# Patient Record
Sex: FEMALE | Race: WHITE | NOT HISPANIC OR LATINO | Employment: OTHER | URBAN - NONMETROPOLITAN AREA
[De-identification: names, ages, dates, MRNs, and addresses within clinical notes are randomized per-mention and may not be internally consistent; named-entity substitution may affect disease eponyms.]

---

## 2017-09-20 ENCOUNTER — ANESTHESIA (OUTPATIENT)
Dept: PERIOP | Facility: HOSPITAL | Age: 67
End: 2017-09-20

## 2017-09-20 ENCOUNTER — APPOINTMENT (OUTPATIENT)
Dept: GENERAL RADIOLOGY | Facility: HOSPITAL | Age: 67
End: 2017-09-20

## 2017-09-20 ENCOUNTER — ANESTHESIA EVENT (OUTPATIENT)
Dept: PERIOP | Facility: HOSPITAL | Age: 67
End: 2017-09-20

## 2017-09-20 ENCOUNTER — HOSPITAL ENCOUNTER (INPATIENT)
Facility: HOSPITAL | Age: 67
LOS: 13 days | Discharge: NURSING, REHAB OR LONG-TERM CARE | End: 2017-10-03
Attending: SURGERY | Admitting: SURGERY

## 2017-09-20 DIAGNOSIS — Z74.09 IMPAIRED FUNCTIONAL MOBILITY, BALANCE, GAIT, AND ENDURANCE: ICD-10-CM

## 2017-09-20 DIAGNOSIS — Z74.09 IMPAIRED MOBILITY AND ACTIVITIES OF DAILY LIVING: ICD-10-CM

## 2017-09-20 DIAGNOSIS — Z78.9 IMPAIRED MOBILITY AND ACTIVITIES OF DAILY LIVING: ICD-10-CM

## 2017-09-20 DIAGNOSIS — K65.1 INTRA-ABDOMINAL ABSCESS (HCC): Primary | ICD-10-CM

## 2017-09-20 LAB
ANION GAP SERPL CALCULATED.3IONS-SCNC: 11 MMOL/L (ref 5–15)
ARTERIAL PATENCY WRIST A: ABNORMAL
ATMOSPHERIC PRESS: ABNORMAL MMHG
BASE EXCESS BLDA CALC-SCNC: -0.8 MMOL/L (ref -2.4–2.4)
BASOPHILS # BLD AUTO: 0.01 10*3/MM3 (ref 0–0.2)
BASOPHILS NFR BLD AUTO: 0.1 % (ref 0–2)
BDY SITE: ABNORMAL
BUN BLD-MCNC: 23 MG/DL (ref 7–21)
BUN/CREAT SERPL: 18.5 (ref 7–25)
CA-I BLD-MCNC: 3.9 MG/DL (ref 4.5–4.9)
CALCIUM SPEC-SCNC: 7.2 MG/DL (ref 8.4–10.2)
CHLORIDE SERPL-SCNC: 91 MMOL/L (ref 95–110)
CO2 BLDA-SCNC: 21.1 MMOL/L (ref 23–27)
CO2 SERPL-SCNC: 21 MMOL/L (ref 22–31)
CREAT BLD-MCNC: 1.24 MG/DL (ref 0.5–1)
DEPRECATED RDW RBC AUTO: 46.5 FL (ref 36.4–46.3)
EOSINOPHIL # BLD AUTO: 0 10*3/MM3 (ref 0–0.7)
EOSINOPHIL NFR BLD AUTO: 0 % (ref 0–7)
ERYTHROCYTE [DISTWIDTH] IN BLOOD BY AUTOMATED COUNT: 14.2 % (ref 11.5–14.5)
GFR SERPL CREATININE-BSD FRML MDRD: 43 ML/MIN/1.73 (ref 60–104)
GLUCOSE BLD-MCNC: 129 MG/DL (ref 60–100)
GLUCOSE BLDA-MCNC: 127 MMOL/L
HCO3 BLDA-SCNC: 20.3 MMOL/L (ref 22–26)
HCT VFR BLD AUTO: 34.9 % (ref 35–45)
HCT VFR BLD CALC: 36 % (ref 38–47)
HGB BLD-MCNC: 11.5 G/DL (ref 12–15.5)
HGB BLDA-MCNC: 12.3 G/DL (ref 12–16)
IMM GRANULOCYTES # BLD: 0.13 10*3/MM3 (ref 0–0.02)
IMM GRANULOCYTES NFR BLD: 1 % (ref 0–0.5)
LYMPHOCYTES # BLD AUTO: 0.79 10*3/MM3 (ref 0.6–4.2)
LYMPHOCYTES NFR BLD AUTO: 6.2 % (ref 10–50)
MCH RBC QN AUTO: 29.6 PG (ref 26.5–34)
MCHC RBC AUTO-ENTMCNC: 33 G/DL (ref 31.4–36)
MCV RBC AUTO: 89.9 FL (ref 80–98)
MODALITY: ABNORMAL
MONOCYTES # BLD AUTO: 0.22 10*3/MM3 (ref 0–0.9)
MONOCYTES NFR BLD AUTO: 1.7 % (ref 0–12)
NEUTROPHILS # BLD AUTO: 11.58 10*3/MM3 (ref 2–8.6)
NEUTROPHILS NFR BLD AUTO: 91 % (ref 37–80)
PCO2 BLDA: 24.5 MM HG (ref 35–45)
PH BLDA: 7.54 PH UNITS (ref 7.35–7.45)
PLATELET # BLD AUTO: 192 10*3/MM3 (ref 150–450)
PMV BLD AUTO: 10.4 FL (ref 8–12)
PO2 BLDA: 203.7 MM HG (ref 80–105)
POTASSIUM BLD-SCNC: 3.4 MMOL/L (ref 3.5–5.1)
POTASSIUM BLDA-SCNC: 3.34 MMOL/L (ref 3.6–4.9)
RBC # BLD AUTO: 3.88 10*6/MM3 (ref 3.77–5.16)
SAO2 % BLDCOA: 99.4 %
SODIUM BLD-SCNC: 123 MMOL/L (ref 137–145)
SODIUM BLDA-SCNC: 124.7 MMOL/L (ref 138–146)
WBC NRBC COR # BLD: 12.73 10*3/MM3 (ref 3.2–9.8)

## 2017-09-20 PROCEDURE — 99283 EMERGENCY DEPT VISIT LOW MDM: CPT

## 2017-09-20 PROCEDURE — 25010000002 HYDROMORPHONE PER 4 MG: Performed by: ANESTHESIOLOGY

## 2017-09-20 PROCEDURE — 94799 UNLISTED PULMONARY SVC/PX: CPT

## 2017-09-20 PROCEDURE — 44140 PARTIAL REMOVAL OF COLON: CPT | Performed by: SURGERY

## 2017-09-20 PROCEDURE — 80048 BASIC METABOLIC PNL TOTAL CA: CPT | Performed by: SURGERY

## 2017-09-20 PROCEDURE — 82803 BLOOD GASES ANY COMBINATION: CPT | Performed by: SURGERY

## 2017-09-20 PROCEDURE — 94002 VENT MGMT INPAT INIT DAY: CPT

## 2017-09-20 PROCEDURE — 25010000002 PROPOFOL 10 MG/ML EMULSION: Performed by: ANESTHESIOLOGY

## 2017-09-20 PROCEDURE — 88307 TISSUE EXAM BY PATHOLOGIST: CPT | Performed by: PATHOLOGY

## 2017-09-20 PROCEDURE — 25010000002 PHENYLEPHRINE PER 1 ML: Performed by: ANESTHESIOLOGY

## 2017-09-20 PROCEDURE — 25010000002 FENTANYL CITRATE (PF) 100 MCG/2ML SOLUTION: Performed by: ANESTHESIOLOGY

## 2017-09-20 PROCEDURE — 71010 HC CHEST PA OR AP: CPT

## 2017-09-20 PROCEDURE — 85025 COMPLETE CBC W/AUTO DIFF WBC: CPT | Performed by: SURGERY

## 2017-09-20 PROCEDURE — 25010000002 SUCCINYLCHOLINE PER 20 MG: Performed by: ANESTHESIOLOGY

## 2017-09-20 PROCEDURE — 25010000002 HEPARIN (PORCINE) PER 1000 UNITS: Performed by: SURGERY

## 2017-09-20 PROCEDURE — 94760 N-INVAS EAR/PLS OXIMETRY 1: CPT

## 2017-09-20 PROCEDURE — 99024 POSTOP FOLLOW-UP VISIT: CPT | Performed by: SURGERY

## 2017-09-20 PROCEDURE — C1751 CATH, INF, PER/CENT/MIDLINE: HCPCS | Performed by: ANESTHESIOLOGY

## 2017-09-20 PROCEDURE — 44320 COLOSTOMY: CPT | Performed by: SURGERY

## 2017-09-20 PROCEDURE — 25010000002 MIDAZOLAM 50 MG/10ML SOLUTION 10 ML VIAL: Performed by: SURGERY

## 2017-09-20 PROCEDURE — 25010000002 LEVOFLOXACIN PER 250 MG: Performed by: SURGERY

## 2017-09-20 PROCEDURE — 25010000002 MIDAZOLAM PER 1 MG: Performed by: ANESTHESIOLOGY

## 2017-09-20 PROCEDURE — 88307 TISSUE EXAM BY PATHOLOGIST: CPT | Performed by: SURGERY

## 2017-09-20 RX ORDER — HYDROMORPHONE HCL 110MG/55ML
0.5 PATIENT CONTROLLED ANALGESIA SYRINGE INTRAVENOUS
Status: DISCONTINUED | OUTPATIENT
Start: 2017-09-20 | End: 2017-09-25

## 2017-09-20 RX ORDER — FENTANYL CITRATE 50 UG/ML
50 INJECTION, SOLUTION INTRAMUSCULAR; INTRAVENOUS
Status: DISCONTINUED | OUTPATIENT
Start: 2017-09-20 | End: 2017-09-25

## 2017-09-20 RX ORDER — PROPOFOL 10 MG/ML
VIAL (ML) INTRAVENOUS AS NEEDED
Status: DISCONTINUED | OUTPATIENT
Start: 2017-09-20 | End: 2017-09-20 | Stop reason: SURG

## 2017-09-20 RX ORDER — SUCCINYLCHOLINE CHLORIDE 20 MG/ML
INJECTION INTRAMUSCULAR; INTRAVENOUS AS NEEDED
Status: DISCONTINUED | OUTPATIENT
Start: 2017-09-20 | End: 2017-09-20 | Stop reason: SURG

## 2017-09-20 RX ORDER — PRIMIDONE 50 MG/1
TABLET ORAL 3 TIMES DAILY
COMMUNITY

## 2017-09-20 RX ORDER — FUROSEMIDE 80 MG
80 TABLET ORAL 2 TIMES DAILY
COMMUNITY
End: 2017-10-17 | Stop reason: HOSPADM

## 2017-09-20 RX ORDER — LEVOFLOXACIN 5 MG/ML
500 INJECTION, SOLUTION INTRAVENOUS EVERY 24 HOURS
Status: DISCONTINUED | OUTPATIENT
Start: 2017-09-20 | End: 2017-10-03 | Stop reason: HOSPADM

## 2017-09-20 RX ORDER — SODIUM CHLORIDE 0.9 % (FLUSH) 0.9 %
1-10 SYRINGE (ML) INJECTION AS NEEDED
Status: DISCONTINUED | OUTPATIENT
Start: 2017-09-20 | End: 2017-10-03 | Stop reason: HOSPADM

## 2017-09-20 RX ORDER — ACETAMINOPHEN 650 MG/1
650 SUPPOSITORY RECTAL ONCE AS NEEDED
Status: DISCONTINUED | OUTPATIENT
Start: 2017-09-20 | End: 2017-09-25

## 2017-09-20 RX ORDER — ALBUTEROL SULFATE 90 UG/1
2 AEROSOL, METERED RESPIRATORY (INHALATION) EVERY 4 HOURS PRN
COMMUNITY

## 2017-09-20 RX ORDER — POTASSIUM CHLORIDE 750 MG/1
10 CAPSULE, EXTENDED RELEASE ORAL 3 TIMES DAILY
COMMUNITY

## 2017-09-20 RX ORDER — SODIUM CHLORIDE 9 MG/ML
100 INJECTION, SOLUTION INTRAVENOUS CONTINUOUS
Status: DISCONTINUED | OUTPATIENT
Start: 2017-09-20 | End: 2017-09-25

## 2017-09-20 RX ORDER — ONDANSETRON 2 MG/ML
4 INJECTION INTRAMUSCULAR; INTRAVENOUS ONCE AS NEEDED
Status: COMPLETED | OUTPATIENT
Start: 2017-09-20 | End: 2017-09-22

## 2017-09-20 RX ORDER — GABAPENTIN 600 MG/1
600 TABLET ORAL 3 TIMES DAILY
COMMUNITY

## 2017-09-20 RX ORDER — ONDANSETRON 2 MG/ML
4 INJECTION INTRAMUSCULAR; INTRAVENOUS EVERY 6 HOURS PRN
Status: DISCONTINUED | OUTPATIENT
Start: 2017-09-20 | End: 2017-10-03 | Stop reason: HOSPADM

## 2017-09-20 RX ORDER — ACETAMINOPHEN 325 MG/1
650 TABLET ORAL ONCE AS NEEDED
Status: DISCONTINUED | OUTPATIENT
Start: 2017-09-20 | End: 2017-09-25

## 2017-09-20 RX ORDER — FLUMAZENIL 0.1 MG/ML
0.2 INJECTION INTRAVENOUS AS NEEDED
Status: DISCONTINUED | OUTPATIENT
Start: 2017-09-20 | End: 2017-09-25

## 2017-09-20 RX ORDER — ONDANSETRON 4 MG/1
4 TABLET, FILM COATED ORAL EVERY 6 HOURS PRN
Status: DISCONTINUED | OUTPATIENT
Start: 2017-09-20 | End: 2017-10-03 | Stop reason: HOSPADM

## 2017-09-20 RX ORDER — CHLORHEXIDINE GLUCONATE 0.12 MG/ML
15 RINSE ORAL EVERY 12 HOURS SCHEDULED
Status: DISCONTINUED | OUTPATIENT
Start: 2017-09-20 | End: 2017-10-03 | Stop reason: HOSPADM

## 2017-09-20 RX ORDER — MEMANTINE HYDROCHLORIDE 7 MG/1
28 CAPSULE, EXTENDED RELEASE ORAL DAILY
COMMUNITY

## 2017-09-20 RX ORDER — SODIUM CHLORIDE 9 MG/ML
INJECTION, SOLUTION INTRAVENOUS CONTINUOUS PRN
Status: DISCONTINUED | OUTPATIENT
Start: 2017-09-20 | End: 2017-09-20 | Stop reason: SURG

## 2017-09-20 RX ORDER — DONEPEZIL HYDROCHLORIDE 5 MG/1
5 TABLET, FILM COATED ORAL NIGHTLY
COMMUNITY

## 2017-09-20 RX ORDER — ROSUVASTATIN CALCIUM 10 MG/1
10 TABLET, COATED ORAL AS NEEDED
COMMUNITY

## 2017-09-20 RX ORDER — SODIUM CHLORIDE, SODIUM GLUCONATE, SODIUM ACETATE, POTASSIUM CHLORIDE, AND MAGNESIUM CHLORIDE 526; 502; 368; 37; 30 MG/100ML; MG/100ML; MG/100ML; MG/100ML; MG/100ML
INJECTION, SOLUTION INTRAVENOUS CONTINUOUS PRN
Status: DISCONTINUED | OUTPATIENT
Start: 2017-09-20 | End: 2017-09-20 | Stop reason: SURG

## 2017-09-20 RX ORDER — NALOXONE HCL 0.4 MG/ML
0.2 VIAL (ML) INJECTION AS NEEDED
Status: DISCONTINUED | OUTPATIENT
Start: 2017-09-20 | End: 2017-09-25

## 2017-09-20 RX ORDER — IPRATROPIUM BROMIDE AND ALBUTEROL SULFATE 2.5; .5 MG/3ML; MG/3ML
3 SOLUTION RESPIRATORY (INHALATION)
Status: DISCONTINUED | OUTPATIENT
Start: 2017-09-20 | End: 2017-10-03 | Stop reason: HOSPADM

## 2017-09-20 RX ORDER — NYSTATIN 100000 [USP'U]/G
POWDER TOPICAL EVERY 12 HOURS SCHEDULED
Status: DISCONTINUED | OUTPATIENT
Start: 2017-09-20 | End: 2017-10-03 | Stop reason: HOSPADM

## 2017-09-20 RX ORDER — LEVOTHYROXINE SODIUM 0.15 MG/1
150137 TABLET ORAL DAILY
COMMUNITY
End: 2019-05-24

## 2017-09-20 RX ORDER — DIPHENHYDRAMINE HYDROCHLORIDE 50 MG/ML
12.5 INJECTION INTRAMUSCULAR; INTRAVENOUS
Status: DISCONTINUED | OUTPATIENT
Start: 2017-09-20 | End: 2017-09-25

## 2017-09-20 RX ORDER — LIDOCAINE HYDROCHLORIDE 20 MG/ML
INJECTION, SOLUTION INFILTRATION; PERINEURAL AS NEEDED
Status: DISCONTINUED | OUTPATIENT
Start: 2017-09-20 | End: 2017-09-20 | Stop reason: SURG

## 2017-09-20 RX ORDER — FAMOTIDINE 10 MG/ML
20 INJECTION, SOLUTION INTRAVENOUS 2 TIMES DAILY
Status: DISCONTINUED | OUTPATIENT
Start: 2017-09-20 | End: 2017-10-03 | Stop reason: HOSPADM

## 2017-09-20 RX ORDER — MIRTAZAPINE 30 MG/1
30 TABLET, FILM COATED ORAL NIGHTLY
COMMUNITY
End: 2019-05-24

## 2017-09-20 RX ORDER — ONDANSETRON 4 MG/1
4 TABLET, ORALLY DISINTEGRATING ORAL EVERY 6 HOURS PRN
Status: DISCONTINUED | OUTPATIENT
Start: 2017-09-20 | End: 2017-10-03 | Stop reason: HOSPADM

## 2017-09-20 RX ORDER — ONDANSETRON 4 MG/1
4 TABLET, FILM COATED ORAL EVERY 8 HOURS PRN
COMMUNITY

## 2017-09-20 RX ORDER — VECURONIUM BROMIDE 20 MG/20ML
INJECTION, POWDER, LYOPHILIZED, FOR SOLUTION INTRAVENOUS AS NEEDED
Status: DISCONTINUED | OUTPATIENT
Start: 2017-09-20 | End: 2017-09-20 | Stop reason: SURG

## 2017-09-20 RX ORDER — EPHEDRINE SULFATE 50 MG/ML
5 INJECTION, SOLUTION INTRAVENOUS ONCE AS NEEDED
Status: DISCONTINUED | OUTPATIENT
Start: 2017-09-20 | End: 2017-09-21

## 2017-09-20 RX ORDER — DICYCLOMINE HYDROCHLORIDE 10 MG/1
10 CAPSULE ORAL 4 TIMES DAILY PRN
COMMUNITY
End: 2019-05-24

## 2017-09-20 RX ORDER — LABETALOL HYDROCHLORIDE 5 MG/ML
5 INJECTION, SOLUTION INTRAVENOUS
Status: DISCONTINUED | OUTPATIENT
Start: 2017-09-20 | End: 2017-09-25

## 2017-09-20 RX ORDER — CITALOPRAM 40 MG/1
40 TABLET ORAL DAILY
COMMUNITY
End: 2019-05-24

## 2017-09-20 RX ORDER — HYDROCODONE BITARTRATE AND ACETAMINOPHEN 7.5; 325 MG/1; MG/1
1 TABLET ORAL EVERY 4 HOURS PRN
Status: ON HOLD | COMMUNITY
End: 2017-10-03

## 2017-09-20 RX ORDER — FENTANYL CITRATE 50 UG/ML
INJECTION, SOLUTION INTRAMUSCULAR; INTRAVENOUS AS NEEDED
Status: DISCONTINUED | OUTPATIENT
Start: 2017-09-20 | End: 2017-09-20 | Stop reason: SURG

## 2017-09-20 RX ORDER — HEPARIN SODIUM 5000 [USP'U]/ML
5000 INJECTION, SOLUTION INTRAVENOUS; SUBCUTANEOUS EVERY 8 HOURS SCHEDULED
Status: DISCONTINUED | OUTPATIENT
Start: 2017-09-20 | End: 2017-10-03 | Stop reason: HOSPADM

## 2017-09-20 RX ORDER — MIDAZOLAM HYDROCHLORIDE 1 MG/ML
INJECTION INTRAMUSCULAR; INTRAVENOUS AS NEEDED
Status: DISCONTINUED | OUTPATIENT
Start: 2017-09-20 | End: 2017-09-20 | Stop reason: SURG

## 2017-09-20 RX ORDER — TIZANIDINE 4 MG/1
4 TABLET ORAL EVERY 12 HOURS PRN
COMMUNITY
End: 2019-05-08

## 2017-09-20 RX ORDER — CHOLESTYRAMINE 4 G/9G
3 POWDER, FOR SUSPENSION ORAL
COMMUNITY
End: 2017-10-17 | Stop reason: HOSPADM

## 2017-09-20 RX ADMIN — PROPOFOL 150 MG: 10 INJECTION, EMULSION INTRAVENOUS at 02:14

## 2017-09-20 RX ADMIN — SODIUM CHLORIDE 125 ML/HR: 900 INJECTION, SOLUTION INTRAVENOUS at 23:23

## 2017-09-20 RX ADMIN — MIDAZOLAM 7 MG/HR: 5 INJECTION INTRAMUSCULAR; INTRAVENOUS at 22:45

## 2017-09-20 RX ADMIN — LIDOCAINE HYDROCHLORIDE 60 MG: 20 INJECTION, SOLUTION INFILTRATION; PERINEURAL at 02:14

## 2017-09-20 RX ADMIN — METRONIDAZOLE 500 MG: 500 INJECTION, SOLUTION INTRAVENOUS at 21:12

## 2017-09-20 RX ADMIN — CHLORHEXIDINE GLUCONATE 15 ML: 1.2 RINSE ORAL at 09:00

## 2017-09-20 RX ADMIN — MIDAZOLAM 1 MG: 1 INJECTION INTRAMUSCULAR; INTRAVENOUS at 02:08

## 2017-09-20 RX ADMIN — HYDROMORPHONE HYDROCHLORIDE 0.5 MG: 2 INJECTION, SOLUTION INTRAMUSCULAR; INTRAVENOUS; SUBCUTANEOUS at 09:12

## 2017-09-20 RX ADMIN — FENTANYL CITRATE 50 MCG: 50 INJECTION, SOLUTION INTRAMUSCULAR; INTRAVENOUS at 02:10

## 2017-09-20 RX ADMIN — NYSTATIN: 100000 POWDER TOPICAL at 20:31

## 2017-09-20 RX ADMIN — NYSTATIN: 100000 POWDER TOPICAL at 12:52

## 2017-09-20 RX ADMIN — HYDROMORPHONE HYDROCHLORIDE 0.5 MG: 2 INJECTION, SOLUTION INTRAMUSCULAR; INTRAVENOUS; SUBCUTANEOUS at 04:52

## 2017-09-20 RX ADMIN — VECURONIUM BROMIDE 2 MG: 1 INJECTION, POWDER, LYOPHILIZED, FOR SOLUTION INTRAVENOUS at 03:15

## 2017-09-20 RX ADMIN — CALCIUM CHLORIDE 1 G: 100 INJECTION, SOLUTION INTRAVENOUS at 07:47

## 2017-09-20 RX ADMIN — SODIUM CHLORIDE 125 ML/HR: 900 INJECTION, SOLUTION INTRAVENOUS at 12:54

## 2017-09-20 RX ADMIN — SUCCINYLCHOLINE CHLORIDE 120 MG: 20 INJECTION, SOLUTION INTRAMUSCULAR; INTRAVENOUS at 02:14

## 2017-09-20 RX ADMIN — PHENYLEPHRINE HYDROCHLORIDE 100 MCG: 10 INJECTION INTRAVENOUS at 03:30

## 2017-09-20 RX ADMIN — SODIUM CHLORIDE, SODIUM GLUCONATE, SODIUM ACETATE, POTASSIUM CHLORIDE, AND MAGNESIUM CHLORIDE: 526; 502; 368; 37; 30 INJECTION, SOLUTION INTRAVENOUS at 02:31

## 2017-09-20 RX ADMIN — SODIUM CHLORIDE: 9 INJECTION, SOLUTION INTRAVENOUS at 02:08

## 2017-09-20 RX ADMIN — MIDAZOLAM 1 MG: 1 INJECTION INTRAMUSCULAR; INTRAVENOUS at 04:17

## 2017-09-20 RX ADMIN — PHENYLEPHRINE HYDROCHLORIDE 100 MCG: 10 INJECTION INTRAVENOUS at 03:15

## 2017-09-20 RX ADMIN — VECURONIUM BROMIDE 10 MG: 1 INJECTION, POWDER, LYOPHILIZED, FOR SOLUTION INTRAVENOUS at 02:31

## 2017-09-20 RX ADMIN — LEVOFLOXACIN 500 MG: 5 INJECTION, SOLUTION INTRAVENOUS at 13:49

## 2017-09-20 RX ADMIN — HEPARIN SODIUM 5000 UNITS: 5000 INJECTION, SOLUTION INTRAVENOUS; SUBCUTANEOUS at 13:49

## 2017-09-20 RX ADMIN — METRONIDAZOLE 500 MG: 500 INJECTION, SOLUTION INTRAVENOUS at 06:14

## 2017-09-20 RX ADMIN — METRONIDAZOLE 500 MG: 500 INJECTION, SOLUTION INTRAVENOUS at 13:49

## 2017-09-20 RX ADMIN — MIDAZOLAM 1 MG/HR: 5 INJECTION INTRAMUSCULAR; INTRAVENOUS at 15:20

## 2017-09-20 RX ADMIN — HEPARIN SODIUM 5000 UNITS: 5000 INJECTION, SOLUTION INTRAVENOUS; SUBCUTANEOUS at 21:11

## 2017-09-20 RX ADMIN — PROPOFOL 20 MCG/KG/MIN: 10 INJECTION, EMULSION INTRAVENOUS at 04:43

## 2017-09-20 RX ADMIN — CHLORHEXIDINE GLUCONATE 15 ML: 1.2 RINSE ORAL at 20:31

## 2017-09-20 RX ADMIN — FAMOTIDINE 20 MG: 10 INJECTION INTRAVENOUS at 12:51

## 2017-09-20 NOTE — ANESTHESIA PROCEDURE NOTES
Arterial Line    Patient location during procedure: OR   Line placed for hemodynamic monitoring, ABGs/Labs/ISTAT and MD/Surgeon request.  Performed By   Anesthesiologist: VANESA GARG  Preanesthetic Checklist  Completed: patient identified, site marked, surgical consent, pre-op evaluation, timeout performed, IV checked, risks and benefits discussed and monitors and equipment checked  Arterial Line Prep   Sterile Tech: mask, gloves, cap, gown and sterile barriers  Prep: ChloraPrep  Patient monitoring: blood pressure monitoring, continuous pulse oximetry and EKG  Arterial Line Procedure   Laterality:right  Location:  radial artery  Catheter size: 20 G   Guidance: landmark technique and palpation technique  Number of attempts: 1  Successful placement: yes          Post Assessment   Dressing Type: biopatch applied, occlusive dressing applied, secured with tape and wrist guard applied.   Complications no  Circ/Move/Sens Assessment: normal.   Patient Tolerance: patient tolerated the procedure well with no apparent complications

## 2017-09-20 NOTE — ANESTHESIA POSTPROCEDURE EVALUATION
Patient: Irma Pacheco    Procedure Summary     Date Anesthesia Start Anesthesia Stop Room / Location    09/20/17 0208 [id number] [facility] OR       Procedure Diagnosis Surgeon Provider    Laparotomy, possible colon resection, possible colostomy, possible gastrostomy tube placement (N/A Abdomen) Intra-abdominal abscess  (Intra-abdominal abscess [K65.1]) MD Owen Francis MD          Anesthesia Type: general  Last vitals  BP        Temp        Pulse       Resp        SpO2          Post Anesthesia Care and Evaluation    Patient location during evaluation: PACU  Patient participation: complete - patient cannot participate  Pain management: adequate  Airway patency: patent  Anesthetic complications: No anesthetic complications  PONV Status: none  Cardiovascular status: acceptable  Respiratory status: ventilator, intubated and ETT  Hydration status: acceptable    Comments: Patient was sedated and intubated

## 2017-09-20 NOTE — ANESTHESIA PREPROCEDURE EVALUATION
Anesthesia Evaluation     Patient summary reviewed and Nursing notes reviewed   NPO Solid Status: Waived due to emergency       Airway   Mallampati: III  TM distance: <3 FB  Neck ROM: full  difficult intubation highly probable  Dental    (+) poor dentation    Comment: Upper and lower remaining dentition in hopeless repair.    Pulmonary - normal exam    breath sounds clear to auscultation  (+) asthma, sleep apnea on CPAP,   Cardiovascular - normal exam    Rhythm: regular  Rate: normal    (+) CHF, hyperlipidemia      Neuro/Psych  (+) psychiatric history Depression,      ROS Comment: Parkinsons.  GI/Hepatic/Renal/Endo    (+) morbid obesity,     ROS Comment: HGB 11 [employer] witness. Does not want blood products.    Musculoskeletal (-) negative ROS    Abdominal   (+) obese,    Substance History - negative use     OB/GYN negative ob/gyn ROS         Other - negative ROS                                   Anesthesia Plan    ASA 4 - emergent     general   Rapid sequence(Discussed central line,arterial line,post op ventilation and patient,daughter understand possible complications,risks and agrees.)  intravenous induction   Anesthetic plan and risks discussed with patient and child.  Use of blood products discussed with patient and child  Did not consent to blood products. Special considerations: Restoration.   Plan discussed with CRNA.

## 2017-09-20 NOTE — ANESTHESIA PROCEDURE NOTES
Central Line    Patient location during procedure: OR  Indications: central pressure monitoring, vascular access and MD/Surgeon request  Staff  Anesthesiologist: VANESA GARG  Preanesthetic Checklist  Completed: patient identified, site marked, surgical consent, pre-op evaluation, timeout performed, IV checked, risks and benefits discussed and monitors and equipment checked  Central Line Prep  Sterile Tech:cap, gloves, gown, mask and sterile barriers  Prep: chloraprep  Patient monitoring: blood pressure monitoring, continuous pulse oximetry and EKG  Central Line Procedure  Laterality:right  Location:internal jugular  Catheter Type:double lumen  Catheter Size:7 Fr  Guidance:landmark technique and palpation technique  Assessment  Post procedure:biopatch applied, line sutured and occlusive dressing applied  Assessement:blood return through all ports and free fluid flow  Complications:no  Patient Tolerance:patient tolerated the procedure well with no apparent complications  Additional Notes  Ultrasound Interpretation:  Using ultrasound guidance the potential vascular sites for insertion of the catheter were visualized to determine the patency of the vessel to be used for vascular access.  After selecting the appropriate site for insertion, the needle was visualized under ultrasound being inserted into the vessel, followed by ultrasound confirmation of wire and catheter placement.  There were no abnormalities seen on ultrasound; an image was taken/ and the patient tolerated the procedure with no complications.

## 2017-09-20 NOTE — ANESTHESIA PROCEDURE NOTES
Airway  Urgency: emergent    Airway not difficult    General Information and Staff    Patient location during procedure: OR  Anesthesiologist: VANESA GARG    Consent for Airway (if performed for an anesthetic, see related documentation for consents)  Patient identity confirmed: arm band and verbally with patient  Consent: No emergent situation. Verbal consent obtained. Written consent obtained.  Risks and benefits: risks, benefits and alternatives were discussed  Consent given by: patient and guardian      Indications and Patient Condition  Indications for airway management: airway protection    Preoxygenated: yes  MILS maintained throughout  Mask difficulty assessment: 0 - not attempted    Final Airway Details  Final airway type: endotracheal airway      Successful airway: ETT  Cuffed: yes   Successful intubation technique: direct laryngoscopy  Facilitating devices/methods: cricoid pressure and intubating stylet  Endotracheal tube insertion site: oral  Blade: Srikanth  Blade size: #3  ETT size: 7.0 mm  Cormack-Lehane Classification: grade IIa - partial view of glottis  Placement verified by: chest auscultation and capnometry   Cuff volume (mL): 5  Measured from: lips  Number of attempts at approach: 1

## 2017-09-21 ENCOUNTER — APPOINTMENT (OUTPATIENT)
Dept: GENERAL RADIOLOGY | Facility: HOSPITAL | Age: 67
End: 2017-09-21

## 2017-09-21 LAB
ANION GAP SERPL CALCULATED.3IONS-SCNC: 10 MMOL/L (ref 5–15)
ARTERIAL PATENCY WRIST A: ABNORMAL
ARTERIAL PATENCY WRIST A: ABNORMAL
ATMOSPHERIC PRESS: ABNORMAL MMHG
ATMOSPHERIC PRESS: ABNORMAL MMHG
BASE EXCESS BLDA CALC-SCNC: 1.4 MMOL/L (ref -2.4–2.4)
BASE EXCESS BLDA CALC-SCNC: 1.6 MMOL/L (ref -2.4–2.4)
BDY SITE: ABNORMAL
BDY SITE: ABNORMAL
BUN BLD-MCNC: 24 MG/DL (ref 7–21)
BUN/CREAT SERPL: 19.7 (ref 7–25)
CA-I BLD-MCNC: 3.9 MG/DL (ref 4.5–4.9)
CA-I BLD-MCNC: 4 MG/DL (ref 4.5–4.9)
CALCIUM SPEC-SCNC: 7.4 MG/DL (ref 8.4–10.2)
CHLORIDE SERPL-SCNC: 97 MMOL/L (ref 95–110)
CO2 BLDA-SCNC: 25.5 MMOL/L (ref 23–27)
CO2 BLDA-SCNC: 25.9 MMOL/L (ref 23–27)
CO2 SERPL-SCNC: 22 MMOL/L (ref 22–31)
CREAT BLD-MCNC: 1.22 MG/DL (ref 0.5–1)
DEPRECATED RDW RBC AUTO: 46.6 FL (ref 36.4–46.3)
ERYTHROCYTE [DISTWIDTH] IN BLOOD BY AUTOMATED COUNT: 14 % (ref 11.5–14.5)
GFR SERPL CREATININE-BSD FRML MDRD: 44 ML/MIN/1.73 (ref 60–104)
GLUCOSE BLD-MCNC: 91 MG/DL (ref 60–100)
GLUCOSE BLDA-MCNC: 102 MMOL/L
GLUCOSE BLDA-MCNC: 95 MMOL/L
HCO3 BLDA-SCNC: 24.5 MMOL/L (ref 22–26)
HCO3 BLDA-SCNC: 24.8 MMOL/L (ref 22–26)
HCT VFR BLD AUTO: 28.6 % (ref 35–45)
HCT VFR BLD CALC: 28 % (ref 38–47)
HCT VFR BLD CALC: 30 % (ref 38–47)
HGB BLD-MCNC: 9.5 G/DL (ref 12–15.5)
HGB BLDA-MCNC: 10.2 G/DL (ref 12–16)
HGB BLDA-MCNC: 9.5 G/DL (ref 12–16)
LAB AP CASE REPORT: NORMAL
Lab: NORMAL
MCH RBC QN AUTO: 30 PG (ref 26.5–34)
MCHC RBC AUTO-ENTMCNC: 33.2 G/DL (ref 31.4–36)
MCV RBC AUTO: 90.2 FL (ref 80–98)
MODALITY: ABNORMAL
MODALITY: ABNORMAL
PATH REPORT.FINAL DX SPEC: NORMAL
PATH REPORT.GROSS SPEC: NORMAL
PCO2 BLDA: 31.9 MM HG (ref 35–45)
PCO2 BLDA: 34.9 MM HG (ref 35–45)
PH BLDA: 7.47 PH UNITS (ref 7.35–7.45)
PH BLDA: 7.5 PH UNITS (ref 7.35–7.45)
PLATELET # BLD AUTO: 160 10*3/MM3 (ref 150–450)
PMV BLD AUTO: 10.5 FL (ref 8–12)
PO2 BLDA: 86 MM HG (ref 80–105)
PO2 BLDA: 95.8 MM HG (ref 80–105)
POTASSIUM BLD-SCNC: 3.4 MMOL/L (ref 3.5–5.1)
POTASSIUM BLDA-SCNC: 3.17 MMOL/L (ref 3.6–4.9)
POTASSIUM BLDA-SCNC: 3.34 MMOL/L (ref 3.6–4.9)
RBC # BLD AUTO: 3.17 10*6/MM3 (ref 3.77–5.16)
SAO2 % BLDCOA: 96.9 % (ref 94–100)
SAO2 % BLDCOA: 97.4 % (ref 94–100)
SODIUM BLD-SCNC: 129 MMOL/L (ref 137–145)
SODIUM BLDA-SCNC: 127.5 MMOL/L (ref 138–146)
SODIUM BLDA-SCNC: 129.2 MMOL/L (ref 138–146)
WBC NRBC COR # BLD: 13.51 10*3/MM3 (ref 3.2–9.8)

## 2017-09-21 PROCEDURE — 80048 BASIC METABOLIC PNL TOTAL CA: CPT | Performed by: SURGERY

## 2017-09-21 PROCEDURE — 94799 UNLISTED PULMONARY SVC/PX: CPT

## 2017-09-21 PROCEDURE — 25010000002 ONDANSETRON PER 1 MG: Performed by: SURGERY

## 2017-09-21 PROCEDURE — 99024 POSTOP FOLLOW-UP VISIT: CPT | Performed by: SURGERY

## 2017-09-21 PROCEDURE — 94640 AIRWAY INHALATION TREATMENT: CPT

## 2017-09-21 PROCEDURE — 25010000002 LEVOFLOXACIN PER 250 MG: Performed by: SURGERY

## 2017-09-21 PROCEDURE — 94760 N-INVAS EAR/PLS OXIMETRY 1: CPT

## 2017-09-21 PROCEDURE — 85027 COMPLETE CBC AUTOMATED: CPT | Performed by: SURGERY

## 2017-09-21 PROCEDURE — 82803 BLOOD GASES ANY COMBINATION: CPT | Performed by: SURGERY

## 2017-09-21 PROCEDURE — 94003 VENT MGMT INPAT SUBQ DAY: CPT

## 2017-09-21 PROCEDURE — 25010000002 HEPARIN (PORCINE) PER 1000 UNITS: Performed by: SURGERY

## 2017-09-21 PROCEDURE — 36600 WITHDRAWAL OF ARTERIAL BLOOD: CPT

## 2017-09-21 PROCEDURE — 71010 HC CHEST PA OR AP: CPT

## 2017-09-21 PROCEDURE — 25010000002 DIPHENHYDRAMINE PER 50 MG: Performed by: ANESTHESIOLOGY

## 2017-09-21 PROCEDURE — 94660 CPAP INITIATION&MGMT: CPT

## 2017-09-21 PROCEDURE — 25010000002 HYDROMORPHONE PER 4 MG: Performed by: ANESTHESIOLOGY

## 2017-09-21 PROCEDURE — 25010000002 FENTANYL CITRATE (PF) 100 MCG/2ML SOLUTION: Performed by: SURGERY

## 2017-09-21 PROCEDURE — 25010000002 MIDAZOLAM 50 MG/10ML SOLUTION 10 ML VIAL: Performed by: SURGERY

## 2017-09-21 RX ADMIN — HEPARIN SODIUM 5000 UNITS: 5000 INJECTION, SOLUTION INTRAVENOUS; SUBCUTANEOUS at 14:30

## 2017-09-21 RX ADMIN — MIDAZOLAM 7 MG/HR: 5 INJECTION INTRAMUSCULAR; INTRAVENOUS at 05:10

## 2017-09-21 RX ADMIN — HYDROMORPHONE HYDROCHLORIDE 0.5 MG: 2 INJECTION, SOLUTION INTRAMUSCULAR; INTRAVENOUS; SUBCUTANEOUS at 09:53

## 2017-09-21 RX ADMIN — HYDROMORPHONE HYDROCHLORIDE 0.5 MG: 2 INJECTION, SOLUTION INTRAMUSCULAR; INTRAVENOUS; SUBCUTANEOUS at 14:38

## 2017-09-21 RX ADMIN — FENTANYL CITRATE 50 MCG: 50 INJECTION, SOLUTION INTRAMUSCULAR; INTRAVENOUS at 02:04

## 2017-09-21 RX ADMIN — METRONIDAZOLE 500 MG: 500 INJECTION, SOLUTION INTRAVENOUS at 14:30

## 2017-09-21 RX ADMIN — NYSTATIN: 100000 POWDER TOPICAL at 08:01

## 2017-09-21 RX ADMIN — HYDROMORPHONE HYDROCHLORIDE 0.5 MG: 2 INJECTION, SOLUTION INTRAMUSCULAR; INTRAVENOUS; SUBCUTANEOUS at 17:37

## 2017-09-21 RX ADMIN — FENTANYL CITRATE 50 MCG: 50 INJECTION, SOLUTION INTRAMUSCULAR; INTRAVENOUS at 03:15

## 2017-09-21 RX ADMIN — HYDROMORPHONE HYDROCHLORIDE 0.5 MG: 2 INJECTION, SOLUTION INTRAMUSCULAR; INTRAVENOUS; SUBCUTANEOUS at 19:31

## 2017-09-21 RX ADMIN — METRONIDAZOLE 500 MG: 500 INJECTION, SOLUTION INTRAVENOUS at 21:42

## 2017-09-21 RX ADMIN — METRONIDAZOLE 500 MG: 500 INJECTION, SOLUTION INTRAVENOUS at 05:34

## 2017-09-21 RX ADMIN — FAMOTIDINE 20 MG: 10 INJECTION INTRAVENOUS at 08:01

## 2017-09-21 RX ADMIN — DIPHENHYDRAMINE HYDROCHLORIDE 12.5 MG: 50 INJECTION INTRAMUSCULAR; INTRAVENOUS at 22:14

## 2017-09-21 RX ADMIN — NYSTATIN: 100000 POWDER TOPICAL at 21:34

## 2017-09-21 RX ADMIN — IPRATROPIUM BROMIDE AND ALBUTEROL SULFATE 3 ML: 2.5; .5 SOLUTION RESPIRATORY (INHALATION) at 19:33

## 2017-09-21 RX ADMIN — SODIUM CHLORIDE 125 ML/HR: 900 INJECTION, SOLUTION INTRAVENOUS at 08:26

## 2017-09-21 RX ADMIN — FAMOTIDINE 20 MG: 10 INJECTION INTRAVENOUS at 17:21

## 2017-09-21 RX ADMIN — SODIUM CHLORIDE 125 ML/HR: 900 INJECTION, SOLUTION INTRAVENOUS at 17:21

## 2017-09-21 RX ADMIN — HYDROMORPHONE HYDROCHLORIDE 0.5 MG: 2 INJECTION, SOLUTION INTRAMUSCULAR; INTRAVENOUS; SUBCUTANEOUS at 21:34

## 2017-09-21 RX ADMIN — CHLORHEXIDINE GLUCONATE 15 ML: 1.2 RINSE ORAL at 21:34

## 2017-09-21 RX ADMIN — HEPARIN SODIUM 5000 UNITS: 5000 INJECTION, SOLUTION INTRAVENOUS; SUBCUTANEOUS at 05:32

## 2017-09-21 RX ADMIN — IPRATROPIUM BROMIDE AND ALBUTEROL SULFATE 3 ML: 2.5; .5 SOLUTION RESPIRATORY (INHALATION) at 13:39

## 2017-09-21 RX ADMIN — HEPARIN SODIUM 5000 UNITS: 5000 INJECTION, SOLUTION INTRAVENOUS; SUBCUTANEOUS at 21:42

## 2017-09-21 RX ADMIN — HYDROMORPHONE HYDROCHLORIDE 0.5 MG: 2 INJECTION, SOLUTION INTRAMUSCULAR; INTRAVENOUS; SUBCUTANEOUS at 08:00

## 2017-09-21 RX ADMIN — CHLORHEXIDINE GLUCONATE 15 ML: 1.2 RINSE ORAL at 08:01

## 2017-09-21 RX ADMIN — LEVOFLOXACIN 500 MG: 5 INJECTION, SOLUTION INTRAVENOUS at 12:45

## 2017-09-21 RX ADMIN — ONDANSETRON 4 MG: 2 INJECTION INTRAMUSCULAR; INTRAVENOUS at 09:58

## 2017-09-22 ENCOUNTER — APPOINTMENT (OUTPATIENT)
Dept: GENERAL RADIOLOGY | Facility: HOSPITAL | Age: 67
End: 2017-09-22

## 2017-09-22 LAB
ANION GAP SERPL CALCULATED.3IONS-SCNC: 10 MMOL/L (ref 5–15)
BUN BLD-MCNC: 24 MG/DL (ref 7–21)
BUN/CREAT SERPL: 20.7 (ref 7–25)
CALCIUM SPEC-SCNC: 7.5 MG/DL (ref 8.4–10.2)
CHLORIDE SERPL-SCNC: 100 MMOL/L (ref 95–110)
CO2 SERPL-SCNC: 23 MMOL/L (ref 22–31)
CREAT BLD-MCNC: 1.16 MG/DL (ref 0.5–1)
DEPRECATED RDW RBC AUTO: 47.6 FL (ref 36.4–46.3)
ERYTHROCYTE [DISTWIDTH] IN BLOOD BY AUTOMATED COUNT: 14.3 % (ref 11.5–14.5)
GFR SERPL CREATININE-BSD FRML MDRD: 47 ML/MIN/1.73 (ref 45–104)
GLUCOSE BLD-MCNC: 79 MG/DL (ref 60–100)
HCT VFR BLD AUTO: 27.6 % (ref 35–45)
HGB BLD-MCNC: 9 G/DL (ref 12–15.5)
MCH RBC QN AUTO: 29.7 PG (ref 26.5–34)
MCHC RBC AUTO-ENTMCNC: 32.6 G/DL (ref 31.4–36)
MCV RBC AUTO: 91.1 FL (ref 80–98)
PLATELET # BLD AUTO: 188 10*3/MM3 (ref 150–450)
PMV BLD AUTO: 11.1 FL (ref 8–12)
POTASSIUM BLD-SCNC: 3.3 MMOL/L (ref 3.5–5.1)
RBC # BLD AUTO: 3.03 10*6/MM3 (ref 3.77–5.16)
SODIUM BLD-SCNC: 133 MMOL/L (ref 137–145)
WBC NRBC COR # BLD: 14.46 10*3/MM3 (ref 3.2–9.8)

## 2017-09-22 PROCEDURE — 25010000002 HYDROMORPHONE PER 4 MG: Performed by: ANESTHESIOLOGY

## 2017-09-22 PROCEDURE — 94799 UNLISTED PULMONARY SVC/PX: CPT

## 2017-09-22 PROCEDURE — 85027 COMPLETE CBC AUTOMATED: CPT | Performed by: SURGERY

## 2017-09-22 PROCEDURE — 25010000002 FENTANYL CITRATE (PF) 100 MCG/2ML SOLUTION: Performed by: SURGERY

## 2017-09-22 PROCEDURE — 25010000002 LEVOFLOXACIN PER 250 MG: Performed by: SURGERY

## 2017-09-22 PROCEDURE — 25010000003 POTASSIUM CHLORIDE 10 MEQ/100ML SOLUTION: Performed by: SURGERY

## 2017-09-22 PROCEDURE — 25010000002 ONDANSETRON PER 1 MG: Performed by: SURGERY

## 2017-09-22 PROCEDURE — 94760 N-INVAS EAR/PLS OXIMETRY 1: CPT

## 2017-09-22 PROCEDURE — 25010000002 HEPARIN (PORCINE) PER 1000 UNITS: Performed by: SURGERY

## 2017-09-22 PROCEDURE — 80048 BASIC METABOLIC PNL TOTAL CA: CPT | Performed by: SURGERY

## 2017-09-22 PROCEDURE — 99024 POSTOP FOLLOW-UP VISIT: CPT | Performed by: SURGERY

## 2017-09-22 PROCEDURE — 25010000002 ONDANSETRON PER 1 MG: Performed by: ANESTHESIOLOGY

## 2017-09-22 PROCEDURE — 94660 CPAP INITIATION&MGMT: CPT

## 2017-09-22 PROCEDURE — 71010 HC CHEST PA OR AP: CPT

## 2017-09-22 PROCEDURE — 25010000002 FUROSEMIDE PER 20 MG: Performed by: SURGERY

## 2017-09-22 RX ORDER — LEVOTHYROXINE SODIUM 0.15 MG/1
150 TABLET ORAL
Status: DISCONTINUED | OUTPATIENT
Start: 2017-09-22 | End: 2017-10-03 | Stop reason: HOSPADM

## 2017-09-22 RX ORDER — PRIMIDONE 50 MG/1
50 TABLET ORAL EVERY 8 HOURS SCHEDULED
Status: DISCONTINUED | OUTPATIENT
Start: 2017-09-22 | End: 2017-10-03 | Stop reason: HOSPADM

## 2017-09-22 RX ORDER — POTASSIUM CHLORIDE 7.45 MG/ML
10 INJECTION INTRAVENOUS
Status: COMPLETED | OUTPATIENT
Start: 2017-09-22 | End: 2017-09-22

## 2017-09-22 RX ORDER — FUROSEMIDE 10 MG/ML
20 INJECTION INTRAMUSCULAR; INTRAVENOUS DAILY
Status: DISCONTINUED | OUTPATIENT
Start: 2017-09-22 | End: 2017-09-25

## 2017-09-22 RX ORDER — MIRTAZAPINE 15 MG/1
30 TABLET, FILM COATED ORAL NIGHTLY
Status: DISCONTINUED | OUTPATIENT
Start: 2017-09-22 | End: 2017-10-03 | Stop reason: HOSPADM

## 2017-09-22 RX ORDER — CITALOPRAM 40 MG/1
40 TABLET ORAL DAILY
Status: DISCONTINUED | OUTPATIENT
Start: 2017-09-22 | End: 2017-10-03 | Stop reason: HOSPADM

## 2017-09-22 RX ADMIN — FAMOTIDINE 20 MG: 10 INJECTION INTRAVENOUS at 17:17

## 2017-09-22 RX ADMIN — MIRTAZAPINE 30 MG: 15 TABLET, FILM COATED ORAL at 20:37

## 2017-09-22 RX ADMIN — HYDROMORPHONE HYDROCHLORIDE 0.5 MG: 2 INJECTION, SOLUTION INTRAMUSCULAR; INTRAVENOUS; SUBCUTANEOUS at 07:25

## 2017-09-22 RX ADMIN — FENTANYL CITRATE 50 MCG: 50 INJECTION, SOLUTION INTRAMUSCULAR; INTRAVENOUS at 23:13

## 2017-09-22 RX ADMIN — HYDROMORPHONE HYDROCHLORIDE 0.5 MG: 2 INJECTION, SOLUTION INTRAMUSCULAR; INTRAVENOUS; SUBCUTANEOUS at 01:54

## 2017-09-22 RX ADMIN — ONDANSETRON 4 MG: 2 INJECTION INTRAMUSCULAR; INTRAVENOUS at 11:26

## 2017-09-22 RX ADMIN — IPRATROPIUM BROMIDE AND ALBUTEROL SULFATE 3 ML: 2.5; .5 SOLUTION RESPIRATORY (INHALATION) at 18:47

## 2017-09-22 RX ADMIN — METRONIDAZOLE 500 MG: 500 INJECTION, SOLUTION INTRAVENOUS at 20:36

## 2017-09-22 RX ADMIN — NYSTATIN: 100000 POWDER TOPICAL at 20:43

## 2017-09-22 RX ADMIN — HEPARIN SODIUM 5000 UNITS: 5000 INJECTION, SOLUTION INTRAVENOUS; SUBCUTANEOUS at 06:33

## 2017-09-22 RX ADMIN — FAMOTIDINE 20 MG: 10 INJECTION INTRAVENOUS at 08:12

## 2017-09-22 RX ADMIN — CHLORHEXIDINE GLUCONATE 15 ML: 1.2 RINSE ORAL at 20:36

## 2017-09-22 RX ADMIN — METRONIDAZOLE 500 MG: 500 INJECTION, SOLUTION INTRAVENOUS at 06:32

## 2017-09-22 RX ADMIN — LEVOFLOXACIN 500 MG: 5 INJECTION, SOLUTION INTRAVENOUS at 14:24

## 2017-09-22 RX ADMIN — METRONIDAZOLE 500 MG: 500 INJECTION, SOLUTION INTRAVENOUS at 15:28

## 2017-09-22 RX ADMIN — IPRATROPIUM BROMIDE AND ALBUTEROL SULFATE 3 ML: 2.5; .5 SOLUTION RESPIRATORY (INHALATION) at 07:44

## 2017-09-22 RX ADMIN — PRIMIDONE 50 MG: 50 TABLET ORAL at 21:36

## 2017-09-22 RX ADMIN — IPRATROPIUM BROMIDE AND ALBUTEROL SULFATE 3 ML: 2.5; .5 SOLUTION RESPIRATORY (INHALATION) at 00:07

## 2017-09-22 RX ADMIN — FENTANYL CITRATE 50 MCG: 50 INJECTION, SOLUTION INTRAMUSCULAR; INTRAVENOUS at 00:08

## 2017-09-22 RX ADMIN — HEPARIN SODIUM 5000 UNITS: 5000 INJECTION, SOLUTION INTRAVENOUS; SUBCUTANEOUS at 20:36

## 2017-09-22 RX ADMIN — POTASSIUM CHLORIDE 10 MEQ: 7.46 INJECTION, SOLUTION INTRAVENOUS at 08:59

## 2017-09-22 RX ADMIN — HYDROMORPHONE HYDROCHLORIDE 0.5 MG: 2 INJECTION, SOLUTION INTRAMUSCULAR; INTRAVENOUS; SUBCUTANEOUS at 21:46

## 2017-09-22 RX ADMIN — NYSTATIN: 100000 POWDER TOPICAL at 08:13

## 2017-09-22 RX ADMIN — ONDANSETRON 4 MG: 2 INJECTION INTRAMUSCULAR; INTRAVENOUS at 11:21

## 2017-09-22 RX ADMIN — POTASSIUM CHLORIDE 10 MEQ: 7.46 INJECTION, SOLUTION INTRAVENOUS at 13:18

## 2017-09-22 RX ADMIN — CITALOPRAM HYDROBROMIDE 40 MG: 40 TABLET ORAL at 10:19

## 2017-09-22 RX ADMIN — HYDROMORPHONE HYDROCHLORIDE 0.5 MG: 2 INJECTION, SOLUTION INTRAMUSCULAR; INTRAVENOUS; SUBCUTANEOUS at 12:15

## 2017-09-22 RX ADMIN — HYDROMORPHONE HYDROCHLORIDE 0.5 MG: 2 INJECTION, SOLUTION INTRAMUSCULAR; INTRAVENOUS; SUBCUTANEOUS at 18:17

## 2017-09-22 RX ADMIN — PRIMIDONE 50 MG: 50 TABLET ORAL at 15:31

## 2017-09-22 RX ADMIN — LEVOTHYROXINE SODIUM 150 MCG: 150 TABLET ORAL at 11:21

## 2017-09-22 RX ADMIN — CHLORHEXIDINE GLUCONATE 15 ML: 1.2 RINSE ORAL at 08:12

## 2017-09-22 RX ADMIN — HYDROMORPHONE HYDROCHLORIDE 0.5 MG: 2 INJECTION, SOLUTION INTRAMUSCULAR; INTRAVENOUS; SUBCUTANEOUS at 15:28

## 2017-09-22 RX ADMIN — FUROSEMIDE 20 MG: 10 INJECTION, SOLUTION INTRAVENOUS at 10:19

## 2017-09-22 RX ADMIN — HEPARIN SODIUM 5000 UNITS: 5000 INJECTION, SOLUTION INTRAVENOUS; SUBCUTANEOUS at 14:06

## 2017-09-22 RX ADMIN — IPRATROPIUM BROMIDE AND ALBUTEROL SULFATE 3 ML: 2.5; .5 SOLUTION RESPIRATORY (INHALATION) at 13:24

## 2017-09-22 RX ADMIN — SODIUM CHLORIDE 100 ML/HR: 900 INJECTION, SOLUTION INTRAVENOUS at 17:17

## 2017-09-22 RX ADMIN — HYDROMORPHONE HYDROCHLORIDE 0.5 MG: 2 INJECTION, SOLUTION INTRAMUSCULAR; INTRAVENOUS; SUBCUTANEOUS at 10:19

## 2017-09-22 RX ADMIN — POTASSIUM CHLORIDE 10 MEQ: 7.46 INJECTION, SOLUTION INTRAVENOUS at 10:07

## 2017-09-22 RX ADMIN — POTASSIUM CHLORIDE 10 MEQ: 7.46 INJECTION, SOLUTION INTRAVENOUS at 11:24

## 2017-09-23 ENCOUNTER — APPOINTMENT (OUTPATIENT)
Dept: GENERAL RADIOLOGY | Facility: HOSPITAL | Age: 67
End: 2017-09-23

## 2017-09-23 LAB
ANION GAP SERPL CALCULATED.3IONS-SCNC: 10 MMOL/L (ref 5–15)
BUN BLD-MCNC: 24 MG/DL (ref 7–21)
BUN/CREAT SERPL: 20.3 (ref 7–25)
CALCIUM SPEC-SCNC: 7.5 MG/DL (ref 8.4–10.2)
CHLORIDE SERPL-SCNC: 100 MMOL/L (ref 95–110)
CO2 SERPL-SCNC: 21 MMOL/L (ref 22–31)
CREAT BLD-MCNC: 1.18 MG/DL (ref 0.5–1)
DEPRECATED RDW RBC AUTO: 49.4 FL (ref 36.4–46.3)
ERYTHROCYTE [DISTWIDTH] IN BLOOD BY AUTOMATED COUNT: 14.6 % (ref 11.5–14.5)
GFR SERPL CREATININE-BSD FRML MDRD: 46 ML/MIN/1.73 (ref 45–104)
GLUCOSE BLD-MCNC: 69 MG/DL (ref 60–100)
HCT VFR BLD AUTO: 27.3 % (ref 35–45)
HGB BLD-MCNC: 8.8 G/DL (ref 12–15.5)
MCH RBC QN AUTO: 29.6 PG (ref 26.5–34)
MCHC RBC AUTO-ENTMCNC: 32.2 G/DL (ref 31.4–36)
MCV RBC AUTO: 91.9 FL (ref 80–98)
PLATELET # BLD AUTO: 171 10*3/MM3 (ref 150–450)
PMV BLD AUTO: 10.5 FL (ref 8–12)
POTASSIUM BLD-SCNC: 4 MMOL/L (ref 3.5–5.1)
RBC # BLD AUTO: 2.97 10*6/MM3 (ref 3.77–5.16)
SODIUM BLD-SCNC: 131 MMOL/L (ref 137–145)
WBC NRBC COR # BLD: 17.08 10*3/MM3 (ref 3.2–9.8)

## 2017-09-23 PROCEDURE — 71010 HC CHEST PA OR AP: CPT

## 2017-09-23 PROCEDURE — 25010000002 HEPARIN (PORCINE) PER 1000 UNITS: Performed by: SURGERY

## 2017-09-23 PROCEDURE — 94799 UNLISTED PULMONARY SVC/PX: CPT

## 2017-09-23 PROCEDURE — 99024 POSTOP FOLLOW-UP VISIT: CPT | Performed by: SURGERY

## 2017-09-23 PROCEDURE — 25010000002 HYDROMORPHONE PER 4 MG: Performed by: ANESTHESIOLOGY

## 2017-09-23 PROCEDURE — 80048 BASIC METABOLIC PNL TOTAL CA: CPT | Performed by: SURGERY

## 2017-09-23 PROCEDURE — 85027 COMPLETE CBC AUTOMATED: CPT | Performed by: SURGERY

## 2017-09-23 PROCEDURE — 25010000002 LEVOFLOXACIN PER 250 MG: Performed by: SURGERY

## 2017-09-23 PROCEDURE — 0D1N0Z4 BYPASS SIGMOID COLON TO CUTANEOUS, OPEN APPROACH: ICD-10-PCS | Performed by: SURGERY

## 2017-09-23 PROCEDURE — 25010000002 ONDANSETRON PER 1 MG: Performed by: SURGERY

## 2017-09-23 PROCEDURE — 25010000002 FENTANYL CITRATE (PF) 100 MCG/2ML SOLUTION: Performed by: SURGERY

## 2017-09-23 PROCEDURE — 94760 N-INVAS EAR/PLS OXIMETRY 1: CPT

## 2017-09-23 PROCEDURE — 25010000002 FUROSEMIDE PER 20 MG: Performed by: SURGERY

## 2017-09-23 PROCEDURE — 0DBN0ZZ EXCISION OF SIGMOID COLON, OPEN APPROACH: ICD-10-PCS | Performed by: SURGERY

## 2017-09-23 RX ADMIN — IPRATROPIUM BROMIDE AND ALBUTEROL SULFATE 3 ML: 2.5; .5 SOLUTION RESPIRATORY (INHALATION) at 13:33

## 2017-09-23 RX ADMIN — HYDROMORPHONE HYDROCHLORIDE 0.5 MG: 2 INJECTION, SOLUTION INTRAMUSCULAR; INTRAVENOUS; SUBCUTANEOUS at 23:05

## 2017-09-23 RX ADMIN — FUROSEMIDE 20 MG: 10 INJECTION, SOLUTION INTRAVENOUS at 08:25

## 2017-09-23 RX ADMIN — HYDROMORPHONE HYDROCHLORIDE 0.5 MG: 2 INJECTION, SOLUTION INTRAMUSCULAR; INTRAVENOUS; SUBCUTANEOUS at 18:50

## 2017-09-23 RX ADMIN — MIRTAZAPINE 30 MG: 15 TABLET, FILM COATED ORAL at 20:21

## 2017-09-23 RX ADMIN — CITALOPRAM HYDROBROMIDE 40 MG: 40 TABLET ORAL at 08:24

## 2017-09-23 RX ADMIN — HEPARIN SODIUM 5000 UNITS: 5000 INJECTION, SOLUTION INTRAVENOUS; SUBCUTANEOUS at 06:44

## 2017-09-23 RX ADMIN — FENTANYL CITRATE 50 MCG: 50 INJECTION, SOLUTION INTRAMUSCULAR; INTRAVENOUS at 21:18

## 2017-09-23 RX ADMIN — CHLORHEXIDINE GLUCONATE 15 ML: 1.2 RINSE ORAL at 20:27

## 2017-09-23 RX ADMIN — FENTANYL CITRATE 50 MCG: 50 INJECTION, SOLUTION INTRAMUSCULAR; INTRAVENOUS at 08:23

## 2017-09-23 RX ADMIN — HEPARIN SODIUM 5000 UNITS: 5000 INJECTION, SOLUTION INTRAVENOUS; SUBCUTANEOUS at 20:23

## 2017-09-23 RX ADMIN — PRIMIDONE 50 MG: 50 TABLET ORAL at 20:22

## 2017-09-23 RX ADMIN — HYDROMORPHONE HYDROCHLORIDE 0.5 MG: 2 INJECTION, SOLUTION INTRAMUSCULAR; INTRAVENOUS; SUBCUTANEOUS at 15:00

## 2017-09-23 RX ADMIN — FAMOTIDINE 20 MG: 10 INJECTION INTRAVENOUS at 08:25

## 2017-09-23 RX ADMIN — FAMOTIDINE 20 MG: 10 INJECTION INTRAVENOUS at 18:51

## 2017-09-23 RX ADMIN — METRONIDAZOLE 500 MG: 500 INJECTION, SOLUTION INTRAVENOUS at 15:07

## 2017-09-23 RX ADMIN — PRIMIDONE 50 MG: 50 TABLET ORAL at 15:07

## 2017-09-23 RX ADMIN — SODIUM CHLORIDE 100 ML/HR: 900 INJECTION, SOLUTION INTRAVENOUS at 16:30

## 2017-09-23 RX ADMIN — METRONIDAZOLE 500 MG: 500 INJECTION, SOLUTION INTRAVENOUS at 06:46

## 2017-09-23 RX ADMIN — HEPARIN SODIUM 5000 UNITS: 5000 INJECTION, SOLUTION INTRAVENOUS; SUBCUTANEOUS at 15:00

## 2017-09-23 RX ADMIN — FENTANYL CITRATE 50 MCG: 50 INJECTION, SOLUTION INTRAMUSCULAR; INTRAVENOUS at 03:07

## 2017-09-23 RX ADMIN — LEVOTHYROXINE SODIUM 150 MCG: 150 TABLET ORAL at 06:45

## 2017-09-23 RX ADMIN — HYDROMORPHONE HYDROCHLORIDE 0.5 MG: 2 INJECTION, SOLUTION INTRAMUSCULAR; INTRAVENOUS; SUBCUTANEOUS at 05:18

## 2017-09-23 RX ADMIN — CHLORHEXIDINE GLUCONATE 15 ML: 1.2 RINSE ORAL at 08:33

## 2017-09-23 RX ADMIN — IPRATROPIUM BROMIDE AND ALBUTEROL SULFATE 3 ML: 2.5; .5 SOLUTION RESPIRATORY (INHALATION) at 07:26

## 2017-09-23 RX ADMIN — SODIUM CHLORIDE 100 ML/HR: 900 INJECTION, SOLUTION INTRAVENOUS at 08:32

## 2017-09-23 RX ADMIN — NYSTATIN: 100000 POWDER TOPICAL at 08:33

## 2017-09-23 RX ADMIN — ONDANSETRON 4 MG: 2 INJECTION INTRAMUSCULAR; INTRAVENOUS at 08:37

## 2017-09-23 RX ADMIN — LEVOFLOXACIN 500 MG: 5 INJECTION, SOLUTION INTRAVENOUS at 12:44

## 2017-09-23 RX ADMIN — PRIMIDONE 50 MG: 50 TABLET ORAL at 06:44

## 2017-09-23 RX ADMIN — METRONIDAZOLE 500 MG: 500 INJECTION, SOLUTION INTRAVENOUS at 20:22

## 2017-09-23 RX ADMIN — IPRATROPIUM BROMIDE AND ALBUTEROL SULFATE 3 ML: 2.5; .5 SOLUTION RESPIRATORY (INHALATION) at 20:35

## 2017-09-23 RX ADMIN — NYSTATIN: 100000 POWDER TOPICAL at 20:23

## 2017-09-24 LAB
ALBUMIN SERPL-MCNC: 2.6 G/DL (ref 3.4–4.8)
ALBUMIN/GLOB SERPL: 1.1 G/DL (ref 1.1–1.8)
ALP SERPL-CCNC: 107 U/L (ref 38–126)
ALT SERPL W P-5'-P-CCNC: 35 U/L (ref 9–52)
ANION GAP SERPL CALCULATED.3IONS-SCNC: 7 MMOL/L (ref 5–15)
AST SERPL-CCNC: 43 U/L (ref 14–36)
BILIRUB SERPL-MCNC: 0.3 MG/DL (ref 0.2–1.3)
BUN BLD-MCNC: 14 MG/DL (ref 7–21)
BUN/CREAT SERPL: 15.2 (ref 7–25)
CALCIUM SPEC-SCNC: 7.4 MG/DL (ref 8.4–10.2)
CHLORIDE SERPL-SCNC: 101 MMOL/L (ref 95–110)
CO2 SERPL-SCNC: 23 MMOL/L (ref 22–31)
CREAT BLD-MCNC: 0.92 MG/DL (ref 0.5–1)
DEPRECATED RDW RBC AUTO: 48.2 FL (ref 36.4–46.3)
ERYTHROCYTE [DISTWIDTH] IN BLOOD BY AUTOMATED COUNT: 14.6 % (ref 11.5–14.5)
GFR SERPL CREATININE-BSD FRML MDRD: 61 ML/MIN/1.73 (ref 45–104)
GLOBULIN UR ELPH-MCNC: 2.3 GM/DL (ref 2.3–3.5)
GLUCOSE BLD-MCNC: 107 MG/DL (ref 60–100)
HCT VFR BLD AUTO: 26.5 % (ref 35–45)
HGB BLD-MCNC: 8.8 G/DL (ref 12–15.5)
MCH RBC QN AUTO: 29.9 PG (ref 26.5–34)
MCHC RBC AUTO-ENTMCNC: 33.2 G/DL (ref 31.4–36)
MCV RBC AUTO: 90.1 FL (ref 80–98)
PLATELET # BLD AUTO: 196 10*3/MM3 (ref 150–450)
PMV BLD AUTO: 10.2 FL (ref 8–12)
POTASSIUM BLD-SCNC: 3.4 MMOL/L (ref 3.5–5.1)
PROT SERPL-MCNC: 4.9 G/DL (ref 6.3–8.6)
RBC # BLD AUTO: 2.94 10*6/MM3 (ref 3.77–5.16)
SODIUM BLD-SCNC: 131 MMOL/L (ref 137–145)
WBC NRBC COR # BLD: 17.53 10*3/MM3 (ref 3.2–9.8)

## 2017-09-24 PROCEDURE — 25010000002 FENTANYL CITRATE (PF) 100 MCG/2ML SOLUTION: Performed by: SURGERY

## 2017-09-24 PROCEDURE — 25010000002 LEVOFLOXACIN PER 250 MG: Performed by: SURGERY

## 2017-09-24 PROCEDURE — 85027 COMPLETE CBC AUTOMATED: CPT | Performed by: SURGERY

## 2017-09-24 PROCEDURE — 25010000002 HEPARIN (PORCINE) PER 1000 UNITS: Performed by: SURGERY

## 2017-09-24 PROCEDURE — 94760 N-INVAS EAR/PLS OXIMETRY 1: CPT

## 2017-09-24 PROCEDURE — G8978 MOBILITY CURRENT STATUS: HCPCS

## 2017-09-24 PROCEDURE — 80053 COMPREHEN METABOLIC PANEL: CPT | Performed by: SURGERY

## 2017-09-24 PROCEDURE — 94799 UNLISTED PULMONARY SVC/PX: CPT

## 2017-09-24 PROCEDURE — 97530 THERAPEUTIC ACTIVITIES: CPT

## 2017-09-24 PROCEDURE — 25010000002 ONDANSETRON PER 1 MG: Performed by: SURGERY

## 2017-09-24 PROCEDURE — 25010000002 HYDROMORPHONE PER 4 MG: Performed by: ANESTHESIOLOGY

## 2017-09-24 PROCEDURE — 99024 POSTOP FOLLOW-UP VISIT: CPT | Performed by: SURGERY

## 2017-09-24 PROCEDURE — 97110 THERAPEUTIC EXERCISES: CPT

## 2017-09-24 PROCEDURE — 25010000002 FUROSEMIDE PER 20 MG: Performed by: SURGERY

## 2017-09-24 PROCEDURE — 97162 PT EVAL MOD COMPLEX 30 MIN: CPT

## 2017-09-24 PROCEDURE — G8979 MOBILITY GOAL STATUS: HCPCS

## 2017-09-24 RX ORDER — POTASSIUM CHLORIDE 1.5 G/1.77G
20 POWDER, FOR SOLUTION ORAL DAILY
Status: DISCONTINUED | OUTPATIENT
Start: 2017-09-24 | End: 2017-09-27

## 2017-09-24 RX ADMIN — CHLORHEXIDINE GLUCONATE 15 ML: 1.2 RINSE ORAL at 21:00

## 2017-09-24 RX ADMIN — HEPARIN SODIUM 5000 UNITS: 5000 INJECTION, SOLUTION INTRAVENOUS; SUBCUTANEOUS at 21:00

## 2017-09-24 RX ADMIN — IPRATROPIUM BROMIDE AND ALBUTEROL SULFATE 3 ML: 2.5; .5 SOLUTION RESPIRATORY (INHALATION) at 08:16

## 2017-09-24 RX ADMIN — METRONIDAZOLE 500 MG: 500 INJECTION, SOLUTION INTRAVENOUS at 21:01

## 2017-09-24 RX ADMIN — METRONIDAZOLE 500 MG: 500 INJECTION, SOLUTION INTRAVENOUS at 13:32

## 2017-09-24 RX ADMIN — FENTANYL CITRATE 50 MCG: 50 INJECTION, SOLUTION INTRAMUSCULAR; INTRAVENOUS at 13:31

## 2017-09-24 RX ADMIN — LEVOTHYROXINE SODIUM 150 MCG: 150 TABLET ORAL at 06:52

## 2017-09-24 RX ADMIN — METRONIDAZOLE 500 MG: 500 INJECTION, SOLUTION INTRAVENOUS at 05:09

## 2017-09-24 RX ADMIN — NYSTATIN: 100000 POWDER TOPICAL at 08:50

## 2017-09-24 RX ADMIN — FAMOTIDINE 20 MG: 10 INJECTION INTRAVENOUS at 17:36

## 2017-09-24 RX ADMIN — HYDROMORPHONE HYDROCHLORIDE 0.5 MG: 2 INJECTION, SOLUTION INTRAMUSCULAR; INTRAVENOUS; SUBCUTANEOUS at 04:56

## 2017-09-24 RX ADMIN — FUROSEMIDE 20 MG: 10 INJECTION, SOLUTION INTRAVENOUS at 08:50

## 2017-09-24 RX ADMIN — PRIMIDONE 50 MG: 50 TABLET ORAL at 13:34

## 2017-09-24 RX ADMIN — CITALOPRAM HYDROBROMIDE 40 MG: 40 TABLET ORAL at 08:50

## 2017-09-24 RX ADMIN — SODIUM CHLORIDE 100 ML/HR: 900 INJECTION, SOLUTION INTRAVENOUS at 04:52

## 2017-09-24 RX ADMIN — ONDANSETRON 4 MG: 2 INJECTION INTRAMUSCULAR; INTRAVENOUS at 08:50

## 2017-09-24 RX ADMIN — PRIMIDONE 50 MG: 50 TABLET ORAL at 21:00

## 2017-09-24 RX ADMIN — MIRTAZAPINE 30 MG: 15 TABLET, FILM COATED ORAL at 21:00

## 2017-09-24 RX ADMIN — IPRATROPIUM BROMIDE AND ALBUTEROL SULFATE 3 ML: 2.5; .5 SOLUTION RESPIRATORY (INHALATION) at 18:42

## 2017-09-24 RX ADMIN — HYDROMORPHONE HYDROCHLORIDE 0.5 MG: 2 INJECTION, SOLUTION INTRAMUSCULAR; INTRAVENOUS; SUBCUTANEOUS at 21:00

## 2017-09-24 RX ADMIN — HYDROMORPHONE HYDROCHLORIDE 0.5 MG: 2 INJECTION, SOLUTION INTRAMUSCULAR; INTRAVENOUS; SUBCUTANEOUS at 08:50

## 2017-09-24 RX ADMIN — HYDROMORPHONE HYDROCHLORIDE 0.5 MG: 2 INJECTION, SOLUTION INTRAMUSCULAR; INTRAVENOUS; SUBCUTANEOUS at 01:40

## 2017-09-24 RX ADMIN — NYSTATIN: 100000 POWDER TOPICAL at 21:06

## 2017-09-24 RX ADMIN — SODIUM CHLORIDE 100 ML/HR: 900 INJECTION, SOLUTION INTRAVENOUS at 17:37

## 2017-09-24 RX ADMIN — PRIMIDONE 50 MG: 50 TABLET ORAL at 06:52

## 2017-09-24 RX ADMIN — FAMOTIDINE 20 MG: 10 INJECTION INTRAVENOUS at 08:50

## 2017-09-24 RX ADMIN — LEVOFLOXACIN 500 MG: 5 INJECTION, SOLUTION INTRAVENOUS at 12:45

## 2017-09-24 RX ADMIN — POTASSIUM CHLORIDE 20 MEQ: 1.5 POWDER, FOR SOLUTION ORAL at 12:45

## 2017-09-24 RX ADMIN — HEPARIN SODIUM 5000 UNITS: 5000 INJECTION, SOLUTION INTRAVENOUS; SUBCUTANEOUS at 13:32

## 2017-09-24 RX ADMIN — HEPARIN SODIUM 5000 UNITS: 5000 INJECTION, SOLUTION INTRAVENOUS; SUBCUTANEOUS at 05:09

## 2017-09-25 PROCEDURE — 25010000002 HYDROMORPHONE PER 4 MG: Performed by: SURGERY

## 2017-09-25 PROCEDURE — 94799 UNLISTED PULMONARY SVC/PX: CPT

## 2017-09-25 PROCEDURE — 25010000002 LEVOFLOXACIN PER 250 MG: Performed by: SURGERY

## 2017-09-25 PROCEDURE — 94760 N-INVAS EAR/PLS OXIMETRY 1: CPT

## 2017-09-25 PROCEDURE — 97530 THERAPEUTIC ACTIVITIES: CPT

## 2017-09-25 PROCEDURE — 99024 POSTOP FOLLOW-UP VISIT: CPT | Performed by: SURGERY

## 2017-09-25 PROCEDURE — 25010000002 HEPARIN (PORCINE) PER 1000 UNITS: Performed by: SURGERY

## 2017-09-25 PROCEDURE — 25010000002 HYDROMORPHONE PER 4 MG: Performed by: ANESTHESIOLOGY

## 2017-09-25 RX ORDER — FUROSEMIDE 80 MG
80 TABLET ORAL DAILY
Status: DISCONTINUED | OUTPATIENT
Start: 2017-09-25 | End: 2017-09-27

## 2017-09-25 RX ORDER — GLATIRAMER 40 MG/ML
40 INJECTION, SOLUTION SUBCUTANEOUS 3 TIMES WEEKLY
Status: DISCONTINUED | OUTPATIENT
Start: 2017-09-25 | End: 2017-10-03 | Stop reason: HOSPADM

## 2017-09-25 RX ORDER — GABAPENTIN 300 MG/1
600 CAPSULE ORAL 3 TIMES DAILY
Status: DISCONTINUED | OUTPATIENT
Start: 2017-09-25 | End: 2017-10-03 | Stop reason: HOSPADM

## 2017-09-25 RX ORDER — HYDROMORPHONE HCL 110MG/55ML
0.5 PATIENT CONTROLLED ANALGESIA SYRINGE INTRAVENOUS
Status: DISCONTINUED | OUTPATIENT
Start: 2017-09-25 | End: 2017-10-03 | Stop reason: HOSPADM

## 2017-09-25 RX ADMIN — METRONIDAZOLE 500 MG: 500 INJECTION, SOLUTION INTRAVENOUS at 14:36

## 2017-09-25 RX ADMIN — METRONIDAZOLE 500 MG: 500 INJECTION, SOLUTION INTRAVENOUS at 22:35

## 2017-09-25 RX ADMIN — IPRATROPIUM BROMIDE AND ALBUTEROL SULFATE 3 ML: 2.5; .5 SOLUTION RESPIRATORY (INHALATION) at 07:57

## 2017-09-25 RX ADMIN — HYDROMORPHONE HYDROCHLORIDE 0.5 MG: 2 INJECTION, SOLUTION INTRAMUSCULAR; INTRAVENOUS; SUBCUTANEOUS at 16:19

## 2017-09-25 RX ADMIN — LEVOTHYROXINE SODIUM 150 MCG: 150 TABLET ORAL at 06:35

## 2017-09-25 RX ADMIN — HYDROMORPHONE HYDROCHLORIDE 0.5 MG: 1 INJECTION, SOLUTION INTRAMUSCULAR; INTRAVENOUS; SUBCUTANEOUS at 08:31

## 2017-09-25 RX ADMIN — GABAPENTIN 600 MG: 300 CAPSULE ORAL at 08:33

## 2017-09-25 RX ADMIN — IPRATROPIUM BROMIDE AND ALBUTEROL SULFATE 3 ML: 2.5; .5 SOLUTION RESPIRATORY (INHALATION) at 13:41

## 2017-09-25 RX ADMIN — GABAPENTIN 600 MG: 300 CAPSULE ORAL at 22:35

## 2017-09-25 RX ADMIN — HYDROMORPHONE HYDROCHLORIDE 0.5 MG: 2 INJECTION, SOLUTION INTRAMUSCULAR; INTRAVENOUS; SUBCUTANEOUS at 12:53

## 2017-09-25 RX ADMIN — ONDANSETRON 4 MG: 4 TABLET, FILM COATED ORAL at 12:46

## 2017-09-25 RX ADMIN — PRIMIDONE 50 MG: 50 TABLET ORAL at 13:50

## 2017-09-25 RX ADMIN — NYSTATIN: 100000 POWDER TOPICAL at 08:34

## 2017-09-25 RX ADMIN — HEPARIN SODIUM 5000 UNITS: 5000 INJECTION, SOLUTION INTRAVENOUS; SUBCUTANEOUS at 22:37

## 2017-09-25 RX ADMIN — HEPARIN SODIUM 5000 UNITS: 5000 INJECTION, SOLUTION INTRAVENOUS; SUBCUTANEOUS at 13:50

## 2017-09-25 RX ADMIN — HYDROMORPHONE HYDROCHLORIDE 0.5 MG: 2 INJECTION, SOLUTION INTRAMUSCULAR; INTRAVENOUS; SUBCUTANEOUS at 06:35

## 2017-09-25 RX ADMIN — PRIMIDONE 50 MG: 50 TABLET ORAL at 22:49

## 2017-09-25 RX ADMIN — CHLORHEXIDINE GLUCONATE 15 ML: 1.2 RINSE ORAL at 22:37

## 2017-09-25 RX ADMIN — CHLORHEXIDINE GLUCONATE 15 ML: 1.2 RINSE ORAL at 08:29

## 2017-09-25 RX ADMIN — NYSTATIN: 100000 POWDER TOPICAL at 22:38

## 2017-09-25 RX ADMIN — CITALOPRAM HYDROBROMIDE 40 MG: 40 TABLET ORAL at 08:33

## 2017-09-25 RX ADMIN — MIRTAZAPINE 30 MG: 15 TABLET, FILM COATED ORAL at 22:49

## 2017-09-25 RX ADMIN — METRONIDAZOLE 500 MG: 500 INJECTION, SOLUTION INTRAVENOUS at 08:34

## 2017-09-25 RX ADMIN — SODIUM CHLORIDE 100 ML/HR: 900 INJECTION, SOLUTION INTRAVENOUS at 06:36

## 2017-09-25 RX ADMIN — PRIMIDONE 50 MG: 50 TABLET ORAL at 06:35

## 2017-09-25 RX ADMIN — FAMOTIDINE 20 MG: 10 INJECTION INTRAVENOUS at 17:31

## 2017-09-25 RX ADMIN — IPRATROPIUM BROMIDE AND ALBUTEROL SULFATE 3 ML: 2.5; .5 SOLUTION RESPIRATORY (INHALATION) at 19:11

## 2017-09-25 RX ADMIN — GLATIRAMER 40 MG: 40 INJECTION, SOLUTION SUBCUTANEOUS at 18:25

## 2017-09-25 RX ADMIN — Medication 10 ML: at 08:34

## 2017-09-25 RX ADMIN — GABAPENTIN 600 MG: 300 CAPSULE ORAL at 17:31

## 2017-09-25 RX ADMIN — LEVOFLOXACIN 500 MG: 5 INJECTION, SOLUTION INTRAVENOUS at 13:25

## 2017-09-25 RX ADMIN — FAMOTIDINE 20 MG: 10 INJECTION INTRAVENOUS at 08:33

## 2017-09-25 RX ADMIN — HEPARIN SODIUM 5000 UNITS: 5000 INJECTION, SOLUTION INTRAVENOUS; SUBCUTANEOUS at 06:37

## 2017-09-25 RX ADMIN — POTASSIUM CHLORIDE 20 MEQ: 1.5 POWDER, FOR SOLUTION ORAL at 08:33

## 2017-09-25 RX ADMIN — FUROSEMIDE 80 MG: 40 TABLET ORAL at 08:33

## 2017-09-26 LAB
ANION GAP SERPL CALCULATED.3IONS-SCNC: 4 MMOL/L (ref 5–15)
BUN BLD-MCNC: 8 MG/DL (ref 7–21)
BUN/CREAT SERPL: 8.5 (ref 7–25)
CALCIUM SPEC-SCNC: 7.1 MG/DL (ref 8.4–10.2)
CHLORIDE SERPL-SCNC: 99 MMOL/L (ref 95–110)
CO2 SERPL-SCNC: 30 MMOL/L (ref 22–31)
CREAT BLD-MCNC: 0.94 MG/DL (ref 0.5–1)
DEPRECATED RDW RBC AUTO: 51.3 FL (ref 36.4–46.3)
ERYTHROCYTE [DISTWIDTH] IN BLOOD BY AUTOMATED COUNT: 15.3 % (ref 11.5–14.5)
GFR SERPL CREATININE-BSD FRML MDRD: 60 ML/MIN/1.73 (ref 45–104)
GLUCOSE BLD-MCNC: 83 MG/DL (ref 60–100)
HCT VFR BLD AUTO: 25.3 % (ref 35–45)
HGB BLD-MCNC: 8.3 G/DL (ref 12–15.5)
MCH RBC QN AUTO: 29.7 PG (ref 26.5–34)
MCHC RBC AUTO-ENTMCNC: 32.8 G/DL (ref 31.4–36)
MCV RBC AUTO: 90.7 FL (ref 80–98)
PLATELET # BLD AUTO: 210 10*3/MM3 (ref 150–450)
PMV BLD AUTO: 9.1 FL (ref 8–12)
POTASSIUM BLD-SCNC: 3.3 MMOL/L (ref 3.5–5.1)
RBC # BLD AUTO: 2.79 10*6/MM3 (ref 3.77–5.16)
SODIUM BLD-SCNC: 133 MMOL/L (ref 137–145)
WBC NRBC COR # BLD: 16.53 10*3/MM3 (ref 3.2–9.8)

## 2017-09-26 PROCEDURE — 97530 THERAPEUTIC ACTIVITIES: CPT

## 2017-09-26 PROCEDURE — 25010000002 LEVOFLOXACIN PER 250 MG: Performed by: SURGERY

## 2017-09-26 PROCEDURE — 85027 COMPLETE CBC AUTOMATED: CPT | Performed by: SURGERY

## 2017-09-26 PROCEDURE — 97116 GAIT TRAINING THERAPY: CPT

## 2017-09-26 PROCEDURE — 94799 UNLISTED PULMONARY SVC/PX: CPT

## 2017-09-26 PROCEDURE — 94760 N-INVAS EAR/PLS OXIMETRY 1: CPT

## 2017-09-26 PROCEDURE — 99024 POSTOP FOLLOW-UP VISIT: CPT | Performed by: SURGERY

## 2017-09-26 PROCEDURE — 25010000002 HYDROMORPHONE PER 4 MG: Performed by: SURGERY

## 2017-09-26 PROCEDURE — 80048 BASIC METABOLIC PNL TOTAL CA: CPT | Performed by: SURGERY

## 2017-09-26 PROCEDURE — 25010000002 HEPARIN (PORCINE) PER 1000 UNITS: Performed by: SURGERY

## 2017-09-26 RX ORDER — POTASSIUM CHLORIDE 750 MG/1
40 CAPSULE, EXTENDED RELEASE ORAL DAILY
Status: DISCONTINUED | OUTPATIENT
Start: 2017-09-26 | End: 2017-10-03 | Stop reason: HOSPADM

## 2017-09-26 RX ADMIN — METRONIDAZOLE 500 MG: 500 INJECTION, SOLUTION INTRAVENOUS at 06:40

## 2017-09-26 RX ADMIN — FAMOTIDINE 20 MG: 10 INJECTION INTRAVENOUS at 17:38

## 2017-09-26 RX ADMIN — CITALOPRAM HYDROBROMIDE 40 MG: 40 TABLET ORAL at 09:30

## 2017-09-26 RX ADMIN — PRIMIDONE 50 MG: 50 TABLET ORAL at 06:40

## 2017-09-26 RX ADMIN — FUROSEMIDE 80 MG: 40 TABLET ORAL at 09:30

## 2017-09-26 RX ADMIN — PRIMIDONE 50 MG: 50 TABLET ORAL at 22:13

## 2017-09-26 RX ADMIN — CHLORHEXIDINE GLUCONATE 15 ML: 1.2 RINSE ORAL at 22:16

## 2017-09-26 RX ADMIN — IPRATROPIUM BROMIDE AND ALBUTEROL SULFATE 3 ML: 2.5; .5 SOLUTION RESPIRATORY (INHALATION) at 11:29

## 2017-09-26 RX ADMIN — IPRATROPIUM BROMIDE AND ALBUTEROL SULFATE 3 ML: 2.5; .5 SOLUTION RESPIRATORY (INHALATION) at 18:27

## 2017-09-26 RX ADMIN — LEVOTHYROXINE SODIUM 150 MCG: 150 TABLET ORAL at 06:40

## 2017-09-26 RX ADMIN — IPRATROPIUM BROMIDE AND ALBUTEROL SULFATE 3 ML: 2.5; .5 SOLUTION RESPIRATORY (INHALATION) at 01:23

## 2017-09-26 RX ADMIN — MIRTAZAPINE 30 MG: 15 TABLET, FILM COATED ORAL at 22:13

## 2017-09-26 RX ADMIN — NYSTATIN: 100000 POWDER TOPICAL at 10:25

## 2017-09-26 RX ADMIN — GABAPENTIN 600 MG: 300 CAPSULE ORAL at 09:30

## 2017-09-26 RX ADMIN — METRONIDAZOLE 500 MG: 500 INJECTION, SOLUTION INTRAVENOUS at 22:08

## 2017-09-26 RX ADMIN — HEPARIN SODIUM 5000 UNITS: 5000 INJECTION, SOLUTION INTRAVENOUS; SUBCUTANEOUS at 06:40

## 2017-09-26 RX ADMIN — HYDROMORPHONE HYDROCHLORIDE 0.5 MG: 2 INJECTION, SOLUTION INTRAMUSCULAR; INTRAVENOUS; SUBCUTANEOUS at 13:11

## 2017-09-26 RX ADMIN — HEPARIN SODIUM 5000 UNITS: 5000 INJECTION, SOLUTION INTRAVENOUS; SUBCUTANEOUS at 22:17

## 2017-09-26 RX ADMIN — PRIMIDONE 50 MG: 50 TABLET ORAL at 13:11

## 2017-09-26 RX ADMIN — IPRATROPIUM BROMIDE AND ALBUTEROL SULFATE 3 ML: 2.5; .5 SOLUTION RESPIRATORY (INHALATION) at 06:52

## 2017-09-26 RX ADMIN — POTASSIUM CHLORIDE 20 MEQ: 1.5 POWDER, FOR SOLUTION ORAL at 09:30

## 2017-09-26 RX ADMIN — HYDROMORPHONE HYDROCHLORIDE 0.5 MG: 2 INJECTION, SOLUTION INTRAMUSCULAR; INTRAVENOUS; SUBCUTANEOUS at 22:07

## 2017-09-26 RX ADMIN — GABAPENTIN 600 MG: 300 CAPSULE ORAL at 22:13

## 2017-09-26 RX ADMIN — NYSTATIN: 100000 POWDER TOPICAL at 22:15

## 2017-09-26 RX ADMIN — CHLORHEXIDINE GLUCONATE 15 ML: 1.2 RINSE ORAL at 09:29

## 2017-09-26 RX ADMIN — HEPARIN SODIUM 5000 UNITS: 5000 INJECTION, SOLUTION INTRAVENOUS; SUBCUTANEOUS at 13:11

## 2017-09-26 RX ADMIN — GABAPENTIN 600 MG: 300 CAPSULE ORAL at 17:38

## 2017-09-26 RX ADMIN — POTASSIUM CHLORIDE 40 MEQ: 750 CAPSULE, EXTENDED RELEASE ORAL at 09:30

## 2017-09-26 RX ADMIN — LEVOFLOXACIN 500 MG: 5 INJECTION, SOLUTION INTRAVENOUS at 13:11

## 2017-09-26 RX ADMIN — METRONIDAZOLE 500 MG: 500 INJECTION, SOLUTION INTRAVENOUS at 13:11

## 2017-09-26 RX ADMIN — FAMOTIDINE 20 MG: 10 INJECTION INTRAVENOUS at 09:30

## 2017-09-27 LAB
ANION GAP SERPL CALCULATED.3IONS-SCNC: 8 MMOL/L (ref 5–15)
BUN BLD-MCNC: 7 MG/DL (ref 7–21)
BUN/CREAT SERPL: 7.4 (ref 7–25)
CALCIUM SPEC-SCNC: 6.9 MG/DL (ref 8.4–10.2)
CHLORIDE SERPL-SCNC: 99 MMOL/L (ref 95–110)
CO2 SERPL-SCNC: 27 MMOL/L (ref 22–31)
CREAT BLD-MCNC: 0.95 MG/DL (ref 0.5–1)
DEPRECATED RDW RBC AUTO: 50.4 FL (ref 36.4–46.3)
ERYTHROCYTE [DISTWIDTH] IN BLOOD BY AUTOMATED COUNT: 15.3 % (ref 11.5–14.5)
GFR SERPL CREATININE-BSD FRML MDRD: 59 ML/MIN/1.73 (ref 45–104)
GLUCOSE BLD-MCNC: 82 MG/DL (ref 60–100)
HCT VFR BLD AUTO: 24.6 % (ref 35–45)
HGB BLD-MCNC: 8.1 G/DL (ref 12–15.5)
MCH RBC QN AUTO: 30 PG (ref 26.5–34)
MCHC RBC AUTO-ENTMCNC: 32.9 G/DL (ref 31.4–36)
MCV RBC AUTO: 91.1 FL (ref 80–98)
PLATELET # BLD AUTO: 177 10*3/MM3 (ref 150–450)
PMV BLD AUTO: 10.1 FL (ref 8–12)
POTASSIUM BLD-SCNC: 3 MMOL/L (ref 3.5–5.1)
RBC # BLD AUTO: 2.7 10*6/MM3 (ref 3.77–5.16)
SODIUM BLD-SCNC: 134 MMOL/L (ref 137–145)
WBC NRBC COR # BLD: 18.41 10*3/MM3 (ref 3.2–9.8)

## 2017-09-27 PROCEDURE — 99024 POSTOP FOLLOW-UP VISIT: CPT | Performed by: SURGERY

## 2017-09-27 PROCEDURE — 97530 THERAPEUTIC ACTIVITIES: CPT

## 2017-09-27 PROCEDURE — 85027 COMPLETE CBC AUTOMATED: CPT | Performed by: SURGERY

## 2017-09-27 PROCEDURE — 97116 GAIT TRAINING THERAPY: CPT

## 2017-09-27 PROCEDURE — 25010000002 HEPARIN (PORCINE) PER 1000 UNITS: Performed by: SURGERY

## 2017-09-27 PROCEDURE — 80048 BASIC METABOLIC PNL TOTAL CA: CPT | Performed by: SURGERY

## 2017-09-27 PROCEDURE — 94799 UNLISTED PULMONARY SVC/PX: CPT

## 2017-09-27 PROCEDURE — 25010000002 LEVOFLOXACIN PER 250 MG: Performed by: SURGERY

## 2017-09-27 PROCEDURE — 25010000002 HYDROMORPHONE PER 4 MG: Performed by: SURGERY

## 2017-09-27 PROCEDURE — 94760 N-INVAS EAR/PLS OXIMETRY 1: CPT

## 2017-09-27 PROCEDURE — 97110 THERAPEUTIC EXERCISES: CPT

## 2017-09-27 RX ORDER — POTASSIUM CHLORIDE 750 MG/1
40 CAPSULE, EXTENDED RELEASE ORAL DAILY
Status: DISCONTINUED | OUTPATIENT
Start: 2017-09-27 | End: 2017-09-27 | Stop reason: SDUPTHER

## 2017-09-27 RX ORDER — FUROSEMIDE 80 MG
80 TABLET ORAL 2 TIMES DAILY
Status: DISCONTINUED | OUTPATIENT
Start: 2017-09-27 | End: 2017-10-03 | Stop reason: HOSPADM

## 2017-09-27 RX ADMIN — FAMOTIDINE 20 MG: 10 INJECTION INTRAVENOUS at 08:51

## 2017-09-27 RX ADMIN — GLATIRAMER 40 MG: 40 INJECTION, SOLUTION SUBCUTANEOUS at 08:52

## 2017-09-27 RX ADMIN — CHLORHEXIDINE GLUCONATE 15 ML: 1.2 RINSE ORAL at 20:11

## 2017-09-27 RX ADMIN — METRONIDAZOLE 500 MG: 500 INJECTION, SOLUTION INTRAVENOUS at 06:09

## 2017-09-27 RX ADMIN — PRIMIDONE 50 MG: 50 TABLET ORAL at 13:04

## 2017-09-27 RX ADMIN — IPRATROPIUM BROMIDE AND ALBUTEROL SULFATE 3 ML: 2.5; .5 SOLUTION RESPIRATORY (INHALATION) at 07:26

## 2017-09-27 RX ADMIN — PRIMIDONE 50 MG: 50 TABLET ORAL at 06:12

## 2017-09-27 RX ADMIN — POTASSIUM CHLORIDE 40 MEQ: 750 CAPSULE, EXTENDED RELEASE ORAL at 08:54

## 2017-09-27 RX ADMIN — LEVOFLOXACIN 500 MG: 5 INJECTION, SOLUTION INTRAVENOUS at 13:04

## 2017-09-27 RX ADMIN — CITALOPRAM HYDROBROMIDE 40 MG: 40 TABLET ORAL at 08:54

## 2017-09-27 RX ADMIN — HEPARIN SODIUM 5000 UNITS: 5000 INJECTION, SOLUTION INTRAVENOUS; SUBCUTANEOUS at 20:09

## 2017-09-27 RX ADMIN — NYSTATIN: 100000 POWDER TOPICAL at 20:11

## 2017-09-27 RX ADMIN — IPRATROPIUM BROMIDE AND ALBUTEROL SULFATE 3 ML: 2.5; .5 SOLUTION RESPIRATORY (INHALATION) at 00:16

## 2017-09-27 RX ADMIN — HYDROMORPHONE HYDROCHLORIDE 0.5 MG: 2 INJECTION, SOLUTION INTRAMUSCULAR; INTRAVENOUS; SUBCUTANEOUS at 21:59

## 2017-09-27 RX ADMIN — IPRATROPIUM BROMIDE AND ALBUTEROL SULFATE 3 ML: 2.5; .5 SOLUTION RESPIRATORY (INHALATION) at 18:36

## 2017-09-27 RX ADMIN — PRIMIDONE 50 MG: 50 TABLET ORAL at 20:08

## 2017-09-27 RX ADMIN — CHLORHEXIDINE GLUCONATE 15 ML: 1.2 RINSE ORAL at 08:53

## 2017-09-27 RX ADMIN — MIRTAZAPINE 30 MG: 15 TABLET, FILM COATED ORAL at 20:08

## 2017-09-27 RX ADMIN — POTASSIUM CHLORIDE 20 MEQ: 1.5 POWDER, FOR SOLUTION ORAL at 08:55

## 2017-09-27 RX ADMIN — NYSTATIN: 100000 POWDER TOPICAL at 08:53

## 2017-09-27 RX ADMIN — LEVOTHYROXINE SODIUM 150 MCG: 150 TABLET ORAL at 06:12

## 2017-09-27 RX ADMIN — HEPARIN SODIUM 5000 UNITS: 5000 INJECTION, SOLUTION INTRAVENOUS; SUBCUTANEOUS at 13:04

## 2017-09-27 RX ADMIN — FUROSEMIDE 80 MG: 40 TABLET ORAL at 08:54

## 2017-09-27 RX ADMIN — METRONIDAZOLE 500 MG: 500 INJECTION, SOLUTION INTRAVENOUS at 13:04

## 2017-09-27 RX ADMIN — FAMOTIDINE 20 MG: 10 INJECTION INTRAVENOUS at 17:17

## 2017-09-27 RX ADMIN — GABAPENTIN 600 MG: 300 CAPSULE ORAL at 15:19

## 2017-09-27 RX ADMIN — HEPARIN SODIUM 5000 UNITS: 5000 INJECTION, SOLUTION INTRAVENOUS; SUBCUTANEOUS at 06:12

## 2017-09-27 RX ADMIN — HYDROMORPHONE HYDROCHLORIDE 0.5 MG: 2 INJECTION, SOLUTION INTRAMUSCULAR; INTRAVENOUS; SUBCUTANEOUS at 20:10

## 2017-09-27 RX ADMIN — GABAPENTIN 600 MG: 300 CAPSULE ORAL at 08:55

## 2017-09-27 RX ADMIN — GABAPENTIN 600 MG: 300 CAPSULE ORAL at 20:09

## 2017-09-27 RX ADMIN — FUROSEMIDE 80 MG: 80 TABLET ORAL at 17:17

## 2017-09-27 RX ADMIN — IPRATROPIUM BROMIDE AND ALBUTEROL SULFATE 3 ML: 2.5; .5 SOLUTION RESPIRATORY (INHALATION) at 12:34

## 2017-09-27 RX ADMIN — METRONIDAZOLE 500 MG: 500 INJECTION, SOLUTION INTRAVENOUS at 20:09

## 2017-09-27 RX ADMIN — HYDROMORPHONE HYDROCHLORIDE 0.5 MG: 2 INJECTION, SOLUTION INTRAMUSCULAR; INTRAVENOUS; SUBCUTANEOUS at 17:18

## 2017-09-28 LAB
ANION GAP SERPL CALCULATED.3IONS-SCNC: 9 MMOL/L (ref 5–15)
BUN BLD-MCNC: 7 MG/DL (ref 7–21)
BUN/CREAT SERPL: 7.4 (ref 7–25)
CALCIUM SPEC-SCNC: 7 MG/DL (ref 8.4–10.2)
CHLORIDE SERPL-SCNC: 99 MMOL/L (ref 95–110)
CO2 SERPL-SCNC: 28 MMOL/L (ref 22–31)
CREAT BLD-MCNC: 0.95 MG/DL (ref 0.5–1)
DEPRECATED RDW RBC AUTO: 51.5 FL (ref 36.4–46.3)
ERYTHROCYTE [DISTWIDTH] IN BLOOD BY AUTOMATED COUNT: 15.3 % (ref 11.5–14.5)
GFR SERPL CREATININE-BSD FRML MDRD: 59 ML/MIN/1.73 (ref 45–104)
GLUCOSE BLD-MCNC: 95 MG/DL (ref 60–100)
HCT VFR BLD AUTO: 26.3 % (ref 35–45)
HGB BLD-MCNC: 8.5 G/DL (ref 12–15.5)
MCH RBC QN AUTO: 29.6 PG (ref 26.5–34)
MCHC RBC AUTO-ENTMCNC: 32.3 G/DL (ref 31.4–36)
MCV RBC AUTO: 91.6 FL (ref 80–98)
PLATELET # BLD AUTO: 177 10*3/MM3 (ref 150–450)
PMV BLD AUTO: 10.1 FL (ref 8–12)
POTASSIUM BLD-SCNC: 3.5 MMOL/L (ref 3.5–5.1)
RBC # BLD AUTO: 2.87 10*6/MM3 (ref 3.77–5.16)
SODIUM BLD-SCNC: 136 MMOL/L (ref 137–145)
WBC NRBC COR # BLD: 15.42 10*3/MM3 (ref 3.2–9.8)

## 2017-09-28 PROCEDURE — 97530 THERAPEUTIC ACTIVITIES: CPT

## 2017-09-28 PROCEDURE — 94799 UNLISTED PULMONARY SVC/PX: CPT

## 2017-09-28 PROCEDURE — 25010000002 LEVOFLOXACIN PER 250 MG: Performed by: SURGERY

## 2017-09-28 PROCEDURE — 94760 N-INVAS EAR/PLS OXIMETRY 1: CPT

## 2017-09-28 PROCEDURE — 25010000002 HEPARIN (PORCINE) PER 1000 UNITS: Performed by: SURGERY

## 2017-09-28 PROCEDURE — 85027 COMPLETE CBC AUTOMATED: CPT | Performed by: SURGERY

## 2017-09-28 PROCEDURE — 25010000002 HYDROMORPHONE PER 4 MG: Performed by: SURGERY

## 2017-09-28 PROCEDURE — 80048 BASIC METABOLIC PNL TOTAL CA: CPT | Performed by: SURGERY

## 2017-09-28 PROCEDURE — 97116 GAIT TRAINING THERAPY: CPT

## 2017-09-28 PROCEDURE — 99024 POSTOP FOLLOW-UP VISIT: CPT | Performed by: SURGERY

## 2017-09-28 RX ORDER — SODIUM CHLORIDE 0.9 % (FLUSH) 0.9 %
1-10 SYRINGE (ML) INJECTION AS NEEDED
Status: DISCONTINUED | OUTPATIENT
Start: 2017-09-28 | End: 2017-10-03 | Stop reason: HOSPADM

## 2017-09-28 RX ADMIN — GABAPENTIN 600 MG: 300 CAPSULE ORAL at 15:37

## 2017-09-28 RX ADMIN — PRIMIDONE 50 MG: 50 TABLET ORAL at 15:37

## 2017-09-28 RX ADMIN — HEPARIN SODIUM 5000 UNITS: 5000 INJECTION, SOLUTION INTRAVENOUS; SUBCUTANEOUS at 14:20

## 2017-09-28 RX ADMIN — HYDROMORPHONE HYDROCHLORIDE 0.5 MG: 2 INJECTION, SOLUTION INTRAMUSCULAR; INTRAVENOUS; SUBCUTANEOUS at 18:24

## 2017-09-28 RX ADMIN — FUROSEMIDE 80 MG: 80 TABLET ORAL at 08:22

## 2017-09-28 RX ADMIN — POTASSIUM CHLORIDE 40 MEQ: 750 CAPSULE, EXTENDED RELEASE ORAL at 08:22

## 2017-09-28 RX ADMIN — CHLORHEXIDINE GLUCONATE 15 ML: 1.2 RINSE ORAL at 08:22

## 2017-09-28 RX ADMIN — LEVOFLOXACIN 500 MG: 5 INJECTION, SOLUTION INTRAVENOUS at 14:19

## 2017-09-28 RX ADMIN — NYSTATIN: 100000 POWDER TOPICAL at 09:00

## 2017-09-28 RX ADMIN — IPRATROPIUM BROMIDE AND ALBUTEROL SULFATE 3 ML: 2.5; .5 SOLUTION RESPIRATORY (INHALATION) at 00:11

## 2017-09-28 RX ADMIN — METRONIDAZOLE 500 MG: 500 INJECTION, SOLUTION INTRAVENOUS at 21:04

## 2017-09-28 RX ADMIN — FUROSEMIDE 80 MG: 80 TABLET ORAL at 18:21

## 2017-09-28 RX ADMIN — HYDROMORPHONE HYDROCHLORIDE 0.5 MG: 2 INJECTION, SOLUTION INTRAMUSCULAR; INTRAVENOUS; SUBCUTANEOUS at 21:03

## 2017-09-28 RX ADMIN — METRONIDAZOLE 500 MG: 500 INJECTION, SOLUTION INTRAVENOUS at 06:10

## 2017-09-28 RX ADMIN — CITALOPRAM HYDROBROMIDE 40 MG: 40 TABLET ORAL at 08:22

## 2017-09-28 RX ADMIN — CHLORHEXIDINE GLUCONATE 15 ML: 1.2 RINSE ORAL at 20:19

## 2017-09-28 RX ADMIN — HYDROMORPHONE HYDROCHLORIDE 0.5 MG: 2 INJECTION, SOLUTION INTRAMUSCULAR; INTRAVENOUS; SUBCUTANEOUS at 04:09

## 2017-09-28 RX ADMIN — METRONIDAZOLE 500 MG: 500 INJECTION, SOLUTION INTRAVENOUS at 15:38

## 2017-09-28 RX ADMIN — IPRATROPIUM BROMIDE AND ALBUTEROL SULFATE 3 ML: 2.5; .5 SOLUTION RESPIRATORY (INHALATION) at 07:20

## 2017-09-28 RX ADMIN — HYDROMORPHONE HYDROCHLORIDE 0.5 MG: 2 INJECTION, SOLUTION INTRAMUSCULAR; INTRAVENOUS; SUBCUTANEOUS at 14:19

## 2017-09-28 RX ADMIN — LEVOTHYROXINE SODIUM 150 MCG: 150 TABLET ORAL at 06:10

## 2017-09-28 RX ADMIN — NYSTATIN: 100000 POWDER TOPICAL at 21:04

## 2017-09-28 RX ADMIN — IPRATROPIUM BROMIDE AND ALBUTEROL SULFATE 3 ML: 2.5; .5 SOLUTION RESPIRATORY (INHALATION) at 19:07

## 2017-09-28 RX ADMIN — GABAPENTIN 600 MG: 300 CAPSULE ORAL at 08:22

## 2017-09-28 RX ADMIN — PRIMIDONE 50 MG: 50 TABLET ORAL at 06:10

## 2017-09-28 RX ADMIN — HEPARIN SODIUM 5000 UNITS: 5000 INJECTION, SOLUTION INTRAVENOUS; SUBCUTANEOUS at 06:10

## 2017-09-28 RX ADMIN — FAMOTIDINE 20 MG: 10 INJECTION INTRAVENOUS at 18:21

## 2017-09-28 RX ADMIN — PRIMIDONE 50 MG: 50 TABLET ORAL at 21:03

## 2017-09-28 RX ADMIN — MIRTAZAPINE 30 MG: 15 TABLET, FILM COATED ORAL at 20:19

## 2017-09-28 RX ADMIN — FAMOTIDINE 20 MG: 10 INJECTION INTRAVENOUS at 08:26

## 2017-09-28 RX ADMIN — HEPARIN SODIUM 5000 UNITS: 5000 INJECTION, SOLUTION INTRAVENOUS; SUBCUTANEOUS at 21:03

## 2017-09-28 RX ADMIN — HYDROMORPHONE HYDROCHLORIDE 0.5 MG: 2 INJECTION, SOLUTION INTRAMUSCULAR; INTRAVENOUS; SUBCUTANEOUS at 10:42

## 2017-09-28 RX ADMIN — GABAPENTIN 600 MG: 300 CAPSULE ORAL at 20:19

## 2017-09-29 LAB
DEPRECATED RDW RBC AUTO: 52.9 FL (ref 36.4–46.3)
ERYTHROCYTE [DISTWIDTH] IN BLOOD BY AUTOMATED COUNT: 15.6 % (ref 11.5–14.5)
HCT VFR BLD AUTO: 27.6 % (ref 35–45)
HGB BLD-MCNC: 8.9 G/DL (ref 12–15.5)
HOLD SPECIMEN: NORMAL
MCH RBC QN AUTO: 29.8 PG (ref 26.5–34)
MCHC RBC AUTO-ENTMCNC: 32.2 G/DL (ref 31.4–36)
MCV RBC AUTO: 92.3 FL (ref 80–98)
PLATELET # BLD AUTO: 181 10*3/MM3 (ref 150–450)
PMV BLD AUTO: 10.3 FL (ref 8–12)
RBC # BLD AUTO: 2.99 10*6/MM3 (ref 3.77–5.16)
WBC NRBC COR # BLD: 13.81 10*3/MM3 (ref 3.2–9.8)

## 2017-09-29 PROCEDURE — 97110 THERAPEUTIC EXERCISES: CPT

## 2017-09-29 PROCEDURE — G8987 SELF CARE CURRENT STATUS: HCPCS

## 2017-09-29 PROCEDURE — 97116 GAIT TRAINING THERAPY: CPT

## 2017-09-29 PROCEDURE — 99024 POSTOP FOLLOW-UP VISIT: CPT | Performed by: SURGERY

## 2017-09-29 PROCEDURE — 85027 COMPLETE CBC AUTOMATED: CPT | Performed by: SURGERY

## 2017-09-29 PROCEDURE — G8988 SELF CARE GOAL STATUS: HCPCS

## 2017-09-29 PROCEDURE — 25010000002 HEPARIN (PORCINE) PER 1000 UNITS: Performed by: SURGERY

## 2017-09-29 PROCEDURE — 97166 OT EVAL MOD COMPLEX 45 MIN: CPT

## 2017-09-29 PROCEDURE — 97535 SELF CARE MNGMENT TRAINING: CPT

## 2017-09-29 PROCEDURE — 94760 N-INVAS EAR/PLS OXIMETRY 1: CPT

## 2017-09-29 PROCEDURE — 25010000002 HYDROMORPHONE PER 4 MG: Performed by: SURGERY

## 2017-09-29 PROCEDURE — 97140 MANUAL THERAPY 1/> REGIONS: CPT

## 2017-09-29 PROCEDURE — 94799 UNLISTED PULMONARY SVC/PX: CPT

## 2017-09-29 PROCEDURE — 25010000002 LEVOFLOXACIN PER 250 MG: Performed by: SURGERY

## 2017-09-29 RX ADMIN — LEVOTHYROXINE SODIUM 150 MCG: 150 TABLET ORAL at 06:38

## 2017-09-29 RX ADMIN — IPRATROPIUM BROMIDE AND ALBUTEROL SULFATE 3 ML: 2.5; .5 SOLUTION RESPIRATORY (INHALATION) at 13:28

## 2017-09-29 RX ADMIN — IPRATROPIUM BROMIDE AND ALBUTEROL SULFATE 3 ML: 2.5; .5 SOLUTION RESPIRATORY (INHALATION) at 20:00

## 2017-09-29 RX ADMIN — HYDROMORPHONE HYDROCHLORIDE 0.5 MG: 2 INJECTION, SOLUTION INTRAMUSCULAR; INTRAVENOUS; SUBCUTANEOUS at 14:04

## 2017-09-29 RX ADMIN — HEPARIN SODIUM 5000 UNITS: 5000 INJECTION, SOLUTION INTRAVENOUS; SUBCUTANEOUS at 21:25

## 2017-09-29 RX ADMIN — CITALOPRAM HYDROBROMIDE 40 MG: 40 TABLET ORAL at 10:12

## 2017-09-29 RX ADMIN — PRIMIDONE 50 MG: 50 TABLET ORAL at 06:38

## 2017-09-29 RX ADMIN — POTASSIUM CHLORIDE 40 MEQ: 750 CAPSULE, EXTENDED RELEASE ORAL at 10:12

## 2017-09-29 RX ADMIN — HEPARIN SODIUM 5000 UNITS: 5000 INJECTION, SOLUTION INTRAVENOUS; SUBCUTANEOUS at 06:38

## 2017-09-29 RX ADMIN — IPRATROPIUM BROMIDE AND ALBUTEROL SULFATE 3 ML: 2.5; .5 SOLUTION RESPIRATORY (INHALATION) at 08:28

## 2017-09-29 RX ADMIN — FUROSEMIDE 80 MG: 80 TABLET ORAL at 10:12

## 2017-09-29 RX ADMIN — GABAPENTIN 600 MG: 300 CAPSULE ORAL at 10:19

## 2017-09-29 RX ADMIN — NYSTATIN: 100000 POWDER TOPICAL at 09:00

## 2017-09-29 RX ADMIN — LEVOFLOXACIN 500 MG: 5 INJECTION, SOLUTION INTRAVENOUS at 14:36

## 2017-09-29 RX ADMIN — METRONIDAZOLE 500 MG: 500 INJECTION, SOLUTION INTRAVENOUS at 21:25

## 2017-09-29 RX ADMIN — GLATIRAMER 40 MG: 40 INJECTION, SOLUTION SUBCUTANEOUS at 14:30

## 2017-09-29 RX ADMIN — FAMOTIDINE 20 MG: 10 INJECTION INTRAVENOUS at 18:21

## 2017-09-29 RX ADMIN — HEPARIN SODIUM 5000 UNITS: 5000 INJECTION, SOLUTION INTRAVENOUS; SUBCUTANEOUS at 14:30

## 2017-09-29 RX ADMIN — PRIMIDONE 50 MG: 50 TABLET ORAL at 21:25

## 2017-09-29 RX ADMIN — PRIMIDONE 50 MG: 50 TABLET ORAL at 14:33

## 2017-09-29 RX ADMIN — GABAPENTIN 600 MG: 300 CAPSULE ORAL at 21:25

## 2017-09-29 RX ADMIN — MIRTAZAPINE 30 MG: 15 TABLET, FILM COATED ORAL at 21:25

## 2017-09-29 RX ADMIN — FUROSEMIDE 80 MG: 80 TABLET ORAL at 18:21

## 2017-09-29 RX ADMIN — CHLORHEXIDINE GLUCONATE 15 ML: 1.2 RINSE ORAL at 10:12

## 2017-09-29 RX ADMIN — METRONIDAZOLE 500 MG: 500 INJECTION, SOLUTION INTRAVENOUS at 06:38

## 2017-09-29 RX ADMIN — IPRATROPIUM BROMIDE AND ALBUTEROL SULFATE 3 ML: 2.5; .5 SOLUTION RESPIRATORY (INHALATION) at 00:25

## 2017-09-29 RX ADMIN — NYSTATIN: 100000 POWDER TOPICAL at 21:26

## 2017-09-29 RX ADMIN — GABAPENTIN 600 MG: 300 CAPSULE ORAL at 15:36

## 2017-09-29 RX ADMIN — METRONIDAZOLE 500 MG: 500 INJECTION, SOLUTION INTRAVENOUS at 15:32

## 2017-09-29 RX ADMIN — CHLORHEXIDINE GLUCONATE 15 ML: 1.2 RINSE ORAL at 21:25

## 2017-09-29 RX ADMIN — FAMOTIDINE 20 MG: 10 INJECTION INTRAVENOUS at 09:00

## 2017-09-30 LAB
DEPRECATED RDW RBC AUTO: 52.6 FL (ref 36.4–46.3)
ERYTHROCYTE [DISTWIDTH] IN BLOOD BY AUTOMATED COUNT: 15.7 % (ref 11.5–14.5)
HCT VFR BLD AUTO: 27 % (ref 35–45)
HGB BLD-MCNC: 8.7 G/DL (ref 12–15.5)
HOLD SPECIMEN: NORMAL
MCH RBC QN AUTO: 29.7 PG (ref 26.5–34)
MCHC RBC AUTO-ENTMCNC: 32.2 G/DL (ref 31.4–36)
MCV RBC AUTO: 92.2 FL (ref 80–98)
PLATELET # BLD AUTO: 184 10*3/MM3 (ref 150–450)
PMV BLD AUTO: 10.1 FL (ref 8–12)
RBC # BLD AUTO: 2.93 10*6/MM3 (ref 3.77–5.16)
WBC NRBC COR # BLD: 11.9 10*3/MM3 (ref 3.2–9.8)

## 2017-09-30 PROCEDURE — 94760 N-INVAS EAR/PLS OXIMETRY 1: CPT

## 2017-09-30 PROCEDURE — 97530 THERAPEUTIC ACTIVITIES: CPT

## 2017-09-30 PROCEDURE — 25010000002 HEPARIN (PORCINE) PER 1000 UNITS: Performed by: SURGERY

## 2017-09-30 PROCEDURE — 25010000002 HYDROMORPHONE PER 4 MG: Performed by: SURGERY

## 2017-09-30 PROCEDURE — 85027 COMPLETE CBC AUTOMATED: CPT | Performed by: SURGERY

## 2017-09-30 PROCEDURE — 97110 THERAPEUTIC EXERCISES: CPT

## 2017-09-30 PROCEDURE — 99024 POSTOP FOLLOW-UP VISIT: CPT | Performed by: SURGERY

## 2017-09-30 PROCEDURE — 25010000002 LEVOFLOXACIN PER 250 MG: Performed by: SURGERY

## 2017-09-30 PROCEDURE — 94799 UNLISTED PULMONARY SVC/PX: CPT

## 2017-09-30 PROCEDURE — 97116 GAIT TRAINING THERAPY: CPT

## 2017-09-30 RX ADMIN — GABAPENTIN 600 MG: 300 CAPSULE ORAL at 09:58

## 2017-09-30 RX ADMIN — NYSTATIN: 100000 POWDER TOPICAL at 10:00

## 2017-09-30 RX ADMIN — Medication 10 ML: at 14:35

## 2017-09-30 RX ADMIN — FUROSEMIDE 80 MG: 80 TABLET ORAL at 12:27

## 2017-09-30 RX ADMIN — IPRATROPIUM BROMIDE AND ALBUTEROL SULFATE 3 ML: 2.5; .5 SOLUTION RESPIRATORY (INHALATION) at 07:29

## 2017-09-30 RX ADMIN — HEPARIN SODIUM 5000 UNITS: 5000 INJECTION, SOLUTION INTRAVENOUS; SUBCUTANEOUS at 14:35

## 2017-09-30 RX ADMIN — IPRATROPIUM BROMIDE AND ALBUTEROL SULFATE 3 ML: 2.5; .5 SOLUTION RESPIRATORY (INHALATION) at 13:14

## 2017-09-30 RX ADMIN — Medication 10 ML: at 21:47

## 2017-09-30 RX ADMIN — GABAPENTIN 600 MG: 300 CAPSULE ORAL at 16:32

## 2017-09-30 RX ADMIN — PRIMIDONE 50 MG: 50 TABLET ORAL at 14:36

## 2017-09-30 RX ADMIN — METRONIDAZOLE 500 MG: 500 INJECTION, SOLUTION INTRAVENOUS at 05:56

## 2017-09-30 RX ADMIN — CHLORHEXIDINE GLUCONATE 15 ML: 1.2 RINSE ORAL at 09:58

## 2017-09-30 RX ADMIN — LEVOFLOXACIN 500 MG: 5 INJECTION, SOLUTION INTRAVENOUS at 14:35

## 2017-09-30 RX ADMIN — IPRATROPIUM BROMIDE AND ALBUTEROL SULFATE 3 ML: 2.5; .5 SOLUTION RESPIRATORY (INHALATION) at 00:57

## 2017-09-30 RX ADMIN — HYDROMORPHONE HYDROCHLORIDE 0.5 MG: 2 INJECTION, SOLUTION INTRAMUSCULAR; INTRAVENOUS; SUBCUTANEOUS at 20:21

## 2017-09-30 RX ADMIN — HEPARIN SODIUM 5000 UNITS: 5000 INJECTION, SOLUTION INTRAVENOUS; SUBCUTANEOUS at 21:45

## 2017-09-30 RX ADMIN — POTASSIUM CHLORIDE 40 MEQ: 750 CAPSULE, EXTENDED RELEASE ORAL at 09:58

## 2017-09-30 RX ADMIN — PRIMIDONE 50 MG: 50 TABLET ORAL at 05:56

## 2017-09-30 RX ADMIN — NYSTATIN: 100000 POWDER TOPICAL at 21:57

## 2017-09-30 RX ADMIN — IPRATROPIUM BROMIDE AND ALBUTEROL SULFATE 3 ML: 2.5; .5 SOLUTION RESPIRATORY (INHALATION) at 18:49

## 2017-09-30 RX ADMIN — MIRTAZAPINE 30 MG: 15 TABLET, FILM COATED ORAL at 20:21

## 2017-09-30 RX ADMIN — GABAPENTIN 600 MG: 300 CAPSULE ORAL at 20:21

## 2017-09-30 RX ADMIN — HEPARIN SODIUM 5000 UNITS: 5000 INJECTION, SOLUTION INTRAVENOUS; SUBCUTANEOUS at 05:56

## 2017-09-30 RX ADMIN — PRIMIDONE 50 MG: 50 TABLET ORAL at 21:46

## 2017-09-30 RX ADMIN — FAMOTIDINE 20 MG: 10 INJECTION INTRAVENOUS at 09:58

## 2017-09-30 RX ADMIN — METRONIDAZOLE 500 MG: 500 INJECTION, SOLUTION INTRAVENOUS at 16:32

## 2017-09-30 RX ADMIN — LEVOTHYROXINE SODIUM 150 MCG: 150 TABLET ORAL at 05:56

## 2017-09-30 RX ADMIN — METRONIDAZOLE 500 MG: 500 INJECTION, SOLUTION INTRAVENOUS at 21:47

## 2017-09-30 RX ADMIN — FAMOTIDINE 20 MG: 10 INJECTION INTRAVENOUS at 17:40

## 2017-09-30 RX ADMIN — CITALOPRAM HYDROBROMIDE 40 MG: 40 TABLET ORAL at 09:58

## 2017-10-01 LAB
ANION GAP SERPL CALCULATED.3IONS-SCNC: 8 MMOL/L (ref 5–15)
BUN BLD-MCNC: 9 MG/DL (ref 7–21)
BUN/CREAT SERPL: 8 (ref 7–25)
CALCIUM SPEC-SCNC: 7.3 MG/DL (ref 8.4–10.2)
CHLORIDE SERPL-SCNC: 100 MMOL/L (ref 95–110)
CO2 SERPL-SCNC: 31 MMOL/L (ref 22–31)
CREAT BLD-MCNC: 1.13 MG/DL (ref 0.5–1)
DEPRECATED RDW RBC AUTO: 53.5 FL (ref 36.4–46.3)
ERYTHROCYTE [DISTWIDTH] IN BLOOD BY AUTOMATED COUNT: 16 % (ref 11.5–14.5)
GFR SERPL CREATININE-BSD FRML MDRD: 48 ML/MIN/1.73 (ref 60–104)
GLUCOSE BLD-MCNC: 94 MG/DL (ref 60–100)
HCT VFR BLD AUTO: 25.5 % (ref 35–45)
HGB BLD-MCNC: 8.2 G/DL (ref 12–15.5)
MCH RBC QN AUTO: 29.7 PG (ref 26.5–34)
MCHC RBC AUTO-ENTMCNC: 32.2 G/DL (ref 31.4–36)
MCV RBC AUTO: 92.4 FL (ref 80–98)
PLATELET # BLD AUTO: 191 10*3/MM3 (ref 150–450)
PMV BLD AUTO: 10.1 FL (ref 8–12)
POTASSIUM BLD-SCNC: 3.5 MMOL/L (ref 3.5–5.1)
RBC # BLD AUTO: 2.76 10*6/MM3 (ref 3.77–5.16)
SODIUM BLD-SCNC: 139 MMOL/L (ref 137–145)
WBC NRBC COR # BLD: 10.14 10*3/MM3 (ref 3.2–9.8)

## 2017-10-01 PROCEDURE — 97116 GAIT TRAINING THERAPY: CPT

## 2017-10-01 PROCEDURE — 80048 BASIC METABOLIC PNL TOTAL CA: CPT | Performed by: SURGERY

## 2017-10-01 PROCEDURE — 99024 POSTOP FOLLOW-UP VISIT: CPT | Performed by: SURGERY

## 2017-10-01 PROCEDURE — 94799 UNLISTED PULMONARY SVC/PX: CPT

## 2017-10-01 PROCEDURE — 97110 THERAPEUTIC EXERCISES: CPT

## 2017-10-01 PROCEDURE — 25010000002 LEVOFLOXACIN PER 250 MG: Performed by: SURGERY

## 2017-10-01 PROCEDURE — 94760 N-INVAS EAR/PLS OXIMETRY 1: CPT

## 2017-10-01 PROCEDURE — 85027 COMPLETE CBC AUTOMATED: CPT | Performed by: SURGERY

## 2017-10-01 PROCEDURE — 25010000002 HEPARIN (PORCINE) PER 1000 UNITS: Performed by: SURGERY

## 2017-10-01 PROCEDURE — 25010000002 HYDROMORPHONE PER 4 MG: Performed by: SURGERY

## 2017-10-01 RX ADMIN — METRONIDAZOLE 500 MG: 500 INJECTION, SOLUTION INTRAVENOUS at 21:42

## 2017-10-01 RX ADMIN — METRONIDAZOLE 500 MG: 500 INJECTION, SOLUTION INTRAVENOUS at 14:30

## 2017-10-01 RX ADMIN — FUROSEMIDE 80 MG: 80 TABLET ORAL at 08:35

## 2017-10-01 RX ADMIN — HEPARIN SODIUM 5000 UNITS: 5000 INJECTION, SOLUTION INTRAVENOUS; SUBCUTANEOUS at 14:30

## 2017-10-01 RX ADMIN — GABAPENTIN 600 MG: 300 CAPSULE ORAL at 21:41

## 2017-10-01 RX ADMIN — NYSTATIN: 100000 POWDER TOPICAL at 21:54

## 2017-10-01 RX ADMIN — HYDROMORPHONE HYDROCHLORIDE 0.5 MG: 2 INJECTION, SOLUTION INTRAMUSCULAR; INTRAVENOUS; SUBCUTANEOUS at 21:42

## 2017-10-01 RX ADMIN — HYDROMORPHONE HYDROCHLORIDE 0.5 MG: 2 INJECTION, SOLUTION INTRAMUSCULAR; INTRAVENOUS; SUBCUTANEOUS at 16:06

## 2017-10-01 RX ADMIN — LEVOFLOXACIN 500 MG: 5 INJECTION, SOLUTION INTRAVENOUS at 13:10

## 2017-10-01 RX ADMIN — NYSTATIN: 100000 POWDER TOPICAL at 08:35

## 2017-10-01 RX ADMIN — HEPARIN SODIUM 5000 UNITS: 5000 INJECTION, SOLUTION INTRAVENOUS; SUBCUTANEOUS at 21:41

## 2017-10-01 RX ADMIN — FUROSEMIDE 80 MG: 80 TABLET ORAL at 17:25

## 2017-10-01 RX ADMIN — PRIMIDONE 50 MG: 50 TABLET ORAL at 06:35

## 2017-10-01 RX ADMIN — MIRTAZAPINE 30 MG: 15 TABLET, FILM COATED ORAL at 21:41

## 2017-10-01 RX ADMIN — METRONIDAZOLE 500 MG: 500 INJECTION, SOLUTION INTRAVENOUS at 06:36

## 2017-10-01 RX ADMIN — Medication 10 ML: at 08:42

## 2017-10-01 RX ADMIN — Medication 10 ML: at 11:37

## 2017-10-01 RX ADMIN — HEPARIN SODIUM 5000 UNITS: 5000 INJECTION, SOLUTION INTRAVENOUS; SUBCUTANEOUS at 06:35

## 2017-10-01 RX ADMIN — PRIMIDONE 50 MG: 50 TABLET ORAL at 21:41

## 2017-10-01 RX ADMIN — FAMOTIDINE 20 MG: 10 INJECTION INTRAVENOUS at 17:25

## 2017-10-01 RX ADMIN — IPRATROPIUM BROMIDE AND ALBUTEROL SULFATE 3 ML: 2.5; .5 SOLUTION RESPIRATORY (INHALATION) at 00:04

## 2017-10-01 RX ADMIN — HYDROMORPHONE HYDROCHLORIDE 0.5 MG: 2 INJECTION, SOLUTION INTRAMUSCULAR; INTRAVENOUS; SUBCUTANEOUS at 11:38

## 2017-10-01 RX ADMIN — GABAPENTIN 600 MG: 300 CAPSULE ORAL at 16:06

## 2017-10-01 RX ADMIN — LEVOTHYROXINE SODIUM 150 MCG: 150 TABLET ORAL at 06:35

## 2017-10-01 RX ADMIN — IPRATROPIUM BROMIDE AND ALBUTEROL SULFATE 3 ML: 2.5; .5 SOLUTION RESPIRATORY (INHALATION) at 18:56

## 2017-10-01 RX ADMIN — IPRATROPIUM BROMIDE AND ALBUTEROL SULFATE 3 ML: 2.5; .5 SOLUTION RESPIRATORY (INHALATION) at 07:09

## 2017-10-01 RX ADMIN — CITALOPRAM HYDROBROMIDE 40 MG: 40 TABLET ORAL at 08:35

## 2017-10-01 RX ADMIN — FAMOTIDINE 20 MG: 10 INJECTION INTRAVENOUS at 08:34

## 2017-10-01 RX ADMIN — Medication 10 ML: at 17:33

## 2017-10-01 RX ADMIN — GABAPENTIN 600 MG: 300 CAPSULE ORAL at 08:34

## 2017-10-01 RX ADMIN — POTASSIUM CHLORIDE 40 MEQ: 750 CAPSULE, EXTENDED RELEASE ORAL at 08:35

## 2017-10-01 RX ADMIN — Medication 10 ML: at 21:41

## 2017-10-01 RX ADMIN — PRIMIDONE 50 MG: 50 TABLET ORAL at 14:30

## 2017-10-01 NOTE — PLAN OF CARE
Problem: Patient Care Overview (Adult)  Goal: Plan of Care Review  Outcome: Ongoing (interventions implemented as appropriate)    10/01/17 1417   Coping/Psychosocial Response Interventions   Plan Of Care Reviewed With patient   Patient Care Overview   Progress no change       Goal: Adult Individualization and Mutuality  Outcome: Ongoing (interventions implemented as appropriate)  Goal: Discharge Needs Assessment  Outcome: Ongoing (interventions implemented as appropriate)    Problem: Pressure Ulcer Risk (Pedro Scale) (Adult,Obstetrics,Pediatric)  Goal: Skin Integrity  Outcome: Ongoing (interventions implemented as appropriate)    Problem: Fall Risk (Adult)  Goal: Absence of Falls  Outcome: Ongoing (interventions implemented as appropriate)    Problem: Bowel Resection (Adult)  Goal: Signs and Symptoms of Listed Potential Problems Will be Absent or Manageable (Bowel Resection)  Outcome: Ongoing (interventions implemented as appropriate)

## 2017-10-01 NOTE — SIGNIFICANT NOTE
10/01/17 1107   Rehab Treatment   Discipline occupational therapy assistant   Treatment Not Performed patient/family declined treatment

## 2017-10-01 NOTE — THERAPY TREATMENT NOTE
Acute Care - Physical Therapy Treatment Note  [facility]     Patient Name: Irma Pacheco  : 1950  MRN: [id number]  Today's Date: 10/1/2017  Onset of Illness/Injury or Date of Surgery Date: 17  Date of Referral to PT: 17  Referring Physician: Dr. Timothy Dixon.    Admit Date: 2017    Visit Dx:    ICD-10-CM ICD-9-CM   1. Intra-abdominal abscess K65.1 567.22   2. Impaired functional mobility, balance, gait, and endurance Z74.09 V49.89   3. Impaired mobility and activities of daily living Z74.09 799.89     Patient Active Problem List   Diagnosis   • Intra-abdominal abscess               Adult Rehabilitation Note       10/01/17 1333 17 0953 17 1400    Rehab Assessment/Intervention    Discipline physical therapy assistant  -SHRAVAN physical therapy assistant  -SHRAVAN physical therapy assistant  -AM    Document Type therapy note (daily note)  -SHRAVAN therapy note (daily note)  -SHRAVAN therapy note (daily note)  -AM    Subjective Information agree to therapy  -SHRAVAN agree to therapy;complains of;pain  -SHRAVAN agree to therapy;complains of;pain  -AM    Patient Effort, Rehab Treatment excellent  -SHRAVAN excellent  -SHRAVAN good  -AM    Symptoms Noted During/After Treatment  fatigue  -SHRAVAN fatigue  -AM    Precautions/Limitations fall precautions  -SHRAVAN fall precautions  -SHRAVAN fall precautions  -AM    Equipment Issued to Patient   gait belt  -AM    Recorded by [SHRAVAN] Thai Swanson PTA [SHRAVAN] Thai Swanson PTA [AM] Murtaza Mccarthy PTA    Vital Signs    Pre Systolic BP Rehab 140  -SHRAVAN 109  -SHRAVAN 131  -AM    Pre Treatment Diastolic BP 76  -SHRAVAN 69  -SHRAVAN 59  -AM    Post Systolic BP Rehab 120  -SHRAVAN 135  -SHRAVAN 126  -AM    Post Treatment Diastolic BP 57  -SHRAVAN 55  -SHRAVAN 65  -AM    Pretreatment Heart Rate (beats/min) 92  -SHRAVAN 83  -SHRAVAN 84  -AM    Intratreatment Heart Rate (beats/min) 101  -SHRAVAN 92   after gait trip 1. 97 after gqit trip 3.  -SHRAVAN     Posttreatment Heart Rate (beats/min) 88  -SHRAVAN 85  -SHRAVAN 90  -AM    Pre SpO2 (%) 97  -SHRAVAN 96  -SHRAVAN  93  -AM    O2 Delivery Pre Treatment room air  -SHRAVAN room air  -SHRAVAN room air  -AM    Intra SpO2 (%) 94  -SHRAVAN 95   after gait trip 1. 95% after gait trip 3  -SHRAVAN     O2 Delivery Intra Treatment room air  -SHRAVAN room air  -SHRAVAN     Post SpO2 (%) 95  -SHRAVAN 93  -SHRAVAN 94  -AM    O2 Delivery Post Treatment room air  -SHRAVAN room air  -SHRAVAN room air  -AM    Pre Patient Position Sitting  -SHRAVAN Sitting  -SHRAVAN Supine  -AM    Intra Patient Position   Standing  -AM    Post Patient Position Sitting  -SHRAVAN Sitting  -SHRAVAN Supine  -AM    Recorded by [SHRAVAN] Thai Swanson PTA [SHRAVAN] Thai Swanson PTA [AM] Murtaza Mccarthy PTA    Pain Assessment    Pain Assessment 0-10  -SHRAVAN 0-10  -SHRAVAN 0-10  -AM    Pain Score 6  -SHRAVAN 5  -SHRAVAN 7  -AM    Post Pain Score 7  -SHRAVAN 5  -SHRAVAN 7  -AM    Pain Type   Acute pain;Surgical pain  -AM    Pain Location Back  -SHRAVAN Generalized  -SHRAVAN Abdomen  -AM    Pain Orientation Lower  -SHRAVAN  Mid  -AM    Pain Descriptors   Sore  -AM    Pain Frequency   Constant/continuous  -AM    Date Pain First Started   09/20/17  -AM    Clinical Progression   Gradually improving  -AM    Patient's Stated Pain Goal   No pain  -AM    Pain Intervention(s) Ambulation/increased activity;Repositioned   pt defers pain meds.   -SHRAVAN Ambulation/increased activity;Repositioned  -SHRAVAN Medication (See MAR);Ambulation/increased activity  -AM    Result of Injury   No  -AM    Work-Related Injury   No  -AM    Multiple Pain Sites   No  -AM    Recorded by [SHRAVAN] Thai Swanson PTA [SHRAVAN] Thai Swanson PTA [AM] Murtaza Mccarthy PTA    Vision Assessment/Intervention    Visual Impairment WFL with corrective lenses  -SHRAVAN WFL with corrective lenses  -SHRAVAN     Recorded by [SHRAVAN] Thai Swanson PTA [SHRAVAN] Thai Swanson PTA     Cognitive Assessment/Intervention    Current Cognitive/Communication Assessment functional  -SHRAVAN functional  -SHRAVAN functional  -AM    Orientation Status oriented x 4  -SHRAVAN oriented x 4  -SHRAVAN oriented x 4  -AM    Follows Commands/Answers Questions 100% of the time  -SHRAVAN 100% of the  time  -SHRAVAN 100% of the time  -AM    Personal Safety WNL/WFL  -SHRAVAN WNL/WFL  -SHRAVAN     Personal Safety Interventions gait belt;muscle strengthening facilitated;nonskid shoes/slippers when out of bed;supervised activity  -SHRAVAN gait belt;muscle strengthening facilitated;nonskid shoes/slippers when out of bed;supervised activity  -SHRAVAN gait belt;nonskid shoes/slippers when out of bed;supervised activity  -AM    Recorded by [SHRAVAN] Thai Swanson PTA [SHRAVAN] Thai Swanson PTA [AM] Murtaza Mccarthy PTA    ROM (Range of Motion)    General ROM   no range of motion deficits identified  -AM    Recorded by   [AM] Murtaza Mccarthy PTA    Bed Mobility, Assessment/Treatment    Bed Mobility, Assistive Device  bed rails   HOB flat  -SHRAVAN bed rails;head of bed elevated  -AM    Bed Mobility, Roll Left, [place]uff  minimum assist (75% patient effort)  -SHRAVAN not tested  -AM    Bed Mobility, Roll Right, [place]uff  minimum assist (75% patient effort)  -SHRAVAN not tested  -AM    Bed Mobility, Scoot/Bridge, [place]uff  supervision required  -SHRAVAN not tested  -AM    Bed Mob, Supine to Sit, Duff  conditional independence  -SHRAVAN conditional independence  -AM    Bed Mob, Sit to Supine, [place]uff  minimum assist (75% patient effort)  -SHRAVAN minimum assist (75% patient effort)  -AM    Bed Mob, Sidelying to Sit, Duff   not tested  -AM    Bed Mob, Sit to Sidelying, Duff   not tested  -AM    Bed Mobility, Safety Issues   decreased use of arms for pushing/pulling;decreased use of legs for bridging/pushing  -AM    Bed Mobility, Impairments   strength decreased;pain  -AM    Bed Mobility, Comment  pt worked on bed mob this tx bur reports that she has sleeped in a recliner for years.   -SHRAVAN     Recorded by  [SHRAVAN] Thai Swanson PTA [AM] Murtaza Mccarthy PTA    Transfer Assessment/Treatment    Transfers, Bed-Chair Duff  contact guard assist  -SHRAVAN contact guard assist  -AM    Transfers, Chair-Bed Duff  contact guard assist  -SHRAVAN  contact guard assist  -AM    Transfers, Bed-Chair-Bed, Assist Device  rolling walker  -SHRAVAN rolling walker  -AM    Transfers, Sit-Stand [place] stand by assist  -SHRAVAN contact guard assist  -SHRAVAN contact guard assist  -AM    Transfers, Stand-Sit [place] stand by assist  -SHRAVAN contact guard assist  -SHRAVAN contact guard assist  -AM    Transfers, Sit-Stand-Sit, Assist Device rolling walker  -SHRAVAN rolling walker  -SHRAVAN rolling walker  -AM    Toilet Transfer, [place]   not tested  -AM    Walk-In Shower Transfer, [place]   not tested  -AM    Bathtub Transfer, [place]   not tested  -AM    Transfer, Maintain Weight Bearing Status   able to maintain weight bearing status  -AM    Transfer, Safety Issues   step length decreased  -AM    Transfer, Impairments   strength decreased;pain  -AM    Recorded by [SHRAVAN] Thai Swanson PTA [SHRAVAN] Thai Swanson PTA [AM] Murtaza Mccarthy PTA    Gait Assessment/Treatment    Gait, [place] Level contact guard assist  -SHRAVAN contact guard assist  -SHRAVAN contact guard assist  -AM    Gait, Assistive Device rolling walker  -SHRAVAN rolling walker  -SHRAVAN rolling walker  -AM    Gait, Distance (Feet) --   60,48,48,36,20  -SHRAVAN --   10,32,32,40,56  -SHRAVAN 120   40+40+40  -AM    Gait, Gait Pattern Analysis   3-point gait  -AM    Gait, Gait Deviations step length decreased  -SHRAVAN palomo decreased;step length decreased  -SHRAVAN bilateral:;palomo decreased  -AM    Gait, Maintain Weight Bearing Status   able to maintain weight bearing status  -AM    Gait, Safety Issues   step length decreased  -AM    Gait, Impairments strength decreased  -SHRAVAN  strength decreased;pain  -AM    Gait, Comment pt fatigues quickly.   -SHRAVAN fatigeus quickly, becomes SOA.   -SHRAVAN     Recorded by [SHRAVAN] Thai Swanson PTA [SHRAVAN] Thai Swanson PTA [AM] Murtaza Mccarthy PTA    Stairs Assessment/Treatment    Stairs, [place] Level  not tested  -SHRAVAN not tested  -AM    Recorded by  [SHRAVAN] Thai Swanson PTA [AM] Murtaza Mccarthy PTA    Therapy  Exercises    Bilateral Lower Extremities AROM:;20 reps;sitting;ankle pumps/circles;LAQ;hip flexion  -SHRAVAN AROM:;20 reps;ankle pumps/circles;hip abduction/adduction;SAQ   pt declined further tx due to nausea.   -SHRAVAN     Recorded by [SHRAVAN] Thai Swanson PTA [SHRAVAN] Thai Swanson PTA     Positioning and Restraints    Pre-Treatment Position sitting in chair/recliner  -SHRAVAN sitting in chair/recliner  -SHRAVAN in bed  -AM    Post Treatment Position chair  -SHRAVAN chair  -SHRAVAN bed  -AM    In Bed   supine;call light within reach;encouraged to call for assist;exit alarm on;with family/caregiver;with nsg  -AM    In Chair reclined;call light within reach;encouraged to call for assist   all needs met.   -SHRAVAN reclined;call light within reach;encouraged to call for assist;with family/caregiver   all needs met.   -SHRAVAN     Recorded by [SHRAVAN] Thai Swanson PTA [SHRAVAN] Thai Swanson PTA [AM] Murtaza Mccarthy PTA      User Key  (r) = Recorded By, (t) = Taken By, (c) = Cosigned By    Initials Name Effective Dates     Thai Swanson PTA 10/17/16 -     AM Murtaza Mccarthy PTA 10/17/16 -                 IP PT Goals       09/30/17 0953 09/29/17 1400 09/28/17 1355    Bed Mobility PT STG    Bed Mobility PT STG, Date Goal Reviewed 09/30/17  -SHRAVAN  09/28/17  -CZ    Bed Mobility PT STG, Outcome goal met  -SHRAVAN  goal ongoing  -CZ    Transfer Training PT LTG    Transfer Training PT  LTG, Date Goal Reviewed  09/29/17  -AM 09/28/17  -CZ    Transfer Training PT LTG, Outcome  goal met  -AM goal ongoing  -CZ      09/28/17 1007 09/27/17 1150 09/26/17 1314    Bed Mobility PT STG    Bed Mobility PT STG, Date Goal Reviewed 09/28/17  -CZ 09/27/17  -RW 09/26/17  -SHRAVAN    Bed Mobility PT STG, Outcome goal partially met  -CZ goal ongoing  -RW goal ongoing  -SHRAVAN    Transfer Training PT STG    Transfer Training PT STG, Date Goal Reviewed  09/27/17  -RW 09/26/17  -SHRAVAN    Transfer Training PT STG, Outcome  goal met  -RW goal ongoing  -SHRAVAN    Transfer Training PT LTG    Transfer  Training PT  LTG, Date Goal Reviewed 09/28/17  -CZ 09/27/17  -RW 09/26/17  -SHRAVAN    Transfer Training PT LTG, Outcome goal partially met  -CZ goal ongoing  -RW goal ongoing  -SHRAVAN    Gait Training PT LTG    Gait Training Goal PT LTG, Date Goal Reviewed  09/27/17  -RW 09/26/17  -SHRAVAN    Gait Training Goal PT LTG, Outcome  goal met  -RW goal ongoing  -SHRAVAN      09/25/17 1544 09/25/17 1455 09/24/17 1606    Bed Mobility PT STG    Bed Mobility PT STG, Date Established   09/24/17  -MN    Bed Mobility PT STG, Time to Achieve   4 days  -MN    Bed Mobility PT STG, Activity Type   roll left/roll right;supine to sit/sit to supine  -MN    Bed Mobility PT STG, [place] Level   minimum assist (75% patient effort)  -MN    Transfer Training Goal, Assist Device   bed rails  -MN    Bed Mobility PT STG, Date Goal Reviewed 09/25/17  -SHRAVAN      Bed Mobility PT STG, Outcome goal ongoing  -SHRAVAN      Transfer Training PT STG    Transfer Training PT STG, Date Established   09/24/17  -MN    Transfer Training PT STG, Time to Achieve   3 days  -MN    Transfer Training PT STG, Activity Type   bed to chair /chair to bed;sit to stand/stand to sit  -MN    Transfer Training PT STG, [place] Level   moderate assist (50% patient effort)  -MN    Transfer Training PT STG, Assist Device   walker, rolling  -MN    Transfer Training PT STG, Date Goal Reviewed  09/25/17  -SHRAVAN     Transfer Training PT STG, Outcome  goal ongoing  -SHRAVAN     Transfer Training PT LTG    Transfer Training PT LTG, Date Established   09/24/17  -MN    Transfer Training PT LTG, Time to Achieve   2 wks  -MN    Transfer Training PT LTG, Activity Type   sit to stand/stand to sit;bed to chair /chair to bed;toilet  -MN    Transfer Training PT LTG, [place] Level   contact guard assist  -MN    Transfer Training PT LTG, Assist Device   walker, rolling  -MN    Transfer Training PT  LTG, Date Goal Reviewed 09/25/17  -SHRAVAN      Transfer Training PT LTG, Outcome goal ongoing  -SHRAVAN      Gait Training  PT LTG    Gait Training Goal PT LTG, Date Established   09/24/17  -MN    Gait Training Goal PT LTG, Time to Achieve   2 wks  -MN    Gait Training Goal PT LTG, [place] Level   minimum assist (75% patient effort)  -MN    Gait Training Goal PT LTG, Assist Device   walker, rolling  -MN    Gait Training Goal PT LTG, Distance to Achieve   25 ft  -MN    Gait Training Goal PT LTG, Date Goal Reviewed 09/25/17  -SHRAVAN      Gait Training Goal PT LTG, Outcome goal ongoing  -SHRAVAN        User Key  (r) = Recorded By, (t) = Taken By, (c) = Cosigned By    Initials Name Provider Type    MN Jcainta Rubio, PT Physical Therapist    SHRAVAN Thai Swanson, PTA Physical Therapy Assistant    AM Murtaza Mccarthy, PTA Physical Therapy Assistant    RW Gordon Kaye, PTA Physical Therapy Assistant    CZ Devin Ennis, PT Physical Therapist          Physical Therapy Education     Title: PT OT SLP Therapies (Active)     Topic: Physical Therapy (Active)     Point: Mobility training (Active)    Learning Progress Summary    Learner Readiness Method Response Comment Documented by Status   Patient Acceptance E NR  SHRAVAN 10/01/17 1429 Active    Acceptance E NR  SHRAVAN 09/30/17 1257 Active    Acceptance E VU reviewed benifits of oob and mobiity  09/27/17 1149 Done    Acceptance E NR  SHRAVAN 09/26/17 1418 Active    Acceptance E DU,NR sequencing for sit EOB safely and role of therapy MN 09/24/17 1604 Done   Family Acceptance E DU,NR sequencing for sit EOB safely and role of therapy MN 09/24/17 1604 Done               Point: Home exercise program (Active)    Learning Progress Summary    Learner Readiness Method Response Comment Documented by Status   Patient Acceptance E NR Patient educated on importance of improving her activity tolerance; educated on proper supine ther ex technique.  09/28/17 1556 Active   Family Acceptance E NR Patient educated on importance of improving her activity tolerance; educated on proper supine ther ex technique.  09/28/17 5106  Active               Point: Body mechanics (Active)    Learning Progress Summary    Learner Readiness Method Response Comment Documented by Status   Patient Acceptance E NR Patient educated on proper technique for sit to supine; educated on proper gait mechanics and appropriate rest breaks.  09/28/17 1307 Active                      User Key     Initials Effective Dates Name Provider Type Discipline    MN 10/17/16 -  Jacinta Rubio, PT Physical Therapist PT    SHRAVAN 10/17/16 -  Thai Swanson, PTA Physical Therapy Assistant PT    RW 10/17/16 -  Gordon Kaye PTA Physical Therapy Assistant PT    CZ 02/17/17 -  Devin Ennis, PT Physical Therapist PT                    PT Recommendation and Plan  Anticipated Discharge Disposition: skilled nursing facility  Planned Therapy Interventions: balance training, bed mobility training, gait training, home exercise program, patient/family education, strengthening, stretching, transfer training  PT Frequency: other (see comments) (5-14x/week)  Plan of Care Review  Plan Of Care Reviewed With: patient  Progress: improving  Outcome Summary/Follow up Plan: pt continues to demonstrate great effort. pt w/ increased gait trips up to 60 ft CGA. pt becomes fatigues and SOA easily. vitals monitored and WFL. pt completed AROM in sitting. pt would continue to benefit from PT service. pt reports sje is expected to DC to swing bed on monday.            Outcome Measures       10/01/17 1333 09/30/17 0953 09/29/17 1400    How much help from another person do you currently need...    Turning from your back to your side while in flat bed without using bedrails? 3  -SHRAVAN 3  -SHRAVAN 3  -AM    Moving from lying on back to sitting on the side of a flat bed without bedrails? 4  -SHRAVAN 4  -SHRAVAN 3  -AM    Moving to and from a bed to a chair (including a wheelchair)? 3  -SHRAVAN 3  -SHRAVAN 3  -AM    Standing up from a chair using your arms (e.g., wheelchair, bedside chair)? 3  -SHRAVAN 3  -SHRAVAN 3  -AM    Climbing 3-5 steps  with a railing? 2  -SHRAVAN 2  -SHRAVAN 1  -AM    To walk in hospital room? 3  -SHRAVAN 3  -SHRAVAN 3  -AM    AM-PAC 6 Clicks Score 18  -SHRAVAN 18  -SHRAVAN 16  -AM    Functional Assessment    Outcome Measure Options AM-PAC 6 Clicks Basic Mobility (PT)  -SHRAVAN AM-PAC 6 Clicks Basic Mobility (PT)  -SHRAVAN AM-PAC 6 Clicks Basic Mobility (PT)  -AM      09/29/17 0933          How much help from another is currently needed...    Putting on and taking off regular lower body clothing? 2  -RB      Bathing (including washing, rinsing, and drying) 2  -RB      Toileting (which includes using toilet bed pan or urinal) 3  -RB      Putting on and taking off regular upper body clothing 3  -RB      Taking care of personal grooming (such as brushing teeth) 4  -RB      Eating meals 4  -RB      Score 18  -RB      Functional Assessment    Outcome Measure Options AM-PAC 6 Clicks Daily Activity (OT)  -RB        User Key  (r) = Recorded By, (t) = Taken By, (c) = Cosigned By    Initials Name Provider Type    JINNY Chavez, OT Occupational Therapist    SHRAVAN Swanson PTA Physical Therapy Assistant    MADISON Mccarthy PTA Physical Therapy Assistant           Time Calculation:         PT Charges       10/01/17 1433          Time Calculation    Start Time 1333  -SHRAVAN      Stop Time 1416  -      Time Calculation (min) 43 min  -      Time Calculation- PT    Total Timed Code Minutes- PT 43 minute(s)  -        User Key  (r) = Recorded By, (t) = Taken By, (c) = Cosigned By    Initials Name Provider Type     Thai Swanson PTA Physical Therapy Assistant          Therapy Charges for Today     Code Description Service Date Service Provider Modifiers Qty    08515434841 HC GAIT TRAINING EA 15 MIN 9/30/2017 Thai Swanson PTA GP 2    75899454845 HC PT THERAPEUTIC ACT EA 15 MIN 9/30/2017 Thai Swanson PTA GP 1    66504316860 HC PT THER PROC EA 15 MIN 9/30/2017 Thai Swanson PTA GP 1    68274690081 HC GAIT TRAINING EA 15 MIN 10/1/2017 Thai Swanson PTA GP 2     [id number]  PT THER PROC EA 15 MIN 10/1/2017 Thai Swanson PTA GP 1          PT G-Codes  PT Professional Judgement Used?: Yes  Outcome Measure Options: AM-PAC 6 Clicks Basic Mobility (PT)  Score: 8  Functional Limitation: Mobility: Walking and moving around  Mobility: Walking and Moving Around Current Status (): At least 80 percent but less than 100 percent impaired, limited or restricted  Mobility: Walking and Moving Around Goal Status (): At least 60 percent but less than 80 percent impaired, limited or restricted    Thai Swanson PTA  10/1/2017

## 2017-10-01 NOTE — PLAN OF CARE
Problem: Patient Care Overview (Adult)  Goal: Plan of Care Review  Outcome: Ongoing (interventions implemented as appropriate)  Goal: Adult Individualization and Mutuality  Outcome: Ongoing (interventions implemented as appropriate)  Goal: Discharge Needs Assessment  Outcome: Ongoing (interventions implemented as appropriate)    Problem: Pressure Ulcer Risk (Pedro Scale) (Adult,Obstetrics,Pediatric)  Goal: Skin Integrity  Outcome: Ongoing (interventions implemented as appropriate)    Problem: Fall Risk (Adult)  Goal: Absence of Falls  Outcome: Ongoing (interventions implemented as appropriate)    Problem: Bowel Resection (Adult)  Goal: Signs and Symptoms of Listed Potential Problems Will be Absent or Manageable (Bowel Resection)  Outcome: Ongoing (interventions implemented as appropriate)

## 2017-10-01 NOTE — PROGRESS NOTES
GENERAL SURGERY PROGRESS NOTE  Chief Complaint:  Surgery Follow up   LOS: 11 days       Subjective     Interval History:     Feels well, tolerating fulls well. Good ostomy output.    Objective     Vital Signs  Temp:  [97.5 °F (36.4 °C)-99.2 °F (37.3 °C)] 98.3 °F (36.8 °C)  Heart Rate:  [76-89] 77  Resp:  [18-20] 20  BP: (116-131)/(56-72) 131/56    Physical Exam:   Abdomen soft, incision CDI ostomy with stool out. CLARI in place with minimal brown drainage.  Labs:  Lab Results (last 24 hours)     Procedure Component Value Units Date/Time    CBC (No Diff) [[id number]]  (Abnormal) Collected:  10/01/17 0539    Specimen:  Blood Updated:  10/01/17 0702     WBC 10.14 (H) 10*3/mm3      RBC 2.76 (L) 10*6/mm3      Hemoglobin 8.2 (L) g/dL      Hematocrit 25.5 (L) %      MCV 92.4 fL      MCH 29.7 pg      MCHC 32.2 g/dL      RDW 16.0 (H) %      RDW-SD 53.5 (H) fl      MPV 10.1 fL      Platelets 191 10*3/mm3     Basic Metabolic Panel [[id number]]  (Abnormal) Collected:  10/01/17 0539    Specimen:  Blood Updated:  10/01/17 0726     Glucose 94 mg/dL      BUN 9 mg/dL      Creatinine 1.13 (H) mg/dL      Sodium 139 mmol/L      Potassium 3.5 mmol/L      Chloride 100 mmol/L      CO2 31.0 mmol/L      Calcium 7.3 (L) mg/dL      eGFR Non African Amer 48 (L) mL/min/1.73      BUN/Creatinine Ratio 8.0     Anion Gap 8.0 mmol/L            Results Review:     Labs and imaging for today were reviewed.    Assessment/Plan     Irma Pacheco is a 67 y.o. female who is s.p hartmanns for rectal perforation.      Overall doing well. Anticipate going to swing bed in [facility] tomorrow if continues to do well.  Will remove drain and switch to PO ABx prior to DC.          This document has been electronically signed by Timothy Dixon MD on October 1, 2017 11:06 AM        Timothy Dixon MD  10/01/17  11:06 AM

## 2017-10-01 NOTE — PLAN OF CARE
Problem: Patient Care Overview (Adult)  Goal: Plan of Care Review  Outcome: Ongoing (interventions implemented as appropriate)    10/01/17 1333   Coping/Psychosocial Response Interventions   Plan Of Care Reviewed With patient   Patient Care Overview   Progress improving   Outcome Evaluation   Outcome Summary/Follow up Plan pt continues to demonstrate great effort. pt w/ increased gait trips up to 60 ft CGA. pt becomes fatigued and SOA easily. vitals monitored and WFL. pt completed AROM in sitting. pt would continue to benefit from PT services. pt reports she is expected to DC to swing bed on monday.        Goal: Discharge Needs Assessment  Outcome: Ongoing (interventions implemented as appropriate)    09/20/17 1854 09/26/17 1500 09/29/17 0933   Discharge Needs Assessment   Concerns To Be Addressed --  --  --    Concerns Comments --  Pt. is concerned about ostomy supplies and if medicare will pay for them.  --    Readmission Within The Last 30 Days no previous admission in last 30 days --  --    Community Agency Name(S) --  [facility] --    Equipment Needed After Discharge --  colostomy/ostomy supplies --    Discharge Facility/Level Of Care Needs --  --  --    Current Discharge Risk --  chronically ill;physical impairment --    Discharge Planning Comments --  CM discussed dcp options including hhc and snf. Pt. has hhc currently and she and he daughter both said that she plans on returning home. She said that she much weaker than previously. she will need pt/ot. She declined snf option. Resources give for PADD and pacs. as pt. was interested in purchasing a bus with a lift for her wheel chair. Pt. said that she was turned down medicaid because she makes $90.00 too much a month. I told her that she may want to try to reapply for medicaid since she has added medical expenses. If she had medicaid, she could benefit from more in home services. I also gave her the national MS society resource #.  --    Current Health    Outpatient/Agency/Support Group Needs --  support group(s) (specify);homecare agency (specify level of care)  (Pt.current with [facility]. resources give for PADD,and [facility] resource guide..) --    Anticipated Changes Related to Illness --  inability to care for self --    Living Environment   Transportation Available --  --  family or friend will provide   Self-Care   Equipment Currently Used at Home --  --  rollator;wheelchair, motorized;grab bar     09/29/17 1331 10/01/17 1418   Discharge Needs Assessment   Concerns To Be Addressed --  no discharge needs identified   Concerns Comments --  --    Readmission Within The Last 30 Days --  --    Community Agency Name(S) --  --    Equipment Needed After Discharge --  --    Discharge Facility/Level Of Care Needs rehabilitation facility;nursing facility, skilled --    Current Discharge Risk --  --    Discharge Planning Comments --  --    Current Health   Outpatient/Agency/Support Group Needs --  --    Anticipated Changes Related to Illness --  --    Living Environment   Transportation Available --  --    Self-Care   Equipment Currently Used at Home --  --

## 2017-10-02 PROCEDURE — 94760 N-INVAS EAR/PLS OXIMETRY 1: CPT

## 2017-10-02 PROCEDURE — 97535 SELF CARE MNGMENT TRAINING: CPT

## 2017-10-02 PROCEDURE — 97110 THERAPEUTIC EXERCISES: CPT

## 2017-10-02 PROCEDURE — 94799 UNLISTED PULMONARY SVC/PX: CPT

## 2017-10-02 PROCEDURE — 97116 GAIT TRAINING THERAPY: CPT

## 2017-10-02 PROCEDURE — 25010000002 HEPARIN (PORCINE) PER 1000 UNITS: Performed by: SURGERY

## 2017-10-02 PROCEDURE — 25010000002 LEVOFLOXACIN PER 250 MG: Performed by: SURGERY

## 2017-10-02 PROCEDURE — 25010000002 HYDROMORPHONE PER 4 MG: Performed by: SURGERY

## 2017-10-02 PROCEDURE — 99024 POSTOP FOLLOW-UP VISIT: CPT | Performed by: SURGERY

## 2017-10-02 RX ORDER — OXYCODONE HYDROCHLORIDE AND ACETAMINOPHEN 5; 325 MG/1; MG/1
1 TABLET ORAL EVERY 6 HOURS PRN
Status: DISCONTINUED | OUTPATIENT
Start: 2017-10-02 | End: 2017-10-03 | Stop reason: HOSPADM

## 2017-10-02 RX ORDER — SODIUM CHLORIDE 0.9 % (FLUSH) 0.9 %
SYRINGE (ML) INJECTION
Status: DISPENSED
Start: 2017-10-02 | End: 2017-10-02

## 2017-10-02 RX ADMIN — IPRATROPIUM BROMIDE AND ALBUTEROL SULFATE 3 ML: 2.5; .5 SOLUTION RESPIRATORY (INHALATION) at 19:30

## 2017-10-02 RX ADMIN — IPRATROPIUM BROMIDE AND ALBUTEROL SULFATE 3 ML: 2.5; .5 SOLUTION RESPIRATORY (INHALATION) at 00:48

## 2017-10-02 RX ADMIN — OXYCODONE HYDROCHLORIDE AND ACETAMINOPHEN 1 TABLET: 5; 325 TABLET ORAL at 23:46

## 2017-10-02 RX ADMIN — FUROSEMIDE 80 MG: 80 TABLET ORAL at 18:03

## 2017-10-02 RX ADMIN — CITALOPRAM HYDROBROMIDE 40 MG: 40 TABLET ORAL at 09:08

## 2017-10-02 RX ADMIN — GABAPENTIN 600 MG: 300 CAPSULE ORAL at 16:38

## 2017-10-02 RX ADMIN — HYDROMORPHONE HYDROCHLORIDE 0.5 MG: 2 INJECTION, SOLUTION INTRAMUSCULAR; INTRAVENOUS; SUBCUTANEOUS at 09:21

## 2017-10-02 RX ADMIN — IPRATROPIUM BROMIDE AND ALBUTEROL SULFATE 3 ML: 2.5; .5 SOLUTION RESPIRATORY (INHALATION) at 13:35

## 2017-10-02 RX ADMIN — LEVOFLOXACIN 500 MG: 5 INJECTION, SOLUTION INTRAVENOUS at 13:55

## 2017-10-02 RX ADMIN — HEPARIN SODIUM 5000 UNITS: 5000 INJECTION, SOLUTION INTRAVENOUS; SUBCUTANEOUS at 13:55

## 2017-10-02 RX ADMIN — FUROSEMIDE 80 MG: 80 TABLET ORAL at 09:08

## 2017-10-02 RX ADMIN — Medication 10 ML: at 21:26

## 2017-10-02 RX ADMIN — HEPARIN SODIUM 5000 UNITS: 5000 INJECTION, SOLUTION INTRAVENOUS; SUBCUTANEOUS at 21:13

## 2017-10-02 RX ADMIN — LEVOTHYROXINE SODIUM 150 MCG: 150 TABLET ORAL at 06:20

## 2017-10-02 RX ADMIN — FAMOTIDINE 20 MG: 10 INJECTION INTRAVENOUS at 09:08

## 2017-10-02 RX ADMIN — MIRTAZAPINE 30 MG: 15 TABLET, FILM COATED ORAL at 21:13

## 2017-10-02 RX ADMIN — NYSTATIN: 100000 POWDER TOPICAL at 21:26

## 2017-10-02 RX ADMIN — HEPARIN SODIUM 5000 UNITS: 5000 INJECTION, SOLUTION INTRAVENOUS; SUBCUTANEOUS at 06:20

## 2017-10-02 RX ADMIN — HYDROMORPHONE HYDROCHLORIDE 0.5 MG: 2 INJECTION, SOLUTION INTRAMUSCULAR; INTRAVENOUS; SUBCUTANEOUS at 14:00

## 2017-10-02 RX ADMIN — GABAPENTIN 600 MG: 300 CAPSULE ORAL at 21:13

## 2017-10-02 RX ADMIN — METRONIDAZOLE 500 MG: 500 INJECTION, SOLUTION INTRAVENOUS at 06:20

## 2017-10-02 RX ADMIN — METRONIDAZOLE 500 MG: 500 INJECTION, SOLUTION INTRAVENOUS at 16:37

## 2017-10-02 RX ADMIN — GABAPENTIN 600 MG: 300 CAPSULE ORAL at 09:08

## 2017-10-02 RX ADMIN — FAMOTIDINE 20 MG: 10 INJECTION INTRAVENOUS at 18:03

## 2017-10-02 RX ADMIN — GLATIRAMER 40 MG: 40 INJECTION, SOLUTION SUBCUTANEOUS at 09:07

## 2017-10-02 RX ADMIN — PRIMIDONE 50 MG: 50 TABLET ORAL at 06:20

## 2017-10-02 RX ADMIN — PRIMIDONE 50 MG: 50 TABLET ORAL at 21:13

## 2017-10-02 RX ADMIN — IPRATROPIUM BROMIDE AND ALBUTEROL SULFATE 3 ML: 2.5; .5 SOLUTION RESPIRATORY (INHALATION) at 08:23

## 2017-10-02 RX ADMIN — POTASSIUM CHLORIDE 40 MEQ: 750 CAPSULE, EXTENDED RELEASE ORAL at 09:08

## 2017-10-02 RX ADMIN — NYSTATIN: 100000 POWDER TOPICAL at 09:09

## 2017-10-02 RX ADMIN — METRONIDAZOLE 500 MG: 500 INJECTION, SOLUTION INTRAVENOUS at 21:13

## 2017-10-02 RX ADMIN — HYDROMORPHONE HYDROCHLORIDE 0.5 MG: 2 INJECTION, SOLUTION INTRAMUSCULAR; INTRAVENOUS; SUBCUTANEOUS at 16:38

## 2017-10-02 RX ADMIN — PRIMIDONE 50 MG: 50 TABLET ORAL at 13:55

## 2017-10-02 NOTE — PROGRESS NOTES
GENERAL SURGERY PROGRESS NOTE  Chief Complaint:  Surgery Follow up   LOS: 12 days       Subjective     Interval History:     Improved ambulation with PT today. Overall feels well.     Objective     Vital Signs  Temp:  [98.5 °F (36.9 °C)-98.9 °F (37.2 °C)] 98.9 °F (37.2 °C)  Heart Rate:  [72-86] 73  Resp:  [18-20] 18  BP: (104-137)/(60-72) 104/60    Physical Exam:   Partial skin dehiscence of wound. No significant drainage. Ostomy with stool out.  Labs:  Lab Results (last 24 hours)     ** No results found for the last 24 hours. **           Results Review:     Labs and imaging for today were reviewed.    Assessment/Plan     Irma Pacheco is a 67 y.o. female who is s/p Karish[facility]'s      Overall appears to be doing well.  Will plan for DC tomorrow if CBC ok.          This document has been electronically signed by Timothy Dixon MD on October 2, 2017 4:37 PM        Timothy Dixon MD  10/02/17  4:37 PM

## 2017-10-02 NOTE — PLAN OF CARE
Problem: Patient Care Overview (Adult)  Goal: Plan of Care Review  Outcome: Ongoing (interventions implemented as appropriate)    10/02/17 1130   Coping/Psychosocial Response Interventions   Plan Of Care Reviewed With patient;daughter   Patient Care Overview   Progress progress toward functional goals as expected   Outcome Evaluation   Outcome Summary/Follow up Plan Pt has met all PT goals again this tx. Pt able to amb 40+40+40+40 ft w/RW CG. Pt would benefit from SNF.

## 2017-10-02 NOTE — PLAN OF CARE
Problem: Patient Care Overview (Adult)  Goal: Plan of Care Review  Outcome: Ongoing (interventions implemented as appropriate)    10/02/17 1420 10/02/17 1443   Coping/Psychosocial Response Interventions   Plan Of Care Reviewed With --  patient   Patient Care Overview   Progress --  improving   Outcome Evaluation   Outcome Summary/Follow up Plan No new goals met this date --        Goal: Discharge Needs Assessment  Outcome: Ongoing (interventions implemented as appropriate)    09/20/17 1854 09/26/17 1500 09/29/17 0933   Discharge Needs Assessment   Concerns To Be Addressed --  --  --    Concerns Comments --  Pt. is concerned about ostomy supplies and if medicare will pay for them.  --    Readmission Within The Last 30 Days no previous admission in last 30 days --  --    Community Agency Name(S) --  [facility] --    Equipment Needed After Discharge --  colostomy/ostomy supplies --    Discharge Facility/Level Of Care Needs --  --  --    Current Discharge Risk --  chronically ill;physical impairment --    Discharge Planning Comments --  CM discussed dcp options including hhc and snf. Pt. has hhc currently and she and he daughter both said that she plans on returning home. She said that she much weaker than previously. she will need pt/ot. She declined snf option. Resources give for PADD and pacs. as pt. was interested in purchasing a bus with a lift for her wheel chair. Pt. said that she was turned down medicaid because she makes $90.00 too much a month. I told her that she may want to try to reapply for medicaid since she has added medical expenses. If she had medicaid, she could benefit from more in home services. I also gave her the national MS society resource #.  --    Current Health   Outpatient/Agency/Support Group Needs --  support group(s) (specify);homecare agency (specify level of care)  (Pt.current with [employer]. resources give for PADD,and [facility] resource guide..) --    Anticipated  Changes Related to Illness --  inability to care for self --    Living Environment   Transportation Available --  --  family or friend will provide   Self-Care   Equipment Currently Used at Home --  --  rollator;wheelchair, motorized;grab bar     09/29/17 1331 10/02/17 1443   Discharge Needs Assessment   Concerns To Be Addressed --  no discharge needs identified   Concerns Comments --  --    Readmission Within The Last 30 Days --  --    Community Agency Name(S) --  --    Equipment Needed After Discharge --  --    Discharge Facility/Level Of Care Needs rehabilitation facility;nursing facility, skilled --    Current Discharge Risk --  --    Discharge Planning Comments --  --    Current Health   Outpatient/Agency/Support Group Needs --  --    Anticipated Changes Related to Illness --  --    Living Environment   Transportation Available --  --    Self-Care   Equipment Currently Used at Home --  --          Problem: Inpatient Occupational Therapy  Goal: Transfer Training Goal 1 LTG- OT  Outcome: Ongoing (interventions implemented as appropriate)    09/29/17 1331 10/02/17 1420   Transfer Training OT LTG   Transfer Training OT LTG, Date Established --  10/02/17   Transfer Training OT LTG, Time to Achieve by discharge --    Transfer Training OT LTG, Activity Type all transfers --    Transfer Training OT LTG, Assist Device walker, rolling --        Goal: Strength Goal LTG- OT  Outcome: Ongoing (interventions implemented as appropriate)    09/29/17 1331 10/02/17 1420   Strength OT LTG   Strength Goal OT LTG, Date Established 09/22/17 --    Strength Goal OT LTG, Time to Achieve by discharge --    Strength Goal OT LTG, Measure to Achieve 1-2 sets of 10 reps all planes with 1-2 lb wrist wts or dumbells for B UE strength to increase independence with bed mobility/transfers. --    Strength Goal OT LTG, Date Goal Reviewed --  10/02/17   Strength Goal OT LTG, Outcome --  goal not met       Goal: Dynamic Standing Balance Goal  LTG-OT  Outcome: Ongoing (interventions implemented as appropriate)    09/29/17 1331 10/02/17 1420   Dynamic Standing Balance OT LTG   Dynamic Standing Balance OT LTG, Date Established 09/29/17 --    Dynamic Standing Balance OT LTG, Time to Achieve by discharge --    Dynamic Standing Balance OT LTG, Melber Level supervision required  (5 minutes with functional activity.) --    Dynamic Standing Balance OT LTG, Assist Device assistive Device  (R/W.) --    Dynamic Standing Balance OT LTG, Date Goal Reviewed --  10/02/17   Dynamic Standing Balance OT LTG, Outcome --  goal not met       Goal: ADL Goal LTG- OT  Outcome: Ongoing (interventions implemented as appropriate)    09/29/17 1331 10/02/17 1420   ADL OT LTG   ADL OT LTG, Date Established 09/29/17 --    ADL OT LTG, Time to Achieve by discharge --    ADL OT LTG, Activity Type ADL skills  (Sponge bath and dress or walk-in shower.) --    ADL OT LTG, Melber Level min assist;assistive device  (R/W.) --    ADL OT LTG, Date Goal Reviewed --  10/02/17   ADL OT LTG, Outcome --  goal not met

## 2017-10-02 NOTE — NURSING NOTE
Dressing changed completed to midline abdominal incision.  Staples intact,; noted scabbing to incision.  2 small areas noted that are gaped with some serosanguineous/purulent drainage.  Cleaned incision and surrounding area with chloraprep swabs.  Cleaned also around CLARI drain site; sutures intact; scabbing noted.  Applied drain sponge to CLARI site; applied 4x4s over abdominal midline incision and covered with ABD. Pt tolerated dressing change well.  Encouraged pt to splint abdomen with position changes to decrease strain to incision site.

## 2017-10-02 NOTE — THERAPY TREATMENT NOTE
Acute Care - Physical Therapy Treatment Note  [facility]     Patient Name: Irma Pacheco  : 1950  MRN: [id number]  Today's Date: 10/2/2017  Onset of Illness/Injury or Date of Surgery Date: 17  Date of Referral to PT: 17  Referring Physician: Dr. Timothy Dixon.    Admit Date: 2017    Visit Dx:    ICD-10-CM ICD-9-CM   1. Intra-abdominal abscess K65.1 567.22   2. Impaired functional mobility, balance, gait, and endurance Z74.09 V49.89   3. Impaired mobility and activities of daily living Z74.09 799.89     Patient Active Problem List   Diagnosis   • Intra-abdominal abscess               Adult Rehabilitation Note       10/02/17 1420 10/02/17 1130 10/01/17 1333    Rehab Assessment/Intervention    Discipline occupational therapy assistant  -KD physical therapy assistant  -AM physical therapy assistant  -SHRAVAN    Document Type therapy note (daily note)  -KD therapy note (daily note)  -AM therapy note (daily note)  -SHRAVAN    Subjective Information agree to therapy  -KD agree to therapy;complains of;pain  -AM agree to therapy  -SHRAVAN    Patient Effort, Rehab Treatment  good  -AM excellent  -SHRAVAN    Symptoms Noted During/After Treatment  increased pain  -AM     Precautions/Limitations fall precautions  -KD fall precautions  -AM fall precautions  -SHRAVAN    Equipment Issued to Patient  gait belt  -AM     Recorded by [KD] GITA Fair/DERICK [AM] Murtaza Mccarthy PTA [SHRAVAN] Thai Swanson PTA    Vital Signs    Pre Systolic BP Rehab  121  -  -SHRAVAN    Pre Treatment Diastolic BP  68  -AM 76  -SHRAVAN    Post Systolic BP Rehab  121  -  -SHRAVAN    Post Treatment Diastolic BP  69  -AM 57  -SHRAVAN    Pretreatment Heart Rate (beats/min) 90  -KD 65  -AM 92  -SHRAVAN    Intratreatment Heart Rate (beats/min)   101  -SHRAVAN    Posttreatment Heart Rate (beats/min) 84  -KD 87  -AM 88  -SHRAVAN    Pre SpO2 (%) 96  -KD 98  -AM 97  -SHRAVAN    O2 Delivery Pre Treatment room air  -KD room air  -AM room air  -SHRAVAN    Intra SpO2 (%)   94  -SHRAVAN     O2 Delivery Intra Treatment   room air  -SHRAVAN    Post SpO2 (%) 94  -KD 96  -AM 95  -SHRAVAN    O2 Delivery Post Treatment room air  -KD room air  -AM room air  -SHRAVAN    Pre Patient Position Sitting  -KD Supine  -AM Sitting  -SHRAVAN    Intra Patient Position Standing  -KD Standing  -AM     Post Patient Position Sitting  -KD Sitting  -AM Sitting  -SHRAVAN    Recorded by [KD] GITA Fair/L [AM] Murtaza Mccarthy PTA [SHRAVAN] Thai Swanson PTA    Pain Assessment    Pain Assessment 0-10  -KD 0-10  -AM 0-10  -SHRAVAN    Pain Score 8  -KD 6  -AM 6  -SHRAVAN    Post Pain Score 8  -KD 7  -AM 7  -SHRAVAN    Pain Type Acute pain  -KD Acute pain;Surgical pain  -AM     Pain Location Back  -KD Abdomen  -AM Back  -SHRAVAN    Pain Orientation Lower  -KD Mid  -AM Lower  -SHRAVAN    Pain Descriptors  Sore  -AM     Pain Frequency  Constant/continuous  -AM     Date Pain First Started  09/20/17  -AM     Clinical Progression  Gradually improving  -AM     Patient's Stated Pain Goal  No pain  -AM     Pain Intervention(s)  Medication (See MAR);Ambulation/increased activity  -AM Ambulation/increased activity;Repositioned   pt defers pain meds.   -SHRAVAN    Result of Injury  No  -AM     Work-Related Injury  No  -AM     Multiple Pain Sites  No  -AM     Recorded by [KD] GITA Fair/DERICK [AM] Murtaza Mccarthy PTA [SHRAVAN] Thai Swanson PTA    Vision Assessment/Intervention    Visual Impairment WFL with corrective lenses  -KD  WFL with corrective lenses  -SHRAVAN    Recorded by [KD] GITA Fair/DERICK  [SHRAVAN] Thai Swanson PTA    Cognitive Assessment/Intervention    Current Cognitive/Communication Assessment functional  -KD functional  -AM functional  -SHRAVAN    Orientation Status oriented x 4  -KD oriented x 4  -AM oriented x 4  -SHRAVAN    Follows Commands/Answers Questions 100% of the time  -% of the time  -% of the time  -SHRAVAN    Personal Safety WNL/WFL  -KD  WNL/WFL  -SHRAVAN    Personal Safety Interventions gait belt;nonskid shoes/slippers when out of bed  -KD gait belt;nonskid  shoes/slippers when out of bed;supervised activity  -AM gait belt;muscle strengthening facilitated;nonskid shoes/slippers when out of bed;supervised activity  -SHRAVAN    Recorded by [KD] FREDDY Fair [AM] Murtaza Mccarthy PTA [SHRAVAN] Thai Swanson PTA    ROM (Range of Motion)    General ROM  no range of motion deficits identified  -AM     Recorded by  [AM] Murtaza Mccarthy PTA     Bed Mobility, Assessment/Treatment    Bed Mobility, Assistive Device  bed rails;head of bed elevated  -AM     Bed Mobility, Roll Left, [place]  not tested  -AM     Bed Mobility, Roll Right, [place]  not tested  -AM     Bed Mobility, Scoot/Bridge, [place]  not tested  -AM     Bed Mob, Supine to Sit, [place]  conditional independence  -AM     Bed Mob, Sit to Supine, [place]  not tested  -AM     Bed Mob, Sidelying to Sit, [place]  not tested  -AM     Bed Mob, Sit to Sidelying, [place]  not tested  -AM     Bed Mobility, Safety Issues  decreased use of arms for pushing/pulling;decreased use of legs for bridging/pushing  -AM     Bed Mobility, Impairments  strength decreased;pain  -AM     Recorded by  [AM] Murtaza Mccarthy PTA     Transfer Assessment/Treatment    Transfers, Bed-Chair [place]  contact guard assist  -AM     Transfers, Chair-Bed [place]  contact guard assist  -AM     Transfers, Bed-Chair-Bed, Assist Device  rolling walker  -AM     Transfers, Sit-Stand [place] supervision required  -KD contact guard assist  -AM stand by assist  -SHRAVAN    Transfers, Stand-Sit [place] supervision required  -KD contact guard assist  -AM stand by assist  -SHRAVAN    Transfers, Sit-Stand-Sit, Assist Device rolling walker  -KD rolling walker  -AM rolling walker  -SHRAVAN    Toilet Transfer, [place]  contact guard assist  -AM     Toilet Transfer, Assistive Device  rolling walker  -AM     Walk-In Shower Transfer, [place]  not tested  -AM     Bathtub Transfer, [place]  not tested  -AM      Transfer, Maintain Weight Bearing Status  able to maintain weight bearing status  -AM     Transfer, Safety Issues  step length decreased  -AM     Transfer, Impairments  strength decreased;pain  -AM     Recorded by [KD] GITA Fair/DERICK [AM] Murtaza Mccarthy PTA [SHRAVAN] Thai Swanson PTA    Gait Assessment/Treatment    Gait, [place] Level  contact guard assist  -AM contact guard assist  -SHRAVAN    Gait, Assistive Device  rolling walker  -AM rolling walker  -SHRAVAN    Gait, Distance (Feet)  160   40+40+40+40  -AM --   60,48,48,36,20  -SHRAVAN    Gait, Gait Pattern Analysis  swing-through gait  -AM     Gait, Gait Deviations  bilateral:;aplomo decreased  -AM step length decreased  -SHRAVAN    Gait, Maintain Weight Bearing Status  able to maintain weight bearing status  -AM     Gait, Safety Issues  step length decreased  -AM     Gait, Impairments  strength decreased;pain  -AM strength decreased  -SHRAVAN    Gait, Comment   pt fatigues quickly.   -SHRAVAN    Recorded by  [AM] Murtaza Mccarthy PTA [SHRAVAN] Thai Swanson PTA    Stairs Assessment/Treatment    Number of Stairs  3  -AM     Stairs, Handrail Location  both sides  -AM     Stairs, [place] Level  contact guard assist  -AM     Stairs, Technique Used  step to step (ascending);step to step (descending)  -AM     Stairs, Maintain Weight Bearing Status  able to maintain weight bearing status  -AM     Stairs, Impairments  strength decreased;pain  -AM     Recorded by  [AM] Murtaza Mccarthy PTA     Balance Skills Training    Standing-Level of Assistance Close supervision  -KD      Standing Balance # of Minutes 5  -KD      Recorded by [KD] GITA Fair/L      Therapy Exercises    Bilateral Lower Extremities   AROM:;20 reps;sitting;ankle pumps/circles;LAQ;hip flexion  -SHRAVAN    Bilateral Upper Extremity AROM:;20 reps;sitting;elbow flexion/extension;pronation/supination;shoulder abduction/adduction;shoulder extension/flexion;shoulder ER/IR;shoulder horizontal abd/add  -ROBERTO      BUE  Resistance manual resistance- minimal  -KD      Recorded by [KD] GITA Fair/DERICK  [SHRAVAN] Thai Swanson PTA    Positioning and Restraints    Pre-Treatment Position in bed  -KD in bed  -AM sitting in chair/recliner  -SHRAVAN    Post Treatment Position bed  -KD chair  -AM chair  -SHRAVAN    In Bed sitting;call light within reach;encouraged to call for assist;exit alarm on  -KD      In Chair  reclined;call light within reach;encouraged to call for assist;with family/caregiver;legs elevated  -AM reclined;call light within reach;encouraged to call for assist   all needs met.   -SHRAVAN    Recorded by [KD] GITA Fair/DERICK [AM] Murtaza Mccarthy PTA [SHRAVAN] Thai Swanson PTA      09/30/17 0953          Rehab Assessment/Intervention    Discipline physical therapy assistant  -SHRAVAN      Document Type therapy note (daily note)  -SHRAVAN      Subjective Information agree to therapy;complains of;pain  -SHRAVAN      Patient Effort, Rehab Treatment excellent  -SHRAVAN      Symptoms Noted During/After Treatment fatigue  -SHRAVAN      Precautions/Limitations fall precautions  -SHRAVAN      Recorded by [SHRAVAN] Thai Swanson PTA      Vital Signs    Pre Systolic BP Rehab 109  -SHRAVAN      Pre Treatment Diastolic BP 69  -SHRAVAN      Post Systolic BP Rehab 135  -SHRAVAN      Post Treatment Diastolic BP 55  -SHRAVAN      Pretreatment Heart Rate (beats/min) 83  -SHRAVAN      Intratreatment Heart Rate (beats/min) 92   after gait trip 1. 97 after gqit trip 3.  -SHRAVAN      Posttreatment Heart Rate (beats/min) 85  -SHRAVAN      Pre SpO2 (%) 96  -SHRAVAN      O2 Delivery Pre Treatment room air  -SHRAVAN      Intra SpO2 (%) 95   after gait trip 1. 95% after gait trip 3  -SHRAVAN      O2 Delivery Intra Treatment room air  -SHRAVAN      Post SpO2 (%) 93  -SHRVAAN      O2 Delivery Post Treatment room air  -SHRAVAN      Pre Patient Position Sitting  -SHRAVAN      Post Patient Position Sitting  -SHRAVAN      Recorded by [SHRAVAN] Thai Swanson PTA      Pain Assessment    Pain Assessment 0-10  -SHRAVAN      Pain Score 5  -SHRAVAN      Post Pain Score 5  -SHRAVAN       Pain Location Generalized  -SHRAVAN      Pain Intervention(s) Ambulation/increased activity;Repositioned  -SHRAVAN      Recorded by [SHRAVAN] Thai Swanson PTA      Vision Assessment/Intervention    Visual Impairment WFL with corrective lenses  -SHRAVAN      Recorded by [SHRAVAN] Thai Swanson PTA      Cognitive Assessment/Intervention    Current Cognitive/Communication Assessment functional  -SHRAVAN      Orientation Status oriented x 4  -SHRAVAN      Follows Commands/Answers Questions 100% of the time  -SHRAVAN      Personal Safety WNL/WFL  -SHRAVAN      Personal Safety Interventions gait belt;muscle strengthening facilitated;nonskid shoes/slippers when out of bed;supervised activity  -SHRAVAN      Recorded by [SHRAVAN] Thai Swanson PTA      Bed Mobility, Assessment/Treatment    Bed Mobility, Assistive Device bed rails   HOB flat  -SHRAVAN      Bed Mobility, Roll Left, [place] minimum assist (75% patient effort)  -SHRAVAN      Bed Mobility, Roll Right, [place] minimum assist (75% patient effort)  -SHRAVAN      Bed Mobility, Scoot/Bridge, [place] supervision required  -SHRAVAN      Bed Mob, Supine to Sit, [place] conditional independence  -SHRAVAN      Bed Mob, Sit to Supine, [place] minimum assist (75% patient effort)  -      Bed Mobility, Comment pt worked on bed mob this tx bur reports that she has sleeped in a recliner for years.   -SHRAVAN      Recorded by [SHRAVAN] Thai Swanson PTA      Transfer Assessment/Treatment    Transfers, Bed-Chair [place] contact guard assist  -SHRAVAN      Transfers, Chair-Bed [place] contact guard assist  -SHRAVAN      Transfers, Bed-Chair-Bed, Assist Device rolling walker  -SHRAVAN      Transfers, Sit-Stand [place] contact guard assist  -SHRAVAN      Transfers, Stand-Sit [place] contact guard assist  -SHRAVAN      Transfers, Sit-Stand-Sit, Assist Device rolling walker  -SHRAVAN      Recorded by [SHRAVAN] Thai Swanson PTA      Gait Assessment/Treatment    Gait, [place] Level contact guard assist  -SHRAVAN      Gait, Assistive Device  rolling walker  -SHRAVAN      Gait, Distance (Feet) --   10,32,32,40,56  -SHRAVAN      Gait, Gait Deviations palomo decreased;step length decreased  -SHRAVAN      Gait, Comment fatigeus quickly, becomes SOA.   -SHRAVAN      Recorded by [SHRAVAN] Thai Swanson PTA      Stairs Assessment/Treatment    Stairs, [place] Level not tested  -SHRAVAN      Recorded by [SHRAVAN] Thai Swanson PTA      Therapy Exercises    Bilateral Lower Extremities AROM:;20 reps;ankle pumps/circles;hip abduction/adduction;SAQ   pt declined further tx due to nausea.   -SHRAVAN      Recorded by [SHRAVAN] Thai Swanson PTA      Positioning and Restraints    Pre-Treatment Position sitting in chair/recliner  -SHRAVAN      Post Treatment Position chair  -SHRAVAN      In Chair reclined;call light within reach;encouraged to call for assist;with family/caregiver   all needs met.   -SHRAVAN      Recorded by [SHRAVAN] Thai Swanson PTA        User Key  (r) = Recorded By, (t) = Taken By, (c) = Cosigned By    Initials Name Effective Dates    SHRAVAN Thai Swanson, TERRY 10/17/16 -     AM Murtaza Mccarthy PTA 10/17/16 -     KD GITA Fair/DERICK 10/17/16 -                 IP PT Goals       09/30/17 0953 09/29/17 1400 09/28/17 1355    Bed Mobility PT STG    Bed Mobility PT STG, Date Goal Reviewed 09/30/17  -SHRAVAN  09/28/17  -CZ    Bed Mobility PT STG, Outcome goal met  -SHRAVAN  goal ongoing  -CZ    Transfer Training PT LTG    Transfer Training PT  LTG, Date Goal Reviewed  09/29/17  -AM 09/28/17  -CZ    Transfer Training PT LTG, Outcome  goal met  -AM goal ongoing  -CZ      09/28/17 1007 09/27/17 1150 09/26/17 1314    Bed Mobility PT STG    Bed Mobility PT STG, Date Goal Reviewed 09/28/17  -CZ 09/27/17  -RW 09/26/17  -SHRAVAN    Bed Mobility PT STG, Outcome goal partially met  -CZ goal ongoing  -RW goal ongoing  -SHRAVAN    Transfer Training PT STG    Transfer Training PT STG, Date Goal Reviewed  09/27/17  -RW 09/26/17  -SHRAVAN    Transfer Training PT STG, Outcome  goal met  -RW goal ongoing  -SHRAVAN    Transfer Training PT LTG     Transfer Training PT  LTG, Date Goal Reviewed 09/28/17  -CZ 09/27/17  -RW 09/26/17  -SHRAVAN    Transfer Training PT LTG, Outcome goal partially met  -CZ goal ongoing  -RW goal ongoing  -SHRAVAN    Gait Training PT LTG    Gait Training Goal PT LTG, Date Goal Reviewed  09/27/17  -RW 09/26/17  -SHRAVAN    Gait Training Goal PT LTG, Outcome  goal met  -RW goal ongoing  -SHRAVAN      09/25/17 1544 09/25/17 1455 09/24/17 1606    Bed Mobility PT STG    Bed Mobility PT STG, Date Established   09/24/17  -MN    Bed Mobility PT STG, Time to Achieve   4 days  -MN    Bed Mobility PT STG, Activity Type   roll left/roll right;supine to sit/sit to supine  -MN    Bed Mobility PT STG, [place] Level   minimum assist (75% patient effort)  -MN    Transfer Training Goal, Assist Device   bed rails  -MN    Bed Mobility PT STG, Date Goal Reviewed 09/25/17  -SHRAVAN      Bed Mobility PT STG, Outcome goal ongoing  -SHRAVAN      Transfer Training PT STG    Transfer Training PT STG, Date Established   09/24/17  -MN    Transfer Training PT STG, Time to Achieve   3 days  -MN    Transfer Training PT STG, Activity Type   bed to chair /chair to bed;sit to stand/stand to sit  -MN    Transfer Training PT STG, [place] Level   moderate assist (50% patient effort)  -MN    Transfer Training PT STG, Assist Device   walker, rolling  -MN    Transfer Training PT STG, Date Goal Reviewed  09/25/17  -SHRAVAN     Transfer Training PT STG, Outcome  goal ongoing  -SHRAVAN     Transfer Training PT LTG    Transfer Training PT LTG, Date Established   09/24/17  -MN    Transfer Training PT LTG, Time to Achieve   2 wks  -MN    Transfer Training PT LTG, Activity Type   sit to stand/stand to sit;bed to chair /chair to bed;toilet  -MN    Transfer Training PT LTG, [place] Level   contact guard assist  -MN    Transfer Training PT LTG, Assist Device   walker, rolling  -MN    Transfer Training PT  LTG, Date Goal Reviewed 09/25/17  -SHRAVAN      Transfer Training PT LTG, Outcome goal ongoing  -SHRAVAN      Gait  Training PT LTG    Gait Training Goal PT LTG, Date Established   09/24/17  -MN    Gait Training Goal PT LTG, Time to Achieve   2 wks  -MN    Gait Training Goal PT LTG, [place] Level   minimum assist (75% patient effort)  -MN    Gait Training Goal PT LTG, Assist Device   walker, rolling  -MN    Gait Training Goal PT LTG, Distance to Achieve   25 ft  -MN    Gait Training Goal PT LTG, Date Goal Reviewed 09/25/17  -SHRAVAN      Gait Training Goal PT LTG, Outcome goal ongoing  -SHRAVAN        User Key  (r) = Recorded By, (t) = Taken By, (c) = Cosigned By    Initials Name Provider Type    MN Jacinta Rubio, PT Physical Therapist    SHRAVAN Thai Swanson, PTA Physical Therapy Assistant    AM Murtaza Mccarthy, PTA Physical Therapy Assistant    RW Gordon Kaye, PTA Physical Therapy Assistant    CZ Devin Ennis, PT Physical Therapist          Physical Therapy Education     Title: PT OT SLP Therapies (Active)     Topic: Physical Therapy (Active)     Point: Mobility training (Active)    Learning Progress Summary    Learner Readiness Method Response Comment Documented by Status   Patient Acceptance E NR  SHRAVAN 10/01/17 1429 Active    Acceptance E NR  SHRAVAN 09/30/17 1257 Active    Acceptance E VU reviewed benifits of oob and mobiity  09/27/17 1149 Done    Acceptance E NR  SHRAVAN 09/26/17 1418 Active    Acceptance E DU,NR sequencing for sit EOB safely and role of therapy MN 09/24/17 1604 Done   Family Acceptance E DU,NR sequencing for sit EOB safely and role of therapy MN 09/24/17 1604 Done               Point: Home exercise program (Active)    Learning Progress Summary    Learner Readiness Method Response Comment Documented by Status   Patient Acceptance E NR Patient educated on importance of improving her activity tolerance; educated on proper supine ther ex technique.  09/28/17 1556 Active   Family Acceptance E NR Patient educated on importance of improving her activity tolerance; educated on proper supine ther ex technique. CZ  09/28/17 1556 Active               Point: Body mechanics (Active)    Learning Progress Summary    Learner Readiness Method Response Comment Documented by Status   Patient Acceptance E NR Patient educated on proper technique for sit to supine; educated on proper gait mechanics and appropriate rest breaks. CZ 09/28/17 1307 Active                      User Key     Initials Effective Dates Name Provider Type Discipline    MN 10/17/16 -  Jacinta Rubio, PT Physical Therapist PT    SHRAVAN 10/17/16 -  Thai Swanson, PTA Physical Therapy Assistant PT    RW 10/17/16 -  Gordon Kaye PTA Physical Therapy Assistant PT    CZ 02/17/17 -  Devin Ennis, PT Physical Therapist PT                    PT Recommendation and Plan  Anticipated Discharge Disposition: skilled nursing facility  Planned Therapy Interventions: balance training, bed mobility training, gait training, home exercise program, patient/family education, strengthening, stretching, transfer training  PT Frequency: other (see comments) (5-14x/week)  Plan of Care Review  Plan Of Care Reviewed With: patient  Progress: progress toward functional goals as expected  Outcome Summary/Follow up Plan: Pt met 1 PT goal this tx. Pt able to t/f out of bed Cond Ind but required min a for LE to get back in bed. Pt amb 40 + 40 +40 ft w/RW CGA. Pt would benefit from SNF for rehab to home.          Outcome Measures       10/02/17 1420 10/02/17 1130 10/01/17 1333    How much help from another person do you currently need...    Turning from your back to your side while in flat bed without using bedrails?  4  -AM 3  -SHRAVAN    Moving from lying on back to sitting on the side of a flat bed without bedrails?  4  -AM 4  -SHRAVAN    Moving to and from a bed to a chair (including a wheelchair)?  3  -AM 3  -SHRAVAN    Standing up from a chair using your arms (e.g., wheelchair, bedside chair)?  3  -AM 3  -SHRAVAN    Climbing 3-5 steps with a railing?  3  -AM 2  -SHRAVAN    To walk in hospital room?  3  -AM 3  -SHRAVAN     AM-PAC 6 Clicks Score  20  -AM 18  -SHRAVAN    How much help from another is currently needed...    Putting on and taking off regular lower body clothing? 2  -KD      Bathing (including washing, rinsing, and drying) 2  -KD      Toileting (which includes using toilet bed pan or urinal) 3  -KD      Putting on and taking off regular upper body clothing 3  -KD      Taking care of personal grooming (such as brushing teeth) 4  -KD      Eating meals 4  -KD      Score 18  -KD      Functional Assessment    Outcome Measure Options  AM-PAC 6 Clicks Basic Mobility (PT)  -AM AM-PAC 6 Clicks Basic Mobility (PT)  -SHRAVAN      09/30/17 0953          How much help from another person do you currently need...    Turning from your back to your side while in flat bed without using bedrails? 3  -SHRAVAN      Moving from lying on back to sitting on the side of a flat bed without bedrails? 4  -SHRAVAN      Moving to and from a bed to a chair (including a wheelchair)? 3  -SHRAVAN      Standing up from a chair using your arms (e.g., wheelchair, bedside chair)? 3  -SHRAVAN      Climbing 3-5 steps with a railing? 2  -SHRAVAN      To walk in hospital room? 3  -SHRAVAN      AM-PAC 6 Clicks Score 18  -SHRAVAN      Functional Assessment    Outcome Measure Options AM-PAC 6 Clicks Basic Mobility (PT)  -SHRAVAN        User Key  (r) = Recorded By, (t) = Taken By, (c) = Cosigned By    Initials Name Provider Type    SHRAVAN Swanson PTA Physical Therapy Assistant    MADISON Mccarthy PTA Physical Therapy Assistant    GITA Mendez/L Occupational Therapy Assistant           Time Calculation:         PT Charges       10/02/17 1130          Time Calculation    Start Time 1130  -AM      Stop Time 1210  -AM      Time Calculation (min) 40 min  -AM      PT Received On 10/02/17  -AM      PT - Next Appointment 10/03/17  -AM      Time Calculation- PT    Total Timed Code Minutes- PT 40 minute(s)  -AM        User Key  (r) = Recorded By, (t) = Taken By, (c) = Cosigned By    Initials Name  Provider Type    AM Murtaza Mccarthy PTA Physical Therapy Assistant          Therapy Charges for Today     Code Description Service Date Service Provider Modifiers Qty    57913058528 HC GAIT TRAINING EA 15 MIN 10/2/2017 Murtaza Mccarthy PTA GP 1    10953756324 HC PT THER PROC EA 15 MIN 10/2/2017 Murtaza Mccarthy PTA GP 1    27586633731 HC PT SELF CARE/MGMT/TRAIN EA 15 MIN 10/2/2017 Murtaza Mccarthy PTA GP 1          PT G-Codes  PT Professional Judgement Used?: Yes  Outcome Measure Options: AM-PAC 6 Clicks Basic Mobility (PT)  Score: 8  Functional Limitation: Mobility: Walking and moving around  Mobility: Walking and Moving Around Current Status (): At least 80 percent but less than 100 percent impaired, limited or restricted  Mobility: Walking and Moving Around Goal Status (): At least 60 percent but less than 80 percent impaired, limited or restricted    Murtaza Mccarthy PTA  10/2/2017

## 2017-10-02 NOTE — PLAN OF CARE
Problem: Patient Care Overview (Adult)  Goal: Plan of Care Review  Outcome: Ongoing (interventions implemented as appropriate)    10/02/17 1443   Coping/Psychosocial Response Interventions   Plan Of Care Reviewed With patient   Patient Care Overview   Progress improving   Outcome Evaluation   Outcome Summary/Follow up Plan vss. patient ready to be discharged to [facility]       Goal: Adult Individualization and Mutuality  Outcome: Ongoing (interventions implemented as appropriate)    10/02/17 1443   Individualization   Patient Specific Goals go to swing bed today       Goal: Discharge Needs Assessment  Outcome: Ongoing (interventions implemented as appropriate)    10/02/17 1443   Discharge Needs Assessment   Concerns To Be Addressed no discharge needs identified         Problem: Pressure Ulcer Risk (Pedro Scale) (Adult,Obstetrics,Pediatric)  Goal: Skin Integrity  Outcome: Ongoing (interventions implemented as appropriate)    10/02/17 1443   Pressure Ulcer Risk (Pedro Scale) (Adult,Obstetrics,Pediatric)   Skin Integrity making progress toward outcome         Problem: Fall Risk (Adult)  Goal: Absence of Falls  Outcome: Ongoing (interventions implemented as appropriate)    10/02/17 1443   Fall Risk (Adult)   Absence of Falls making progress toward outcome         Problem: Bowel Resection (Adult)  Goal: Signs and Symptoms of Listed Potential Problems Will be Absent or Manageable (Bowel Resection)  Outcome: Ongoing (interventions implemented as appropriate)    10/02/17 1443   Bowel Resection   Problems Assessed (Bowel Resection) all   Problems Present (Bowel Resection) pain;infection

## 2017-10-02 NOTE — DISCHARGE PLACEMENT REQUEST
"Irma Pacheco (67 y.o. Female)     Date of Birth Social Security Number Address Home Phone MRN    [id number]  [id number] [phone] [id number]    [employer] Status          [employer] Single       Admission Date Admission Type Admitting Provider Attending Provider Department, Room/Bed    9/20/17 Emergency Timothy Dixon MD Armstrong, James Edward, MD [address], [facility]/1    Discharge Date Discharge Disposition Discharge Destination                      Attending Provider: Timothy Dixon MD     Allergies:  Codeine, Milk-related Compounds, Motrin [Ibuprofen], Penicillins    Isolation:  None   Infection:  None   Code Status:  FULL    Ht:  60\" (152.4 cm)   Wt:  273 lb (124 kg)    Admission Cmt:  None   Principal Problem:  Intra-abdominal abscess [K65.1] More...                 Active Insurance as of 9/20/2017     Primary Coverage     Payor Plan Insurance Group Employer/Plan Group    MEDICARE MEDICARE A & B      Payor Plan Address Payor Plan Phone Number Effective From Effective To    [address] [phone] 10/1/2010     [place] [address]       Subscriber Name Subscriber Birth Date Member ID       IRMA PACHECO 1950 [id number]                 Emergency Contacts      (Rel.) Home Phone Work Phone Mobile Phone    Lalita Pacheco (Daughter) -- -- [phone]            Vital Signs (last 72 hrs)       09/29 0700  -  09/30 0659 09/30 0700  -  10/01 0659 10/01 0700  -  10/02 0659 10/02 0700  -  10/02 1029   Most Recent    Temp (°F) 97.6 -  98.7    97.5 -  99.2    98 -  98.6      98.9     98.9 (37.2)    Heart Rate 79 -  89    75 -  89    72 -  86    76 -  84     84    Resp 18 -  20    18 -  20    18 -  20      18     18    /51 -  156/63    116/72 -  131/56    125/57 -  137/61      104/60     104/60    SpO2 (%) 90 -  97    92 -  98    92 -  96    97 -  98     98          Intake & " Output (last 3 days)       09/29 0701 - 09/30 0700 09/30 0701 - 10/01 0700 10/01 0701 - 10/02 0700 10/02 0701 - 10/03 0700    P.O.    400    IV Piggyback  100 100     Total Intake(mL/kg)  100 (0.8) 100 (0.8) 400 (3.2)    Urine (mL/kg/hr) 0 (0) 0 (0) 0 (0)     Other 25 (0) 70 (0) 55 (0)     Stool 150 (0) 875 (0.3) 250 (0.1)     Total Output 175 945 305      Net -175 -845 -205 +400            Unmeasured Urine Occurrence 11 x 11 x 12 x 1 x        Lines, Drains & Airways    Active LDAs     Name:   Placement date:   Placement time:   Site:   Days:    Peripheral IV Line - Single Lumen 10/02/17 0645 median vein (underside of arm), left 22 gauge  10/02/17    0645      less than 1    Colostomy 09/20/17 0232 descending/sigmoid colostomy  09/20/17    0232      12    Drain/Device Site 09/20/17 0330 Right other (see comments) abdomen collapsible closed device  09/20/17    0330      12                Hospital Medications (active)       Dose Frequency Start End    chlorhexidine (PERIDEX) 0.12 % solution 15 mL 15 mL Every 12 Hours Scheduled 9/20/2017     Sig - Route: Apply 15 mL to the mouth or throat Every 12 (Twelve) Hours. - Mouth/Throat    citalopram (CeleXA) tablet 40 mg 40 mg Daily 9/22/2017     Sig - Route: Take 1 tablet by mouth Daily. - Oral    famotidine (PEPCID) injection 20 mg 20 mg 2 Times Daily 9/20/2017     Sig - Route: Infuse 2 mL into a venous catheter 2 (Two) Times a Day. - Intravenous    furosemide (LASIX) tablet 80 mg 80 mg 2 Times Daily 9/27/2017     Sig - Route: Take 1 tablet by mouth 2 (Two) Times a Day. - Oral    gabapentin (NEURONTIN) capsule 600 mg 600 mg 3 Times Daily 9/25/2017     Sig - Route: Take 2 capsules by mouth 3 (Three) Times a Day. - Oral    Glatiramer Acetate solution prefilled syringe 40 mg 40 mg 3 Times Weekly 9/25/2017     Sig - Route: Inject 40 mg into the shoulder, thigh, or buttocks Once per day on Mon Wed Fri. - Intramuscular    Notes to Pharmacy: Monday, Wednesday, Friday    heparin  "(porcine) 5000 UNIT/ML injection 5,000 Units 5,000 Units Every 8 Hours Scheduled 9/20/2017     Sig - Route: Inject 1 mL under the skin Every 8 (Eight) Hours. - Subcutaneous    HYDROmorphone (DILAUDID) injection 0.5 mg 0.5 mg Every 2 Hours PRN 9/25/2017 10/5/2017    Sig - Route: Infuse 0.25 mL into a venous catheter Every 2 (Two) Hours As Needed for Severe Pain . - Intravenous    ipratropium-albuterol (DUO-NEB) nebulizer solution 3 mL 3 mL Every 6 Hours - RT 9/20/2017     Sig - Route: Take 3 mL by nebulization Every 6 (Six) Hours. - Nebulization    levoFLOXacin (LEVAQUIN) 500 mg/100 mL D5W (premix) (LEVAQUIN) 500 mg 500 mg Every 24 Hours 9/20/2017     Sig - Route: Infuse 100 mL into a venous catheter Daily. - Intravenous    levothyroxine (SYNTHROID, LEVOTHROID) tablet 150 mcg 150 mcg Every Early Morning 9/22/2017     Sig - Route: Take 1 tablet by mouth Every Morning. - Oral    magic mouthwash with nystatin oral supsension 10 mL 10 mL Every 4 Hours PRN 9/30/2017     Sig - Route: Swish and spit 10 mL Every 4 (Four) Hours As Needed for Mouth Pain. - Swish & Spit    metroNIDAZOLE (FLAGYL) IVPB 500 mg 500 mg Every 8 Hours 9/20/2017     Sig - Route: Infuse 100 mL into a venous catheter Every 8 (Eight) Hours. - Intravenous    mirtazapine (REMERON) tablet 30 mg 30 mg Nightly 9/22/2017     Sig - Route: Take 2 tablets by mouth Every Night. - Oral    nystatin (MYCOSTATIN) powder  Every 12 Hours Scheduled 9/20/2017     Sig - Route: Apply  topically Every 12 (Twelve) Hours. - Topical    ondansetron (ZOFRAN) injection 4 mg 4 mg Every 6 Hours PRN 9/20/2017     Sig - Route: Infuse 2 mL into a venous catheter Every 6 (Six) Hours As Needed for Nausea or Vomiting. - Intravenous    Linked Group 1:  \"Or\" Linked Group Details        ondansetron (ZOFRAN) tablet 4 mg 4 mg Every 6 Hours PRN 9/20/2017     Sig - Route: Take 1 tablet by mouth Every 6 (Six) Hours As Needed for Nausea or Vomiting. - Oral    Linked Group 1:  \"Or\" Linked Group " "Details        ondansetron ODT (ZOFRAN-ODT) disintegrating tablet 4 mg 4 mg Every 6 Hours PRN 9/20/2017     Sig - Route: Take 1 tablet by mouth Every 6 (Six) Hours As Needed for Nausea or Vomiting. - Oral    Linked Group 1:  \"Or\" Linked Group Details        potassium chloride (MICRO-K) CR capsule 40 mEq 40 mEq Daily 9/26/2017     Sig - Route: Take 4 capsules by mouth Daily. - Oral    primidone (MYSOLINE) tablet 50 mg 50 mg Every 8 Hours Scheduled 9/22/2017     Sig - Route: Take 1 tablet by mouth Every 8 (Eight) Hours. - Oral    sodium chloride 0.9 % flush 0.9 %  - ADS Override Pull   10/2/2017 10/2/2017    Notes to Pharmacy: REGINALDO RADHA: cabinet override    sodium chloride 0.9 % flush 1-10 mL 1-10 mL As Needed 9/20/2017     Sig - Route: Infuse 1-10 mL into a venous catheter As Needed for Line Care. - Intravenous    sodium chloride 0.9 % flush 1-10 mL 1-10 mL As Needed 9/28/2017     Sig - Route: Infuse 1-10 mL into a venous catheter As Needed for Line Care. - Intravenous          Lab Results (last 72 hours)     Procedure Component Value Units Date/Time    CBC (No Diff) [[id number]]  (Abnormal) Collected:  09/30/17 0513    Specimen:  Blood Updated:  09/30/17 0539     WBC 11.90 (H) 10*3/mm3      RBC 2.93 (L) 10*6/mm3      Hemoglobin 8.7 (L) g/dL      Hematocrit 27.0 (L) %      MCV 92.2 fL      MCH 29.7 pg      MCHC 32.2 g/dL      RDW 15.7 (H) %      RDW-SD 52.6 (H) fl      MPV 10.1 fL      Platelets 184 10*3/mm3     Extra Tubes [[id number]] Collected:  09/30/17 0513    Specimen:  Blood from Blood, Venous Line Updated:  09/30/17 0701    Narrative:       The following orders were created for panel order Extra Tubes.  Procedure                               Abnormality         Status                     ---------                               -----------         ------                     Green Top (Gel)[[id number]]                                  Final result                 Please view results for these tests on the " individual orders.    Green Top (Gel) [[id number]] Collected:  09/30/17 0513    Specimen:  Blood Updated:  09/30/17 0701     Extra Tube Hold for add-ons.      Auto resulted.       CBC (No Diff) [[id number]]  (Abnormal) Collected:  10/01/17 0539    Specimen:  Blood Updated:  10/01/17 0702     WBC 10.14 (H) 10*3/mm3      RBC 2.76 (L) 10*6/mm3      Hemoglobin 8.2 (L) g/dL      Hematocrit 25.5 (L) %      MCV 92.4 fL      MCH 29.7 pg      MCHC 32.2 g/dL      RDW 16.0 (H) %      RDW-SD 53.5 (H) fl      MPV 10.1 fL      Platelets 191 10*3/mm3     Basic Metabolic Panel [[id number]]  (Abnormal) Collected:  10/01/17 0539    Specimen:  Blood Updated:  10/01/17 0726     Glucose 94 mg/dL      BUN 9 mg/dL      Creatinine 1.13 (H) mg/dL      Sodium 139 mmol/L      Potassium 3.5 mmol/L      Chloride 100 mmol/L      CO2 31.0 mmol/L      Calcium 7.3 (L) mg/dL      eGFR Non African Amer 48 (L) mL/min/1.73      BUN/Creatinine Ratio 8.0     Anion Gap 8.0 mmol/L           Imaging Results (last 72 hours)     ** No results found for the last 72 hours. **           Physician Progress Notes (last 72 hours) (Notes from 9/29/2017 10:29 AM through 10/2/2017 10:29 AM)      Timothy Dixon MD at 9/29/2017  1:22 PM  Version 1 of 1         GENERAL SURGERY PROGRESS NOTE  Chief Complaint:  Surgery Follow up   LOS: 9 days       Subjective     Interval History:     Feels a little better today. Walked again yesterday. Resting in chair currently. Tolerating diet.    Objective     Vital Signs  Temp:  [97.1 °F (36.2 °C)-98.6 °F (37 °C)] 97.6 °F (36.4 °C)  Heart Rate:  [] 79  Resp:  [18-20] 18  BP: (109-146)/(48-81) 109/51    Physical Exam:   Abdomen soft, stool out ostomy.  Labs:  Lab Results (last 24 hours)     Procedure Component Value Units Date/Time    CBC (No Diff) [[id number]]  (Abnormal) Collected:  09/29/17 0813    Specimen:  Blood Updated:  09/29/17 0855     WBC 13.81 (H) 10*3/mm3      RBC 2.99 (L) 10*6/mm3      Hemoglobin 8.9 (L) g/dL       Hematocrit 27.6 (L) %      MCV 92.3 fL      MCH 29.8 pg      MCHC 32.2 g/dL      RDW 15.6 (H) %      RDW-SD 52.9 (H) fl      MPV 10.3 fL      Platelets 181 10*3/mm3     Extra Tubes [[id number]] Collected:  09/29/17 0847    Specimen:  Blood from Blood, Venous Line Updated:  09/29/17 1001    Narrative:       The following orders were created for panel order Extra Tubes.  Procedure                               Abnormality         Status                     ---------                               -----------         ------                     Green Top (Gel)[[id number]]                                  Final result                 Please view results for these tests on the individual orders.    Green Top (Gel) [[id number]] Collected:  09/29/17 0847    Specimen:  Blood Updated:  09/29/17 1001     Extra Tube Hold for add-ons.      Auto resulted.              Results Review:     Labs and imaging for today were reviewed.    Assessment/Plan     Irma Pacheco is a 67 y.o. female who is s/p Karishma's      WBC improving, continue ABx and drain.  Anticipate going to [facility] swing bed on Monday.          This document has been electronically signed by Timothy Dixon MD on September 29, 2017 1:22 PM        Timothy Dixon MD  09/29/17  1:22 PM           Electronically signed by Timothy Dixon MD at 9/29/2017  1:24 PM      Timothy Dixon MD at 9/30/2017 10:46 AM  Version 1 of 1         GENERAL SURGERY PROGRESS NOTE  Chief Complaint:  Surgery Follow up   LOS: 10 days       Subjective     Interval History:     Continues to improve, feels well overall. Some pain and nausea with regular diet, but tolerates full liquids well.    Objective     Vital Signs  Temp:  [97.6 °F (36.4 °C)-98.7 °F (37.1 °C)] 98.7 °F (37.1 °C)  Heart Rate:  [75-89] 75  Resp:  [18-20] 20  BP: (109-156)/(51-63) 156/63    Physical Exam:   Abdomen soft, ostomy with stool out  Labs:  Lab Results (last 24 hours)      Procedure Component Value Units Date/Time    CBC (No Diff) [[id number]]  (Abnormal) Collected:  09/30/17 0513    Specimen:  Blood Updated:  09/30/17 0539     WBC 11.90 (H) 10*3/mm3      RBC 2.93 (L) 10*6/mm3      Hemoglobin 8.7 (L) g/dL      Hematocrit 27.0 (L) %      MCV 92.2 fL      MCH 29.7 pg      MCHC 32.2 g/dL      RDW 15.7 (H) %      RDW-SD 52.6 (H) fl      MPV 10.1 fL      Platelets 184 10*3/mm3     Extra Tubes [[id number]] Collected:  09/30/17 0513    Specimen:  Blood from Blood, Venous Line Updated:  09/30/17 0701    Narrative:       The following orders were created for panel order Extra Tubes.  Procedure                               Abnormality         Status                     ---------                               -----------         ------                     Green Top (Gel)[[id number]]                                  Final result                 Please view results for these tests on the individual orders.    Green Top (Gel) [[id number]] Collected:  09/30/17 0513    Specimen:  Blood Updated:  09/30/17 0701     Extra Tube Hold for add-ons.      Auto resulted.              Results Review:     Labs and imaging for today were reviewed.    Assessment/Plan     Irma Pacheco is a 67 y.o. female who is s/p Hartmanns for rectal perforation      Continue current care. WBC improving with ABx.  Anticipate transfer to [facility] on Monday.          This document has been electronically signed by Timothy Dixon MD on September 30, 2017 10:46 AM        Timothy Dixon MD  09/30/17  10:46 AM           Electronically signed by Timothy Dixon MD at 9/30/2017 10:47 AM      Timothy Dixon MD at 10/1/2017 11:06 AM  Version 1 of 1         GENERAL SURGERY PROGRESS NOTE  Chief Complaint:  Surgery Follow up   LOS: 11 days       Subjective     Interval History:     Feels well, tolerating fulls well. Good ostomy output.    Objective     Vital Signs  Temp:  [97.5 °F (36.4 °C)-99.2 °F  (37.3 °C)] 98.3 °F (36.8 °C)  Heart Rate:  [76-89] 77  Resp:  [18-20] 20  BP: (116-131)/(56-72) 131/56    Physical Exam:   Abdomen soft, incision CDI ostomy with stool out. CLARI in place with minimal brown drainage.  Labs:  Lab Results (last 24 hours)     Procedure Component Value Units Date/Time    CBC (No Diff) [[id number]]  (Abnormal) Collected:  10/01/17 0539    Specimen:  Blood Updated:  10/01/17 0702     WBC 10.14 (H) 10*3/mm3      RBC 2.76 (L) 10*6/mm3      Hemoglobin 8.2 (L) g/dL      Hematocrit 25.5 (L) %      MCV 92.4 fL      MCH 29.7 pg      MCHC 32.2 g/dL      RDW 16.0 (H) %      RDW-SD 53.5 (H) fl      MPV 10.1 fL      Platelets 191 10*3/mm3     Basic Metabolic Panel [[id number]]  (Abnormal) Collected:  10/01/17 0539    Specimen:  Blood Updated:  10/01/17 0726     Glucose 94 mg/dL      BUN 9 mg/dL      Creatinine 1.13 (H) mg/dL      Sodium 139 mmol/L      Potassium 3.5 mmol/L      Chloride 100 mmol/L      CO2 31.0 mmol/L      Calcium 7.3 (L) mg/dL      eGFR Non African Amer 48 (L) mL/min/1.73      BUN/Creatinine Ratio 8.0     Anion Gap 8.0 mmol/L            Results Review:     Labs and imaging for today were reviewed.    Assessment/Plan     Irma Pacheco is a 67 y.o. female who is s.p hartmanns for rectal perforation.      Overall doing well. Anticipate going to swing bed in [facility] tomorrow if continues to do well.  Will remove drain and switch to PO ABx prior to DC.          This document has been electronically signed by Timothy Dixon MD on October 1, 2017 11:06 AM        Timothy Dixon MD  10/01/17  11:06 AM           Electronically signed by Timothy Dixon MD at 10/1/2017 11:08 AM        Consult Notes (last 72 hours) (Notes from 9/29/2017 10:29 AM through 10/2/2017 10:29 AM)     No notes of this type exist for this encounter.

## 2017-10-02 NOTE — THERAPY TREATMENT NOTE
Acute Care - Occupational Therapy Treatment Note  [facility]     Patient Name: Irma Pacheco  : 1950  MRN: [id number]  Today's Date: 10/2/2017  Onset of Illness/Injury or Date of Surgery Date: 17  Date of Referral to OT: 17  Referring Physician: Dr. Timothy Dixon.      Admit Date: 2017    Visit Dx:     ICD-10-CM ICD-9-CM   1. Intra-abdominal abscess K65.1 567.22   2. Impaired functional mobility, balance, gait, and endurance Z74.09 V49.89   3. Impaired mobility and activities of daily living Z74.09 799.89     Patient Active Problem List   Diagnosis   • Intra-abdominal abscess             Adult Rehabilitation Note       10/02/17 1420 10/01/17 1333 17 0953    Rehab Assessment/Intervention    Discipline occupational therapy assistant  -KD physical therapy assistant  -SHRAVAN physical therapy assistant  -SHRAVAN    Document Type therapy note (daily note)  -KD therapy note (daily note)  -SHRAVAN therapy note (daily note)  -SHRAVAN    Subjective Information agree to therapy  -KD agree to therapy  -SHRAVAN agree to therapy;complains of;pain  -SHRAVAN    Patient Effort, Rehab Treatment  excellent  -SHRAVAN excellent  -SHRAVAN    Symptoms Noted During/After Treatment   fatigue  -SHRAVAN    Precautions/Limitations fall precautions  -KD fall precautions  -SHRAVAN fall precautions  -SHRAVAN    Recorded by [KD] GITA Fair/DERICK [SHRAVAN] Thai Swanson, PTA [SHRAVAN] Thai Swanson, PTA    Vital Signs    Pre Systolic BP Rehab  140  -SHRAVAN 109  -SHRAVAN    Pre Treatment Diastolic BP  76  -SHRAVAN 69  -SHRAVAN    Post Systolic BP Rehab  120  -SHRAVAN 135  -SHRAVAN    Post Treatment Diastolic BP  57  -SHRAVAN 55  -SHRAVAN    Pretreatment Heart Rate (beats/min) 90  -KD 92  -SHRAVAN 83  -SHRAVAN    Intratreatment Heart Rate (beats/min)  101  -SHRAVAN 92   after gait trip 1. 97 after gqit trip 3.  -SHRAVAN    Posttreatment Heart Rate (beats/min) 84  -KD 88  -SHRAVAN 85  -SHRAVAN    Pre SpO2 (%) 96  -KD 97  -SHRAVAN 96  -SHRAVAN    O2 Delivery Pre Treatment room air  -KD room air  -SHRAVAN room air  -SHRAVAN    Intra SpO2 (%)  94  -SHRAVAN  95   after gait trip 1. 95% after gait trip 3  -SHRAVAN    O2 Delivery Intra Treatment  room air  -SHRAVAN room air  -SHRAVAN    Post SpO2 (%) 94  -KD 95  -SHRAVAN 93  -SHRAVAN    O2 Delivery Post Treatment room air  -KD room air  -SHRAVAN room air  -SHRAVAN    Pre Patient Position Sitting  -KD Sitting  -SHRAVAN Sitting  -SHRAVAN    Intra Patient Position Standing  -KD      Post Patient Position Sitting  -KD Sitting  -SHRAVAN Sitting  -SHRAVAN    Recorded by [KD] GITA Fair/DERICK [SHRAVAN] Thai Swanson PTA [SHRAVAN] Thai Swanson PTA    Pain Assessment    Pain Assessment 0-10  -KD 0-10  -SHRAVAN 0-10  -SHRAVAN    Pain Score 8  -KD 6  -SHRAVAN 5  -SHRAVAN    Post Pain Score 8  -KD 7  -SHRAVAN 5  -SHRAVAN    Pain Type Acute pain  -KD      Pain Location Back  -KD Back  -SHRAVAN Generalized  -SHRAVAN    Pain Orientation Lower  -KD Lower  -SHRAVAN     Pain Intervention(s)  Ambulation/increased activity;Repositioned   pt defers pain meds.   -SHRAVAN Ambulation/increased activity;Repositioned  -SHRAVAN    Recorded by [KD] GITA Fair/DERICK [SHRAVAN] Thai Swanson PTA [SHRAVAN] Thai Swanson PTA    Vision Assessment/Intervention    Visual Impairment WFL with corrective lenses  -KD WFL with corrective lenses  -SHRAVAN WFL with corrective lenses  -SHRAVAN    Recorded by [KD] GITA Fair/DERICK [SHRAVAN] Thai Swanson PTA [SHRAVAN] Thai Swanson PTA    Cognitive Assessment/Intervention    Current Cognitive/Communication Assessment functional  -KD functional  -SHRAVAN functional  -SHRAVAN    Orientation Status oriented x 4  -KD oriented x 4  -SHRAVAN oriented x 4  -SHRAVAN    Follows Commands/Answers Questions 100% of the time  -% of the time  -SHRAVAN 100% of the time  -SHRAVAN    Personal Safety WNL/WFL  -KD WNL/WFL  -SHRAVAN WNL/WFL  -SHRAVAN    Personal Safety Interventions gait belt;nonskid shoes/slippers when out of bed  -KD gait belt;muscle strengthening facilitated;nonskid shoes/slippers when out of bed;supervised activity  -SHRAVAN gait belt;muscle strengthening facilitated;nonskid shoes/slippers when out of bed;supervised activity  -SHRAVAN    Recorded by [KD] Judit URBANO  GITA Guallpa/L [SHRAVAN] Thai Swanson PTA [SHRAVAN] Thai Swanson PTA    Bed Mobility, Assessment/Treatment    Bed Mobility, Assistive Device   bed rails   HOB flat  -SHRAVAN    Bed Mobility, Roll Left, [place]   minimum assist (75% patient effort)  -SHRAVAN    Bed Mobility, Roll Right, [place]   minimum assist (75% patient effort)  -SHRAVAN    Bed Mobility, Scoot/Bridge, [place]   supervision required  -SHRAVAN    Bed Mob, Supine to Sit, [place]   conditional independence  -SHRAVAN    Bed Mob, Sit to Supine, [place]   minimum assist (75% patient effort)  -SHRAVAN    Bed Mobility, Comment   pt worked on bed mob this tx bur reports that she has sleeped in a recliner for years.   -SHRAVAN    Recorded by   [SHRAVAN] Thai Swanson PTA    Transfer Assessment/Treatment    Transfers, Bed-Chair [place]   contact guard assist  -SHRAVAN    Transfers, Chair-Bed [place]   contact guard assist  -SHRAVAN    Transfers, Bed-Chair-Bed, Assist Device   rolling walker  -SHRAVAN    Transfers, Sit-Stand [place] supervision required  -KD stand by assist  -SHRAVAN contact guard assist  -SHRAVAN    Transfers, Stand-Sit [place] supervision required  -KD stand by assist  -SHRAVAN contact guard assist  -SHRAVAN    Transfers, Sit-Stand-Sit, Assist Device rolling walker  -KD rolling walker  -SHRAVAN rolling walker  -SHRAVAN    Recorded by [KD] GITA Fair/DERICK [SHRAVAN] Thai Swanson PTA [SHRAVAN] Thai Swanson PTA    Gait Assessment/Treatment    Gait, [place] Level  contact guard assist  -SHRAVAN contact guard assist  -SHRAVAN    Gait, Assistive Device  rolling walker  -SHRAVAN rolling walker  -SHRAVAN    Gait, Distance (Feet)  --   60,48,48,36,20  -SHRAVAN --   10,32,32,40,56  -SHRAVAN    Gait, Gait Deviations  step length decreased  -SHRAVAN palomo decreased;step length decreased  -SHRAVAN    Gait, Impairments  strength decreased  -SHRAVAN     Gait, Comment  pt fatigues quickly.   -SHRAVAN fatigeus quickly, becomes SOA.   -SHRAVAN    Recorded by  [SHRAVAN] Thai Swanson PTA [SHRAVAN] Thai Swanson PTA    Stairs  Assessment/Treatment    Stairs, [place] Level   not tested  -SHRAVAN    Recorded by   [SHRAVAN] Thai Swanson PTA    Therapy Exercises    Bilateral Lower Extremities  AROM:;20 reps;sitting;ankle pumps/circles;LAQ;hip flexion  -SHRAVAN AROM:;20 reps;ankle pumps/circles;hip abduction/adduction;SAQ   pt declined further tx due to nausea.   -SHRAVAN    Bilateral Upper Extremity AROM:;20 reps;sitting;elbow flexion/extension;pronation/supination;shoulder abduction/adduction;shoulder extension/flexion;shoulder ER/IR;shoulder horizontal abd/add  -KD      BUE Resistance manual resistance- minimal  -KD      Recorded by [KD] GITA Fair/DERICK [SHRAVAN] Thai Swanson PTA [SHRAVAN] Thai Swanson PTA    Positioning and Restraints    Pre-Treatment Position in bed  -KD sitting in chair/recliner  -SHRAVAN sitting in chair/recliner  -SHRAVAN    Post Treatment Position bed  -KD chair  -SHRAVAN chair  -SHRAVAN    In Bed sitting;call light within reach;encouraged to call for assist;exit alarm on  -KD      In Chair  reclined;call light within reach;encouraged to call for assist   all needs met.   -SHRAVAN reclined;call light within reach;encouraged to call for assist;with family/caregiver   all needs met.   -SHRAVAN    Recorded by [ROBERTO] GITA Fair/DERICK [SHRAVAN] Thai Swanson PTA [SHRAVAN] Thai Swanson PTA      User Key  (r) = Recorded By, (t) = Taken By, (c) = Cosigned By    Initials Name Effective Dates    SHRAVAN Swanson PTA 10/17/16 -     FREDDY Mendez 10/17/16 -                 OT Goals       10/02/17 1420 09/29/17 1331       Transfer Training OT LTG    Transfer Training OT LTG, Date Established 10/02/17  -KD 09/29/17  -RB     Transfer Training OT LTG, Time to Achieve  by discharge  -RB     Transfer Training OT LTG, Activity Type  all transfers  -RB     Transfer Training OT LTG, Assist Device  walker, rolling  -RB     Strength OT LTG    Strength Goal OT LTG, Date Established  09/22/17  -RB     Strength Goal OT LTG, Time to Achieve  by discharge  -RB      Strength Goal OT LTG, Measure to Achieve  1-2 sets of 10 reps all planes with 1-2 lb wrist wts or dumbells for B UE strength to increase independence with bed mobility/transfers.  -RB     Strength Goal OT LTG, Date Goal Reviewed 10/02/17  -      Strength Goal OT LTG, Outcome goal not met  -KD      Dynamic Standing Balance OT LTG    Dynamic Standing Balance OT LTG, Date Established  09/29/17  -     Dynamic Standing Balance OT LTG, Time to Achieve  by discharge  -RB     Dynamic Standing Balance OT LTG, [place] Level  supervision required   5 minutes with functional activity.  -RB     Dynamic Standing Balance OT LTG, Assist Device  assistive Device   R/W.  -RB     Dynamic Standing Balance OT LTG, Date Goal Reviewed 10/02/17  -KD      Dynamic Standing Balance OT LTG, Outcome goal not met  -KD      ADL OT LTG    ADL OT LTG, Date Established  09/29/17  -     ADL OT LTG, Time to Achieve  by discharge  -RB     ADL OT LTG, Activity Type  ADL skills   Sponge bath and dress or walk-in shower.  -     ADL OT LTG, [place] Level  min assist;assistive device   R/W.  -RB     ADL OT LTG, Date Goal Reviewed 10/02/17  -KD      ADL OT LTG, Outcome goal not met  -KD        User Key  (r) = Recorded By, (t) = Taken By, (c) = Cosigned By    Initials Name Provider Type    JINNY Chavez, OT Occupational Therapist    FREDDY Mendez Occupational Therapy Assistant          Occupational Therapy Education     Title: PT OT SLP Therapies (Active)     Topic: Occupational Therapy (Active)     Point: Precautions (Done)    Description: Instruct learner(s) on prescribed precautions during self-care and functional transfers.    Learning Progress Summary    Learner Readiness Method Response Comment Documented by Status   Patient Acceptance E VU Edu pt on use of gait belt and non skid socks before OOB and no OOB without assisst. RB 09/29/17 1329 Done                      User Key     Initials Effective Dates Name Provider Type  Discipline    RB 06/15/16 -  Harry Chavez OT Occupational Therapist OT                  OT Recommendation and Plan  Anticipated Discharge Disposition: home with 24/7 care, inpatient rehabilitation facility, skilled nursing facility  Planned Therapy Interventions: activity intolerance, ADL retraining, balance training, energy conservation, edema management, transfer training, strengthening  Therapy Frequency:  (3-14x/wk.)  Plan of Care Review  Outcome Summary/Follow up Plan: No new goals met this date        Outcome Measures       10/02/17 1420 10/01/17 1333 09/30/17 0953    How much help from another person do you currently need...    Turning from your back to your side while in flat bed without using bedrails?  3  -SHRAVAN 3  -SHRAVAN    Moving from lying on back to sitting on the side of a flat bed without bedrails?  4  -SHRAVAN 4  -SHRAVAN    Moving to and from a bed to a chair (including a wheelchair)?  3  -SHRAVAN 3  -SHRAVAN    Standing up from a chair using your arms (e.g., wheelchair, bedside chair)?  3  -SHRAVAN 3  -SHRAVAN    Climbing 3-5 steps with a railing?  2  -SHRAVAN 2  -SHRAVAN    To walk in hospital room?  3  -SHRAVAN 3  -SHRAVAN    AM-PAC 6 Clicks Score  18  -SHRAVAN 18  -SHRAVAN    How much help from another is currently needed...    Putting on and taking off regular lower body clothing? 2  -KD      Bathing (including washing, rinsing, and drying) 2  -KD      Toileting (which includes using toilet bed pan or urinal) 3  -KD      Putting on and taking off regular upper body clothing 3  -KD      Taking care of personal grooming (such as brushing teeth) 4  -KD      Eating meals 4  -KD      Score 18  -KD      Functional Assessment    Outcome Measure Options  AM-PAC 6 Clicks Basic Mobility (PT)  -SHRAVAN AM-PAC 6 Clicks Basic Mobility (PT)  -SHRAVAN      User Key  (r) = Recorded By, (t) = Taken By, (c) = Cosigned By    Initials Name Provider Type    SHRAVAN Swanson PTA Physical Therapy Assistant    FREDDY Mendez Occupational Therapy Assistant           Time  Calculation:         Time Calculation- OT       10/02/17 1520          Time Calculation- OT    OT Start Time 1420  -KD      OT Stop Time 1445  -KD      OT Time Calculation (min) 25 min  -KD      Total Timed Code Minutes- OT 25 minute(s)  -KD      OT Received On 10/02/17  -KD        User Key  (r) = Recorded By, (t) = Taken By, (c) = Cosigned By    Initials Name Provider Type     GITA Fair/L Occupational Therapy Assistant           Therapy Charges for Today     Code Description Service Date Service Provider Modifiers Qty    56960731468 HC OT THER PROC EA 15 MIN 10/2/2017 GITA Fair/L GO 1    60530293904 HC OT SELF CARE/MGMT/TRAIN EA 15 MIN 10/2/2017 GITA Fair/L GO 1          OT G-codes  OT Professional Judgement Used?: Yes  OT Functional Scales Options: AM-PAC 6 Clicks Daily Activity (OT)  Score: 18  Functional Limitation: Self care  Self Care Current Status (): At least 40 percent but less than 60 percent impaired, limited or restricted  Self Care Goal Status (): At least 20 percent but less than 40 percent impaired, limited or restricted    GITA Fair/DERICK  10/2/2017

## 2017-10-03 VITALS
OXYGEN SATURATION: 99 % | SYSTOLIC BLOOD PRESSURE: 131 MMHG | DIASTOLIC BLOOD PRESSURE: 61 MMHG | WEIGHT: 268.19 LBS | HEIGHT: 60 IN | HEART RATE: 84 BPM | BODY MASS INDEX: 52.65 KG/M2 | TEMPERATURE: 98.6 F | RESPIRATION RATE: 17 BRPM

## 2017-10-03 PROBLEM — K65.1 INTRA-ABDOMINAL ABSCESS (HCC): Status: ACTIVE | Noted: 2017-09-19

## 2017-10-03 LAB
DEPRECATED RDW RBC AUTO: 54.1 FL (ref 36.4–46.3)
ERYTHROCYTE [DISTWIDTH] IN BLOOD BY AUTOMATED COUNT: 16 % (ref 11.5–14.5)
HCT VFR BLD AUTO: 27.3 % (ref 35–45)
HGB BLD-MCNC: 8.7 G/DL (ref 12–15.5)
MCH RBC QN AUTO: 29.8 PG (ref 26.5–34)
MCHC RBC AUTO-ENTMCNC: 31.9 G/DL (ref 31.4–36)
MCV RBC AUTO: 93.5 FL (ref 80–98)
PLATELET # BLD AUTO: 191 10*3/MM3 (ref 150–450)
PMV BLD AUTO: 10.2 FL (ref 8–12)
RBC # BLD AUTO: 2.92 10*6/MM3 (ref 3.77–5.16)
WBC NRBC COR # BLD: 10.19 10*3/MM3 (ref 3.2–9.8)

## 2017-10-03 PROCEDURE — 25010000002 HYDROMORPHONE PER 4 MG: Performed by: SURGERY

## 2017-10-03 PROCEDURE — G8978 MOBILITY CURRENT STATUS: HCPCS

## 2017-10-03 PROCEDURE — 97116 GAIT TRAINING THERAPY: CPT

## 2017-10-03 PROCEDURE — 94760 N-INVAS EAR/PLS OXIMETRY 1: CPT

## 2017-10-03 PROCEDURE — 25010000002 HEPARIN (PORCINE) PER 1000 UNITS: Performed by: SURGERY

## 2017-10-03 PROCEDURE — 94799 UNLISTED PULMONARY SVC/PX: CPT

## 2017-10-03 PROCEDURE — 85027 COMPLETE CBC AUTOMATED: CPT | Performed by: SURGERY

## 2017-10-03 PROCEDURE — 97530 THERAPEUTIC ACTIVITIES: CPT

## 2017-10-03 PROCEDURE — G8979 MOBILITY GOAL STATUS: HCPCS

## 2017-10-03 RX ORDER — HYDROCODONE BITARTRATE AND ACETAMINOPHEN 7.5; 325 MG/1; MG/1
1 TABLET ORAL EVERY 4 HOURS PRN
Qty: 40 TABLET | Refills: 0 | Status: SHIPPED | OUTPATIENT
Start: 2017-10-03 | End: 2017-10-17 | Stop reason: HOSPADM

## 2017-10-03 RX ADMIN — HYDROMORPHONE HYDROCHLORIDE 0.5 MG: 2 INJECTION, SOLUTION INTRAMUSCULAR; INTRAVENOUS; SUBCUTANEOUS at 11:27

## 2017-10-03 RX ADMIN — Medication 10 ML: at 09:06

## 2017-10-03 RX ADMIN — IPRATROPIUM BROMIDE AND ALBUTEROL SULFATE 3 ML: 2.5; .5 SOLUTION RESPIRATORY (INHALATION) at 13:12

## 2017-10-03 RX ADMIN — ONDANSETRON 4 MG: 4 TABLET, FILM COATED ORAL at 11:26

## 2017-10-03 RX ADMIN — POTASSIUM CHLORIDE 40 MEQ: 750 CAPSULE, EXTENDED RELEASE ORAL at 09:00

## 2017-10-03 RX ADMIN — PRIMIDONE 50 MG: 50 TABLET ORAL at 05:54

## 2017-10-03 RX ADMIN — FUROSEMIDE 80 MG: 80 TABLET ORAL at 09:00

## 2017-10-03 RX ADMIN — HEPARIN SODIUM 5000 UNITS: 5000 INJECTION, SOLUTION INTRAVENOUS; SUBCUTANEOUS at 05:54

## 2017-10-03 RX ADMIN — OXYCODONE HYDROCHLORIDE AND ACETAMINOPHEN 1 TABLET: 5; 325 TABLET ORAL at 08:59

## 2017-10-03 RX ADMIN — LEVOTHYROXINE SODIUM 150 MCG: 150 TABLET ORAL at 05:54

## 2017-10-03 RX ADMIN — NYSTATIN: 100000 POWDER TOPICAL at 09:01

## 2017-10-03 RX ADMIN — CITALOPRAM HYDROBROMIDE 40 MG: 40 TABLET ORAL at 08:59

## 2017-10-03 RX ADMIN — IPRATROPIUM BROMIDE AND ALBUTEROL SULFATE 3 ML: 2.5; .5 SOLUTION RESPIRATORY (INHALATION) at 07:31

## 2017-10-03 RX ADMIN — IPRATROPIUM BROMIDE AND ALBUTEROL SULFATE 3 ML: 2.5; .5 SOLUTION RESPIRATORY (INHALATION) at 00:50

## 2017-10-03 RX ADMIN — METRONIDAZOLE 500 MG: 500 INJECTION, SOLUTION INTRAVENOUS at 05:54

## 2017-10-03 RX ADMIN — FAMOTIDINE 20 MG: 10 INJECTION INTRAVENOUS at 09:00

## 2017-10-03 RX ADMIN — GABAPENTIN 600 MG: 300 CAPSULE ORAL at 08:59

## 2017-10-03 NOTE — DISCHARGE PLACEMENT REQUEST
"Irma Pacheco (67 y.o. Female)     Date of Birth Social Security Number Address Home Phone MRN    1950  [address] [address] [phone] [id number]    Yarsani Marital Status          Nondenominational Single       Admission Date Admission Type Admitting Provider Attending Provider Department, Room/Bed    9/20/17 Emergency Timothy Dixon MD Armstrong, James Edward, MD [address], [facility]/1    Discharge Date Discharge Disposition Discharge Destination         Skilled Nursing Facility (DC - External)             Attending Provider: Timothy Dixon MD     Allergies:  Codeine, Milk-related Compounds, Motrin [Ibuprofen], Penicillins    Isolation:  None   Infection:  None   Code Status:  FULL    Ht:  60\" (152.4 cm)   Wt:  268 lb 3 oz (122 kg)    Admission Cmt:  None   Principal Problem:  Intra-abdominal abscess [K65.1] More...                 Active Insurance as of 9/20/2017     Primary Coverage     Payor Plan Insurance Group Employer/Plan Group    MEDICARE MEDICARE A & B      Payor Plan Address Payor Plan Phone Number Effective From Effective To    [address] [phone] 10/1/2010     [place] [address]       Subscriber Name Subscriber Birth Date Member ID       IRMA PACHECO 1950 [id number]                 Emergency Contacts      (Rel.) Home Phone Work Phone Mobile Phone    Lalita Pacheco (Daughter) -- -- [phone]            Discharge Order     Start     Ordered    10/03/17 1223  Discharge patient  Once     Comments:  Patient going to swing bed in [facility]   Expected Discharge Date:  10/03/17    Discharge Disposition:  Skilled Nursing Facility (DC - External)        10/03/17 1224          "

## 2017-10-03 NOTE — DISCHARGE PLACEMENT REQUEST
"Irma Pacheco (67 y.o. Female)     Date of Birth Social Security Number Address Home Phone MRN    1950  [address] [address] [phone] [id number]    Episcopalian Marital Status          Mandaeism Single       Admission Date Admission Type Admitting Provider Attending Provider Department, Room/Bed    9/20/17 Emergency Timothy Dixon MD Armstrong, James Edward, MD [address], [facility]/1    Discharge Date Discharge Disposition Discharge Destination                      Attending Provider: Timothy Dixon MD     Allergies:  Codeine, Milk-related Compounds, Motrin [Ibuprofen], Penicillins    Isolation:  None   Infection:  None   Code Status:  FULL    Ht:  60\" (152.4 cm)   Wt:  268 lb 3 oz (122 kg)    Admission Cmt:  None   Principal Problem:  Intra-abdominal abscess [K65.1] More...                 Active Insurance as of 9/20/2017     Primary Coverage     Payor Plan Insurance Group Employer/Plan Group    MEDICARE MEDICARE A & B      Payor Plan Address Payor Plan Phone Number Effective From Effective To    [address] [phone] 10/1/2010     [place] [address]       Subscriber Name Subscriber Birth Date Member ID       IRMA PACHECO 1950 [id number]                 Emergency Contacts      (Rel.) Home Phone Work Phone Mobile Phone    Lalita Pacheco (Daughter) -- -- [phone]            Vital Signs (last 24 hours)       10/02 0700  -  10/03 0659 10/03 0700  -  10/03 1051   Most Recent    Temp (°F) 96.9 -  98.9       98.3 (36.8)    Heart Rate 73 -  87    74 -  81     81    Resp   18      18     18    /60 -  138/96       119/56    SpO2 (%) 92 -  98      91     91          Intake & Output (last day)       10/02 0701 - 10/03 0700 10/03 0701 - 10/04 0700    P.O. 940 200    IV Piggyback      Total Intake(mL/kg) 940 (7.7) 200 (1.6)    Urine (mL/kg/hr) 0 (0)     Other 20 (0)     Stool 250 (0.1)     " Total Output 270      Net +670 +200          Unmeasured Urine Occurrence 23 x 1 x        Lines, Drains & Airways    Active LDAs     Name:   Placement date:   Placement time:   Site:   Days:    Peripheral IV Line - Single Lumen 10/02/17 0645 median vein (underside of arm), left 22 gauge  10/02/17    0645      1    Colostomy 09/20/17 0232 descending/sigmoid colostomy  09/20/17 0232      13                Lab Results (last 24 hours)     Procedure Component Value Units Date/Time    CBC (No Diff) [[id number]]  (Abnormal) Collected:  10/03/17 0517    Specimen:  Blood Updated:  10/03/17 0611     WBC 10.19 (H) 10*3/mm3      RBC 2.92 (L) 10*6/mm3      Hemoglobin 8.7 (L) g/dL      Hematocrit 27.3 (L) %      MCV 93.5 fL      MCH 29.8 pg      MCHC 31.9 g/dL      RDW 16.0 (H) %      RDW-SD 54.1 (H) fl      MPV 10.2 fL      Platelets 191 10*3/mm3         Imaging Results (last 24 hours)     ** No results found for the last 24 hours. **           Physician Progress Notes (last 24 hours) (Notes from 10/2/2017 10:52 AM through 10/3/2017 10:52 AM)      Timothy Dixon MD at 10/2/2017  4:37 PM  Version 1 of 1         GENERAL SURGERY PROGRESS NOTE  Chief Complaint:  Surgery Follow up   LOS: 12 days       Subjective     Interval History:     Improved ambulation with PT today. Overall feels well.     Objective     Vital Signs  Temp:  [98.5 °F (36.9 °C)-98.9 °F (37.2 °C)] 98.9 °F (37.2 °C)  Heart Rate:  [72-86] 73  Resp:  [18-20] 18  BP: (104-137)/(60-72) 104/60    Physical Exam:   Partial skin dehiscence of wound. No significant drainage. Ostomy with stool out.  Labs:  Lab Results (last 24 hours)     ** No results found for the last 24 hours. **           Results Review:     Labs and imaging for today were reviewed.    Assessment/Plan     Irma Pacheco is a 67 y.o. female who is s/p Karishma's      Overall appears to be doing well.  Will plan for DC tomorrow if CBC ok.          This document has been electronically signed  by Timothy Dixon MD on October 2, 2017 4:37 PM        Timothy Dixon MD  10/02/17  4:37 PM           Electronically signed by Timothy Dixon MD at 10/2/2017  4:38 PM        Consult Notes (last 24 hours) (Notes from 10/2/2017 10:52 AM through 10/3/2017 10:52 AM)     No notes of this type exist for this encounter.

## 2017-10-03 NOTE — PLAN OF CARE
Problem: Patient Care Overview (Adult)  Goal: Plan of Care Review  Outcome: Ongoing (interventions implemented as appropriate)    10/03/17 1110   Coping/Psychosocial Response Interventions   Plan Of Care Reviewed With patient   Outcome Evaluation   Outcome Summary/Follow up Plan Pt progress note completed today 2* all goals met. Pt has made significant improvements with functional mobility since initial eval and is ambulating 85ft with RW and CGA/SBA and able to do large step with 2HR and CGA in prep for van transfer. Pt is planning to d/c to [facility] swing bed today for continued therapy prior to d/c home with daughter. WIll continue to see patient until d/c         Problem: Inpatient Physical Therapy  Goal: Bed Mobility Goal STG- PT  Outcome: Outcome(s) achieved Date Met:  10/03/17    09/24/17 1606 10/03/17 1110   Bed Mobility PT STG   Bed Mobility PT STG, Date Established 09/24/17 --    Bed Mobility PT STG, Time to Achieve 4 days --    Bed Mobility PT STG, Activity Type roll left/roll right;supine to sit/sit to supine --    Bed Mobility PT STG, [place] Level minimum assist (75% patient effort) --    Transfer Training Goal, Assist Device bed rails --    Bed Mobility PT STG, Date Goal Reviewed --  10/03/17   Bed Mobility PT STG, Outcome --  goal met       Goal: Transfer Training Goal 1 STG- PT  Outcome: Outcome(s) achieved Date Met:  10/03/17    09/24/17 1606 10/03/17 1110   Transfer Training PT STG   Transfer Training PT STG, Date Established 09/24/17 --    Transfer Training PT STG, Time to Achieve 3 days --    Transfer Training PT STG, Activity Type bed to chair /chair to bed;sit to stand/stand to sit --    Transfer Training PT STG, [place] Level moderate assist (50% patient effort) --    Transfer Training PT STG, Assist Device walker, rolling --    Transfer Training PT STG, Date Goal Reviewed --  10/03/17   Transfer Training PT STG, Outcome --  goal met       Goal: Transfer Training Goal 1 LTG-  PT  Outcome: Outcome(s) achieved Date Met:  10/03/17    09/24/17 1606 10/03/17 1110   Transfer Training PT LTG   Transfer Training PT LTG, Date Established 09/24/17 --    Transfer Training PT LTG, Time to Achieve 2 wks --    Transfer Training PT LTG, Activity Type sit to stand/stand to sit;bed to chair /chair to bed;toilet --    Transfer Training PT LTG, [place] Level contact guard assist --    Transfer Training PT LTG, Assist Device walker, rolling --    Transfer Training PT LTG, Date Goal Reviewed --  10/03/17   Transfer Training PT LTG, Outcome --  goal met       Goal: Gait Training Goal LTG- PT  Outcome: Revised    10/03/17 1110   Gait Training PT LTG   Gait Training Goal PT LTG, [place] Level supervision required   Gait Training Goal PT LTG, Assist Device walker, rolling   Gait Training Goal PT LTG, Distance to Achieve 150 ft   Gait Training Goal PT LTG, Date Goal Reviewed 10/03/17   Gait Training Goal PT LTG, Outcome goal revised

## 2017-10-03 NOTE — THERAPY PROGRESS REPORT/RE-CERT
"Acute Care - Physical Therapy Progress Note  [facility]     Patient Name: Irma Pacheco  : 1950  MRN: [id number]  Today's Date: 10/3/2017   Onset of Illness/Injury or Date of Surgery Date: 17  Date of Referral to PT: 17  Referring Physician: Dr. Timothy Dixon.      Admit Date: 2017     Visit Dx:    ICD-10-CM ICD-9-CM   1. Intra-abdominal abscess K65.1 567.22   2. Impaired functional mobility, balance, gait, and endurance Z74.09 V49.89   3. Impaired mobility and activities of daily living Z74.09 799.89     Patient Active Problem List   Diagnosis   • Intra-abdominal abscess     History reviewed. No pertinent past medical history.  Past Surgical History:   Procedure Laterality Date   • COLON RESECTION N/A 2017    Procedure: exploratory laparotomy, sigmoid colon resection and colostomy;  Surgeon: Timothy Dixon MD;  Location: [facility] OR;  Service:           PT ASSESSMENT (last 72 hours)      PT Evaluation       10/03/17 1020 10/03/17 0845    Rehab Evaluation    Document Type progress note  -MN     Subjective Information complains of;pain;agree to therapy  -MN     Patient Effort, Rehab Treatment good  -MN     General Information    Patient Profile Review yes  -MN     General Observations Sit OOB in recliner +IV +daughter at bedside  -MN     Pertinent History Of Current Problem Pt has made sig progress with functional mobility since initial eval  -MN     Precautions/Limitations fall precautions;other (see comments)   gait belt placemnet 2* colostomy  -MN     Clinical Impression    Patient/Family Goals Statement \"Get home stronger than before\"  -MN     Criteria for Skilled Therapeutic Interventions Met yes;treatment indicated  -MN     Pathology/Pathophysiology Noted (Describe Specifically for Each System) musculoskeletal;neuromuscular  -MN     Impairments Found (describe specific impairments) aerobic capacity/endurance;anthropometric characteristics;gait, locomotion, and " balance  -MN     Rehab Potential good, to achieve stated therapy goals  -MN     Predicted Duration of Therapy Intervention (days/wks) until d/c to [facility] swing bed  -MN     Vital Signs    Pre Systolic BP Rehab 95  -MN     Pre Treatment Diastolic BP 57  -MN     Post Systolic BP Rehab 131  -MN     Post Treatment Diastolic BP 61  -MN     Pretreatment Heart Rate (beats/min) 91  -MN     Intratreatment Heart Rate (beats/min) 99  -MN     Posttreatment Heart Rate (beats/min) 89  -MN     Pre SpO2 (%) 95  -MN     O2 Delivery Pre Treatment room air  -MN     Intra SpO2 (%) 94  -MN     O2 Delivery Intra Treatment room air  -MN     Post SpO2 (%) 97  -MN     O2 Delivery Post Treatment room air  -MN     Pre Patient Position Sitting  -MN     Intra Patient Position Standing  -MN     Post Patient Position Sitting  -MN     Pain Assessment    Pain Assessment 0-10  -MN     Pain Score 8  -MN     Post Pain Score 8  -MN     Pain Type Acute pain  -MN     Pain Location Head  -MN     Vision Assessment/Intervention    Visual Impairment WFL  -MN     Cognitive Assessment/Intervention    Current Cognitive/Communication Assessment functional  -MN     Orientation Status oriented x 4  -MN     Follows Commands/Answers Questions 100% of the time  -MN     Personal Safety WNL/WFL  -MN     Personal Safety Interventions gait belt;nonskid shoes/slippers when out of bed  -MN     ROM (Range of Motion)    General ROM no range of motion deficits identified  -MN     MMT (Manual Muscle Testing)    General MMT Assessment lower extremity strength deficits identified  -MN     Lower Extremity    Lower Ext Manual Muscle Testing Detail BLEs: hip flex 2+/5, knee ext 3+/5, knee flex 3+/5, DF 3+/5, PF 3-/5  -MN     Transfer Assessment/Treatment    Transfers, Sit-Stand [place] stand by assist  -MN     Transfers, Stand-Sit [place] stand by assist   x6 reps  -MN     Transfers, Sit-Stand-Sit, Assist Device rolling walker  -MN     Transfer, Comment cues required  for hand placement prior to sitting for safety  -MN     Gait Assessment/Treatment    Gait, [place] Level supervision required;contact guard assist  -MN     Gait, Assistive Device rolling walker  -MN     Gait, Distance (Feet) 60   +50+85 ft with sit rest breaks between  -MN     Stairs Assessment/Treatment    Number of Stairs 4   up/down large steps then sit rest break and then 4 more step  -MN     Stairs, Handrail Location both sides  -MN     Stairs, [place] Level contact guard assist  -MN     Stairs, Technique Used step to step (ascending);step to step (descending)  -MN     Sensory Assessment/Intervention    Light Touch LLE;RLE  -MN LUE;RUE  -SG    LUE Light Touch  WNL  -SG    RUE Light Touch  WNL  -SG    LLE Light Touch mild impairment   chronic decreased light touch distal to mid thigh  -MN mild impairment  -SG    RLE Light Touch mild impairment   chronic decreased light touch distal to mid thigh  -MN mild impairment  -SG    Edema Management    Edema Amount none  -MN     Positioning and Restraints    Pre-Treatment Position sitting in chair/recliner  -MN     Post Treatment Position chair  -MN     In Chair reclined;encouraged to call for assist;with family/caregiver  -MN       10/02/17 2020 10/02/17 1420    Rehab Evaluation    Document Type  therapy note (daily note)  -KD    Subjective Information  agree to therapy  -KD    General Information    Precautions/Limitations  fall precautions  -KD    Vital Signs    Pretreatment Heart Rate (beats/min)  90  -KD    Posttreatment Heart Rate (beats/min)  84  -KD    Pre SpO2 (%)  96  -KD    O2 Delivery Pre Treatment  room air  -KD    Post SpO2 (%)  94  -KD    O2 Delivery Post Treatment  room air  -KD    Pre Patient Position  Sitting  -KD    Intra Patient Position  Standing  -KD    Post Patient Position  Sitting  -KD    Pain Assessment    Pain Assessment  0-10  -KD    Pain Score  8  -KD    Post Pain Score  8  -KD    Pain Type  Acute pain  -KD    Pain Location  Back   -KD    Pain Orientation  Lower  -KD    Vision Assessment/Intervention    Visual Impairment  WFL with corrective lenses  -KD    Cognitive Assessment/Intervention    Current Cognitive/Communication Assessment  functional  -KD    Orientation Status  oriented x 4  -KD    Follows Commands/Answers Questions  100% of the time  -KD    Personal Safety  WNL/WFL  -KD    Personal Safety Interventions  gait belt;nonskid shoes/slippers when out of bed  -KD    Transfer Assessment/Treatment    Transfers, Sit-Stand [place]  supervision required  -KD    Transfers, Stand-Sit [place]  supervision required  -KD    Transfers, Sit-Stand-Sit, Assist Device  rolling walker  -KD    Balance Skills Training    Standing-Level of Assistance  Close supervision  -KD    Standing Balance # of Minutes  5  -KD    Therapy Exercises    Bilateral Upper Extremity  AROM:;20 reps;sitting;elbow flexion/extension;pronation/supination;shoulder abduction/adduction;shoulder extension/flexion;shoulder ER/IR;shoulder horizontal abd/add  -KD    BUE Resistance  manual resistance- minimal  -KD    Sensory Assessment/Intervention    Light Touch LUE;RUE  -CLARI     LUE Light Touch WNL  -CLARI     RUE Light Touch WNL  -CLARI     LLE Light Touch mild impairment  -CLARI     RLE Light Touch mild impairment  -CLARI     Positioning and Restraints    Pre-Treatment Position  in bed  -KD    Post Treatment Position  bed  -KD    In Bed  sitting;call light within reach;encouraged to call for assist;exit alarm on  -KD      10/02/17 1130 10/01/17 1333    Rehab Evaluation    Document Type therapy note (daily note)  -AM therapy note (daily note)  -SHRAVAN    Subjective Information agree to therapy;complains of;pain  -AM agree to therapy  -SHRAVAN    Patient Effort, Rehab Treatment good  -AM excellent  -SHRAVAN    Symptoms Noted During/After Treatment increased pain  -AM     General Information    Precautions/Limitations fall precautions  -AM fall precautions  -SHRAVAN    Vital Signs    Pre Systolic BP Rehab  121  -  -SHRAVAN    Pre Treatment Diastolic BP 68  -AM 76  -SHRAVAN    Post Systolic BP Rehab 121  -  -SHRAVAN    Post Treatment Diastolic BP 69  -AM 57  -SHRAVAN    Pretreatment Heart Rate (beats/min) 65  -AM 92  -SHRAVAN    Intratreatment Heart Rate (beats/min)  101  -SHRAVAN    Posttreatment Heart Rate (beats/min) 87  -AM 88  -SHRAVAN    Pre SpO2 (%) 98  -AM 97  -SHRAVAN    O2 Delivery Pre Treatment room air  -AM room air  -SHRAVAN    Intra SpO2 (%)  94  -SHRAVAN    O2 Delivery Intra Treatment  room air  -SHRAVAN    Post SpO2 (%) 96  -AM 95  -SHRAVAN    O2 Delivery Post Treatment room air  -AM room air  -SHRAVAN    Pre Patient Position Supine  -AM Sitting  -SHRAVAN    Intra Patient Position Standing  -AM     Post Patient Position Sitting  -AM Sitting  -SHRAVAN    Pain Assessment    Pain Assessment 0-10  -AM 0-10  -SHRAVAN    Pain Score 6  -AM 6  -SHRAVAN    Post Pain Score 7  -AM 7  -SHRAVAN    Pain Type Acute pain;Surgical pain  -AM     Pain Location Abdomen  -AM Back  -SHRAVAN    Pain Orientation Mid  -AM Lower  -SHRAVAN    Pain Descriptors Sore  -AM     Pain Frequency Constant/continuous  -AM     Date Pain First Started 09/20/17  -AM     Clinical Progression Gradually improving  -AM     Patient's Stated Pain Goal No pain  -AM     Pain Intervention(s) Medication (See MAR);Ambulation/increased activity  -AM Ambulation/increased activity;Repositioned   pt defers pain meds.   -SHRAVAN    Result of Injury No  -AM     Work-Related Injury No  -AM     Multiple Pain Sites No  -AM     Vision Assessment/Intervention    Visual Impairment  WFL with corrective lenses  -SHRAVAN    Cognitive Assessment/Intervention    Current Cognitive/Communication Assessment functional  -AM functional  -SHRAVAN    Orientation Status oriented x 4  -AM oriented x 4  -SHRAVAN    Follows Commands/Answers Questions 100% of the time  -% of the time  -SHRAVAN    Personal Safety  WNL/WFL  -SHRAVAN    Personal Safety Interventions gait belt;nonskid shoes/slippers when out of bed;supervised activity  -AM gait belt;muscle strengthening facilitated;nonskid  shoes/slippers when out of bed;supervised activity  -SHRAVAN    ROM (Range of Motion)    General ROM no range of motion deficits identified  -AM     Bed Mobility, Assessment/Treatment    Bed Mobility, Assistive Device bed rails;head of bed elevated  -AM     Bed Mobility, Roll Left, [place] not tested  -AM     Bed Mobility, Roll Right, [place] not tested  -AM     Bed Mobility, Scoot/Bridge, [place] not tested  -AM     Bed Mob, Supine to Sit, [place] conditional independence  -AM     Bed Mob, Sit to Supine, [place] not tested  -AM     Bed Mob, Sidelying to Sit, [place] not tested  -AM     Bed Mob, Sit to Sidelying, [place] not tested  -AM     Bed Mobility, Safety Issues decreased use of arms for pushing/pulling;decreased use of legs for bridging/pushing  -AM     Bed Mobility, Impairments strength decreased;pain  -AM     Transfer Assessment/Treatment    Transfers, Bed-Chair [place] contact guard assist  -AM     Transfers, Chair-Bed [place] contact guard assist  -AM     Transfers, Bed-Chair-Bed, Assist Device rolling walker  -AM     Transfers, Sit-Stand [place] contact guard assist  -AM stand by assist  -SHRAVAN    Transfers, Stand-Sit [place] contact guard assist  -AM stand by assist  -SHRAVAN    Transfers, Sit-Stand-Sit, Assist Device rolling walker  -AM rolling walker  -SHRAVAN    Toilet Transfer, [place] contact guard assist  -AM     Toilet Transfer, Assistive Device rolling walker  -AM     Walk-In Shower Transfer, [place] not tested  -AM     Bathtub Transfer, [place] not tested  -AM     Transfer, Maintain Weight Bearing Status able to maintain weight bearing status  -AM     Transfer, Safety Issues step length decreased  -AM     Transfer, Impairments strength decreased;pain  -AM     Gait Assessment/Treatment    Gait, [place] Level contact guard assist  -AM contact guard assist  -SHRAVAN    Gait, Assistive Device rolling walker  -AM rolling walker  -SHRAVAN    Gait,  Distance (Feet) 160   40+40+40+40  -AM --   60,48,48,36,20  -SHRAVAN    Gait, Gait Pattern Analysis swing-through gait  -AM     Gait, Gait Deviations bilateral:;[place] decreased  -AM step length decreased  -SHRAVAN    Gait, Maintain Weight Bearing Status able to maintain weight bearing status  -AM     Gait, Safety Issues step length decreased  -AM     Gait, Impairments strength decreased;pain  -AM strength decreased  -SHRAVAN    Gait, Comment  pt fatigues quickly.   -SHRAVAN    Stairs Assessment/Treatment    Number of Stairs 3  -AM     Stairs, Handrail Location both sides  -AM     Stairs, [place] Level contact guard assist  -AM     Stairs, Technique Used step to step (ascending);step to step (descending)  -AM     Stairs, Maintain Weight Bearing Status able to maintain weight bearing status  -AM     Stairs, Impairments strength decreased;pain  -AM     Therapy Exercises    Bilateral Lower Extremities  AROM:;20 reps;sitting;ankle pumps/circles;LAQ;hip flexion  -SHRAVAN    Positioning and Restraints    Pre-Treatment Position in bed  -AM sitting in chair/recliner  -SHRAVAN    Post Treatment Position chair  -AM chair  -SHRAVAN    In Chair reclined;call light within reach;encouraged to call for assist;with family/caregiver;legs elevated  -AM reclined;call light within reach;encouraged to call for assist   all needs met.   -SHRAVAN      User Key  (r) = Recorded By, (t) = Taken By, (c) = Cosigned By    Initials Name Provider Type    YOVANA Rubio, PT Physical Therapist    LA Westfall, RN Registered Nurse    CLARI Nixon, RN Registered Nurse    SHRAVAN Swanson PTA Physical Therapy Assistant    MADISON Mccarthy PTA Physical Therapy Assistant    GITA Mendez/L Occupational Therapy Assistant          Physical Therapy Education     Title: PT OT SLP Therapies (Active)     Topic: Physical Therapy (Active)     Point: Mobility training (Active)    Learning Progress Summary    Learner Readiness Method Response Comment Documented by  Status   Patient Acceptance E NR  SHRAVAN 10/01/17 1429 Active    Acceptance E NR  SHRAVAN 09/30/17 1257 Active    Acceptance E VU reviewed benifits of oob and mobiity  09/27/17 1149 Done    Acceptance E NR   09/26/17 1418 Active    Acceptance E DU,NR sequencing for sit EOB safely and role of therapy MN 09/24/17 1604 Done   Family Acceptance E DU,NR sequencing for sit EOB safely and role of therapy MN 09/24/17 1604 Done               Point: Home exercise program (Active)    Learning Progress Summary    Learner Readiness Method Response Comment Documented by Status   Patient Acceptance E NR Patient educated on importance of improving her activity tolerance; educated on proper supine ther ex technique.  09/28/17 1556 Active   Family Acceptance E NR Patient educated on importance of improving her activity tolerance; educated on proper supine ther ex technique.  09/28/17 1556 Active               Point: Body mechanics (Active)    Learning Progress Summary    Learner Readiness Method Response Comment Documented by Status   Patient Acceptance E NR Patient educated on proper technique for sit to supine; educated on proper gait mechanics and appropriate rest breaks.  09/28/17 1307 Active               Point: Precautions (Done)    Learning Progress Summary    Learner Readiness Method Response Comment Documented by Status   Patient Acceptance E VU safety with stairs MN 10/03/17 1110 Done   Family Acceptance E VU safety with stairs MN 10/03/17 1110 Done                      User Key     Initials Effective Dates Name Provider Type Discipline    MN 10/17/16 -  Jacinta Rubio, PT Physical Therapist PT     10/17/16 -  Thai Swanson, PTA Physical Therapy Assistant PT     10/17/16 -  Gordon Kaye PTA Physical Therapy Assistant PT     02/17/17 -  Devin Ennis, PT Physical Therapist PT                PT Recommendation and Plan  Anticipated Discharge Disposition: transitional care  Planned Therapy Interventions: balance  training, gait training, home exercise program, stair training, strengthening, stretching, transfer training  PT Frequency: other (see comments) (5-14x/week)  Plan of Care Review  Plan Of Care Reviewed With: patient  Outcome Summary/Follow up Plan: Pt progress  note completed today 2* all goals met. Pt has made significant improvements with functional mobility since initial eval and is ambulating 85ft with RW and CGA/SBA and able to do large step with 2HR and CGA in prep for van transfer. Pt is planning to d/c to [facility] swing bed today for continued therapy prior to d/c home with daughter. WIll continue to see patient until d/c          IP PT Goals       10/03/17 1110 09/30/17 0953 09/29/17 1400    Bed Mobility PT STG    Bed Mobility PT STG, Date Goal Reviewed 10/03/17  -MN 09/30/17  -SHRAVAN     Bed Mobility PT STG, Outcome goal met  -MN goal met  -SHRAVAN     Transfer Training PT STG    Transfer Training PT STG, Date Goal Reviewed 10/03/17  -MN      Transfer Training PT STG, Outcome goal met  -MN      Transfer Training PT LTG    Transfer Training PT  LTG, Date Goal Reviewed 10/03/17  -MN  09/29/17  -AM    Transfer Training PT LTG, Outcome goal met  -MN  goal met  -AM    Gait Training PT LTG    Gait Training Goal PT LTG, [place] Level supervision required  -MN      Gait Training Goal PT LTG, Assist Device walker, rolling  -MN      Gait Training Goal PT LTG, Distance to Achieve 150 ft  -MN      Gait Training Goal PT LTG, Date Goal Reviewed 10/03/17  -MN      Gait Training Goal PT LTG, Outcome goal revised  -MN        09/28/17 1355 09/28/17 1007 09/27/17 1150    Bed Mobility PT STG    Bed Mobility PT STG, Date Goal Reviewed 09/28/17  -CZ 09/28/17  -CZ 09/27/17  -RW    Bed Mobility PT STG, Outcome goal ongoing  -CZ goal partially met  -CZ goal ongoing  -RW    Transfer Training PT STG    Transfer Training PT STG, Date Goal Reviewed   09/27/17  -RW    Transfer Training PT STG, Outcome   goal met  -RW    Transfer Training  PT LTG    Transfer Training PT  LTG, Date Goal Reviewed 09/28/17  -CZ 09/28/17  -CZ 09/27/17  -RW    Transfer Training PT LTG, Outcome goal ongoing  -CZ goal partially met  -CZ goal ongoing  -RW    Gait Training PT LTG    Gait Training Goal PT LTG, Date Goal Reviewed   09/27/17  -RW    Gait Training Goal PT LTG, Outcome   goal met  -RW      09/26/17 1314 09/25/17 1544 09/25/17 1455    Bed Mobility PT STG    Bed Mobility PT STG, Date Goal Reviewed 09/26/17  -SHRAVAN 09/25/17  -SHRAVAN     Bed Mobility PT STG, Outcome goal ongoing  -SHRAVAN goal ongoing  -SHRAVAN     Transfer Training PT STG    Transfer Training PT STG, Date Goal Reviewed 09/26/17  -SHRAVAN  09/25/17  -SHRAVAN    Transfer Training PT STG, Outcome goal ongoing  -SHRAVAN  goal ongoing  -SHRAVAN    Transfer Training PT LTG    Transfer Training PT  LTG, Date Goal Reviewed 09/26/17  -SHRAVAN 09/25/17  -SHRAVAN     Transfer Training PT LTG, Outcome goal ongoing  -SHRAVAN goal ongoing  -SHRAVAN     Gait Training PT LTG    Gait Training Goal PT LTG, Date Goal Reviewed 09/26/17  -SHRAVAN 09/25/17  -SHRAVAN     Gait Training Goal PT LTG, Outcome goal ongoing  -SHRAVAN goal ongoing  -SHRAVAN       09/24/17 1606          Bed Mobility PT STG    Bed Mobility PT STG, Date Established 09/24/17  -MN      Bed Mobility PT STG, Time to Achieve 4 days  -MN      Bed Mobility PT STG, Activity Type roll left/roll right;supine to sit/sit to supine  -MN      Bed Mobility PT STG, [place] Level minimum assist (75% patient effort)  -MN      Transfer Training Goal, Assist Device bed rails  -MN      Transfer Training PT STG    Transfer Training PT STG, Date Established 09/24/17  -MN      Transfer Training PT STG, Time to Achieve 3 days  -MN      Transfer Training PT STG, Activity Type bed to chair /chair to bed;sit to stand/stand to sit  -MN      Transfer Training PT STG, [place] Level moderate assist (50% patient effort)  -MN      Transfer Training PT STG, Assist Device walker, rolling  -MN      Transfer Training PT LTG    Transfer Training PT LTG,  Date Established 09/24/17  -MN      Transfer Training PT LTG, Time to Achieve 2 wks  -MN      Transfer Training PT LTG, Activity Type sit to stand/stand to sit;bed to chair /chair to bed;toilet  -MN      Transfer Training PT LTG, [place] Level contact guard assist  -MN      Transfer Training PT LTG, Assist Device walker, rolling  -MN      Gait Training PT LTG    Gait Training Goal PT LTG, Date Established 09/24/17  -MN      Gait Training Goal PT LTG, Time to Achieve 2 wks  -MN      Gait Training Goal PT LTG, [place] Level minimum assist (75% patient effort)  -MN      Gait Training Goal PT LTG, Assist Device walker, rolling  -MN      Gait Training Goal PT LTG, Distance to Achieve 25 ft  -MN        User Key  (r) = Recorded By, (t) = Taken By, (c) = Cosigned By    Initials Name Provider Type    YOVANA Rubio, PT Physical Therapist    SHRAVAN Swanson, PTA Physical Therapy Assistant    AM Murtaza Mccarthy, PTA Physical Therapy Assistant    RW Gordon Kaye, PTA Physical Therapy Assistant    CZ Devin Ennis, PT Physical Therapist                Outcome Measures       10/03/17 1020 10/02/17 1420 10/02/17 1130    How much help from another person do you currently need...    Turning from your back to your side while in flat bed without using bedrails? 4  -MN  4  -AM    Moving from lying on back to sitting on the side of a flat bed without bedrails? 4  -MN  4  -AM    Moving to and from a bed to a chair (including a wheelchair)? 3  -MN  3  -AM    Standing up from a chair using your arms (e.g., wheelchair, bedside chair)? 3  -MN  3  -AM    Climbing 3-5 steps with a railing? 3  -MN  3  -AM    To walk in hospital room? 3  -MN  3  -AM    AM-PAC 6 Clicks Score 20  -MN  20  -AM    How much help from another is currently needed...    Putting on and taking off regular lower body clothing?  2  -KD     Bathing (including washing, rinsing, and drying)  2  -KD     Toileting (which includes using toilet bed pan or  urinal)  3  -KD     Putting on and taking off regular upper body clothing  3  -KD     Taking care of personal grooming (such as brushing teeth)  4  -KD     Eating meals  4  -KD     Score  18  -KD     Functional Assessment    Outcome Measure Options AM-PAC 6 Clicks Basic Mobility (PT)  -MN  AM-PAC 6 Clicks Basic Mobility (PT)  -AM      10/01/17 1333          How much help from another person do you currently need...    Turning from your back to your side while in flat bed without using bedrails? 3  -SHRAVAN      Moving from lying on back to sitting on the side of a flat bed without bedrails? 4  -SHRAVAN      Moving to and from a bed to a chair (including a wheelchair)? 3  -SHRAVAN      Standing up from a chair using your arms (e.g., wheelchair, bedside chair)? 3  -SHRAVAN      Climbing 3-5 steps with a railing? 2  -SHRAVAN      To walk in hospital room? 3  -SHRAVAN      AM-PAC 6 Clicks Score 18  -SHRAVAN      Functional Assessment    Outcome Measure Options AM-PAC 6 Clicks Basic Mobility (PT)  -SHRAVAN        User Key  (r) = Recorded By, (t) = Taken By, (c) = Cosigned By    Initials Name Provider Type    YOVANA Rubio, PT Physical Therapist    SHRAVAN Swanson PTA Physical Therapy Assistant    MADISON Mccarthy PTA Physical Therapy Assistant    GITA Mendez/L Occupational Therapy Assistant           Time Calculation:         PT Charges       10/03/17 1108          Time Calculation    Start Time 1020  -MN      Stop Time 1058  -MN      Time Calculation (min) 38 min  -MN      PT Received On 10/03/17  -MN      PT Goal Re-Cert Due Date 10/16/17  -MN      Time Calculation- PT    Total Timed Code Minutes- PT 38 minute(s)  -MN        User Key  (r) = Recorded By, (t) = Taken By, (c) = Cosigned By    Initials Name Provider Type    YOVANA Rubio PT Physical Therapist          Therapy Charges for Today     Code Description Service Date Service Provider Modifiers Qty    74844121360 HC PT MOBILITY CURRENT 10/3/2017 Jacinta Rubio, LUCRECIA GP,  CJ 1    95371808731 HC PT MOBILITY PROJECTED 10/3/2017 Jacinta Rubio PT GP, CI 1    95737016809 HC GAIT TRAINING EA 15 MIN 10/3/2017 Jacinta Rubio, PT GP 2    73718052050 HC PT THERAPEUTIC ACT EA 15 MIN 10/3/2017 Jacinta Rubio, PT GP 1          PT G-Codes  PT Professional Judgement Used?: Yes  Outcome Measure Options: AM-PAC 6 Clicks Basic Mobility (PT)  Score: 20  Functional Limitation: Mobility: Walking and moving around  Mobility: Walking and Moving Around Current Status (): At least 20 percent but less than 40 percent impaired, limited or restricted  Mobility: Walking and Moving Around Goal Status (): At least 1 percent but less than 20 percent impaired, limited or restricted      Jacinta Rubio PT  10/3/2017

## 2017-10-03 NOTE — PLAN OF CARE
Problem: Patient Care Overview (Adult)  Goal: Plan of Care Review  Outcome: Ongoing (interventions implemented as appropriate)    10/02/17 1443 10/02/17 2020   Coping/Psychosocial Response Interventions   Plan Of Care Reviewed With --  patient   Patient Care Overview   Progress improving --    Outcome Evaluation   Outcome Summary/Follow up Plan vss. patient ready to be discharged to [facility] --        Goal: Adult Individualization and Mutuality  Outcome: Ongoing (interventions implemented as appropriate)  Goal: Discharge Needs Assessment  Outcome: Ongoing (interventions implemented as appropriate)    Problem: Pressure Ulcer Risk (Pedro Scale) (Adult,Obstetrics,Pediatric)  Goal: Skin Integrity  Outcome: Ongoing (interventions implemented as appropriate)    Problem: Fall Risk (Adult)  Goal: Absence of Falls  Outcome: Ongoing (interventions implemented as appropriate)

## 2017-10-03 NOTE — THERAPY DISCHARGE NOTE
Acute Care - Physical Therapy Discharge Summary  [facility]       Patient Name: Irma Pacheco  : 1950  MRN: [id number]    Today's Date: 10/3/2017  Onset of Illness/Injury or Date of Surgery Date: 17    Date of Referral to PT: 17  Referring Physician: Dr. Timothy Dixon.      Admit Date: 2017      PT Recommendation and Plan    Visit Dx:    ICD-10-CM ICD-9-CM   1. Intra-abdominal abscess K65.1 567.22   2. Impaired functional mobility, balance, gait, and endurance Z74.09 V49.89   3. Impaired mobility and activities of daily living Z74.09 799.89             Outcome Measures       10/03/17 1020 10/02/17 1420 10/02/17 1130    How much help from another person do you currently need...    Turning from your back to your side while in flat bed without using bedrails? 4  -MN  4  -AM    Moving from lying on back to sitting on the side of a flat bed without bedrails? 4  -MN  4  -AM    Moving to and from a bed to a chair (including a wheelchair)? 3  -MN  3  -AM    Standing up from a chair using your arms (e.g., wheelchair, bedside chair)? 3  -MN  3  -AM    Climbing 3-5 steps with a railing? 3  -MN  3  -AM    To walk in hospital room? 3  -MN  3  -AM    AM-PAC 6 Clicks Score 20  -MN  20  -AM    How much help from another is currently needed...    Putting on and taking off regular lower body clothing?  2  -KD     Bathing (including washing, rinsing, and drying)  2  -KD     Toileting (which includes using toilet bed pan or urinal)  3  -KD     Putting on and taking off regular upper body clothing  3  -KD     Taking care of personal grooming (such as brushing teeth)  4  -KD     Eating meals  4  -KD     Score  18  -KD     Functional Assessment    Outcome Measure Options AM-PAC 6 Clicks Basic Mobility (PT)  -MN  AM-PAC 6 Clicks Basic Mobility (PT)  -AM      10/01/17 1333          How much help from another person do you currently need...    Turning from your back to your side while in flat bed without  using bedrails? 3  -SHRAVAN      Moving from lying on back to sitting on the side of a flat bed without bedrails? 4  -SHRAVAN      Moving to and from a bed to a chair (including a wheelchair)? 3  -SHRAVAN      Standing up from a chair using your arms (e.g., wheelchair, bedside chair)? 3  -SHRAVAN      Climbing 3-5 steps with a railing? 2  -SHRAVAN      To walk in hospital room? 3  -SHRAVAN      AM-PAC 6 Clicks Score 18  -SHRAVAN      Functional Assessment    Outcome Measure Options AM-PAC 6 Clicks Basic Mobility (PT)  -SHRAVAN        User Key  (r) = Recorded By, (t) = Taken By, (c) = Cosigned By    Initials Name Provider Type    YOVANA Rubio, PT Physical Therapist    SHRAVAN Swanson, PTA Physical Therapy Assistant    MADISON Mccarthy, PTA Physical Therapy Assistant    ROBERTO Guallpa, PELAYO/L Occupational Therapy Assistant                PT Charges       10/03/17 1108          Time Calculation    Start Time 1020  -MN      Stop Time 1058  -MN      Time Calculation (min) 38 min  -MN      PT Received On 10/03/17  -MN      PT Goal Re-Cert Due Date 10/16/17  -MN      Time Calculation- PT    Total Timed Code Minutes- PT 38 minute(s)  -MN        User Key  (r) = Recorded By, (t) = Taken By, (c) = Cosigned By    Initials Name Provider Type    YOVANA Rubio, PT Physical Therapist                  IP PT Goals       10/03/17 1602 10/03/17 1110 09/30/17 0953    Bed Mobility PT STG    Bed Mobility PT STG, Date Goal Reviewed  10/03/17  -MN 09/30/17  -SHRAVAN    Bed Mobility PT STG, Outcome  goal met  -MN goal met  -SHRAVAN    Transfer Training PT STG    Transfer Training PT STG, Date Goal Reviewed  10/03/17  -MN     Transfer Training PT STG, Outcome  goal met  -MN     Transfer Training PT LTG    Transfer Training PT  LTG, Date Goal Reviewed  10/03/17  -MN     Transfer Training PT LTG, Outcome  goal met  -MN     Gait Training PT LTG    Gait Training Goal PT LTG, [place] Level  supervision required  -MN     Gait Training Goal PT LTG, Assist Device  walker,  rolling  -MN     Gait Training Goal PT LTG, Distance to Achieve  150 ft  -MN     Gait Training Goal PT LTG, Date Goal Reviewed 10/03/17  -CZ 10/03/17  -MN     Gait Training Goal PT LTG, Outcome goal not met  -CZ goal revised  -MN     Gait Training Goal PT LTG, Reason Goal Not Met discharged from facility  -CZ        09/29/17 1400 09/28/17 1355 09/28/17 1007    Bed Mobility PT STG    Bed Mobility PT STG, Date Goal Reviewed  09/28/17  -CZ 09/28/17  -CZ    Bed Mobility PT STG, Outcome  goal ongoing  -CZ goal partially met  -CZ    Transfer Training PT LTG    Transfer Training PT  LTG, Date Goal Reviewed 09/29/17  -AM 09/28/17  -CZ 09/28/17  -CZ    Transfer Training PT LTG, Outcome goal met  -AM goal ongoing  -CZ goal partially met  -CZ      09/27/17 1150 09/26/17 1314 09/25/17 1544    Bed Mobility PT STG    Bed Mobility PT STG, Date Goal Reviewed 09/27/17  -RW 09/26/17  -SHRAVAN 09/25/17  -SHRAVAN    Bed Mobility PT STG, Outcome goal ongoing  -RW goal ongoing  -SHRAVAN goal ongoing  -SHRAVAN    Transfer Training PT STG    Transfer Training PT STG, Date Goal Reviewed 09/27/17  -RW 09/26/17  -SHRAVAN     Transfer Training PT STG, Outcome goal met  -RW goal ongoing  -SHRAVAN     Transfer Training PT LTG    Transfer Training PT  LTG, Date Goal Reviewed 09/27/17  -RW 09/26/17  -SHRAVAN 09/25/17  -SHRAVAN    Transfer Training PT LTG, Outcome goal ongoing  -RW goal ongoing  -SHRAVAN goal ongoing  -SHRAVAN    Gait Training PT LTG    Gait Training Goal PT LTG, Date Goal Reviewed 09/27/17  -RW 09/26/17  -SHRAVAN 09/25/17  -SHRAVAN    Gait Training Goal PT LTG, Outcome goal met  -RW goal ongoing  -SHRAVAN goal ongoing  -SHRAVAN      09/25/17 1455 09/24/17 1606       Bed Mobility PT STG    Bed Mobility PT STG, Date Established  09/24/17  -MN     Bed Mobility PT STG, Time to Achieve  4 days  -MN     Bed Mobility PT STG, Activity Type  roll left/roll right;supine to sit/sit to supine  -MN     Bed Mobility PT STG, [place] Level  minimum assist (75% patient effort)  -MN     Transfer Training Goal,  Assist Device  bed rails  -MN     Transfer Training PT STG    Transfer Training PT STG, Date Established  09/24/17  -MN     Transfer Training PT STG, Time to Achieve  3 days  -MN     Transfer Training PT STG, Activity Type  bed to chair /chair to bed;sit to stand/stand to sit  -MN     Transfer Training PT STG, [place] Level  moderate assist (50% patient effort)  -MN     Transfer Training PT STG, Assist Device  walker, rolling  -MN     Transfer Training PT STG, Date Goal Reviewed 09/25/17  -SHRAVAN      Transfer Training PT STG, Outcome goal ongoing  -SHRAVAN      Transfer Training PT LTG    Transfer Training PT LTG, Date Established  09/24/17  -MN     Transfer Training PT LTG, Time to Achieve  2 wks  -MN     Transfer Training PT LTG, Activity Type  sit to stand/stand to sit;bed to chair /chair to bed;toilet  -MN     Transfer Training PT LTG, [place] Level  contact guard assist  -MN     Transfer Training PT LTG, Assist Device  walker, rolling  -MN     Gait Training PT LTG    Gait Training Goal PT LTG, Date Established  09/24/17  -MN     Gait Training Goal PT LTG, Time to Achieve  2 wks  -MN     Gait Training Goal PT LTG, [place] Level  minimum assist (75% patient effort)  -MN     Gait Training Goal PT LTG, Assist Device  walker, rolling  -MN     Gait Training Goal PT LTG, Distance to Achieve  25 ft  -MN       User Key  (r) = Recorded By, (t) = Taken By, (c) = Cosigned By    Initials Name Provider Type    YOVANA Rubio, PT Physical Therapist    SHRAVAN Swanson, PTA Physical Therapy Assistant    MADISON Mccarthy, PTA Physical Therapy Assistant    KELSEA Kaye, PTA Physical Therapy Assistant    YANIV Ennis, PT Physical Therapist              PT Discharge Summary  Anticipated Discharge Disposition: transitional care  Reason for Discharge: Per MD order, Discharge from facility  Outcomes Achieved: Patient able to partially acheive established goals  Discharge Destination: SNF      Devin URBANO  Raymon, PT   10/3/2017

## 2017-10-03 NOTE — PLAN OF CARE
Problem: Inpatient Physical Therapy  Goal: Gait Training Goal LTG- PT  Outcome: Unable to achieve outcome(s) by discharge Date Met:  10/03/17    09/24/17 1606 10/03/17 1110 10/03/17 1602   Gait Training PT LTG   Gait Training Goal PT LTG, Date Established 09/24/17 --  --    Gait Training Goal PT LTG, Time to Achieve 2 wks --  --    Gait Training Goal PT LTG, [place] Level --  supervision required --    Gait Training Goal PT LTG, Assist Device --  walker, rolling --    Gait Training Goal PT LTG, Distance to Achieve --  150 ft --    Gait Training Goal PT LTG, Date Goal Reviewed --  --  10/03/17   Gait Training Goal PT LTG, Outcome --  --  goal not met   Gait Training Goal PT LTG, Reason Goal Not Met --  --  discharged from facility

## 2017-10-04 NOTE — THERAPY DISCHARGE NOTE
Acute Care - Occupational Therapy Discharge Summary  [facility]     Patient Name: Irma Pacheco  : 1950  MRN: [id number]    Today's Date: 10/4/2017  Onset of Illness/Injury or Date of Surgery Date: 17    Date of Referral to OT: 17  Referring Physician: Dr. Timothy Dixon.      Admit Date: 2017        OT Recommendation and Plan    Visit Dx:    ICD-10-CM ICD-9-CM   1. Intra-abdominal abscess K65.1 567.22   2. Impaired functional mobility, balance, gait, and endurance Z74.09 V49.89   3. Impaired mobility and activities of daily living Z74.09 799.89                     OT Goals       10/04/17 0751 10/02/17 1420 17 1331    Transfer Training OT LTG    Transfer Training OT LTG, Date Established  10/02/17  -KD 17  -RB    Transfer Training OT LTG, Time to Achieve   by discharge  -RB    Transfer Training OT LTG, Activity Type   all transfers  -RB    Transfer Training OT LTG, Assist Device   walker, rolling  -RB    Transfer Training OT LTG, Date Goal Reviewed 10/04/17  -      Transfer Training OT LTG, Outcome goal not met  -      Transfer Training OT LTG, Reason Goal Not Met discharged from facility  -      Strength OT LTG    Strength Goal OT LTG, Date Established   17  -RB    Strength Goal OT LTG, Time to Achieve   by discharge  -RB    Strength Goal OT LTG, Measure to Achieve   1-2 sets of 10 reps all planes with 1-2 lb wrist wts or dumbells for B UE strength to increase independence with bed mobility/transfers.  -RB    Strength Goal OT LTG, Date Goal Reviewed  10/02/17  -KD     Strength Goal OT LTG, Outcome  goal not met  -KD     Strength Goal OT LTG, Reason Goal Not Met discharged from facility  -      Dynamic Standing Balance OT LTG    Dynamic Standing Balance OT LTG, Date Established   17  -RB    Dynamic Standing Balance OT LTG, Time to Achieve   by discharge  -RB    Dynamic Standing Balance OT LTG, [place] Level   supervision required   5 minutes  with functional activity.  -RB    Dynamic Standing Balance OT LTG, Assist Device   assistive Device   R/W.  -RB    Dynamic Standing Balance OT LTG, Date Goal Reviewed  10/02/17  -     Dynamic Standing Balance OT LTG, Outcome  goal not met  -     Dynamic Standing Balance OT LTG, Reason Goal Not Met discharged from facility  [facility]      ADL OT LTG    ADL OT LTG, Date Established   09/29/17  -    ADL OT LTG, Time to Achieve   by discharge  -    ADL OT LTG, Activity Type   ADL skills   Sponge bath and dress or walk-in shower.  -    ADL OT LTG, [place] Level   min assist;assistive device   R/W.  -RB    ADL OT LTG, Date Goal Reviewed  10/02/17  -KD     ADL OT LTG, Outcome  goal not met  -     ADL OT LTG, Reason Goal Not Met discharged from facility  -        User Key  (r) = Recorded By, (t) = Taken By, (c) = Cosigned By    Initials Name Provider Type    RB Harry Chavez, SANDRA Occupational Therapist     GALDINO Eldridge/L Occupational Therapist     GITA Fair/L Occupational Therapy Assistant                Outcome Measures       10/03/17 1020 10/02/17 1420 10/02/17 1130    How much help from another person do you currently need...    Turning from your back to your side while in flat bed without using bedrails? 4  -MN  4  -AM    Moving from lying on back to sitting on the side of a flat bed without bedrails? 4  -MN  4  -AM    Moving to and from a bed to a chair (including a wheelchair)? 3  -MN  3  -AM    Standing up from a chair using your arms (e.g., wheelchair, bedside chair)? 3  -MN  3  -AM    Climbing 3-5 steps with a railing? 3  -MN  3  -AM    To walk in hospital room? 3  -MN  3  -AM    AM-PAC 6 Clicks Score 20  -MN  20  -AM    How much help from another is currently needed...    Putting on and taking off regular lower body clothing?  2  -KD     Bathing (including washing, rinsing, and drying)  2  -KD     Toileting (which includes using toilet bed pan or urinal)  3  -KD     Putting on and  taking off regular upper body clothing  3  -KD     Taking care of personal grooming (such as brushing teeth)  4  -KD     Eating meals  4  -KD     Score  18  -KD     Functional Assessment    Outcome Measure Options AM-PAC 6 Clicks Basic Mobility (PT)  -MN  AM-PAC 6 Clicks Basic Mobility (PT)  -AM      10/01/17 1333          How much help from another person do you currently need...    Turning from your back to your side while in flat bed without using bedrails? 3  -SHRAVAN      Moving from lying on back to sitting on the side of a flat bed without bedrails? 4  -SHRAVAN      Moving to and from a bed to a chair (including a wheelchair)? 3  -SHRAVAN      Standing up from a chair using your arms (e.g., wheelchair, bedside chair)? 3  -SHRAVAN      Climbing 3-5 steps with a railing? 2  -SHRAVAN      To walk in hospital room? 3  -SHRAVAN      AM-PAC 6 Clicks Score 18  -SHRAVAN      Functional Assessment    Outcome Measure Options AM-PAC 6 Clicks Basic Mobility (PT)  -SHRAVAN        User Key  (r) = Recorded By, (t) = Taken By, (c) = Cosigned By    Initials Name Provider Type    YOVANA Rubio, PT Physical Therapist    SHRAVAN Swanson, PTA Physical Therapy Assistant    MADISNO Mccarthy PTA Physical Therapy Assistant    GITA Mendez/L Occupational Therapy Assistant              OT Discharge Summary  Anticipated Discharge Disposition: home with 24/7 care, inpatient rehabilitation facility, skilled nursing facility  Reason for Discharge: Discharge from facility, Per MD order  Outcomes Achieved: Refer to plan of care for updates on goals achieved  Discharge Destination: SNF      GALDINO Eldridge/DERICK  10/4/2017

## 2017-10-04 NOTE — PLAN OF CARE
Problem: Inpatient Occupational Therapy  Goal: Transfer Training Goal 1 LTG- OT  Outcome: Unable to achieve outcome(s) by discharge Date Met:  10/04/17    09/29/17 1331 10/02/17 1420 10/04/17 0751   Transfer Training OT LTG   Transfer Training OT LTG, Date Established --  10/02/17 --    Transfer Training OT LTG, Time to Achieve by discharge --  --    Transfer Training OT LTG, Activity Type all transfers --  --    Transfer Training OT LTG, Assist Device walker, rolling --  --    Transfer Training OT LTG, Date Goal Reviewed --  --  10/04/17   Transfer Training OT LTG, Outcome --  --  goal not met   Transfer Training OT LTG, Reason Goal Not Met --  --  discharged from facility       Goal: Strength Goal LTG- OT  Outcome: Unable to achieve outcome(s) by discharge Date Met:  10/04/17    09/29/17 1331 10/02/17 1420 10/04/17 0751   Strength OT LTG   Strength Goal OT LTG, Date Established 09/22/17 --  --    Strength Goal OT LTG, Time to Achieve by discharge --  --    Strength Goal OT LTG, Measure to Achieve 1-2 sets of 10 reps all planes with 1-2 lb wrist wts or dumbells for B UE strength to increase independence with bed mobility/transfers. --  --    Strength Goal OT LTG, Date Goal Reviewed --  10/02/17 --    Strength Goal OT LTG, Outcome --  goal not met --    Strength Goal OT LTG, Reason Goal Not Met --  --  discharged from facility       Goal: Dynamic Standing Balance Goal LTG-OT  Outcome: Unable to achieve outcome(s) by discharge Date Met:  10/04/17    09/29/17 1331 10/02/17 1420 10/04/17 0751   Dynamic Standing Balance OT LTG   Dynamic Standing Balance OT LTG, Date Established 09/29/17 --  --    Dynamic Standing Balance OT LTG, Time to Achieve by discharge --  --    Dynamic Standing Balance OT LTG, [place] Level supervision required  (5 minutes with functional activity.) --  --    Dynamic Standing Balance OT LTG, Assist Device assistive Device  (R/W.) --  --    Dynamic Standing Balance OT LTG, Date Goal Reviewed  --  10/02/17 --    Dynamic Standing Balance OT LTG, Outcome --  goal not met --    Dynamic Standing Balance OT LTG, Reason Goal Not Met --  --  discharged from facility       Goal: ADL Goal LTG- OT  Outcome: Unable to achieve outcome(s) by discharge Date Met:  10/04/17    09/29/17 1331 10/02/17 1420 10/04/17 0751   ADL OT LTG   ADL OT LTG, Date Established 09/29/17 --  --    ADL OT LTG, Time to Achieve by discharge --  --    ADL OT LTG, Activity Type ADL skills  (Sponge bath and dress or walk-in shower.) --  --    ADL OT LTG, [place] Level min assist;assistive device  (R/W.) --  --    ADL OT LTG, Date Goal Reviewed --  10/02/17 --    ADL OT LTG, Outcome --  goal not met --    ADL OT LTG, Reason Goal Not Met --  --  discharged from facility

## 2017-10-08 ENCOUNTER — HOSPITAL ENCOUNTER (INPATIENT)
Facility: HOSPITAL | Age: 67
LOS: 9 days | Discharge: NURSING, REHAB OR LONG-TERM CARE | End: 2017-10-17
Attending: SURGERY | Admitting: SURGERY

## 2017-10-08 DIAGNOSIS — Z78.9 IMPAIRED MOBILITY AND ACTIVITIES OF DAILY LIVING: ICD-10-CM

## 2017-10-08 DIAGNOSIS — N73.9 PELVIC ABSCESS IN FEMALE: Primary | ICD-10-CM

## 2017-10-08 DIAGNOSIS — Z74.09 IMPAIRED FUNCTIONAL MOBILITY, BALANCE, GAIT, AND ENDURANCE: ICD-10-CM

## 2017-10-08 DIAGNOSIS — Z74.09 IMPAIRED MOBILITY AND ACTIVITIES OF DAILY LIVING: ICD-10-CM

## 2017-10-08 LAB
ALBUMIN SERPL-MCNC: 3.2 G/DL (ref 3.4–4.8)
ALBUMIN/GLOB SERPL: 1.2 G/DL (ref 1.1–1.8)
ALP SERPL-CCNC: 130 U/L (ref 38–126)
ALT SERPL W P-5'-P-CCNC: 25 U/L (ref 9–52)
ANION GAP SERPL CALCULATED.3IONS-SCNC: 13 MMOL/L (ref 5–15)
AST SERPL-CCNC: 18 U/L (ref 14–36)
BASOPHILS # BLD AUTO: 0.02 10*3/MM3 (ref 0–0.2)
BASOPHILS NFR BLD AUTO: 0.1 % (ref 0–2)
BILIRUB SERPL-MCNC: 0.5 MG/DL (ref 0.2–1.3)
BUN BLD-MCNC: 17 MG/DL (ref 7–21)
BUN/CREAT SERPL: 13.3 (ref 7–25)
CALCIUM SPEC-SCNC: 7.9 MG/DL (ref 8.4–10.2)
CHLORIDE SERPL-SCNC: 94 MMOL/L (ref 95–110)
CO2 SERPL-SCNC: 23 MMOL/L (ref 22–31)
CREAT BLD-MCNC: 1.28 MG/DL (ref 0.5–1)
D-LACTATE SERPL-SCNC: 1 MMOL/L (ref 0.5–2)
DEPRECATED RDW RBC AUTO: 49.4 FL (ref 36.4–46.3)
EOSINOPHIL # BLD AUTO: 0.03 10*3/MM3 (ref 0–0.7)
EOSINOPHIL NFR BLD AUTO: 0.2 % (ref 0–7)
ERYTHROCYTE [DISTWIDTH] IN BLOOD BY AUTOMATED COUNT: 15.1 % (ref 11.5–14.5)
GFR SERPL CREATININE-BSD FRML MDRD: 42 ML/MIN/1.73 (ref 45–104)
GLOBULIN UR ELPH-MCNC: 2.6 GM/DL (ref 2.3–3.5)
GLUCOSE BLD-MCNC: 69 MG/DL (ref 60–100)
HCT VFR BLD AUTO: 29.2 % (ref 35–45)
HGB BLD-MCNC: 10.1 G/DL (ref 12–15.5)
IMM GRANULOCYTES # BLD: 0.2 10*3/MM3 (ref 0–0.02)
IMM GRANULOCYTES NFR BLD: 1.2 % (ref 0–0.5)
LYMPHOCYTES # BLD AUTO: 2.18 10*3/MM3 (ref 0.6–4.2)
LYMPHOCYTES NFR BLD AUTO: 13.3 % (ref 10–50)
MAGNESIUM SERPL-MCNC: 2.2 MG/DL (ref 1.6–2.3)
MCH RBC QN AUTO: 30.9 PG (ref 26.5–34)
MCHC RBC AUTO-ENTMCNC: 34.6 G/DL (ref 31.4–36)
MCV RBC AUTO: 89.3 FL (ref 80–98)
MONOCYTES # BLD AUTO: 1.35 10*3/MM3 (ref 0–0.9)
MONOCYTES NFR BLD AUTO: 8.3 % (ref 0–12)
NEUTROPHILS # BLD AUTO: 12.57 10*3/MM3 (ref 2–8.6)
NEUTROPHILS NFR BLD AUTO: 76.9 % (ref 37–80)
PHOSPHATE SERPL-MCNC: 3.6 MG/DL (ref 2.4–4.4)
PLATELET # BLD AUTO: 249 10*3/MM3 (ref 150–450)
PMV BLD AUTO: 9.6 FL (ref 8–12)
POTASSIUM BLD-SCNC: 4.6 MMOL/L (ref 3.5–5.1)
PROT SERPL-MCNC: 5.8 G/DL (ref 6.3–8.6)
RBC # BLD AUTO: 3.27 10*6/MM3 (ref 3.77–5.16)
SODIUM BLD-SCNC: 130 MMOL/L (ref 137–145)
WBC NRBC COR # BLD: 16.35 10*3/MM3 (ref 3.2–9.8)
WHOLE BLOOD HOLD SPECIMEN: NORMAL

## 2017-10-08 PROCEDURE — 94799 UNLISTED PULMONARY SVC/PX: CPT

## 2017-10-08 PROCEDURE — 84100 ASSAY OF PHOSPHORUS: CPT | Performed by: SURGERY

## 2017-10-08 PROCEDURE — 25010000002 LEVOFLOXACIN PER 250 MG: Performed by: SURGERY

## 2017-10-08 PROCEDURE — 99222 1ST HOSP IP/OBS MODERATE 55: CPT | Performed by: SURGERY

## 2017-10-08 PROCEDURE — 25010000002 ONDANSETRON PER 1 MG: Performed by: SURGERY

## 2017-10-08 PROCEDURE — 80053 COMPREHEN METABOLIC PANEL: CPT | Performed by: SURGERY

## 2017-10-08 PROCEDURE — 85025 COMPLETE CBC W/AUTO DIFF WBC: CPT | Performed by: SURGERY

## 2017-10-08 PROCEDURE — 83735 ASSAY OF MAGNESIUM: CPT | Performed by: SURGERY

## 2017-10-08 PROCEDURE — 83605 ASSAY OF LACTIC ACID: CPT | Performed by: SURGERY

## 2017-10-08 RX ORDER — DONEPEZIL HYDROCHLORIDE 5 MG/1
5 TABLET, FILM COATED ORAL NIGHTLY
Status: DISCONTINUED | OUTPATIENT
Start: 2017-10-08 | End: 2017-10-17 | Stop reason: HOSPADM

## 2017-10-08 RX ORDER — MIRTAZAPINE 15 MG/1
30 TABLET, FILM COATED ORAL NIGHTLY
Status: DISCONTINUED | OUTPATIENT
Start: 2017-10-08 | End: 2017-10-17 | Stop reason: HOSPADM

## 2017-10-08 RX ORDER — TIZANIDINE 4 MG/1
4 TABLET ORAL EVERY 12 HOURS PRN
Status: DISCONTINUED | OUTPATIENT
Start: 2017-10-08 | End: 2017-10-17 | Stop reason: HOSPADM

## 2017-10-08 RX ORDER — DIFLUPREDNATE OPHTHALMIC 0.5 MG/ML
1 EMULSION OPHTHALMIC 2 TIMES DAILY
COMMUNITY
Start: 2017-10-13 | End: 2017-10-19

## 2017-10-08 RX ORDER — ONDANSETRON 2 MG/ML
4 INJECTION INTRAMUSCULAR; INTRAVENOUS EVERY 6 HOURS PRN
Status: DISCONTINUED | OUTPATIENT
Start: 2017-10-08 | End: 2017-10-17 | Stop reason: HOSPADM

## 2017-10-08 RX ORDER — SODIUM CHLORIDE 0.9 % (FLUSH) 0.9 %
1-10 SYRINGE (ML) INJECTION AS NEEDED
Status: DISCONTINUED | OUTPATIENT
Start: 2017-10-08 | End: 2017-10-17 | Stop reason: HOSPADM

## 2017-10-08 RX ORDER — DIFLUPREDNATE OPHTHALMIC 0.5 MG/ML
1 EMULSION OPHTHALMIC 3 TIMES DAILY
COMMUNITY
Start: 2017-10-08 | End: 2017-10-17

## 2017-10-08 RX ORDER — OXYCODONE AND ACETAMINOPHEN 7.5; 325 MG/1; MG/1
1 TABLET ORAL EVERY 6 HOURS PRN
COMMUNITY
End: 2017-10-17 | Stop reason: HOSPADM

## 2017-10-08 RX ORDER — LEVOFLOXACIN 5 MG/ML
500 INJECTION, SOLUTION INTRAVENOUS EVERY 24 HOURS
Status: DISCONTINUED | OUTPATIENT
Start: 2017-10-08 | End: 2017-10-17

## 2017-10-08 RX ORDER — ROSUVASTATIN CALCIUM 10 MG/1
10 TABLET, COATED ORAL DAILY
Status: DISCONTINUED | OUTPATIENT
Start: 2017-10-08 | End: 2017-10-17 | Stop reason: HOSPADM

## 2017-10-08 RX ORDER — HYDROCODONE BITARTRATE AND ACETAMINOPHEN 7.5; 325 MG/1; MG/1
1 TABLET ORAL EVERY 4 HOURS PRN
Status: DISCONTINUED | OUTPATIENT
Start: 2017-10-08 | End: 2017-10-11

## 2017-10-08 RX ORDER — LEVOTHYROXINE SODIUM 0.15 MG/1
150 TABLET ORAL
Status: DISCONTINUED | OUTPATIENT
Start: 2017-10-09 | End: 2017-10-17 | Stop reason: HOSPADM

## 2017-10-08 RX ORDER — ONDANSETRON 4 MG/1
4 TABLET, FILM COATED ORAL EVERY 8 HOURS PRN
Status: DISCONTINUED | OUTPATIENT
Start: 2017-10-08 | End: 2017-10-17 | Stop reason: HOSPADM

## 2017-10-08 RX ORDER — POTASSIUM CHLORIDE 750 MG/1
10 CAPSULE, EXTENDED RELEASE ORAL 3 TIMES DAILY
Status: DISCONTINUED | OUTPATIENT
Start: 2017-10-08 | End: 2017-10-09 | Stop reason: CLARIF

## 2017-10-08 RX ORDER — FUROSEMIDE 80 MG
80 TABLET ORAL 2 TIMES DAILY
Status: DISCONTINUED | OUTPATIENT
Start: 2017-10-08 | End: 2017-10-16

## 2017-10-08 RX ORDER — SODIUM CHLORIDE 9 MG/ML
100 INJECTION, SOLUTION INTRAVENOUS CONTINUOUS
Status: DISCONTINUED | OUTPATIENT
Start: 2017-10-08 | End: 2017-10-13

## 2017-10-08 RX ADMIN — METRONIDAZOLE 500 MG: 500 INJECTION, SOLUTION INTRAVENOUS at 21:16

## 2017-10-08 RX ADMIN — SODIUM CHLORIDE 100 ML/HR: 900 INJECTION, SOLUTION INTRAVENOUS at 18:40

## 2017-10-08 RX ADMIN — DONEPEZIL HYDROCHLORIDE 5 MG: 5 TABLET, FILM COATED ORAL at 21:23

## 2017-10-08 RX ADMIN — ONDANSETRON 4 MG: 2 INJECTION INTRAMUSCULAR; INTRAVENOUS at 21:23

## 2017-10-08 RX ADMIN — POTASSIUM CHLORIDE 10 MEQ: 750 CAPSULE, EXTENDED RELEASE ORAL at 21:23

## 2017-10-08 RX ADMIN — LEVOFLOXACIN 500 MG: 5 INJECTION, SOLUTION INTRAVENOUS at 22:27

## 2017-10-08 RX ADMIN — MIRTAZAPINE 30 MG: 15 TABLET, FILM COATED ORAL at 21:23

## 2017-10-08 RX ADMIN — HYDROCODONE BITARTRATE AND ACETAMINOPHEN 1 TABLET: 7.5; 325 TABLET ORAL at 19:11

## 2017-10-08 NOTE — H&P
Patient Care Team:  DIANA Lindquist as PCP - General (Family Medicine)  Arnulfo Marcum MD  Chief complaint abd wound drainage    Subjective     Patient is a 67 y.o. female presents 3 weeks s/p kayla's procedure.  She got up this morning and sat in the chair and had a large volume of pus expressed from lower midline wound.  It has continued to drain pus over the course of the day.  Feels OK.  Ate this morning.  Still having ostomy output.  No fevers.    Review of Systems   Review of Systems   Constitutional: Negative.  Negative for appetite change, chills, fever and unexpected weight change.   HENT: Negative.  Negative for hearing loss, nosebleeds and trouble swallowing.    Eyes: Negative.  Negative for visual disturbance.   Respiratory: Negative.  Negative for apnea, cough, choking, chest tightness, shortness of breath, wheezing and stridor.    Cardiovascular: Negative.  Negative for chest pain, palpitations and leg swelling.   Gastrointestinal: Negative.  Negative for abdominal distention, abdominal pain, blood in stool, constipation, diarrhea, nausea and vomiting.   Endocrine: Negative.  Negative for cold intolerance, heat intolerance, polydipsia, polyphagia and polyuria.   Genitourinary: Negative.  Negative for difficulty urinating, dysuria, frequency, hematuria and urgency.   Musculoskeletal: Negative.  Negative for arthralgias, back pain, myalgias and neck pain.   Skin: Negative.  Negative for color change, pallor and rash.   Allergic/Immunologic: Negative.  Negative for immunocompromised state.   Neurological: Negative.  Negative for dizziness, seizures, syncope, light-headedness, numbness and headaches.   Hematological: Negative.  Negative for adenopathy.   Psychiatric/Behavioral: Negative.  Negative for suicidal ideas. The patient is not nervous/anxious.      History  No past medical history on file.  Past Surgical History:   Procedure Laterality Date   • COLON RESECTION N/A 9/20/2017     Procedure: exploratory laparotomy, sigmoid colon resection and colostomy;  Surgeon: Timothy Dixon MD;  Location: [facility];  Service:      No family history on file.  Social History   Substance Use Topics   • Smoking status: Former Smoker     Types: Cigarettes     Quit date: 9/20/1980   • Smokeless tobacco: Not on file   • Alcohol use 0.6 oz/week     1 Cans of beer per week     Prescriptions Prior to Admission   Medication Sig Dispense Refill Last Dose   • besifloxacin (BESIVANCE) 0.6 % suspension ophthalmic suspension Administer 1 drop into the left eye Daily.   10/8/2017 at Unknown time   • [START ON 10/13/2017] difluprednate (DUREZOL) 0.05 % ophthalmic emulsion Administer 1 drop into the left eye 2 (Two) Times a Day.      • difluprednate (DUREZOL) 0.05 % ophthalmic emulsion Administer 1 drop into the left eye 3 (Three) Times a Day.      • oxyCODONE-acetaminophen (PERCOCET) 7.5-325 MG per tablet Take 1 tablet by mouth Every 6 (Six) Hours As Needed.      • albuterol (PROVENTIL HFA;VENTOLIN HFA) 108 (90 Base) MCG/ACT inhaler Inhale 2 puffs Every 4 (Four) Hours As Needed for Wheezing.      • cholestyramine (QUESTRAN) 4 g packet Take 3 g by mouth 3 (Three) Times a Day With Meals.      • citalopram (CeleXA) 40 MG tablet Take 40 mg by mouth Daily.      • dicyclomine (BENTYL) 10 MG capsule Take 10 mg by mouth 4 (Four) Times a Day As Needed.      • donepezil (ARICEPT) 5 MG tablet Take 5 mg by mouth Every Night.      • furosemide (LASIX) 80 MG tablet Take 80 mg by mouth 2 (Two) Times a Day.      • gabapentin (NEURONTIN) 600 MG tablet Take 600 mg by mouth 3 (Three) Times a Day.      • Glatiramer Acetate (COPAXONE SC) Inject 40 mg under the skin 3 (Three) Times a Week. M, W, F      • HYDROcodone-acetaminophen (NORCO) 7.5-325 MG per tablet Take 1 tablet by mouth Every 4 (Four) Hours As Needed (pain). 40 tablet 0    • levothyroxine (SYNTHROID, LEVOTHROID) 150 MCG tablet Take 150 mcg by mouth Daily.      • memantine  (NAMENDA XR) 7 MG capsule sustained-release 24 hr extended release capsule Take 21 mg by mouth Daily.      • mirtazapine (REMERON) 30 MG tablet Take 30 mg by mouth Every Night.      • ondansetron (ZOFRAN) 4 MG tablet Take 4 mg by mouth Every 8 (Eight) Hours As Needed for Nausea or Vomiting.      • potassium chloride (MICRO-K) 10 MEQ CR capsule Take 10 mEq by mouth 3 (Three) Times a Day.      • primidone (MYSOLINE) 50 MG tablet Take 50 mg by mouth 3 (Three) Times a Day.      • rosuvastatin (CRESTOR) 10 MG tablet Take 10 mg by mouth Daily.      • tiZANidine (ZANAFLEX) 4 MG tablet Take 4 mg by mouth Every 12 (Twelve) Hours As Needed for Muscle Spasms.        Allergies:  Codeine; Milk-related compounds; Motrin [ibuprofen]; and Penicillins    Current Facility-Administered Medications:   •  pneumococcal polysaccharide 23-valent (PNEUMOVAX-23) vaccine 0.5 mL, 0.5 mL, Intramuscular, During Hospitalization, Arnulfo Marcum MD    Objective     Vital Signs       Physical Exam:    Physical Exam   Constitutional: She is oriented to person, place, and time. She appears well-developed and well-nourished.   HENT:   Head: Normocephalic and atraumatic.   Eyes: EOM are normal. Pupils are equal, round, and reactive to light.   Neck: Normal range of motion. Neck supple.   Cardiovascular: Normal rate and regular rhythm.    Pulmonary/Chest: Effort normal and breath sounds normal.   Abdominal: Soft. Bowel sounds are normal.   Musculoskeletal: Normal range of motion.   Neurological: She is alert and oriented to person, place, and time.   Skin: Skin is warm and dry.   Psychiatric: She has a normal mood and affect. Her behavior is normal.   Vitals reviewed.  incision intact, ostomy pink and viable, purulent discharge coming from lower aspect of wound    Results Review:      Lab Results (last 24 hours)     ** No results found for the last 24 hours. **      CT scan from outside hospital reviewed and shows large pelvic abscess    Assessment/Plan      Active Problems:    * No active hospital problems. *    66 yo lady transfer/direct admit from outside hospital that was recently operated on here.  Appears to have a post op pelvic abscess.  Vitals OK, labs pending.  No need for repeat CT scan.  Will admit and start fluids and antibiotics.  Due to the size and nature of her abscess will plan on taking her to the OR tomorrow morning for ex-lap and washout.  The procedure and risks, benefits, and alternatives to the plan have been discussed and she understands and agrees.  She has also stated she doesn't want blood products even if necessary for saving her life.    I discussed the patients findings and my recommendations with patient and family.     Arnulfo Marcum MD  10/08/17  5:51 PM

## 2017-10-09 ENCOUNTER — ANESTHESIA EVENT (OUTPATIENT)
Dept: PERIOP | Facility: HOSPITAL | Age: 67
End: 2017-10-09

## 2017-10-09 ENCOUNTER — ANESTHESIA (OUTPATIENT)
Dept: PERIOP | Facility: HOSPITAL | Age: 67
End: 2017-10-09

## 2017-10-09 ENCOUNTER — APPOINTMENT (OUTPATIENT)
Dept: GENERAL RADIOLOGY | Facility: HOSPITAL | Age: 67
End: 2017-10-09

## 2017-10-09 LAB
A-A DO2: 76.4 MMHG
ALBUMIN SERPL-MCNC: 2.9 G/DL (ref 3.4–4.8)
ALBUMIN/GLOB SERPL: 1 G/DL (ref 1.1–1.8)
ALP SERPL-CCNC: 114 U/L (ref 38–126)
ALT SERPL W P-5'-P-CCNC: 24 U/L (ref 9–52)
ANION GAP SERPL CALCULATED.3IONS-SCNC: 12 MMOL/L (ref 5–15)
ARTERIAL PATENCY WRIST A: ABNORMAL
AST SERPL-CCNC: 28 U/L (ref 14–36)
ATMOSPHERIC PRESS: ABNORMAL MMHG
BASE EXCESS BLDA CALC-SCNC: -8.2 MMOL/L (ref -2.4–2.4)
BDY SITE: ABNORMAL
BILIRUB SERPL-MCNC: 0.4 MG/DL (ref 0.2–1.3)
BODY TEMPERATURE: 98.6 C
BUN BLD-MCNC: 14 MG/DL (ref 7–21)
BUN/CREAT SERPL: 12.4 (ref 7–25)
CA-I BLD-MCNC: 4.4 MG/DL (ref 4.5–4.9)
CALCIUM SPEC-SCNC: 7.7 MG/DL (ref 8.4–10.2)
CHLORIDE SERPL-SCNC: 101 MMOL/L (ref 95–110)
CK MB SERPL-CCNC: 0.67 NG/ML (ref 0–5)
CK SERPL-CCNC: 36 U/L (ref 30–135)
CO2 BLDA-SCNC: 17.5 MMOL/L (ref 23–27)
CO2 SERPL-SCNC: 19 MMOL/L (ref 22–31)
CREAT BLD-MCNC: 1.13 MG/DL (ref 0.5–1)
DEPRECATED RDW RBC AUTO: 47.9 FL (ref 36.4–46.3)
ERYTHROCYTE [DISTWIDTH] IN BLOOD BY AUTOMATED COUNT: 14.9 % (ref 11.5–14.5)
GFR SERPL CREATININE-BSD FRML MDRD: 48 ML/MIN/1.73 (ref 60–104)
GLOBULIN UR ELPH-MCNC: 2.8 GM/DL (ref 2.3–3.5)
GLUCOSE BLD-MCNC: 68 MG/DL (ref 60–100)
GLUCOSE BLDA-MCNC: 66 MMOL/L
HCO3 BLDA-SCNC: 16.6 MMOL/L (ref 22–26)
HCT VFR BLD AUTO: 26.2 % (ref 35–45)
HCT VFR BLD CALC: 33 % (ref 38–47)
HGB BLD-MCNC: 9.2 G/DL (ref 12–15.5)
HGB BLDA-MCNC: 11.2 G/DL (ref 12–16)
HOROWITZ INDEX BLD+IHG-RTO: 40 %
MCH RBC QN AUTO: 30.8 PG (ref 26.5–34)
MCHC RBC AUTO-ENTMCNC: 35.1 G/DL (ref 31.4–36)
MCV RBC AUTO: 87.6 FL (ref 80–98)
MODALITY: ABNORMAL
NT-PROBNP SERPL-MCNC: 918 PG/ML (ref 0–900)
PCO2 BLDA: 31.6 MM HG (ref 35–45)
PH BLDA: 7.34 PH UNITS (ref 7.35–7.45)
PLATELET # BLD AUTO: 226 10*3/MM3 (ref 150–450)
PMV BLD AUTO: 9.1 FL (ref 8–12)
PO2 BLDA: 171.3 MM HG (ref 80–105)
POTASSIUM BLD-SCNC: 4.2 MMOL/L (ref 3.5–5.1)
POTASSIUM BLDA-SCNC: 3.81 MMOL/L (ref 3.6–4.9)
PROT SERPL-MCNC: 5.7 G/DL (ref 6.3–8.6)
RBC # BLD AUTO: 2.99 10*6/MM3 (ref 3.77–5.16)
SAO2 % BLDCOA: 99 %
SODIUM BLD-SCNC: 132 MMOL/L (ref 137–145)
SODIUM BLDA-SCNC: 133 MMOL/L (ref 138–146)
TROPONIN I SERPL-MCNC: 0.02 NG/ML
TROPONIN I SERPL-MCNC: <0.012 NG/ML
TROPONIN I SERPL-MCNC: <0.012 NG/ML
WBC NRBC COR # BLD: 10.92 10*3/MM3 (ref 3.2–9.8)
WHOLE BLOOD HOLD SPECIMEN: NORMAL

## 2017-10-09 PROCEDURE — 94799 UNLISTED PULMONARY SVC/PX: CPT

## 2017-10-09 PROCEDURE — 25010000002 LEVOFLOXACIN PER 250 MG: Performed by: SURGERY

## 2017-10-09 PROCEDURE — 87070 CULTURE OTHR SPECIMN AEROBIC: CPT | Performed by: SURGERY

## 2017-10-09 PROCEDURE — 93010 ELECTROCARDIOGRAM REPORT: CPT | Performed by: INTERNAL MEDICINE

## 2017-10-09 PROCEDURE — 99232 SBSQ HOSP IP/OBS MODERATE 35: CPT | Performed by: SURGERY

## 2017-10-09 PROCEDURE — 82550 ASSAY OF CK (CPK): CPT | Performed by: SURGERY

## 2017-10-09 PROCEDURE — 87186 SC STD MICRODIL/AGAR DIL: CPT | Performed by: SURGERY

## 2017-10-09 PROCEDURE — 87015 SPECIMEN INFECT AGNT CONCNTJ: CPT | Performed by: SURGERY

## 2017-10-09 PROCEDURE — 82803 BLOOD GASES ANY COMBINATION: CPT | Performed by: SURGERY

## 2017-10-09 PROCEDURE — 0W9G00Z DRAINAGE OF PERITONEAL CAVITY WITH DRAINAGE DEVICE, OPEN APPROACH: ICD-10-PCS | Performed by: SURGERY

## 2017-10-09 PROCEDURE — 25010000002 FENTANYL CITRATE (PF) 100 MCG/2ML SOLUTION: Performed by: NURSE ANESTHETIST, CERTIFIED REGISTERED

## 2017-10-09 PROCEDURE — 71010 HC CHEST PA OR AP: CPT

## 2017-10-09 PROCEDURE — 94002 VENT MGMT INPAT INIT DAY: CPT

## 2017-10-09 PROCEDURE — 25010000002 PROPOFOL 1000 MG/ML EMULSION: Performed by: SURGERY

## 2017-10-09 PROCEDURE — 84484 ASSAY OF TROPONIN QUANT: CPT | Performed by: SURGERY

## 2017-10-09 PROCEDURE — 85027 COMPLETE CBC AUTOMATED: CPT | Performed by: SURGERY

## 2017-10-09 PROCEDURE — 80053 COMPREHEN METABOLIC PANEL: CPT | Performed by: SURGERY

## 2017-10-09 PROCEDURE — 49002 REOPENING OF ABDOMEN: CPT | Performed by: SURGERY

## 2017-10-09 PROCEDURE — 82553 CREATINE MB FRACTION: CPT | Performed by: SURGERY

## 2017-10-09 PROCEDURE — 25010000002 HYDROMORPHONE PER 4 MG: Performed by: NURSE ANESTHETIST, CERTIFIED REGISTERED

## 2017-10-09 PROCEDURE — 25010000002 PROPOFOL 10 MG/ML EMULSION: Performed by: NURSE ANESTHETIST, CERTIFIED REGISTERED

## 2017-10-09 PROCEDURE — 93005 ELECTROCARDIOGRAM TRACING: CPT | Performed by: SURGERY

## 2017-10-09 PROCEDURE — 83880 ASSAY OF NATRIURETIC PEPTIDE: CPT | Performed by: SURGERY

## 2017-10-09 PROCEDURE — 25010000002 MORPHINE PER 10 MG: Performed by: SURGERY

## 2017-10-09 PROCEDURE — 25010000002 PROPOFOL 1000 MG/ML EMULSION

## 2017-10-09 PROCEDURE — 25010000002 MIDAZOLAM PER 1 MG: Performed by: NURSE ANESTHETIST, CERTIFIED REGISTERED

## 2017-10-09 PROCEDURE — 87205 SMEAR GRAM STAIN: CPT | Performed by: SURGERY

## 2017-10-09 RX ORDER — SODIUM CHLORIDE 9 MG/ML
INJECTION, SOLUTION INTRAVENOUS CONTINUOUS PRN
Status: DISCONTINUED | OUTPATIENT
Start: 2017-10-09 | End: 2017-10-09 | Stop reason: SURG

## 2017-10-09 RX ORDER — FENTANYL CITRATE 50 UG/ML
50 INJECTION, SOLUTION INTRAMUSCULAR; INTRAVENOUS
Status: DISCONTINUED | OUTPATIENT
Start: 2017-10-09 | End: 2017-10-10

## 2017-10-09 RX ORDER — GLATIRAMER 40 MG/ML
40 INJECTION, SOLUTION SUBCUTANEOUS 3 TIMES WEEKLY
Status: DISCONTINUED | OUTPATIENT
Start: 2017-10-09 | End: 2017-10-17 | Stop reason: HOSPADM

## 2017-10-09 RX ORDER — PROPOFOL 10 MG/ML
VIAL (ML) INTRAVENOUS AS NEEDED
Status: DISCONTINUED | OUTPATIENT
Start: 2017-10-09 | End: 2017-10-09 | Stop reason: SURG

## 2017-10-09 RX ORDER — MORPHINE SULFATE 10 MG/ML
2 INJECTION INTRAMUSCULAR; INTRAVENOUS; SUBCUTANEOUS EVERY 4 HOURS PRN
Status: DISCONTINUED | OUTPATIENT
Start: 2017-10-09 | End: 2017-10-10

## 2017-10-09 RX ORDER — MIDAZOLAM HYDROCHLORIDE 1 MG/ML
INJECTION INTRAMUSCULAR; INTRAVENOUS AS NEEDED
Status: DISCONTINUED | OUTPATIENT
Start: 2017-10-09 | End: 2017-10-09 | Stop reason: SURG

## 2017-10-09 RX ORDER — POTASSIUM CHLORIDE 750 MG/1
10 CAPSULE, EXTENDED RELEASE ORAL 3 TIMES DAILY
Status: DISCONTINUED | OUTPATIENT
Start: 2017-10-09 | End: 2017-10-17 | Stop reason: HOSPADM

## 2017-10-09 RX ORDER — LIDOCAINE HYDROCHLORIDE 10 MG/ML
INJECTION, SOLUTION INFILTRATION; PERINEURAL AS NEEDED
Status: DISCONTINUED | OUTPATIENT
Start: 2017-10-09 | End: 2017-10-09 | Stop reason: SURG

## 2017-10-09 RX ORDER — HYDROMORPHONE HCL 110MG/55ML
PATIENT CONTROLLED ANALGESIA SYRINGE INTRAVENOUS AS NEEDED
Status: DISCONTINUED | OUTPATIENT
Start: 2017-10-09 | End: 2017-10-09 | Stop reason: SURG

## 2017-10-09 RX ORDER — CHLORHEXIDINE GLUCONATE 0.12 MG/ML
15 RINSE ORAL EVERY 12 HOURS SCHEDULED
Status: DISCONTINUED | OUTPATIENT
Start: 2017-10-09 | End: 2017-10-17 | Stop reason: HOSPADM

## 2017-10-09 RX ORDER — FENTANYL CITRATE 50 UG/ML
INJECTION, SOLUTION INTRAMUSCULAR; INTRAVENOUS AS NEEDED
Status: DISCONTINUED | OUTPATIENT
Start: 2017-10-09 | End: 2017-10-09 | Stop reason: SURG

## 2017-10-09 RX ORDER — ROCURONIUM BROMIDE 10 MG/ML
INJECTION, SOLUTION INTRAVENOUS AS NEEDED
Status: DISCONTINUED | OUTPATIENT
Start: 2017-10-09 | End: 2017-10-09 | Stop reason: SURG

## 2017-10-09 RX ADMIN — ROCURONIUM BROMIDE 20 MG: 10 INJECTION INTRAVENOUS at 09:58

## 2017-10-09 RX ADMIN — PROPOFOL 50 MCG/KG/MIN: 10 INJECTION, EMULSION INTRAVENOUS at 13:13

## 2017-10-09 RX ADMIN — GLATIRAMER 40 MG: 40 INJECTION, SOLUTION SUBCUTANEOUS at 21:04

## 2017-10-09 RX ADMIN — CHLORHEXIDINE GLUCONATE 15 ML: 1.2 RINSE ORAL at 12:23

## 2017-10-09 RX ADMIN — DONEPEZIL HYDROCHLORIDE 5 MG: 5 TABLET, FILM COATED ORAL at 21:04

## 2017-10-09 RX ADMIN — MORPHINE SULFATE 2 MG: 10 INJECTION INTRAVENOUS at 18:35

## 2017-10-09 RX ADMIN — METRONIDAZOLE 500 MG: 500 INJECTION, SOLUTION INTRAVENOUS at 03:25

## 2017-10-09 RX ADMIN — ROCURONIUM BROMIDE 50 MG: 10 INJECTION INTRAVENOUS at 08:48

## 2017-10-09 RX ADMIN — PROPOFOL 140 MG: 10 INJECTION, EMULSION INTRAVENOUS at 08:48

## 2017-10-09 RX ADMIN — PROPOFOL 20 MG: 10 INJECTION, EMULSION INTRAVENOUS at 11:02

## 2017-10-09 RX ADMIN — POTASSIUM CHLORIDE 10 MEQ: 750 CAPSULE, EXTENDED RELEASE ORAL at 21:04

## 2017-10-09 RX ADMIN — MIDAZOLAM 1 MG: 1 INJECTION INTRAMUSCULAR; INTRAVENOUS at 08:45

## 2017-10-09 RX ADMIN — FUROSEMIDE 80 MG: 80 TABLET ORAL at 17:26

## 2017-10-09 RX ADMIN — PROPOFOL 50 MCG/KG/MIN: 10 INJECTION, EMULSION INTRAVENOUS at 22:39

## 2017-10-09 RX ADMIN — FENTANYL CITRATE 50 MCG: 50 INJECTION, SOLUTION INTRAMUSCULAR; INTRAVENOUS at 08:48

## 2017-10-09 RX ADMIN — METRONIDAZOLE 500 MG: 500 INJECTION, SOLUTION INTRAVENOUS at 21:13

## 2017-10-09 RX ADMIN — MORPHINE SULFATE 2 MG: 10 INJECTION INTRAVENOUS at 04:10

## 2017-10-09 RX ADMIN — FENTANYL CITRATE 50 MCG: 50 INJECTION, SOLUTION INTRAMUSCULAR; INTRAVENOUS at 09:42

## 2017-10-09 RX ADMIN — PROPOFOL 50 MCG/KG/MIN: 10 INJECTION, EMULSION INTRAVENOUS at 16:17

## 2017-10-09 RX ADMIN — SODIUM CHLORIDE: 9 INJECTION, SOLUTION INTRAVENOUS at 09:15

## 2017-10-09 RX ADMIN — SODIUM CHLORIDE 100 ML/HR: 900 INJECTION, SOLUTION INTRAVENOUS at 12:14

## 2017-10-09 RX ADMIN — MIDAZOLAM 1 MG: 1 INJECTION INTRAMUSCULAR; INTRAVENOUS at 08:40

## 2017-10-09 RX ADMIN — HYDROMORPHONE HYDROCHLORIDE 0.25 MG: 2 INJECTION INTRAMUSCULAR; INTRAVENOUS; SUBCUTANEOUS at 10:03

## 2017-10-09 RX ADMIN — LIDOCAINE HYDROCHLORIDE 50 MG: 10 INJECTION, SOLUTION INFILTRATION; PERINEURAL at 08:48

## 2017-10-09 RX ADMIN — CHLORHEXIDINE GLUCONATE 15 ML: 1.2 RINSE ORAL at 21:04

## 2017-10-09 RX ADMIN — ROCURONIUM BROMIDE 10 MG: 10 INJECTION INTRAVENOUS at 10:23

## 2017-10-09 RX ADMIN — HYDROMORPHONE HYDROCHLORIDE 0.25 MG: 2 INJECTION INTRAMUSCULAR; INTRAVENOUS; SUBCUTANEOUS at 10:26

## 2017-10-09 RX ADMIN — PROPOFOL 5 MCG/KG/MIN: 10 INJECTION, EMULSION INTRAVENOUS at 11:22

## 2017-10-09 RX ADMIN — HYDROMORPHONE HYDROCHLORIDE 0.25 MG: 2 INJECTION INTRAMUSCULAR; INTRAVENOUS; SUBCUTANEOUS at 10:21

## 2017-10-09 RX ADMIN — PROPOFOL 50 MCG/KG/MIN: 10 INJECTION, EMULSION INTRAVENOUS at 20:30

## 2017-10-09 RX ADMIN — LEVOFLOXACIN 500 MG: 5 INJECTION, SOLUTION INTRAVENOUS at 18:36

## 2017-10-09 RX ADMIN — HYDROMORPHONE HYDROCHLORIDE 0.25 MG: 2 INJECTION INTRAMUSCULAR; INTRAVENOUS; SUBCUTANEOUS at 10:11

## 2017-10-09 RX ADMIN — HYDROCODONE BITARTRATE AND ACETAMINOPHEN 1 TABLET: 7.5; 325 TABLET ORAL at 03:24

## 2017-10-09 RX ADMIN — MIRTAZAPINE 30 MG: 15 TABLET, FILM COATED ORAL at 21:04

## 2017-10-09 RX ADMIN — ROCURONIUM BROMIDE 20 MG: 10 INJECTION INTRAVENOUS at 09:32

## 2017-10-09 NOTE — PLAN OF CARE
Problem: Patient Care Overview (Adult)  Goal: Plan of Care Review  Outcome: Ongoing (interventions implemented as appropriate)    10/09/17 4527   Coping/Psychosocial Response Interventions   Plan Of Care Reviewed With caregiver   Patient Care Overview   Progress no change   Outcome Evaluation   Outcome Summary/Follow up Plan Pt admitted with post op Pelvic abscess. Pt gone to surgery. RD will monitor.

## 2017-10-09 NOTE — PLAN OF CARE
Problem: Patient Care Overview (Adult)  Goal: Plan of Care Review  Outcome: Ongoing (interventions implemented as appropriate)    10/09/17 1819   Coping/Psychosocial Response Interventions   Plan Of Care Reviewed With daughter   Patient Care Overview   Progress no change   Outcome Evaluation   Outcome Summary/Follow up Plan pt admitted to the ICU following surgery, pt remains on ventilator, plan to extubate in the am. pt tolerating well, daughter at bedside.        Goal: Adult Individualization and Mutuality  Outcome: Ongoing (interventions implemented as appropriate)  Goal: Discharge Needs Assessment  Outcome: Ongoing (interventions implemented as appropriate)    Problem: Fall Risk (Adult)  Goal: Identify Related Risk Factors and Signs and Symptoms  Outcome: Outcome(s) achieved Date Met:  10/09/17  Goal: Absence of Falls  Outcome: Ongoing (interventions implemented as appropriate)    Problem: Pain, Acute (Adult)  Goal: Identify Related Risk Factors and Signs and Symptoms  Outcome: Outcome(s) achieved Date Met:  10/09/17  Goal: Acceptable Pain Control/Comfort Level  Outcome: Ongoing (interventions implemented as appropriate)    Problem: Perioperative Period (Adult)  Goal: Signs and Symptoms of Listed Potential Problems Will be Absent or Manageable (Perioperative Period)  Outcome: Ongoing (interventions implemented as appropriate)

## 2017-10-09 NOTE — ANESTHESIA PREPROCEDURE EVALUATION
Anesthesia Evaluation     Patient summary reviewed and Nursing notes reviewed   NPO Solid Status: > 8 hours       Airway   Mallampati: III  TM distance: <3 FB  Neck ROM: full  Dental    (+) poor dentation    Comment: Upper and lower remaining dentition in hopeless repair.    Pulmonary - normal exam    breath sounds clear to auscultation  (+) a smoker Former, asthma, sleep apnea on CPAP,   Cardiovascular - normal exam    ECG reviewed  Rhythm: regular  Rate: normal    (+) CHF, hyperlipidemia    ROS comment: EKG:NSR    Neuro/Psych  (+) psychiatric history Depression,      ROS Comment: Parkinsons.  GI/Hepatic/Renal/Endo    (+) morbid obesity, renal disease (Creatinine 1.13),     ROS Comment:  Jehovah witness. Does not want blood products. HGB 9.2 HCT 26.2 10/9/17.    Musculoskeletal (-) negative ROS    Abdominal   (+) obese,    Substance History - negative use     OB/GYN negative ob/gyn ROS         Other - negative ROS                                       Anesthesia Plan    ASA 4 - emergent     general   (Discussed central line,arterial line,post op ventilation and patient,daughter understand possible complications,risk and agree.)  intravenous induction   Anesthetic plan and risks discussed with patient and child.

## 2017-10-09 NOTE — PROGRESS NOTES
LOS: 1 day   Patient Care Team:  DIANA Lindquist as PCP - General (Family Medicine)    Chief Complaint:  <principal problem not specified>    Subjective     Interval History:   Chest pain overnight, pressure, SOA, morphine improved pain, never had pain like that before    Objective     Vital Signs  Temp:  [97.5 °F (36.4 °C)-98.9 °F (37.2 °C)] 97.5 °F (36.4 °C)  Heart Rate:  [70-81] 78  Resp:  [16-20] 20  BP: ()/(42-58) 105/42    Physical Exam:  Ostomy pink and viable, pus still draining from lower midline wound     Results Review:       Lab Results (last 24 hours)     Procedure Component Value Units Date/Time    CBC Auto Differential [[id number]]  (Abnormal) Collected:  10/08/17 1830    Specimen:  Blood Updated:  10/08/17 1842     WBC 16.35 (H) 10*3/mm3      RBC 3.27 (L) 10*6/mm3      Hemoglobin 10.1 (L) g/dL      Hematocrit 29.2 (L) %      MCV 89.3 fL      MCH 30.9 pg      MCHC 34.6 g/dL      RDW 15.1 (H) %      RDW-SD 49.4 (H) fl      MPV 9.6 fL      Platelets 249 10*3/mm3      Neutrophil % 76.9 %      Lymphocyte % 13.3 %      Monocyte % 8.3 %      Eosinophil % 0.2 %      Basophil % 0.1 %      Immature Grans % 1.2 (H) %      Neutrophils, Absolute 12.57 (H) 10*3/mm3      Lymphocytes, Absolute 2.18 10*3/mm3      Monocytes, Absolute 1.35 (H) 10*3/mm3      Eosinophils, Absolute 0.03 10*3/mm3      Basophils, Absolute 0.02 10*3/mm3      Immature Grans, Absolute 0.20 (H) 10*3/mm3     Lactic Acid, Plasma [[id number]]  (Normal) Collected:  10/08/17 1830    Specimen:  Blood Updated:  10/08/17 1851     Lactate 1.0 mmol/L     Magnesium [[id number]]  (Normal) Collected:  10/08/17 1830    Specimen:  Blood Updated:  10/08/17 1856     Magnesium 2.2 mg/dL     Phosphorus [[id number]]  (Normal) Collected:  10/08/17 1830    Specimen:  Blood Updated:  10/08/17 1856     Phosphorus 3.6 mg/dL     Comprehensive Metabolic Panel [[id number]]  (Abnormal) Collected:  10/08/17 1830    Specimen:  Blood Updated:  10/08/17 1857      Glucose 69 mg/dL      BUN 17 mg/dL      Creatinine 1.28 (H) mg/dL      Sodium 130 (L) mmol/L      Potassium 4.6 mmol/L      Chloride 94 (L) mmol/L      CO2 23.0 mmol/L      Calcium 7.9 (L) mg/dL      Total Protein 5.8 (L) g/dL      Albumin 3.20 (L) g/dL      ALT (SGPT) 25 U/L      AST (SGOT) 18 U/L      Alkaline Phosphatase 130 (H) U/L      Total Bilirubin 0.5 mg/dL      eGFR Non African Amer 42 (L) mL/min/1.73      Globulin 2.6 gm/dL      A/G Ratio 1.2 g/dL      BUN/Creatinine Ratio 13.3     Anion Gap 13.0 mmol/L     Extra Tubes [[id number]] Collected:  10/08/17 1835    Specimen:  Blood from Blood, Venous Line Updated:  10/08/17 1946    Narrative:       The following orders were created for panel order Extra Tubes.  Procedure                               Abnormality         Status                     ---------                               -----------         ------                     Light Blue Top[[id number]]                                   Final result                 Please view results for these tests on the individual orders.    Light Blue Top [[id number]] Collected:  10/08/17 1835    Specimen:  Blood Updated:  10/08/17 1946     Extra Tube hold for add-on      Auto resulted       CBC (No Diff) [[id number]]  (Abnormal) Collected:  10/09/17 0357    Specimen:  Blood Updated:  10/09/17 0408     WBC 10.92 (H) 10*3/mm3      RBC 2.99 (L) 10*6/mm3      Hemoglobin 9.2 (L) g/dL      Hematocrit 26.2 (L) %      MCV 87.6 fL      MCH 30.8 pg      MCHC 35.1 g/dL      RDW 14.9 (H) %      RDW-SD 47.9 (H) fl      MPV 9.1 fL      Platelets 226 10*3/mm3     CK [[id number]]  (Normal) Collected:  10/09/17 0357    Specimen:  Blood Updated:  10/09/17 0416     Creatine Kinase 36 U/L     BNP [[id number]]  (Abnormal) Collected:  10/09/17 0357    Specimen:  Blood Updated:  10/09/17 0430     proBNP 918.0 (H) pg/mL     Troponin [[id number]]  (Normal) Collected:  10/09/17 0357    Specimen:  Blood Updated:  10/09/17 0430     Troponin I <0.012  ng/mL     CK-MB [[id number]]  (Normal) Collected:  10/09/17 0357    Specimen:  Blood Updated:  10/09/17 0430     CKMB 0.67 ng/mL     Comprehensive Metabolic Panel [[id number]] Collected:  10/09/17 0357    Specimen:  Blood Updated:  10/09/17 0630          Medication Review:   Current Facility-Administered Medications   Medication Dose Route Frequency Provider Last Rate Last Dose   • donepezil (ARICEPT) tablet 5 mg  5 mg Oral Nightly Arnulfo Marcum MD   5 mg at 10/08/17 2123   • furosemide (LASIX) tablet 80 mg  80 mg Oral BID Arnulfo Marcum MD       • HYDROcodone-acetaminophen (NORCO) 7.5-325 MG per tablet 1 tablet  1 tablet Oral Q4H PRN Arnulfo Marcum MD   1 tablet at 10/09/17 0324   • levoFLOXacin (LEVAQUIN) 500 mg/100 mL D5W (premix) (LEVAQUIN) 500 mg  500 mg Intravenous Q24H Arnulfo Marcum MD   500 mg at 10/08/17 2227   • levothyroxine (SYNTHROID, LEVOTHROID) tablet 150 mcg  150 mcg Oral Q AM Arnulfo Marcum MD   Stopped at 10/09/17 0600   • metroNIDAZOLE (FLAGYL) IVPB 500 mg  500 mg Intravenous Q8H Arnulfo Marcum MD   500 mg at 10/09/17 0325   • mirtazapine (REMERON) tablet 30 mg  30 mg Oral Nightly Arnulfo Marcum MD   30 mg at 10/08/17 2123   • Morphine injection 2 mg  2 mg Intravenous Q4H PRN Arnulfo Marcum MD   2 mg at 10/09/17 0410   • ondansetron (ZOFRAN) injection 4 mg  4 mg Intravenous Q6H PRN Arnulfo Marcum MD   4 mg at 10/08/17 2123   • ondansetron (ZOFRAN) tablet 4 mg  4 mg Oral Q8H PRN Arnulfo Marcum MD       • pneumococcal polysaccharide 23-valent (PNEUMOVAX-23) vaccine 0.5 mL  0.5 mL Intramuscular During Hospitalization Arnulfo Marcum MD       • potassium chloride (MICRO-K) CR capsule 10 mEq  10 mEq Oral TID Arnulfo Marcum MD   10 mEq at 10/08/17 2123   • rosuvastatin (CRESTOR) tablet 10 mg  10 mg Oral Daily Arnulfo Marcum MD       • sodium chloride 0.9 % flush 1-10 mL  1-10 mL Intravenous PRN Arnulfo Marcum MD       • sodium chloride 0.9 % infusion  100 mL/hr Intravenous Continuous Arnulfo Marcum  mL/hr at 10/08/17 2116 100  mL/hr at 10/08/17 2116   • tiZANidine (ZANAFLEX) tablet 4 mg  4 mg Oral Q12H PRN Arnulfo Marcum MD           Assessment/Plan     Active Problems:    Pelvic abscess in female    68 yo lady with pelvic abscess after [place]  Cardiac workup  Still plan OR today for washout  High risk patient, discussed with her and she understands, will likely be in ICU post op    Arnulfo Marcum MD  10/09/17  6:42 AM

## 2017-10-09 NOTE — CONSULTS
Adult Nutrition  Assessment    Patient Name:  Irma Pacheco  YOB: 1950  MRN: [id number]  Admit Date:  10/8/2017    Assessment Date:  10/9/2017    Comments:  Pt with a BMI of 50.2 which is compatible with morbid obesity, she is 257% of her IBW.   She is 257% of her IBW.  Pt admitted post op Pelvic Abscess.  3 weeks ago she had a Karishma's Procedure.  She is going to surgery today for an Exp lap and washout.  RD will monitor          Reason for Assessment       10/09/17 1435    Reason for Assessment    Reason For Assessment/Visit identified at risk by screening criteria    Identified At Risk By Screening Criteria BMI    Other diagnosis pelvic abscess                Nutrition/Diet History       10/09/17 1436    Nutrition/Diet History    Typical Food/Fluid Intake Pt not in room.  She has gone to surgery              Labs/Tests/Procedures/Meds       10/09/17 1436    Labs/Tests/Procedures/Meds    Labs/Tests Review Reviewed;Na+;Creat;Alb    Medication Review Reviewed, pertinent;Diuretic;Antibiotic            Physical Findings       10/09/17 1436    Physical Appearance    Overall Physical Appearance obese;overweight            Estimated/Assessed Needs       10/09/17 1437    Calculation Measurements    Weight Used For Calculations 63 kg (138 lb 14.2 oz)   adjusted body weight    Height Used for Calculations 1.524 m (5')    Estimated/Assessed Energy Needs    Energy Need Method Montgomery-St Jeor    Age 67    RMR (Montgomery-St. Jeor Equation) 1096.5    Total estimated needs (Montgomery St. Jeor) 1450    Estimated/Assessed Protein Needs    Weight Used for Protein Calculation 62.6 kg (138 lb)   adjusted body weight    Protein (gm/kg) 1.2    1.2 Gm Protein (gm) 75.12    Estimated Protein Range 75    Estimated/Assessed Fluid Needs    Fluid Need Method Other (comment)   1500cc            Nutrition Prescription Ordered       10/09/17 1442    Nutrition Prescription PO    Current PO Diet NPO              Electronically  signed by:  Nancy Gilmore, GIANNA  10/09/17 2:48 PM

## 2017-10-09 NOTE — PLAN OF CARE
Problem: Patient Care Overview (Adult)  Goal: Plan of Care Review  Outcome: Ongoing (interventions implemented as appropriate)    10/09/17 0430   Coping/Psychosocial Response Interventions   Plan Of Care Reviewed With patient;daughter   Patient Care Overview   Progress no change   Outcome Evaluation   Outcome Summary/Follow up Plan c/o heartburn then chest pain later in shift, EKg, awaiting blood work results. Abd continuing to drain purulent brown/green with foul odor. drsg changed x2.       Goal: Adult Individualization and Mutuality  Outcome: Ongoing (interventions implemented as appropriate)  Goal: Discharge Needs Assessment  Outcome: Ongoing (interventions implemented as appropriate)    Problem: Fall Risk (Adult)  Goal: Identify Related Risk Factors and Signs and Symptoms  Outcome: Ongoing (interventions implemented as appropriate)  Goal: Absence of Falls  Outcome: Ongoing (interventions implemented as appropriate)    Problem: Pain, Acute (Adult)  Goal: Identify Related Risk Factors and Signs and Symptoms  Outcome: Ongoing (interventions implemented as appropriate)  Goal: Acceptable Pain Control/Comfort Level  Outcome: Ongoing (interventions implemented as appropriate)

## 2017-10-09 NOTE — ANESTHESIA POSTPROCEDURE EVALUATION
Patient: Irma Pacheco    Procedure Summary     Date Anesthesia Start Anesthesia Stop Room / Location    10/09/17 0836 1106  [facility] OR 09 / [facility]       Procedure Diagnosis Surgeon Provider    LAPAROTOMY EXPLORATORY, drainage intra-abdominal abscess, washout (N/A Abdomen) Pelvic abscess in female  (Pelvic abscess in female [N73.9]) MD Owen Roberson MD          Anesthesia Type: general  Last vitals  BP        Temp        Pulse   70 (10/09/17 1103)   Resp        SpO2   100 % (10/09/17 1103)      Post Anesthesia Care and Evaluation    Patient location during evaluation: ICU  Patient participation: complete - patient cannot participate  Level of consciousness: responsive to painful stimuli  Pain scale: Unable to assess.  Anesthetic complications: No anesthetic complications  PONV Status: none  Cardiovascular status: hemodynamically stable  Respiratory status: oral airway, ETT and ventilator    Comments: Transported to ICU per bed with monitors.  Bagged with 100% oxygen per ETT.  Placed on vent.  Report to staff.

## 2017-10-09 NOTE — OP NOTE
LAPAROTOMY EXPLORATORY  Procedure Note    Irma Pacheco  10/8/2017 - 10/9/2017    Pre-op Diagnosis:   Pelvic abscess in female [N73.9]    Post-op Diagnosis:     Post-Op Diagnosis Codes:     * Pelvic abscess in female [N73.9]    Procedure/CPT® Codes:  GA REOPEN RECENT ABD EXPLORATORY [10435]    Procedure(s):  LAPAROTOMY EXPLORATORY, drainage intra-abdominal abscess, washout    Surgeon(s):  Arnulfo Marcum MD    Anesthesia: General    Staff:   Circulator: Rebekah Galvin RN  Scrub Person: Cami Vega  Assistant: Teresa Clement CSA    Estimated Blood Loss: 100 mL    Specimens:                  ID Type Source Tests Collected by Time Destination   1 : peritoneal fluid Body Fluid Abdominal Wall GRAM STAIN, BODY FLUID CULTURE Arnulfo Marcum MD 10/9/2017 0934          Drains:   Colostomy 09/20/17 0232 descending/sigmoid colostomy (Active)   Stoma Appearance rosebud appearance 10/3/2017  8:45 AM   Peristomal Skin unable to assess, covered by appliance 10/3/2017  8:45 AM   Appliance 2-piece 10/3/2017  8:45 AM   Accessories/Skin Care barrier substance over peristomal skin 10/2/2017  8:20 PM   Stoma Function stool 10/8/2017  7:45 PM   Stool Color brown 10/8/2017  7:45 PM   Tolerance no signs/symptoms of discomfort 10/3/2017  8:45 AM   Intake (mL) 350 10/3/2017 11:22 AM   Stool output (mL) 400 10/9/2017  3:13 AM       Drain/Device Site 10/09/17 0941 collapsible closed device (Active)       Naso/Oral/Gastric Tube 10/09/17 0936 [place] sump 18 right nostril (Active)       Urethral Catheter 10/08/17 (Active)   Daily Indications Other (comment) 10/8/2017  7:45 PM   Urine Output (mL) 375 10/9/2017  3:13 AM           Findings: deep pelvic abscess with large amount of pus, 2 small bowel enterotomies made on entrance and both noticed and repaired    Complications: none    Op note: After consent was obtained patient was taken the operating room.  Gen. anesthesia was induced.  The right internal jugular central line was placed per  anesthesia.  Bilateral radial arterial lines were attempted to be placed but could not be placed due to the patient's anatomy.  The abdomen was prepped and draped in normal sterile fashion.  Timeout was performed.  We opened her previous midline laparotomy incision with scalpel followed by electrocautery.  Fascia was found and entered and then incision was opened totally.  We took the incision down deep into the pelvis.  Kocker's placed on bilateral fascia and sharp dissection was used to remove the loops of bowel from the abdominal wall and each other.  Attention was turned to the pelvis and into the abscess cavity there is a large volume of pus.  This was also all suctioned and washed out and cleaned out.  This time we surveilled rest abdomen.  2 small enterotomies were noted with one in the midabdomen and one in a loop of small bowel and down near the pelvis.  These were both repaired primarily with 3-0 Vicryl sutures.  The pelvic abscess was adequately drained and was clean.  After copious irrigation 19 [place] CLARI drain was placed with this tip down the pelvis after adequate washout.  Hemostasis was good. The fascia was closed with a running looped PDS with 1-0 Vicryl internal retention sutures every fourth stitch.  Once fascia was closed.  The wound is closed and skin was closed partially on the superior aspect of the wound with georgina every few centimeters and the wound was left open at the base where the majority of the pus and contamination was found.  Patient tolerated procedure well.  Taken intubated to the ICU.    Arnulfo Marcum MD     Date: 10/9/2017  Time: 10:46 AM

## 2017-10-09 NOTE — NURSING NOTE
Mirna MARIANO (House Supervisor) notified @ 0735 that this pt will be coming to surgery this AM and will need an ICU bed/vent post-op per Dr. Lincoln.  Calvin Mera RN (CCU Clinical Leader) notified @ 0736 that this pt will be coming to surgery this AM and will need an ICU bed/vent post-op per Dr. Lincoln.  Calvin MARIANO

## 2017-10-09 NOTE — ANESTHESIA PROCEDURE NOTES
Airway  Urgency: elective    Airway not difficult    General Information and Staff    Patient location during procedure: OR  CRNA: BUDDY MCKEON    Indications and Patient Condition  Indications for airway management: airway protection    Preoxygenated: yes  Mask difficulty assessment: 1 - vent by mask    Final Airway Details  Final airway type: endotracheal airway      Successful airway: ETT  Cuffed: yes   Successful intubation technique: direct laryngoscopy  Endotracheal tube insertion site: oral  Blade: Srikanth  Blade size: #3  ETT size: 7.0 mm  Cormack-Lehane Classification: grade I - full view of glottis  Placement verified by: chest auscultation and capnometry   Measured from: lips  ETT to lips (cm): 21  Number of attempts at approach: 1

## 2017-10-10 LAB
ANION GAP SERPL CALCULATED.3IONS-SCNC: 15 MMOL/L (ref 5–15)
ANISOCYTOSIS BLD QL: NORMAL
ARTERIAL PATENCY WRIST A: ABNORMAL
ATMOSPHERIC PRESS: ABNORMAL MMHG
BASE EXCESS BLDA CALC-SCNC: -8.1 MMOL/L (ref -2.4–2.4)
BASOPHILS # BLD AUTO: 0.13 10*3/MM3 (ref 0–0.2)
BASOPHILS NFR BLD AUTO: 0.6 % (ref 0–2)
BDY SITE: ABNORMAL
BUN BLD-MCNC: 10 MG/DL (ref 7–21)
BUN/CREAT SERPL: 10.3 (ref 7–25)
CA-I BLD-MCNC: 4.5 MG/DL (ref 4.5–4.9)
CALCIUM SPEC-SCNC: 8.1 MG/DL (ref 8.4–10.2)
CHLORIDE SERPL-SCNC: 106 MMOL/L (ref 95–110)
CO2 BLDA-SCNC: 15.6 MMOL/L (ref 23–27)
CO2 SERPL-SCNC: 15 MMOL/L (ref 22–31)
CREAT BLD-MCNC: 0.97 MG/DL (ref 0.5–1)
DEPRECATED RDW RBC AUTO: 48.9 FL (ref 36.4–46.3)
EOSINOPHIL # BLD AUTO: 0 10*3/MM3 (ref 0–0.7)
EOSINOPHIL NFR BLD AUTO: 0 % (ref 0–7)
ERYTHROCYTE [DISTWIDTH] IN BLOOD BY AUTOMATED COUNT: 14.8 % (ref 11.5–14.5)
GFR SERPL CREATININE-BSD FRML MDRD: 58 ML/MIN/1.73 (ref 45–104)
GLUCOSE BLD-MCNC: 94 MG/DL (ref 60–100)
GLUCOSE BLDA-MCNC: 83 MMOL/L
HCO3 BLDA-SCNC: 14.8 MMOL/L (ref 22–26)
HCT VFR BLD AUTO: 29.5 % (ref 35–45)
HCT VFR BLD CALC: 41 % (ref 38–47)
HGB BLD-MCNC: 9.6 G/DL (ref 12–15.5)
HGB BLDA-MCNC: 14.1 G/DL (ref 12–16)
IMM GRANULOCYTES # BLD: 1.51 10*3/MM3 (ref 0–0.02)
IMM GRANULOCYTES NFR BLD: 7.4 % (ref 0–0.5)
LYMPHOCYTES # BLD AUTO: 1.77 10*3/MM3 (ref 0.6–4.2)
LYMPHOCYTES NFR BLD AUTO: 8.7 % (ref 10–50)
MCH RBC QN AUTO: 29.5 PG (ref 26.5–34)
MCHC RBC AUTO-ENTMCNC: 32.5 G/DL (ref 31.4–36)
MCV RBC AUTO: 90.8 FL (ref 80–98)
MODALITY: ABNORMAL
MONOCYTES # BLD AUTO: 1.07 10*3/MM3 (ref 0–0.9)
MONOCYTES NFR BLD AUTO: 5.2 % (ref 0–12)
NEUTROPHILS # BLD AUTO: 15.93 10*3/MM3 (ref 2–8.6)
NEUTROPHILS NFR BLD AUTO: 78.1 % (ref 37–80)
PCO2 BLDA: 24.8 MM HG (ref 35–45)
PH BLDA: 7.39 PH UNITS (ref 7.35–7.45)
PLATELET # BLD AUTO: 279 10*3/MM3 (ref 150–450)
PMV BLD AUTO: 9.9 FL (ref 8–12)
PO2 BLDA: 131 MM HG (ref 80–105)
POTASSIUM BLD-SCNC: 4.4 MMOL/L (ref 3.5–5.1)
POTASSIUM BLDA-SCNC: 4.37 MMOL/L (ref 3.6–4.9)
RBC # BLD AUTO: 3.25 10*6/MM3 (ref 3.77–5.16)
SAO2 % BLDCOA: 98.7 % (ref 94–100)
SMALL PLATELETS BLD QL SMEAR: ADEQUATE
SODIUM BLD-SCNC: 136 MMOL/L (ref 137–145)
SODIUM BLDA-SCNC: 133.9 MMOL/L (ref 138–146)
TOXIC GRANULATION: NORMAL
WBC NRBC COR # BLD: 20.41 10*3/MM3 (ref 3.2–9.8)

## 2017-10-10 PROCEDURE — 80048 BASIC METABOLIC PNL TOTAL CA: CPT | Performed by: SURGERY

## 2017-10-10 PROCEDURE — 94003 VENT MGMT INPAT SUBQ DAY: CPT

## 2017-10-10 PROCEDURE — 94799 UNLISTED PULMONARY SVC/PX: CPT

## 2017-10-10 PROCEDURE — 36600 WITHDRAWAL OF ARTERIAL BLOOD: CPT

## 2017-10-10 PROCEDURE — 25010000002 FENTANYL CITRATE (PF) 100 MCG/2ML SOLUTION: Performed by: SURGERY

## 2017-10-10 PROCEDURE — 94760 N-INVAS EAR/PLS OXIMETRY 1: CPT

## 2017-10-10 PROCEDURE — 25010000002 LEVOFLOXACIN PER 250 MG: Performed by: SURGERY

## 2017-10-10 PROCEDURE — 82803 BLOOD GASES ANY COMBINATION: CPT | Performed by: SURGERY

## 2017-10-10 PROCEDURE — 25010000002 MORPHINE PER 10 MG: Performed by: SURGERY

## 2017-10-10 PROCEDURE — 99024 POSTOP FOLLOW-UP VISIT: CPT | Performed by: SURGERY

## 2017-10-10 PROCEDURE — 85025 COMPLETE CBC W/AUTO DIFF WBC: CPT | Performed by: SURGERY

## 2017-10-10 PROCEDURE — 85007 BL SMEAR W/DIFF WBC COUNT: CPT | Performed by: SURGERY

## 2017-10-10 PROCEDURE — 25010000002 MORPHINE (PF) 10 MG/ML SOLUTION: Performed by: SURGERY

## 2017-10-10 PROCEDURE — 25010000002 PROPOFOL 1000 MG/ML EMULSION: Performed by: SURGERY

## 2017-10-10 RX ORDER — MORPHINE SULFATE 10 MG/ML
4 INJECTION INTRAMUSCULAR; INTRAVENOUS; SUBCUTANEOUS EVERY 4 HOURS PRN
Status: DISCONTINUED | OUTPATIENT
Start: 2017-10-10 | End: 2017-10-11

## 2017-10-10 RX ADMIN — MIRTAZAPINE 30 MG: 15 TABLET, FILM COATED ORAL at 20:59

## 2017-10-10 RX ADMIN — FENTANYL CITRATE 50 MCG: 50 INJECTION, SOLUTION INTRAMUSCULAR; INTRAVENOUS at 14:52

## 2017-10-10 RX ADMIN — FUROSEMIDE 80 MG: 80 TABLET ORAL at 08:21

## 2017-10-10 RX ADMIN — MORPHINE SULFATE 2 MG: 10 INJECTION INTRAVENOUS at 07:47

## 2017-10-10 RX ADMIN — METRONIDAZOLE 500 MG: 500 INJECTION, SOLUTION INTRAVENOUS at 12:38

## 2017-10-10 RX ADMIN — METRONIDAZOLE 500 MG: 500 INJECTION, SOLUTION INTRAVENOUS at 20:59

## 2017-10-10 RX ADMIN — METRONIDAZOLE 500 MG: 500 INJECTION, SOLUTION INTRAVENOUS at 06:15

## 2017-10-10 RX ADMIN — CHLORHEXIDINE GLUCONATE 15 ML: 1.2 RINSE ORAL at 08:21

## 2017-10-10 RX ADMIN — HYDROCODONE BITARTRATE AND ACETAMINOPHEN 1 TABLET: 7.5; 325 TABLET ORAL at 08:52

## 2017-10-10 RX ADMIN — FENTANYL CITRATE 50 MCG: 50 INJECTION, SOLUTION INTRAMUSCULAR; INTRAVENOUS at 11:02

## 2017-10-10 RX ADMIN — FUROSEMIDE 80 MG: 80 TABLET ORAL at 17:47

## 2017-10-10 RX ADMIN — POTASSIUM CHLORIDE 10 MEQ: 750 CAPSULE, EXTENDED RELEASE ORAL at 20:59

## 2017-10-10 RX ADMIN — SODIUM CHLORIDE 100 ML/HR: 900 INJECTION, SOLUTION INTRAVENOUS at 08:52

## 2017-10-10 RX ADMIN — LEVOFLOXACIN 500 MG: 5 INJECTION, SOLUTION INTRAVENOUS at 17:47

## 2017-10-10 RX ADMIN — LEVOTHYROXINE SODIUM 150 MCG: 150 TABLET ORAL at 06:21

## 2017-10-10 RX ADMIN — DONEPEZIL HYDROCHLORIDE 5 MG: 5 TABLET, FILM COATED ORAL at 20:59

## 2017-10-10 RX ADMIN — ROSUVASTATIN CALCIUM 10 MG: 10 TABLET, FILM COATED ORAL at 08:21

## 2017-10-10 RX ADMIN — MORPHINE SULFATE 2 MG: 10 INJECTION INTRAVENOUS at 12:38

## 2017-10-10 RX ADMIN — SODIUM CHLORIDE 100 ML/HR: 900 INJECTION, SOLUTION INTRAVENOUS at 00:13

## 2017-10-10 RX ADMIN — PROPOFOL 45 MCG/KG/MIN: 10 INJECTION, EMULSION INTRAVENOUS at 01:35

## 2017-10-10 RX ADMIN — PROPOFOL 40 MCG/KG/MIN: 10 INJECTION, EMULSION INTRAVENOUS at 05:42

## 2017-10-10 RX ADMIN — MORPHINE SULFATE 4 MG: 10 INJECTION INTRAVENOUS at 21:11

## 2017-10-10 RX ADMIN — MORPHINE SULFATE 4 MG: 10 INJECTION INTRAVENOUS at 17:46

## 2017-10-10 RX ADMIN — SODIUM CHLORIDE 500 ML: 9 INJECTION, SOLUTION INTRAVENOUS at 07:51

## 2017-10-10 RX ADMIN — SODIUM CHLORIDE 100 ML/HR: 900 INJECTION, SOLUTION INTRAVENOUS at 16:58

## 2017-10-10 RX ADMIN — CHLORHEXIDINE GLUCONATE 15 ML: 1.2 RINSE ORAL at 20:59

## 2017-10-10 RX ADMIN — POTASSIUM CHLORIDE 10 MEQ: 750 CAPSULE, EXTENDED RELEASE ORAL at 08:21

## 2017-10-10 RX ADMIN — HYDROCODONE BITARTRATE AND ACETAMINOPHEN 1 TABLET: 7.5; 325 TABLET ORAL at 23:30

## 2017-10-10 NOTE — PLAN OF CARE
Problem: Patient Care Overview (Adult)  Goal: Plan of Care Review  Outcome: Ongoing (interventions implemented as appropriate)    10/10/17 0545   Coping/Psychosocial Response Interventions   Plan Of Care Reviewed With patient;daughter   Patient Care Overview   Progress improving   Outcome Evaluation   Outcome Summary/Follow up Plan VSS tonight. Pt tolerating weaning from ventilator well. Sedation held and readiness to extubate examined by RT. Pt had insufficient UOP and small out put from CLARI drain. Pt to be extubated this AM.        Goal: Adult Individualization and Mutuality  Outcome: Ongoing (interventions implemented as appropriate)  Goal: Discharge Needs Assessment  Outcome: Ongoing (interventions implemented as appropriate)    Problem: Fall Risk (Adult)  Goal: Absence of Falls  Outcome: Ongoing (interventions implemented as appropriate)    Problem: Pain, Acute (Adult)  Goal: Acceptable Pain Control/Comfort Level  Outcome: Ongoing (interventions implemented as appropriate)    Problem: Perioperative Period (Adult)  Goal: Signs and Symptoms of Listed Potential Problems Will be Absent or Manageable (Perioperative Period)  Outcome: Ongoing (interventions implemented as appropriate)

## 2017-10-10 NOTE — CONSULTS
Adult Nutrition  Assessment    Patient Name:  Irma Pacheco  YOB: 1950  MRN: [id number]  Admit Date:  10/8/2017    Assessment Date:  10/10/2017    Comments:  Pt  Currently NPO post op Exp lap with drainage and washout of pelvic abscess.  She was extubated this am and is currently NPO with NGT.  Pt and her daughter report poor po pta with pt unable to tolerate a soft diet.  WT loss of ? Amt.  Rd will monitor diet advancement and tolerance.            Reason for Assessment       10/10/17 1218    Reason for Assessment    Reason For Assessment/Visit follow up protocol                Nutrition/Diet History       10/10/17 1218    Nutrition/Diet History    Typical Food/Fluid Intake Pt laying in the bed.  She claims that she was not eating well at the nursing home.  Her daughter claims that she was only taking liquids at the nursing home.  She could not tolerate a soft diet.                Labs/Tests/Procedures/Meds       10/10/17 1221    Labs/Tests/Procedures/Meds    Labs/Tests Review Reviewed    Medication Review Diuretic;Antibiotic                Nutrition Prescription Ordered       10/10/17 1222    Nutrition Prescription PO    Current PO Diet NPO              Electronically signed by:  Nancy Gilmore RD  10/10/17 12:29 PM

## 2017-10-10 NOTE — PLAN OF CARE
Problem: Patient Care Overview (Adult)  Goal: Plan of Care Review  Outcome: Ongoing (interventions implemented as appropriate)    10/10/17 8327   Coping/Psychosocial Response Interventions   Plan Of Care Reviewed With patient;caregiver;daughter   Patient Care Overview   Progress no change   Outcome Evaluation   Outcome Summary/Follow up Plan Pt post op surgery. Extubated this am and currently NPO with NGT. Poor intake following her discharge from the hospital to the nursing home with poor po tolerance. Rd will monitor diet initiation and tolerance

## 2017-10-11 LAB
ANION GAP SERPL CALCULATED.3IONS-SCNC: 16 MMOL/L (ref 5–15)
BASOPHILS # BLD AUTO: 0.11 10*3/MM3 (ref 0–0.2)
BASOPHILS NFR BLD AUTO: 0.6 % (ref 0–2)
BUN BLD-MCNC: 9 MG/DL (ref 7–21)
BUN/CREAT SERPL: 9.6 (ref 7–25)
CALCIUM SPEC-SCNC: 7.9 MG/DL (ref 8.4–10.2)
CHLORIDE SERPL-SCNC: 103 MMOL/L (ref 95–110)
CO2 SERPL-SCNC: 19 MMOL/L (ref 22–31)
CREAT BLD-MCNC: 0.94 MG/DL (ref 0.5–1)
DEPRECATED RDW RBC AUTO: 48.4 FL (ref 36.4–46.3)
EOSINOPHIL # BLD AUTO: 0.03 10*3/MM3 (ref 0–0.7)
EOSINOPHIL NFR BLD AUTO: 0.2 % (ref 0–7)
ERYTHROCYTE [DISTWIDTH] IN BLOOD BY AUTOMATED COUNT: 14.7 % (ref 11.5–14.5)
GFR SERPL CREATININE-BSD FRML MDRD: 60 ML/MIN/1.73 (ref 60–104)
GLUCOSE BLD-MCNC: 83 MG/DL (ref 60–100)
HCT VFR BLD AUTO: 27.9 % (ref 35–45)
HGB BLD-MCNC: 9.1 G/DL (ref 12–15.5)
IMM GRANULOCYTES # BLD: 1.87 10*3/MM3 (ref 0–0.02)
IMM GRANULOCYTES NFR BLD: 10.6 % (ref 0–0.5)
LYMPHOCYTES # BLD AUTO: 1.83 10*3/MM3 (ref 0.6–4.2)
LYMPHOCYTES NFR BLD AUTO: 10.3 % (ref 10–50)
MAGNESIUM SERPL-MCNC: 1.9 MG/DL (ref 1.6–2.3)
MCH RBC QN AUTO: 29.4 PG (ref 26.5–34)
MCHC RBC AUTO-ENTMCNC: 32.6 G/DL (ref 31.4–36)
MCV RBC AUTO: 90 FL (ref 80–98)
MONOCYTES # BLD AUTO: 1.06 10*3/MM3 (ref 0–0.9)
MONOCYTES NFR BLD AUTO: 6 % (ref 0–12)
NEUTROPHILS # BLD AUTO: 12.81 10*3/MM3 (ref 2–8.6)
NEUTROPHILS NFR BLD AUTO: 72.3 % (ref 37–80)
NRBC BLD MANUAL-RTO: 0 /100 WBC (ref 0–0)
PHOSPHATE SERPL-MCNC: 3.1 MG/DL (ref 2.4–4.4)
PLATELET # BLD AUTO: 279 10*3/MM3 (ref 150–450)
PMV BLD AUTO: 9.6 FL (ref 8–12)
POTASSIUM BLD-SCNC: 3.5 MMOL/L (ref 3.5–5.1)
RBC # BLD AUTO: 3.1 10*6/MM3 (ref 3.77–5.16)
SODIUM BLD-SCNC: 138 MMOL/L (ref 137–145)
WBC NRBC COR # BLD: 17.71 10*3/MM3 (ref 3.2–9.8)

## 2017-10-11 PROCEDURE — 80048 BASIC METABOLIC PNL TOTAL CA: CPT | Performed by: SURGERY

## 2017-10-11 PROCEDURE — G8978 MOBILITY CURRENT STATUS: HCPCS

## 2017-10-11 PROCEDURE — 83735 ASSAY OF MAGNESIUM: CPT | Performed by: SURGERY

## 2017-10-11 PROCEDURE — G8988 SELF CARE GOAL STATUS: HCPCS

## 2017-10-11 PROCEDURE — 25010000002 ENOXAPARIN PER 10 MG: Performed by: SURGERY

## 2017-10-11 PROCEDURE — 97116 GAIT TRAINING THERAPY: CPT

## 2017-10-11 PROCEDURE — 25010000002 LEVOFLOXACIN PER 250 MG: Performed by: SURGERY

## 2017-10-11 PROCEDURE — G8979 MOBILITY GOAL STATUS: HCPCS

## 2017-10-11 PROCEDURE — 84100 ASSAY OF PHOSPHORUS: CPT | Performed by: SURGERY

## 2017-10-11 PROCEDURE — 94799 UNLISTED PULMONARY SVC/PX: CPT

## 2017-10-11 PROCEDURE — 97166 OT EVAL MOD COMPLEX 45 MIN: CPT

## 2017-10-11 PROCEDURE — 94760 N-INVAS EAR/PLS OXIMETRY 1: CPT

## 2017-10-11 PROCEDURE — G8987 SELF CARE CURRENT STATUS: HCPCS

## 2017-10-11 PROCEDURE — 97162 PT EVAL MOD COMPLEX 30 MIN: CPT

## 2017-10-11 PROCEDURE — 85025 COMPLETE CBC W/AUTO DIFF WBC: CPT | Performed by: SURGERY

## 2017-10-11 PROCEDURE — 99024 POSTOP FOLLOW-UP VISIT: CPT | Performed by: SURGERY

## 2017-10-11 RX ORDER — ACETAMINOPHEN 500 MG
500 TABLET ORAL EVERY 6 HOURS PRN
Status: DISCONTINUED | OUTPATIENT
Start: 2017-10-11 | End: 2017-10-17 | Stop reason: HOSPADM

## 2017-10-11 RX ORDER — MORPHINE SULFATE 10 MG/ML
2 INJECTION INTRAMUSCULAR; INTRAVENOUS; SUBCUTANEOUS EVERY 4 HOURS PRN
Status: DISCONTINUED | OUTPATIENT
Start: 2017-10-11 | End: 2017-10-11

## 2017-10-11 RX ORDER — MIRTAZAPINE 15 MG/1
30 TABLET, FILM COATED ORAL NIGHTLY
Status: DISCONTINUED | OUTPATIENT
Start: 2017-10-11 | End: 2017-10-11 | Stop reason: SDUPTHER

## 2017-10-11 RX ADMIN — POTASSIUM CHLORIDE 10 MEQ: 750 CAPSULE, EXTENDED RELEASE ORAL at 17:33

## 2017-10-11 RX ADMIN — LEVOFLOXACIN 500 MG: 5 INJECTION, SOLUTION INTRAVENOUS at 18:03

## 2017-10-11 RX ADMIN — MIRTAZAPINE 30 MG: 15 TABLET, FILM COATED ORAL at 20:35

## 2017-10-11 RX ADMIN — SODIUM CHLORIDE 100 ML/HR: 900 INJECTION, SOLUTION INTRAVENOUS at 17:34

## 2017-10-11 RX ADMIN — ACETAMINOPHEN 500 MG: 500 TABLET ORAL at 12:18

## 2017-10-11 RX ADMIN — POTASSIUM CHLORIDE 10 MEQ: 750 CAPSULE, EXTENDED RELEASE ORAL at 08:29

## 2017-10-11 RX ADMIN — HYDROCODONE BITARTRATE AND ACETAMINOPHEN 1 TABLET: 7.5; 325 TABLET ORAL at 08:30

## 2017-10-11 RX ADMIN — ACETAMINOPHEN 500 MG: 500 TABLET ORAL at 20:34

## 2017-10-11 RX ADMIN — CHLORHEXIDINE GLUCONATE 15 ML: 1.2 RINSE ORAL at 08:29

## 2017-10-11 RX ADMIN — CHLORHEXIDINE GLUCONATE 15 ML: 1.2 RINSE ORAL at 20:34

## 2017-10-11 RX ADMIN — METRONIDAZOLE 500 MG: 500 INJECTION, SOLUTION INTRAVENOUS at 05:16

## 2017-10-11 RX ADMIN — FUROSEMIDE 80 MG: 80 TABLET ORAL at 17:33

## 2017-10-11 RX ADMIN — ENOXAPARIN SODIUM 40 MG: 40 INJECTION SUBCUTANEOUS at 08:34

## 2017-10-11 RX ADMIN — METRONIDAZOLE 500 MG: 500 INJECTION, SOLUTION INTRAVENOUS at 20:36

## 2017-10-11 RX ADMIN — LEVOTHYROXINE SODIUM 150 MCG: 150 TABLET ORAL at 06:10

## 2017-10-11 RX ADMIN — DONEPEZIL HYDROCHLORIDE 5 MG: 5 TABLET, FILM COATED ORAL at 20:39

## 2017-10-11 RX ADMIN — SODIUM CHLORIDE 100 ML/HR: 900 INJECTION, SOLUTION INTRAVENOUS at 06:27

## 2017-10-11 RX ADMIN — POTASSIUM CHLORIDE 10 MEQ: 750 CAPSULE, EXTENDED RELEASE ORAL at 20:37

## 2017-10-11 RX ADMIN — ROSUVASTATIN CALCIUM 10 MG: 10 TABLET, FILM COATED ORAL at 08:30

## 2017-10-11 RX ADMIN — METRONIDAZOLE 500 MG: 500 INJECTION, SOLUTION INTRAVENOUS at 12:18

## 2017-10-11 RX ADMIN — GLATIRAMER 40 MG: 40 INJECTION, SOLUTION SUBCUTANEOUS at 08:34

## 2017-10-11 RX ADMIN — FUROSEMIDE 80 MG: 80 TABLET ORAL at 08:30

## 2017-10-11 NOTE — PROGRESS NOTES
LOS: 3 days   Patient Care Team:  DIANA Lindquist as PCP - General (Family Medicine)    Chief Complaint:  <principal problem not specified>    Subjective     Interval History:   Didn't sleep last night, neurologically changed, depressed this AM    Objective     Vital Signs  Temp:  [97.9 °F (36.6 °C)-99.1 °F (37.3 °C)] 99.1 °F (37.3 °C)  Heart Rate:  [70-97] 97  Resp:  [12-20] 16  BP: (129-161)/(60-70) 132/60  Arterial Line BP: ()/() 127/66    Physical Exam:  Ostomy pink and viable, wound dressings OK     Results Review:       Lab Results (last 24 hours)     Procedure Component Value Units Date/Time    CBC Auto Differential [[id number]]  (Abnormal) Collected:  10/10/17 0757    Specimen:  Blood Updated:  10/10/17 0810     WBC 20.41 (H) 10*3/mm3      RBC 3.25 (L) 10*6/mm3      Hemoglobin 9.6 (L) g/dL      Hematocrit 29.5 (L) %      MCV 90.8 fL      MCH 29.5 pg      MCHC 32.5 g/dL      RDW 14.8 (H) %      RDW-SD 48.9 (H) fl      MPV 9.9 fL      Platelets 279 10*3/mm3      Neutrophil % 78.1 %      Lymphocyte % 8.7 (L) %      Monocyte % 5.2 %      Eosinophil % 0.0 %      Basophil % 0.6 %      Immature Grans % 7.4 (H) %      Neutrophils, Absolute 15.93 (H) 10*3/mm3      Lymphocytes, Absolute 1.77 10*3/mm3      Monocytes, Absolute 1.07 (H) 10*3/mm3      Eosinophils, Absolute 0.00 10*3/mm3      Basophils, Absolute 0.13 10*3/mm3      Immature Grans, Absolute 1.51 (H) 10*3/mm3     Basic Metabolic Panel [[id number]]  (Abnormal) Collected:  10/10/17 0757    Specimen:  Blood Updated:  10/10/17 0817     Glucose 94 mg/dL      BUN 10 mg/dL      Creatinine 0.97 mg/dL      Sodium 136 (L) mmol/L      Potassium 4.4 mmol/L      Chloride 106 mmol/L      CO2 15.0 (L) mmol/L      Calcium 8.1 (L) mg/dL      eGFR Non African Amer 58 mL/min/1.73      BUN/Creatinine Ratio 10.3     Anion Gap 15.0 mmol/L     CBC & Differential [[id number]] Collected:  10/10/17 0757    Specimen:  Blood Updated:  10/10/17 0922    Narrative:        The following orders were created for panel order CBC & Differential.  Procedure                               Abnormality         Status                     ---------                               -----------         ------                     Scan Slide[[id number]]                                       Final result               CBC Auto Differential[[id number]]        Abnormal            Final result                 Please view results for these tests on the individual orders.    Scan Slide [[id number]] Collected:  10/10/17 0757    Specimen:  Blood Updated:  10/10/17 0922     Anisocytosis Slight/1+     Toxic Granulation Slight/1+     Platelet Estimate Adequate    CBC & Differential [[id number]] Collected:  10/11/17 0317    Specimen:  Blood Updated:  10/11/17 0336    Narrative:       The following orders were created for panel order CBC & Differential.  Procedure                               Abnormality         Status                     ---------                               -----------         ------                     Scan Slide[[id number]]                                                                  CBC Auto Differential[[id number]]        Abnormal            Final result                 Please view results for these tests on the individual orders.    CBC Auto Differential [[id number]]  (Abnormal) Collected:  10/11/17 0317    Specimen:  Blood Updated:  10/11/17 0336     WBC 17.71 (H) 10*3/mm3      RBC 3.10 (L) 10*6/mm3      Hemoglobin 9.1 (L) g/dL      Hematocrit 27.9 (L) %      MCV 90.0 fL      MCH 29.4 pg      MCHC 32.6 g/dL      RDW 14.7 (H) %      RDW-SD 48.4 (H) fl      MPV 9.6 fL      Platelets 279 10*3/mm3      Neutrophil % 72.3 %      Lymphocyte % 10.3 %      Monocyte % 6.0 %      Eosinophil % 0.2 %      Basophil % 0.6 %      Immature Grans % 10.6 (H) %      Neutrophils, Absolute 12.81 (H) 10*3/mm3      Lymphocytes, Absolute 1.83 10*3/mm3      Monocytes, Absolute 1.06 (H) 10*3/mm3      Eosinophils,  Absolute 0.03 10*3/mm3      Basophils, Absolute 0.11 10*3/mm3      Immature Grans, Absolute 1.87 (H) 10*3/mm3      nRBC 0.0 /100 WBC     Magnesium [[id number]]  (Normal) Collected:  10/11/17 0317    Specimen:  Blood Updated:  10/11/17 0347     Magnesium 1.9 mg/dL     Phosphorus [[id number]]  (Normal) Collected:  10/11/17 0317    Specimen:  Blood Updated:  10/11/17 0347     Phosphorus 3.1 mg/dL     Basic Metabolic Panel [[id number]]  (Abnormal) Collected:  10/11/17 0317    Specimen:  Blood Updated:  10/11/17 0348     Glucose 83 mg/dL      BUN 9 mg/dL      Creatinine 0.94 mg/dL      Sodium 138 mmol/L      Potassium 3.5 mmol/L      Chloride 103 mmol/L      CO2 19.0 (L) mmol/L      Calcium 7.9 (L) mg/dL      eGFR Non African Amer 60 (L) mL/min/1.73      BUN/Creatinine Ratio 9.6     Anion Gap 16.0 (H) mmol/L           Medication Review:   Current Facility-Administered Medications   Medication Dose Route Frequency Provider Last Rate Last Dose   • chlorhexidine (PERIDEX) 0.12 % solution 15 mL  15 mL Mouth/Throat Q12H Arnulfo Marcum MD   15 mL at 10/10/17 2059   • donepezil (ARICEPT) tablet 5 mg  5 mg Oral Nightly Arnulfo Marcum MD   5 mg at 10/10/17 2059   • enoxaparin (LOVENOX) syringe 40 mg  40 mg Subcutaneous Q24H Arnulfo Marcum MD       • furosemide (LASIX) tablet 80 mg  80 mg Oral BID Arnulfo Marcum MD   80 mg at 10/10/17 1747   • Glatiramer Acetate solution prefilled syringe 40 mg  40 mg Subcutaneous Once per day on Mon Wed Fri Arnulfo Marcum MD   40 mg at 10/09/17 2104   • HYDROcodone-acetaminophen (NORCO) 7.5-325 MG per tablet 1 tablet  1 tablet Oral Q4H PRN Arnulfo Marcum MD   1 tablet at 10/10/17 2330   • levoFLOXacin (LEVAQUIN) 500 mg/100 mL D5W (premix) (LEVAQUIN) 500 mg  500 mg Intravenous Q24H Arnulfo Marcum MD   500 mg at 10/10/17 1747   • levothyroxine (SYNTHROID, LEVOTHROID) tablet 150 mcg  150 mcg Oral Q AM Arnulfo Marcum MD   150 mcg at 10/11/17 0610   • metroNIDAZOLE (FLAGYL) IVPB 500 mg  500 mg Intravenous Q8H Arnulfo BISHOP  MD Trixie   500 mg at 10/11/17 0516   • mirtazapine (REMERON) tablet 30 mg  30 mg Oral Nightly Arnulfo Marcum MD   30 mg at 10/10/17 2059   • mirtazapine (REMERON) tablet 30 mg  30 mg Oral Nightly Arnulfo Marcum MD       • Morphine injection 2 mg  2 mg Intravenous Q4H PRN Arnulfo Marcum MD       • ondansetron (ZOFRAN) injection 4 mg  4 mg Intravenous Q6H PRN Arnulfo Marcum MD   4 mg at 10/08/17 2123   • ondansetron (ZOFRAN) tablet 4 mg  4 mg Oral Q8H PRN Arnulfo Marcum MD       • pneumococcal polysaccharide 23-valent (PNEUMOVAX-23) vaccine 0.5 mL  0.5 mL Intramuscular During Hospitalization Arnulfo Mracum MD       • potassium chloride (MICRO-K) CR capsule 10 mEq  10 mEq Oral TID Arnulfo Marcum MD   10 mEq at 10/10/17 2059   • rosuvastatin (CRESTOR) tablet 10 mg  10 mg Oral Daily Arnulfo Marcum MD   10 mg at 10/10/17 0821   • sodium chloride 0.9 % flush 1-10 mL  1-10 mL Intravenous PRN Arnulfo Marcum MD       • sodium chloride 0.9 % infusion  100 mL/hr Intravenous Continuous Arnulfo Marcum  mL/hr at 10/11/17 0627 100 mL/hr at 10/11/17 0627   • tiZANidine (ZANAFLEX) tablet 4 mg  4 mg Oral Q12H PRN Arnulfo Marcum MD           Assessment/Plan     Active Problems:    Pelvic abscess in female    66 yo lady 3 weeks s/p Karishma's and POD#2 s/p ex-lap and washout of pelvic abscess  Neuro- poor sleep, depressed mood concerning for ICU delirium, will hold narcotics as neccesary and restart remeron tonight for sleep  Cards- stable, no hypotension or tachy  Lungs- breathing well on nasal canula, IS for support  GI- NPO for now, wait for ostomy output, d/c NGT  Renal- UOP much improved  Heme/ID- jehovahs witness, monitor labs, f/u cultures, continue antibiotics, start lovenox  dispo- PT to get into chair, will try to transfer to floor soon    Arnulfo Marcum MD  10/11/17  7:12 AM

## 2017-10-11 NOTE — PLAN OF CARE
Problem: Patient Care Overview (Adult)  Goal: Plan of Care Review  Outcome: Ongoing (interventions implemented as appropriate)    10/10/17 2000 10/11/17 0615   Coping/Psychosocial Response Interventions   Plan Of Care Reviewed With patient;daughter --    Patient Care Overview   Progress --  improving   Outcome Evaluation   Outcome Summary/Follow up Plan --  VSS for this shift. pt extubated this Am with no complications. pt complains of back pain relieved by medication. Pt became confused at one point during the night, was briefly apprehensive, this was short lived. Aguilera output adequate. CLARI and dressing remain intact.        Goal: Adult Individualization and Mutuality  Outcome: Ongoing (interventions implemented as appropriate)  Goal: Discharge Needs Assessment  Outcome: Ongoing (interventions implemented as appropriate)    Problem: Fall Risk (Adult)  Goal: Absence of Falls  Outcome: Ongoing (interventions implemented as appropriate)    Problem: Pain, Acute (Adult)  Goal: Acceptable Pain Control/Comfort Level  Outcome: Ongoing (interventions implemented as appropriate)    Problem: Perioperative Period (Adult)  Goal: Signs and Symptoms of Listed Potential Problems Will be Absent or Manageable (Perioperative Period)  Outcome: Ongoing (interventions implemented as appropriate)    Problem: Pressure Ulcer Risk (Pedro Scale) (Adult,Obstetrics,Pediatric)  Goal: Identify Related Risk Factors and Signs and Symptoms  Outcome: Outcome(s) achieved Date Met:  10/11/17  Goal: Skin Integrity  Outcome: Ongoing (interventions implemented as appropriate)    Problem: Pain, Chronic (Adult)  Goal: Identify Related Risk Factors and Signs and Symptoms  Outcome: Outcome(s) achieved Date Met:  10/11/17  Goal: Acceptable Pain Control/Comfort Level  Outcome: Ongoing (interventions implemented as appropriate)

## 2017-10-11 NOTE — THERAPY EVALUATION
Acute Care - Physical Therapy Initial Evaluation  [facility]     Patient Name: Irma Pacheco  : 1950  MRN: [id number]  Today's Date: 10/11/2017   Onset of Illness/Injury or Date of Surgery Date: 10/09/17  Date of Referral to PT: 10/10/17  Referring Physician: Dr. Arnulfo Marcum      Admit Date: 10/8/2017     Visit Dx:    ICD-10-CM ICD-9-CM   1. Pelvic abscess in female N73.9 614.4   2. Impaired functional mobility, balance, gait, and endurance Z74.09 V49.89     Patient Active Problem List   Diagnosis   • Intra-abdominal abscess   • Pelvic abscess in female     History reviewed. No pertinent past medical history.  Past Surgical History:   Procedure Laterality Date   • COLON RESECTION N/A 2017    Procedure: exploratory laparotomy, sigmoid colon resection and colostomy;  Surgeon: Timothy Dixon MD;  Location: [facility];  Service:    • EXPLORATORY LAPAROTOMY N/A 10/9/2017    Procedure: LAPAROTOMY EXPLORATORY, drainage intra-abdominal abscess, washout;  Surgeon: Arnulfo Marcum MD;  Location: [facility];  Service:           PT ASSESSMENT (last 72 hours)      PT Evaluation       10/11/17 1012 10/11/17 0800    Rehab Evaluation    Document Type evaluation  -SHRAVAN     Subjective Information agree to therapy;complains of   dtr/RN report some possible delirium  -SHRAVAN     Patient Effort, Rehab Treatment good  -SHRAVAN     Symptoms Noted During/After Treatment increased pain  -SHRAVAN     General Information    Patient Profile Review yes  -SHRAVAN     Onset of Illness/Injury or Date of Surgery Date 10/09/17  -SHRAVAN     Referring Physician Dr. Arnulfo Marcum  -SHRAVAN     General Observations Pt supine;noted telemetry, IV, O2 on 2L/min per nasal cannula, CLARI drain, miranda  -SHRAVAN     Pertinent History Of Current Problem Pt admitted to [facility] 10/8/2017 with pelvic abscess s/p Karishma's 3 3 weeks prior. Pt underwent exploratory lap wit drainage of abscess 10/9/2017.  -SHRAVAN     Precautions/Limitations fall precautions;oxygen therapy device and  L/min;other (see comments)   watch gait belt placement s/p abdominal surgery  -SHRAVAN     Prior Level of Function min assist:;gait;transfer;ADL's  -SHRAVAN     Equipment Currently Used at Home bipap/ cpap;grab bar;rollator;wheelchair, motorized  -SHRAVAN     Plans/Goals Discussed With patient and family   dtr present  -SHRAVAN     Risks Reviewed patient and family:  -SHRAVAN     Benefits Reviewed patient and family:  -SHRAVAN     Barriers to Rehab cognitive status  -SHRAVAN     Living Environment    Lives With child(laine), adult   dtr  -SHRAVAN     Living Arrangements apartment   pt recently at NH in [facility] s/p abd surgery  -SHRAVAN     Home Accessibility no concerns  -SHRAVAN     Transportation Available family or friend will provide  -SHRAVAN     Living Environment Comment Pt transferred to NH after 1st abdominal surgery where she was walking with assistance with a walker. At home pt used rollator for inside apartment and power chair for community distances. Dtr assisted pt with lower body bathing and dressing pt was able to bathe and dress upper body.  -SHRAVAN     Clinical Impression    Date of Referral to PT 10/10/17  -SHRAVAN     PT Diagnosis impaired physical mobility s/p abdominal surgery for pelvic abscess  -SHRAVAN     Prognosis per MD  -SHRAVAN     Patient/Family Goals Statement Be stronger;able to assist with care  -SHRAVAN     Criteria for Skilled Therapeutic Interventions Met yes  -SHRAVAN     Rehab Potential good, to achieve stated therapy goals  -SHRAVAN     Predicted Duration of Therapy Intervention (days/wks) until discharge or goals met  -SHRAVAN     Vital Signs    Pre Systolic BP Rehab 141  -SHRAVAN     Pre Treatment Diastolic BP 62  -SHRAVAN     Intra Systolic BP Rehab 139  -SHRAVAN     Intra Treatment Diastolic BP 60  -SHRAVAN     Post Systolic BP Rehab 150  -SHRAVAN     Post Treatment Diastolic BP 73  -SHRAVAN     Pretreatment Heart Rate (beats/min) 91  -SHRAVAN     Posttreatment Heart Rate (beats/min) 96  -SHRAVAN     Pre SpO2 (%) 100  -SHRAVAN     O2 Delivery Pre Treatment supplemental O2   2L/min  -SHRAVAN     Intra SpO2 (%) 98   -     O2 Delivery Intra Treatment supplemental O2  -     Post SpO2 (%) 95  -     O2 Delivery Post Treatment supplemental O2  -SHRAVAN     Pre Patient Position Supine  -     Intra Patient Position Sitting  -     Post Patient Position Sitting  -     Pain Assessment    Pain Assessment 0-10  -     Pain Score 2  -     Post Pain Score 7  -     Pain Type Chronic pain  -     Pain Location Back  -     Pain Orientation Lower  -     Pain Intervention(s) Repositioned  -     Vision Assessment/Intervention    Visual Impairment WFL with corrective lenses  -     Cognitive Assessment/Intervention    Current Cognitive/Communication Assessment impaired  -     Orientation Status oriented to;person;place   knew  and current year not current month  -     Follows Commands/Answers Questions 75% of the time;needs cueing;needs increased time;needs repetition  -     Personal Safety mild impairment  -     Personal Safety Interventions gait belt;nonskid shoes/slippers when out of bed;supervised activity  -     ROM (Range of Motion)    General ROM upper extremity range of motion deficits identified  -     General ROM Detail AAROM WFL BLE's  -     General UE Assessment    ROM Detail Please see OT evalution for BUE range detail  -     MMT (Manual Muscle Testing)    General MMT Assessment upper extremity strength deficits identified;lower extremity strength deficits identified  -     Upper Extremity    Upper Ext Manual Muscle Testing Detail Please refer to OT evaluation for BUE strength detail  -     Lower Extremity    Lower Ext Manual Muscle Testing Detail BLE: 3+/5 ankles/feet, 3/5 knees, 3-/5 hips  -     Bed Mobility, Assessment/Treatment    Bed Mob, Supine to Sit, [place] moderate assist (50% patient effort);2 person assist required  -     Bed Mob, Sit to Supine, [place] not tested  -     Bed Mobility, Comment Pt sat EOB 10 minutes prior to transfer to chair. Pt complained of mild  lightheadedness that improved with time  -SHRAVAN     Transfer Assessment/Treatment    Transfers, Bed-Chair [place] minimum assist (75% patient effort);2 person assist required  -SHRAVAN     Transfers, Chair-Bed [place] not tested  -SHRAVAN     Transfers, Bed-Chair-Bed, Assist Device rolling walker  -SHRAVAN     Transfers, Sit-Stand [place] minimum assist (75% patient effort);2 person assist required  -SHRAVAN     Transfers, Stand-Sit [place] minimum assist (75% patient effort);2 person assist required  -SHRAVAN     Transfers, Sit-Stand-Sit, Assist Device rolling walker  -SHRAVAN     Transfer, Comment Pt required verbal and tactile cues to guide walker to recliner  -SHRAVAN     Gait Assessment/Treatment    Gait, [place] Level minimum assist (75% patient effort);2 person assist required  -SHRAVAN     Gait, Assistive Device rolling walker  -SHRAVAN     Gait, Distance (Feet) 3   to recliner  -SHRAVAN     Sensory Assessment/Intervention    LUE Light Touch  WNL  -SV    RUE Light Touch  WNL  -SV    LLE Light Touch WNL  -SHRAVAN WNL  -SV    RLE Light Touch WNL  -SHRAVAN WNL  -SV    Edema Management    Edema Amount left:;right:;minimal  -SHRAVAN     Positioning and Restraints    Pre-Treatment Position in bed  -SHRAVAN     Post Treatment Position chair  -SHRAVAN     In Chair call light within reach;encouraged to call for assist;with family/caregiver;legs elevated;reclined  -SHRAVAN       10/10/17 1600 10/10/17 1200    Sensory Assessment/Intervention    LUE Light Touch WNL  -SV WNL  -SV    RUE Light Touch WNL  -SV WNL  -SV    LLE Light Touch WNL  -SV WNL  -SV    RLE Light Touch WNL  -SV WNL  -SV      10/10/17 0800 10/08/17 1720    General Information    Equipment Currently Used at Home  bipap/ cpap;grab bar;rollator;wheelchair, motorized  -VK    Living Environment    Lives With  child(laine), adult  -VK    Living Arrangements  apartment  -VK    Home Accessibility  no concerns  -VK    Stair Railings at Home  none  -VK    Type of Financial/Environmental Concern  none  -VK     Transportation Available  van, wheelchair accessible  -VK    Sensory Assessment/Intervention    LUE Light Touch WNL  -SV     RUE Light Touch WNL  -SV     LLE Light Touch WNL  -SV     RLE Light Touch WNL  -SV       User Key  (r) = Recorded By, (t) = Taken By, (c) = Cosigned By    Initials Name Provider Type    SHRAVAN Cain, PT Physical Therapist    DARÍO Friend, RN Registered Nurse    CLARE Lackey RN Registered Nurse          Physical Therapy Education     Title: PT OT SLP Therapies (Active)     Topic: Physical Therapy (Active)     Point: Mobility training (Active)    Learning Progress Summary    Learner Readiness Method Response Comment Documented by Status   Patient Acceptance E NR   10/11/17 1308 Active   Family Acceptance E NR   10/11/17 1308 Active               Point: Precautions (Active)    Learning Progress Summary    Learner Readiness Method Response Comment Documented by Status   Patient Acceptance E NR   10/11/17 1308 Active   Family Acceptance E NR   10/11/17 1308 Active                      User Key     Initials Effective Dates Name Provider Type Discipline     10/17/16 -  Carla Cain PT Physical Therapist PT                PT Recommendation and Plan  Anticipated Discharge Disposition: skilled nursing facility  Planned Therapy Interventions: balance training, bed mobility training, gait training, patient/family education, strengthening, transfer training  PT Frequency: other (see comments) (5-14 times per week)  Plan of Care Review  Plan Of Care Reviewed With: patient, daughter  Outcome Summary/Follow up Plan: PT evaluation completed.Pt transferred supine to sit with mod assistance of 2 and sit to stand with RW with min assistance of 2. Pt transferred to recliner with RW and min assistance of 2. Pt slow processing today. Function limited primariliy be decreased strength, balance, and tolerance for functional mobility and activities. Pt will benefit from skilled PT to regain  lost function.If pt discharged prior to goals being met, anticipate pt may require SNF again with continued therapy services prior to return home.          IP PT Goals       10/11/17 1309          Transfer Training PT LTG    Transfer Training PT LTG, Date Established 10/11/17  -      Transfer Training PT LTG, Time to Achieve by discharge  -      Transfer Training PT LTG, Activity Type bed to chair /chair to bed;sit to stand/stand to sit  -      Transfer Training PT LTG, [place] Level supervision required  -      Transfer Training PT LTG, Outcome goal ongoing  -      Gait Training PT LTG    Gait Training Goal PT LTG, Date Established 10/11/17  -      Gait Training Goal PT LTG, Time to Achieve by discharge  -      Gait Training Goal PT LTG, [place] Level supervision required  -      Gait Training Goal PT LTG, Distance to Achieve 150ft  -      Gait Training Goal PT LTG, Additional Goal 300ft  -      Gait Training Goal PT LTG, Outcome goal ongoing  -      Strength Goal PT LTG    Strength Goal PT LTG, Date Established 10/11/17  -      Strength Goal PT LTG, Time to Achieve by discharge  -      Strength Goal PT LTG, Measure to Achieve Pt will perform 15 reps of AROM exercises   -      Strength Goal PT LTG, Outcome goal ongoing  -      Patient Education PT LTG    Patient Education PT LTG, Date Established 10/11/17  -      Patient Education PT LTG, Time to Achieve by discharge  -      Patient Education PT LTG, Education Type gait;transfers;home safety  -      Patient Education PT LTG Outcome goal ongoing  [facility]        User Key  (r) = Recorded By, (t) = Taken By, (c) = Cosigned By    Initials Name Provider Type    SHRAVAN Cain, PT Physical Therapist                Outcome Measures       10/11/17 1012          How much help from another person do you currently need...    Turning from your back to your side while in flat bed without using bedrails? 2  -SHRAVAN      Moving from lying on  back to sitting on the side of a flat bed without bedrails? 2  -SHRAVAN      Moving to and from a bed to a chair (including a wheelchair)? 3  -SHRAVAN      Standing up from a chair using your arms (e.g., wheelchair, bedside chair)? 3  -SHRAVAN      Climbing 3-5 steps with a railing? 2  -SHRAVAN      To walk in hospital room? 3  -SHRAVAN      AM-PAC 6 Clicks Score 15  -SHRAVAN      Functional Assessment    Outcome Measure Options AM-PAC 6 Clicks Basic Mobility (PT)  -        User Key  (r) = Recorded By, (t) = Taken By, (c) = Cosigned By    Initials Name Provider Type    SHRAVAN Cain PT Physical Therapist           Time Calculation:         PT Charges       10/11/17 1318          Time Calculation    Start Time 1012  -      Stop Time 1100  -      Time Calculation (min) 48 min  -SHRAVAN      PT Received On 10/11/17  -      PT Goal Re-Cert Due Date 10/24/17  -      Time Calculation- PT    Total Timed Code Minutes- PT 15 minute(s)  -        User Key  (r) = Recorded By, (t) = Taken By, (c) = Cosigned By    Initials Name Provider Type    SHRAVAN Cain PT Physical Therapist          Therapy Charges for Today     Code Description Service Date Service Provider Modifiers Qty    82328011321 HC PT MOBILITY CURRENT 10/11/2017 Carla Cain PT GP, CK 1    53769169910 HC PT MOBILITY PROJECTED 10/11/2017 Carla Cain PT GP, CJ 1    92335251891 HC PT EVAL MOD COMPLEXITY 1 10/11/2017 Carla Cain PT GP 1    06409577528 HC GAIT TRAINING EA 15 MIN 10/11/2017 Carla Cain PT GP 1          PT G-Codes  PT Professional Judgement Used?: Yes  Outcome Measure Options: AM-PAC 6 Clicks Basic Mobility (PT)  Score: 15  Functional Limitation: Mobility: Walking and moving around  Mobility: Walking and Moving Around Current Status (): At least 40 percent but less than 60 percent impaired, limited or restricted  Mobility: Walking and Moving Around Goal Status (): At least 20 percent but less than 40 percent impaired, limited or restricted      Carla MANUEL  Ant, PT  10/11/2017

## 2017-10-11 NOTE — THERAPY EVALUATION
Acute Care - Occupational Therapy Initial Evaluation  [facility]     Patient Name: Irma Pacheco  : 1950  MRN: [id number]  Today's Date: 10/11/2017  Onset of Illness/Injury or Date of Surgery Date: 10/09/17  Date of Referral to OT: 10/10/17  Referring Physician: Dr. Arnulfo Marcum    Admit Date: 10/8/2017       ICD-10-CM ICD-9-CM   1. Pelvic abscess in female N73.9 614.4   2. Impaired functional mobility, balance, gait, and endurance Z74.09 V49.89   3. Impaired mobility and activities of daily living Z74.09 799.89     Patient Active Problem List   Diagnosis   • Intra-abdominal abscess   • Pelvic abscess in female     History reviewed. No pertinent past medical history.  Past Surgical History:   Procedure Laterality Date   • COLON RESECTION N/A 2017    Procedure: exploratory laparotomy, sigmoid colon resection and colostomy;  Surgeon: Timothy Dixon MD;  Location: [facility];  Service:    • EXPLORATORY LAPAROTOMY N/A 10/9/2017    Procedure: LAPAROTOMY EXPLORATORY, drainage intra-abdominal abscess, washout;  Surgeon: Arnulfo Marcum MD;  Location: [facility];  Service:           OT ASSESSMENT FLOWSHEET (last 72 hours)      OT Evaluation       10/11/17 1200 10/11/17 1018 10/11/17 1012 10/11/17 0800 10/10/17 1600    Rehab Evaluation    Document Type  evaluation  -RB evaluation  -SHRAVAN      Subjective Information  agree to therapy;complains of;pain  -RB agree to therapy;complains of   dtr/RN report some possible delirium  -SHRAVAN      Patient Effort, Rehab Treatment  good  -RB good  -SHRAVAN      Symptoms Noted During/After Treatment  increased pain  -RB increased pain  -SHRAVAN      General Information    Patient Profile Review  yes  -RB yes  -SHRAVAN      Onset of Illness/Injury or Date of Surgery Date  10/09/17  -RB 10/09/17  -SHRAVAN      Referring Physician  Dr. Arnulfo Marcum  -RB Dr. Arnulfo Marcum  -SHRAVAN      General Observations  Supine with telemetry, IV, miranda and CLARI drain.  -RB Pt supine;noted telemetry, IV, O2 on 2L/min per  nasal cannula, CLARI drain, miranda  -SHRAVAN      Pertinent History Of Current Problem  Exp lap 2/20/17 with sigmoid colon res and colostomy.  10/9/17 - exp lap with intra-abdominal abscess and washout.  -RB Pt admitted to [facility] 10/8/2017 with pelvic abscess s/p Karishma's 3 3 weeks prior. Pt underwent exploratory lap wit drainage of abscess 10/9/2017.  -SHRAVAN      Precautions/Limitations  fall precautions;oxygen therapy device and L/min   Careful with belt placement due to recent sx..  -RB fall precautions;oxygen therapy device and L/min;other (see comments)   watch gait belt placement s/p abdominal surgery  -SHRAVAN      Prior Level of Function  min assist:;gait;transfer;ADL's  -RB min assist:;gait;transfer;ADL's  -SHRAVAN      Equipment Currently Used at Home  grab bar;wheelchair, motorized;rollator;bipap/ cpap  -RB bipap/ cpap;grab bar;rollator;wheelchair, motorized  -SHRAVAN      Plans/Goals Discussed With  patient and family  -RB patient and family   dtr present  -SHRAVAN      Risks Reviewed  patient and family:  -RB patient and family:  -SHRAVAN      Benefits Reviewed   patient and family:  -SHRAVAN      Barriers to Rehab  medically complex  -RB cognitive status  -SHRAVAN      Living Environment    Lives With  child(laine), adult   dtr  -RB child(laine), adult   dtr  -SHRAVAN      Living Arrangements  apartment   Pt came from swing bed at [facility] but prev living with dtr.  -RB apartment   pt recently at NH in [facility] s/p abd surgery  -SHRAVAN      Home Accessibility  no concerns  -RB no concerns  -SHRAVAN      Transportation Available  family or friend will provide  -RB family or friend will provide  -SHRAVAN      Living Environment Comment  At home pt used rollator for inside apt and W/C for outside..  -RB Pt transferred to NH after 1st abdominal surgery where she was walking with assistance with a walker. At home pt used rollator for inside apartment and power chair for community distances. Dtr assisted pt with lower body bathing and dressing pt was able to bathe and dress  upper body.  -SHRAVAN      Clinical Impression    Date of Referral to OT  10/10/17  -RB       OT Diagnosis  Impaired mob and ADLs.  -RB       Functional Level At Time Of Evaluation  Impaired mobility and ADLs.  -RB       Impairments Found (describe specific impairments)  gait, locomotion, and balance  -RB       Criteria for Skilled Therapeutic Interventions Met  yes;treatment indicated  -RB       Rehab Potential  good, to achieve stated therapy goals  -RB       Therapy Frequency  --   3-14x/wk  -RB       Predicted Duration of Therapy Intervention (days/wks)  Until D/C.  -RB       Anticipated Discharge Disposition  home with 24/7 care;skilled nursing facility;long term acute care facility  -RB       Functional Level Prior    Ambulation  3-->assistive equipment and person  -RB       Transferring  3-->assistive equipment and person  -RB       Toileting  3-->assistive equipment and person  -RB       Bathing  3-->assistive equipment and person  -RB       Dressing  2-->assistive person  -RB       Eating  0-->independent  -RB       Communication  0-->understands/communicates without difficulty  -RB       Vital Signs    Pre Systolic BP Rehab  141  -  -SHRAVAN      Pre Treatment Diastolic BP  62  -RB 62  -SHRAVAN      Intra Systolic BP Rehab   139  -SHRAVAN      Intra Treatment Diastolic BP   60  -SHRAVAN      Post Systolic BP Rehab  150  -  -SHRAVAN      Post Treatment Diastolic BP  73  -RB 73  -SHRAVAN      Pretreatment Heart Rate (beats/min)  91  -RB 91  -SHRAVAN      Posttreatment Heart Rate (beats/min)  96  -RB 96  -SHRAVAN      Pre SpO2 (%)  99  -  -SHRAVAN      O2 Delivery Pre Treatment  supplemental O2   2 L  -RB supplemental O2   2L/min  -SHRAVAN      Intra SpO2 (%)   98  -SHRAVAN      O2 Delivery Intra Treatment   supplemental O2  -SHRAVAN      Post SpO2 (%)  96  -RB 95  -SHRAVAN      O2 Delivery Post Treatment   supplemental O2  -SHRAVAN      Pre Patient Position  Supine  -RB Supine  -SHRAVAN      Intra Patient Position  Standing  -RB Sitting  -SHRAVAN      Post Patient Position   Sitting  -RB Sitting  -SHRAVAN      Pain Assessment    Pain Assessment  0-10  -RB 0-10  -SHRAVAN      Pain Score  2  -RB 2  -SHRAVAN      Post Pain Score  7  -RB 7  -SHRAVAN      Pain Type  Chronic pain  -RB Chronic pain  -SHRAVAN      Pain Location  Back  -RB Back  -SHRAVAN      Pain Orientation  Lower  -RB Lower  -SHRAVAN      Pain Intervention(s)  Repositioned  -RB Repositioned  -SHRAVAN      Vision Assessment/Intervention    Visual Impairment  WFL with corrective lenses  -RB WFL with corrective lenses  -SHRAVAN      Cognitive Assessment/Intervention    Current Cognitive/Communication Assessment  impaired  -RB impaired  -SHRAVAN      Orientation Status  oriented to;person;place;other (see comments)   Present year.  -RB oriented to;person;place   knew  and current year not current month  -SHRAVAN      Follows Commands/Answers Questions  75% of the time  -RB 75% of the time;needs cueing;needs increased time;needs repetition  -SHRAVAN      Personal Safety  mild impairment  -RB mild impairment  -SHRAVAN      Personal Safety Interventions  gait belt;nonskid shoes/slippers when out of bed;supervised activity  -RB gait belt;nonskid shoes/slippers when out of bed;supervised activity  -SHRAVAN      ROM (Range of Motion)    General ROM  upper extremity range of motion deficits identified  -RB upper extremity range of motion deficits identified  -      General ROM Detail  L shld ~ 45* flex and rest is WNL.  -RB AAROM WFL BLE's  -SHRAVAN      General UE Assessment    ROM Detail   Please see OT evalution for BUE range detail  -SHRAVAN      MMT (Manual Muscle Testing)    General MMT Assessment  upper extremity strength deficits identified  -RB upper extremity strength deficits identified;lower extremity strength deficits identified  -      General MMT Assessment Detail  2-/5 for L shld flex, 4-/5 for B  and 4/5 for rest of B UE strength.  -RB       Upper Extremity    Upper Ext Manual Muscle Testing Detail   Please refer to OT evaluation for BUE strength detail  -SHRAVAN      Bed Mobility,  Assessment/Treatment    Bed Mob, Supine to Sit, [place]  moderate assist (50% patient effort);2 person assist required  -RB moderate assist (50% patient effort);2 person assist required  -SHRAVAN      Bed Mob, Sit to Supine, [place]  not tested  -RB not tested  -SHRAVAN      Bed Mobility, Comment  EOB sit ~ 10 minutes.  -RB Pt sat EOB 10 minutes prior to transfer to chair. Pt complained of mild lightheadedness that improved with time  -SHRAVAN      Transfer Assessment/Treatment    Transfers, Bed-Chair [place]  minimum assist (75% patient effort);2 person assist required  -RB minimum assist (75% patient effort);2 person assist required  -SHRAVAN      Transfers, Chair-Bed [place]   not tested  -SHRAVAN      Transfers, Bed-Chair-Bed, Assist Device  rolling walker  -RB rolling walker  -SHRAVAN      Transfers, Sit-Stand [place]  minimum assist (75% patient effort);2 person assist required  -RB minimum assist (75% patient effort);2 person assist required  -SHRAVAN      Transfers, Stand-Sit [place]  minimum assist (75% patient effort);2 person assist required  -RB minimum assist (75% patient effort);2 person assist required  -SHRAVAN      Transfers, Sit-Stand-Sit, Assist Device  rolling walker  -RB rolling walker  -SHRAVAN      Transfer, Comment   Pt required verbal and tactile cues to guide walker to recliner  -SHRAVAN      Functional Mobility    Functional Mobility- Ind. Level  minimum assist (75% patient effort);2 person assist required  -RB       Functional Mobility- Device  rolling walker  -RB       Functional Mobility-Distance (Feet)  3  -RB       Functional Mobility- Safety Issues  step length decreased;sequencing ability decreased;supplemental O2  -RB       Sensory Assessment/Intervention    Light Touch  LUE;RUE  -RB       LUE Light Touch WNL  -SV WNL  -RB  WNL  -SV WNL  -SV    RUE Light Touch WNL  -SV WNL  -RB  WNL  -SV WNL  -SV    LLE Light Touch WNL  -SV  WNL  -SHRAVAN WNL  -SV WNL  -SV    RLE Light Touch WNL  -SV  WNL  -SHRAVAN WNL  -SV WNL   -SV    Edema Management    Edema Amount   left:;right:;minimal  -SHRAVAN      General Therapy Interventions    Planned Therapy Interventions  activity intolerance;ADL retraining;strengthening;transfer training;balance training;bed mobility training;energy conservation  -RB       Activity Intolerance  fair  -RB       Positioning and Restraints    Pre-Treatment Position  in bed  -RB in bed  -SHRAVAN      Post Treatment Position  chair  -RB chair  -SHRAVAN      In Chair  call light within reach;with family/caregiver  -RB call light within reach;encouraged to call for assist;with family/caregiver;legs elevated;reclined  -SHRAVAN      Lower Extremity    Lower Ext Manual Muscle Testing Detail   BLE: 3+/5 ankles/feet, 3/5 knees, 3-/5 hips  -SHRAVAN        10/10/17 1200 10/10/17 0800 10/08/17 1720          General Information    Equipment Currently Used at Home   bipap/ cpap;grab bar;rollator;wheelchair, motorized  -VK      Living Environment    Lives With   child(laine), adult  -VK      Living Arrangements   apartment  -VK      Home Accessibility   no concerns  -VK      Stair Railings at Home   none  -VK      Type of Financial/Environmental Concern   none  -VK      Transportation Available   van, wheelchair accessible  -VK      Functional Level Prior    Ambulation   3-->assistive equipment and person  -VK      Transferring   4-->completely dependent  -VK      Toileting   3-->assistive equipment and person  -VK      Bathing   3-->assistive equipment and person  -VK      Dressing   2-->assistive person  -VK      Eating   0-->independent  -VK      Communication   0-->understands/communicates without difficulty  -VK      Swallowing   2-->difficulty swallowing foods  -VK      Sensory Assessment/Intervention    LUE Light Touch WNL  -SV WNL  -SV       RUE Light Touch WNL  -SV WNL  -SV       LLE Light Touch WNL  -SV WNL  -SV       RLE Light Touch WNL  -SV WNL  -SV         User Key  (r) = Recorded By, (t) = Taken By, (c) = Cosigned By    Initials Name  Effective Dates    RB Harry Chavez, OT 06/15/16 -     SHRAVAN Cain, PT 10/17/16 -     VK Latha Friend, RN 10/17/16 -     SV Nneka Lackey, RN 01/09/17 -            Occupational Therapy Education     Title: PT OT SLP Therapies (Active)     Topic: Occupational Therapy (Active)     Point: Precautions (Done)    Description: Instruct learner(s) on prescribed precautions during self-care and functional transfers.    Learning Progress Summary    Learner Readiness Method Response Comment Documented by Status   Patient Acceptance E VU,NR Edu pt on use of gait belt and non skid socks when OOB and no OOB without assist. RB 10/11/17 1415 Done                      User Key     Initials Effective Dates Name Provider Type Discipline     06/15/16 -  Harry Chavez OT Occupational Therapist OT                  OT Recommendation and Plan  Anticipated Discharge Disposition: home with 24/7 care, skilled nursing facility, long term acute care facility  Planned Therapy Interventions: activity intolerance, ADL retraining, strengthening, transfer training, balance training, bed mobility training, energy conservation  Therapy Frequency:  (3-14x/wk)  Plan of Care Review  Plan Of Care Reviewed With: patient, daughter  Outcome Summary/Follow up Plan: OT eval complete on this date.  Pt required mod A x 2 for sup to sit and min A x 2 for sit to stand.  Min A x 2 for ambulation bed to chair .  Pt may benefit from OT services to increase independence with ADLs and functional mobility/transfers.           OT Goals       10/11/17 1417          Transfer Training OT LTG    Transfer Training OT LTG, Date Established 10/11/17  -RB      Transfer Training OT LTG, Time to Achieve by discharge  -RB      Transfer Training OT LTG, Activity Type all transfers  -RB      Transfer Training OT LTG, [place] Level supervision required  -RB      Transfer Training OT LTG, Assist Device walker, platform  -RB      Strength OT LTG    Strength Goal OT LTG,  Date Established 10/11/17  -RB      Strength Goal OT LTG, Time to Achieve by discharge  -RB      Strength Goal OT LTG, Measure to Achieve 1-2 sets of 10 reps all planes with 1-2 lb wrist wts or dumbells for B UE strengthening to increase independence with functional mobility.  -RB      Dynamic Sitting Balance OT LTG    Dynamic Sitting Balance OT LTG, Date Established 10/04/17  -RB      Dynamic Sitting Balance OT LTG, Time to Achieve by discharge  -RB      Dynamic Sitting Balance OT LTG, [place] Level supervision required   10-15 minutes with functional activity  -RB      Dynamic Sitting Balance OT LTG, Assist Device bed rails  -RB      ADL OT LTG    ADL OT LTG, Date Established 10/11/17  -RB      ADL OT LTG, Time to Achieve by discharge  -RB      ADL OT LTG, Activity Type ADL skills   Sponge bath and dress upper body.  -RB      ADL OT LTG, [place] Level standby assist;contact guard;assistive device   R/W.  -RB        User Key  (r) = Recorded By, (t) = Taken By, (c) = Cosigned By    Initials Name Provider Type    RB Harry Chavez, OT Occupational Therapist                Outcome Measures       10/11/17 1018 10/11/17 1012       How much help from another person do you currently need...    Turning from your back to your side while in flat bed without using bedrails?  2  -SHRAVAN     Moving from lying on back to sitting on the side of a flat bed without bedrails?  2  -SHRAVNA     Moving to and from a bed to a chair (including a wheelchair)?  3  -SHRAVAN     Standing up from a chair using your arms (e.g., wheelchair, bedside chair)?  3  -SHRAVAN     Climbing 3-5 steps with a railing?  2  -SHRAVAN     To walk in hospital room?  3  -SHRAVAN     AM-PAC 6 Clicks Score  15  -SHRAVAN     How much help from another is currently needed...    Putting on and taking off regular lower body clothing? 2  -RB      Bathing (including washing, rinsing, and drying) 2  -RB      Toileting (which includes using toilet bed pan or urinal) 2  -RB      Putting on and  taking off regular upper body clothing 3  -RB      Taking care of personal grooming (such as brushing teeth) 3  -RB      Eating meals 4  -RB      Score 16  -RB      Functional Assessment    Outcome Measure Options AM-PAC 6 Clicks Daily Activity (OT)  -RB AM-PAC 6 Clicks Basic Mobility (PT)  -SHRAVAN       User Key  (r) = Recorded By, (t) = Taken By, (c) = Cosigned By    Initials Name Provider Type    RB Harry Chavez OT Occupational Therapist    SHRAVAN Cain, PT Physical Therapist          Time Calculation:   OT Start Time: 1018 (Co- eval with PT.)  OT Stop Time: 1100  OT Time Calculation (min): 42 min    Therapy Charges for Today     Code Description Service Date Service Provider Modifiers Qty    [id number]  OT SELFCARE CURRENT 10/11/2017 Harry Chavez OT GO, CK 1    [id number]  OT SELFCARE PROJECTED 10/11/2017 SANDRA Stanton, CJ 1    [id number]  OT EVAL MOD COMPLEXITY 2 10/11/2017 Harry Chavez OT GO 1          OT G-codes  OT Professional Judgement Used?: Yes  OT Functional Scales Options: AM-PAC 6 Clicks Daily Activity (OT)  Score: 16  Functional Limitation: Self care  Self Care Current Status (): At least 40 percent but less than 60 percent impaired, limited or restricted  Self Care Goal Status (): At least 20 percent but less than 40 percent impaired, limited or restricted    Harry Chavez OT  10/11/2017

## 2017-10-11 NOTE — PLAN OF CARE
Problem: Patient Care Overview (Adult)  Goal: Plan of Care Review  Outcome: Ongoing (interventions implemented as appropriate)    10/11/17 1417   Coping/Psychosocial Response Interventions   Plan Of Care Reviewed With patient;daughter   Outcome Evaluation   Outcome Summary/Follow up Plan OT eval complete on this date. Pt required mod A x 2 for sup to sit and min A x 2 for sit to stand. Min A x 2 for ambulation bed to chair . Pt may benefit from OT services to increase independence with ADLs and functional mobility/transfers.        Goal: Discharge Needs Assessment  Outcome: Ongoing (interventions implemented as appropriate)    Problem: Inpatient Occupational Therapy  Goal: Transfer Training Goal 1 LTG- OT  Outcome: Ongoing (interventions implemented as appropriate)    10/11/17 1417   Transfer Training OT LTG   Transfer Training OT LTG, Date Established 10/11/17   Transfer Training OT LTG, Time to Achieve by discharge   Transfer Training OT LTG, Activity Type all transfers   Transfer Training OT LTG, [place] Level supervision required   Transfer Training OT LTG, Assist Device walker, platform       Goal: Strength Goal LTG- OT  Outcome: Ongoing (interventions implemented as appropriate)    10/11/17 1417   Strength OT LTG   Strength Goal OT LTG, Date Established 10/11/17   Strength Goal OT LTG, Time to Achieve by discharge   Strength Goal OT LTG, Measure to Achieve 1-2 sets of 10 reps all planes with 1-2 lb wrist wts or dumbells for B UE strengthening to increase independence with functional mobility.       Goal: Dynamic Sitting Balance Goal LTG- OT  Outcome: Ongoing (interventions implemented as appropriate)    10/11/17 1417   Dynamic Sitting Balance OT LTG   Dynamic Sitting Balance OT LTG, Date Established 10/04/17   Dynamic Sitting Balance OT LTG, Time to Achieve by discharge   Dynamic Sitting Balance OT LTG, [place] Level supervision required  (10-15 minutes with functional activity)   Dynamic Sitting  Balance OT LTG, Assist Device bed rails       Goal: ADL Goal LTG- OT  Outcome: Ongoing (interventions implemented as appropriate)    10/11/17 1417   ADL OT LTG   ADL OT LTG, Date Established 10/11/17   ADL OT LTG, Time to Achieve by discharge   ADL OT LTG, Activity Type ADL skills  (Sponge bath and dress upper body.)   ADL OT LTG, [place] Level standby assist;       10/11/17 1417   ADL OT LTG   ADL OT LTG, Date Established 10/11/17   ADL OT LTG, Time to Achieve by discharge   ADL OT LTG, Activity Type ADL skills  (Sponge bath and dress upper body.)   ADL OT LTG, [place] Level standby assist;contact guard;assistive device  (R/W.)   contact guard

## 2017-10-11 NOTE — PLAN OF CARE
Problem: Patient Care Overview (Adult)  Goal: Plan of Care Review  Outcome: Ongoing (interventions implemented as appropriate)    10/11/17 1309   Coping/Psychosocial Response Interventions   Plan Of Care Reviewed With patient;daughter   Outcome Evaluation   Outcome Summary/Follow up Plan PT evaluation completed.Pt transferred supine to sit with mod assistance of 2 and sit to stand with RW with min assistance of 2. Pt transferred to recliner with RW and min assistance of 2. Pt slow processing today. Function limited primariliy be decreased strength, balance, and tolerance for functional mobility and activities. Pt will benefit from skilled PT to regain lost function.If pt discharged prior to goals being met, anticipate pt may require SNF again with continued therapy services prior to return home.         Problem: Inpatient Physical Therapy  Goal: Transfer Training Goal 1 LTG- PT  Outcome: Ongoing (interventions implemented as appropriate)    10/11/17 1309   Transfer Training PT LTG   Transfer Training PT LTG, Date Established 10/11/17   Transfer Training PT LTG, Time to Achieve by discharge   Transfer Training PT LTG, Activity Type bed to chair /chair to bed;sit to stand/stand to sit   Transfer Training PT LTG, [place] Level supervision required   Transfer Training PT LTG, Outcome goal ongoing       Goal: Gait Training Goal LTG- PT  Outcome: Ongoing (interventions implemented as appropriate)    10/11/17 1309   Gait Training PT LTG   Gait Training Goal PT LTG, Date Established 10/11/17   Gait Training Goal PT LTG, Time to Achieve by discharge   Gait Training Goal PT LTG, [place] Level supervision required   Gait Training Goal PT LTG, Distance to Achieve 150ft   Gait Training Goal PT LTG, Additional Goal 300ft   Gait Training Goal PT LTG, Outcome goal ongoing       Goal: Strength Goal LTG- PT  Outcome: Ongoing (interventions implemented as appropriate)    10/11/17 1309   Strength Goal PT LTG   Strength Goal  PT LTG, Date Established 10/11/17   Strength Goal PT LTG, Time to Achieve by discharge   Strength Goal PT LTG, Measure to Achieve Pt will perform 15 reps of AROM exercises    Strength Goal PT LTG, Outcome goal ongoing       Goal: Patient Education Goal LTG- PT  Outcome: Ongoing (interventions implemented as appropriate)    10/11/17 1309   Patient Education PT LTG   Patient Education PT LTG, Date Established 10/11/17   Patient Education PT LTG, Time to Achieve by discharge   Patient Education PT LTG, Education Type gait;transfers;home safety   Patient Education PT LTG Outcome goal ongoing

## 2017-10-12 LAB
ANION GAP SERPL CALCULATED.3IONS-SCNC: 19 MMOL/L (ref 5–15)
BASOPHILS # BLD AUTO: 0.12 10*3/MM3 (ref 0–0.2)
BASOPHILS NFR BLD AUTO: 0.8 % (ref 0–2)
BUN BLD-MCNC: 11 MG/DL (ref 7–21)
BUN/CREAT SERPL: 11.6 (ref 7–25)
CALCIUM SPEC-SCNC: 7.9 MG/DL (ref 8.4–10.2)
CHLORIDE SERPL-SCNC: 101 MMOL/L (ref 95–110)
CO2 SERPL-SCNC: 20 MMOL/L (ref 22–31)
CREAT BLD-MCNC: 0.95 MG/DL (ref 0.5–1)
DEPRECATED RDW RBC AUTO: 48.6 FL (ref 36.4–46.3)
EOSINOPHIL # BLD AUTO: 0 10*3/MM3 (ref 0–0.7)
EOSINOPHIL NFR BLD AUTO: 0 % (ref 0–7)
ERYTHROCYTE [DISTWIDTH] IN BLOOD BY AUTOMATED COUNT: 14.8 % (ref 11.5–14.5)
GFR SERPL CREATININE-BSD FRML MDRD: 59 ML/MIN/1.73 (ref 60–104)
GLUCOSE BLD-MCNC: 95 MG/DL (ref 60–100)
HCT VFR BLD AUTO: 27 % (ref 35–45)
HGB BLD-MCNC: 8.8 G/DL (ref 12–15.5)
IMM GRANULOCYTES # BLD: 2.32 10*3/MM3 (ref 0–0.02)
IMM GRANULOCYTES NFR BLD: 14.5 % (ref 0–0.5)
LYMPHOCYTES # BLD AUTO: 1.68 10*3/MM3 (ref 0.6–4.2)
LYMPHOCYTES NFR BLD AUTO: 10.5 % (ref 10–50)
MCH RBC QN AUTO: 29.2 PG (ref 26.5–34)
MCHC RBC AUTO-ENTMCNC: 32.6 G/DL (ref 31.4–36)
MCV RBC AUTO: 89.7 FL (ref 80–98)
MONOCYTES # BLD AUTO: 0.71 10*3/MM3 (ref 0–0.9)
MONOCYTES NFR BLD AUTO: 4.4 % (ref 0–12)
NEUTROPHILS # BLD AUTO: 11.15 10*3/MM3 (ref 2–8.6)
NEUTROPHILS NFR BLD AUTO: 69.8 % (ref 37–80)
PLATELET # BLD AUTO: 290 10*3/MM3 (ref 150–450)
PMV BLD AUTO: 9.8 FL (ref 8–12)
POTASSIUM BLD-SCNC: 3.6 MMOL/L (ref 3.5–5.1)
RBC # BLD AUTO: 3.01 10*6/MM3 (ref 3.77–5.16)
SODIUM BLD-SCNC: 140 MMOL/L (ref 137–145)
WBC NRBC COR # BLD: 15.98 10*3/MM3 (ref 3.2–9.8)

## 2017-10-12 PROCEDURE — 94799 UNLISTED PULMONARY SVC/PX: CPT

## 2017-10-12 PROCEDURE — 85025 COMPLETE CBC W/AUTO DIFF WBC: CPT | Performed by: SURGERY

## 2017-10-12 PROCEDURE — 25010000002 LEVOFLOXACIN PER 250 MG: Performed by: SURGERY

## 2017-10-12 PROCEDURE — 25010000002 ENOXAPARIN PER 10 MG: Performed by: SURGERY

## 2017-10-12 PROCEDURE — 25010000002 ONDANSETRON PER 1 MG: Performed by: SURGERY

## 2017-10-12 PROCEDURE — 94760 N-INVAS EAR/PLS OXIMETRY 1: CPT

## 2017-10-12 PROCEDURE — 25010000002 KETOROLAC TROMETHAMINE PER 15 MG: Performed by: SURGERY

## 2017-10-12 PROCEDURE — 99024 POSTOP FOLLOW-UP VISIT: CPT | Performed by: SURGERY

## 2017-10-12 PROCEDURE — 80048 BASIC METABOLIC PNL TOTAL CA: CPT | Performed by: SURGERY

## 2017-10-12 RX ORDER — PRIMIDONE 50 MG/1
50 TABLET ORAL 3 TIMES DAILY
Status: DISCONTINUED | OUTPATIENT
Start: 2017-10-12 | End: 2017-10-17 | Stop reason: HOSPADM

## 2017-10-12 RX ORDER — KETOROLAC TROMETHAMINE 30 MG/ML
30 INJECTION, SOLUTION INTRAMUSCULAR; INTRAVENOUS EVERY 6 HOURS PRN
Status: DISCONTINUED | OUTPATIENT
Start: 2017-10-12 | End: 2017-10-17 | Stop reason: HOSPADM

## 2017-10-12 RX ORDER — LABETALOL HYDROCHLORIDE 5 MG/ML
5 INJECTION, SOLUTION INTRAVENOUS ONCE
Status: COMPLETED | OUTPATIENT
Start: 2017-10-12 | End: 2017-10-12

## 2017-10-12 RX ORDER — MEMANTINE HYDROCHLORIDE 5 MG/1
7 TABLET ORAL NIGHTLY
Status: DISCONTINUED | OUTPATIENT
Start: 2017-10-12 | End: 2017-10-17 | Stop reason: HOSPADM

## 2017-10-12 RX ORDER — GABAPENTIN 300 MG/1
600 CAPSULE ORAL 3 TIMES DAILY
Status: DISCONTINUED | OUTPATIENT
Start: 2017-10-12 | End: 2017-10-17 | Stop reason: HOSPADM

## 2017-10-12 RX ORDER — CITALOPRAM 40 MG/1
40 TABLET ORAL DAILY
Status: DISCONTINUED | OUTPATIENT
Start: 2017-10-12 | End: 2017-10-17 | Stop reason: HOSPADM

## 2017-10-12 RX ADMIN — POTASSIUM CHLORIDE 10 MEQ: 750 CAPSULE, EXTENDED RELEASE ORAL at 20:20

## 2017-10-12 RX ADMIN — ONDANSETRON 4 MG: 2 INJECTION INTRAMUSCULAR; INTRAVENOUS at 20:31

## 2017-10-12 RX ADMIN — PRIMIDONE 50 MG: 50 TABLET ORAL at 16:30

## 2017-10-12 RX ADMIN — Medication 10 ML: at 09:31

## 2017-10-12 RX ADMIN — POTASSIUM CHLORIDE 10 MEQ: 750 CAPSULE, EXTENDED RELEASE ORAL at 12:37

## 2017-10-12 RX ADMIN — PRIMIDONE 50 MG: 50 TABLET ORAL at 20:22

## 2017-10-12 RX ADMIN — KETOROLAC TROMETHAMINE 30 MG: 30 INJECTION, SOLUTION INTRAMUSCULAR; INTRAVENOUS at 17:26

## 2017-10-12 RX ADMIN — TIZANIDINE 4 MG: 4 TABLET ORAL at 20:23

## 2017-10-12 RX ADMIN — MEMANTINE 7.5 MG: 5 TABLET ORAL at 20:22

## 2017-10-12 RX ADMIN — LABETALOL HYDROCHLORIDE 5 MG: 5 INJECTION, SOLUTION INTRAVENOUS at 16:34

## 2017-10-12 RX ADMIN — METRONIDAZOLE 500 MG: 500 INJECTION, SOLUTION INTRAVENOUS at 04:25

## 2017-10-12 RX ADMIN — CHLORHEXIDINE GLUCONATE 15 ML: 1.2 RINSE ORAL at 20:20

## 2017-10-12 RX ADMIN — METRONIDAZOLE 500 MG: 500 INJECTION, SOLUTION INTRAVENOUS at 14:03

## 2017-10-12 RX ADMIN — GABAPENTIN 600 MG: 300 CAPSULE ORAL at 16:33

## 2017-10-12 RX ADMIN — FUROSEMIDE 80 MG: 80 TABLET ORAL at 20:20

## 2017-10-12 RX ADMIN — TIZANIDINE 4 MG: 4 TABLET ORAL at 03:47

## 2017-10-12 RX ADMIN — SODIUM CHLORIDE 100 ML/HR: 900 INJECTION, SOLUTION INTRAVENOUS at 16:38

## 2017-10-12 RX ADMIN — SODIUM CHLORIDE 100 ML/HR: 900 INJECTION, SOLUTION INTRAVENOUS at 04:21

## 2017-10-12 RX ADMIN — LEVOTHYROXINE SODIUM 150 MCG: 150 TABLET ORAL at 05:51

## 2017-10-12 RX ADMIN — ACETAMINOPHEN 500 MG: 500 TABLET ORAL at 03:47

## 2017-10-12 RX ADMIN — ENOXAPARIN SODIUM 40 MG: 40 INJECTION SUBCUTANEOUS at 09:30

## 2017-10-12 RX ADMIN — ACETAMINOPHEN 500 MG: 500 TABLET ORAL at 13:56

## 2017-10-12 RX ADMIN — POTASSIUM CHLORIDE 10 MEQ: 750 CAPSULE, EXTENDED RELEASE ORAL at 16:31

## 2017-10-12 RX ADMIN — MIRTAZAPINE 30 MG: 15 TABLET, FILM COATED ORAL at 20:23

## 2017-10-12 RX ADMIN — LEVOFLOXACIN 500 MG: 5 INJECTION, SOLUTION INTRAVENOUS at 18:13

## 2017-10-12 RX ADMIN — ROSUVASTATIN CALCIUM 10 MG: 10 TABLET, FILM COATED ORAL at 12:37

## 2017-10-12 RX ADMIN — DONEPEZIL HYDROCHLORIDE 5 MG: 5 TABLET, FILM COATED ORAL at 20:20

## 2017-10-12 RX ADMIN — METRONIDAZOLE 500 MG: 500 INJECTION, SOLUTION INTRAVENOUS at 20:31

## 2017-10-12 RX ADMIN — FUROSEMIDE 80 MG: 80 TABLET ORAL at 12:37

## 2017-10-12 RX ADMIN — CITALOPRAM HYDROBROMIDE 40 MG: 40 TABLET ORAL at 13:56

## 2017-10-12 RX ADMIN — GABAPENTIN 600 MG: 300 CAPSULE ORAL at 20:20

## 2017-10-12 NOTE — PLAN OF CARE
Problem: Patient Care Overview (Adult)  Goal: Plan of Care Review  Outcome: Ongoing (interventions implemented as appropriate)    10/12/17 1426   Coping/Psychosocial Response Interventions   Plan Of Care Reviewed With caregiver   Patient Care Overview   Progress no change   Outcome Evaluation   Outcome Summary/Follow up Plan Pt remains NPO but NGT has been removed. Pt very confused and unable to take po at this time. RD will monitor         Problem: Bowel Resection (Adult)  Goal: Signs and Symptoms of Listed Potential Problems Will be Absent or Manageable (Bowel Resection)  Outcome: Ongoing (interventions implemented as appropriate)

## 2017-10-12 NOTE — DISCHARGE SUMMARY
Discharge Summary  Date: 10/3/2017  Service: General Surgery  Attending: Timothy [place]    Procedures: Laparotomy with evacuation of abscess and sigmoid colon resection with end colostomy  Consults: None  Discharge Diagnoses: Upper rectal or sigmoid colon perforation with intra-abdominal abscess  Hospital Course: Patient was transferred from [facility] with apparent intra-abdominal sepsis.  She was taking the operating room and underwent a Castelan's type procedure for apparent rectal perforation with a large intra-abdominal abscess.  The patient was then kept in the hospital until she was medically stable and appeared to have recovered from her acute illness.  At the time of her transfer to [facility] for further rehabilitation the patient was tolerating a diet, was afebrile, had a normal white count.    Discharge Medications:     Your medication list       As of 10/3/2017  2:23 PM      CHANGE how you take these medications       Instructions Last Dose Given Next Dose Due    HYDROcodone-acetaminophen 7.5-325 MG per tablet   Commonly known as:  NORCO   What changed:  reasons to take this        Take 1 tablet by mouth Every 4 (Four) Hours As Needed (pain).           CONTINUE taking these medications       Instructions Last Dose Given Next Dose Due    albuterol 108 (90 Base) MCG/ACT inhaler   Commonly known as:  PROVENTIL HFA;VENTOLIN HFA              cholestyramine 4 g packet   Commonly known as:  QUESTRAN              citalopram 40 MG tablet   Commonly known as:  CeleXA              COPAXONE SC              dicyclomine 10 MG capsule   Commonly known as:  BENTYL              donepezil 5 MG tablet   Commonly known as:  ARICEPT              furosemide 80 MG tablet   Commonly known as:  LASIX              gabapentin 600 MG tablet   Commonly known as:  NEURONTIN              levothyroxine 150 MCG tablet   Commonly known as:  SYNTHROID, LEVOTHROID              memantine 7 MG capsule sustained-release 24 hr  extended release capsule   Commonly known as:  NAMENDA XR              mirtazapine 30 MG tablet   Commonly known as:  REMERON              ondansetron 4 MG tablet   Commonly known as:  ZOFRAN              potassium chloride 10 MEQ CR capsule   Commonly known as:  MICRO-K              primidone 50 MG tablet   Commonly known as:  MYSOLINE              rosuvastatin 10 MG tablet   Commonly known as:  CRESTOR              tiZANidine 4 MG tablet   Commonly known as:  ZANAFLEX                   Where to Get Your Medications      You can get these medications from any pharmacy     Bring a paper prescription for each of these medications    • HYDROcodone-acetaminophen 7.5-325 MG per tablet             Activity Instructions     Discharge Activity       1) No driving while taking narcotics.   2) May shower, no tub bath or submerging incisions  3) Do not lift / push / pull more than 15 lbs.  4) Patient will need physical and occupational therapy and has no activity restrictions other than lifting.  5) Will need ostomy teaching while in [place]                   Your Scheduled Appointments     Oct 12, 2017  1:30 PM CDT   Post-Op with Timothy [place] MD   [facility] GENERAL SURGERY (--)    [address] [place] [address] Dr  [facility] [address] [place] [id number] [phone]                          This document has been electronically signed by Timothy [place] MD on October 12, 2017 7:45 AM

## 2017-10-12 NOTE — SIGNIFICANT NOTE
10/12/17 1333   Rehab Treatment   Discipline occupational therapy assistant   Treatment Not Performed patient/family decline treatment, pt not feeling well  (Pt with confusion this date and only worsening throughout the day, pt now in restraints, will recheck tomorrow  )

## 2017-10-12 NOTE — PLAN OF CARE
Problem: Fall Risk (Adult)  Goal: Absence of Falls  Outcome: Ongoing (interventions implemented as appropriate)  No falls this shift    Problem: Pressure Ulcer Risk (Pedro Scale) (Adult,Obstetrics,Pediatric)  Goal: Skin Integrity  Outcome: Ongoing (interventions implemented as appropriate)    Problem: Pain, Chronic (Adult)  Goal: Acceptable Pain Control/Comfort Level  Outcome: Ongoing (interventions implemented as appropriate)    Problem: Bowel Resection (Adult)  Goal: Signs and Symptoms of Listed Potential Problems Will be Absent or Manageable (Bowel Resection)  Outcome: Ongoing (interventions implemented as appropriate)

## 2017-10-12 NOTE — PROGRESS NOTES
LOS: 4 days   Patient Care Team:  DIANA Lindquist as PCP - General (Family Medicine)    Chief Complaint:  <principal problem not specified>    Subjective     Interval History:   Slept 2 hours last night, agitated, still in restraints, non-verbal this AM, making eye contact     Objective     Vital Signs  Temp:  [98.2 °F (36.8 °C)-99.6 °F (37.6 °C)] 99.6 °F (37.6 °C)  Heart Rate:  [] 83  Resp:  [12-28] 18  BP: (119-182)/(60-97) 167/72    Physical Exam:  Dressings ok, ostomy pink and viable with output this AM, CLARI with bloody/purulent output     Results Review:       Lab Results (last 24 hours)     Procedure Component Value Units Date/Time    CBC & Differential [[id number]] Collected:  10/12/17 0317    Specimen:  Blood Updated:  10/12/17 0336    Narrative:       The following orders were created for panel order CBC & Differential.  Procedure                               Abnormality         Status                     ---------                               -----------         ------                     Scan Slide[[id number]]                                                                  CBC Auto Differential[[id number]]        Abnormal            Final result                 Please view results for these tests on the individual orders.    CBC Auto Differential [[id number]]  (Abnormal) Collected:  10/12/17 0317    Specimen:  Blood Updated:  10/12/17 0336     WBC 15.98 (H) 10*3/mm3      RBC 3.01 (L) 10*6/mm3      Hemoglobin 8.8 (L) g/dL      Hematocrit 27.0 (L) %      MCV 89.7 fL      MCH 29.2 pg      MCHC 32.6 g/dL      RDW 14.8 (H) %      RDW-SD 48.6 (H) fl      MPV 9.8 fL      Platelets 290 10*3/mm3      Neutrophil % 69.8 %      Lymphocyte % 10.5 %      Monocyte % 4.4 %      Eosinophil % 0.0 %      Basophil % 0.8 %      Immature Grans % 14.5 (H) %      Neutrophils, Absolute 11.15 (H) 10*3/mm3      Lymphocytes, Absolute 1.68 10*3/mm3      Monocytes, Absolute 0.71 10*3/mm3      Eosinophils, Absolute  0.00 10*3/mm3      Basophils, Absolute 0.12 10*3/mm3      Immature Grans, Absolute 2.32 (H) 10*3/mm3     Basic Metabolic Panel [[id number]]  (Abnormal) Collected:  10/12/17 0317    Specimen:  Blood Updated:  10/12/17 0343     Glucose 95 mg/dL      BUN 11 mg/dL      Creatinine 0.95 mg/dL      Sodium 140 mmol/L      Potassium 3.6 mmol/L      Chloride 101 mmol/L      CO2 20.0 (L) mmol/L      Calcium 7.9 (L) mg/dL      eGFR Non African Amer 59 (L) mL/min/1.73      BUN/Creatinine Ratio 11.6     Anion Gap 19.0 (H) mmol/L     Body Fluid Culture - Body Fluid, Abdominal Wall [[id number]]  (Abnormal) Collected:  10/09/17 0934    Specimen:  Body Fluid from Abdominal Wall Updated:  10/12/17 0606     BF Culture --      Light growth (2+) Streptococcus anginosus (A)     Gram Stain Result Many (4+) WBCs seen      Many (4+) Gram positive cocci in chains and clusters      Many (4+) Gram negative bacilli          Medication Review:   Current Facility-Administered Medications   Medication Dose Route Frequency Provider Last Rate Last Dose   • acetaminophen (TYLENOL) tablet 500 mg  500 mg Oral Q6H PRN Arnulfo Marcum MD   500 mg at 10/12/17 0347   • chlorhexidine (PERIDEX) 0.12 % solution 15 mL  15 mL Mouth/Throat Q12H Arnulfo Marcum MD   15 mL at 10/11/17 2034   • donepezil (ARICEPT) tablet 5 mg  5 mg Oral Nightly Arnulof Marcum MD   5 mg at 10/11/17 2039   • enoxaparin (LOVENOX) syringe 40 mg  40 mg Subcutaneous Q24H Arnulfo Marcum MD   40 mg at 10/11/17 0834   • furosemide (LASIX) tablet 80 mg  80 mg Oral BID Arnulfo Marcum MD   80 mg at 10/11/17 1733   • Glatiramer Acetate solution prefilled syringe 40 mg  40 mg Subcutaneous Once per day on Mon Wed Fri Arnulfo Marcum MD   40 mg at 10/11/17 0834   • levoFLOXacin (LEVAQUIN) 500 mg/100 mL D5W (premix) (LEVAQUIN) 500 mg  500 mg Intravenous Q24H Arnulfo Marcum MD   500 mg at 10/11/17 1801   • levothyroxine (SYNTHROID, LEVOTHROID) tablet 150 mcg  150 mcg Oral Q AM Arnulfo Marcum MD   150 mcg at 10/12/17 7746    • metroNIDAZOLE (FLAGYL) IVPB 500 mg  500 mg Intravenous Q8H Arnulfo Marcum MD   500 mg at 10/12/17 0425   • mirtazapine (REMERON) tablet 30 mg  30 mg Oral Nightly Arnulfo Marcum MD   30 mg at 10/11/17 2035   • ondansetron (ZOFRAN) injection 4 mg  4 mg Intravenous Q6H PRN Arnulfo Marcum MD   4 mg at 10/08/17 2123   • ondansetron (ZOFRAN) tablet 4 mg  4 mg Oral Q8H PRN Arnulfo Marcum MD       • pneumococcal polysaccharide 23-valent (PNEUMOVAX-23) vaccine 0.5 mL  0.5 mL Intramuscular During Hospitalization Arnulfo Marcum MD       • potassium chloride (MICRO-K) CR capsule 10 mEq  10 mEq Oral TID Arnulfo Marcum MD   10 mEq at 10/11/17 2037   • rosuvastatin (CRESTOR) tablet 10 mg  10 mg Oral Daily Arnulfo Marcum MD   10 mg at 10/11/17 0830   • sodium chloride 0.9 % flush 1-10 mL  1-10 mL Intravenous PRN Arnulfo Marcum MD       • sodium chloride 0.9 % infusion  100 mL/hr Intravenous Continuous Arnulfo Marcum  mL/hr at 10/12/17 0425 100 mL/hr at 10/12/17 0425   • tiZANidine (ZANAFLEX) tablet 4 mg  4 mg Oral Q12H PRN Arnulfo Marcum MD   4 mg at 10/12/17 0347       Assessment/Plan     Active Problems:    Pelvic abscess in female    68 yo lady 3 weeks s/p Karishma's and POD#3 s/p ex-lap and washout of pelvic abscess  Neuro- worsening ICU psychosis, poor sleep but improved on remeron, hold narcotics, in restaints  Cards- stable, no hypotension or tachy  Lungs- breathing well on nasal canula, IS for support  GI- having ostomy output, will start feeds once neuro status improves  Renal- UOP much improved, continue to monitor, continue miranda  Heme/ID- jehovahs witness, monitor labs, f/u cultures, continue antibiotics and lovenox  dispo- PT to get into chair    Arnulfo Marcum MD  10/12/17  8:55 AM

## 2017-10-12 NOTE — CONSULTS
Adult Nutrition  Assessment    Patient Name:  Irma Pacheco  YOB: 1950  MRN: [id number]  Admit Date:  10/8/2017    Assessment Date:  10/12/2017    Comments:  Pt remains NPO but NGT has been removed.  Pt having Ostomy output.  She is now POD#3.  MD notes that pt to have po started as soon as neuro improved.  Pt currently confused and agitated with soft restraints.  MD notes ICU Psychosis.  Pt is on LAsix and Flagyl.  According to nsg they are restarting some meds that may help with current mental status.  Will monitor.           Reason for Assessment       10/12/17 1420    Reason for Assessment    Reason For Assessment/Visit follow up protocol                Nutrition/Diet History       10/12/17 1421    Nutrition/Diet History    Typical Food/Fluid Intake Pt in the bed. No family present.  Pt would just look at me and would not answer any of my questions.  Per staff she has been agitated and required soft restraints.  Refusing to take meds.  MD is aware              Labs/Tests/Procedures/Meds       10/12/17 1422    Labs/Tests/Procedures/Meds    Labs/Tests Review Reviewed    Medication Review Reviewed, pertinent            Physical Findings       10/12/17 1422    Physical Appearance    Overall Physical Appearance obese;overweight              Nutrition Prescription Ordered       10/12/17 1422    Nutrition Prescription PO    Current PO Diet NPO              Electronically signed by:  Nancy Gilmore RD  10/12/17 2:27 PM

## 2017-10-12 NOTE — PLAN OF CARE
Problem: Patient Care Overview (Adult)  Goal: Plan of Care Review  Outcome: Ongoing (interventions implemented as appropriate)    10/12/17 1704   Coping/Psychosocial Response Interventions   Plan Of Care Reviewed With patient;daughter   Patient Care Overview   Progress improving   Outcome Evaluation   Outcome Summary/Follow up Plan throughout the afternoon, pts. delirium symptoms have improved; pt. now able to hold a limited conversation and oriented to person; also now able to follow some commands and distrustfulness is improved; pt. taking PO meds without difficulty; good UOP and good stool output from colostomy; incision without drainage and CLARI drainage moderate; SBP elevated but improved after Labetalol       Goal: Adult Individualization and Mutuality  Outcome: Ongoing (interventions implemented as appropriate)  Goal: Discharge Needs Assessment  Outcome: Ongoing (interventions implemented as appropriate)    Problem: Fall Risk (Adult)  Goal: Absence of Falls  Outcome: Ongoing (interventions implemented as appropriate)    Problem: Pain, Acute (Adult)  Goal: Identify Related Risk Factors and Signs and Symptoms  Outcome: Ongoing (interventions implemented as appropriate)  Goal: Acceptable Pain Control/Comfort Level  Outcome: Ongoing (interventions implemented as appropriate)    Problem: Pressure Ulcer Risk (Pedro Scale) (Adult,Obstetrics,Pediatric)  Goal: Skin Integrity  Outcome: Ongoing (interventions implemented as appropriate)    Problem: Bowel Resection (Adult)  Goal: Signs and Symptoms of Listed Potential Problems Will be Absent or Manageable (Bowel Resection)  Outcome: Ongoing (interventions implemented as appropriate)    Problem: Anxiety (Adult)  Goal: Identify Related Risk Factors and Signs and Symptoms  Outcome: Ongoing (interventions implemented as appropriate)  Goal: Reduction/Resolution  Outcome: Ongoing (interventions implemented as appropriate)

## 2017-10-13 ENCOUNTER — APPOINTMENT (OUTPATIENT)
Dept: ULTRASOUND IMAGING | Facility: HOSPITAL | Age: 67
End: 2017-10-13

## 2017-10-13 ENCOUNTER — APPOINTMENT (OUTPATIENT)
Dept: INTERVENTIONAL RADIOLOGY/VASCULAR | Facility: HOSPITAL | Age: 67
End: 2017-10-13

## 2017-10-13 ENCOUNTER — APPOINTMENT (OUTPATIENT)
Dept: GENERAL RADIOLOGY | Facility: HOSPITAL | Age: 67
End: 2017-10-13

## 2017-10-13 LAB
ALBUMIN SERPL-MCNC: 2.8 G/DL (ref 3.4–4.8)
ALBUMIN/GLOB SERPL: 1.1 G/DL (ref 1.1–1.8)
ALP SERPL-CCNC: 91 U/L (ref 38–126)
ALT SERPL W P-5'-P-CCNC: 30 U/L (ref 9–52)
ANION GAP SERPL CALCULATED.3IONS-SCNC: 12 MMOL/L (ref 5–15)
ANION GAP SERPL CALCULATED.3IONS-SCNC: 13 MMOL/L (ref 5–15)
ANISOCYTOSIS BLD QL: ABNORMAL
AST SERPL-CCNC: 26 U/L (ref 14–36)
BACTERIA FLD CULT: ABNORMAL
BACTERIA FLD CULT: ABNORMAL
BASOPHILS # BLD AUTO: 0.11 10*3/MM3 (ref 0–0.2)
BASOPHILS NFR BLD AUTO: 1 % (ref 0–2)
BILIRUB SERPL-MCNC: 0.3 MG/DL (ref 0.2–1.3)
BUN BLD-MCNC: 11 MG/DL (ref 7–21)
BUN BLD-MCNC: 11 MG/DL (ref 7–21)
BUN/CREAT SERPL: 11.5 (ref 7–25)
BUN/CREAT SERPL: 11.6 (ref 7–25)
CA-I BLD-MCNC: 3.6 MG/DL (ref 4.5–4.9)
CALCIUM SPEC-SCNC: 7.3 MG/DL (ref 8.4–10.2)
CALCIUM SPEC-SCNC: 7.6 MG/DL (ref 8.4–10.2)
CHLORIDE SERPL-SCNC: 100 MMOL/L (ref 95–110)
CHLORIDE SERPL-SCNC: 100 MMOL/L (ref 95–110)
CHOLEST SERPL-MCNC: 85 MG/DL (ref 0–199)
CO2 SERPL-SCNC: 25 MMOL/L (ref 22–31)
CO2 SERPL-SCNC: 27 MMOL/L (ref 22–31)
CREAT BLD-MCNC: 0.95 MG/DL (ref 0.5–1)
CREAT BLD-MCNC: 0.96 MG/DL (ref 0.5–1)
CRP SERPL-MCNC: 4.4 MG/DL (ref 0–1)
DEPRECATED RDW RBC AUTO: 48.6 FL (ref 36.4–46.3)
EOSINOPHIL # BLD AUTO: 0.06 10*3/MM3 (ref 0–0.7)
EOSINOPHIL # BLD MANUAL: 0.11 10*3/MM3 (ref 0–0.7)
EOSINOPHIL NFR BLD AUTO: 0.5 % (ref 0–7)
EOSINOPHIL NFR BLD MANUAL: 1 % (ref 0–7)
ERYTHROCYTE [DISTWIDTH] IN BLOOD BY AUTOMATED COUNT: 14.9 % (ref 11.5–14.5)
GFR SERPL CREATININE-BSD FRML MDRD: 58 ML/MIN/1.73 (ref 45–104)
GFR SERPL CREATININE-BSD FRML MDRD: 59 ML/MIN/1.73 (ref 45–104)
GLOBULIN UR ELPH-MCNC: 2.5 GM/DL (ref 2.3–3.5)
GLUCOSE BLD-MCNC: 80 MG/DL (ref 60–100)
GLUCOSE BLD-MCNC: 80 MG/DL (ref 60–100)
GLUCOSE BLDC GLUCOMTR-MCNC: 208 MG/DL (ref 70–130)
GRAM STN SPEC: ABNORMAL
HCT VFR BLD AUTO: 27 % (ref 35–45)
HGB BLD-MCNC: 8.8 G/DL (ref 12–15.5)
HYPOCHROMIA BLD QL: ABNORMAL
IMM GRANULOCYTES # BLD: 1.37 10*3/MM3 (ref 0–0.02)
IMM GRANULOCYTES NFR BLD: 12.4 % (ref 0–0.5)
LYMPHOCYTES # BLD AUTO: 1.84 10*3/MM3 (ref 0.6–4.2)
LYMPHOCYTES # BLD MANUAL: 1.66 10*3/MM3 (ref 0.6–4.2)
LYMPHOCYTES NFR BLD AUTO: 16.6 % (ref 10–50)
LYMPHOCYTES NFR BLD MANUAL: 15 % (ref 10–50)
LYMPHOCYTES NFR BLD MANUAL: 9 % (ref 0–12)
MAGNESIUM SERPL-MCNC: 1.6 MG/DL (ref 1.6–2.3)
MCH RBC QN AUTO: 29.3 PG (ref 26.5–34)
MCHC RBC AUTO-ENTMCNC: 32.6 G/DL (ref 31.4–36)
MCV RBC AUTO: 90 FL (ref 80–98)
METAMYELOCYTES NFR BLD MANUAL: 6 % (ref 0–0)
MONOCYTES # BLD AUTO: 0.77 10*3/MM3 (ref 0–0.9)
MONOCYTES # BLD AUTO: 1 10*3/MM3 (ref 0–0.9)
MONOCYTES NFR BLD AUTO: 7 % (ref 0–12)
MYELOCYTES NFR BLD MANUAL: 3 % (ref 0–0)
NEUTROPHILS # BLD AUTO: 6.92 10*3/MM3 (ref 2–8.6)
NEUTROPHILS # BLD AUTO: 7.31 10*3/MM3 (ref 2–8.6)
NEUTROPHILS NFR BLD AUTO: 62.5 % (ref 37–80)
NEUTROPHILS NFR BLD MANUAL: 61 % (ref 37–80)
NEUTS BAND NFR BLD MANUAL: 5 % (ref 0–5)
NRBC SPEC MANUAL: 1 /100 WBC (ref 0–0)
PHOSPHATE SERPL-MCNC: 3.5 MG/DL (ref 2.4–4.4)
PLATELET # BLD AUTO: 249 10*3/MM3 (ref 150–450)
PMV BLD AUTO: 9.5 FL (ref 8–12)
POLYCHROMASIA BLD QL SMEAR: ABNORMAL
POTASSIUM BLD-SCNC: 3 MMOL/L (ref 3.5–5.1)
POTASSIUM BLD-SCNC: 3 MMOL/L (ref 3.5–5.1)
PREALB SERPL-MCNC: 15.5 MG/DL (ref 17.6–36)
PROT SERPL-MCNC: 5.3 G/DL (ref 6.3–8.6)
RBC # BLD AUTO: 3 10*6/MM3 (ref 3.77–5.16)
SMALL PLATELETS BLD QL SMEAR: ADEQUATE
SODIUM BLD-SCNC: 138 MMOL/L (ref 137–145)
SODIUM BLD-SCNC: 139 MMOL/L (ref 137–145)
TRIGL SERPL-MCNC: 180 MG/DL (ref 20–199)
WBC MORPH BLD: NORMAL
WBC NRBC COR # BLD: 11.07 10*3/MM3 (ref 3.2–9.8)

## 2017-10-13 PROCEDURE — 25010000003 POTASSIUM CHLORIDE 10 MEQ/100ML SOLUTION: Performed by: SURGERY

## 2017-10-13 PROCEDURE — 25010000002 ONDANSETRON PER 1 MG: Performed by: SURGERY

## 2017-10-13 PROCEDURE — 63710000001 INSULIN ASPART PER 5 UNITS: Performed by: SURGERY

## 2017-10-13 PROCEDURE — 25010000002 KETOROLAC TROMETHAMINE PER 15 MG: Performed by: SURGERY

## 2017-10-13 PROCEDURE — 83735 ASSAY OF MAGNESIUM: CPT | Performed by: SURGERY

## 2017-10-13 PROCEDURE — 94799 UNLISTED PULMONARY SVC/PX: CPT

## 2017-10-13 PROCEDURE — 82962 GLUCOSE BLOOD TEST: CPT

## 2017-10-13 PROCEDURE — 85025 COMPLETE CBC W/AUTO DIFF WBC: CPT | Performed by: SURGERY

## 2017-10-13 PROCEDURE — 84478 ASSAY OF TRIGLYCERIDES: CPT | Performed by: SURGERY

## 2017-10-13 PROCEDURE — B548ZZA ULTRASONOGRAPHY OF SUPERIOR VENA CAVA, GUIDANCE: ICD-10-PCS | Performed by: RADIOLOGY

## 2017-10-13 PROCEDURE — C1751 CATH, INF, PER/CENT/MIDLINE: HCPCS

## 2017-10-13 PROCEDURE — 86140 C-REACTIVE PROTEIN: CPT | Performed by: SURGERY

## 2017-10-13 PROCEDURE — 25010000002 MAGNESIUM SULFATE PER 500 MG OF MAGNESIUM: Performed by: SURGERY

## 2017-10-13 PROCEDURE — 82330 ASSAY OF CALCIUM: CPT | Performed by: SURGERY

## 2017-10-13 PROCEDURE — 02HV33Z INSERTION OF INFUSION DEVICE INTO SUPERIOR VENA CAVA, PERCUTANEOUS APPROACH: ICD-10-PCS | Performed by: RADIOLOGY

## 2017-10-13 PROCEDURE — 25010000002 CALCIUM GLUCONATE PER 10 ML: Performed by: SURGERY

## 2017-10-13 PROCEDURE — 97530 THERAPEUTIC ACTIVITIES: CPT

## 2017-10-13 PROCEDURE — 82465 ASSAY BLD/SERUM CHOLESTEROL: CPT | Performed by: SURGERY

## 2017-10-13 PROCEDURE — 80053 COMPREHEN METABOLIC PANEL: CPT | Performed by: SURGERY

## 2017-10-13 PROCEDURE — 84134 ASSAY OF PREALBUMIN: CPT | Performed by: SURGERY

## 2017-10-13 PROCEDURE — 25010000002 LEVOFLOXACIN PER 250 MG: Performed by: SURGERY

## 2017-10-13 PROCEDURE — 25010000002 ENOXAPARIN PER 10 MG: Performed by: SURGERY

## 2017-10-13 PROCEDURE — 85007 BL SMEAR W/DIFF WBC COUNT: CPT | Performed by: SURGERY

## 2017-10-13 PROCEDURE — 99024 POSTOP FOLLOW-UP VISIT: CPT | Performed by: SURGERY

## 2017-10-13 PROCEDURE — 76937 US GUIDE VASCULAR ACCESS: CPT

## 2017-10-13 PROCEDURE — 97110 THERAPEUTIC EXERCISES: CPT

## 2017-10-13 PROCEDURE — 25010000002 POTASSIUM CHLORIDE PER 2 MEQ OF POTASSIUM: Performed by: SURGERY

## 2017-10-13 PROCEDURE — 84100 ASSAY OF PHOSPHORUS: CPT | Performed by: SURGERY

## 2017-10-13 RX ORDER — POTASSIUM CHLORIDE 7.45 MG/ML
10 INJECTION INTRAVENOUS
Status: COMPLETED | OUTPATIENT
Start: 2017-10-13 | End: 2017-10-13

## 2017-10-13 RX ORDER — ALUMINA, MAGNESIA, AND SIMETHICONE 2400; 2400; 240 MG/30ML; MG/30ML; MG/30ML
30 SUSPENSION ORAL EVERY 6 HOURS PRN
Status: DISCONTINUED | OUTPATIENT
Start: 2017-10-13 | End: 2017-10-17 | Stop reason: HOSPADM

## 2017-10-13 RX ADMIN — KETOROLAC TROMETHAMINE 30 MG: 30 INJECTION, SOLUTION INTRAMUSCULAR; INTRAVENOUS at 06:03

## 2017-10-13 RX ADMIN — ONDANSETRON 4 MG: 2 INJECTION INTRAMUSCULAR; INTRAVENOUS at 15:34

## 2017-10-13 RX ADMIN — ALUMINUM HYDROXIDE, MAGNESIUM HYDROXIDE, AND DIMETHICONE 30 ML: 400; 400; 40 SUSPENSION ORAL at 12:13

## 2017-10-13 RX ADMIN — POTASSIUM CHLORIDE 10 MEQ: 7.46 INJECTION, SOLUTION INTRAVENOUS at 07:40

## 2017-10-13 RX ADMIN — MEMANTINE 7.5 MG: 5 TABLET ORAL at 20:26

## 2017-10-13 RX ADMIN — INSULIN ASPART 4 UNITS: 100 INJECTION, SOLUTION INTRAVENOUS; SUBCUTANEOUS at 20:40

## 2017-10-13 RX ADMIN — CITALOPRAM HYDROBROMIDE 40 MG: 40 TABLET ORAL at 08:26

## 2017-10-13 RX ADMIN — GABAPENTIN 600 MG: 300 CAPSULE ORAL at 08:26

## 2017-10-13 RX ADMIN — LEVOTHYROXINE SODIUM 150 MCG: 150 TABLET ORAL at 06:42

## 2017-10-13 RX ADMIN — DONEPEZIL HYDROCHLORIDE 5 MG: 5 TABLET, FILM COATED ORAL at 20:27

## 2017-10-13 RX ADMIN — POTASSIUM CHLORIDE 10 MEQ: 750 CAPSULE, EXTENDED RELEASE ORAL at 20:27

## 2017-10-13 RX ADMIN — PRIMIDONE 50 MG: 50 TABLET ORAL at 08:27

## 2017-10-13 RX ADMIN — LEVOFLOXACIN 500 MG: 5 INJECTION, SOLUTION INTRAVENOUS at 18:26

## 2017-10-13 RX ADMIN — FUROSEMIDE 80 MG: 80 TABLET ORAL at 08:26

## 2017-10-13 RX ADMIN — POTASSIUM CHLORIDE 10 MEQ: 7.46 INJECTION, SOLUTION INTRAVENOUS at 08:37

## 2017-10-13 RX ADMIN — ONDANSETRON 4 MG: 2 INJECTION INTRAMUSCULAR; INTRAVENOUS at 08:36

## 2017-10-13 RX ADMIN — GABAPENTIN 600 MG: 300 CAPSULE ORAL at 20:27

## 2017-10-13 RX ADMIN — KETOROLAC TROMETHAMINE 30 MG: 30 INJECTION, SOLUTION INTRAMUSCULAR; INTRAVENOUS at 19:08

## 2017-10-13 RX ADMIN — ENOXAPARIN SODIUM 40 MG: 40 INJECTION SUBCUTANEOUS at 06:42

## 2017-10-13 RX ADMIN — PRIMIDONE 50 MG: 50 TABLET ORAL at 17:15

## 2017-10-13 RX ADMIN — PRIMIDONE 50 MG: 50 TABLET ORAL at 20:27

## 2017-10-13 RX ADMIN — MIRTAZAPINE 30 MG: 15 TABLET, FILM COATED ORAL at 20:27

## 2017-10-13 RX ADMIN — POTASSIUM CHLORIDE 10 MEQ: 750 CAPSULE, EXTENDED RELEASE ORAL at 08:26

## 2017-10-13 RX ADMIN — FUROSEMIDE 80 MG: 80 TABLET ORAL at 17:14

## 2017-10-13 RX ADMIN — METRONIDAZOLE 500 MG: 500 INJECTION, SOLUTION INTRAVENOUS at 06:03

## 2017-10-13 RX ADMIN — GLATIRAMER 40 MG: 40 INJECTION, SOLUTION SUBCUTANEOUS at 08:27

## 2017-10-13 RX ADMIN — ROSUVASTATIN CALCIUM 10 MG: 10 TABLET, FILM COATED ORAL at 08:27

## 2017-10-13 RX ADMIN — POTASSIUM CHLORIDE 10 MEQ: 7.46 INJECTION, SOLUTION INTRAVENOUS at 09:40

## 2017-10-13 RX ADMIN — POTASSIUM CHLORIDE 10 MEQ: 750 CAPSULE, EXTENDED RELEASE ORAL at 17:14

## 2017-10-13 RX ADMIN — POTASSIUM CHLORIDE: 2 INJECTION, SOLUTION, CONCENTRATE INTRAVENOUS at 17:14

## 2017-10-13 RX ADMIN — POTASSIUM CHLORIDE 10 MEQ: 7.46 INJECTION, SOLUTION INTRAVENOUS at 11:20

## 2017-10-13 RX ADMIN — KETOROLAC TROMETHAMINE 30 MG: 30 INJECTION, SOLUTION INTRAMUSCULAR; INTRAVENOUS at 11:24

## 2017-10-13 RX ADMIN — GABAPENTIN 600 MG: 300 CAPSULE ORAL at 17:14

## 2017-10-13 RX ADMIN — I.V. FAT EMULSION 38.6 G: 20 EMULSION INTRAVENOUS at 21:11

## 2017-10-13 RX ADMIN — POTASSIUM CHLORIDE 10 MEQ: 7.46 INJECTION, SOLUTION INTRAVENOUS at 12:30

## 2017-10-13 NOTE — PLAN OF CARE
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 Will monitor .          Problem: Bowel Resection (Adult)  Goal: Signs and Symptoms of Listed Potential Problems Will be Absent or Manageable (Bowel Resection)  Outcome: Ongoing (interventions implemented as appropriate)

## 2017-10-13 NOTE — PLAN OF CARE
Problem: Patient Care Overview (Adult)  Goal: Plan of Care Review  Outcome: Ongoing (interventions implemented as appropriate)    10/13/17 0350   Coping/Psychosocial Response Interventions   Plan Of Care Reviewed With patient;daughter   Patient Care Overview   Progress improving   Outcome Evaluation   Outcome Summary/Follow up Plan Pt was able to sleep throughout the night, she is oriented X4, her vital signs have remained stable throughout the night.        Goal: Adult Individualization and Mutuality  Outcome: Ongoing (interventions implemented as appropriate)  Goal: Discharge Needs Assessment  Outcome: Ongoing (interventions implemented as appropriate)    Problem: Fall Risk (Adult)  Goal: Absence of Falls  Outcome: Ongoing (interventions implemented as appropriate)    Problem: Pain, Acute (Adult)  Goal: Identify Related Risk Factors and Signs and Symptoms  Outcome: Ongoing (interventions implemented as appropriate)  Goal: Acceptable Pain Control/Comfort Level  Outcome: Ongoing (interventions implemented as appropriate)    Problem: Pressure Ulcer Risk (Pedro Scale) (Adult,Obstetrics,Pediatric)  Goal: Skin Integrity  Outcome: Ongoing (interventions implemented as appropriate)    Problem: Bowel Resection (Adult)  Goal: Signs and Symptoms of Listed Potential Problems Will be Absent or Manageable (Bowel Resection)  Outcome: Ongoing (interventions implemented as appropriate)    Problem: Anxiety (Adult)  Goal: Identify Related Risk Factors and Signs and Symptoms  Outcome: Ongoing (interventions implemented as appropriate)  Goal: Reduction/Resolution  Outcome: Ongoing (interventions implemented as appropriate)

## 2017-10-13 NOTE — PROGRESS NOTES
TWO PATIENT IDENTIFIERS WERE USED. CONSENT WAS SIGNED PER PATIENT EDUCATION MATERIAL WAS GIVEN TO PATIENT AND / OR FAMILY. THE PATIENT WAS DRAPED WITH FULL BODY DRAPE AND PATIENT'SRIGHT   ARM WAS PREPPED WITH CHLORAPREP.  ULTRASOUND WAS USED TO LOCALIZE THERIGHT BASILIC VEIN. SUBCUTANEOUS TISSUE AT THE CATHETER SITE WAS INFILTRATED WITH 2% LIDOCAINE. UNDER ULTRASOUND GUIDANCE, THE VEIN WAS ACCESSED WITH A 21GAUGE  NEEDLE. AN 0.018 WIRE WAS THEN THREADED THROUGH THE NEEDLE INTO THE CENTRAL VENOUS SYSTEM. THE 21GAUGE  NEEDLE WAS REMOVED AND A 6 FRENCHPEEL AWAY SHEATH WAS PLACED OVER THE WIRE. THE PICC LINE CATHETER WAS CUT AT 41 CM. THE PICC LINE CATHETER WAS THEN PLACED OVER THE WIRE INTO THE VEIN, THE SHEATH WAS PEELED AWAY,WIRE WAS REMOVED. CATHETER WAS FLUSHED WITH NORMAL SALINE AND TIPS APPLIED. BIOPATCH PLACED. CATHETER SECURED WITHSTATLOCK  AND TEGADERM. PATIENT TOLERATED PROCEDURE WELL. THIS WAS DONE IN    ICU      IMPRESSION: SUCCESSFUL PLACEMENT OF TRIPLE LUMEN SOLO PICC        Aliza Galo  10/13/2017  2:10 PM

## 2017-10-13 NOTE — PLAN OF CARE
Problem: Patient Care Overview (Adult)  Goal: Plan of Care Review  Outcome: Ongoing (interventions implemented as appropriate)    10/13/17 1898   Coping/Psychosocial Response Interventions   Plan Of Care Reviewed With patient;daughter   Patient Care Overview   Progress improving   Outcome Evaluation   Outcome Summary/Follow up Plan Pt has been much more clear minded this shift. PICC line placed and TPN started per MD order. CLARI drain needs to be emptied several times each shift. Dressing changed no S/S of infection noted to site. Pt very positive about her outcome.        Goal: Adult Individualization and Mutuality  Outcome: Ongoing (interventions implemented as appropriate)  Goal: Discharge Needs Assessment  Outcome: Ongoing (interventions implemented as appropriate)    Problem: Fall Risk (Adult)  Goal: Absence of Falls  Outcome: Ongoing (interventions implemented as appropriate)    Problem: Pain, Acute (Adult)  Goal: Identify Related Risk Factors and Signs and Symptoms  Outcome: Ongoing (interventions implemented as appropriate)  Goal: Acceptable Pain Control/Comfort Level  Outcome: Ongoing (interventions implemented as appropriate)    Problem: Pressure Ulcer Risk (Pedro Scale) (Adult,Obstetrics,Pediatric)  Goal: Skin Integrity  Outcome: Ongoing (interventions implemented as appropriate)    Problem: Bowel Resection (Adult)  Goal: Signs and Symptoms of Listed Potential Problems Will be Absent or Manageable (Bowel Resection)  Outcome: Ongoing (interventions implemented as appropriate)    Problem: Anxiety (Adult)  Goal: Identify Related Risk Factors and Signs and Symptoms  Outcome: Ongoing (interventions implemented as appropriate)  Goal: Reduction/Resolution  Outcome: Ongoing (interventions implemented as appropriate)

## 2017-10-13 NOTE — THERAPY TREATMENT NOTE
Acute Care - Physical Therapy Treatment Note  [facility]     Patient Name: Irma Pacheco  : 1950  MRN: [id number]  Today's Date: 10/13/2017  Onset of Illness/Injury or Date of Surgery Date: 10/09/17  Date of Referral to PT: 10/10/17  Referring Physician: Dr. Arnulfo Marcum    Admit Date: 10/8/2017    Visit Dx:    ICD-10-CM ICD-9-CM   1. Pelvic abscess in female N73.9 614.4   2. Impaired functional mobility, balance, gait, and endurance Z74.09 V49.89   3. Impaired mobility and activities of daily living Z74.09 799.89     Patient Active Problem List   Diagnosis   • Intra-abdominal abscess   • Pelvic abscess in female               Adult Rehabilitation Note       10/13/17 1000          Rehab Assessment/Intervention    Discipline physical therapy assistant  -LN      Document Type therapy note (daily note)  -LN      Subjective Information agree to therapy;complains of;pain;weakness  -LN      Precautions/Limitations fall precautions;oxygen therapy device and L/min   watch gt belt placement due to recent abd sx  -LN      Recorded by [LN] Jannet Garcia PTA      Vital Signs    Pre Systolic BP Rehab 129  -LN      Pre Treatment Diastolic BP 60  -LN      Intra Systolic BP Rehab 146  -LN      Intra Treatment Diastolic BP 63  -LN      Post Systolic BP Rehab 135  -LN      Post Treatment Diastolic BP 63  -LN      Pretreatment Heart Rate (beats/min) 70  -LN      Intratreatment Heart Rate (beats/min) 82  -LN      Posttreatment Heart Rate (beats/min) 79  -LN      Pre SpO2 (%) 100  -LN      O2 Delivery Pre Treatment supplemental O2  -LN      Post SpO2 (%) 96  -LN      Pre Patient Position Side Lying  -LN      Intra Patient Position Standing  -LN      Post Patient Position Sitting  -LN      Recorded by [LN] Jannet Garcia PTA      Pain Assessment    Pain Assessment 0-10  -LN      Pain Score 7  -LN      Post Pain Score 8  -LN      Pain Type Acute pain  -LN      Pain Location Abdomen  -LN      Pain Intervention(s) --   nsg  informed  -LN      Recorded by [LN] Jannet Garcia PTA      Cognitive Assessment/Intervention    Orientation Status oriented to;person;place     -LN      Recorded by [LN] Jannet Garcia PTA      Bed Mobility, Assessment/Treatment    Bed Mobility, Assistive Device bed rails;head of bed elevated  -LN      Bed Mobility, Roll Left, [place] not tested  -LN      Bed Mobility, Roll Right, [place] minimum assist (75% patient effort)  -LN      Bed Mob, Supine to Sit, [place] minimum assist (75% patient effort);moderate assist (50% patient effort)  -LN      Bed Mob, Sit to Supine, [place] not tested  -LN      Recorded by [LN] Jannet Garcia PTA      Transfer Assessment/Treatment    Transfers, Bed-Chair [place] minimum assist (75% patient effort)  -LN      Transfers, Chair-Bed [place] not tested  -LN      Transfers, Bed-Chair-Bed, Assist Device rolling walker  -LN      Transfers, Sit-Stand [place] minimum assist (75% patient effort)  -LN      Transfers, Stand-Sit [place] minimum assist (75% patient effort)  -LN      Transfers, Sit-Stand-Sit, Assist Device rolling walker  -LN      Toilet Transfer, [place] not tested  -LN      Recorded by [LN] Jannet Garcia PTA      Gait Assessment/Treatment    Gait, [place] Level minimum assist (75% patient effort)  -LN      Gait, Assistive Device rolling walker  -LN      Gait, Distance (Feet) 3  -LN      Gait, Gait Deviations palomo decreased;step length decreased  -LN      Recorded by [LN] Jannet Garcia PTA      Therapy Exercises    Bilateral Lower Extremities AROM:;20 reps;sitting;ankle pumps/circles;hip abduction/adduction;LAQ  -LN      Recorded by [LN] Jannet Garcia PTA      Positioning and Restraints    Post Treatment Position chair  -LN      In Chair notified nsg;sitting;call light within reach;encouraged to call for assist;with family/caregiver;legs elevated  -LN      Recorded by [LN] Jannet Garcia PTA        User Key  (r) = Recorded  By, (t) = Taken By, (c) = Cosigned By    Initials Name Effective Dates    LN Jannet S Jose, PTA 10/17/16 -                 IP PT Goals       10/13/17 1000 10/11/17 1309       Transfer Training PT LTG    Transfer Training PT LTG, Date Established  10/11/17  -     Transfer Training PT LTG, Time to Achieve  by discharge  -     Transfer Training PT LTG, Activity Type  bed to chair /chair to bed;sit to stand/stand to sit  -     Transfer Training PT LTG, [place] Level  supervision required  -     Transfer Training PT  LTG, Date Goal Reviewed 10/13/17  -      Transfer Training PT LTG, Outcome goal not met  -LN goal ongoing  -     Gait Training PT LTG    Gait Training Goal PT LTG, Date Established  10/11/17  -     Gait Training Goal PT LTG, Time to Achieve  by discharge  -     Gait Training Goal PT LTG, [place] Level  supervision required  -     Gait Training Goal PT LTG, Distance to Achieve  150ft  -     Gait Training Goal PT LTG, Additional Goal  300ft  -     Gait Training Goal PT LTG, Date Goal Reviewed 10/13/17  -      Gait Training Goal PT LTG, Outcome goal not met  -LN goal ongoing  -     Strength Goal PT LTG    Strength Goal PT LTG, Date Established  10/11/17  -     Strength Goal PT LTG, Time to Achieve  by discharge  -     Strength Goal PT LTG, Measure to Achieve  Pt will perform 15 reps of AROM exercises   -     Strength Goal PT LTG, Date Goal Reviewed 10/13/17  -      Strength Goal PT LTG, Outcome goal met  -LN goal ongoing  -     Patient Education PT LTG    Patient Education PT LTG, Date Established  10/11/17  -     Patient Education PT LTG, Time to Achieve  by discharge  -     Patient Education PT LTG, Education Type  gait;transfers;home safety  -     Patient Education PT LTG, Date Goal Reviewed 10/13/17  -      Patient Education PT LTG Outcome goal not met  - goal ongoing  [facility]       User Key  (r) = Recorded By, (t) = Taken By, (c) = Cosigned By    Initials  Name Provider Type    SHRAVAN Carla Cain, PT Physical Therapist    LN Jannet Garcia PTA Physical Therapy Assistant          Physical Therapy Education     Title: PT OT SLP Therapies (Active)     Topic: Physical Therapy (Active)     Point: Mobility training (Active)    Learning Progress Summary    Learner Readiness Method Response Comment Documented by Status   Patient Acceptance E NR  LN 10/13/17 1259 Active    Acceptance E NR  SHRAVAN 10/11/17 1308 Active   Family Acceptance E NR  SHRAVAN 10/11/17 1308 Active               Point: Home exercise program (Active)    Learning Progress Summary    Learner Readiness Method Response Comment Documented by Status   Patient Acceptance E NR  LN 10/13/17 1259 Active               Point: Body mechanics (Active)    Learning Progress Summary    Learner Readiness Method Response Comment Documented by Status   Patient Acceptance E NR  LN 10/13/17 1259 Active               Point: Precautions (Active)    Learning Progress Summary    Learner Readiness Method Response Comment Documented by Status   Patient Acceptance E NR  LN 10/13/17 1259 Active    Acceptance E NR  SHRAVAN 10/11/17 1308 Active   Family Acceptance E NR   10/11/17 1308 Active                      User Key     Initials Effective Dates Name Provider Type Discipline     10/17/16 -  Carla Cain, PT Physical Therapist PT     10/17/16 -  Jannet Garcia PTA Physical Therapy Assistant PT                    PT Recommendation and Plan  Anticipated Discharge Disposition: skilled nursing facility  Planned Therapy Interventions: balance training, bed mobility training, gait training, patient/family education, strengthening, transfer training  PT Frequency: other (see comments) (5-14 times per week)  Plan of Care Review  Plan Of Care Reviewed With: patient  Progress: progress towards functional goals is fair  Outcome Summary/Follow up Plan: sup-sit-stand-sit min of 1,amb 3' with rw and min of 1,vss with t/f's-1 new goal met-if d/c today would  benefit from aru          Outcome Measures       10/13/17 1000 10/11/17 1018 10/11/17 1012    How much help from another person do you currently need...    Turning from your back to your side while in flat bed without using bedrails? 3  -LN  2  -SHRAVAN    Moving from lying on back to sitting on the side of a flat bed without bedrails? 3  -LN  2  -SHRAVAN    Moving to and from a bed to a chair (including a wheelchair)? 3  -LN  3  -SHRAVAN    Standing up from a chair using your arms (e.g., wheelchair, bedside chair)? 3  -LN  3  -SHRAVAN    Climbing 3-5 steps with a railing? 2  -LN  2  -SHRAVAN    To walk in hospital room? 3  -LN  3  -SHRAVAN    AM-PAC 6 Clicks Score 17  -LN  15  -SHRAVAN    How much help from another is currently needed...    Putting on and taking off regular lower body clothing?  2  -RB     Bathing (including washing, rinsing, and drying)  2  -RB     Toileting (which includes using toilet bed pan or urinal)  2  -RB     Putting on and taking off regular upper body clothing  3  -RB     Taking care of personal grooming (such as brushing teeth)  3  -RB     Eating meals  4  -RB     Score  16  -RB     Functional Assessment    Outcome Measure Options AM-PAC 6 Clicks Basic Mobility (PT)  -LN AM-PAC 6 Clicks Daily Activity (OT)  -RB AM-PAC 6 Clicks Basic Mobility (PT)  -SHRAVAN      User Key  (r) = Recorded By, (t) = Taken By, (c) = Cosigned By    Initials Name Provider Type    RB Harry Chavez, OT Occupational Therapist    SHRAVAN Carla Cain, PT Physical Therapist    KATH Garcia PTA Physical Therapy Assistant           Time Calculation:         PT Charges       10/13/17 1000          Time Calculation    Start Time 1000  -LN      Stop Time 1040  -LN      Time Calculation (min) 40 min  -LN      PT Received On 10/13/17  -LN      Time Calculation- PT    Total Timed Code Minutes- PT 40 minute(s)  -LN        User Key  (r) = Recorded By, (t) = Taken By, (c) = Cosigned By    Initials Name Provider Type    KATH Garcia PTA Physical Therapy Assistant           Therapy Charges for Today     Code Description Service Date Service Provider Modifiers Qty    28991184964 HC PT THERAPEUTIC ACT EA 15 MIN 10/13/2017 Jannet Garcia, TERRY GP 2    89805276917 HC PT THER PROC EA 15 MIN 10/13/2017 Jannet Garcia PTA GP 1          PT G-Codes  PT Professional Judgement Used?: Yes  Outcome Measure Options: AM-PAC 6 Clicks Basic Mobility (PT)  Score: 15  Functional Limitation: Mobility: Walking and moving around  Mobility: Walking and Moving Around Current Status (): At least 40 percent but less than 60 percent impaired, limited or restricted  Mobility: Walking and Moving Around Goal Status (): At least 20 percent but less than 40 percent impaired, limited or restricted    Jannet Garcia PTA  10/13/2017

## 2017-10-13 NOTE — PROGRESS NOTES
Parenteral Nutrition by Pharmacy    Patient: Irma Pacheco  MRN#: [id number]  Attending: No name on file.  Admission Date: 100817    Subjective/Objective       Assessment      Lab Results   Component Value Date    GLUCOSE 80 10/13/2017    BUN 11 10/13/2017    CREATININE 0.95 10/13/2017    EGFRIFNONA 59 10/13/2017    BCR 11.6 10/13/2017    K 3.0 (L) 10/13/2017    CO2 25.0 10/13/2017    CALCIUM 7.3 (L) 10/13/2017    ALBUMIN 2.80 (L) 10/13/2017    LABIL2 1.1 10/13/2017    AST 26 10/13/2017    ALT 30 10/13/2017     Lab Results   Component Value Date    GLUCOSE 80 10/13/2017    CALCIUM 7.3 (L) 10/13/2017     10/13/2017    K 3.0 (L) 10/13/2017    CO2 25.0 10/13/2017     10/13/2017    BUN 11 10/13/2017    CREATININE 0.95 10/13/2017    EGFRIFNONA 59 10/13/2017    BCR 11.6 10/13/2017    ANIONGAP 13.0 10/13/2017     Magnesium   Date Value Ref Range Status   10/13/2017 1.6 1.6 - 2.3 mg/dL Final     Phosphorus   Date Value Ref Range Status   10/13/2017 3.5 2.4 - 4.4 mg/dL Final     Calcium   Date Value Ref Range Status   10/13/2017 7.3 (L) 8.4 - 10.2 mg/dL Final     Triglycerides   Date Value Ref Range Status   10/13/2017 180 20 - 199 mg/dL Final     Above labs reviewed.  Corrected Ca 8.26- still low  Plan       Reviewed patient chart and above labs. Calculated REE at 1606 calories x 1.2 stress factor resulting in 1927 calories needed. 20% of these calories will be met with Lipids 20%. Calculated Protein at 2.5 gm/kg of IBW = 113.8 gm.   TPN formula will provide 96 grams of protein and 1690 calories + 385 calories from lipids= 2075 calories will be provided. Pharmacy will continue to monitor and adjust formula as needed.     Current TPN formula will be as follows:  Clinimix 5/20%  K Phos 30 mMol  KCl 56 mEq  NaCl 130 mEq  Na Acetate 70 mEq  Calcium Gluconate 10 mEq  Magnesium Sulfate 16 mEq  MVI 10 ml      Becka M Shaw[place], RP  10/13/17  10:21 AM

## 2017-10-13 NOTE — SIGNIFICANT NOTE
10/13/17 1339   Rehab Treatment   Discipline occupational therapy assistant   Treatment Not Performed patient/family declined treatment  (Pt awaiting xray and RN to change dressings and gown, also pt states she may be going upstairs to another floor , requests OT check back sometimne this weekend )

## 2017-10-13 NOTE — PLAN OF CARE
Problem: Patient Care Overview (Adult)  Goal: Plan of Care Review  Outcome: Ongoing (interventions implemented as appropriate)    10/13/17 1000   Coping/Psychosocial Response Interventions   Plan Of Care Reviewed With patient   Patient Care Overview   Progress progress towards functional goals is fair   Outcome Evaluation   Outcome Summary/Follow up Plan sup-sit-stand-sit min of 1,amb 3' with rw and min of 1,vss with t/f's-1 new goal met-if d/c today would benefit from aru       Goal: Discharge Needs Assessment  Outcome: Ongoing (interventions implemented as appropriate)    10/11/17 1417 10/11/17 1446 10/13/17 0350   Current Health   Anticipated Changes Related to Illness --  inability to care for self --    Discharge Needs Assessment   Concerns To Be Addressed --  --  no discharge needs identified   Readmission Within The Last 30 Days --  other (see comments)  (Pt had surgery within the last 30 days and is here for an abscess in the wound.. Pt was transferred to [facility] bed upon d/c from [facility].) --    Equipment Needed After Discharge --  none --    Discharge Facility/Level Of Care Needs acute rehab --  --    Discharge Disposition --  --  still a patient   Living Environment   Transportation Available --  family or friend will provide --    Self-Care   Equipment Currently Used at Home --  grab bar;wheelchair, motorized;rollator;bipap/ cpap --          Problem: Inpatient Physical Therapy  Goal: Transfer Training Goal 1 LTG- PT  Outcome: Ongoing (interventions implemented as appropriate)    10/11/17 1309 10/13/17 1000   Transfer Training PT LTG   Transfer Training PT LTG, Date Established 10/11/17 --    Transfer Training PT LTG, Time to Achieve by discharge --    Transfer Training PT LTG, Activity Type bed to chair /chair to bed;sit to stand/stand to sit --    Transfer Training PT LTG, [place] Level supervision required --    Transfer Training PT LTG, Date Goal Reviewed --  10/13/17   Transfer Training PT  LTG, Outcome --  goal not met       Goal: Gait Training Goal LTG- PT  Outcome: Ongoing (interventions implemented as appropriate)    10/11/17 1309 10/13/17 1000   Gait Training PT LTG   Gait Training Goal PT LTG, Date Established 10/11/17 --    Gait Training Goal PT LTG, Time to Achieve by discharge --    Gait Training Goal PT LTG, [place] Level supervision required --    Gait Training Goal PT LTG, Distance to Achieve 150ft --    Gait Training Goal PT LTG, Additional Goal 300ft --    Gait Training Goal PT LTG, Date Goal Reviewed --  10/13/17   Gait Training Goal PT LTG, Outcome --  goal not met       Goal: Strength Goal LTG- PT  Outcome: Outcome(s) achieved Date Met:  10/13/17    10/11/17 1309 10/13/17 1000   Strength Goal PT LTG   Strength Goal PT LTG, Date Established 10/11/17 --    Strength Goal PT LTG, Time to Achieve by discharge --    Strength Goal PT LTG, Measure to Achieve Pt will perform 15 reps of AROM exercises  --    Strength Goal PT LTG, Date Goal Reviewed --  10/13/17   Strength Goal PT LTG, Outcome --  goal met       Goal: Patient Education Goal LTG- PT  Outcome: Ongoing (interventions implemented as appropriate)    10/11/17 1309 10/13/17 1000   Patient Education PT LTG   Patient Education PT LTG, Date Established 10/11/17 --    Patient Education PT LTG, Time to Achieve by discharge --    Patient Education PT LTG, Education Type gait;transfers;home safety --    Patient Education PT LTG, Date Goal Reviewed --  10/13/17   Patient Education PT LTG Outcome --  goal not met

## 2017-10-13 NOTE — PROGRESS NOTES
LOS: 5 days   Patient Care Team:  DIANA Lindquist as PCP - General (Family Medicine)    Chief Complaint:  <principal problem not specified>    Subjective     Interval History:   Slept much better last night, overnight had greatly increased green drainage from wound and CLARI    Objective     Vital Signs  Temp:  [97.7 °F (36.5 °C)-99.6 °F (37.6 °C)] 97.7 °F (36.5 °C)  Heart Rate:  [] 57  Resp:  [12-24] 12  BP: (107-210)/() 154/60    Physical Exam:  Succus in CLARI, wound clean and OK currently     Results Review:       Lab Results (last 24 hours)     Procedure Component Value Units Date/Time    CBC Auto Differential [[id number]]  (Abnormal) Collected:  10/13/17 0550    Specimen:  Blood Updated:  10/13/17 0558     WBC 11.07 (H) 10*3/mm3      RBC 3.00 (L) 10*6/mm3      Hemoglobin 8.8 (L) g/dL      Hematocrit 27.0 (L) %      MCV 90.0 fL      MCH 29.3 pg      MCHC 32.6 g/dL      RDW 14.9 (H) %      RDW-SD 48.6 (H) fl      MPV 9.5 fL      Platelets 249 10*3/mm3      Neutrophil % 62.5 %      Lymphocyte % 16.6 %      Monocyte % 7.0 %      Eosinophil % 0.5 %      Basophil % 1.0 %      Immature Grans % 12.4 (H) %      Neutrophils, Absolute 6.92 10*3/mm3      Lymphocytes, Absolute 1.84 10*3/mm3      Monocytes, Absolute 0.77 10*3/mm3      Eosinophils, Absolute 0.06 10*3/mm3      Basophils, Absolute 0.11 10*3/mm3      Immature Grans, Absolute 1.37 (H) 10*3/mm3     Basic Metabolic Panel [[id number]]  (Abnormal) Collected:  10/13/17 0550    Specimen:  Blood Updated:  10/13/17 0628     Glucose 80 mg/dL      BUN 11 mg/dL      Creatinine 0.96 mg/dL      Sodium 139 mmol/L      Potassium 3.0 (L) mmol/L      Chloride 100 mmol/L      CO2 27.0 mmol/L      Calcium 7.6 (L) mg/dL      eGFR Non African Amer 58 mL/min/1.73      BUN/Creatinine Ratio 11.5     Anion Gap 12.0 mmol/L     Body Fluid Culture - Body Fluid, Abdominal Wall [[id number]]  (Abnormal)  (Susceptibility) Collected:  10/09/17 0980    Specimen:  Body Fluid from  Abdominal Wall Updated:  10/13/17 0713     BF Culture --      Light growth (2+) Streptococcus anginosus (A)     Gram Stain Result Many (4+) WBCs seen      Many (4+) Gram positive cocci in chains and clusters      Many (4+) Gram negative bacilli    Susceptibility      Streptococcus anginosus     PASTORA     Amoxicillin + Clavulanate <=0.5/.25 ug/ml     Ampicillin <=0.06 ug/ml Susceptible     Cefotaxime <=0.25 ug/ml Susceptible     Ceftriaxone <=0.25 ug/ml Susceptible     Cefuroxime sodium <=0.25 ug/ml     Clindamycin >0.5 ug/ml Resistant     Erythromycin >0.5 ug/ml Resistant     Levofloxacin 1 ug/ml Susceptible     Meropenem <=0.06 ug/ml Susceptible     Penicillin G <=0.03 ug/ml Susceptible     Tetracycline <=0.5 ug/ml Susceptible     Trimethoprim + Sulfamethoxazole <=.25/4.7 ug/ml     Vancomycin 1 ug/ml Susceptible                    Cholesterol, Total [[id number]] Collected:  10/13/17 0726    Specimen:  Blood Updated:  10/13/17 0730    Calcium, Ionized [[id number]]  (Abnormal) Collected:  10/13/17 0726    Specimen:  Blood Updated:  10/13/17 0738     Ionized Calcium 3.60 (L) mg/dL     Magnesium [[id number]]  (Normal) Collected:  10/13/17 0726    Specimen:  Blood Updated:  10/13/17 0747     Magnesium 1.6 mg/dL     Phosphorus [[id number]]  (Normal) Collected:  10/13/17 0726    Specimen:  Blood Updated:  10/13/17 0747     Phosphorus 3.5 mg/dL     Triglycerides [[id number]]  (Normal) Collected:  10/13/17 0726    Specimen:  Blood Updated:  10/13/17 0747     Triglycerides 180 mg/dL     C-reactive Protein [[id number]]  (Abnormal) Collected:  10/13/17 0726    Specimen:  Blood Updated:  10/13/17 0747     C-Reactive Protein 4.40 (H) mg/dL     Comprehensive Metabolic Panel [[id number]]  (Abnormal) Collected:  10/13/17 0726    Specimen:  Blood Updated:  10/13/17 0747     Glucose 80 mg/dL      BUN 11 mg/dL      Creatinine 0.95 mg/dL      Sodium 138 mmol/L      Potassium 3.0 (L) mmol/L      Chloride 100 mmol/L      CO2 25.0 mmol/L       Calcium 7.3 (L) mg/dL      Total Protein 5.3 (L) g/dL      Albumin 2.80 (L) g/dL      ALT (SGPT) 30 U/L      AST (SGOT) 26 U/L      Alkaline Phosphatase 91 U/L      Total Bilirubin 0.3 mg/dL      eGFR Non African Amer 59 mL/min/1.73      Globulin 2.5 gm/dL      A/G Ratio 1.1 g/dL      BUN/Creatinine Ratio 11.6     Anion Gap 13.0 mmol/L     CBC & Differential [[id number]] Collected:  10/13/17 0550    Specimen:  Blood Updated:  10/13/17 0751    Narrative:       The following orders were created for panel order CBC & Differential.  Procedure                               Abnormality         Status                     ---------                               -----------         ------                     Manual Differential[[id number]]          Abnormal            Final result               Scan Slide[[id number]]                                                                  CBC Auto Differential[[id number]]        Abnormal            Final result                 Please view results for these tests on the individual orders.    Manual Differential [[id number]]  (Abnormal) Collected:  10/13/17 0550    Specimen:  Blood Updated:  10/13/17 0751     Neutrophil % 61.0 %      Lymphocyte % 15.0 %      Monocyte % 9.0 %      Eosinophil % 1.0 %      Bands %  5.0 %      Metamyelocyte % 6.0 (H) %      Myelocyte % 3.0 (H) %      Neutrophils Absolute 7.31 10*3/mm3      Lymphocytes Absolute 1.66 10*3/mm3      Monocytes Absolute 1.00 (H) 10*3/mm3      Eosinophils Absolute 0.11 10*3/mm3      nRBC 1.0 (H) /100 WBC      Anisocytosis Slight/1+     Hypochromia --      FEW CELLS        Polychromasia --      FEW CELLS        WBC Morphology Normal     Platelet Estimate Adequate    Prealbumin [[id number]]  (Abnormal) Collected:  10/13/17 0726    Specimen:  Blood Updated:  10/13/17 0752     Prealbumin 15.5 (L) mg/dL           Medication Review:   Current Facility-Administered Medications   Medication Dose Route Frequency Provider Last Rate Last Dose    • acetaminophen (TYLENOL) tablet 500 mg  500 mg Oral Q6H PRN Arnulfo Marcum MD   500 mg at 10/12/17 1356   • chlorhexidine (PERIDEX) 0.12 % solution 15 mL  15 mL Mouth/Throat Q12H Arnulfo Marcum MD   15 mL at 10/12/17 2020   • citalopram (CeleXA) tablet 40 mg  40 mg Oral Daily Arnulfo Marcum MD   40 mg at 10/12/17 1356   • donepezil (ARICEPT) tablet 5 mg  5 mg Oral Nightly Arnulfo Marcum MD   5 mg at 10/12/17 2020   • enoxaparin (LOVENOX) syringe 40 mg  40 mg Subcutaneous Q24H Arnulfo Marcum MD   40 mg at 10/13/17 0642   • furosemide (LASIX) tablet 80 mg  80 mg Oral BID Arnulfo Marcum MD   80 mg at 10/12/17 2020   • gabapentin (NEURONTIN) capsule 600 mg  600 mg Oral TID Arnulfo Marcum MD   600 mg at 10/12/17 2020   • Glatiramer Acetate solution prefilled syringe 40 mg  40 mg Subcutaneous Once per day on Mon Wed Fri Arnulfo Marcum MD   40 mg at 10/11/17 0834   • ketorolac (TORADOL) injection 30 mg  30 mg Intravenous Q6H PRN Arnulfo Marcum MD   30 mg at 10/13/17 0603   • levoFLOXacin (LEVAQUIN) 500 mg/100 mL D5W (premix) (LEVAQUIN) 500 mg  500 mg Intravenous Q24H Arnulfo Marcum MD   500 mg at 10/12/17 1813   • levothyroxine (SYNTHROID, LEVOTHROID) tablet 150 mcg  150 mcg Oral Q AM Arnulfo Marcum MD   150 mcg at 10/13/17 0642   • memantine (NAMENDA) tablet 7.5 mg  7.5 mg Oral Nightly Arnulfo Marcum MD   7.5 mg at 10/12/17 2022   • metroNIDAZOLE (FLAGYL) IVPB 500 mg  500 mg Intravenous Q8H Arnulfo Marcum MD   500 mg at 10/13/17 0603   • mirtazapine (REMERON) tablet 30 mg  30 mg Oral Nightly Arnulfo Marcum MD   30 mg at 10/12/17 2023   • ondansetron (ZOFRAN) injection 4 mg  4 mg Intravenous Q6H PRN Arnulfo Marcum MD   4 mg at 10/12/17 2031   • ondansetron (ZOFRAN) tablet 4 mg  4 mg Oral Q8H PRN Arnulfo Marcum MD       • Pharmacy to Dose TPN   Does not apply Continuous PRN Arnulfo Marcum MD       • pneumococcal polysaccharide 23-valent (PNEUMOVAX-23) vaccine 0.5 mL  0.5 mL Intramuscular During Hospitalization Arnulfo Marcum MD       • potassium chloride  (MICRO-K) CR capsule 10 mEq  10 mEq Oral TID Arnulfo Marcum MD   10 mEq at 10/12/17 2020   • potassium chloride 10 mEq in 100 mL IVPB  10 mEq Intravenous Q1H Arnulfo Marcum  mL/hr at 10/13/17 0740 10 mEq at 10/13/17 0740   • primidone (MYSOLINE) tablet 50 mg  50 mg Oral TID Arnulfo Marcum MD   50 mg at 10/12/17 2022   • rosuvastatin (CRESTOR) tablet 10 mg  10 mg Oral Daily Arnulfo Marcum MD   10 mg at 10/12/17 1237   • sodium chloride 0.9 % flush 1-10 mL  1-10 mL Intravenous PRN Arnulfo Marcum MD   10 mL at 10/12/17 0931   • sodium chloride 0.9 % infusion  100 mL/hr Intravenous Continuous Arnulfo Marcum  mL/hr at 10/12/17 1638 100 mL/hr at 10/12/17 1638   • tiZANidine (ZANAFLEX) tablet 4 mg  4 mg Oral Q12H PRN Arnulfo Marcum MD   4 mg at 10/12/17 2023       Assessment/Plan     Active Problems:    Pelvic abscess in female    66 yo lady 3 weeks s/p Karishma's and POD#4 s/p ex-lap and washout of pelvic abscess  Neuro- much improved ICU psychosis, holding narcotics, most home meds restarted, continue to monitor   Cards- stable, no hypotension or tachy  Lungs- breathing well on nasal canula, IS for support  GI- having ostomy output, 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

## 2017-10-13 NOTE — CONSULTS
Adult Nutrition  Assessment    Patient Name:  Irma Pacheco  YOB: 1950  MRN: [id number]  Admit Date:  10/8/2017    Assessment Date:  10/13/2017    Comments:  Pt alert and oriented  Confusion seems to have cleared up.  PICC line placed and PN to be started due to succus coming from wound and drain signifing a bowel fistula.  PN to be started by Pharm D.  Labs reviewed and noted.  PreAlb mildly depressed at 15.5/  Rd will monitor            Reason for Assessment       10/13/17 1211    Reason for Assessment    Reason For Assessment/Visit follow up protocol                Nutrition/Diet History       10/13/17 1211    Nutrition/Diet History    Typical Food/Fluid Intake Pt alert and talking.  Said that she was feeling better.                Labs/Tests/Procedures/Meds       10/13/17 1211    Labs/Tests/Procedures/Meds    Labs/Tests Review Reviewed    Medication Review Reviewed, pertinent                Nutrition Prescription Ordered       10/13/17 1212    Nutrition Prescription PO    Current PO Diet NPO    Nutrition Prescription PN    PN Route PICC              Electronically signed by:  Nancy Gilmore RD  10/13/17 12:27 PM

## 2017-10-14 LAB
ALBUMIN SERPL-MCNC: 3 G/DL (ref 3.4–4.8)
ALBUMIN/GLOB SERPL: 1.2 G/DL (ref 1.1–1.8)
ALP SERPL-CCNC: 87 U/L (ref 38–126)
ALT SERPL W P-5'-P-CCNC: 15 U/L (ref 9–52)
ANION GAP SERPL CALCULATED.3IONS-SCNC: 10 MMOL/L (ref 5–15)
AST SERPL-CCNC: 20 U/L (ref 14–36)
BASOPHILS # BLD AUTO: 0.05 10*3/MM3 (ref 0–0.2)
BASOPHILS NFR BLD AUTO: 0.5 % (ref 0–2)
BILIRUB SERPL-MCNC: 0.4 MG/DL (ref 0.2–1.3)
BUN BLD-MCNC: 17 MG/DL (ref 7–21)
BUN/CREAT SERPL: 17.7 (ref 7–25)
CA-I BLD-MCNC: 4 MG/DL (ref 4.5–4.9)
CALCIUM SPEC-SCNC: 7.5 MG/DL (ref 8.4–10.2)
CHLORIDE SERPL-SCNC: 101 MMOL/L (ref 95–110)
CO2 SERPL-SCNC: 26 MMOL/L (ref 22–31)
CREAT BLD-MCNC: 0.96 MG/DL (ref 0.5–1)
DEPRECATED RDW RBC AUTO: 50.7 FL (ref 36.4–46.3)
EOSINOPHIL # BLD AUTO: 0.15 10*3/MM3 (ref 0–0.7)
EOSINOPHIL NFR BLD AUTO: 1.4 % (ref 0–7)
ERYTHROCYTE [DISTWIDTH] IN BLOOD BY AUTOMATED COUNT: 15 % (ref 11.5–14.5)
GFR SERPL CREATININE-BSD FRML MDRD: 58 ML/MIN/1.73 (ref 45–104)
GLOBULIN UR ELPH-MCNC: 2.5 GM/DL (ref 2.3–3.5)
GLUCOSE BLD-MCNC: 181 MG/DL (ref 60–100)
GLUCOSE BLDC GLUCOMTR-MCNC: 149 MG/DL (ref 70–130)
GLUCOSE BLDC GLUCOMTR-MCNC: 159 MG/DL (ref 70–130)
GLUCOSE BLDC GLUCOMTR-MCNC: 202 MG/DL (ref 70–130)
HCT VFR BLD AUTO: 30.5 % (ref 35–45)
HGB BLD-MCNC: 9.6 G/DL (ref 12–15.5)
IMM GRANULOCYTES # BLD: 1.25 10*3/MM3 (ref 0–0.02)
IMM GRANULOCYTES NFR BLD: 12 % (ref 0–0.5)
LYMPHOCYTES # BLD AUTO: 1.71 10*3/MM3 (ref 0.6–4.2)
LYMPHOCYTES NFR BLD AUTO: 16.3 % (ref 10–50)
MAGNESIUM SERPL-MCNC: 1.9 MG/DL (ref 1.6–2.3)
MCH RBC QN AUTO: 28.9 PG (ref 26.5–34)
MCHC RBC AUTO-ENTMCNC: 31.5 G/DL (ref 31.4–36)
MCV RBC AUTO: 91.9 FL (ref 80–98)
MONOCYTES # BLD AUTO: 0.68 10*3/MM3 (ref 0–0.9)
MONOCYTES NFR BLD AUTO: 6.5 % (ref 0–12)
NEUTROPHILS # BLD AUTO: 6.62 10*3/MM3 (ref 2–8.6)
NEUTROPHILS NFR BLD AUTO: 63.3 % (ref 37–80)
NRBC BLD MANUAL-RTO: 0 /100 WBC (ref 0–0)
PHOSPHATE SERPL-MCNC: 3 MG/DL (ref 2.4–4.4)
PLATELET # BLD AUTO: 245 10*3/MM3 (ref 150–450)
PMV BLD AUTO: 10.3 FL (ref 8–12)
POTASSIUM BLD-SCNC: 3.8 MMOL/L (ref 3.5–5.1)
PROT SERPL-MCNC: 5.5 G/DL (ref 6.3–8.6)
RBC # BLD AUTO: 3.32 10*6/MM3 (ref 3.77–5.16)
SODIUM BLD-SCNC: 137 MMOL/L (ref 137–145)
WBC NRBC COR # BLD: 10.46 10*3/MM3 (ref 3.2–9.8)

## 2017-10-14 PROCEDURE — 25010000002 KETOROLAC TROMETHAMINE PER 15 MG: Performed by: SURGERY

## 2017-10-14 PROCEDURE — 82330 ASSAY OF CALCIUM: CPT | Performed by: SURGERY

## 2017-10-14 PROCEDURE — 25010000002 ONDANSETRON PER 1 MG: Performed by: SURGERY

## 2017-10-14 PROCEDURE — 25010000002 MAGNESIUM SULFATE PER 500 MG OF MAGNESIUM: Performed by: SURGERY

## 2017-10-14 PROCEDURE — 25010000002 ENOXAPARIN PER 10 MG: Performed by: SURGERY

## 2017-10-14 PROCEDURE — 94760 N-INVAS EAR/PLS OXIMETRY 1: CPT

## 2017-10-14 PROCEDURE — 84100 ASSAY OF PHOSPHORUS: CPT | Performed by: SURGERY

## 2017-10-14 PROCEDURE — 82962 GLUCOSE BLOOD TEST: CPT

## 2017-10-14 PROCEDURE — 25010000002 LEVOFLOXACIN PER 250 MG: Performed by: SURGERY

## 2017-10-14 PROCEDURE — 80053 COMPREHEN METABOLIC PANEL: CPT | Performed by: SURGERY

## 2017-10-14 PROCEDURE — 25010000002 POTASSIUM CHLORIDE PER 2 MEQ OF POTASSIUM: Performed by: SURGERY

## 2017-10-14 PROCEDURE — 83735 ASSAY OF MAGNESIUM: CPT | Performed by: SURGERY

## 2017-10-14 PROCEDURE — 63710000001 INSULIN ASPART PER 5 UNITS: Performed by: SURGERY

## 2017-10-14 PROCEDURE — 85025 COMPLETE CBC W/AUTO DIFF WBC: CPT | Performed by: SURGERY

## 2017-10-14 PROCEDURE — 25010000002 CALCIUM GLUCONATE PER 10 ML: Performed by: SURGERY

## 2017-10-14 PROCEDURE — 94799 UNLISTED PULMONARY SVC/PX: CPT

## 2017-10-14 RX ADMIN — POTASSIUM CHLORIDE: 2 INJECTION, SOLUTION, CONCENTRATE INTRAVENOUS at 17:16

## 2017-10-14 RX ADMIN — ONDANSETRON 4 MG: 2 INJECTION INTRAMUSCULAR; INTRAVENOUS at 20:50

## 2017-10-14 RX ADMIN — KETOROLAC TROMETHAMINE 30 MG: 30 INJECTION, SOLUTION INTRAMUSCULAR; INTRAVENOUS at 05:29

## 2017-10-14 RX ADMIN — ONDANSETRON 4 MG: 2 INJECTION INTRAMUSCULAR; INTRAVENOUS at 10:58

## 2017-10-14 RX ADMIN — ONDANSETRON 4 MG: 2 INJECTION INTRAMUSCULAR; INTRAVENOUS at 17:16

## 2017-10-14 RX ADMIN — DONEPEZIL HYDROCHLORIDE 5 MG: 5 TABLET, FILM COATED ORAL at 20:30

## 2017-10-14 RX ADMIN — ALUMINUM HYDROXIDE, MAGNESIUM HYDROXIDE, AND DIMETHICONE 30 ML: 400; 400; 40 SUSPENSION ORAL at 13:40

## 2017-10-14 RX ADMIN — CITALOPRAM HYDROBROMIDE 40 MG: 40 TABLET ORAL at 09:38

## 2017-10-14 RX ADMIN — KETOROLAC TROMETHAMINE 30 MG: 30 INJECTION, SOLUTION INTRAMUSCULAR; INTRAVENOUS at 20:50

## 2017-10-14 RX ADMIN — INSULIN ASPART 4 UNITS: 100 INJECTION, SOLUTION INTRAVENOUS; SUBCUTANEOUS at 05:55

## 2017-10-14 RX ADMIN — ONDANSETRON 4 MG: 2 INJECTION INTRAMUSCULAR; INTRAVENOUS at 05:29

## 2017-10-14 RX ADMIN — I.V. FAT EMULSION 38.6 G: 20 EMULSION INTRAVENOUS at 21:01

## 2017-10-14 RX ADMIN — PRIMIDONE 50 MG: 50 TABLET ORAL at 09:38

## 2017-10-14 RX ADMIN — INSULIN ASPART 2 UNITS: 100 INJECTION, SOLUTION INTRAVENOUS; SUBCUTANEOUS at 17:37

## 2017-10-14 RX ADMIN — ENOXAPARIN SODIUM 40 MG: 40 INJECTION SUBCUTANEOUS at 08:15

## 2017-10-14 RX ADMIN — ALUMINUM HYDROXIDE, MAGNESIUM HYDROXIDE, AND DIMETHICONE 30 ML: 400; 400; 40 SUSPENSION ORAL at 05:51

## 2017-10-14 RX ADMIN — POTASSIUM CHLORIDE 10 MEQ: 750 CAPSULE, EXTENDED RELEASE ORAL at 16:55

## 2017-10-14 RX ADMIN — LEVOFLOXACIN 500 MG: 5 INJECTION, SOLUTION INTRAVENOUS at 18:53

## 2017-10-14 RX ADMIN — ROSUVASTATIN CALCIUM 10 MG: 10 TABLET, FILM COATED ORAL at 09:38

## 2017-10-14 RX ADMIN — PRIMIDONE 50 MG: 50 TABLET ORAL at 20:32

## 2017-10-14 RX ADMIN — GABAPENTIN 600 MG: 300 CAPSULE ORAL at 16:55

## 2017-10-14 RX ADMIN — MIRTAZAPINE 30 MG: 15 TABLET, FILM COATED ORAL at 20:31

## 2017-10-14 RX ADMIN — POTASSIUM CHLORIDE 10 MEQ: 750 CAPSULE, EXTENDED RELEASE ORAL at 20:30

## 2017-10-14 RX ADMIN — FUROSEMIDE 80 MG: 80 TABLET ORAL at 17:16

## 2017-10-14 RX ADMIN — FUROSEMIDE 80 MG: 80 TABLET ORAL at 09:38

## 2017-10-14 RX ADMIN — KETOROLAC TROMETHAMINE 30 MG: 30 INJECTION, SOLUTION INTRAMUSCULAR; INTRAVENOUS at 10:58

## 2017-10-14 RX ADMIN — PRIMIDONE 50 MG: 50 TABLET ORAL at 16:56

## 2017-10-14 RX ADMIN — GABAPENTIN 600 MG: 300 CAPSULE ORAL at 09:38

## 2017-10-14 RX ADMIN — LEVOTHYROXINE SODIUM 150 MCG: 150 TABLET ORAL at 06:04

## 2017-10-14 RX ADMIN — ALUMINUM HYDROXIDE, MAGNESIUM HYDROXIDE, AND DIMETHICONE 30 ML: 400; 400; 40 SUSPENSION ORAL at 20:50

## 2017-10-14 RX ADMIN — GABAPENTIN 600 MG: 300 CAPSULE ORAL at 20:29

## 2017-10-14 RX ADMIN — MEMANTINE 7.5 MG: 5 TABLET ORAL at 20:31

## 2017-10-14 RX ADMIN — POTASSIUM CHLORIDE 10 MEQ: 750 CAPSULE, EXTENDED RELEASE ORAL at 09:38

## 2017-10-14 RX ADMIN — KETOROLAC TROMETHAMINE 30 MG: 30 INJECTION, SOLUTION INTRAMUSCULAR; INTRAVENOUS at 17:16

## 2017-10-14 NOTE — PROGRESS NOTES
"  Subjective:   Stable overnight.     /51  Pulse 67  Temp 98.3 °F (36.8 °C) (Oral)   Resp 14  Ht 60\" (152.4 cm)  Wt 239 lb 6.7 oz (109 kg)  SpO2 100%  BMI 46.76 kg/m2    Lab Results (last 24 hours)     Procedure Component Value Units Date/Time    POC Glucose Fingerstick [[id number]]  (Abnormal) Collected:  10/13/17 2032    Specimen:  Blood Updated:  10/13/17 2054     Glucose 208 (H) mg/dL       Meter: [id number]: [id number] LIZA IBARRA       Magnesium [[id number]]  (Normal) Collected:  10/14/17 0452    Specimen:  Blood Updated:  10/14/17 0529     Magnesium 1.9 mg/dL     Phosphorus [[id number]]  (Normal) Collected:  10/14/17 0452    Specimen:  Blood Updated:  10/14/17 0529     Phosphorus 3.0 mg/dL     Comprehensive Metabolic Panel [[id number]]  (Abnormal) Collected:  10/14/17 0452    Specimen:  Blood Updated:  10/14/17 0532     Glucose 181 (H) mg/dL      BUN 17 mg/dL      Creatinine 0.96 mg/dL      Sodium 137 mmol/L      Potassium 3.8 mmol/L      Chloride 101 mmol/L      CO2 26.0 mmol/L      Calcium 7.5 (L) mg/dL      Total Protein 5.5 (L) g/dL      Albumin 3.00 (L) g/dL      ALT (SGPT) 15 U/L      AST (SGOT) 20 U/L      Alkaline Phosphatase 87 U/L      Total Bilirubin 0.4 mg/dL      eGFR Non African Amer 58 mL/min/1.73      Globulin 2.5 gm/dL      A/G Ratio 1.2 g/dL      BUN/Creatinine Ratio 17.7     Anion Gap 10.0 mmol/L     CBC & Differential [[id number]] Collected:  10/14/17 0452    Specimen:  Blood Updated:  10/14/17 0551    Narrative:       The following orders were created for panel order CBC & Differential.  Procedure                               Abnormality         Status                     ---------                               -----------         ------                     Scan Slide[[id number]]                                                                  CBC Auto Differential[[id number]]        Abnormal            Final result                 Please view results for these tests on " the individual orders.    CBC Auto Differential [[id number]]  (Abnormal) Collected:  10/14/17 0452    Specimen:  Blood Updated:  10/14/17 0551     WBC 10.46 (H) 10*3/mm3      RBC 3.32 (L) 10*6/mm3      Hemoglobin 9.6 (L) g/dL      Hematocrit 30.5 (L) %      MCV 91.9 fL      MCH 28.9 pg      MCHC 31.5 g/dL      RDW 15.0 (H) %      RDW-SD 50.7 (H) fl      MPV 10.3 fL      Platelets 245 10*3/mm3      Neutrophil % 63.3 %      Lymphocyte % 16.3 %      Monocyte % 6.5 %      Eosinophil % 1.4 %      Basophil % 0.5 %      Immature Grans % 12.0 (H) %      Neutrophils, Absolute 6.62 10*3/mm3      Lymphocytes, Absolute 1.71 10*3/mm3      Monocytes, Absolute 0.68 10*3/mm3      Eosinophils, Absolute 0.15 10*3/mm3      Basophils, Absolute 0.05 10*3/mm3      Immature Grans, Absolute 1.25 (H) 10*3/mm3      nRBC 0.0 /100 WBC     POC Glucose Fingerstick [[id number]]  (Abnormal) Collected:  10/14/17 0550    Specimen:  Blood Updated:  10/14/17 0609     Glucose 202 (H) mg/dL       Sliding Scale AdminMeter: [id number]: [id number] NAVEED AGUILAR       Calcium, Ionized [[id number]] Updated:  10/14/17 1020    Specimen:  Blood           Current Medications:  Current Facility-Administered Medications   Medication Dose Route Frequency Provider Last Rate Last Dose   • acetaminophen (TYLENOL) tablet 500 mg  500 mg Oral Q6H PRN Arnulfo Marcum MD   500 mg at 10/12/17 1356   • Adult [facility] TPN   Intravenous Q24H (TPN) Arnulfo Marcum MD 80 mL/hr at 10/13/17 1714      And   • fat emulsion (INTRALIPID,LIPOSYN) 20 % infusion 38.6 g  193 mL Intravenous Q24H Arnulfo Marcum MD 16.08 mL/hr at 10/13/17 2111 38.6 g at 10/13/17 2111   • aluminum-magnesium hydroxide-simethicone (MAALOX MAX) 400-400-40 MG/5ML suspension 30 mL  30 mL Oral Q6H PRN Arnulfo Marcum MD   30 mL at 10/14/17 0551   • chlorhexidine (PERIDEX) 0.12 % solution 15 mL  15 mL Mouth/Throat Q12H Arnulfo Marcum MD   15 mL at 10/12/17 2020   • citalopram (CeleXA) tablet 40 mg  40 mg Oral Daily  Arnulfo Marcum MD   40 mg at 10/14/17 0938   • donepezil (ARICEPT) tablet 5 mg  5 mg Oral Nightly Arnulfo Marcum MD   5 mg at 10/13/17 2027   • enoxaparin (LOVENOX) syringe 40 mg  40 mg Subcutaneous Q24H Arnulfo Marcum MD   40 mg at 10/14/17 0815   • furosemide (LASIX) tablet 80 mg  80 mg Oral BID Arnulfo Marcum MD   80 mg at 10/14/17 0938   • gabapentin (NEURONTIN) capsule 600 mg  600 mg Oral TID Arnulfo Marcum MD   600 mg at 10/14/17 0938   • Glatiramer Acetate solution prefilled syringe 40 mg  40 mg Subcutaneous Once per day on Mon Wed Fri Arnulfo Marcum MD   40 mg at 10/13/17 0827   • insulin aspart (novoLOG) injection 0-9 Units  0-9 Units Subcutaneous 4x Daily AC & at Bedtime Arnulfo Marcum MD   4 Units at 10/14/17 0555   • ketorolac (TORADOL) injection 30 mg  30 mg Intravenous Q6H PRN Arnulfo Marcum MD   30 mg at 10/14/17 0529   • levoFLOXacin (LEVAQUIN) 500 mg/100 mL D5W (premix) (LEVAQUIN) 500 mg  500 mg Intravenous Q24H Arnulfo Marcum MD   500 mg at 10/13/17 1826   • levothyroxine (SYNTHROID, LEVOTHROID) tablet 150 mcg  150 mcg Oral Q AM Arnulfo Marcum MD   150 mcg at 10/14/17 0604   • memantine (NAMENDA) tablet 7.5 mg  7.5 mg Oral Nightly Arnulfo Marcum MD   7.5 mg at 10/13/17 2026   • mirtazapine (REMERON) tablet 30 mg  30 mg Oral Nightly Arnulfo Marcum MD   30 mg at 10/13/17 2027   • ondansetron (ZOFRAN) injection 4 mg  4 mg Intravenous Q6H PRN Arnulfo Marcum MD   4 mg at 10/14/17 0529   • ondansetron (ZOFRAN) tablet 4 mg  4 mg Oral Q8H PRN Arnulfo Marcum MD       • Pharmacy to Dose TPN   Does not apply Continuous PRN Arnulfo Marcum MD       • pneumococcal polysaccharide 23-valent (PNEUMOVAX-23) vaccine 0.5 mL  0.5 mL Intramuscular During Hospitalization Arnulfo Marcum MD       • potassium chloride (MICRO-K) CR capsule 10 mEq  10 mEq Oral TID Arnulfo Marcum MD   10 mEq at 10/14/17 0938   • primidone (MYSOLINE) tablet 50 mg  50 mg Oral TID Arnulfo Marcum MD   50 mg at 10/14/17 0938   • rosuvastatin (CRESTOR) tablet 10 mg  10 mg Oral Daily  Arnulfo Marcum MD   10 mg at 10/14/17 0938   • sodium chloride 0.9 % flush 1-10 mL  1-10 mL Intravenous PRN Arnulfo Marcum MD   10 mL at 10/12/17 0931   • tiZANidine (ZANAFLEX) tablet 4 mg  4 mg Oral Q12H PRN Arnulfo Marcum MD   4 mg at 10/12/17 2023       Prior to admission medications:  Prescriptions Prior to Admission   Medication Sig Dispense Refill Last Dose   • [] besifloxacin (BESIVANCE) 0.6 % suspension ophthalmic suspension Administer 1 drop into the left eye Daily.   10/8/2017 at Unknown time   • difluprednate (DUREZOL) 0.05 % ophthalmic emulsion Administer 1 drop into the left eye 2 (Two) Times a Day.      • [] difluprednate (DUREZOL) 0.05 % ophthalmic emulsion Administer 1 drop into the left eye 3 (Three) Times a Day.      • oxyCODONE-acetaminophen (PERCOCET) 7.5-325 MG per tablet Take 1 tablet by mouth Every 6 (Six) Hours As Needed.      • albuterol (PROVENTIL HFA;VENTOLIN HFA) 108 (90 Base) MCG/ACT inhaler Inhale 2 puffs Every 4 (Four) Hours As Needed for Wheezing.      • cholestyramine (QUESTRAN) 4 g packet Take 3 g by mouth 3 (Three) Times a Day With Meals.      • citalopram (CeleXA) 40 MG tablet Take 40 mg by mouth Daily.      • dicyclomine (BENTYL) 10 MG capsule Take 10 mg by mouth 4 (Four) Times a Day As Needed.      • donepezil (ARICEPT) 5 MG tablet Take 5 mg by mouth Every Night.      • furosemide (LASIX) 80 MG tablet Take 80 mg by mouth 2 (Two) Times a Day.      • gabapentin (NEURONTIN) 600 MG tablet Take 600 mg by mouth 3 (Three) Times a Day.      • Glatiramer Acetate (COPAXONE SC) Inject 40 mg under the skin 3 (Three) Times a Week. M, W, F      • HYDROcodone-acetaminophen (NORCO) 7.5-325 MG per tablet Take 1 tablet by mouth Every 4 (Four) Hours As Needed (pain). 40 tablet 0    • levothyroxine (SYNTHROID, LEVOTHROID) 150 MCG tablet Take 150 mcg by mouth Daily.      • memantine (NAMENDA XR) 7 MG capsule sustained-release 24 hr extended release capsule Take 21 mg by mouth Daily.      •  mirtazapine (REMERON) 30 MG tablet Take 30 mg by mouth Every Night.      • ondansetron (ZOFRAN) 4 MG tablet Take 4 mg by mouth Every 8 (Eight) Hours As Needed for Nausea or Vomiting.      • potassium chloride (MICRO-K) 10 MEQ CR capsule Take 10 mEq by mouth 3 (Three) Times a Day.      • primidone (MYSOLINE) 50 MG tablet Take 50 mg by mouth 3 (Three) Times a Day.      • rosuvastatin (CRESTOR) 10 MG tablet Take 10 mg by mouth Daily.      • tiZANidine (ZANAFLEX) 4 MG tablet Take 4 mg by mouth Every 12 (Twelve) Hours As Needed for Muscle Spasms.          Physical exam: alert , not toxic, abd soft, dressings intact.  Idris with bile like drainage    Assessment :  POD 6 pelvic abscess,colon perforation, post op fistula    Plan:  Continue tpn and present care  Pt involved.

## 2017-10-14 NOTE — PROGRESS NOTES
Parenteral Nutrition by Pharmacy    Patient: Irma Pacheco  MRN#: [id number]  Attending: No name on file.  Admission Date: 100817    Subjective/Objective  Progress notes reviewed.     Daily Assessment  Lab Results   Component Value Date    GLUCOSE 181 (H) 10/14/2017    BUN 17 10/14/2017    CREATININE 0.96 10/14/2017    EGFRIFNONA 58 10/14/2017    BCR 17.7 10/14/2017    K 3.8 10/14/2017    CO2 26.0 10/14/2017    CALCIUM 7.5 (L) 10/14/2017    ALBUMIN 3.00 (L) 10/14/2017    LABIL2 1.2 10/14/2017    AST 20 10/14/2017    ALT 15 10/14/2017     Lab Results   Component Value Date    GLUCOSE 181 (H) 10/14/2017    CALCIUM 7.5 (L) 10/14/2017     10/14/2017    K 3.8 10/14/2017    CO2 26.0 10/14/2017     10/14/2017    BUN 17 10/14/2017    CREATININE 0.96 10/14/2017    EGFRIFNONA 58 10/14/2017    BCR 17.7 10/14/2017    ANIONGAP 10.0 10/14/2017     Magnesium   Date Value Ref Range Status   10/14/2017 1.9 1.6 - 2.3 mg/dL Final     Phosphorus   Date Value Ref Range Status   10/14/2017 3.0 2.4 - 4.4 mg/dL Final     Calcium   Date Value Ref Range Status   10/14/2017 7.5 (L) 8.4 - 10.2 mg/dL Final     Triglycerides   Date Value Ref Range Status   10/13/2017 180 20 - 199 mg/dL Final     Above labs reviewed.    Plan   Labs and chart reviewed.  No change to current TPN formulation.  Pharmacy will continue to monitor and adjust formula as needed.    Current TPN formula will be as follows:  Clinimix 5/20%  K Phos 30 mMol  KCl 56 mEq  NaCl 130 mEq  Na Acetate 70 mEq  Calcium Gluconate 10 mEq  Magnesium Sulfate 16 mEq  MVI 10 ml    Yessenia Ordoñez RPH  10/14/17  8:43 AM

## 2017-10-14 NOTE — PLAN OF CARE
Problem: Patient Care Overview (Adult)  Goal: Plan of Care Review  Outcome: Ongoing (interventions implemented as appropriate)  Goal: Adult Individualization and Mutuality  Outcome: Ongoing (interventions implemented as appropriate)  Goal: Discharge Needs Assessment  Outcome: Ongoing (interventions implemented as appropriate)    Problem: Fall Risk (Adult)  Goal: Absence of Falls  Outcome: Ongoing (interventions implemented as appropriate)    Problem: Pain, Acute (Adult)  Goal: Identify Related Risk Factors and Signs and Symptoms  Outcome: Ongoing (interventions implemented as appropriate)  Goal: Acceptable Pain Control/Comfort Level  Outcome: Ongoing (interventions implemented as appropriate)    Problem: Pressure Ulcer Risk (Pedro Scale) (Adult,Obstetrics,Pediatric)  Goal: Skin Integrity  Outcome: Ongoing (interventions implemented as appropriate)    Problem: Bowel Resection (Adult)  Goal: Signs and Symptoms of Listed Potential Problems Will be Absent or Manageable (Bowel Resection)  Outcome: Ongoing (interventions implemented as appropriate)    Problem: Anxiety (Adult)  Goal: Identify Related Risk Factors and Signs and Symptoms  Outcome: Ongoing (interventions implemented as appropriate)  Goal: Reduction/Resolution  Outcome: Ongoing (interventions implemented as appropriate)

## 2017-10-15 LAB
ALBUMIN SERPL-MCNC: 2.9 G/DL (ref 3.4–4.8)
ALBUMIN/GLOB SERPL: 1 G/DL (ref 1.1–1.8)
ALP SERPL-CCNC: 88 U/L (ref 38–126)
ALT SERPL W P-5'-P-CCNC: 19 U/L (ref 9–52)
ANION GAP SERPL CALCULATED.3IONS-SCNC: 8 MMOL/L (ref 5–15)
AST SERPL-CCNC: 17 U/L (ref 14–36)
BASOPHILS # BLD AUTO: 0.05 10*3/MM3 (ref 0–0.2)
BASOPHILS NFR BLD AUTO: 0.5 % (ref 0–2)
BILIRUB SERPL-MCNC: 0.3 MG/DL (ref 0.2–1.3)
BUN BLD-MCNC: 28 MG/DL (ref 7–21)
BUN/CREAT SERPL: 24.8 (ref 7–25)
CA-I BLD-MCNC: 4.4 MG/DL (ref 4.5–4.9)
CALCIUM SPEC-SCNC: 7.8 MG/DL (ref 8.4–10.2)
CHLORIDE SERPL-SCNC: 103 MMOL/L (ref 95–110)
CO2 SERPL-SCNC: 32 MMOL/L (ref 22–31)
CREAT BLD-MCNC: 1.13 MG/DL (ref 0.5–1)
DEPRECATED RDW RBC AUTO: 50.2 FL (ref 36.4–46.3)
EOSINOPHIL # BLD AUTO: 0.16 10*3/MM3 (ref 0–0.7)
EOSINOPHIL NFR BLD AUTO: 1.5 % (ref 0–7)
ERYTHROCYTE [DISTWIDTH] IN BLOOD BY AUTOMATED COUNT: 14.9 % (ref 11.5–14.5)
GFR SERPL CREATININE-BSD FRML MDRD: 48 ML/MIN/1.73 (ref 60–104)
GLOBULIN UR ELPH-MCNC: 2.8 GM/DL (ref 2.3–3.5)
GLUCOSE BLD-MCNC: 123 MG/DL (ref 60–100)
GLUCOSE BLDC GLUCOMTR-MCNC: 103 MG/DL (ref 70–130)
GLUCOSE BLDC GLUCOMTR-MCNC: 114 MG/DL (ref 70–130)
GLUCOSE BLDC GLUCOMTR-MCNC: 128 MG/DL (ref 70–130)
GLUCOSE BLDC GLUCOMTR-MCNC: 135 MG/DL (ref 70–130)
HCT VFR BLD AUTO: 30.2 % (ref 35–45)
HGB BLD-MCNC: 9.5 G/DL (ref 12–15.5)
IMM GRANULOCYTES # BLD: 1 10*3/MM3 (ref 0–0.02)
IMM GRANULOCYTES NFR BLD: 9.3 % (ref 0–0.5)
LYMPHOCYTES # BLD AUTO: 2.01 10*3/MM3 (ref 0.6–4.2)
LYMPHOCYTES NFR BLD AUTO: 18.7 % (ref 10–50)
MAGNESIUM SERPL-MCNC: 2.3 MG/DL (ref 1.6–2.3)
MCH RBC QN AUTO: 29.1 PG (ref 26.5–34)
MCHC RBC AUTO-ENTMCNC: 31.5 G/DL (ref 31.4–36)
MCV RBC AUTO: 92.6 FL (ref 80–98)
MONOCYTES # BLD AUTO: 0.76 10*3/MM3 (ref 0–0.9)
MONOCYTES NFR BLD AUTO: 7.1 % (ref 0–12)
NEUTROPHILS # BLD AUTO: 6.79 10*3/MM3 (ref 2–8.6)
NEUTROPHILS NFR BLD AUTO: 62.9 % (ref 37–80)
PHOSPHATE SERPL-MCNC: 3.9 MG/DL (ref 2.4–4.4)
PLATELET # BLD AUTO: 253 10*3/MM3 (ref 150–450)
PMV BLD AUTO: 10 FL (ref 8–12)
POTASSIUM BLD-SCNC: 5.5 MMOL/L (ref 3.5–5.1)
PROT SERPL-MCNC: 5.7 G/DL (ref 6.3–8.6)
RBC # BLD AUTO: 3.26 10*6/MM3 (ref 3.77–5.16)
SODIUM BLD-SCNC: 143 MMOL/L (ref 137–145)
WBC NRBC COR # BLD: 10.77 10*3/MM3 (ref 3.2–9.8)

## 2017-10-15 PROCEDURE — 25010000002 ONDANSETRON PER 1 MG: Performed by: SURGERY

## 2017-10-15 PROCEDURE — 25010000002 ENOXAPARIN PER 10 MG: Performed by: SURGERY

## 2017-10-15 PROCEDURE — 25010000002 MAGNESIUM SULFATE PER 500 MG OF MAGNESIUM: Performed by: SURGERY

## 2017-10-15 PROCEDURE — 25010000002 LEVOFLOXACIN PER 250 MG: Performed by: SURGERY

## 2017-10-15 PROCEDURE — 80053 COMPREHEN METABOLIC PANEL: CPT | Performed by: SURGERY

## 2017-10-15 PROCEDURE — 25010000002 KETOROLAC TROMETHAMINE PER 15 MG: Performed by: SURGERY

## 2017-10-15 PROCEDURE — 25010000002 CALCIUM GLUCONATE PER 10 ML: Performed by: SURGERY

## 2017-10-15 PROCEDURE — 93010 ELECTROCARDIOGRAM REPORT: CPT | Performed by: INTERNAL MEDICINE

## 2017-10-15 PROCEDURE — 97110 THERAPEUTIC EXERCISES: CPT

## 2017-10-15 PROCEDURE — 84100 ASSAY OF PHOSPHORUS: CPT | Performed by: SURGERY

## 2017-10-15 PROCEDURE — 83735 ASSAY OF MAGNESIUM: CPT | Performed by: SURGERY

## 2017-10-15 PROCEDURE — 85025 COMPLETE CBC W/AUTO DIFF WBC: CPT | Performed by: SURGERY

## 2017-10-15 PROCEDURE — 25010000002 POTASSIUM CHLORIDE PER 2 MEQ OF POTASSIUM: Performed by: SURGERY

## 2017-10-15 PROCEDURE — 82330 ASSAY OF CALCIUM: CPT | Performed by: SURGERY

## 2017-10-15 PROCEDURE — 82962 GLUCOSE BLOOD TEST: CPT

## 2017-10-15 RX ADMIN — POTASSIUM CHLORIDE 10 MEQ: 750 CAPSULE, EXTENDED RELEASE ORAL at 20:02

## 2017-10-15 RX ADMIN — DONEPEZIL HYDROCHLORIDE 5 MG: 5 TABLET, FILM COATED ORAL at 20:01

## 2017-10-15 RX ADMIN — PRIMIDONE 50 MG: 50 TABLET ORAL at 09:02

## 2017-10-15 RX ADMIN — CHLORHEXIDINE GLUCONATE 15 ML: 1.2 RINSE ORAL at 20:03

## 2017-10-15 RX ADMIN — GABAPENTIN 600 MG: 300 CAPSULE ORAL at 09:01

## 2017-10-15 RX ADMIN — FUROSEMIDE 80 MG: 80 TABLET ORAL at 17:59

## 2017-10-15 RX ADMIN — FUROSEMIDE 80 MG: 80 TABLET ORAL at 09:02

## 2017-10-15 RX ADMIN — KETOROLAC TROMETHAMINE 30 MG: 30 INJECTION, SOLUTION INTRAMUSCULAR; INTRAVENOUS at 04:06

## 2017-10-15 RX ADMIN — POTASSIUM CHLORIDE 10 MEQ: 750 CAPSULE, EXTENDED RELEASE ORAL at 09:01

## 2017-10-15 RX ADMIN — LEVOFLOXACIN 500 MG: 5 INJECTION, SOLUTION INTRAVENOUS at 17:56

## 2017-10-15 RX ADMIN — MEMANTINE 7.5 MG: 5 TABLET ORAL at 20:02

## 2017-10-15 RX ADMIN — CITALOPRAM HYDROBROMIDE 40 MG: 40 TABLET ORAL at 09:02

## 2017-10-15 RX ADMIN — TIZANIDINE 4 MG: 4 TABLET ORAL at 09:01

## 2017-10-15 RX ADMIN — ONDANSETRON 4 MG: 2 INJECTION INTRAMUSCULAR; INTRAVENOUS at 04:06

## 2017-10-15 RX ADMIN — KETOROLAC TROMETHAMINE 30 MG: 30 INJECTION, SOLUTION INTRAMUSCULAR; INTRAVENOUS at 15:27

## 2017-10-15 RX ADMIN — MIRTAZAPINE 30 MG: 15 TABLET, FILM COATED ORAL at 20:02

## 2017-10-15 RX ADMIN — POTASSIUM CHLORIDE 10 MEQ: 750 CAPSULE, EXTENDED RELEASE ORAL at 17:56

## 2017-10-15 RX ADMIN — GABAPENTIN 600 MG: 300 CAPSULE ORAL at 20:01

## 2017-10-15 RX ADMIN — LEVOTHYROXINE SODIUM 150 MCG: 150 TABLET ORAL at 06:23

## 2017-10-15 RX ADMIN — I.V. FAT EMULSION 38.6 G: 20 EMULSION INTRAVENOUS at 21:44

## 2017-10-15 RX ADMIN — ENOXAPARIN SODIUM 40 MG: 40 INJECTION SUBCUTANEOUS at 09:01

## 2017-10-15 RX ADMIN — ROSUVASTATIN CALCIUM 10 MG: 10 TABLET, FILM COATED ORAL at 09:02

## 2017-10-15 RX ADMIN — PRIMIDONE 50 MG: 50 TABLET ORAL at 20:02

## 2017-10-15 RX ADMIN — ONDANSETRON 4 MG: 4 TABLET, FILM COATED ORAL at 09:02

## 2017-10-15 RX ADMIN — GABAPENTIN 600 MG: 300 CAPSULE ORAL at 17:56

## 2017-10-15 RX ADMIN — PRIMIDONE 50 MG: 50 TABLET ORAL at 17:57

## 2017-10-15 RX ADMIN — POTASSIUM CHLORIDE: 2 INJECTION, SOLUTION, CONCENTRATE INTRAVENOUS at 17:57

## 2017-10-15 RX ADMIN — ONDANSETRON 4 MG: 2 INJECTION INTRAMUSCULAR; INTRAVENOUS at 20:21

## 2017-10-15 NOTE — PLAN OF CARE
Problem: Patient Care Overview (Adult)  Goal: Plan of Care Review  Outcome: Ongoing (interventions implemented as appropriate)    10/15/17 3963   Coping/Psychosocial Response Interventions   Plan Of Care Reviewed With patient;daughter   Patient Care Overview   Progress progress towards functional goals is fair   Patient has copious drainage from CLARI drain  Goal: Adult Individualization and Mutuality  Outcome: Ongoing (interventions implemented as appropriate)  Goal: Discharge Needs Assessment  Outcome: Ongoing (interventions implemented as appropriate)    Problem: Fall Risk (Adult)  Goal: Absence of Falls  Outcome: Ongoing (interventions implemented as appropriate)    Problem: Pain, Acute (Adult)  Goal: Identify Related Risk Factors and Signs and Symptoms  Outcome: Ongoing (interventions implemented as appropriate)  Goal: Acceptable Pain Control/Comfort Level  Outcome: Ongoing (interventions implemented as appropriate)    Problem: Pressure Ulcer Risk (Pedro Scale) (Adult,Obstetrics,Pediatric)  Goal: Skin Integrity  Outcome: Ongoing (interventions implemented as appropriate)    Problem: Bowel Resection (Adult)  Goal: Signs and Symptoms of Listed Potential Problems Will be Absent or Manageable (Bowel Resection)  Outcome: Ongoing (interventions implemented as appropriate)    Problem: Anxiety (Adult)  Goal: Identify Related Risk Factors and Signs and Symptoms  Outcome: Ongoing (interventions implemented as appropriate)  Goal: Reduction/Resolution  Outcome: Ongoing (interventions implemented as appropriate)

## 2017-10-15 NOTE — PLAN OF CARE
Problem: Patient Care Overview (Adult)  Goal: Plan of Care Review  Outcome: Ongoing (interventions implemented as appropriate)  Goal: Adult Individualization and Mutuality  Outcome: Ongoing (interventions implemented as appropriate)    Problem: Fall Risk (Adult)  Goal: Absence of Falls  Outcome: Ongoing (interventions implemented as appropriate)    Problem: Pain, Acute (Adult)  Goal: Identify Related Risk Factors and Signs and Symptoms  Outcome: Ongoing (interventions implemented as appropriate)  Goal: Acceptable Pain Control/Comfort Level  Outcome: Ongoing (interventions implemented as appropriate)    Problem: Pressure Ulcer Risk (Pedro Scale) (Adult,Obstetrics,Pediatric)  Goal: Skin Integrity  Outcome: Ongoing (interventions implemented as appropriate)    Problem: Bowel Resection (Adult)  Goal: Signs and Symptoms of Listed Potential Problems Will be Absent or Manageable (Bowel Resection)  Outcome: Ongoing (interventions implemented as appropriate)    Problem: Anxiety (Adult)  Goal: Identify Related Risk Factors and Signs and Symptoms  Outcome: Ongoing (interventions implemented as appropriate)  Goal: Reduction/Resolution  Outcome: Ongoing (interventions implemented as appropriate)

## 2017-10-15 NOTE — SIGNIFICANT NOTE
Pt with visitor in room and declines OT at this time.     10/15/17 1350   Rehab Treatment   Discipline occupational therapy assistant   Treatment Not Performed patient/family declined treatment

## 2017-10-15 NOTE — THERAPY TREATMENT NOTE
Acute Care - Physical Therapy Treatment Note  [facility]     Patient Name: Irma Pacheco  : 1950  MRN: [id number]  Today's Date: 10/15/2017  Onset of Illness/Injury or Date of Surgery Date: 10/09/17  Date of Referral to PT: 10/10/17  Referring Physician: Dr. Arnulfo Marcum    Admit Date: 10/8/2017    Visit Dx:    ICD-10-CM ICD-9-CM   1. Pelvic abscess in female N73.9 614.4   2. Impaired functional mobility, balance, gait, and endurance Z74.09 V49.89   3. Impaired mobility and activities of daily living Z74.09 799.89     Patient Active Problem List   Diagnosis   • Intra-abdominal abscess   • Pelvic abscess in female               Adult Rehabilitation Note       10/15/17 1319 10/13/17 1000       Rehab Assessment/Intervention    Discipline physical therapy assistant  -POLA physical therapy assistant  -LN     Document Type therapy note (daily note)  -POLA therapy note (daily note)  -LN     Subjective Information agree to therapy  -POLA agree to therapy;complains of;pain;weakness  -LN     Precautions/Limitations  fall precautions;oxygen therapy device and L/min   watch gt belt placement due to recent abd sx  -LN     Recorded by [POLA] Rosibel Ram PTA [LN] Jannet Garcia PTA     Vital Signs    Pre Systolic BP Rehab 83  -POLA 129  -LN     Pre Treatment Diastolic BP 57  -POLA 60  -LN     Intra Systolic BP Rehab  146  -LN     Intra Treatment Diastolic   -POLA 63  -LN     Post Systolic BP Rehab 63  -POLA 135  -LN     Post Treatment Diastolic BP  63  -LN     Pretreatment Heart Rate (beats/min) 74  -POLA 70  -LN     Intratreatment Heart Rate (beats/min)  82  -LN     Posttreatment Heart Rate (beats/min)  79  -LN     Pre SpO2 (%) 100  -POLA 100  -LN     O2 Delivery Pre Treatment supplemental O2  -POLA supplemental O2  -LN     Intra SpO2 (%) 100  -POLA      Post SpO2 (%)  96  -LN     Pre Patient Position  Side Lying  -LN     Intra Patient Position  Standing  -LN     Post Patient Position  Sitting  -LN     Recorded by [POLA] Rosibel CARRILLO  TERRY Ram [LN] Jannet Garcia, TERRY     Pain Assessment    Pain Assessment 0-10  -POLA 0-10  -LN     Pain Score 8  -POLA 7  -LN     Post Pain Score 8  -POLA 8  -LN     Pain Type Chronic pain  -POLA Acute pain  -LN     Pain Location Back  -POLA Abdomen  -LN     Pain Intervention(s)  --   nsg informed  -LN     Recorded by [POLA] Rosbiel Ram PTA [LN] Jannet Garcia PTA     Vision Assessment/Intervention    Visual Impairment WFL;WNL  -POLA      Recorded by [POLA] Rosibel Ram PTA      Cognitive Assessment/Intervention    Current Cognitive/Communication Assessment functional  -POLA      Orientation Status oriented x 4  -POLA oriented to;person;place     -LN     Follows Commands/Answers Questions 100% of the time  -POLA      Personal Safety WNL/WFL  -POLA      Personal Safety Interventions supervised activity  -POLA      Recorded by [POLA] Rosibel Ram PTA [LN] Jannet Garcia PTA     Bed Mobility, Assessment/Treatment    Bed Mobility, Assistive Device  bed rails;head of bed elevated  -LN     Bed Mobility, Roll Left, [place]  not tested  -LN     Bed Mobility, Roll Right, [place]  minimum assist (75% patient effort)  -LN     Bed Mob, Supine to Sit, [place]  minimum assist (75% patient effort);moderate assist (50% patient effort)  -LN     Bed Mob, Sit to Supine, [place]  not tested  -LN     Bed Mobility, Comment unable due to low BP  -POLA      Recorded by [POLA] Rosibel Ram PTA [LN] Jannet Garcia, TERRY     Transfer Assessment/Treatment    Transfers, Bed-Chair [place]  minimum assist (75% patient effort)  -LN     Transfers, Chair-Bed [place]  not tested  -LN     Transfers, Bed-Chair-Bed, Assist Device  rolling walker  -LN     Transfers, Sit-Stand [place]  minimum assist (75% patient effort)  -LN     Transfers, Stand-Sit [place]  minimum assist (75% patient effort)  -LN     Transfers, Sit-Stand-Sit, Assist Device  rolling walker  -LN     Toilet Transfer, [place]  not tested  -LN     Recorded by  [LN]  Jannet Garcia, PTA     Gait Assessment/Treatment    Gait, [place] Level  minimum assist (75% patient effort)  -LN     Gait, Assistive Device  rolling walker  -LN     Gait, Distance (Feet)  3  -LN     Gait, Gait Deviations  palomo decreased;step length decreased  -LN     Recorded by  [LN] Jannet Garcia, TERRY     Therapy Exercises    Bilateral Lower Extremities AROM:;20 reps;supine;ankle pumps/circles;glut sets;heel slides;hip ER;hip IR;SAQ;quad sets;hip abduction/adduction  -POLA AROM:;20 reps;sitting;ankle pumps/circles;hip abduction/adduction;LAQ  -LN     Recorded by [POLA] Rosibel Ram PTA [LN] Jannet Garcia, TERRY     Positioning and Restraints    Post Treatment Position  chair  -LN     In Chair  notified nsg;sitting;call light within reach;encouraged to call for assist;with family/caregiver;legs elevated  -LN     Recorded by  [LN] Jannet Garcia PTA       User Key  (r) = Recorded By, (t) = Taken By, (c) = Cosigned By    Initials Name Effective Dates    POLA Rosibel Ram, TERRY 10/17/16 -     LN Jannet Garcia PTA 10/17/16 -                 IP PT Goals       10/15/17 1319 10/13/17 1000 10/11/17 1309    Transfer Training PT LTG    Transfer Training PT LTG, Date Established   10/11/17  -    Transfer Training PT LTG, Time to Achieve   by discharge  -    Transfer Training PT LTG, Activity Type   bed to chair /chair to bed;sit to stand/stand to sit  -SHRAVAN    Transfer Training PT LTG, [place] Level   supervision required  -SHRAVAN    Transfer Training PT  LTG, Date Goal Reviewed 10/15/17  -POLA 10/13/17  -LN     Transfer Training PT LTG, Outcome goal ongoing  - goal not met  -LN goal ongoing  -    Gait Training PT LTG    Gait Training Goal PT LTG, Date Established   10/11/17  -SHRAVAN    Gait Training Goal PT LTG, Time to Achieve   by discharge  -SHRAVAN    Gait Training Goal PT LTG, [place] Level   supervision required  -SHRAVAN    Gait Training Goal PT LTG, Distance to Achieve   150ft  -SHRAVAN    Gait Training Goal PT LTG,  Additional Goal   300ft  -    Gait Training Goal PT LTG, Date Goal Reviewed 10/15/17  -POLA 10/13/17  -LN     Gait Training Goal PT LTG, Outcome goal ongoing  - goal not met  -LN goal ongoing  -    Strength Goal PT LTG    Strength Goal PT LTG, Date Established   10/11/17  -    Strength Goal PT LTG, Time to Achieve   by discharge  -    Strength Goal PT LTG, Measure to Achieve   Pt will perform 15 reps of AROM exercises   -    Strength Goal PT LTG, Date Goal Reviewed  10/13/17  -LN     Strength Goal PT LTG, Outcome  goal met  -LN goal ongoing  -    Patient Education PT LTG    Patient Education PT LTG, Date Established   10/11/17  -    Patient Education PT LTG, Time to Achieve   by discharge  -    Patient Education PT LTG, Education Type   gait;transfers;home safety  -    Patient Education PT LTG, Date Goal Reviewed 10/15/17  -POLA 10/13/17  -LN     Patient Education PT LTG Outcome goal ongoing  - goal not met  -LN goal ongoing  -      User Key  (r) = Recorded By, (t) = Taken By, (c) = Cosigned By    Initials Name Provider Type    SHRAVAN Cain, PT Physical Therapist    POLA Rosibel Ram, PTA Physical Therapy Assistant    LN Jannet Garcia, PTA Physical Therapy Assistant          Physical Therapy Education     Title: PT OT SLP Therapies (Active)     Topic: Physical Therapy (Active)     Point: Mobility training (Active)    Learning Progress Summary    Learner Readiness Method Response Comment Documented by Status   Patient Acceptance E NR  LN 10/13/17 1259 Active    Acceptance E NR  SHRAVAN 10/11/17 1308 Active   Family Acceptance E NR  SHRAVAN 10/11/17 1308 Active               Point: Home exercise program (Active)    Learning Progress Summary    Learner Readiness Method Response Comment Documented by Status   Patient Acceptance E NR  LN 10/13/17 1259 Active               Point: Body mechanics (Active)    Learning Progress Summary    Learner Readiness Method Response Comment Documented by Status   Patient  Acceptance E NR  LN 10/13/17 1259 Active               Point: Precautions (Active)    Learning Progress Summary    Learner Readiness Method Response Comment Documented by Status   Patient Acceptance E NR  LN 10/13/17 1259 Active    Acceptance E NR  SHRAVAN 10/11/17 1308 Active   Family Acceptance E NR  SHRAVAN 10/11/17 1308 Active                      User Key     Initials Effective Dates Name Provider Type Discipline     10/17/16 -  Carla Cain, PT Physical Therapist PT    LN 10/17/16 -  Jannet Garcia, PTA Physical Therapy Assistant PT                    PT Recommendation and Plan  Anticipated Discharge Disposition: skilled nursing facility  Planned Therapy Interventions: balance training, bed mobility training, gait training, patient/family education, strengthening, transfer training  PT Frequency: other (see comments) (5-14 times per week)  Plan of Care Review  Plan Of Care Reviewed With: patient  Progress: progress toward functional goals as expected  Outcome Summary/Follow up Plan: pt unable to get OOB at this time due to Low BP, pt did tolerated exercises well with no increased pain.           Outcome Measures       10/15/17 1319 10/13/17 1000       How much help from another person do you currently need...    Turning from your back to your side while in flat bed without using bedrails? 3  -POLA 3  -LN     Moving from lying on back to sitting on the side of a flat bed without bedrails? 3  -POLA 3  -LN     Moving to and from a bed to a chair (including a wheelchair)? 3  -POLA 3  -LN     Standing up from a chair using your arms (e.g., wheelchair, bedside chair)? 3  -POLA 3  -LN     Climbing 3-5 steps with a railing? 2  -POLA 2  -LN     To walk in hospital room? 3  -POLA 3  -LN     AM-PAC 6 Clicks Score 17  -POLA 17  -LN     Functional Assessment    Outcome Measure Options  AM-PAC 6 Clicks Basic Mobility (PT)  -LN       User Key  (r) = Recorded By, (t) = Taken By, (c) = Cosigned By    Initials Name Provider Type    POLA Ram,  TERRY Physical Therapy Assistant    KATH Garcia PTA Physical Therapy Assistant           Time Calculation:         PT Charges       10/15/17 1414          Time Calculation    Start Time 1319  -POLA      Stop Time 1405  -POLA      Time Calculation (min) 46 min  -POLA      Time Calculation- PT    Total Timed Code Minutes- PT 46 minute(s)  -POLA        User Key  (r) = Recorded By, (t) = Taken By, (c) = Cosigned By    Initials Name Provider Type    POLA Ram PTA Physical Therapy Assistant          Therapy Charges for Today     Code Description Service Date Service Provider Modifiers Qty    68578475610 HC PT THER PROC EA 15 MIN 10/15/2017 Rosibel Ram PTA GP 3     Duration:  46m ( 1:19 PM -  2:05 PM)            PT G-Codes  PT Professional Judgement Used?: Yes  Outcome Measure Options: AM-PAC 6 Clicks Basic Mobility (PT)  Score: 15  Functional Limitation: Mobility: Walking and moving around  Mobility: Walking and Moving Around Current Status (): At least 40 percent but less than 60 percent impaired, limited or restricted  Mobility: Walking and Moving Around Goal Status (): At least 20 percent but less than 40 percent impaired, limited or restricted    Rosibel Ram PTA  10/15/2017

## 2017-10-15 NOTE — PROGRESS NOTES
Parenteral Nutrition by Pharmacy    Patient: Irma Pacheco  MRN#: [id number]  Attending: No name on file.  Admission Date: 100817    Subjective/Objective  Progress notes reviewed.     Daily Assessment  Lab Results   Component Value Date    GLUCOSE 123 (H) 10/15/2017    BUN 28 (H) 10/15/2017    CREATININE 1.13 (H) 10/15/2017    EGFRIFNONA 48 (L) 10/15/2017    BCR 24.8 10/15/2017    K 5.5 (H) 10/15/2017    CO2 32.0 (H) 10/15/2017    CALCIUM 7.8 (L) 10/15/2017    ALBUMIN 2.90 (L) 10/15/2017    LABIL2 1.0 (L) 10/15/2017    AST 17 10/15/2017    ALT 19 10/15/2017     Lab Results   Component Value Date    GLUCOSE 123 (H) 10/15/2017    CALCIUM 7.8 (L) 10/15/2017     10/15/2017    K 5.5 (H) 10/15/2017    CO2 32.0 (H) 10/15/2017     10/15/2017    BUN 28 (H) 10/15/2017    CREATININE 1.13 (H) 10/15/2017    EGFRIFNONA 48 (L) 10/15/2017    BCR 24.8 10/15/2017    ANIONGAP 8.0 10/15/2017     Magnesium   Date Value Ref Range Status   10/15/2017 2.3 1.6 - 2.3 mg/dL Final     Phosphorus   Date Value Ref Range Status   10/15/2017 3.9 2.4 - 4.4 mg/dL Final     Calcium   Date Value Ref Range Status   10/15/2017 7.8 (L) 8.4 - 10.2 mg/dL Final     Triglycerides   Date Value Ref Range Status   10/13/2017 180 20 - 199 mg/dL Final     Above labs reviewed.    Plan   Potassium is 5.5.  Patient receiving potassium chloride tablets 10 meq tid and potassium in TPN.  Potassium in TPN reduced by 40 meq daily, magnesium adjusted to 12 meq, acetate decreased to 30 meq.  Pharmacy will continue to monitor and adjust formula as needed.  New TPN formulation:    Clinimix 5/20%  K Phos 30 mMol  KCl 10 mEq  NaCl 160 mEq  Na Acetate 30 mEq  Calcium Gluconate 10 mEq  Magnesium Sulfate 12 mEq  MVI 10 ml  Infusing at 80 ml/hr.  Yessenia Ordoñez RPH  10/15/17  10:42 AM

## 2017-10-15 NOTE — PROGRESS NOTES
"  Subjective:   Stable overnight. Idris, urine draining bile.      BP 93/63  Pulse 76  Temp 98.6 °F (37 °C) (Axillary)   Resp 14  Ht 60\" (152.4 cm)  Wt 246 lb 4.1 oz (112 kg)  SpO2 100%  BMI 48.09 kg/m2    Lab Results (last 24 hours)     Procedure Component Value Units Date/Time    Calcium, Ionized [[id number]]  (Abnormal) Collected:  10/14/17 1207    Specimen:  Blood Updated:  10/14/17 1218     Ionized Calcium 4.00 (L) mg/dL     POC Glucose Fingerstick [[id number]]  (Abnormal) Collected:  10/14/17 1724    Specimen:  Blood Updated:  10/14/17 1740     Glucose 159 (H) mg/dL       Sliding Scale AdminMeter: [id number]: [id number] BLAKE ALICEA       POC Glucose Fingerstick [[id number]]  (Abnormal) Collected:  10/14/17 2048    Specimen:  Blood Updated:  10/14/17 2356     Glucose 149 (H) mg/dL       Sliding Scale AdminMeter: [id number]: [id number] NAVEED AGUILAR       Calcium, Ionized [[id number]]  (Abnormal) Collected:  10/15/17 0432    Specimen:  Blood Updated:  10/15/17 0506     Ionized Calcium 4.40 (L) mg/dL     Magnesium [[id number]]  (Normal) Collected:  10/15/17 0432    Specimen:  Blood Updated:  10/15/17 0533     Magnesium 2.3 mg/dL     Comprehensive Metabolic Panel [[id number]]  (Abnormal) Collected:  10/15/17 0432    Specimen:  Blood Updated:  10/15/17 0536     Glucose 123 (H) mg/dL      BUN 28 (H) mg/dL      Creatinine 1.13 (H) mg/dL      Sodium 143 mmol/L      Potassium 5.5 (H) mmol/L      Chloride 103 mmol/L      CO2 32.0 (H) mmol/L      Calcium 7.8 (L) mg/dL      Total Protein 5.7 (L) g/dL      Albumin 2.90 (L) g/dL      ALT (SGPT) 19 U/L      AST (SGOT) 17 U/L      Alkaline Phosphatase 88 U/L      Total Bilirubin 0.3 mg/dL      eGFR Non African Amer 48 (L) mL/min/1.73      Globulin 2.8 gm/dL      A/G Ratio 1.0 (L) g/dL      BUN/Creatinine Ratio 24.8     Anion Gap 8.0 mmol/L     Phosphorus [[id number]]  (Normal) Collected:  10/15/17 0432    Specimen:  Blood Updated:  10/15/17 0536     " Phosphorus 3.9 mg/dL     CBC & Differential [[id number]] Collected:  10/15/17 0432    Specimen:  Blood Updated:  10/15/17 0605    Narrative:       The following orders were created for panel order CBC & Differential.  Procedure                               Abnormality         Status                     ---------                               -----------         ------                     Scan Slide[[id number]]                                                                  CBC Auto Differential[[id number]]        Abnormal            Final result                 Please view results for these tests on the individual orders.    CBC Auto Differential [[id number]]  (Abnormal) Collected:  10/15/17 0432    Specimen:  Blood Updated:  10/15/17 0605     WBC 10.77 (H) 10*3/mm3      RBC 3.26 (L) 10*6/mm3      Hemoglobin 9.5 (L) g/dL      Hematocrit 30.2 (L) %      MCV 92.6 fL      MCH 29.1 pg      MCHC 31.5 g/dL      RDW 14.9 (H) %      RDW-SD 50.2 (H) fl      MPV 10.0 fL      Platelets 253 10*3/mm3      Neutrophil % 62.9 %      Lymphocyte % 18.7 %      Monocyte % 7.1 %      Eosinophil % 1.5 %      Basophil % 0.5 %      Immature Grans % 9.3 (H) %      Neutrophils, Absolute 6.79 10*3/mm3      Lymphocytes, Absolute 2.01 10*3/mm3      Monocytes, Absolute 0.76 10*3/mm3      Eosinophils, Absolute 0.16 10*3/mm3      Basophils, Absolute 0.05 10*3/mm3      Immature Grans, Absolute 1.00 (H) 10*3/mm3     POC Glucose Fingerstick [[id number]]  (Normal) Collected:  10/15/17 0559    Specimen:  Blood Updated:  10/15/17 0618     Glucose 128 mg/dL       Sliding Scale AdminMeter: [id number]: [id number] NAVEED AGUILAR             Current Medications:  Current Facility-Administered Medications   Medication Dose Route Frequency Provider Last Rate Last Dose   • acetaminophen (TYLENOL) tablet 500 mg  500 mg Oral Q6H PRN Arnulfo Marcum MD   500 mg at 10/12/17 1356   • Adult [facility] TPN   Intravenous Q24H (TPN) Fran Betts MD        And    • fat emulsion (INTRALIPID,LIPOSYN) 20 % infusion 38.6 g  193 mL Intravenous Q24H Fran Betts MD       • Adult [facility] [employer] TPN   Intravenous Q24H (TPN) Fran Betts MD       • Adult [facility] [employer] TPN   Intravenous Q24H (TPN) Fran Betts MD       • aluminum-magnesium hydroxide-simethicone (MAALOX MAX) 400-400-40 MG/5ML suspension 30 mL  30 mL Oral Q6H PRN Arnulfo Marcum MD   30 mL at 10/14/17 2050   • chlorhexidine (PERIDEX) 0.12 % solution 15 mL  15 mL Mouth/Throat Q12H Arnulfo Marcum MD   Stopped at 10/14/17 2100   • citalopram (CeleXA) tablet 40 mg  40 mg Oral Daily Arnulfo Marcum MD   40 mg at 10/15/17 0902   • donepezil (ARICEPT) tablet 5 mg  5 mg Oral Nightly Arnulfo Marcum MD   5 mg at 10/14/17 2030   • enoxaparin (LOVENOX) syringe 40 mg  40 mg Subcutaneous Q24H Arnulfo Marcum MD   40 mg at 10/15/17 0901   • furosemide (LASIX) tablet 80 mg  80 mg Oral BID Arnulfo Marcum MD   80 mg at 10/15/17 0902   • gabapentin (NEURONTIN) capsule 600 mg  600 mg Oral TID Arnulfo Marcum MD   600 mg at 10/15/17 0901   • Glatiramer Acetate solution prefilled syringe 40 mg  40 mg Subcutaneous Once per day on Mon Wed Fri Arnulfo Marcum MD   40 mg at 10/13/17 0827   • insulin aspart (novoLOG) injection 0-9 Units  0-9 Units Subcutaneous 4x Daily AC & at Bedtime Arnulfo Marcum MD   Stopped at 10/14/17 2048   • ketorolac (TORADOL) injection 30 mg  30 mg Intravenous Q6H PRN Arnulfo Marcum MD   30 mg at 10/15/17 0406   • levoFLOXacin (LEVAQUIN) 500 mg/100 mL D5W (premix) (LEVAQUIN) 500 mg  500 mg Intravenous Q24H Arnulfo Marcum MD   500 mg at 10/14/17 1853   • levothyroxine (SYNTHROID, LEVOTHROID) tablet 150 mcg  150 mcg Oral Q AM Arnulfo Marcum MD   150 mcg at 10/15/17 0623   • memantine (NAMENDA) tablet 7.5 mg  7.5 mg Oral Nightly Arnulfo Marcum MD   7.5 mg at 10/14/17 2031   • mirtazapine (REMERON) tablet 30 mg  30 mg Oral Nightly Arnulfo Marcum MD   30 mg at 10/14/17 2031   • ondansetron (ZOFRAN) injection 4 mg  4 mg Intravenous Q6H  PRN Arnulfo Marcum MD   4 mg at 10/15/17 0406   • ondansetron (ZOFRAN) tablet 4 mg  4 mg Oral Q8H PRN Arnulfo Marcum MD   4 mg at 10/15/17 0902   • Pharmacy to Dose TPN   Does not apply Continuous PRN Arnulfo Marcum MD       • pneumococcal polysaccharide 23-valent (PNEUMOVAX-23) vaccine 0.5 mL  0.5 mL Intramuscular During Hospitalization Arnulfo Marcum MD       • potassium chloride (MICRO-K) CR capsule 10 mEq  10 mEq Oral TID Arnulfo Marcum MD   10 mEq at 10/15/17 09   • primidone (MYSOLINE) tablet 50 mg  50 mg Oral TID Arnulfo Marcum MD   50 mg at 10/15/17 09   • rosuvastatin (CRESTOR) tablet 10 mg  10 mg Oral Daily Arnulfo Marcum MD   10 mg at 10/15/17 09   • sodium chloride 0.9 % flush 1-10 mL  1-10 mL Intravenous PRN Arnulfo Marcum MD   10 mL at 10/12/17 0931   • tiZANidine (ZANAFLEX) tablet 4 mg  4 mg Oral Q12H PRN Arnulfo Marcum MD   4 mg at 10/15/17 09       Prior to admission medications:  Prescriptions Prior to Admission   Medication Sig Dispense Refill Last Dose   • [] besifloxacin (BESIVANCE) 0.6 % suspension ophthalmic suspension Administer 1 drop into the left eye Daily.   10/8/2017 at Unknown time   • difluprednate (DUREZOL) 0.05 % ophthalmic emulsion Administer 1 drop into the left eye 2 (Two) Times a Day.      • [] difluprednate (DUREZOL) 0.05 % ophthalmic emulsion Administer 1 drop into the left eye 3 (Three) Times a Day.      • oxyCODONE-acetaminophen (PERCOCET) 7.5-325 MG per tablet Take 1 tablet by mouth Every 6 (Six) Hours As Needed.      • albuterol (PROVENTIL HFA;VENTOLIN HFA) 108 (90 Base) MCG/ACT inhaler Inhale 2 puffs Every 4 (Four) Hours As Needed for Wheezing.      • cholestyramine (QUESTRAN) 4 g packet Take 3 g by mouth 3 (Three) Times a Day With Meals.      • citalopram (CeleXA) 40 MG tablet Take 40 mg by mouth Daily.      • dicyclomine (BENTYL) 10 MG capsule Take 10 mg by mouth 4 (Four) Times a Day As Needed.      • donepezil (ARICEPT) 5 MG tablet Take 5 mg by mouth Every Night.       • furosemide (LASIX) 80 MG tablet Take 80 mg by mouth 2 (Two) Times a Day.      • gabapentin (NEURONTIN) 600 MG tablet Take 600 mg by mouth 3 (Three) Times a Day.      • Glatiramer Acetate (COPAXONE SC) Inject 40 mg under the skin 3 (Three) Times a Week. M, W, F      • HYDROcodone-acetaminophen (NORCO) 7.5-325 MG per tablet Take 1 tablet by mouth Every 4 (Four) Hours As Needed (pain). 40 tablet 0    • levothyroxine (SYNTHROID, LEVOTHROID) 150 MCG tablet Take 150 mcg by mouth Daily.      • memantine (NAMENDA XR) 7 MG capsule sustained-release 24 hr extended release capsule Take 21 mg by mouth Daily.      • mirtazapine (REMERON) 30 MG tablet Take 30 mg by mouth Every Night.      • ondansetron (ZOFRAN) 4 MG tablet Take 4 mg by mouth Every 8 (Eight) Hours As Needed for Nausea or Vomiting.      • potassium chloride (MICRO-K) 10 MEQ CR capsule Take 10 mEq by mouth 3 (Three) Times a Day.      • primidone (MYSOLINE) 50 MG tablet Take 50 mg by mouth 3 (Three) Times a Day.      • rosuvastatin (CRESTOR) 10 MG tablet Take 10 mg by mouth Daily.      • tiZANidine (ZANAFLEX) 4 MG tablet Take 4 mg by mouth Every 12 (Twelve) Hours As Needed for Muscle Spasms.          Physical exam: alert , not toxic appearance, abdomen soft, dressings intact.  Idris, urine  draining bile.     Assessment :  POD #7 for pelvic abscess,colon perforation, post op fistula    Plan: Potassium was 5.5 will adjust TPN, have called pharmacy. Continue to assess fistulas, monitor output, fistulas will need to be studied at some time.   Pt involved.            Pt seen and evaluated with resident.  Agree with above note and plan.  Fully discussed with nursing staff and patient and family member.

## 2017-10-15 NOTE — PLAN OF CARE
Problem: Patient Care Overview (Adult)  Goal: Plan of Care Review  Outcome: Ongoing (interventions implemented as appropriate)    10/15/17 1319   Coping/Psychosocial Response Interventions   Plan Of Care Reviewed With patient   Patient Care Overview   Progress progress toward functional goals as expected   Outcome Evaluation   Outcome Summary/Follow up Plan pt unable to get OOB at this time due to Low BP, pt tolerated exercises well with no increased pain.          Problem: Inpatient Physical Therapy  Goal: Transfer Training Goal 1 LTG- PT  Outcome: Ongoing (interventions implemented as appropriate)    10/11/17 1309 10/15/17 1319   Transfer Training PT LTG   Transfer Training PT LTG, Date Established 10/11/17 --    Transfer Training PT LTG, Time to Achieve by discharge --    Transfer Training PT LTG, Activity Type bed to chair /chair to bed;sit to stand/stand to sit --    Transfer Training PT LTG, [place] Level supervision required --    Transfer Training PT LTG, Date Goal Reviewed --  10/15/17   Transfer Training PT LTG, Outcome --  goal ongoing       Goal: Gait Training Goal LTG- PT  Outcome: Ongoing (interventions implemented as appropriate)    10/11/17 1309 10/15/17 1319   Gait Training PT LTG   Gait Training Goal PT LTG, Date Established 10/11/17 --    Gait Training Goal PT LTG, Time to Achieve by discharge --    Gait Training Goal PT LTG, [place] Level supervision required --    Gait Training Goal PT LTG, Distance to Achieve 150ft --    Gait Training Goal PT LTG, Additional Goal 300ft --    Gait Training Goal PT LTG, Date Goal Reviewed --  10/15/17   Gait Training Goal PT LTG, Outcome --  goal ongoing       Goal: Patient Education Goal LTG- PT  Outcome: Ongoing (interventions implemented as appropriate)    10/11/17 1309 10/15/17 1319   Patient Education PT LTG   Patient Education PT LTG, Date Established 10/11/17 --    Patient Education PT LTG, Time to Achieve by discharge --    Patient Education PT  LTG, Education Type gait;transfers;home safety --    Patient Education PT LTG, Date Goal Reviewed --  10/15/17   Patient Education PT LTG Outcome --  goal ongoing

## 2017-10-16 LAB
ALBUMIN SERPL-MCNC: 3 G/DL (ref 3.4–4.8)
ALBUMIN/GLOB SERPL: 1.1 G/DL (ref 1.1–1.8)
ALP SERPL-CCNC: 96 U/L (ref 38–126)
ALT SERPL W P-5'-P-CCNC: 24 U/L (ref 9–52)
ANION GAP SERPL CALCULATED.3IONS-SCNC: 9 MMOL/L (ref 5–15)
AST SERPL-CCNC: 26 U/L (ref 14–36)
BASOPHILS # BLD AUTO: 0.07 10*3/MM3 (ref 0–0.2)
BASOPHILS NFR BLD AUTO: 0.7 % (ref 0–2)
BILIRUB SERPL-MCNC: 0.4 MG/DL (ref 0.2–1.3)
BUN BLD-MCNC: 31 MG/DL (ref 7–21)
BUN/CREAT SERPL: 28.7 (ref 7–25)
CA-I BLD-MCNC: 3.7 MG/DL (ref 4.5–4.9)
CALCIUM SPEC-SCNC: 7.5 MG/DL (ref 8.4–10.2)
CHLORIDE SERPL-SCNC: 100 MMOL/L (ref 95–110)
CO2 SERPL-SCNC: 28 MMOL/L (ref 22–31)
CREAT BLD-MCNC: 1.08 MG/DL (ref 0.5–1)
CREAT UR-MCNC: 2 MG/DL
CRP SERPL-MCNC: 1.9 MG/DL (ref 0–1)
DEPRECATED RDW RBC AUTO: 49.8 FL (ref 36.4–46.3)
EOSINOPHIL # BLD AUTO: 0.15 10*3/MM3 (ref 0–0.7)
EOSINOPHIL NFR BLD AUTO: 1.4 % (ref 0–7)
ERYTHROCYTE [DISTWIDTH] IN BLOOD BY AUTOMATED COUNT: 14.9 % (ref 11.5–14.5)
GFR SERPL CREATININE-BSD FRML MDRD: 51 ML/MIN/1.73 (ref 45–104)
GLOBULIN UR ELPH-MCNC: 2.8 GM/DL (ref 2.3–3.5)
GLUCOSE BLD-MCNC: 109 MG/DL (ref 60–100)
GLUCOSE BLDC GLUCOMTR-MCNC: 101 MG/DL (ref 70–130)
GLUCOSE BLDC GLUCOMTR-MCNC: 124 MG/DL (ref 70–130)
GLUCOSE BLDC GLUCOMTR-MCNC: 126 MG/DL (ref 70–130)
GLUCOSE BLDC GLUCOMTR-MCNC: 138 MG/DL (ref 70–130)
HCT VFR BLD AUTO: 28.9 % (ref 35–45)
HGB BLD-MCNC: 9.3 G/DL (ref 12–15.5)
IMM GRANULOCYTES # BLD: 0.73 10*3/MM3 (ref 0–0.02)
IMM GRANULOCYTES NFR BLD: 7.1 % (ref 0–0.5)
LYMPHOCYTES # BLD AUTO: 1.79 10*3/MM3 (ref 0.6–4.2)
LYMPHOCYTES NFR BLD AUTO: 17.3 % (ref 10–50)
MAGNESIUM SERPL-MCNC: 2.3 MG/DL (ref 1.6–2.3)
MCH RBC QN AUTO: 29.5 PG (ref 26.5–34)
MCHC RBC AUTO-ENTMCNC: 32.2 G/DL (ref 31.4–36)
MCV RBC AUTO: 91.7 FL (ref 80–98)
MONOCYTES # BLD AUTO: 0.61 10*3/MM3 (ref 0–0.9)
MONOCYTES NFR BLD AUTO: 5.9 % (ref 0–12)
NEUTROPHILS # BLD AUTO: 7 10*3/MM3 (ref 2–8.6)
NEUTROPHILS NFR BLD AUTO: 67.6 % (ref 37–80)
PHOSPHATE SERPL-MCNC: 4.6 MG/DL (ref 2.4–4.4)
PLATELET # BLD AUTO: 242 10*3/MM3 (ref 150–450)
PMV BLD AUTO: 10.2 FL (ref 8–12)
POTASSIUM BLD-SCNC: 4.3 MMOL/L (ref 3.5–5.1)
PREALB SERPL-MCNC: 23.9 MG/DL (ref 17.6–36)
PROT SERPL-MCNC: 5.8 G/DL (ref 6.3–8.6)
RBC # BLD AUTO: 3.15 10*6/MM3 (ref 3.77–5.16)
SODIUM BLD-SCNC: 137 MMOL/L (ref 137–145)
WBC NRBC COR # BLD: 10.35 10*3/MM3 (ref 3.2–9.8)

## 2017-10-16 PROCEDURE — 85025 COMPLETE CBC W/AUTO DIFF WBC: CPT | Performed by: SURGERY

## 2017-10-16 PROCEDURE — 80053 COMPREHEN METABOLIC PANEL: CPT | Performed by: SURGERY

## 2017-10-16 PROCEDURE — 84134 ASSAY OF PREALBUMIN: CPT | Performed by: SURGERY

## 2017-10-16 PROCEDURE — 25010000002 LEVOFLOXACIN PER 250 MG: Performed by: SURGERY

## 2017-10-16 PROCEDURE — 86140 C-REACTIVE PROTEIN: CPT | Performed by: SURGERY

## 2017-10-16 PROCEDURE — 25010000002 CALCIUM GLUCONATE PER 10 ML: Performed by: SURGERY

## 2017-10-16 PROCEDURE — 97110 THERAPEUTIC EXERCISES: CPT

## 2017-10-16 PROCEDURE — 82962 GLUCOSE BLOOD TEST: CPT

## 2017-10-16 PROCEDURE — 83735 ASSAY OF MAGNESIUM: CPT | Performed by: SURGERY

## 2017-10-16 PROCEDURE — 97530 THERAPEUTIC ACTIVITIES: CPT

## 2017-10-16 PROCEDURE — 82570 ASSAY OF URINE CREATININE: CPT | Performed by: SURGERY

## 2017-10-16 PROCEDURE — 25010000002 MAGNESIUM SULFATE PER 500 MG OF MAGNESIUM: Performed by: SURGERY

## 2017-10-16 PROCEDURE — 25010000002 ENOXAPARIN PER 10 MG: Performed by: SURGERY

## 2017-10-16 PROCEDURE — 25010000002 POTASSIUM CHLORIDE PER 2 MEQ OF POTASSIUM: Performed by: SURGERY

## 2017-10-16 PROCEDURE — 99024 POSTOP FOLLOW-UP VISIT: CPT | Performed by: SURGERY

## 2017-10-16 PROCEDURE — 25010000002 FUROSEMIDE PER 20 MG: Performed by: SURGERY

## 2017-10-16 PROCEDURE — 25010000002 KETOROLAC TROMETHAMINE PER 15 MG: Performed by: SURGERY

## 2017-10-16 PROCEDURE — 82330 ASSAY OF CALCIUM: CPT | Performed by: SURGERY

## 2017-10-16 PROCEDURE — 93005 ELECTROCARDIOGRAM TRACING: CPT | Performed by: SURGERY

## 2017-10-16 PROCEDURE — 84100 ASSAY OF PHOSPHORUS: CPT | Performed by: SURGERY

## 2017-10-16 RX ORDER — SODIUM CHLORIDE 9 MG/ML
INJECTION, SOLUTION INTRAVENOUS
Status: DISPENSED
Start: 2017-10-16 | End: 2017-10-16

## 2017-10-16 RX ORDER — FUROSEMIDE 10 MG/ML
20 INJECTION INTRAMUSCULAR; INTRAVENOUS 2 TIMES DAILY
Status: DISCONTINUED | OUTPATIENT
Start: 2017-10-16 | End: 2017-10-17 | Stop reason: HOSPADM

## 2017-10-16 RX ADMIN — LEVOFLOXACIN 500 MG: 5 INJECTION, SOLUTION INTRAVENOUS at 18:18

## 2017-10-16 RX ADMIN — GABAPENTIN 600 MG: 300 CAPSULE ORAL at 18:18

## 2017-10-16 RX ADMIN — POTASSIUM CHLORIDE 10 MEQ: 750 CAPSULE, EXTENDED RELEASE ORAL at 18:17

## 2017-10-16 RX ADMIN — ENOXAPARIN SODIUM 40 MG: 40 INJECTION SUBCUTANEOUS at 08:14

## 2017-10-16 RX ADMIN — CITALOPRAM HYDROBROMIDE 40 MG: 40 TABLET ORAL at 08:16

## 2017-10-16 RX ADMIN — GABAPENTIN 600 MG: 300 CAPSULE ORAL at 22:33

## 2017-10-16 RX ADMIN — FUROSEMIDE 20 MG: 10 INJECTION, SOLUTION INTRAVENOUS at 18:20

## 2017-10-16 RX ADMIN — TIZANIDINE 4 MG: 4 TABLET ORAL at 20:50

## 2017-10-16 RX ADMIN — PRIMIDONE 50 MG: 50 TABLET ORAL at 18:20

## 2017-10-16 RX ADMIN — PRIMIDONE 50 MG: 50 TABLET ORAL at 09:18

## 2017-10-16 RX ADMIN — CHLORHEXIDINE GLUCONATE 15 ML: 1.2 RINSE ORAL at 20:57

## 2017-10-16 RX ADMIN — GABAPENTIN 600 MG: 300 CAPSULE ORAL at 08:16

## 2017-10-16 RX ADMIN — KETOROLAC TROMETHAMINE 30 MG: 30 INJECTION, SOLUTION INTRAMUSCULAR; INTRAVENOUS at 10:15

## 2017-10-16 RX ADMIN — MEMANTINE 7.5 MG: 5 TABLET ORAL at 20:51

## 2017-10-16 RX ADMIN — POTASSIUM CHLORIDE: 2 INJECTION, SOLUTION, CONCENTRATE INTRAVENOUS at 18:20

## 2017-10-16 RX ADMIN — ALUMINUM HYDROXIDE, MAGNESIUM HYDROXIDE, AND DIMETHICONE 30 ML: 400; 400; 40 SUSPENSION ORAL at 21:22

## 2017-10-16 RX ADMIN — ALUMINUM HYDROXIDE, MAGNESIUM HYDROXIDE, AND DIMETHICONE 30 ML: 400; 400; 40 SUSPENSION ORAL at 04:25

## 2017-10-16 RX ADMIN — FUROSEMIDE 80 MG: 80 TABLET ORAL at 08:16

## 2017-10-16 RX ADMIN — GLATIRAMER 40 MG: 40 INJECTION, SOLUTION SUBCUTANEOUS at 09:16

## 2017-10-16 RX ADMIN — MIRTAZAPINE 30 MG: 15 TABLET, FILM COATED ORAL at 20:50

## 2017-10-16 RX ADMIN — POTASSIUM CHLORIDE 10 MEQ: 750 CAPSULE, EXTENDED RELEASE ORAL at 08:15

## 2017-10-16 RX ADMIN — I.V. FAT EMULSION 38.6 G: 20 EMULSION INTRAVENOUS at 21:08

## 2017-10-16 RX ADMIN — ROSUVASTATIN CALCIUM 10 MG: 10 TABLET, FILM COATED ORAL at 08:18

## 2017-10-16 RX ADMIN — LEVOTHYROXINE SODIUM 150 MCG: 150 TABLET ORAL at 08:15

## 2017-10-16 RX ADMIN — KETOROLAC TROMETHAMINE 30 MG: 30 INJECTION, SOLUTION INTRAMUSCULAR; INTRAVENOUS at 02:56

## 2017-10-16 RX ADMIN — CHLORHEXIDINE GLUCONATE 15 ML: 1.2 RINSE ORAL at 08:18

## 2017-10-16 RX ADMIN — PRIMIDONE 50 MG: 50 TABLET ORAL at 20:54

## 2017-10-16 RX ADMIN — DONEPEZIL HYDROCHLORIDE 5 MG: 5 TABLET, FILM COATED ORAL at 20:57

## 2017-10-16 RX ADMIN — POTASSIUM CHLORIDE 10 MEQ: 750 CAPSULE, EXTENDED RELEASE ORAL at 21:22

## 2017-10-16 NOTE — CONSULTS
Adult Nutrition  Assessment    Patient Name:  Irma Pacheco  YOB: 1950  MRN: [id number]  Admit Date:  10/8/2017    Assessment Date:  10/16/2017    Comments:  Pt remains NPO with PN for total nutrition.  PN meeting >100% of estimated kcalorie and protein needs.  Still with open wound and high JT drainage (2680cc/24 hours).  Wound care continues.  LTACH consult.  LAbs reviewed and noted.  She remains on Abx and Lasix.  RD will continue to monitor tx plans.            Reason for Assessment       10/16/17 1248    Reason for Assessment    Reason For Assessment/Visit follow up protocol    Identified At Risk By Screening Criteria tube feeding or parenteral nutrition                Nutrition/Diet History       10/16/17 1248    Nutrition/Diet History    Typical Food/Fluid Intake Pt sitting up in chair.  SHe claims that her nausea is better than it was.  She is able to drink water.  TEarful over having to stay in the hospital for so long               Labs/Tests/Procedures/Meds       10/16/17 1249    Labs/Tests/Procedures/Meds    Labs/Tests Review Reviewed;Glucose;BUN;Creat;Phos;Alb    Medication Review Reviewed, pertinent;Diuretic;Antibiotic;Insulin            Physical Findings       10/16/17 1249    Physical Appearance    Overall Physical Appearance overweight;obese;on oxygen therapy    Skin surgical wound;non-healing wound(s)              Nutrition Prescription Ordered       10/16/17 1250    Nutrition Prescription PO    Current PO Diet NPO    Nutrition Prescription PN    PN Route PICC    PN Goal Rate (mL/hr) 80 mL/hr    PN Current Rate (mL/hr) 80 mL/hr    PN Goal Volume (mL) 1920 mL    Dextrose Concentration (%) 20 %    Dextrose (Kcal) 1306    Amino Acid Concentration (%) 5 %    Amino Acid (gm) 96    Lipid Concentration (%) 20%    Lipid Volume (mL) Other (comment)   193cc    Lipid Frequency Daily            Evaluation of Received Nutrient/Fluid Intake       10/16/17 1251    Evaluation of Received  Nutrient/Fluid Intake    Nutrition Delivered Calorie Evaluation;Protein Evaluation    Calorie Intake Evaluation    Parenteral Calories (kcal) 2076    Total Calories (kcal) 2076    % Kcal Needs >100% of estimated needs --1500--1600  (1690 NPO)     Protein Intake Evaluation    Enteral Protein (gm) 96    Total Protein (gm) 96    % Protein Needs >100% of estimated needs 75 gms            Electronically signed by:  Nancy Gilmore RD  10/16/17 12:56 PM

## 2017-10-16 NOTE — THERAPY TREATMENT NOTE
"Acute Care - Physical Therapy Treatment Note  [facility]     Patient Name: Irma Pacheco  : 1950  MRN: [id number]  Today's Date: 10/16/2017  Onset of Illness/Injury or Date of Surgery Date: 10/09/17  Date of Referral to PT: 10/10/17  Referring Physician: Dr. Arnulfo Marcum    Admit Date: 10/8/2017    Visit Dx:    ICD-10-CM ICD-9-CM   1. Pelvic abscess in female N73.9 614.4   2. Impaired functional mobility, balance, gait, and endurance Z74.09 V49.89   3. Impaired mobility and activities of daily living Z74.09 799.89     Patient Active Problem List   Diagnosis   • Intra-abdominal abscess   • Pelvic abscess in female               Adult Rehabilitation Note       10/16/17 0800 10/15/17 1319 10/13/17 1000    Rehab Assessment/Intervention    Discipline physical therapy assistant  -TW physical therapy assistant  -POLA physical therapy assistant  -LN    Document Type therapy note (daily note)  -TW therapy note (daily note)  -POLA therapy note (daily note)  -LN    Subjective Information agree to therapy   Pt reports \"Im ready.\" States hasnt had therapy all weekend.  -TW agree to therapy  -POLA agree to therapy;complains of;pain;weakness  -LN    Patient Effort, Rehab Treatment good  -TW      Precautions/Limitations fall precautions;other (see comments)   Pt has lots of lines that need monitoring during transfer.  -TW  fall precautions;oxygen therapy device and L/min   watch gt belt placement due to recent abd sx  -LN    Recorded by [TW] Karl Zheng, PTA [POLA] Rosibel Ram PTA [LN] Jannet Garcia PTA    Vital Signs    Pre Systolic BP Rehab 105  -TW 83  -POLA 129  -LN    Pre Treatment Diastolic BP 66  -TW 57  -POLA 60  -LN    Intra Systolic BP Rehab   146  -LN    Intra Treatment Diastolic BP  133  -POLA 63  -LN    Post Systolic BP Rehab 112  -TW 63  -POLA 135  -LN    Post Treatment Diastolic BP 67  -TW  63  -LN    Pretreatment Heart Rate (beats/min) 70  -TW 74  -POLA 70  -LN    Intratreatment Heart Rate (beats/min)   82  " -LN    Posttreatment Heart Rate (beats/min) 78  -TW  79  -LN    Pre SpO2 (%) 100  -  -POLA 100  -LN    O2 Delivery Pre Treatment supplemental O2  -TW supplemental O2  -POLA supplemental O2  -LN    Intra SpO2 (%)  100  -POLA     Post SpO2 (%) 100  -TW  96  -LN    Pre Patient Position Supine  -TW  Side Lying  -LN    Intra Patient Position Standing  -TW  Standing  -LN    Post Patient Position Sitting  -TW  Sitting  -LN    Recorded by [TW] Karl Zheng PTA [POLA] Rosibel Ram PTA [LN] Jannet Garcia PTA    Pain Assessment    Pain Assessment No/denies pain  -TW 0-10  -POLA 0-10  -LN    Pain Score  8  -POLA 7  -LN    Post Pain Score  8  -POLA 8  -LN    Pain Type  Chronic pain  -POLA Acute pain  -LN    Pain Location  Back  -POLA Abdomen  -LN    Pain Intervention(s)   --   nsg informed  -LN    Recorded by [TW] Karl Zheng PTA [POLA] Rosibel Ram PTA [LN] Jannet Garcia PTA    Vision Assessment/Intervention    Visual Impairment  WFL;WNL  -POLA     Recorded by  [POLA] Rosibel Ram PTA     Cognitive Assessment/Intervention    Current Cognitive/Communication Assessment functional  -TW functional  -POLA     Orientation Status oriented x 4  -TW oriented x 4  -POLA oriented to;person;place     -LN    Follows Commands/Answers Questions 100% of the time  -% of the time  -POLA     Personal Safety  WNL/WFL  -POLA     Personal Safety Interventions fall prevention program maintained;muscle strengthening facilitated;nonskid shoes/slippers when out of bed  -TW supervised activity  -POLA     Recorded by [TW] Karl Zheng PTA [POLA] Rosibel Ram PTA [LN] Jannet Garcia PTA    Bed Mobility, Assessment/Treatment    Bed Mobility, Assistive Device bed rails;head of bed elevated  -TW  bed rails;head of bed elevated  -LN    Bed Mobility, Roll Left, [place]   not tested  -LN    Bed Mobility, Roll Right, [place] supervision required  -TW  minimum assist (75% patient effort)  -LN    Bed Mob, Supine to Sit, [place] contact  guard assist  -TW  minimum assist (75% patient effort);moderate assist (50% patient effort)  -LN    Bed Mob, Sit to Supine, [place] not tested  -TW  not tested  -LN    Bed Mobility, Comment  unable due to low BP  -POLA     Recorded by [TW] Karl Zheng PTA [POLA] Rosibel Ram PTA [LN] Jannet Garcia PTA    Transfer Assessment/Treatment    Transfers, Bed-Chair [place] contact guard assist   with assistance from nsg to manage lines.  -TW  minimum assist (75% patient effort)  -LN    Transfers, Chair-Bed [place]   not tested  -LN    Transfers, Bed-Chair-Bed, Assist Device   rolling walker  -LN    Transfers, Sit-Stand [place] contact guard assist  -TW  minimum assist (75% patient effort)  -LN    Transfers, Stand-Sit [place] contact guard assist  -TW  minimum assist (75% patient effort)  -LN    Transfers, Sit-Stand-Sit, Assist Device rolling walker  -TW  rolling walker  -LN    Toilet Transfer, [place]   not tested  -LN    Recorded by [TW] Karl Zheng PTA  [LN] Jannet Garcia PTA    Gait Assessment/Treatment    Gait, [place] Level contact guard assist;minimum assist (75% patient effort)  -TW  minimum assist (75% patient effort)  -LN    Gait, Assistive Device rolling walker  -TW  rolling walker  -LN    Gait, Distance (Feet) 3   to bedside chair.  -TW  3  -LN    Gait, Gait Deviations   palomo decreased;step length decreased  -LN    Recorded by [TW] Karl Zheng PTA  [LN] Jannet Garcia PTA    Therapy Exercises    Bilateral Lower Extremities AROM:;20 reps;supine;ankle pumps/circles;glut sets;quad sets;hip abduction/adduction;hip ER;hip IR;heel slides  -TW AROM:;20 reps;supine;ankle pumps/circles;glut sets;heel slides;hip ER;hip IR;SAQ;quad sets;hip abduction/adduction  -POLA AROM:;20 reps;sitting;ankle pumps/circles;hip abduction/adduction;LAQ  -LN    Recorded by [TW] Karl Zheng PTA [POLA] Rosibel Ram PTA [LN] Jannet Garcia PTA    Positioning and Restraints     Pre-Treatment Position in bed  -TW      Post Treatment Position chair  -TW  chair  -LN    In Chair reclined;call light within reach;encouraged to call for assist  -TW  notified nsg;sitting;call light within reach;encouraged to call for assist;with family/caregiver;legs elevated  -LN    Recorded by [TW] Karl Zheng, PTA  [LN] Jannet Garcia, PTA      User Key  (r) = Recorded By, (t) = Taken By, (c) = Cosigned By    Initials Name Effective Dates    POLA Rosibel Ram, PTA 10/17/16 -     LN Jannet Garcia, PTA 10/17/16 -     TW Karl Zheng, PTA 10/17/16 -                 IP PT Goals       10/16/17 0800 10/15/17 1319 10/13/17 1000    Transfer Training PT LTG    Transfer Training PT  LTG, Date Goal Reviewed 10/16/17  -TW 10/15/17  -POLA 10/13/17  -LN    Transfer Training PT LTG, Outcome goal ongoing  -TW goal ongoing  -POLA goal not met  -LN    Gait Training PT LTG    Gait Training Goal PT LTG, Date Goal Reviewed 10/16/17  -TW 10/15/17  -POLA 10/13/17  -LN    Gait Training Goal PT LTG, Outcome goal ongoing  -TW goal ongoing  -POLA goal not met  -LN    Strength Goal PT LTG    Strength Goal PT LTG, Date Goal Reviewed   10/13/17  -LN    Strength Goal PT LTG, Outcome   goal met  -LN    Patient Education PT LTG    Patient Education PT LTG, Date Goal Reviewed 10/16/17  -TW 10/15/17  -POLA 10/13/17  -LN    Patient Education PT LTG Outcome goal ongoing  -TW goal ongoing  -POLA goal not met  -LN      10/11/17 1309          Transfer Training PT LTG    Transfer Training PT LTG, Date Established 10/11/17  -SHRAVAN      Transfer Training PT LTG, Time to Achieve by discharge  -SHRAVAN      Transfer Training PT LTG, Activity Type bed to chair /chair to bed;sit to stand/stand to sit  -SHRAVAN      Transfer Training PT LTG, [place] Level supervision required  -SHRAVAN      Transfer Training PT LTG, Outcome goal ongoing  -SHRAVAN      Gait Training PT LTG    Gait Training Goal PT LTG, Date Established 10/11/17  -SHRAVAN      Gait Training Goal PT LTG, Time to  Achieve by discharge  -      Gait Training Goal PT LTG, [place] Level supervision required  -      Gait Training Goal PT LTG, Distance to Achieve 150ft  -      Gait Training Goal PT LTG, Additional Goal 300ft  -      Gait Training Goal PT LTG, Outcome goal ongoing  -      Strength Goal PT LTG    Strength Goal PT LTG, Date Established 10/11/17  -      Strength Goal PT LTG, Time to Achieve by discharge  -      Strength Goal PT LTG, Measure to Achieve Pt will perform 15 reps of AROM exercises   -      Strength Goal PT LTG, Outcome goal ongoing  [facility]      Patient Education PT LTG    Patient Education PT LTG, Date Established 10/11/17  -      Patient Education PT LTG, Time to Achieve by discharge  -      Patient Education PT LTG, Education Type gait;transfers;home safety  -      Patient Education PT LTG Outcome goal ongoing  [facility]        User Key  (r) = Recorded By, (t) = Taken By, (c) = Cosigned By    Initials Name Provider Type    SHRAVAN Carla Cain, PT Physical Therapist    POLA Rosibel Ram, PTA Physical Therapy Assistant    LN Jannet Garcia, PTA Physical Therapy Assistant    TW Karl Zheng, [facility] Physical Therapy Assistant          Physical Therapy Education     Title: PT OT SLP Therapies (Active)     Topic: Physical Therapy (Active)     Point: Mobility training (Active)    Learning Progress Summary    Learner Readiness Method Response Comment Documented by Status   Patient Acceptance E NR  LN 10/13/17 1259 Active    Acceptance E NR  SHRAVAN 10/11/17 1308 Active   Family Acceptance E NR  SHRAVAN 10/11/17 1308 Active               Point: Home exercise program (Active)    Learning Progress Summary    Learner Readiness Method Response Comment Documented by Status   Patient Acceptance E NR  LN 10/13/17 1259 Active               Point: Body mechanics (Active)    Learning Progress Summary    Learner Readiness Method Response Comment Documented by Status   Patient Acceptance E NR  LN 10/13/17 1259 Active                Point: Precautions (Active)    Learning Progress Summary    Learner Readiness Method Response Comment Documented by Status   Patient Acceptance E NR  LN 10/13/17 1259 Active    Acceptance E NR  SHRAVAN 10/11/17 1308 Active   Family Acceptance E NR  SHRAVAN 10/11/17 1308 Active                      User Key     Initials Effective Dates Name Provider Type Discipline     10/17/16 -  Carla Cain, PT Physical Therapist PT    LN 10/17/16 -  Jannet Garcia PTA Physical Therapy Assistant PT                    PT Recommendation and Plan  Anticipated Discharge Disposition: skilled nursing facility  Planned Therapy Interventions: balance training, bed mobility training, gait training, patient/family education, strengthening, transfer training  PT Frequency: other (see comments) (5-14 times per week)  Plan of Care Review  Plan Of Care Reviewed With: patient  Progress: improving  Outcome Summary/Follow up Plan: Pt tolerated up to chair after completion of supine ther ex. Pt eager to improve and needs VC's to take her time for safety on occassions.          Outcome Measures       10/16/17 0800 10/15/17 1319 10/13/17 1000    How much help from another person do you currently need...    Turning from your back to your side while in flat bed without using bedrails? 3  -TW 3  -POLA 3  -LN    Moving from lying on back to sitting on the side of a flat bed without bedrails? 3  -TW 3  -POLA 3  -LN    Moving to and from a bed to a chair (including a wheelchair)? 3  -TW 3  -POLA 3  -LN    Standing up from a chair using your arms (e.g., wheelchair, bedside chair)? 3  -TW 3  -POLA 3  -LN    Climbing 3-5 steps with a railing? 3  -TW 2  -POLA 2  -LN    To walk in hospital room? 3  -TW 3  -POLA 3  -LN    AM-PAC 6 Clicks Score 18  -TW 17  -POLA 17  -LN    Functional Assessment    Outcome Measure Options AM-PAC 6 Clicks Basic Mobility (PT)  -TW  AM-PAC 6 Clicks Basic Mobility (PT)  -LN      User Key  (r) = Recorded By, (t) = Taken By, (c) = Cosigned By     Initials Name Provider Type    POLA Gleason GERARDO Ram, TERRY Physical Therapy Assistant    LN Jannet Garcia, TERRY Physical Therapy Assistant    TW Karl Zheng PTA Physical Therapy Assistant           Time Calculation:         PT Charges       10/16/17 0907          Time Calculation    Start Time 0820  -TW      Stop Time 0900  -TW      Time Calculation (min) 40 min  -TW      PT Received On 10/16/17  -TW      PT Goal Re-Cert Due Date 10/24/17  -TW      Time Calculation- PT    Total Timed Code Minutes- PT 40 minute(s)  -TW        User Key  (r) = Recorded By, (t) = Taken By, (c) = Cosigned By    Initials Name Provider Type     Karl Zheng PTA Physical Therapy Assistant          Therapy Charges for Today     Code Description Service Date Service Provider Modifiers Qty    13695435831 HC PT THER SUPP EA 15 MIN 10/16/2017 Karl Zheng PTA GP 1    33487574314 HC PT THER PROC EA 15 MIN 10/16/2017 Karl Zheng PTA GP 1    99997096394 HC PT THERAPEUTIC ACT EA 15 MIN 10/16/2017 Karl Zheng PTA GP 2          PT G-Codes  PT Professional Judgement Used?: Yes  Outcome Measure Options: AM-PAC 6 Clicks Basic Mobility (PT)  Score: 15  Functional Limitation: Mobility: Walking and moving around  Mobility: Walking and Moving Around Current Status (): At least 40 percent but less than 60 percent impaired, limited or restricted  Mobility: Walking and Moving Around Goal Status (): At least 20 percent but less than 40 percent impaired, limited or restricted    Karl Zheng PTA  10/16/2017

## 2017-10-16 NOTE — PLAN OF CARE
Problem: Inpatient Occupational Therapy  Goal: Transfer Training Goal 1 LTG- OT  Outcome: Ongoing (interventions implemented as appropriate)    10/11/17 1417 10/16/17 1100   Transfer Training OT LTG   Transfer Training OT LTG, Date Established 10/11/17 --    Transfer Training OT LTG, Time to Achieve by discharge --    Transfer Training OT LTG, Activity Type all transfers --    Transfer Training OT LTG, [place] Level supervision required --    Transfer Training OT LTG, Assist Device walker, platform --    Transfer Training OT LTG, Date Goal Reviewed --  10/16/17   Transfer Training OT LTG, Outcome --  goal ongoing       Goal: Strength Goal LTG- OT  Outcome: Ongoing (interventions implemented as appropriate)    10/11/17 1417 10/16/17 1100   Strength OT LTG   Strength Goal OT LTG, Date Established 10/11/17 --    Strength Goal OT LTG, Time to Achieve by discharge --    Strength Goal OT LTG, Measure to Achieve 1-2 sets of 10 reps all planes with 1-2 lb wrist wts or dumbells for B UE strengthening to increase independence with functional mobility. --    Strength Goal OT LTG, Date Goal Reviewed --  10/16/17   Strength Goal OT LTG, Outcome --  goal ongoing       Goal: Dynamic Sitting Balance Goal LTG- OT  Outcome: Ongoing (interventions implemented as appropriate)    10/11/17 1417 10/16/17 1100   Dynamic Sitting Balance OT LTG   Dynamic Sitting Balance OT LTG, Date Established 10/04/17 --    Dynamic Sitting Balance OT LTG, Time to Achieve by discharge --    Dynamic Sitting Balance OT LTG, [place] Level supervision required  (10-15 minutes with functional activity) --    Dynamic Sitting Balance OT LTG, Assist Device bed rails --    Dynamic Sitting Balance OT LTG, Date Goal Reviewed --  10/16/17   Dynamic Sitting Balance OT LTG, Outcome --  goal ongoing       Goal: ADL Goal LTG- OT  Outcome: Ongoing (interventions implemented as appropriate)    10/11/17 1417 10/16/17 1100   ADL OT LTG   ADL OT LTG, Date Established  10/11/17 --    ADL OT LTG, Time to Achieve by discharge --    ADL OT LTG, Activity Type ADL skills  (Sponge bath and dress upper body.) --    ADL OT LTG, [place] Level standby assist;contact guard;assistive device  (R/W.) --    ADL OT LTG, Date Goal Reviewed --  10/16/17   ADL OT LTG, Outcome --  goal ongoing

## 2017-10-16 NOTE — PLAN OF CARE
Problem: Patient Care Overview (Adult)  Goal: Plan of Care Review  Outcome: Ongoing (interventions implemented as appropriate)    10/16/17 8595   Coping/Psychosocial Response Interventions   Plan Of Care Reviewed With patient;daughter   Patient Care Overview   Progress improving   Outcome Evaluation   Outcome Summary/Follow up Plan Patient continuing to improve, VSS, pain management through toradol, pt up to chair today with 2 assist.       Goal: Adult Individualization and Mutuality  Outcome: Ongoing (interventions implemented as appropriate)  Goal: Discharge Needs Assessment  Outcome: Ongoing (interventions implemented as appropriate)    Problem: Fall Risk (Adult)  Goal: Absence of Falls  Outcome: Ongoing (interventions implemented as appropriate)    Problem: Pain, Acute (Adult)  Goal: Identify Related Risk Factors and Signs and Symptoms  Outcome: Ongoing (interventions implemented as appropriate)  Goal: Acceptable Pain Control/Comfort Level  Outcome: Ongoing (interventions implemented as appropriate)    Problem: Pressure Ulcer Risk (Pedro Scale) (Adult,Obstetrics,Pediatric)  Goal: Skin Integrity  Outcome: Ongoing (interventions implemented as appropriate)    Problem: Bowel Resection (Adult)  Goal: Signs and Symptoms of Listed Potential Problems Will be Absent or Manageable (Bowel Resection)  Outcome: Ongoing (interventions implemented as appropriate)    Problem: Anxiety (Adult)  Goal: Identify Related Risk Factors and Signs and Symptoms  Outcome: Ongoing (interventions implemented as appropriate)  Goal: Reduction/Resolution  Outcome: Ongoing (interventions implemented as appropriate)

## 2017-10-16 NOTE — PROGRESS NOTES
LOS: 8 days   Patient Care Team:  DIANA Lindquist as PCP - General (Family Medicine)    Chief Complaint:  <principal problem not specified>    Subjective     Interval History:   Feels stronger over weekend, lots more CLARI output    Objective     Vital Signs  Temp:  [97.4 °F (36.3 °C)-98.7 °F (37.1 °C)] 98.5 °F (36.9 °C)  Heart Rate:  [] 84  BP: ()/(42-84) 118/57    Physical Exam:  CLARI lighter bilious over 2 liters, ostomy working     Results Review:       Lab Results (last 24 hours)     Procedure Component Value Units Date/Time    POC Glucose Fingerstick [[id number]]  (Abnormal) Collected:  10/15/17 1136    Specimen:  Blood Updated:  10/15/17 1151     Glucose 135 (H) mg/dL       Meter: [id number]: [id number] RILEY MORRIS       POC Glucose Fingerstick [[id number]]  (Normal) Collected:  10/15/17 1754    Specimen:  Blood Updated:  10/15/17 1811     Glucose 114 mg/dL       Meter: [id number]: [id number] ED SHAIKH       POC Glucose Fingerstick [[id number]]  (Normal) Collected:  10/15/17 2028    Specimen:  Blood Updated:  10/15/17 2041     Glucose 103 mg/dL       Meter: [id number]: [id number] ANGIE TONO       Calcium, Ionized [[id number]]  (Abnormal) Collected:  10/16/17 0419    Specimen:  Blood Updated:  10/16/17 0429     Ionized Calcium 3.70 (L) mg/dL     Magnesium [[id number]]  (Normal) Collected:  10/16/17 0419    Specimen:  Blood Updated:  10/16/17 0440     Magnesium 2.3 mg/dL     Phosphorus [[id number]]  (Abnormal) Collected:  10/16/17 0419    Specimen:  Blood Updated:  10/16/17 0440     Phosphorus 4.6 (H) mg/dL     C-reactive Protein [[id number]]  (Abnormal) Collected:  10/16/17 0419    Specimen:  Blood Updated:  10/16/17 0440     C-Reactive Protein 1.90 (H) mg/dL     CBC & Differential [[id number]] Collected:  10/16/17 0419    Specimen:  Blood Updated:  10/16/17 0452    Narrative:       The following orders were created for panel order CBC &  Differential.  Procedure                               Abnormality         Status                     ---------                               -----------         ------                     Scan Slide[[id number]]                                                                  CBC Auto Differential[[id number]]        Abnormal            Final result                 Please view results for these tests on the individual orders.    CBC Auto Differential [[id number]]  (Abnormal) Collected:  10/16/17 0419    Specimen:  Blood Updated:  10/16/17 0452     WBC 10.35 (H) 10*3/mm3      RBC 3.15 (L) 10*6/mm3      Hemoglobin 9.3 (L) g/dL      Hematocrit 28.9 (L) %      MCV 91.7 fL      MCH 29.5 pg      MCHC 32.2 g/dL      RDW 14.9 (H) %      RDW-SD 49.8 (H) fl      MPV 10.2 fL      Platelets 242 10*3/mm3      Neutrophil % 67.6 %      Lymphocyte % 17.3 %      Monocyte % 5.9 %      Eosinophil % 1.4 %      Basophil % 0.7 %      Immature Grans % 7.1 (H) %      Neutrophils, Absolute 7.00 10*3/mm3      Lymphocytes, Absolute 1.79 10*3/mm3      Monocytes, Absolute 0.61 10*3/mm3      Eosinophils, Absolute 0.15 10*3/mm3      Basophils, Absolute 0.07 10*3/mm3      Immature Grans, Absolute 0.73 (H) 10*3/mm3     Prealbumin [[id number]]  (Normal) Collected:  10/16/17 0419    Specimen:  Blood Updated:  10/16/17 0459     Prealbumin 23.9 mg/dL     Comprehensive Metabolic Panel [[id number]]  (Abnormal) Collected:  10/16/17 0419    Specimen:  Blood Updated:  10/16/17 0459     Glucose 109 (H) mg/dL      BUN 31 (H) mg/dL      Creatinine 1.08 (H) mg/dL      Sodium 137 mmol/L      Potassium 4.3 mmol/L      Chloride 100 mmol/L      CO2 28.0 mmol/L      Calcium 7.5 (L) mg/dL      Total Protein 5.8 (L) g/dL      Albumin 3.00 (L) g/dL      ALT (SGPT) 24 U/L      AST (SGOT) 26 U/L      Alkaline Phosphatase 96 U/L      Total Bilirubin 0.4 mg/dL      eGFR Non African Amer 51 mL/min/1.73      Globulin 2.8 gm/dL      A/G Ratio 1.1 g/dL      BUN/Creatinine Ratio 28.7  (H)     Anion Gap 9.0 mmol/L           Medication Review:   Current Facility-Administered Medications   Medication Dose Route Frequency Provider Last Rate Last Dose   • acetaminophen (TYLENOL) tablet 500 mg  500 mg Oral Q6H PRN Arnulfo Marcum MD   500 mg at 10/12/17 1356   • Adult Central Clinimix TPN   Intravenous Q24H (TPN) Fran Betts MD 80 mL/hr at 10/15/17 1757     • aluminum-magnesium hydroxide-simethicone (MAALOX MAX) 400-400-40 MG/5ML suspension 30 mL  30 mL Oral Q6H PRN Arnulfo Marcum MD   30 mL at 10/16/17 0425   • chlorhexidine (PERIDEX) 0.12 % solution 15 mL  15 mL Mouth/Throat Q12H Arnulfo Marcum MD   15 mL at 10/15/17 2003   • citalopram (CeleXA) tablet 40 mg  40 mg Oral Daily Arnulfo Marcum MD   40 mg at 10/15/17 0902   • donepezil (ARICEPT) tablet 5 mg  5 mg Oral Nightly Arnulfo Marcum MD   5 mg at 10/15/17 2001   • enoxaparin (LOVENOX) syringe 40 mg  40 mg Subcutaneous Q24H Arnulfo Marcum MD   40 mg at 10/15/17 0901   • fat emulsion (INTRALIPID,LIPOSYN) 20 % infusion 38.6 g  193 mL Intravenous Q24H Fran Betts MD 16.08 mL/hr at 10/15/17 2144 38.6 g at 10/15/17 2144   • furosemide (LASIX) tablet 80 mg  80 mg Oral BID Arnulfo Marcum MD   80 mg at 10/15/17 1759   • gabapentin (NEURONTIN) capsule 600 mg  600 mg Oral TID Arnulfo Marcum MD   600 mg at 10/15/17 2001   • Glatiramer Acetate solution prefilled syringe 40 mg  40 mg Subcutaneous Once per day on Mon Wed Fri Arnulfo Marcum MD   40 mg at 10/13/17 0827   • insulin aspart (novoLOG) injection 0-9 Units  0-9 Units Subcutaneous 4x Daily AC & at Bedtime Arnulfo Marcum MD   Stopped at 10/14/17 2048   • ketorolac (TORADOL) injection 30 mg  30 mg Intravenous Q6H PRN Arnulfo Marcum MD   30 mg at 10/16/17 0256   • levoFLOXacin (LEVAQUIN) 500 mg/100 mL D5W (premix) (LEVAQUIN) 500 mg  500 mg Intravenous Q24H Arnulfo Marcum MD   500 mg at 10/15/17 1756   • levothyroxine (SYNTHROID, LEVOTHROID) tablet 150 mcg  150 mcg Oral Q AM Arnulfo Marcum MD   150 mcg at 10/15/17 0675   •  memantine (NAMENDA) tablet 7.5 mg  7.5 mg Oral Nightly Arnulfo Marcum MD   7.5 mg at 10/15/17 2002   • mirtazapine (REMERON) tablet 30 mg  30 mg Oral Nightly Arnulfo Marcum MD   30 mg at 10/15/17 2002   • ondansetron (ZOFRAN) injection 4 mg  4 mg Intravenous Q6H PRN Arnulfo Marcum MD   4 mg at 10/15/17 2021   • ondansetron (ZOFRAN) tablet 4 mg  4 mg Oral Q8H PRN Arnulfo Marcum MD   4 mg at 10/15/17 0902   • Pharmacy to Dose TPN   Does not apply Continuous PRN Arnulfo Marcum MD       • pneumococcal polysaccharide 23-valent (PNEUMOVAX-23) vaccine 0.5 mL  0.5 mL Intramuscular During Hospitalization Arnulfo Marcum MD       • potassium chloride (MICRO-K) CR capsule 10 mEq  10 mEq Oral TID Arnulfo Marcum MD   10 mEq at 10/15/17 2002   • primidone (MYSOLINE) tablet 50 mg  50 mg Oral TID Arnulfo Marcum MD   50 mg at 10/15/17 2002   • rosuvastatin (CRESTOR) tablet 10 mg  10 mg Oral Daily Arnulfo Marcum MD   10 mg at 10/15/17 0902   • sodium chloride 0.9 % flush 1-10 mL  1-10 mL Intravenous PRN Arnulfo Marcum MD   10 mL at 10/12/17 0931   • sodium chloride 0.9 % infusion  - ADS Override Pull            • tiZANidine (ZANAFLEX) tablet 4 mg  4 mg Oral Q12H PRN Arnulfo Marcum MD   4 mg at 10/15/17 0901       Assessment/Plan     Active Problems:    Pelvic abscess in female    66 yo lady 4 weeks s/p Karishma's and POD#7 s/p ex-lap and washout of pelvic abscess  Neuro- much improved ICU psychosis, holding narcotics, most home meds restarted, continue to monitor   Cards- stable, no hypotension or tachy  Lungs- breathing well on nasal canula, IS for support  GI- having ostomy output, succus coming from wound and drain signifies small bowel fistula, continue TPN, switch CLARI to gravity, check drain for creatinine  Renal- UOP much improved, continue to monitor, continue miranda  Heme/ID- jehovahs witness, monitor labs, f/u cultures, continue antibiotics and lovenox  dispo- PT to get into chair    Arnulfo Marcum MD  10/16/17  8:01 AM

## 2017-10-16 NOTE — PROGRESS NOTES
Parenteral Nutrition by Pharmacy    Patient: Irma Pacheco  MRN#: [id number]  Attending: No name on file.  Admission Date: 100817    Subjective/Objective  Progress notes reviewed.     Daily Assessment  Lab Results   Component Value Date    GLUCOSE 109 (H) 10/16/2017    BUN 31 (H) 10/16/2017    CREATININE 1.08 (H) 10/16/2017    EGFRIFNONA 51 10/16/2017    BCR 28.7 (H) 10/16/2017    K 4.3 10/16/2017    CO2 28.0 10/16/2017    CALCIUM 7.5 (L) 10/16/2017    ALBUMIN 3.00 (L) 10/16/2017    LABIL2 1.1 10/16/2017    AST 26 10/16/2017    ALT 24 10/16/2017     Lab Results   Component Value Date    GLUCOSE 109 (H) 10/16/2017    CALCIUM 7.5 (L) 10/16/2017     10/16/2017    K 4.3 10/16/2017    CO2 28.0 10/16/2017     10/16/2017    BUN 31 (H) 10/16/2017    CREATININE 1.08 (H) 10/16/2017    EGFRIFNONA 51 10/16/2017    BCR 28.7 (H) 10/16/2017    ANIONGAP 9.0 10/16/2017     Magnesium   Date Value Ref Range Status   10/16/2017 2.3 1.6 - 2.3 mg/dL Final     Phosphorus   Date Value Ref Range Status   10/16/2017 4.6 (H) 2.4 - 4.4 mg/dL Final     Calcium   Date Value Ref Range Status   10/16/2017 7.5 (L) 8.4 - 10.2 mg/dL Final     Triglycerides   Date Value Ref Range Status   10/13/2017 180 20 - 199 mg/dL Final     Above labs reviewed.  Corrected Ca + 8.3-WNL  Plan   Reviewed all labs and notes. Phosphate level slightly elevated. Reduced K Phos in TPN to 15 mMol and increased K Cl to 20 mEq. Left all other levels the same.  Pharmacy will continue to monitor and adjust formula as needed.    Current TPN formula is:   Clinimix 5/20%  K Phos 15 mMol  KCl 20 mEq  NaCl 160 mEq  Na Acetate 30 mEq  Calcium Gluconate 10 mEq  Magnesium Sulfate 12 mEq  MVI 10 ml  Infusing at 80 ml/hr.    Becka Moseley [facility]  10/16/17  9:12 AM

## 2017-10-16 NOTE — THERAPY TREATMENT NOTE
"Acute Care - Occupational Therapy Treatment Note  [facility]     Patient Name: Irma Pacheco  : 1950  MRN: [id number]  Today's Date: 10/16/2017  Onset of Illness/Injury or Date of Surgery Date: 10/09/17  Date of Referral to OT: 10/10/17  Referring Physician: Dr. Arnulfo Marcum      Admit Date: 10/8/2017    Visit Dx:     ICD-10-CM ICD-9-CM   1. Pelvic abscess in female N73.9 614.4   2. Impaired functional mobility, balance, gait, and endurance Z74.09 V49.89   3. Impaired mobility and activities of daily living Z74.09 799.89     Patient Active Problem List   Diagnosis   • Intra-abdominal abscess   • Pelvic abscess in female             Adult Rehabilitation Note       10/16/17 1100 10/16/17 0800 10/15/17 1319    Rehab Assessment/Intervention    Discipline occupational therapy assistant  - physical therapy assistant  -TW physical therapy assistant  -POLA    Document Type therapy note (daily note)  - therapy note (daily note)  - therapy note (daily note)  -    Subjective Information agree to therapy;complains of;fatigue  - agree to therapy   Pt reports \"Im ready.\" States hasnt had therapy all weekend.  -TW agree to therapy  -POLA    Patient Effort, Rehab Treatment fair  - good  -TW     Precautions/Limitations  fall precautions;other (see comments)   Pt has lots of lines that need monitoring during transfer.  -TW     Recorded by [] GITA Magallon/DERICK [TW] Karl Zheng PTA [POLA] Rosibel Ram PTA    Vital Signs    Pre Systolic BP Rehab 115  -  -TW 83  -POLA    Pre Treatment Diastolic BP 55  -JH 66  -TW 57  -POLA    Intra Treatment Diastolic BP   133  -POLA    Post Systolic BP Rehab  112  -TW 63  -POLA    Post Treatment Diastolic BP  67  -TW     Pretreatment Heart Rate (beats/min) 72  -JH 70  -TW 74  -POLA    Posttreatment Heart Rate (beats/min)  78  -TW     Pre SpO2 (%) 98  -  -  -POLA    O2 Delivery Pre Treatment supplemental O2  - supplemental O2  -TW supplemental O2  -POLA    " Intra SpO2 (%)   100  -POLA    Post SpO2 (%)  100  -TW     Pre Patient Position Sitting  -JH Supine  -TW     Intra Patient Position  Standing  -TW     Post Patient Position  Sitting  -TW     Recorded by [] GITA Magallon/DERICK [TW] Karl Zheng PTA [POLA] Rosibel Ram PTA    Pain Assessment    Pain Assessment No/denies pain  - No/denies pain  -TW 0-10  -POLA    Pain Score   8  -POLA    Post Pain Score   8  -POLA    Pain Type   Chronic pain  -POLA    Pain Location   Back  -POLA    Recorded by [] GITA Magallon/DERICK [TW] Karl Zheng PTA [POLA] Rosibel Ram PTA    Vision Assessment/Intervention    Visual Impairment   WFL;WNL  -POLA    Recorded by   [POLA] Rosibel Ram PTA    Cognitive Assessment/Intervention    Current Cognitive/Communication Assessment functional  -JH functional  -TW functional  -POLA    Orientation Status oriented x 4  -JH oriented x 4  -TW oriented x 4  -POLA    Follows Commands/Answers Questions 100% of the time  -% of the time  -% of the time  -POLA    Personal Safety   WNL/WFL  -POLA    Personal Safety Interventions  fall prevention program maintained;muscle strengthening facilitated;nonskid shoes/slippers when out of bed  -TW supervised activity  -POLA    Recorded by [] GITA Magallon/DERICK [TW] Karl Zheng PTA [POLA] Rosibel Ram PTA    Bed Mobility, Assessment/Treatment    Bed Mobility, Assistive Device  bed rails;head of bed elevated  -TW     Bed Mobility, Roll Right, [place]  supervision required  -TW     Bed Mob, Supine to Sit, [place]  contact guard assist  -TW     Bed Mob, Sit to Supine, [place]  not tested  -TW     Bed Mobility, Comment   unable due to low BP  -POLA    Recorded by  [TW] Karl Zheng PTA [POLA] Rosibel Ram PTA    Transfer Assessment/Treatment    Transfers, Bed-Chair [place]  contact guard assist   with assistance from nsg to manage lines.  -TW     Transfers, Sit-Stand [place]  contact guard assist  -TW      Transfers, Stand-Sit [place]  contact guard assist  -TW     Transfers, Sit-Stand-Sit, Assist Device  rolling walker  -TW     Recorded by  [TW] Karl Zheng PTA     Gait Assessment/Treatment    Gait, [place] Level  contact guard assist;minimum assist (75% patient effort)  -TW     Gait, Assistive Device  rolling walker  -TW     Gait, Distance (Feet)  3   to bedside chair.  -TW     Recorded by  [TW] Karl Zheng PTA     Grooming Assessment/Training    Grooming Assess/Train, Indepen Level supervision required  -      Recorded by [] FREDDY Magallon      Therapy Exercises    Bilateral Lower Extremities  AROM:;20 reps;supine;ankle pumps/circles;glut sets;quad sets;hip abduction/adduction;hip ER;hip IR;heel slides  -TW AROM:;20 reps;supine;ankle pumps/circles;glut sets;heel slides;hip ER;hip IR;SAQ;quad sets;hip abduction/adduction  -POLA    Bilateral Upper Extremity AROM:;15 reps;standing;sitting;elbow flexion/extension;hand pumps;pronation/supination;shoulder extension/flexion  -      BUE Resistance manual resistance- minimal  -      Recorded by [] FREDDY Magallon [TW] Karl Zheng PTA [POLA] Rosibel Ram PTA    Positioning and Restraints    Pre-Treatment Position sitting in chair/recliner  - in bed  -     Post Treatment Position chair  - chair  -     In Chair reclined;call light within reach;encouraged to call for assist;with family/caregiver  - reclined;call light within reach;encouraged to call for assist  -TW     Recorded by [] FREDDY Magallon [TW] Karl Zheng PTA       User Key  (r) = Recorded By, (t) = Taken By, (c) = Cosigned By    Initials Name Effective Dates    POLA Rosibel Ram PTA 10/17/16 -     ANGELINA Zheng PTA 10/17/16 -      FREDDY Magallon 10/17/16 -                 OT Goals       10/16/17 1100 10/11/17 1417       Transfer Training OT LTG    Transfer Training OT LTG, Date Established  10/11/17  -RB      Transfer Training OT LTG, Time to Achieve  by discharge  -RB     Transfer Training OT LTG, Activity Type  all transfers  -RB     Transfer Training OT LTG, [place] Level  supervision required  -RB     Transfer Training OT LTG, Assist Device  walker, platform  -RB     Transfer Training OT LTG, Date Goal Reviewed 10/16/17  -      Transfer Training OT LTG, Outcome goal ongoing  -      Strength OT LTG    Strength Goal OT LTG, Date Established  10/11/17  -     Strength Goal OT LTG, Time to Achieve  by discharge  -RB     Strength Goal OT LTG, Measure to Achieve  1-2 sets of 10 reps all planes with 1-2 lb wrist wts or dumbells for B UE strengthening to increase independence with functional mobility.  -     Strength Goal OT LTG, Date Goal Reviewed 10/16/17  -      Strength Goal OT LTG, Outcome goal ongoing  -      Dynamic Sitting Balance OT LTG    Dynamic Sitting Balance OT LTG, Date Established  10/04/17  -     Dynamic Sitting Balance OT LTG, Time to Achieve  by discharge  -     Dynamic Sitting Balance OT LTG, [place] Level  supervision required   10-15 minutes with functional activity  -RB     Dynamic Sitting Balance OT LTG, Assist Device  bed rails  -RB     Dynamic Sitting Balance OT LTG, Date Goal Reviewed 10/16/17  -      Dynamic Sitting Balance OT LTG, Outcome goal ongoing  -      ADL OT LTG    ADL OT LTG, Date Established  10/11/17  -     ADL OT LTG, Time to Achieve  by discharge  -     ADL OT LTG, Activity Type  ADL skills   Sponge bath and dress upper body.  -     ADL OT LTG, [place] Level  standby assist;contact guard;assistive device   R/W.  -     ADL OT LTG, Date Goal Reviewed 10/16/17  -      ADL OT LTG, Outcome goal ongoing  -        User Key  (r) = Recorded By, (t) = Taken By, (c) = Cosigned By    Initials Name Provider Type    RB Harry Chavez, OT Occupational Therapist    GITA Tam/L Occupational Therapy Assistant          Occupational Therapy  Education     Title: PT OT SLP Therapies (Active)     Topic: Occupational Therapy (Active)     Point: Precautions (Done)    Description: Instruct learner(s) on prescribed precautions during self-care and functional transfers.    Learning Progress Summary    Learner Readiness Method Response Comment Documented by Status   Patient Acceptance E VU,NR Edu pt on use of gait belt and non skid socks when OOB and no OOB without assist.  10/11/17 1415 Done                      User Key     Initials Effective Dates Name Provider Type Discipline     06/15/16 -  Harry Chavez OT Occupational Therapist OT                  OT Recommendation and Plan  Anticipated Discharge Disposition: home with 24/7 care, skilled nursing facility, long term acute care facility  Planned Therapy Interventions: activity intolerance, ADL retraining, strengthening, transfer training, balance training, bed mobility training, energy conservation  Therapy Frequency:  (3-14x/wk)           Outcome Measures       10/16/17 0800 10/15/17 1319       How much help from another person do you currently need...    Turning from your back to your side while in flat bed without using bedrails? 3  -TW 3  -POLA     Moving from lying on back to sitting on the side of a flat bed without bedrails? 3  -TW 3  -POLA     Moving to and from a bed to a chair (including a wheelchair)? 3  -TW 3  -POLA     Standing up from a chair using your arms (e.g., wheelchair, bedside chair)? 3  -TW 3  -POLA     Climbing 3-5 steps with a railing? 3  -TW 2  -POLA     To walk in hospital room? 3  -TW 3  -POLA     AM-PAC 6 Clicks Score 18  -TW 17  -POLA     Functional Assessment    Outcome Measure Options AM-PAC 6 Clicks Basic Mobility (PT)  -TW        User Key  (r) = Recorded By, (t) = Taken By, (c) = Cosigned By    Initials Name Provider Type    POLA Rosibel Ram, TERRY Physical Therapy Assistant    TW Karl Zheng PTA Physical Therapy Assistant           Time Calculation:         Time Calculation-  OT       10/16/17 1535          Time Calculation- OT    OT Start Time 1100  -      OT Stop Time 1123  -      OT Time Calculation (min) 23 min  -      OT Received On 10/16/17  -        User Key  (r) = Recorded By, (t) = Taken By, (c) = Cosigned By    Initials Name Provider Type     GITA Magallon/L Occupational Therapy Assistant           Therapy Charges for Today     Code Description Service Date Service Provider Modifiers Qty    12931062177 HC OT THER PROC EA 15 MIN 10/16/2017 GITA Magallon/L GO 1    34118825770 HC OT THERAPEUTIC ACT EA 15 MIN 10/16/2017 GITA Magallon/L GO 1    51172714799 HC OT THER SUPP EA 15 MIN 10/16/2017 GITA Magallon/L GO 1          OT G-codes  OT Professional Judgement Used?: Yes  OT Functional Scales Options: AM-PAC 6 Clicks Daily Activity (OT)  Score: 16  Functional Limitation: Self care  Self Care Current Status (): At least 40 percent but less than 60 percent impaired, limited or restricted  Self Care Goal Status (): At least 20 percent but less than 40 percent impaired, limited or restricted    GITA Magallon/DERICK  10/16/2017

## 2017-10-16 NOTE — NURSING NOTE
"Was notified that by Physicians office that patients daughter \"couldn't afford the meals\". Spoke to the patient about wanting to talk to her daughter, patient stated that daughter is out paying  Bills and getting her hair done. Talked to daughter when she came back, denied any needs, except ice for her mountain dew. No mention of concern with meal expenses.   "

## 2017-10-16 NOTE — PLAN OF CARE
Problem: Patient Care Overview (Adult)  Goal: Plan of Care Review  Outcome: Ongoing (interventions implemented as appropriate)    10/16/17 1254   Coping/Psychosocial Response Interventions   Plan Of Care Reviewed With patient;caregiver   Patient Care Overview   Progress improving   Outcome Evaluation   Outcome Summary/Follow up Plan Current PN providing >100% of estmiated needs. LTACH consult for wound therapy. Ryan iwll monitor         Problem: Bowel Resection (Adult)  Goal: Signs and Symptoms of Listed Potential Problems Will be Absent or Manageable (Bowel Resection)  Outcome: Ongoing (interventions implemented as appropriate)

## 2017-10-16 NOTE — PLAN OF CARE
Problem: Patient Care Overview (Adult)  Goal: Plan of Care Review  Outcome: Ongoing (interventions implemented as appropriate)    10/16/17 0618   Coping/Psychosocial Response Interventions   Plan Of Care Reviewed With patient;daughter   Outcome Evaluation   Outcome Summary/Follow up Plan pt currently on tpn & lipids; CLARI drain with excessive amount of greenish-yellow drainage; urine ouptpu inadequate for this shift; vss; will continue to monitor.        Goal: Adult Individualization and Mutuality  Outcome: Ongoing (interventions implemented as appropriate)  Goal: Discharge Needs Assessment  Outcome: Ongoing (interventions implemented as appropriate)    Problem: Fall Risk (Adult)  Goal: Absence of Falls  Outcome: Ongoing (interventions implemented as appropriate)    Problem: Pain, Acute (Adult)  Goal: Identify Related Risk Factors and Signs and Symptoms  Outcome: Ongoing (interventions implemented as appropriate)  Goal: Acceptable Pain Control/Comfort Level  Outcome: Ongoing (interventions implemented as appropriate)    Problem: Pressure Ulcer Risk (Pedro Scale) (Adult,Obstetrics,Pediatric)  Goal: Skin Integrity  Outcome: Ongoing (interventions implemented as appropriate)    Problem: Bowel Resection (Adult)  Goal: Signs and Symptoms of Listed Potential Problems Will be Absent or Manageable (Bowel Resection)  Outcome: Ongoing (interventions implemented as appropriate)    Problem: Anxiety (Adult)  Goal: Identify Related Risk Factors and Signs and Symptoms  Outcome: Ongoing (interventions implemented as appropriate)  Goal: Reduction/Resolution  Outcome: Ongoing (interventions implemented as appropriate)

## 2017-10-16 NOTE — PLAN OF CARE
Problem: Patient Care Overview (Adult)  Goal: Plan of Care Review  Outcome: Ongoing (interventions implemented as appropriate)    10/16/17 0800   Coping/Psychosocial Response Interventions   Plan Of Care Reviewed With patient   Patient Care Overview   Progress improving   Outcome Evaluation   Outcome Summary/Follow up Plan Pt tolerated up to chair after completion of supine ther ex. Pt eager to improve and needs VC's to take her time for safety on occassions.       Goal: Discharge Needs Assessment  Outcome: Ongoing (interventions implemented as appropriate)    10/11/17 1417 10/11/17 1446 10/13/17 0350   Current Health   Anticipated Changes Related to Illness --  --  --    Discharge Needs Assessment   Concerns To Be Addressed --  --  --    Readmission Within The Last 30 Days --  --  --    Equipment Needed After Discharge --  none --    Discharge Facility/Level Of Care Needs acute rehab --  --    Discharge Disposition --  --  still a patient   Living Environment   Transportation Available --  family or friend will provide --    Self-Care   Equipment Currently Used at Home --  grab bar;wheelchair, motorized;rollator;bipap/ cpap --      10/13/17 1757 10/15/17 1839   Current Health   Anticipated Changes Related to Illness --  inability to care for self   Discharge Needs Assessment   Concerns To Be Addressed adjustment to diagnosis/illness concerns --    Readmission Within The Last 30 Days --  other (see comments)   Equipment Needed After Discharge --  --    Discharge Facility/Level Of Care Needs --  --    Discharge Disposition --  --    Living Environment   Transportation Available --  --    Self-Care   Equipment Currently Used at Home --  --          Problem: Inpatient Physical Therapy  Goal: Transfer Training Goal 1 LTG- PT  Outcome: Ongoing (interventions implemented as appropriate)    10/11/17 1309 10/16/17 0800   Transfer Training PT LTG   Transfer Training PT LTG, Date Established 10/11/17 --    Transfer Training  PT LTG, Time to Achieve by discharge --    Transfer Training PT LTG, Activity Type bed to chair /chair to bed;sit to stand/stand to sit --    Transfer Training PT LTG, [place] Level supervision required --    Transfer Training PT LTG, Date Goal Reviewed --  10/16/17   Transfer Training PT LTG, Outcome --  goal ongoing       Goal: Gait Training Goal LTG- PT  Outcome: Ongoing (interventions implemented as appropriate)    10/11/17 1309 10/16/17 0800   Gait Training PT LTG   Gait Training Goal PT LTG, Date Established 10/11/17 --    Gait Training Goal PT LTG, Time to Achieve by discharge --    Gait Training Goal PT LTG, [place] Level supervision required --    Gait Training Goal PT LTG, Distance to Achieve 150ft --    Gait Training Goal PT LTG, Additional Goal 300ft --    Gait Training Goal PT LTG, Date Goal Reviewed --  10/16/17   Gait Training Goal PT LTG, Outcome --  goal ongoing       Goal: Patient Education Goal LTG- PT  Outcome: Ongoing (interventions implemented as appropriate)    10/11/17 1309 10/16/17 0800   Patient Education PT LTG   Patient Education PT LTG, Date Established 10/11/17 --    Patient Education PT LTG, Time to Achieve by discharge --    Patient Education PT LTG, Education Type gait;transfers;home safety --    Patient Education PT LTG, Date Goal Reviewed --  10/16/17   Patient Education PT LTG Outcome --  goal ongoing

## 2017-10-17 ENCOUNTER — APPOINTMENT (OUTPATIENT)
Dept: GENERAL RADIOLOGY | Facility: HOSPITAL | Age: 67
End: 2017-10-17

## 2017-10-17 ENCOUNTER — HOSPITAL ENCOUNTER (OUTPATIENT)
Facility: HOSPITAL | Age: 67
Discharge: HOME HEALTH | End: 2017-12-14
Attending: INTERNAL MEDICINE | Admitting: INTERNAL MEDICINE

## 2017-10-17 VITALS
OXYGEN SATURATION: 100 % | DIASTOLIC BLOOD PRESSURE: 60 MMHG | HEIGHT: 60 IN | SYSTOLIC BLOOD PRESSURE: 125 MMHG | RESPIRATION RATE: 23 BRPM | TEMPERATURE: 97.7 F | BODY MASS INDEX: 47.78 KG/M2 | HEART RATE: 75 BPM | WEIGHT: 243.39 LBS

## 2017-10-17 DIAGNOSIS — Z74.09 IMPAIRED MOBILITY AND ADLS: ICD-10-CM

## 2017-10-17 DIAGNOSIS — Z74.09 IMPAIRED FUNCTIONAL MOBILITY, BALANCE, GAIT, AND ENDURANCE: ICD-10-CM

## 2017-10-17 DIAGNOSIS — Z78.9 DECREASED ACTIVITIES OF DAILY LIVING (ADL): ICD-10-CM

## 2017-10-17 DIAGNOSIS — Z78.9 IMPAIRED MOBILITY AND ADLS: ICD-10-CM

## 2017-10-17 PROBLEM — N73.9 PELVIC ABSCESS IN FEMALE: Status: RESOLVED | Noted: 2017-10-08 | Resolved: 2017-10-17

## 2017-10-17 PROBLEM — K63.2 ENTEROCUTANEOUS FISTULA: Status: ACTIVE | Noted: 2017-10-17

## 2017-10-17 LAB
ANION GAP SERPL CALCULATED.3IONS-SCNC: 8 MMOL/L (ref 5–15)
ANISOCYTOSIS BLD QL: ABNORMAL
BACTERIA UR QL AUTO: ABNORMAL /HPF
BASOPHILS # BLD AUTO: 0.02 10*3/MM3 (ref 0–0.2)
BASOPHILS NFR BLD AUTO: 0.2 % (ref 0–2)
BILIRUB UR QL STRIP: ABNORMAL
BUN BLD-MCNC: 33 MG/DL (ref 7–21)
BUN/CREAT SERPL: 28.9 (ref 7–25)
CALCIUM SPEC-SCNC: 7.7 MG/DL (ref 8.4–10.2)
CHLORIDE SERPL-SCNC: 99 MMOL/L (ref 95–110)
CLARITY UR: ABNORMAL
CO2 SERPL-SCNC: 30 MMOL/L (ref 22–31)
COLOR UR: YELLOW
CREAT BLD-MCNC: 1.14 MG/DL (ref 0.5–1)
DEPRECATED RDW RBC AUTO: 49.7 FL (ref 36.4–46.3)
EOSINOPHIL # BLD AUTO: 0.11 10*3/MM3 (ref 0–0.7)
EOSINOPHIL # BLD MANUAL: 0.2 10*3/MM3 (ref 0–0.7)
EOSINOPHIL NFR BLD AUTO: 1.1 % (ref 0–7)
EOSINOPHIL NFR BLD MANUAL: 2 % (ref 0–7)
ERYTHROCYTE [DISTWIDTH] IN BLOOD BY AUTOMATED COUNT: 14.8 % (ref 11.5–14.5)
GFR SERPL CREATININE-BSD FRML MDRD: 48 ML/MIN/1.73 (ref 45–104)
GLUCOSE BLD-MCNC: 124 MG/DL (ref 60–100)
GLUCOSE BLDC GLUCOMTR-MCNC: 141 MG/DL (ref 70–130)
GLUCOSE UR STRIP-MCNC: NEGATIVE MG/DL
HCT VFR BLD AUTO: 27.8 % (ref 35–45)
HGB BLD-MCNC: 9 G/DL (ref 12–15.5)
HGB UR QL STRIP.AUTO: ABNORMAL
HYALINE CASTS UR QL AUTO: ABNORMAL /LPF
IMM GRANULOCYTES # BLD: 0.5 10*3/MM3 (ref 0–0.02)
IMM GRANULOCYTES NFR BLD: 5.1 % (ref 0–0.5)
KETONES UR QL STRIP: NEGATIVE
LEUKOCYTE ESTERASE UR QL STRIP.AUTO: ABNORMAL
LYMPHOCYTES # BLD AUTO: 1.89 10*3/MM3 (ref 0.6–4.2)
LYMPHOCYTES # BLD MANUAL: 2.07 10*3/MM3 (ref 0.6–4.2)
LYMPHOCYTES NFR BLD AUTO: 19.2 % (ref 10–50)
LYMPHOCYTES NFR BLD MANUAL: 1 % (ref 0–12)
LYMPHOCYTES NFR BLD MANUAL: 21 % (ref 10–50)
MAGNESIUM SERPL-MCNC: 2.5 MG/DL (ref 1.6–2.3)
MCH RBC QN AUTO: 29.7 PG (ref 26.5–34)
MCHC RBC AUTO-ENTMCNC: 32.4 G/DL (ref 31.4–36)
MCV RBC AUTO: 91.7 FL (ref 80–98)
METAMYELOCYTES NFR BLD MANUAL: 1 % (ref 0–0)
MONOCYTES # BLD AUTO: 0.1 10*3/MM3 (ref 0–0.9)
MONOCYTES # BLD AUTO: 0.5 10*3/MM3 (ref 0–0.9)
MONOCYTES NFR BLD AUTO: 5.1 % (ref 0–12)
NEUTROPHILS # BLD AUTO: 6.83 10*3/MM3 (ref 2–8.6)
NEUTROPHILS # BLD AUTO: 7.19 10*3/MM3 (ref 2–8.6)
NEUTROPHILS NFR BLD AUTO: 69.3 % (ref 37–80)
NEUTROPHILS NFR BLD MANUAL: 73 % (ref 37–80)
NITRITE UR QL STRIP: POSITIVE
PH UR STRIP.AUTO: 6.5 [PH] (ref 5–9)
PHOSPHATE SERPL-MCNC: 4.1 MG/DL (ref 2.4–4.4)
PLATELET # BLD AUTO: 217 10*3/MM3 (ref 150–450)
PMV BLD AUTO: 9.8 FL (ref 8–12)
POLYCHROMASIA BLD QL SMEAR: ABNORMAL
POTASSIUM BLD-SCNC: 4.1 MMOL/L (ref 3.5–5.1)
PROT UR QL STRIP: NEGATIVE
RBC # BLD AUTO: 3.03 10*6/MM3 (ref 3.77–5.16)
RBC # UR: ABNORMAL /HPF
REF LAB TEST METHOD: ABNORMAL
SMALL PLATELETS BLD QL SMEAR: ADEQUATE
SODIUM BLD-SCNC: 137 MMOL/L (ref 137–145)
SP GR UR STRIP: 1.01 (ref 1–1.03)
SQUAMOUS #/AREA URNS HPF: ABNORMAL /HPF
UROBILINOGEN UR QL STRIP: ABNORMAL
VARIANT LYMPHS NFR BLD MANUAL: 2 % (ref 0–5)
WBC MORPH BLD: NORMAL
WBC NRBC COR # BLD: 9.85 10*3/MM3 (ref 3.2–9.8)
WBC UR QL AUTO: ABNORMAL /HPF

## 2017-10-17 PROCEDURE — 25010000002 FUROSEMIDE PER 20 MG: Performed by: SURGERY

## 2017-10-17 PROCEDURE — 82962 GLUCOSE BLOOD TEST: CPT

## 2017-10-17 PROCEDURE — 25010000002 KETOROLAC TROMETHAMINE PER 15 MG: Performed by: SURGERY

## 2017-10-17 PROCEDURE — 25010000002 KETOROLAC TROMETHAMINE PER 15 MG: Performed by: INTERNAL MEDICINE

## 2017-10-17 PROCEDURE — 25010000002 ONDANSETRON PER 1 MG: Performed by: SURGERY

## 2017-10-17 PROCEDURE — 83735 ASSAY OF MAGNESIUM: CPT | Performed by: SURGERY

## 2017-10-17 PROCEDURE — 87086 URINE CULTURE/COLONY COUNT: CPT | Performed by: INTERNAL MEDICINE

## 2017-10-17 PROCEDURE — 87040 BLOOD CULTURE FOR BACTERIA: CPT | Performed by: INTERNAL MEDICINE

## 2017-10-17 PROCEDURE — 25010000002 FUROSEMIDE PER 20 MG: Performed by: INTERNAL MEDICINE

## 2017-10-17 PROCEDURE — 81001 URINALYSIS AUTO W/SCOPE: CPT | Performed by: INTERNAL MEDICINE

## 2017-10-17 PROCEDURE — 93010 ELECTROCARDIOGRAM REPORT: CPT | Performed by: INTERNAL MEDICINE

## 2017-10-17 PROCEDURE — 85025 COMPLETE CBC W/AUTO DIFF WBC: CPT | Performed by: SURGERY

## 2017-10-17 PROCEDURE — 25010000002 ENOXAPARIN PER 10 MG: Performed by: SURGERY

## 2017-10-17 PROCEDURE — 25010000002 HEPARIN LOCK FLUSH 10 UNIT/ML SOLUTION: Performed by: INTERNAL MEDICINE

## 2017-10-17 PROCEDURE — 99024 POSTOP FOLLOW-UP VISIT: CPT | Performed by: SURGERY

## 2017-10-17 PROCEDURE — 87186 SC STD MICRODIL/AGAR DIL: CPT | Performed by: INTERNAL MEDICINE

## 2017-10-17 PROCEDURE — 87070 CULTURE OTHR SPECIMN AEROBIC: CPT | Performed by: INTERNAL MEDICINE

## 2017-10-17 PROCEDURE — 85007 BL SMEAR W/DIFF WBC COUNT: CPT | Performed by: SURGERY

## 2017-10-17 PROCEDURE — 71010 HC CHEST PA OR AP: CPT

## 2017-10-17 PROCEDURE — 87205 SMEAR GRAM STAIN: CPT | Performed by: INTERNAL MEDICINE

## 2017-10-17 PROCEDURE — 84100 ASSAY OF PHOSPHORUS: CPT | Performed by: SURGERY

## 2017-10-17 PROCEDURE — 87077 CULTURE AEROBIC IDENTIFY: CPT | Performed by: INTERNAL MEDICINE

## 2017-10-17 PROCEDURE — 80048 BASIC METABOLIC PNL TOTAL CA: CPT | Performed by: SURGERY

## 2017-10-17 PROCEDURE — 93005 ELECTROCARDIOGRAM TRACING: CPT | Performed by: INTERNAL MEDICINE

## 2017-10-17 RX ORDER — ROSUVASTATIN CALCIUM 10 MG/1
10 TABLET, COATED ORAL DAILY
Status: DISCONTINUED | OUTPATIENT
Start: 2017-10-18 | End: 2017-12-14 | Stop reason: HOSPADM

## 2017-10-17 RX ORDER — GABAPENTIN 300 MG/1
600 CAPSULE ORAL 3 TIMES DAILY
Status: DISCONTINUED | OUTPATIENT
Start: 2017-10-17 | End: 2017-12-14 | Stop reason: HOSPADM

## 2017-10-17 RX ORDER — CITALOPRAM 40 MG/1
40 TABLET ORAL DAILY
Status: DISCONTINUED | OUTPATIENT
Start: 2017-10-18 | End: 2017-12-14 | Stop reason: HOSPADM

## 2017-10-17 RX ORDER — LEVOTHYROXINE SODIUM 0.15 MG/1
150 TABLET ORAL
Status: DISCONTINUED | OUTPATIENT
Start: 2017-10-18 | End: 2017-12-14 | Stop reason: HOSPADM

## 2017-10-17 RX ORDER — SODIUM CHLORIDE 0.9 % (FLUSH) 0.9 %
10 SYRINGE (ML) INJECTION AS NEEDED
Status: DISCONTINUED | OUTPATIENT
Start: 2017-10-17 | End: 2017-12-14 | Stop reason: HOSPADM

## 2017-10-17 RX ORDER — ACETAMINOPHEN 500 MG
500 TABLET ORAL EVERY 6 HOURS PRN
Status: DISCONTINUED | OUTPATIENT
Start: 2017-10-17 | End: 2017-12-14 | Stop reason: HOSPADM

## 2017-10-17 RX ORDER — FUROSEMIDE 10 MG/ML
20 INJECTION INTRAMUSCULAR; INTRAVENOUS 2 TIMES DAILY
Status: DISCONTINUED | OUTPATIENT
Start: 2017-10-17 | End: 2017-11-14

## 2017-10-17 RX ORDER — ONDANSETRON 2 MG/ML
4 INJECTION INTRAMUSCULAR; INTRAVENOUS EVERY 6 HOURS PRN
Status: DISCONTINUED | OUTPATIENT
Start: 2017-10-17 | End: 2017-12-14 | Stop reason: HOSPADM

## 2017-10-17 RX ORDER — KETOROLAC TROMETHAMINE 30 MG/ML
30 INJECTION, SOLUTION INTRAMUSCULAR; INTRAVENOUS EVERY 6 HOURS PRN
Status: DISCONTINUED | OUTPATIENT
Start: 2017-10-17 | End: 2017-10-22 | Stop reason: SDUPTHER

## 2017-10-17 RX ORDER — SODIUM CHLORIDE 0.9 % (FLUSH) 0.9 %
10 SYRINGE (ML) INJECTION EVERY 8 HOURS SCHEDULED
Status: DISCONTINUED | OUTPATIENT
Start: 2017-10-17 | End: 2017-12-14 | Stop reason: HOSPADM

## 2017-10-17 RX ORDER — TIZANIDINE 4 MG/1
4 TABLET ORAL EVERY 12 HOURS PRN
Status: DISCONTINUED | OUTPATIENT
Start: 2017-10-17 | End: 2017-11-02 | Stop reason: SDUPTHER

## 2017-10-17 RX ORDER — HEPARIN SODIUM,PORCINE 10 UNIT/ML
30 VIAL (ML) INTRAVENOUS EVERY 8 HOURS SCHEDULED
Status: DISCONTINUED | OUTPATIENT
Start: 2017-10-17 | End: 2017-12-14 | Stop reason: HOSPADM

## 2017-10-17 RX ORDER — MIRTAZAPINE 15 MG/1
30 TABLET, FILM COATED ORAL NIGHTLY
Status: DISCONTINUED | OUTPATIENT
Start: 2017-10-17 | End: 2017-12-14 | Stop reason: HOSPADM

## 2017-10-17 RX ORDER — PRIMIDONE 50 MG/1
50 TABLET ORAL 3 TIMES DAILY
Status: DISCONTINUED | OUTPATIENT
Start: 2017-10-17 | End: 2017-12-03

## 2017-10-17 RX ORDER — DONEPEZIL HYDROCHLORIDE 5 MG/1
5 TABLET, FILM COATED ORAL NIGHTLY
Status: DISCONTINUED | OUTPATIENT
Start: 2017-10-17 | End: 2017-12-14 | Stop reason: HOSPADM

## 2017-10-17 RX ORDER — GLATIRAMER 40 MG/ML
40 INJECTION, SOLUTION SUBCUTANEOUS 3 TIMES WEEKLY
Status: DISCONTINUED | OUTPATIENT
Start: 2017-10-18 | End: 2017-10-18

## 2017-10-17 RX ORDER — MEMANTINE HYDROCHLORIDE 5 MG/1
7 TABLET ORAL NIGHTLY
Status: DISCONTINUED | OUTPATIENT
Start: 2017-10-17 | End: 2017-12-14 | Stop reason: HOSPADM

## 2017-10-17 RX ORDER — ALUMINA, MAGNESIA, AND SIMETHICONE 2400; 2400; 240 MG/30ML; MG/30ML; MG/30ML
30 SUSPENSION ORAL EVERY 6 HOURS PRN
Status: DISCONTINUED | OUTPATIENT
Start: 2017-10-17 | End: 2017-12-14 | Stop reason: HOSPADM

## 2017-10-17 RX ORDER — CHLORHEXIDINE GLUCONATE 0.12 MG/ML
15 RINSE ORAL EVERY 12 HOURS SCHEDULED
Status: DISCONTINUED | OUTPATIENT
Start: 2017-10-17 | End: 2017-10-18 | Stop reason: ALTCHOICE

## 2017-10-17 RX ORDER — ONDANSETRON 4 MG/1
4 TABLET, FILM COATED ORAL EVERY 8 HOURS PRN
Status: DISCONTINUED | OUTPATIENT
Start: 2017-10-17 | End: 2017-12-14 | Stop reason: HOSPADM

## 2017-10-17 RX ORDER — POTASSIUM CHLORIDE 750 MG/1
10 CAPSULE, EXTENDED RELEASE ORAL 3 TIMES DAILY
Status: DISCONTINUED | OUTPATIENT
Start: 2017-10-17 | End: 2017-12-14 | Stop reason: HOSPADM

## 2017-10-17 RX ADMIN — KETOROLAC TROMETHAMINE 30 MG: 30 INJECTION, SOLUTION INTRAMUSCULAR; INTRAVENOUS at 08:55

## 2017-10-17 RX ADMIN — Medication 10 ML: at 08:56

## 2017-10-17 RX ADMIN — GABAPENTIN 600 MG: 300 CAPSULE ORAL at 08:55

## 2017-10-17 RX ADMIN — KETOROLAC TROMETHAMINE 30 MG: 30 INJECTION, SOLUTION INTRAMUSCULAR; INTRAVENOUS at 02:08

## 2017-10-17 RX ADMIN — ROSUVASTATIN CALCIUM 10 MG: 10 TABLET, FILM COATED ORAL at 08:56

## 2017-10-17 RX ADMIN — PRIMIDONE 50 MG: 50 TABLET ORAL at 08:55

## 2017-10-17 RX ADMIN — FUROSEMIDE 20 MG: 10 INJECTION, SOLUTION INTRAVENOUS at 08:55

## 2017-10-17 RX ADMIN — POTASSIUM CHLORIDE 10 MEQ: 750 CAPSULE, EXTENDED RELEASE ORAL at 08:55

## 2017-10-17 RX ADMIN — ENOXAPARIN SODIUM 40 MG: 40 INJECTION SUBCUTANEOUS at 08:54

## 2017-10-17 RX ADMIN — ONDANSETRON 4 MG: 2 INJECTION INTRAMUSCULAR; INTRAVENOUS at 02:07

## 2017-10-17 RX ADMIN — ALUMINUM HYDROXIDE, MAGNESIUM HYDROXIDE, AND DIMETHICONE 30 ML: 400; 400; 40 SUSPENSION ORAL at 08:52

## 2017-10-17 RX ADMIN — CITALOPRAM HYDROBROMIDE 40 MG: 40 TABLET ORAL at 08:55

## 2017-10-17 RX ADMIN — LEVOTHYROXINE SODIUM 150 MCG: 150 TABLET ORAL at 08:55

## 2017-10-17 NOTE — DISCHARGE SUMMARY
Discharge Summary  Date of Admission: 10/8/17  Date of Discharge: 10/17/17  Service: General Surgery  Attending: Arnulfo Marcum  Procedures: ex-lap, washout  Discharge Diagnoses: enterocutaneous fistula  Hospital Course: Came in post op after Karishma's procedure with large draining pelvic abscess.  Went to OR for washout.  Post op developed small bowel fistula.  Has succus draining out drain and wound.  Got better and started on TPN and kept NPO.  Discharged to LTACH here.  Discharge Medications:     Your medication list      CONTINUE taking these medications       Instructions Last Dose Given Next Dose Due    albuterol 108 (90 Base) MCG/ACT inhaler   Commonly known as:  PROVENTIL HFA;VENTOLIN HFA              citalopram 40 MG tablet   Commonly known as:  CeleXA              COPAXONE SC              dicyclomine 10 MG capsule   Commonly known as:  BENTYL              difluprednate 0.05 % ophthalmic emulsion   Commonly known as:  DUREZOL              donepezil 5 MG tablet   Commonly known as:  ARICEPT              gabapentin 600 MG tablet   Commonly known as:  NEURONTIN              levothyroxine 150 MCG tablet   Commonly known as:  SYNTHROID, LEVOTHROID              memantine 7 MG capsule sustained-release 24 hr extended release capsule   Commonly known as:  NAMENDA XR              mirtazapine 30 MG tablet   Commonly known as:  REMERON              ondansetron 4 MG tablet   Commonly known as:  ZOFRAN              potassium chloride 10 MEQ CR capsule   Commonly known as:  MICRO-K              primidone 50 MG tablet   Commonly known as:  MYSOLINE              rosuvastatin 10 MG tablet   Commonly known as:  CRESTOR              tiZANidine 4 MG tablet   Commonly known as:  ZANAFLEX                STOP taking these medications          besifloxacin 0.6 % suspension ophthalmic suspension   Commonly known as:  BESIVANCE           cholestyramine 4 g packet   Commonly known as:  QUESTRAN           furosemide 80 MG tablet    Commonly known as:  LASIX           HYDROcodone-acetaminophen 7.5-325 MG per tablet   Commonly known as:  NORCO           oxyCODONE-acetaminophen 7.5-325 MG per tablet   Commonly known as:  PERCOCET             ASK your doctor about these medications       Instructions Last Dose Given Next Dose Due    difluprednate 0.05 % ophthalmic emulsion   Commonly known as:  DUREZOL   Ask about: Should I take this medication?                  Disposition: Jaziel PENA    Will follow as long as she is in the hospital          This document has been electronically signed by Arnulfo Marcum MD on October 17, 2017 8:10 AM

## 2017-10-17 NOTE — PROGRESS NOTES
LOS: 9 days   Patient Care Team:  DIANA Lindquist as PCP - General (Family Medicine)    Chief Complaint:  <principal problem not specified>    Subjective     Interval History:   Feels better, still having succus output    Objective     Vital Signs  Temp:  [98 °F (36.7 °C)-98.9 °F (37.2 °C)] 98 °F (36.7 °C)  Heart Rate:  [62-84] 70  Resp:  [16-20] 16  BP: ()/(49-76) 106/60    Physical Exam:  Urine clear, CLARI to gravtiy succus, midline wound draining     Results Review:       Lab Results (last 24 hours)     Procedure Component Value Units Date/Time    Creatinine, Urine, Random - Urine, Catheter [[id number]] Collected:  10/16/17 0814    Specimen:  Urine from Urine, Catheter Updated:  10/16/17 0844     Creatinine, Urine 2.0 mg/dL     POC Glucose Fingerstick [[id number]]  (Normal) Collected:  10/16/17 0914    Specimen:  Blood Updated:  10/16/17 0932     Glucose 126 mg/dL       Meter: [id number]: [id number]       POC Glucose Fingerstick [[id number]]  (Abnormal) Collected:  10/16/17 1354    Specimen:  Blood Updated:  10/16/17 1440     Glucose 138 (H) mg/dL       Meter: [id number]: [id number]       POC Glucose Fingerstick [[id number]]  (Normal) Collected:  10/16/17 1817    Specimen:  Blood Updated:  10/16/17 1846     Glucose 101 mg/dL       Meter: [id number]: [id number]       POC Glucose Fingerstick [[id number]]  (Normal) Collected:  10/16/17 2059    Specimen:  Blood Updated:  10/16/17 2110     Glucose 124 mg/dL       Meter: [id number]: [id number]       POC Glucose Fingerstick [[id number]]  (Abnormal) Collected:  10/17/17 0548    Specimen:  Blood Updated:  10/17/17 0600     Glucose 141 (H) mg/dL       RN NotifiedMeter: [id number] Notified       Basic Metabolic Panel [[id number]]  (Abnormal) Collected:  10/17/17 0715    Specimen:  Blood Updated:  10/17/17 0742     Glucose 124 (H) mg/dL      BUN 33 (H) mg/dL      Creatinine 1.14 (H) mg/dL      Sodium 137 mmol/L      Potassium 4.1 mmol/L       Chloride 99 mmol/L      CO2 30.0 mmol/L      Calcium 7.7 (L) mg/dL      eGFR Non African Amer 48 mL/min/1.73      BUN/Creatinine Ratio 28.9 (H)     Anion Gap 8.0 mmol/L     Magnesium [[id number]]  (Abnormal) Collected:  10/17/17 0715    Specimen:  Blood Updated:  10/17/17 0742     Magnesium 2.5 (H) mg/dL     Phosphorus [[id number]]  (Normal) Collected:  10/17/17 0715    Specimen:  Blood Updated:  10/17/17 0742     Phosphorus 4.1 mg/dL     CBC Auto Differential [[id number]]  (Abnormal) Collected:  10/17/17 0715    Specimen:  Blood Updated:  10/17/17 0753     WBC 9.85 (H) 10*3/mm3      RBC 3.03 (L) 10*6/mm3      Hemoglobin 9.0 (L) g/dL      Hematocrit 27.8 (L) %      MCV 91.7 fL      MCH 29.7 pg      MCHC 32.4 g/dL      RDW 14.8 (H) %      RDW-SD 49.7 (H) fl      MPV 9.8 fL      Platelets 217 10*3/mm3      Neutrophil % 69.3 %      Lymphocyte % 19.2 %      Monocyte % 5.1 %      Eosinophil % 1.1 %      Basophil % 0.2 %      Immature Grans % 5.1 (H) %      Neutrophils, Absolute 6.83 10*3/mm3      Lymphocytes, Absolute 1.89 10*3/mm3      Monocytes, Absolute 0.50 10*3/mm3      Eosinophils, Absolute 0.11 10*3/mm3      Basophils, Absolute 0.02 10*3/mm3      Immature Grans, Absolute 0.50 (H) 10*3/mm3     Manual Differential [[id number]] Collected:  10/17/17 0715    Specimen:  Blood Updated:  10/17/17 0754    CBC & Differential [[id number]] Collected:  10/17/17 0715    Specimen:  Blood Updated:  10/17/17 0755    Narrative:       The following orders were created for panel order CBC & Differential.  Procedure                               Abnormality         Status                     ---------                               -----------         ------                     Manual Differential[[id number]]                              In process                 Scan Slide[[id number]]                                                                  CBC Auto Differential[[id number]]        Abnormal            Final result                  Please view results for these tests on the individual orders.          Medication Review:   Current Facility-Administered Medications   Medication Dose Route Frequency Provider Last Rate Last Dose   • acetaminophen (TYLENOL) tablet 500 mg  500 mg Oral Q6H PRN Arnulfo Marcum MD   500 mg at 10/12/17 1356   • Adult Central Clinimix TPN   Intravenous Q24H (TPN) Arnulfo Marcum MD 80 mL/hr at 10/16/17 1820     • aluminum-magnesium hydroxide-simethicone (MAALOX MAX) 400-400-40 MG/5ML suspension 30 mL  30 mL Oral Q6H PRN Arnulfo Marcum MD   30 mL at 10/16/17 2122   • chlorhexidine (PERIDEX) 0.12 % solution 15 mL  15 mL Mouth/Throat Q12H Arnulfo Marcum MD   15 mL at 10/16/17 2057   • citalopram (CeleXA) tablet 40 mg  40 mg Oral Daily Arnulfo Marcum MD   40 mg at 10/16/17 0816   • donepezil (ARICEPT) tablet 5 mg  5 mg Oral Nightly Arnulfo Marcum MD   5 mg at 10/16/17 2057   • enoxaparin (LOVENOX) syringe 40 mg  40 mg Subcutaneous Q24H Arnulfo Marcum MD   40 mg at 10/16/17 0814   • fat emulsion (INTRALIPID,LIPOSYN) 20 % infusion 38.6 g  193 mL Intravenous Q24H Fran Betts MD 16.08 mL/hr at 10/16/17 2108 38.6 g at 10/16/17 2108   • furosemide (LASIX) injection 20 mg  20 mg Intravenous BID Arnulfo Marcum MD   20 mg at 10/16/17 1820   • gabapentin (NEURONTIN) capsule 600 mg  600 mg Oral TID Arnulfo Marcum MD   600 mg at 10/16/17 2233   • Glatiramer Acetate solution prefilled syringe 40 mg  40 mg Subcutaneous Once per day on Mon Wed Fri Arnulfo Marcum MD   40 mg at 10/16/17 0916   • insulin aspart (novoLOG) injection 0-9 Units  0-9 Units Subcutaneous 4x Daily AC & at Bedtime Arnulfo Marcum MD   Stopped at 10/14/17 2048   • ketorolac (TORADOL) injection 30 mg  30 mg Intravenous Q6H PRN Arnulfo Marcum MD   30 mg at 10/17/17 0208   • levothyroxine (SYNTHROID, LEVOTHROID) tablet 150 mcg  150 mcg Oral Q AM Arnulfo Marcum MD   150 mcg at 10/16/17 0815   • memantine (NAMENDA) tablet 7.5 mg  7.5 mg Oral Nightly Arnulfo Marcum MD   7.5 mg at 10/16/17 2051    • mirtazapine (REMERON) tablet 30 mg  30 mg Oral Nightly Arnulfo Marcum MD   30 mg at 10/16/17 2050   • ondansetron (ZOFRAN) injection 4 mg  4 mg Intravenous Q6H PRN Arnulfo Marcum MD   4 mg at 10/17/17 0207   • ondansetron (ZOFRAN) tablet 4 mg  4 mg Oral Q8H PRN Arnulfo Marcum MD   4 mg at 10/15/17 0902   • Pharmacy to Dose TPN   Does not apply Continuous PRN Arnulfo Marcum MD       • pneumococcal polysaccharide 23-valent (PNEUMOVAX-23) vaccine 0.5 mL  0.5 mL Intramuscular During Hospitalization Arnulfo Marcum MD       • potassium chloride (MICRO-K) CR capsule 10 mEq  10 mEq Oral TID Arnulfo Marcum MD   10 mEq at 10/16/17 2122   • primidone (MYSOLINE) tablet 50 mg  50 mg Oral TID Arnulfo Marcum MD   50 mg at 10/16/17 2054   • rosuvastatin (CRESTOR) tablet 10 mg  10 mg Oral Daily Arnulfo Marcum MD   10 mg at 10/16/17 0818   • sodium chloride 0.9 % flush 1-10 mL  1-10 mL Intravenous PRN Arnulfo Marcum MD   10 mL at 10/12/17 0931   • tiZANidine (ZANAFLEX) tablet 4 mg  4 mg Oral Q12H PRN Arnulfo Marcum MD   4 mg at 10/16/17 2050       Assessment/Plan     Active Problems:    Enterocutaneous fistula    66 yo lady 4 weeks s/p Karishma's and POD#8 s/p ex-lap and washout of pelvic abscess  Neuro- much improved ICU psychosis, holding narcotics, most home meds restarted, continue to monitor   Cards- stable, no hypotension or tachy  Lungs- breathing well on nasal canula, IS for support  GI- having ostomy output, succus coming from wound and drain signifies small bowel fistula, continue TPN, sJP drain to gravity  Renal- UOP much improved, continue to monitor, continue miranda  Heme/ID- jehovahs witness, monitor labs, f/u cultures, continue antibiotics and lovenox  dispo- PT to get into chair       Arnulfo Marcum MD  10/17/17  8:13 AM

## 2017-10-17 NOTE — PROGRESS NOTES
Parenteral Nutrition by Pharmacy    Patient: Irma Pacheco  MRN#: [id number]  Attending: No name on file.  Admission Date: 100817    Subjective/Objective  Progress notes reviewed.     Daily Assessment  Lab Results   Component Value Date    GLUCOSE 124 (H) 10/17/2017    BUN 33 (H) 10/17/2017    CREATININE 1.14 (H) 10/17/2017    EGFRIFNONA 48 10/17/2017    BCR 28.9 (H) 10/17/2017    K 4.1 10/17/2017    CO2 30.0 10/17/2017    CALCIUM 7.7 (L) 10/17/2017    ALBUMIN 3.00 (L) 10/16/2017    LABIL2 1.1 10/16/2017    AST 26 10/16/2017    ALT 24 10/16/2017     Lab Results   Component Value Date    GLUCOSE 124 (H) 10/17/2017    CALCIUM 7.7 (L) 10/17/2017     10/17/2017    K 4.1 10/17/2017    CO2 30.0 10/17/2017    CL 99 10/17/2017    BUN 33 (H) 10/17/2017    CREATININE 1.14 (H) 10/17/2017    EGFRIFNONA 48 10/17/2017    BCR 28.9 (H) 10/17/2017    ANIONGAP 8.0 10/17/2017     Magnesium   Date Value Ref Range Status   10/17/2017 2.5 (H) 1.6 - 2.3 mg/dL Final     Phosphorus   Date Value Ref Range Status   10/17/2017 4.1 2.4 - 4.4 mg/dL Final     Calcium   Date Value Ref Range Status   10/17/2017 7.7 (L) 8.4 - 10.2 mg/dL Final     Triglycerides   Date Value Ref Range Status   10/13/2017 180 20 - 199 mg/dL Final     Above labs reviewed.  Corrected Ca= 8.5-WNL  Plan   Magnesium level is slightly elevated. Will decrease Magnesium in TPN to 8 mEq/bag. All other levels are within goal range. Pharmacy will continue to monitor and adjust formula as needed.    Current TPN formula is as follows:    Clinimix 5/20%  K Phos 15 mMol  KCl 20 mEq  NaCl 160 mEq  Na Acetate 30 mEq  Calcium Gluconate 10 mEq  Magnesium Sulfate 8 mEq  MVI 10 ml  Infusing at 80 ml/hr    Becka Moseley [facility]  10/17/17  8:51 AM

## 2017-10-17 NOTE — PLAN OF CARE
Problem: Inpatient Occupational Therapy  Goal: Transfer Training Goal 1 LTG- OT  Outcome: Unable to achieve outcome(s) by discharge Date Met:  10/17/17    10/11/17 1417 10/17/17 1459   Transfer Training OT LTG   Transfer Training OT LTG, Date Established 10/11/17 --    Transfer Training OT LTG, Time to Achieve by discharge --    Transfer Training OT LTG, Activity Type all transfers --    Transfer Training OT LTG, [place] Level supervision required --    Transfer Training OT LTG, Assist Device walker, platform --    Transfer Training OT LTG, Date Goal Reviewed --  10/17/17   Transfer Training OT LTG, Outcome --  goal not met   Transfer Training OT LTG, Reason Goal Not Met --  discharged from facility       Goal: Strength Goal LTG- OT  Outcome: Unable to achieve outcome(s) by discharge Date Met:  10/17/17    10/11/17 1417 10/17/17 1459   Strength OT LTG   Strength Goal OT LTG, Date Established 10/11/17 --    Strength Goal OT LTG, Time to Achieve by discharge --    Strength Goal OT LTG, Measure to Achieve 1-2 sets of 10 reps all planes with 1-2 lb wrist wts or dumbells for B UE strengthening to increase independence with functional mobility. --    Strength Goal OT LTG, Date Goal Reviewed --  10/10/17   Strength Goal OT LTG, Outcome --  goal not met   Strength Goal OT LTG, Reason Goal Not Met --  discharged from facility       Goal: Dynamic Sitting Balance Goal LTG- OT  Outcome: Unable to achieve outcome(s) by discharge Date Met:  10/17/17    10/11/17 1417 10/17/17 1459   Dynamic Sitting Balance OT LTG   Dynamic Sitting Balance OT LTG, Date Established 10/04/17 --    Dynamic Sitting Balance OT LTG, Time to Achieve by discharge --    Dynamic Sitting Balance OT LTG, [place] Level supervision required  (10-15 minutes with functional activity) --    Dynamic Sitting Balance OT LTG, Assist Device bed rails --    Dynamic Sitting Balance OT LTG, Date Goal Reviewed --  10/17/17   Dynamic Sitting Balance OT LTG, Outcome  --  goal not met   Dynamic Sitting Balance OT LTG, Reason Goal Not Met --  discharged from facility       Goal: ADL Goal LTG- OT  Outcome: Unable to achieve outcome(s) by discharge Date Met:  10/17/17    10/11/17 1417 10/17/17 1459   ADL OT LTG   ADL OT LTG, Date Established 10/11/17 --    ADL OT LTG, Time to Achieve by discharge --    ADL OT LTG, Activity Type ADL skills  (Sponge bath and dress upper body.) --    ADL OT LTG, [place] Level standby assist;contact guard;assistive device  (R/W.) --    ADL OT LTG, Date Goal Reviewed --  10/17/17   ADL OT LTG, Outcome --  goal not met   ADL OT LTG, Reason Goal Not Met --  discharged from facility

## 2017-10-17 NOTE — THERAPY DISCHARGE NOTE
Acute Care - Occupational Therapy Discharge Summary  [facility]     Patient Name: Irma Pacheco  : 1950  MRN: [id number]    Today's Date: 10/17/2017  Onset of Illness/Injury or Date of Surgery Date: 10/09/17    Date of Referral to OT: 10/10/17  Referring Physician: Dr. Arnulfo Marcum      Admit Date: 10/8/2017        OT Recommendation and Plan    Visit Dx:    ICD-10-CM ICD-9-CM   1. Pelvic abscess in female N73.9 614.4   2. Impaired functional mobility, balance, gait, and endurance Z74.09 V49.89   3. Impaired mobility and activities of daily living Z74.09 799.89                     OT Goals       10/17/17 1459 10/16/17 1100 10/11/17 1417    Transfer Training OT LTG    Transfer Training OT LTG, Date Established   10/11/17  -RB    Transfer Training OT LTG, Time to Achieve   by discharge  -RB    Transfer Training OT LTG, Activity Type   all transfers  -RB    Transfer Training OT LTG, [place] Level   supervision required  -RB    Transfer Training OT LTG, Assist Device   walker, platform  -RB    Transfer Training OT LTG, Date Goal Reviewed 10/17/17  -SG 10/16/17  -JH     Transfer Training OT LTG, Outcome goal not met  -SG goal ongoing  -     Transfer Training OT LTG, Reason Goal Not Met discharged from facility  -SG      Strength OT LTG    Strength Goal OT LTG, Date Established   10/11/17  -RB    Strength Goal OT LTG, Time to Achieve   by discharge  -RB    Strength Goal OT LTG, Measure to Achieve   1-2 sets of 10 reps all planes with 1-2 lb wrist wts or dumbells for B UE strengthening to increase independence with functional mobility.  -RB    Strength Goal OT LTG, Date Goal Reviewed 10/10/17  -SG 10/16/17  -JH     Strength Goal OT LTG, Outcome goal not met  -SG goal ongoing  -     Strength Goal OT LTG, Reason Goal Not Met discharged from facility  -SG      Dynamic Sitting Balance OT LTG    Dynamic Sitting Balance OT LTG, Date Established   10/04/17  -RB    Dynamic Sitting Balance OT LTG, Time to  Achieve   by discharge  -    Dynamic Sitting Balance OT LTG, [place] Level   supervision required   10-15 minutes with functional activity  -RB    Dynamic Sitting Balance OT LTG, Assist Device   bed rails  -RB    Dynamic Sitting Balance OT LTG, Date Goal Reviewed 10/17/17  -SG 10/16/17  -     Dynamic Sitting Balance OT LTG, Outcome goal not met  - goal ongoing  -     Dynamic Sitting Balance OT LTG, Reason Goal Not Met discharged from facility  -SG      ADL OT LTG    ADL OT LTG, Date Established   10/11/17  -    ADL OT LTG, Time to Achieve   by discharge  -RB    ADL OT LTG, Activity Type   ADL skills   Sponge bath and dress upper body.  -    ADL OT LTG, [place] Level   standby assist;contact guard;assistive device   R/W.  -RB    ADL OT LTG, Date Goal Reviewed 10/17/17  - 10/16/17  -     ADL OT LTG, Outcome goal not met  - goal ongoing  -     ADL OT LTG, Reason Goal Not Met discharged from facility  -        User Key  (r) = Recorded By, (t) = Taken By, (c) = Cosigned By    Initials Name Provider Type    RB Harry Chavez, OT Occupational Therapist     GITA Magallon/L Occupational Therapy Assistant     Keila Choi OT Occupational Therapist                Outcome Measures       10/16/17 0800 10/15/17 1319       How much help from another person do you currently need...    Turning from your back to your side while in flat bed without using bedrails? 3  -TW 3  -POLA     Moving from lying on back to sitting on the side of a flat bed without bedrails? 3  -TW 3  -POLA     Moving to and from a bed to a chair (including a wheelchair)? 3  -TW 3  -POLA     Standing up from a chair using your arms (e.g., wheelchair, bedside chair)? 3  -TW 3  -POLA     Climbing 3-5 steps with a railing? 3  -TW 2  -POLA     To walk in hospital room? 3  -TW 3  -POLA     AM-PAC 6 Clicks Score 18  -TW 17  -POLA     Functional Assessment    Outcome Measure Options AM-PAC 6 Clicks Basic Mobility (PT)  -TW        User Key  (r)  = Recorded By, (t) = Taken By, (c) = Cosigned By    Initials Name Provider Type    POLA Ram, TERRY Physical Therapy Assistant    ANGELINA Zheng PTA Physical Therapy Assistant              OT Discharge Summary  Anticipated Discharge Disposition: skilled nursing facility  Reason for Discharge: Discharge from facility, Per MD order  Outcomes Achieved: Refer to plan of care for updates on goals achieved  Discharge Destination: SNF      Keila Choi OT  10/17/2017

## 2017-10-17 NOTE — PLAN OF CARE
Problem: Bowel Resection (Adult)  Goal: Signs and Symptoms of Listed Potential Problems Will be Absent or Manageable (Bowel Resection)  Outcome: Ongoing (interventions implemented as appropriate)    Problem: Anxiety (Adult)  Goal: Identify Related Risk Factors and Signs and Symptoms  Outcome: Outcome(s) achieved Date Met:  10/17/17  Goal: Reduction/Resolution  Outcome: Ongoing (interventions implemented as appropriate)

## 2017-10-17 NOTE — THERAPY DISCHARGE NOTE
Acute Care - Physical Therapy Discharge Summary  [facility]       Patient Name: Irma Pacheco  : 1950  MRN: [id number]    Today's Date: 10/17/2017  Onset of Illness/Injury or Date of Surgery Date: 10/09/17    Date of Referral to PT: 10/10/17  Referring Physician: Dr. Arnulfo Marcum      Admit Date: 10/8/2017      PT Recommendation and Plan    Visit Dx:    ICD-10-CM ICD-9-CM   1. Pelvic abscess in female N73.9 614.4   2. Impaired functional mobility, balance, gait, and endurance Z74.09 V49.89   3. Impaired mobility and activities of daily living Z74.09 799.89             Outcome Measures       10/16/17 0800 10/15/17 1319       How much help from another person do you currently need...    Turning from your back to your side while in flat bed without using bedrails? 3  -TW 3  -POLA     Moving from lying on back to sitting on the side of a flat bed without bedrails? 3  -TW 3  -POLA     Moving to and from a bed to a chair (including a wheelchair)? 3  -TW 3  -POLA     Standing up from a chair using your arms (e.g., wheelchair, bedside chair)? 3  -TW 3  -POLA     Climbing 3-5 steps with a railing? 3  -TW 2  -POLA     To walk in hospital room? 3  -TW 3  -POLA     AM-PAC 6 Clicks Score 18  -TW 17  -POLA     Functional Assessment    Outcome Measure Options AM-PAC 6 Clicks Basic Mobility (PT)  -TW        User Key  (r) = Recorded By, (t) = Taken By, (c) = Cosigned By    Initials Name Provider Type    POLA Ram, PTA Physical Therapy Assistant    TW Karl Zheng PTA Physical Therapy Assistant                      IP PT Goals       10/17/17 1617 10/16/17 0800 10/15/17 1319    Transfer Training PT LTG    Transfer Training PT  LTG, Date Goal Reviewed 10/17/17  -MN 10/16/17  -TW 10/15/17  -POLA    Transfer Training PT LTG, Outcome goal not met  -MN goal ongoing  -TW goal ongoing  -POLA    Transfer Training PT LTG, Reason Goal Not Met discharged from facility  -MN      Gait Training PT LTG    Gait Training Goal PT LTG, Date  Goal Reviewed 10/17/17  -MN 10/16/17  -TW 10/15/17  -POLA    Gait Training Goal PT LTG, Outcome goal not met  -MN goal ongoing  -TW goal ongoing  -POLA    Gait Training Goal PT LTG, Reason Goal Not Met discharged from facility  -MN      Strength Goal PT LTG    Strength Goal PT LTG, Date Goal Reviewed 10/17/17  -MN      Strength Goal PT LTG, Outcome goal met  -MN      Strength Goal PT LTG, Reason Goal Not Met discharged from facility  -MN      Patient Education PT LTG    Patient Education PT LTG, Date Goal Reviewed 10/17/17  -MN 10/16/17  -TW 10/15/17  -POLA    Patient Education PT LTG Outcome goal not met  -MN goal ongoing  -TW goal ongoing  -POLA    Patient Education PT LTG, Reason Goal Not Met discharged from facility  -MN        10/13/17 1000 10/11/17 1309       Transfer Training PT LTG    Transfer Training PT LTG, Date Established  10/11/17  -SHRAVAN     Transfer Training PT LTG, Time to Achieve  by discharge  -SHRAVAN     Transfer Training PT LTG, Activity Type  bed to chair /chair to bed;sit to stand/stand to sit  -SHRAVAN     Transfer Training PT LTG, [place] Level  supervision required  -SHRAVAN     Transfer Training PT  LTG, Date Goal Reviewed 10/13/17  -LN      Transfer Training PT LTG, Outcome goal not met  -LN goal ongoing  -SHRAVAN     Gait Training PT LTG    Gait Training Goal PT LTG, Date Established  10/11/17  -SHRAVAN     Gait Training Goal PT LTG, Time to Achieve  by discharge  -SHRAVAN     Gait Training Goal PT LTG, [place] Level  supervision required  -SHRAVAN     Gait Training Goal PT LTG, Distance to Achieve  150ft  -SHRAVAN     Gait Training Goal PT LTG, Additional Goal  300ft  -SHRAVAN     Gait Training Goal PT LTG, Date Goal Reviewed 10/13/17  -LN      Gait Training Goal PT LTG, Outcome goal not met  -LN goal ongoing  -SHRAVAN     Strength Goal PT LTG    Strength Goal PT LTG, Date Established  10/11/17  -SHRAVAN     Strength Goal PT LTG, Time to Achieve  by discharge  -SHRAVAN     Strength Goal PT LTG, Measure to Achieve  Pt will perform 15 reps of AROM  exercises   -     Strength Goal PT LTG, Date Goal Reviewed 10/13/17  -LN      Strength Goal PT LTG, Outcome goal met  -LN goal ongoing  -     Patient Education PT LTG    Patient Education PT LTG, Date Established  10/11/17  -     Patient Education PT LTG, Time to Achieve  by discharge  -     Patient Education PT LTG, Education Type  gait;transfers;home safety  -     Patient Education PT LTG, Date Goal Reviewed 10/13/17  -LN      Patient Education PT LTG Outcome goal not met  -LN goal ongoing  -       User Key  (r) = Recorded By, (t) = Taken By, (c) = Cosigned By    Initials Name Provider Type    YOVANA Rubio, PT Physical Therapist    SHRAVAN Carla Cain, PT Physical Therapist    POLA Rosibel Ram, PTA Physical Therapy Assistant    LN Jannet Garcia, PTA Physical Therapy Assistant    TW Karl Zheng, PTA Physical Therapy Assistant              PT Discharge Summary  Anticipated Discharge Disposition: skilled nursing facility  Reason for Discharge: Discharge from facility, Per MD order  Outcomes Achieved: Patient able to partially acheive established goals  Discharge Destination: [facility]ACH      Jacinta Rubio, PT   10/17/2017

## 2017-10-17 NOTE — PLAN OF CARE
Problem: Inpatient Physical Therapy  Goal: Transfer Training Goal 1 LTG- PT  Outcome: Unable to achieve outcome(s) by discharge Date Met:  10/17/17    10/11/17 1309 10/17/17 1617   Transfer Training PT LTG   Transfer Training PT LTG, Date Established 10/11/17 --    Transfer Training PT LTG, Time to Achieve by discharge --    Transfer Training PT LTG, Activity Type bed to chair /chair to bed;sit to stand/stand to sit --    Transfer Training PT LTG, [place] Level supervision required --    Transfer Training PT LTG, Date Goal Reviewed --  10/17/17   Transfer Training PT LTG, Outcome --  goal not met   Transfer Training PT LTG, Reason Goal Not Met --  discharged from facility       Goal: Gait Training Goal LTG- PT  Outcome: Unable to achieve outcome(s) by discharge Date Met:  10/17/17    10/11/17 1309 10/17/17 1617   Gait Training PT LTG   Gait Training Goal PT LTG, Date Established 10/11/17 --    Gait Training Goal PT LTG, Time to Achieve by discharge --    Gait Training Goal PT LTG, [place] Level supervision required --    Gait Training Goal PT LTG, Distance to Achieve 150ft --    Gait Training Goal PT LTG, Additional Goal 300ft --    Gait Training Goal PT LTG, Date Goal Reviewed --  10/17/17   Gait Training Goal PT LTG, Outcome --  goal not met   Gait Training Goal PT LTG, Reason Goal Not Met --  discharged from facility       Goal: Strength Goal LTG- PT  Outcome: Outcome(s) achieved Date Met:  10/17/17    10/11/17 1309 10/17/17 1617   Strength Goal PT LTG   Strength Goal PT LTG, Date Established 10/11/17 --    Strength Goal PT LTG, Time to Achieve by discharge --    Strength Goal PT LTG, Measure to Achieve Pt will perform 15 reps of AROM exercises  --    Strength Goal PT LTG, Date Goal Reviewed --  10/17/17   Strength Goal PT LTG, Outcome --  goal met   Strength Goal PT LTG, Reason Goal Not Met --  discharged from facility       Goal: Patient Education Goal LTG- PT  Outcome: Unable to achieve outcome(s) by  discharge Date Met:  10/17/17    10/11/17 1309 10/17/17 1617   Patient Education PT LTG   Patient Education PT LTG, Date Established 10/11/17 --    Patient Education PT LTG, Time to Achieve by discharge --    Patient Education PT LTG, Education Type gait;transfers;home safety --    Patient Education PT LTG, Date Goal Reviewed --  10/17/17   Patient Education PT LTG Outcome --  goal not met   Patient Education PT LTG, Reason Goal Not Met --  discharged from facility

## 2017-10-18 PROCEDURE — 97162 PT EVAL MOD COMPLEX 30 MIN: CPT

## 2017-10-18 PROCEDURE — 25010000002 GLATIRAMER ACETATE 40 MG/ML SOLUTION PREFILLED SYRINGE

## 2017-10-18 PROCEDURE — 97530 THERAPEUTIC ACTIVITIES: CPT

## 2017-10-18 PROCEDURE — 25010000002 HEPARIN LOCK FLUSH 10 UNIT/ML SOLUTION: Performed by: INTERNAL MEDICINE

## 2017-10-18 PROCEDURE — 97110 THERAPEUTIC EXERCISES: CPT

## 2017-10-18 PROCEDURE — 82962 GLUCOSE BLOOD TEST: CPT

## 2017-10-18 PROCEDURE — 25010000002 ENOXAPARIN PER 10 MG: Performed by: INTERNAL MEDICINE

## 2017-10-18 PROCEDURE — 25010000002 KETOROLAC TROMETHAMINE PER 15 MG: Performed by: INTERNAL MEDICINE

## 2017-10-18 PROCEDURE — 25010000002 FUROSEMIDE PER 20 MG: Performed by: INTERNAL MEDICINE

## 2017-10-18 PROCEDURE — 25010000002 LEVOFLOXACIN PER 250 MG: Performed by: NURSE PRACTITIONER

## 2017-10-18 RX ORDER — PANTOPRAZOLE SODIUM 40 MG/1
40 TABLET, DELAYED RELEASE ORAL
Status: DISCONTINUED | OUTPATIENT
Start: 2017-10-19 | End: 2017-12-14 | Stop reason: HOSPADM

## 2017-10-18 RX ORDER — GLATIRAMER 40 MG/ML
40 INJECTION, SOLUTION SUBCUTANEOUS 3 TIMES WEEKLY
Status: DISCONTINUED | OUTPATIENT
Start: 2017-10-18 | End: 2017-12-14 | Stop reason: HOSPADM

## 2017-10-18 RX ORDER — LEVOFLOXACIN 5 MG/ML
500 INJECTION, SOLUTION INTRAVENOUS EVERY 24 HOURS
Status: DISPENSED | OUTPATIENT
Start: 2017-10-18 | End: 2017-10-24

## 2017-10-19 LAB
ALBUMIN SERPL-MCNC: 3 G/DL (ref 3.4–4.8)
ALBUMIN/GLOB SERPL: 1 G/DL (ref 1.1–1.8)
ALP SERPL-CCNC: 136 U/L (ref 38–126)
ALT SERPL W P-5'-P-CCNC: 49 U/L (ref 9–52)
ANION GAP SERPL CALCULATED.3IONS-SCNC: 7 MMOL/L (ref 5–15)
AST SERPL-CCNC: 73 U/L (ref 14–36)
BACTERIA SPEC AEROBE CULT: ABNORMAL
BACTERIA SPEC AEROBE CULT: NORMAL
BACTERIA SPEC AEROBE CULT: NORMAL
BILIRUB SERPL-MCNC: 0.3 MG/DL (ref 0.2–1.3)
BUN BLD-MCNC: 37 MG/DL (ref 7–21)
BUN/CREAT SERPL: 32.5 (ref 7–25)
CALCIUM SPEC-SCNC: 7.9 MG/DL (ref 8.4–10.2)
CHLORIDE SERPL-SCNC: 100 MMOL/L (ref 95–110)
CO2 SERPL-SCNC: 28 MMOL/L (ref 22–31)
CREAT BLD-MCNC: 1.14 MG/DL (ref 0.5–1)
DEPRECATED RDW RBC AUTO: 58.1 FL (ref 36.4–46.3)
ERYTHROCYTE [DISTWIDTH] IN BLOOD BY AUTOMATED COUNT: 15.1 % (ref 11.5–14.5)
GFR SERPL CREATININE-BSD FRML MDRD: 48 ML/MIN/1.73 (ref 45–104)
GLOBULIN UR ELPH-MCNC: 2.9 GM/DL (ref 2.3–3.5)
GLUCOSE BLD-MCNC: 161 MG/DL (ref 60–100)
GRAM STN SPEC: ABNORMAL
HCT VFR BLD AUTO: 25.8 % (ref 35–45)
HGB BLD-MCNC: 7.7 G/DL (ref 12–15.5)
MAGNESIUM SERPL-MCNC: 2.4 MG/DL (ref 1.6–2.3)
MCH RBC QN AUTO: 31.4 PG (ref 26.5–34)
MCHC RBC AUTO-ENTMCNC: 29.8 G/DL (ref 31.4–36)
MCV RBC AUTO: 105.3 FL (ref 80–98)
PLATELET # BLD AUTO: 178 10*3/MM3 (ref 150–450)
PMV BLD AUTO: 10.5 FL (ref 8–12)
POTASSIUM BLD-SCNC: 4.4 MMOL/L (ref 3.5–5.1)
PROT SERPL-MCNC: 5.9 G/DL (ref 6.3–8.6)
RBC # BLD AUTO: 2.45 10*6/MM3 (ref 3.77–5.16)
SODIUM BLD-SCNC: 135 MMOL/L (ref 137–145)
WBC NRBC COR # BLD: 8.44 10*3/MM3 (ref 3.2–9.8)

## 2017-10-19 PROCEDURE — 97110 THERAPEUTIC EXERCISES: CPT

## 2017-10-19 PROCEDURE — 97167 OT EVAL HIGH COMPLEX 60 MIN: CPT

## 2017-10-19 PROCEDURE — 25010000002 ENOXAPARIN PER 10 MG: Performed by: INTERNAL MEDICINE

## 2017-10-19 PROCEDURE — 25010000002 FUROSEMIDE PER 20 MG: Performed by: INTERNAL MEDICINE

## 2017-10-19 PROCEDURE — 85027 COMPLETE CBC AUTOMATED: CPT | Performed by: INTERNAL MEDICINE

## 2017-10-19 PROCEDURE — 25010000002 HEPARIN LOCK FLUSH 10 UNIT/ML SOLUTION: Performed by: INTERNAL MEDICINE

## 2017-10-19 PROCEDURE — 25010000002 LEVOFLOXACIN PER 250 MG: Performed by: NURSE PRACTITIONER

## 2017-10-19 PROCEDURE — 80053 COMPREHEN METABOLIC PANEL: CPT | Performed by: INTERNAL MEDICINE

## 2017-10-19 PROCEDURE — 82962 GLUCOSE BLOOD TEST: CPT

## 2017-10-19 PROCEDURE — 25010000002 KETOROLAC TROMETHAMINE PER 15 MG: Performed by: INTERNAL MEDICINE

## 2017-10-19 PROCEDURE — 83735 ASSAY OF MAGNESIUM: CPT | Performed by: INTERNAL MEDICINE

## 2017-10-19 RX ORDER — SODIUM CHLORIDE 9 MG/ML
INJECTION, SOLUTION INTRAVENOUS
Status: DISPENSED
Start: 2017-10-19 | End: 2017-10-19

## 2017-10-19 RX ORDER — SODIUM CHLORIDE 9 MG/ML
INJECTION, SOLUTION INTRAVENOUS
Status: DISPENSED
Start: 2017-10-19 | End: 2017-10-20

## 2017-10-20 LAB
DEPRECATED RDW RBC AUTO: 48.3 FL (ref 36.4–46.3)
ERYTHROCYTE [DISTWIDTH] IN BLOOD BY AUTOMATED COUNT: 14.7 % (ref 11.5–14.5)
GLUCOSE BLDC GLUCOMTR-MCNC: 105 MG/DL (ref 70–130)
GLUCOSE BLDC GLUCOMTR-MCNC: 108 MG/DL (ref 70–130)
GLUCOSE BLDC GLUCOMTR-MCNC: 119 MG/DL (ref 70–130)
GLUCOSE BLDC GLUCOMTR-MCNC: 122 MG/DL (ref 70–130)
GLUCOSE BLDC GLUCOMTR-MCNC: 130 MG/DL (ref 70–130)
GLUCOSE BLDC GLUCOMTR-MCNC: 132 MG/DL (ref 70–130)
GLUCOSE BLDC GLUCOMTR-MCNC: 133 MG/DL (ref 70–130)
GLUCOSE BLDC GLUCOMTR-MCNC: 135 MG/DL (ref 70–130)
GLUCOSE BLDC GLUCOMTR-MCNC: 139 MG/DL (ref 70–130)
GLUCOSE BLDC GLUCOMTR-MCNC: 141 MG/DL (ref 70–130)
GLUCOSE BLDC GLUCOMTR-MCNC: 143 MG/DL (ref 70–130)
GLUCOSE BLDC GLUCOMTR-MCNC: 149 MG/DL (ref 70–130)
GLUCOSE BLDC GLUCOMTR-MCNC: 154 MG/DL (ref 70–130)
GLUCOSE BLDC GLUCOMTR-MCNC: 165 MG/DL (ref 70–130)
HCT VFR BLD AUTO: 24.9 % (ref 35–45)
HGB BLD-MCNC: 8.2 G/DL (ref 12–15.5)
MCH RBC QN AUTO: 29.8 PG (ref 26.5–34)
MCHC RBC AUTO-ENTMCNC: 32.9 G/DL (ref 31.4–36)
MCV RBC AUTO: 90.5 FL (ref 80–98)
PLATELET # BLD AUTO: 229 10*3/MM3 (ref 150–450)
PMV BLD AUTO: 10.3 FL (ref 8–12)
RBC # BLD AUTO: 2.75 10*6/MM3 (ref 3.77–5.16)
WBC NRBC COR # BLD: 8.58 10*3/MM3 (ref 3.2–9.8)

## 2017-10-20 PROCEDURE — 25010000002 KETOROLAC TROMETHAMINE PER 15 MG: Performed by: INTERNAL MEDICINE

## 2017-10-20 PROCEDURE — 85027 COMPLETE CBC AUTOMATED: CPT | Performed by: NURSE PRACTITIONER

## 2017-10-20 PROCEDURE — 82962 GLUCOSE BLOOD TEST: CPT

## 2017-10-20 PROCEDURE — 97110 THERAPEUTIC EXERCISES: CPT

## 2017-10-20 PROCEDURE — 97530 THERAPEUTIC ACTIVITIES: CPT

## 2017-10-20 PROCEDURE — 25010000002 HEPARIN LOCK FLUSH 10 UNIT/ML SOLUTION: Performed by: INTERNAL MEDICINE

## 2017-10-20 PROCEDURE — 25010000002 FUROSEMIDE PER 20 MG: Performed by: INTERNAL MEDICINE

## 2017-10-20 PROCEDURE — 97535 SELF CARE MNGMENT TRAINING: CPT

## 2017-10-20 PROCEDURE — 25010000002 MEROPENEM: Performed by: NURSE PRACTITIONER

## 2017-10-20 PROCEDURE — 25010000002 LEVOFLOXACIN PER 250 MG: Performed by: NURSE PRACTITIONER

## 2017-10-20 PROCEDURE — 97116 GAIT TRAINING THERAPY: CPT

## 2017-10-21 LAB
ALBUMIN SERPL-MCNC: 2.9 G/DL (ref 3.4–4.8)
ALBUMIN/GLOB SERPL: 1 G/DL (ref 1.1–1.8)
ALP SERPL-CCNC: 169 U/L (ref 38–126)
ALT SERPL W P-5'-P-CCNC: 62 U/L (ref 9–52)
ANION GAP SERPL CALCULATED.3IONS-SCNC: 11 MMOL/L (ref 5–15)
AST SERPL-CCNC: 73 U/L (ref 14–36)
BILIRUB SERPL-MCNC: 0.3 MG/DL (ref 0.2–1.3)
BUN BLD-MCNC: 36 MG/DL (ref 7–21)
BUN/CREAT SERPL: 30 (ref 7–25)
CALCIUM SPEC-SCNC: 7.8 MG/DL (ref 8.4–10.2)
CHLORIDE SERPL-SCNC: 101 MMOL/L (ref 95–110)
CO2 SERPL-SCNC: 25 MMOL/L (ref 22–31)
CREAT BLD-MCNC: 1.2 MG/DL (ref 0.5–1)
DEPRECATED RDW RBC AUTO: 49.4 FL (ref 36.4–46.3)
ERYTHROCYTE [DISTWIDTH] IN BLOOD BY AUTOMATED COUNT: 14.8 % (ref 11.5–14.5)
GFR SERPL CREATININE-BSD FRML MDRD: 45 ML/MIN/1.73 (ref 60–104)
GLOBULIN UR ELPH-MCNC: 3 GM/DL (ref 2.3–3.5)
GLUCOSE BLD-MCNC: 124 MG/DL (ref 60–100)
GLUCOSE BLDC GLUCOMTR-MCNC: 132 MG/DL (ref 70–130)
GLUCOSE BLDC GLUCOMTR-MCNC: 137 MG/DL (ref 70–130)
GLUCOSE BLDC GLUCOMTR-MCNC: 139 MG/DL (ref 70–130)
HCT VFR BLD AUTO: 26.4 % (ref 35–45)
HGB BLD-MCNC: 8.3 G/DL (ref 12–15.5)
MAGNESIUM SERPL-MCNC: 2.2 MG/DL (ref 1.6–2.3)
MCH RBC QN AUTO: 28.8 PG (ref 26.5–34)
MCHC RBC AUTO-ENTMCNC: 31.4 G/DL (ref 31.4–36)
MCV RBC AUTO: 91.7 FL (ref 80–98)
PLATELET # BLD AUTO: 171 10*3/MM3 (ref 150–450)
PMV BLD AUTO: 11.3 FL (ref 8–12)
POTASSIUM BLD-SCNC: 4.5 MMOL/L (ref 3.5–5.1)
PROT SERPL-MCNC: 5.9 G/DL (ref 6.3–8.6)
RBC # BLD AUTO: 2.88 10*6/MM3 (ref 3.77–5.16)
SODIUM BLD-SCNC: 137 MMOL/L (ref 137–145)
WBC NRBC COR # BLD: 9.08 10*3/MM3 (ref 3.2–9.8)

## 2017-10-21 PROCEDURE — 25010000002 KETOROLAC TROMETHAMINE PER 15 MG: Performed by: INTERNAL MEDICINE

## 2017-10-21 PROCEDURE — 25010000002 LEVOFLOXACIN PER 250 MG: Performed by: NURSE PRACTITIONER

## 2017-10-21 PROCEDURE — 82962 GLUCOSE BLOOD TEST: CPT

## 2017-10-21 PROCEDURE — 25010000002 FUROSEMIDE PER 20 MG: Performed by: INTERNAL MEDICINE

## 2017-10-21 PROCEDURE — 83735 ASSAY OF MAGNESIUM: CPT | Performed by: INTERNAL MEDICINE

## 2017-10-21 PROCEDURE — 97530 THERAPEUTIC ACTIVITIES: CPT

## 2017-10-21 PROCEDURE — 25010000002 HEPARIN LOCK FLUSH 10 UNIT/ML SOLUTION: Performed by: INTERNAL MEDICINE

## 2017-10-21 PROCEDURE — 80053 COMPREHEN METABOLIC PANEL: CPT | Performed by: INTERNAL MEDICINE

## 2017-10-21 PROCEDURE — 97110 THERAPEUTIC EXERCISES: CPT

## 2017-10-21 PROCEDURE — 25010000002 ONDANSETRON PER 1 MG: Performed by: INTERNAL MEDICINE

## 2017-10-21 PROCEDURE — 85027 COMPLETE CBC AUTOMATED: CPT | Performed by: INTERNAL MEDICINE

## 2017-10-21 PROCEDURE — 25010000002 MEROPENEM: Performed by: NURSE PRACTITIONER

## 2017-10-21 RX ORDER — NYSTATIN 100000 [USP'U]/G
POWDER TOPICAL AS NEEDED
Status: DISCONTINUED | OUTPATIENT
Start: 2017-10-21 | End: 2017-12-14 | Stop reason: HOSPADM

## 2017-10-21 RX ORDER — NYSTATIN 100000 [USP'U]/G
POWDER TOPICAL EVERY 12 HOURS SCHEDULED
Status: DISCONTINUED | OUTPATIENT
Start: 2017-10-21 | End: 2017-12-14 | Stop reason: HOSPADM

## 2017-10-21 RX ORDER — SODIUM CHLORIDE 9 MG/ML
INJECTION, SOLUTION INTRAVENOUS
Status: DISPENSED
Start: 2017-10-21 | End: 2017-10-21

## 2017-10-22 LAB
BACTERIA SPEC AEROBE CULT: NORMAL
GLUCOSE BLDC GLUCOMTR-MCNC: 124 MG/DL (ref 70–130)
GLUCOSE BLDC GLUCOMTR-MCNC: 130 MG/DL (ref 70–130)
GLUCOSE BLDC GLUCOMTR-MCNC: 135 MG/DL (ref 70–130)

## 2017-10-22 PROCEDURE — 82962 GLUCOSE BLOOD TEST: CPT

## 2017-10-22 PROCEDURE — 25010000002 KETOROLAC TROMETHAMINE PER 15 MG: Performed by: INTERNAL MEDICINE

## 2017-10-22 PROCEDURE — 25010000002 HEPARIN LOCK FLUSH 10 UNIT/ML SOLUTION: Performed by: INTERNAL MEDICINE

## 2017-10-22 PROCEDURE — 25010000002 MEROPENEM: Performed by: NURSE PRACTITIONER

## 2017-10-22 PROCEDURE — 25010000002 FUROSEMIDE PER 20 MG: Performed by: INTERNAL MEDICINE

## 2017-10-22 PROCEDURE — 25010000002 LEVOFLOXACIN PER 250 MG: Performed by: NURSE PRACTITIONER

## 2017-10-22 RX ORDER — KETOROLAC TROMETHAMINE 30 MG/ML
30 INJECTION, SOLUTION INTRAMUSCULAR; INTRAVENOUS EVERY 6 HOURS PRN
Status: DISPENSED | OUTPATIENT
Start: 2017-10-22 | End: 2017-10-27

## 2017-10-23 LAB
ALBUMIN SERPL-MCNC: 3.2 G/DL (ref 3.4–4.8)
ALBUMIN/GLOB SERPL: 1.1 G/DL (ref 1.1–1.8)
ALP SERPL-CCNC: 174 U/L (ref 38–126)
ALT SERPL W P-5'-P-CCNC: 57 U/L (ref 9–52)
ANION GAP SERPL CALCULATED.3IONS-SCNC: 9 MMOL/L (ref 5–15)
AST SERPL-CCNC: 62 U/L (ref 14–36)
BILIRUB SERPL-MCNC: 0.2 MG/DL (ref 0.2–1.3)
BUN BLD-MCNC: 34 MG/DL (ref 7–21)
BUN/CREAT SERPL: 30.4 (ref 7–25)
CALCIUM SPEC-SCNC: 7.8 MG/DL (ref 8.4–10.2)
CHLORIDE SERPL-SCNC: 103 MMOL/L (ref 95–110)
CO2 SERPL-SCNC: 21 MMOL/L (ref 22–31)
CREAT BLD-MCNC: 1.12 MG/DL (ref 0.5–1)
DEPRECATED RDW RBC AUTO: 49.2 FL (ref 36.4–46.3)
ERYTHROCYTE [DISTWIDTH] IN BLOOD BY AUTOMATED COUNT: 15 % (ref 11.5–14.5)
GFR SERPL CREATININE-BSD FRML MDRD: 49 ML/MIN/1.73 (ref 45–104)
GLOBULIN UR ELPH-MCNC: 3 GM/DL (ref 2.3–3.5)
GLUCOSE BLD-MCNC: 126 MG/DL (ref 60–100)
GLUCOSE BLDC GLUCOMTR-MCNC: 111 MG/DL (ref 70–130)
GLUCOSE BLDC GLUCOMTR-MCNC: 123 MG/DL (ref 70–130)
GLUCOSE BLDC GLUCOMTR-MCNC: 124 MG/DL (ref 70–130)
GLUCOSE BLDC GLUCOMTR-MCNC: 129 MG/DL (ref 70–130)
GLUCOSE BLDC GLUCOMTR-MCNC: 133 MG/DL (ref 70–130)
HCT VFR BLD AUTO: 25.6 % (ref 35–45)
HGB BLD-MCNC: 8.4 G/DL (ref 12–15.5)
MAGNESIUM SERPL-MCNC: 2.2 MG/DL (ref 1.6–2.3)
MCH RBC QN AUTO: 29.5 PG (ref 26.5–34)
MCHC RBC AUTO-ENTMCNC: 32.8 G/DL (ref 31.4–36)
MCV RBC AUTO: 89.8 FL (ref 80–98)
PLATELET # BLD AUTO: 242 10*3/MM3 (ref 150–450)
PMV BLD AUTO: 10.2 FL (ref 8–12)
POTASSIUM BLD-SCNC: 3.8 MMOL/L (ref 3.5–5.1)
PROT SERPL-MCNC: 6.2 G/DL (ref 6.3–8.6)
RBC # BLD AUTO: 2.85 10*6/MM3 (ref 3.77–5.16)
SODIUM BLD-SCNC: 133 MMOL/L (ref 137–145)
WBC NRBC COR # BLD: 7.22 10*3/MM3 (ref 3.2–9.8)

## 2017-10-23 PROCEDURE — 82962 GLUCOSE BLOOD TEST: CPT

## 2017-10-23 PROCEDURE — 80053 COMPREHEN METABOLIC PANEL: CPT | Performed by: INTERNAL MEDICINE

## 2017-10-23 PROCEDURE — 25010000002 LEVOFLOXACIN PER 250 MG: Performed by: INTERNAL MEDICINE

## 2017-10-23 PROCEDURE — 25010000002 KETOROLAC TROMETHAMINE PER 15 MG: Performed by: INTERNAL MEDICINE

## 2017-10-23 PROCEDURE — 97530 THERAPEUTIC ACTIVITIES: CPT

## 2017-10-23 PROCEDURE — 25010000002 HEPARIN LOCK FLUSH 10 UNIT/ML SOLUTION: Performed by: INTERNAL MEDICINE

## 2017-10-23 PROCEDURE — 25010000002 FUROSEMIDE PER 20 MG: Performed by: INTERNAL MEDICINE

## 2017-10-23 PROCEDURE — 25010000002 MEROPENEM: Performed by: NURSE PRACTITIONER

## 2017-10-23 PROCEDURE — 83735 ASSAY OF MAGNESIUM: CPT | Performed by: INTERNAL MEDICINE

## 2017-10-23 PROCEDURE — 85027 COMPLETE CBC AUTOMATED: CPT | Performed by: INTERNAL MEDICINE

## 2017-10-23 RX ORDER — SODIUM CHLORIDE 9 MG/ML
INJECTION, SOLUTION INTRAVENOUS
Status: DISPENSED
Start: 2017-10-23 | End: 2017-10-24

## 2017-10-24 LAB
GLUCOSE BLDC GLUCOMTR-MCNC: 109 MG/DL (ref 70–130)
GLUCOSE BLDC GLUCOMTR-MCNC: 124 MG/DL (ref 70–130)
GLUCOSE BLDC GLUCOMTR-MCNC: 133 MG/DL (ref 70–130)

## 2017-10-24 PROCEDURE — 82962 GLUCOSE BLOOD TEST: CPT

## 2017-10-24 PROCEDURE — 97110 THERAPEUTIC EXERCISES: CPT

## 2017-10-24 PROCEDURE — 25010000002 FUROSEMIDE PER 20 MG: Performed by: INTERNAL MEDICINE

## 2017-10-24 PROCEDURE — 25010000002 HEPARIN LOCK FLUSH 10 UNIT/ML SOLUTION: Performed by: INTERNAL MEDICINE

## 2017-10-24 PROCEDURE — 25010000002 MEROPENEM: Performed by: NURSE PRACTITIONER

## 2017-10-24 PROCEDURE — 97535 SELF CARE MNGMENT TRAINING: CPT

## 2017-10-24 PROCEDURE — 25010000002 KETOROLAC TROMETHAMINE PER 15 MG: Performed by: INTERNAL MEDICINE

## 2017-10-24 PROCEDURE — 97530 THERAPEUTIC ACTIVITIES: CPT

## 2017-10-24 PROCEDURE — 25010000002 LEVOFLOXACIN PER 250 MG: Performed by: INTERNAL MEDICINE

## 2017-10-25 LAB
ALBUMIN SERPL-MCNC: 2.9 G/DL (ref 3.4–4.8)
ALBUMIN/GLOB SERPL: 1 G/DL (ref 1.1–1.8)
ALP SERPL-CCNC: 152 U/L (ref 38–126)
ALT SERPL W P-5'-P-CCNC: 52 U/L (ref 9–52)
ANION GAP SERPL CALCULATED.3IONS-SCNC: 11 MMOL/L (ref 5–15)
AST SERPL-CCNC: 49 U/L (ref 14–36)
BILIRUB SERPL-MCNC: 0.2 MG/DL (ref 0.2–1.3)
BUN BLD-MCNC: 38 MG/DL (ref 7–21)
BUN/CREAT SERPL: 34.2 (ref 7–25)
CALCIUM SPEC-SCNC: 8 MG/DL (ref 8.4–10.2)
CHLORIDE SERPL-SCNC: 106 MMOL/L (ref 95–110)
CO2 SERPL-SCNC: 23 MMOL/L (ref 22–31)
CREAT BLD-MCNC: 1.11 MG/DL (ref 0.5–1)
DEPRECATED RDW RBC AUTO: 49.8 FL (ref 36.4–46.3)
ERYTHROCYTE [DISTWIDTH] IN BLOOD BY AUTOMATED COUNT: 15.2 % (ref 11.5–14.5)
GFR SERPL CREATININE-BSD FRML MDRD: 49 ML/MIN/1.73 (ref 45–104)
GLOBULIN UR ELPH-MCNC: 3 GM/DL (ref 2.3–3.5)
GLUCOSE BLD-MCNC: 118 MG/DL (ref 60–100)
GLUCOSE BLDC GLUCOMTR-MCNC: 120 MG/DL (ref 70–130)
GLUCOSE BLDC GLUCOMTR-MCNC: 124 MG/DL (ref 70–130)
GLUCOSE BLDC GLUCOMTR-MCNC: 136 MG/DL (ref 70–130)
HCT VFR BLD AUTO: 25 % (ref 35–45)
HGB BLD-MCNC: 8.4 G/DL (ref 12–15.5)
MAGNESIUM SERPL-MCNC: 2.3 MG/DL (ref 1.6–2.3)
MCH RBC QN AUTO: 29.9 PG (ref 26.5–34)
MCHC RBC AUTO-ENTMCNC: 33.6 G/DL (ref 31.4–36)
MCV RBC AUTO: 89 FL (ref 80–98)
PLATELET # BLD AUTO: 240 10*3/MM3 (ref 150–450)
PMV BLD AUTO: 9.5 FL (ref 8–12)
POTASSIUM BLD-SCNC: 3.9 MMOL/L (ref 3.5–5.1)
PROT SERPL-MCNC: 5.9 G/DL (ref 6.3–8.6)
RBC # BLD AUTO: 2.81 10*6/MM3 (ref 3.77–5.16)
SODIUM BLD-SCNC: 140 MMOL/L (ref 137–145)
WBC NRBC COR # BLD: 6.58 10*3/MM3 (ref 3.2–9.8)

## 2017-10-25 PROCEDURE — 97530 THERAPEUTIC ACTIVITIES: CPT

## 2017-10-25 PROCEDURE — 83735 ASSAY OF MAGNESIUM: CPT | Performed by: INTERNAL MEDICINE

## 2017-10-25 PROCEDURE — 97535 SELF CARE MNGMENT TRAINING: CPT

## 2017-10-25 PROCEDURE — 25010000002 MEROPENEM: Performed by: NURSE PRACTITIONER

## 2017-10-25 PROCEDURE — 82962 GLUCOSE BLOOD TEST: CPT

## 2017-10-25 PROCEDURE — 25010000002 FUROSEMIDE PER 20 MG: Performed by: INTERNAL MEDICINE

## 2017-10-25 PROCEDURE — 25010000002 HEPARIN LOCK FLUSH 10 UNIT/ML SOLUTION: Performed by: INTERNAL MEDICINE

## 2017-10-25 PROCEDURE — 80053 COMPREHEN METABOLIC PANEL: CPT | Performed by: INTERNAL MEDICINE

## 2017-10-25 PROCEDURE — 25010000002 MEROPENEM: Performed by: INTERNAL MEDICINE

## 2017-10-25 PROCEDURE — 85027 COMPLETE CBC AUTOMATED: CPT | Performed by: INTERNAL MEDICINE

## 2017-10-25 PROCEDURE — 97110 THERAPEUTIC EXERCISES: CPT

## 2017-10-25 RX ORDER — SODIUM CHLORIDE 9 MG/ML
75 INJECTION, SOLUTION INTRAVENOUS ONCE
Status: DISCONTINUED | OUTPATIENT
Start: 2017-10-25 | End: 2017-10-26

## 2017-10-26 LAB
GLUCOSE BLDC GLUCOMTR-MCNC: 105 MG/DL (ref 70–130)
GLUCOSE BLDC GLUCOMTR-MCNC: 120 MG/DL (ref 70–130)
GLUCOSE BLDC GLUCOMTR-MCNC: 129 MG/DL (ref 70–130)
GLUCOSE BLDC GLUCOMTR-MCNC: 132 MG/DL (ref 70–130)
GLUCOSE BLDC GLUCOMTR-MCNC: 136 MG/DL (ref 70–130)

## 2017-10-26 PROCEDURE — 82962 GLUCOSE BLOOD TEST: CPT

## 2017-10-26 PROCEDURE — 25010000002 HEPARIN LOCK FLUSH 10 UNIT/ML SOLUTION: Performed by: INTERNAL MEDICINE

## 2017-10-26 PROCEDURE — 97110 THERAPEUTIC EXERCISES: CPT

## 2017-10-26 PROCEDURE — 97530 THERAPEUTIC ACTIVITIES: CPT

## 2017-10-26 PROCEDURE — 25010000002 FUROSEMIDE PER 20 MG: Performed by: INTERNAL MEDICINE

## 2017-10-26 PROCEDURE — 97535 SELF CARE MNGMENT TRAINING: CPT

## 2017-10-26 PROCEDURE — 25010000002 MEROPENEM: Performed by: INTERNAL MEDICINE

## 2017-10-26 PROCEDURE — 25010000002 KETOROLAC TROMETHAMINE PER 15 MG: Performed by: INTERNAL MEDICINE

## 2017-10-26 RX ORDER — SODIUM CHLORIDE 9 MG/ML
INJECTION, SOLUTION INTRAVENOUS
Status: DISPENSED
Start: 2017-10-26 | End: 2017-10-26

## 2017-10-26 RX ORDER — SODIUM CHLORIDE 9 MG/ML
30 INJECTION, SOLUTION INTRAVENOUS CONTINUOUS
Status: DISCONTINUED | OUTPATIENT
Start: 2017-10-26 | End: 2017-11-06

## 2017-10-26 RX ORDER — SODIUM CHLORIDE 9 MG/ML
75 INJECTION, SOLUTION INTRAVENOUS CONTINUOUS
Status: DISCONTINUED | OUTPATIENT
Start: 2017-10-26 | End: 2017-11-02 | Stop reason: SDUPTHER

## 2017-10-27 LAB
ALBUMIN SERPL-MCNC: 2.8 G/DL (ref 3.4–4.8)
ALBUMIN/GLOB SERPL: 0.9 G/DL (ref 1.1–1.8)
ALP SERPL-CCNC: 147 U/L (ref 38–126)
ALT SERPL W P-5'-P-CCNC: 51 U/L (ref 9–52)
ANION GAP SERPL CALCULATED.3IONS-SCNC: 9 MMOL/L (ref 5–15)
AST SERPL-CCNC: 55 U/L (ref 14–36)
BILIRUB SERPL-MCNC: 0.2 MG/DL (ref 0.2–1.3)
BUN BLD-MCNC: 32 MG/DL (ref 7–21)
BUN/CREAT SERPL: 30.5 (ref 7–25)
CALCIUM SPEC-SCNC: 8 MG/DL (ref 8.4–10.2)
CHLORIDE SERPL-SCNC: 107 MMOL/L (ref 95–110)
CO2 SERPL-SCNC: 24 MMOL/L (ref 22–31)
CREAT BLD-MCNC: 1.05 MG/DL (ref 0.5–1)
DEPRECATED RDW RBC AUTO: 50 FL (ref 36.4–46.3)
ERYTHROCYTE [DISTWIDTH] IN BLOOD BY AUTOMATED COUNT: 15.2 % (ref 11.5–14.5)
GFR SERPL CREATININE-BSD FRML MDRD: 53 ML/MIN/1.73 (ref 45–104)
GLOBULIN UR ELPH-MCNC: 3 GM/DL (ref 2.3–3.5)
GLUCOSE BLD-MCNC: 106 MG/DL (ref 60–100)
GLUCOSE BLDC GLUCOMTR-MCNC: 111 MG/DL (ref 70–130)
GLUCOSE BLDC GLUCOMTR-MCNC: 132 MG/DL (ref 70–130)
GLUCOSE BLDC GLUCOMTR-MCNC: 139 MG/DL (ref 70–130)
GLUCOSE BLDC GLUCOMTR-MCNC: 63 MG/DL (ref 70–130)
GLUCOSE BLDC GLUCOMTR-MCNC: 78 MG/DL (ref 70–130)
HCT VFR BLD AUTO: 24.5 % (ref 35–45)
HGB BLD-MCNC: 8 G/DL (ref 12–15.5)
MCH RBC QN AUTO: 29.3 PG (ref 26.5–34)
MCHC RBC AUTO-ENTMCNC: 32.7 G/DL (ref 31.4–36)
MCV RBC AUTO: 89.7 FL (ref 80–98)
PHOSPHATE SERPL-MCNC: 4.2 MG/DL (ref 2.4–4.4)
PLATELET # BLD AUTO: 225 10*3/MM3 (ref 150–450)
PMV BLD AUTO: 9.2 FL (ref 8–12)
POTASSIUM BLD-SCNC: 4.3 MMOL/L (ref 3.5–5.1)
PROT SERPL-MCNC: 5.8 G/DL (ref 6.3–8.6)
RBC # BLD AUTO: 2.73 10*6/MM3 (ref 3.77–5.16)
SODIUM BLD-SCNC: 140 MMOL/L (ref 137–145)
TRIGL SERPL-MCNC: 198 MG/DL (ref 20–199)
WBC NRBC COR # BLD: 6.9 10*3/MM3 (ref 3.2–9.8)

## 2017-10-27 PROCEDURE — 25010000002 KETOROLAC TROMETHAMINE PER 15 MG: Performed by: INTERNAL MEDICINE

## 2017-10-27 PROCEDURE — 25010000002 POTASSIUM CHLORIDE PER 2 MEQ OF POTASSIUM: Performed by: INTERNAL MEDICINE

## 2017-10-27 PROCEDURE — 97116 GAIT TRAINING THERAPY: CPT

## 2017-10-27 PROCEDURE — 82962 GLUCOSE BLOOD TEST: CPT

## 2017-10-27 PROCEDURE — 25010000002 HEPARIN LOCK FLUSH 10 UNIT/ML SOLUTION: Performed by: INTERNAL MEDICINE

## 2017-10-27 PROCEDURE — 85027 COMPLETE CBC AUTOMATED: CPT | Performed by: INTERNAL MEDICINE

## 2017-10-27 PROCEDURE — 97110 THERAPEUTIC EXERCISES: CPT

## 2017-10-27 PROCEDURE — 25010000002 CALCIUM GLUCONATE PER 10 ML: Performed by: INTERNAL MEDICINE

## 2017-10-27 PROCEDURE — 97530 THERAPEUTIC ACTIVITIES: CPT

## 2017-10-27 PROCEDURE — 25010000002 MEROPENEM: Performed by: INTERNAL MEDICINE

## 2017-10-27 PROCEDURE — 80053 COMPREHEN METABOLIC PANEL: CPT | Performed by: INTERNAL MEDICINE

## 2017-10-27 PROCEDURE — 25010000002 FUROSEMIDE PER 20 MG: Performed by: INTERNAL MEDICINE

## 2017-10-27 PROCEDURE — 84478 ASSAY OF TRIGLYCERIDES: CPT | Performed by: INTERNAL MEDICINE

## 2017-10-27 PROCEDURE — 97535 SELF CARE MNGMENT TRAINING: CPT

## 2017-10-27 PROCEDURE — 84100 ASSAY OF PHOSPHORUS: CPT | Performed by: INTERNAL MEDICINE

## 2017-10-27 PROCEDURE — 25010000002 MAGNESIUM SULFATE PER 500 MG OF MAGNESIUM: Performed by: INTERNAL MEDICINE

## 2017-10-27 RX ORDER — KETOROLAC TROMETHAMINE 30 MG/ML
30 INJECTION, SOLUTION INTRAMUSCULAR; INTRAVENOUS EVERY 6 HOURS PRN
Status: DISPENSED | OUTPATIENT
Start: 2017-10-27 | End: 2017-11-01

## 2017-10-28 LAB
GLUCOSE BLDC GLUCOMTR-MCNC: 69 MG/DL (ref 70–130)
GLUCOSE BLDC GLUCOMTR-MCNC: 88 MG/DL (ref 70–130)
GLUCOSE BLDC GLUCOMTR-MCNC: 89 MG/DL (ref 70–130)

## 2017-10-28 PROCEDURE — 97110 THERAPEUTIC EXERCISES: CPT

## 2017-10-28 PROCEDURE — 82962 GLUCOSE BLOOD TEST: CPT

## 2017-10-28 PROCEDURE — 25010000002 CALCIUM GLUCONATE PER 10 ML: Performed by: INTERNAL MEDICINE

## 2017-10-28 PROCEDURE — 97116 GAIT TRAINING THERAPY: CPT

## 2017-10-28 PROCEDURE — 25010000002 FUROSEMIDE PER 20 MG: Performed by: INTERNAL MEDICINE

## 2017-10-28 PROCEDURE — 25010000002 MEROPENEM: Performed by: INTERNAL MEDICINE

## 2017-10-28 PROCEDURE — 25010000002 KETOROLAC TROMETHAMINE PER 15 MG: Performed by: INTERNAL MEDICINE

## 2017-10-28 PROCEDURE — 97530 THERAPEUTIC ACTIVITIES: CPT

## 2017-10-28 PROCEDURE — 25010000002 MAGNESIUM SULFATE PER 500 MG OF MAGNESIUM: Performed by: INTERNAL MEDICINE

## 2017-10-28 PROCEDURE — 25010000002 HEPARIN LOCK FLUSH 10 UNIT/ML SOLUTION: Performed by: INTERNAL MEDICINE

## 2017-10-28 PROCEDURE — 25010000002 POTASSIUM CHLORIDE PER 2 MEQ OF POTASSIUM: Performed by: INTERNAL MEDICINE

## 2017-10-29 LAB
ALBUMIN SERPL-MCNC: 2.8 G/DL (ref 3.4–4.8)
ALBUMIN/GLOB SERPL: 1 G/DL (ref 1.1–1.8)
ALP SERPL-CCNC: 148 U/L (ref 38–126)
ALT SERPL W P-5'-P-CCNC: 45 U/L (ref 9–52)
ANION GAP SERPL CALCULATED.3IONS-SCNC: 10 MMOL/L (ref 5–15)
AST SERPL-CCNC: 40 U/L (ref 14–36)
BILIRUB SERPL-MCNC: 0.2 MG/DL (ref 0.2–1.3)
BUN BLD-MCNC: 26 MG/DL (ref 7–21)
BUN/CREAT SERPL: 25 (ref 7–25)
CALCIUM SPEC-SCNC: 7.9 MG/DL (ref 8.4–10.2)
CHLORIDE SERPL-SCNC: 108 MMOL/L (ref 95–110)
CO2 SERPL-SCNC: 24 MMOL/L (ref 22–31)
CREAT BLD-MCNC: 1.04 MG/DL (ref 0.5–1)
DEPRECATED RDW RBC AUTO: 47.9 FL (ref 36.4–46.3)
ERYTHROCYTE [DISTWIDTH] IN BLOOD BY AUTOMATED COUNT: 14.5 % (ref 11.5–14.5)
GFR SERPL CREATININE-BSD FRML MDRD: 53 ML/MIN/1.73 (ref 45–104)
GLOBULIN UR ELPH-MCNC: 2.9 GM/DL (ref 2.3–3.5)
GLUCOSE BLD-MCNC: 122 MG/DL (ref 60–100)
GLUCOSE BLDC GLUCOMTR-MCNC: 108 MG/DL (ref 70–130)
GLUCOSE BLDC GLUCOMTR-MCNC: 108 MG/DL (ref 70–130)
GLUCOSE BLDC GLUCOMTR-MCNC: 132 MG/DL (ref 70–130)
GLUCOSE BLDC GLUCOMTR-MCNC: 139 MG/DL (ref 70–130)
GLUCOSE BLDC GLUCOMTR-MCNC: 139 MG/DL (ref 70–130)
HCT VFR BLD AUTO: 26.2 % (ref 35–45)
HGB BLD-MCNC: 8.3 G/DL (ref 12–15.5)
MCH RBC QN AUTO: 28.5 PG (ref 26.5–34)
MCHC RBC AUTO-ENTMCNC: 31.7 G/DL (ref 31.4–36)
MCV RBC AUTO: 90 FL (ref 80–98)
PLATELET # BLD AUTO: 200 10*3/MM3 (ref 150–450)
PMV BLD AUTO: 10.4 FL (ref 8–12)
POTASSIUM BLD-SCNC: 3.9 MMOL/L (ref 3.5–5.1)
PROT SERPL-MCNC: 5.7 G/DL (ref 6.3–8.6)
RBC # BLD AUTO: 2.91 10*6/MM3 (ref 3.77–5.16)
SODIUM BLD-SCNC: 142 MMOL/L (ref 137–145)
WBC NRBC COR # BLD: 4.63 10*3/MM3 (ref 3.2–9.8)

## 2017-10-29 PROCEDURE — 25010000002 MEROPENEM: Performed by: INTERNAL MEDICINE

## 2017-10-29 PROCEDURE — 25010000002 CALCIUM GLUCONATE PER 10 ML: Performed by: INTERNAL MEDICINE

## 2017-10-29 PROCEDURE — 80053 COMPREHEN METABOLIC PANEL: CPT | Performed by: INTERNAL MEDICINE

## 2017-10-29 PROCEDURE — 82962 GLUCOSE BLOOD TEST: CPT

## 2017-10-29 PROCEDURE — 25010000002 HEPARIN LOCK FLUSH 10 UNIT/ML SOLUTION: Performed by: INTERNAL MEDICINE

## 2017-10-29 PROCEDURE — 25010000002 POTASSIUM CHLORIDE PER 2 MEQ OF POTASSIUM: Performed by: INTERNAL MEDICINE

## 2017-10-29 PROCEDURE — 85027 COMPLETE CBC AUTOMATED: CPT | Performed by: INTERNAL MEDICINE

## 2017-10-29 PROCEDURE — 25010000002 MAGNESIUM SULFATE PER 500 MG OF MAGNESIUM: Performed by: INTERNAL MEDICINE

## 2017-10-29 PROCEDURE — 25010000002 KETOROLAC TROMETHAMINE PER 15 MG: Performed by: INTERNAL MEDICINE

## 2017-10-29 PROCEDURE — 25010000002 FUROSEMIDE PER 20 MG: Performed by: INTERNAL MEDICINE

## 2017-10-30 ENCOUNTER — APPOINTMENT (OUTPATIENT)
Dept: CT IMAGING | Facility: HOSPITAL | Age: 67
End: 2017-10-30

## 2017-10-30 LAB
GLUCOSE BLDC GLUCOMTR-MCNC: 114 MG/DL (ref 70–130)
GLUCOSE BLDC GLUCOMTR-MCNC: 119 MG/DL (ref 70–130)
MAGNESIUM SERPL-MCNC: 2.1 MG/DL (ref 1.6–2.3)
PHOSPHATE SERPL-MCNC: 4.4 MG/DL (ref 2.4–4.4)
TRIGL SERPL-MCNC: 199 MG/DL (ref 20–199)

## 2017-10-30 PROCEDURE — 25010000002 MEROPENEM: Performed by: INTERNAL MEDICINE

## 2017-10-30 PROCEDURE — 84100 ASSAY OF PHOSPHORUS: CPT | Performed by: INTERNAL MEDICINE

## 2017-10-30 PROCEDURE — 83735 ASSAY OF MAGNESIUM: CPT | Performed by: INTERNAL MEDICINE

## 2017-10-30 PROCEDURE — 25010000002 POTASSIUM CHLORIDE PER 2 MEQ OF POTASSIUM: Performed by: INTERNAL MEDICINE

## 2017-10-30 PROCEDURE — 25010000002 FUROSEMIDE PER 20 MG: Performed by: INTERNAL MEDICINE

## 2017-10-30 PROCEDURE — 72194 CT PELVIS W/O & W/DYE: CPT

## 2017-10-30 PROCEDURE — 82962 GLUCOSE BLOOD TEST: CPT

## 2017-10-30 PROCEDURE — 97110 THERAPEUTIC EXERCISES: CPT

## 2017-10-30 PROCEDURE — 25010000002 HEPARIN LOCK FLUSH 10 UNIT/ML SOLUTION: Performed by: INTERNAL MEDICINE

## 2017-10-30 PROCEDURE — 25010000002 MAGNESIUM SULFATE PER 500 MG OF MAGNESIUM: Performed by: INTERNAL MEDICINE

## 2017-10-30 PROCEDURE — 25010000002 KETOROLAC TROMETHAMINE PER 15 MG: Performed by: INTERNAL MEDICINE

## 2017-10-30 PROCEDURE — 25010000002 ONDANSETRON PER 1 MG: Performed by: INTERNAL MEDICINE

## 2017-10-30 PROCEDURE — 84478 ASSAY OF TRIGLYCERIDES: CPT | Performed by: INTERNAL MEDICINE

## 2017-10-30 PROCEDURE — 97530 THERAPEUTIC ACTIVITIES: CPT

## 2017-10-30 PROCEDURE — 25010000002 CALCIUM GLUCONATE PER 10 ML: Performed by: INTERNAL MEDICINE

## 2017-10-31 LAB
ALBUMIN SERPL-MCNC: 2.5 G/DL (ref 3.4–4.8)
ALBUMIN/GLOB SERPL: 0.9 G/DL (ref 1.1–1.8)
ALP SERPL-CCNC: 133 U/L (ref 38–126)
ALT SERPL W P-5'-P-CCNC: 46 U/L (ref 9–52)
ANION GAP SERPL CALCULATED.3IONS-SCNC: 10 MMOL/L (ref 5–15)
AST SERPL-CCNC: 47 U/L (ref 14–36)
BILIRUB SERPL-MCNC: 0.2 MG/DL (ref 0.2–1.3)
BUN BLD-MCNC: 25 MG/DL (ref 7–21)
BUN/CREAT SERPL: 25.5 (ref 7–25)
CALCIUM SPEC-SCNC: 7.7 MG/DL (ref 8.4–10.2)
CHLORIDE SERPL-SCNC: 107 MMOL/L (ref 95–110)
CO2 SERPL-SCNC: 25 MMOL/L (ref 22–31)
CREAT BLD-MCNC: 0.98 MG/DL (ref 0.5–1)
DEPRECATED RDW RBC AUTO: 46.9 FL (ref 36.4–46.3)
ERYTHROCYTE [DISTWIDTH] IN BLOOD BY AUTOMATED COUNT: 14.3 % (ref 11.5–14.5)
GFR SERPL CREATININE-BSD FRML MDRD: 57 ML/MIN/1.73 (ref 45–104)
GLOBULIN UR ELPH-MCNC: 2.8 GM/DL (ref 2.3–3.5)
GLUCOSE BLD-MCNC: 112 MG/DL (ref 60–100)
GLUCOSE BLDC GLUCOMTR-MCNC: 103 MG/DL (ref 70–130)
GLUCOSE BLDC GLUCOMTR-MCNC: 107 MG/DL (ref 70–130)
GLUCOSE BLDC GLUCOMTR-MCNC: 109 MG/DL (ref 70–130)
GLUCOSE BLDC GLUCOMTR-MCNC: 119 MG/DL (ref 70–130)
GLUCOSE BLDC GLUCOMTR-MCNC: 120 MG/DL (ref 70–130)
HCT VFR BLD AUTO: 23.8 % (ref 35–45)
HGB BLD-MCNC: 7.6 G/DL (ref 12–15.5)
MCH RBC QN AUTO: 28.6 PG (ref 26.5–34)
MCHC RBC AUTO-ENTMCNC: 31.9 G/DL (ref 31.4–36)
MCV RBC AUTO: 89.5 FL (ref 80–98)
PLATELET # BLD AUTO: 160 10*3/MM3 (ref 150–450)
PMV BLD AUTO: 9.8 FL (ref 8–12)
POTASSIUM BLD-SCNC: 3.9 MMOL/L (ref 3.5–5.1)
PROT SERPL-MCNC: 5.3 G/DL (ref 6.3–8.6)
RBC # BLD AUTO: 2.66 10*6/MM3 (ref 3.77–5.16)
SODIUM BLD-SCNC: 142 MMOL/L (ref 137–145)
WBC NRBC COR # BLD: 5.85 10*3/MM3 (ref 3.2–9.8)

## 2017-10-31 PROCEDURE — 80053 COMPREHEN METABOLIC PANEL: CPT | Performed by: INTERNAL MEDICINE

## 2017-10-31 PROCEDURE — 25010000002 MEROPENEM: Performed by: INTERNAL MEDICINE

## 2017-10-31 PROCEDURE — 97168 OT RE-EVAL EST PLAN CARE: CPT

## 2017-10-31 PROCEDURE — 82962 GLUCOSE BLOOD TEST: CPT

## 2017-10-31 PROCEDURE — 97530 THERAPEUTIC ACTIVITIES: CPT

## 2017-10-31 PROCEDURE — 25010000002 CALCIUM GLUCONATE PER 10 ML: Performed by: INTERNAL MEDICINE

## 2017-10-31 PROCEDURE — 25010000002 KETOROLAC TROMETHAMINE PER 15 MG: Performed by: INTERNAL MEDICINE

## 2017-10-31 PROCEDURE — 97116 GAIT TRAINING THERAPY: CPT

## 2017-10-31 PROCEDURE — 25010000002 FUROSEMIDE PER 20 MG: Performed by: INTERNAL MEDICINE

## 2017-10-31 PROCEDURE — 25010000002 POTASSIUM CHLORIDE PER 2 MEQ OF POTASSIUM: Performed by: INTERNAL MEDICINE

## 2017-10-31 PROCEDURE — 97542 WHEELCHAIR MNGMENT TRAINING: CPT

## 2017-10-31 PROCEDURE — 25010000002 MAGNESIUM SULFATE PER 500 MG OF MAGNESIUM: Performed by: INTERNAL MEDICINE

## 2017-10-31 PROCEDURE — 25010000002 HEPARIN LOCK FLUSH 10 UNIT/ML SOLUTION: Performed by: INTERNAL MEDICINE

## 2017-10-31 PROCEDURE — 85027 COMPLETE CBC AUTOMATED: CPT | Performed by: INTERNAL MEDICINE

## 2017-10-31 PROCEDURE — 97535 SELF CARE MNGMENT TRAINING: CPT

## 2017-10-31 RX ORDER — SODIUM CHLORIDE 9 MG/ML
INJECTION, SOLUTION INTRAVENOUS
Status: DISPENSED
Start: 2017-10-31 | End: 2017-11-01

## 2017-11-01 LAB
GLUCOSE BLDC GLUCOMTR-MCNC: 119 MG/DL (ref 70–130)
GLUCOSE BLDC GLUCOMTR-MCNC: 128 MG/DL (ref 70–130)
GLUCOSE BLDC GLUCOMTR-MCNC: 87 MG/DL (ref 70–130)
GLUCOSE BLDC GLUCOMTR-MCNC: 95 MG/DL (ref 70–130)
GLUCOSE BLDC GLUCOMTR-MCNC: 98 MG/DL (ref 70–130)

## 2017-11-01 PROCEDURE — 25010000002 POTASSIUM CHLORIDE PER 2 MEQ OF POTASSIUM: Performed by: INTERNAL MEDICINE

## 2017-11-01 PROCEDURE — 25010000002 HEPARIN LOCK FLUSH 10 UNIT/ML SOLUTION: Performed by: INTERNAL MEDICINE

## 2017-11-01 PROCEDURE — 25010000002 KETOROLAC TROMETHAMINE PER 15 MG: Performed by: INTERNAL MEDICINE

## 2017-11-01 PROCEDURE — 25010000002 MAGNESIUM SULFATE PER 500 MG OF MAGNESIUM: Performed by: INTERNAL MEDICINE

## 2017-11-01 PROCEDURE — 97530 THERAPEUTIC ACTIVITIES: CPT

## 2017-11-01 PROCEDURE — 25010000002 MEROPENEM: Performed by: INTERNAL MEDICINE

## 2017-11-01 PROCEDURE — 82962 GLUCOSE BLOOD TEST: CPT

## 2017-11-01 PROCEDURE — 25010000002 FUROSEMIDE PER 20 MG: Performed by: INTERNAL MEDICINE

## 2017-11-01 PROCEDURE — 25010000002 CALCIUM GLUCONATE PER 10 ML: Performed by: INTERNAL MEDICINE

## 2017-11-01 PROCEDURE — 25010000002 MAGNESIUM SULFATE 2 GM/50ML SOLUTION: Performed by: INTERNAL MEDICINE

## 2017-11-01 PROCEDURE — 97116 GAIT TRAINING THERAPY: CPT

## 2017-11-01 RX ORDER — MAGNESIUM SULFATE HEPTAHYDRATE 40 MG/ML
2 INJECTION, SOLUTION INTRAVENOUS ONCE
Status: DISCONTINUED | OUTPATIENT
Start: 2017-11-01 | End: 2017-11-01

## 2017-11-02 ENCOUNTER — APPOINTMENT (OUTPATIENT)
Dept: GENERAL RADIOLOGY | Facility: HOSPITAL | Age: 67
End: 2017-11-02

## 2017-11-02 LAB
ALBUMIN SERPL-MCNC: 2.8 G/DL (ref 3.4–4.8)
ALBUMIN/GLOB SERPL: 1 G/DL (ref 1.1–1.8)
ALP SERPL-CCNC: 138 U/L (ref 38–126)
ALT SERPL W P-5'-P-CCNC: 38 U/L (ref 9–52)
ANION GAP SERPL CALCULATED.3IONS-SCNC: 9 MMOL/L (ref 5–15)
AST SERPL-CCNC: 27 U/L (ref 14–36)
BILIRUB SERPL-MCNC: 0.3 MG/DL (ref 0.2–1.3)
BUN BLD-MCNC: 26 MG/DL (ref 7–21)
BUN/CREAT SERPL: 25.7 (ref 7–25)
CALCIUM SPEC-SCNC: 7.7 MG/DL (ref 8.4–10.2)
CHLORIDE SERPL-SCNC: 109 MMOL/L (ref 95–110)
CO2 SERPL-SCNC: 25 MMOL/L (ref 22–31)
CREAT BLD-MCNC: 1.01 MG/DL (ref 0.5–1)
DEPRECATED RDW RBC AUTO: 47.8 FL (ref 36.4–46.3)
ERYTHROCYTE [DISTWIDTH] IN BLOOD BY AUTOMATED COUNT: 14.5 % (ref 11.5–14.5)
GFR SERPL CREATININE-BSD FRML MDRD: 55 ML/MIN/1.73 (ref 60–104)
GLOBULIN UR ELPH-MCNC: 2.9 GM/DL (ref 2.3–3.5)
GLUCOSE BLD-MCNC: 107 MG/DL (ref 60–100)
GLUCOSE BLDC GLUCOMTR-MCNC: 111 MG/DL (ref 70–130)
GLUCOSE BLDC GLUCOMTR-MCNC: 117 MG/DL (ref 70–130)
GLUCOSE BLDC GLUCOMTR-MCNC: 123 MG/DL (ref 70–130)
GLUCOSE BLDC GLUCOMTR-MCNC: 96 MG/DL (ref 70–130)
HCT VFR BLD AUTO: 24.5 % (ref 35–45)
HGB BLD-MCNC: 8 G/DL (ref 12–15.5)
MAGNESIUM SERPL-MCNC: 2.4 MG/DL (ref 1.6–2.3)
MCH RBC QN AUTO: 29.7 PG (ref 26.5–34)
MCHC RBC AUTO-ENTMCNC: 32.7 G/DL (ref 31.4–36)
MCV RBC AUTO: 91.1 FL (ref 80–98)
PHOSPHATE SERPL-MCNC: 4.2 MG/DL (ref 2.4–4.4)
PLATELET # BLD AUTO: 160 10*3/MM3 (ref 150–450)
PMV BLD AUTO: 10 FL (ref 8–12)
POTASSIUM BLD-SCNC: 4.3 MMOL/L (ref 3.5–5.1)
PROT SERPL-MCNC: 5.7 G/DL (ref 6.3–8.6)
RBC # BLD AUTO: 2.69 10*6/MM3 (ref 3.77–5.16)
SODIUM BLD-SCNC: 143 MMOL/L (ref 137–145)
WBC NRBC COR # BLD: 4.87 10*3/MM3 (ref 3.2–9.8)

## 2017-11-02 PROCEDURE — 85027 COMPLETE CBC AUTOMATED: CPT | Performed by: INTERNAL MEDICINE

## 2017-11-02 PROCEDURE — 83735 ASSAY OF MAGNESIUM: CPT | Performed by: INTERNAL MEDICINE

## 2017-11-02 PROCEDURE — 73020 X-RAY EXAM OF SHOULDER: CPT

## 2017-11-02 PROCEDURE — 25010000002 FUROSEMIDE PER 20 MG: Performed by: INTERNAL MEDICINE

## 2017-11-02 PROCEDURE — 25010000002 KETOROLAC TROMETHAMINE PER 15 MG: Performed by: INTERNAL MEDICINE

## 2017-11-02 PROCEDURE — 25010000002 POTASSIUM CHLORIDE PER 2 MEQ OF POTASSIUM: Performed by: SURGERY

## 2017-11-02 PROCEDURE — 97110 THERAPEUTIC EXERCISES: CPT

## 2017-11-02 PROCEDURE — 82962 GLUCOSE BLOOD TEST: CPT

## 2017-11-02 PROCEDURE — 84100 ASSAY OF PHOSPHORUS: CPT | Performed by: INTERNAL MEDICINE

## 2017-11-02 PROCEDURE — 25010000002 MAGNESIUM SULFATE PER 500 MG OF MAGNESIUM: Performed by: INTERNAL MEDICINE

## 2017-11-02 PROCEDURE — 25010000002 MAGNESIUM SULFATE PER 500 MG OF MAGNESIUM: Performed by: SURGERY

## 2017-11-02 PROCEDURE — 25010000002 CALCIUM GLUCONATE PER 10 ML: Performed by: INTERNAL MEDICINE

## 2017-11-02 PROCEDURE — 80053 COMPREHEN METABOLIC PANEL: CPT | Performed by: INTERNAL MEDICINE

## 2017-11-02 PROCEDURE — 25010000002 HEPARIN LOCK FLUSH 10 UNIT/ML SOLUTION: Performed by: INTERNAL MEDICINE

## 2017-11-02 PROCEDURE — 25010000002 CALCIUM GLUCONATE PER 10 ML: Performed by: SURGERY

## 2017-11-02 PROCEDURE — 25010000002 POTASSIUM CHLORIDE PER 2 MEQ OF POTASSIUM: Performed by: INTERNAL MEDICINE

## 2017-11-02 PROCEDURE — 25010000002 MEROPENEM: Performed by: INTERNAL MEDICINE

## 2017-11-02 PROCEDURE — 97530 THERAPEUTIC ACTIVITIES: CPT

## 2017-11-02 RX ORDER — KETOROLAC TROMETHAMINE 30 MG/ML
30 INJECTION, SOLUTION INTRAMUSCULAR; INTRAVENOUS EVERY 6 HOURS PRN
Status: DISPENSED | OUTPATIENT
Start: 2017-11-02 | End: 2017-11-07

## 2017-11-02 RX ORDER — CYCLOBENZAPRINE HCL 5 MG
5 TABLET ORAL 3 TIMES DAILY PRN
Status: DISCONTINUED | OUTPATIENT
Start: 2017-11-02 | End: 2017-11-02

## 2017-11-03 LAB
GLUCOSE BLDC GLUCOMTR-MCNC: 102 MG/DL (ref 70–130)
GLUCOSE BLDC GLUCOMTR-MCNC: 111 MG/DL (ref 70–130)
GLUCOSE BLDC GLUCOMTR-MCNC: 114 MG/DL (ref 70–130)
GLUCOSE BLDC GLUCOMTR-MCNC: 128 MG/DL (ref 70–130)

## 2017-11-03 PROCEDURE — 97530 THERAPEUTIC ACTIVITIES: CPT

## 2017-11-03 PROCEDURE — 82962 GLUCOSE BLOOD TEST: CPT

## 2017-11-03 PROCEDURE — 97535 SELF CARE MNGMENT TRAINING: CPT

## 2017-11-03 PROCEDURE — 25010000002 MEROPENEM: Performed by: INTERNAL MEDICINE

## 2017-11-03 PROCEDURE — 25010000002 FUROSEMIDE PER 20 MG: Performed by: INTERNAL MEDICINE

## 2017-11-03 PROCEDURE — 25010000002 MAGNESIUM SULFATE PER 500 MG OF MAGNESIUM: Performed by: SURGERY

## 2017-11-03 PROCEDURE — 97110 THERAPEUTIC EXERCISES: CPT

## 2017-11-03 PROCEDURE — 25010000002 HEPARIN LOCK FLUSH 10 UNIT/ML SOLUTION: Performed by: INTERNAL MEDICINE

## 2017-11-03 PROCEDURE — 97116 GAIT TRAINING THERAPY: CPT

## 2017-11-03 PROCEDURE — 25010000002 POTASSIUM CHLORIDE PER 2 MEQ OF POTASSIUM: Performed by: SURGERY

## 2017-11-03 PROCEDURE — 25010000002 KETOROLAC TROMETHAMINE PER 15 MG: Performed by: INTERNAL MEDICINE

## 2017-11-03 PROCEDURE — 25010000002 CALCIUM GLUCONATE PER 10 ML: Performed by: SURGERY

## 2017-11-03 RX ORDER — TIZANIDINE 4 MG/1
4 TABLET ORAL EVERY 12 HOURS PRN
Status: DISCONTINUED | OUTPATIENT
Start: 2017-11-03 | End: 2017-12-03

## 2017-11-04 LAB
ALBUMIN SERPL-MCNC: 2.7 G/DL (ref 3.4–4.8)
ALBUMIN/GLOB SERPL: 1 G/DL (ref 1.1–1.8)
ALP SERPL-CCNC: 128 U/L (ref 38–126)
ALT SERPL W P-5'-P-CCNC: 38 U/L (ref 9–52)
ANION GAP SERPL CALCULATED.3IONS-SCNC: 8 MMOL/L (ref 5–15)
AST SERPL-CCNC: 32 U/L (ref 14–36)
BILIRUB SERPL-MCNC: 0.2 MG/DL (ref 0.2–1.3)
BUN BLD-MCNC: 28 MG/DL (ref 7–21)
BUN/CREAT SERPL: 27.2 (ref 7–25)
CALCIUM SPEC-SCNC: 7.7 MG/DL (ref 8.4–10.2)
CHLORIDE SERPL-SCNC: 106 MMOL/L (ref 95–110)
CO2 SERPL-SCNC: 26 MMOL/L (ref 22–31)
CREAT BLD-MCNC: 1.03 MG/DL (ref 0.5–1)
DEPRECATED RDW RBC AUTO: 53.6 FL (ref 36.4–46.3)
ERYTHROCYTE [DISTWIDTH] IN BLOOD BY AUTOMATED COUNT: 14.3 % (ref 11.5–14.5)
GFR SERPL CREATININE-BSD FRML MDRD: 54 ML/MIN/1.73 (ref 45–104)
GLOBULIN UR ELPH-MCNC: 2.7 GM/DL (ref 2.3–3.5)
GLUCOSE BLD-MCNC: 107 MG/DL (ref 60–100)
GLUCOSE BLDC GLUCOMTR-MCNC: 107 MG/DL (ref 70–130)
GLUCOSE BLDC GLUCOMTR-MCNC: 125 MG/DL (ref 70–130)
HCT VFR BLD AUTO: 23.9 % (ref 35–45)
HGB BLD-MCNC: 7.4 G/DL (ref 12–15.5)
MAGNESIUM SERPL-MCNC: 1.8 MG/DL (ref 1.6–2.3)
MCH RBC QN AUTO: 32 PG (ref 26.5–34)
MCHC RBC AUTO-ENTMCNC: 31 G/DL (ref 31.4–36)
MCV RBC AUTO: 103.5 FL (ref 80–98)
PHOSPHATE SERPL-MCNC: 6.5 MG/DL (ref 2.4–4.4)
PLATELET # BLD AUTO: 123 10*3/MM3 (ref 150–450)
PMV BLD AUTO: 10.8 FL (ref 8–12)
POTASSIUM BLD-SCNC: 4 MMOL/L (ref 3.5–5.1)
PROT SERPL-MCNC: 5.4 G/DL (ref 6.3–8.6)
RBC # BLD AUTO: 2.31 10*6/MM3 (ref 3.77–5.16)
SODIUM BLD-SCNC: 140 MMOL/L (ref 137–145)
WBC NRBC COR # BLD: 4.23 10*3/MM3 (ref 3.2–9.8)

## 2017-11-04 PROCEDURE — 25010000002 CALCIUM GLUCONATE PER 10 ML: Performed by: INTERNAL MEDICINE

## 2017-11-04 PROCEDURE — 25010000002 FUROSEMIDE PER 20 MG: Performed by: INTERNAL MEDICINE

## 2017-11-04 PROCEDURE — 83735 ASSAY OF MAGNESIUM: CPT | Performed by: INTERNAL MEDICINE

## 2017-11-04 PROCEDURE — 80053 COMPREHEN METABOLIC PANEL: CPT | Performed by: INTERNAL MEDICINE

## 2017-11-04 PROCEDURE — 25010000002 MAGNESIUM SULFATE PER 500 MG OF MAGNESIUM: Performed by: INTERNAL MEDICINE

## 2017-11-04 PROCEDURE — 25010000002 KETOROLAC TROMETHAMINE PER 15 MG: Performed by: INTERNAL MEDICINE

## 2017-11-04 PROCEDURE — 82962 GLUCOSE BLOOD TEST: CPT

## 2017-11-04 PROCEDURE — 85027 COMPLETE CBC AUTOMATED: CPT | Performed by: INTERNAL MEDICINE

## 2017-11-04 PROCEDURE — 84100 ASSAY OF PHOSPHORUS: CPT | Performed by: INTERNAL MEDICINE

## 2017-11-04 PROCEDURE — 97530 THERAPEUTIC ACTIVITIES: CPT

## 2017-11-04 PROCEDURE — 25010000002 HEPARIN LOCK FLUSH 10 UNIT/ML SOLUTION: Performed by: INTERNAL MEDICINE

## 2017-11-04 PROCEDURE — 25010000002 POTASSIUM CHLORIDE PER 2 MEQ OF POTASSIUM: Performed by: INTERNAL MEDICINE

## 2017-11-04 RX ORDER — SODIUM CHLORIDE 9 MG/ML
INJECTION, SOLUTION INTRAVENOUS
Status: DISPENSED
Start: 2017-11-04 | End: 2017-11-05

## 2017-11-05 LAB
GLUCOSE BLDC GLUCOMTR-MCNC: 105 MG/DL (ref 70–130)
GLUCOSE BLDC GLUCOMTR-MCNC: 115 MG/DL (ref 70–130)
GLUCOSE BLDC GLUCOMTR-MCNC: 121 MG/DL (ref 70–130)
GLUCOSE BLDC GLUCOMTR-MCNC: 90 MG/DL (ref 70–130)

## 2017-11-05 PROCEDURE — 25010000002 KETOROLAC TROMETHAMINE PER 15 MG: Performed by: INTERNAL MEDICINE

## 2017-11-05 PROCEDURE — 25010000002 MAGNESIUM SULFATE PER 500 MG OF MAGNESIUM: Performed by: INTERNAL MEDICINE

## 2017-11-05 PROCEDURE — 25010000002 HEPARIN LOCK FLUSH 10 UNIT/ML SOLUTION: Performed by: INTERNAL MEDICINE

## 2017-11-05 PROCEDURE — 25010000002 FUROSEMIDE PER 20 MG: Performed by: INTERNAL MEDICINE

## 2017-11-05 PROCEDURE — 82962 GLUCOSE BLOOD TEST: CPT

## 2017-11-05 PROCEDURE — 25010000002 CALCIUM GLUCONATE PER 10 ML: Performed by: INTERNAL MEDICINE

## 2017-11-05 PROCEDURE — 25010000002 POTASSIUM CHLORIDE PER 2 MEQ OF POTASSIUM: Performed by: INTERNAL MEDICINE

## 2017-11-06 LAB
ALBUMIN SERPL-MCNC: 2.7 G/DL (ref 3.4–4.8)
ALBUMIN/GLOB SERPL: 1 G/DL (ref 1.1–1.8)
ALP SERPL-CCNC: 136 U/L (ref 38–126)
ALT SERPL W P-5'-P-CCNC: 42 U/L (ref 9–52)
ANION GAP SERPL CALCULATED.3IONS-SCNC: 9 MMOL/L (ref 5–15)
AST SERPL-CCNC: 42 U/L (ref 14–36)
BASOPHILS # BLD AUTO: 0.01 10*3/MM3 (ref 0–0.2)
BASOPHILS NFR BLD AUTO: 0.2 % (ref 0–2)
BILIRUB SERPL-MCNC: 0.2 MG/DL (ref 0.2–1.3)
BUN BLD-MCNC: 30 MG/DL (ref 7–21)
BUN/CREAT SERPL: 28.6 (ref 7–25)
CALCIUM SPEC-SCNC: 7.9 MG/DL (ref 8.4–10.2)
CHLORIDE SERPL-SCNC: 108 MMOL/L (ref 95–110)
CO2 SERPL-SCNC: 25 MMOL/L (ref 22–31)
CREAT BLD-MCNC: 1.05 MG/DL (ref 0.5–1)
DEPRECATED RDW RBC AUTO: 45 FL (ref 36.4–46.3)
EOSINOPHIL # BLD AUTO: 0.22 10*3/MM3 (ref 0–0.7)
EOSINOPHIL NFR BLD AUTO: 4.6 % (ref 0–7)
ERYTHROCYTE [DISTWIDTH] IN BLOOD BY AUTOMATED COUNT: 13.7 % (ref 11.5–14.5)
GFR SERPL CREATININE-BSD FRML MDRD: 53 ML/MIN/1.73 (ref 45–104)
GLOBULIN UR ELPH-MCNC: 2.8 GM/DL (ref 2.3–3.5)
GLUCOSE BLD-MCNC: 122 MG/DL (ref 60–100)
GLUCOSE BLDC GLUCOMTR-MCNC: 104 MG/DL (ref 70–130)
GLUCOSE BLDC GLUCOMTR-MCNC: 106 MG/DL (ref 70–130)
GLUCOSE BLDC GLUCOMTR-MCNC: 108 MG/DL (ref 70–130)
GLUCOSE BLDC GLUCOMTR-MCNC: 114 MG/DL (ref 70–130)
GLUCOSE BLDC GLUCOMTR-MCNC: 120 MG/DL (ref 70–130)
GLUCOSE BLDC GLUCOMTR-MCNC: 123 MG/DL (ref 70–130)
HCT VFR BLD AUTO: 24.7 % (ref 35–45)
HGB BLD-MCNC: 7.9 G/DL (ref 12–15.5)
IMM GRANULOCYTES # BLD: 0.01 10*3/MM3 (ref 0–0.02)
IMM GRANULOCYTES NFR BLD: 0.2 % (ref 0–0.5)
LYMPHOCYTES # BLD AUTO: 1.66 10*3/MM3 (ref 0.6–4.2)
LYMPHOCYTES NFR BLD AUTO: 34.7 % (ref 10–50)
MAGNESIUM SERPL-MCNC: 1.9 MG/DL (ref 1.6–2.3)
MCH RBC QN AUTO: 28.5 PG (ref 26.5–34)
MCHC RBC AUTO-ENTMCNC: 32 G/DL (ref 31.4–36)
MCV RBC AUTO: 89.2 FL (ref 80–98)
MONOCYTES # BLD AUTO: 0.28 10*3/MM3 (ref 0–0.9)
MONOCYTES NFR BLD AUTO: 5.9 % (ref 0–12)
NEUTROPHILS # BLD AUTO: 2.6 10*3/MM3 (ref 2–8.6)
NEUTROPHILS NFR BLD AUTO: 54.4 % (ref 37–80)
PHOSPHATE SERPL-MCNC: 4.3 MG/DL (ref 2.4–4.4)
PLATELET # BLD AUTO: 143 10*3/MM3 (ref 150–450)
PMV BLD AUTO: 10.4 FL (ref 8–12)
POTASSIUM BLD-SCNC: 3.9 MMOL/L (ref 3.5–5.1)
PROT SERPL-MCNC: 5.5 G/DL (ref 6.3–8.6)
RBC # BLD AUTO: 2.77 10*6/MM3 (ref 3.77–5.16)
SODIUM BLD-SCNC: 142 MMOL/L (ref 137–145)
TRIGL SERPL-MCNC: 152 MG/DL (ref 20–199)
WBC NRBC COR # BLD: 4.78 10*3/MM3 (ref 3.2–9.8)

## 2017-11-06 PROCEDURE — 82962 GLUCOSE BLOOD TEST: CPT

## 2017-11-06 PROCEDURE — 97110 THERAPEUTIC EXERCISES: CPT

## 2017-11-06 PROCEDURE — 25010000002 FUROSEMIDE PER 20 MG: Performed by: INTERNAL MEDICINE

## 2017-11-06 PROCEDURE — 97530 THERAPEUTIC ACTIVITIES: CPT

## 2017-11-06 PROCEDURE — 25010000002 KETOROLAC TROMETHAMINE PER 15 MG: Performed by: INTERNAL MEDICINE

## 2017-11-06 PROCEDURE — 80053 COMPREHEN METABOLIC PANEL: CPT | Performed by: INTERNAL MEDICINE

## 2017-11-06 PROCEDURE — 85025 COMPLETE CBC W/AUTO DIFF WBC: CPT | Performed by: INTERNAL MEDICINE

## 2017-11-06 PROCEDURE — 25010000002 HEPARIN LOCK FLUSH 10 UNIT/ML SOLUTION: Performed by: INTERNAL MEDICINE

## 2017-11-06 PROCEDURE — 25010000002 POTASSIUM CHLORIDE PER 2 MEQ OF POTASSIUM: Performed by: INTERNAL MEDICINE

## 2017-11-06 PROCEDURE — 25010000002 MAGNESIUM SULFATE PER 500 MG OF MAGNESIUM: Performed by: INTERNAL MEDICINE

## 2017-11-06 PROCEDURE — 83735 ASSAY OF MAGNESIUM: CPT | Performed by: INTERNAL MEDICINE

## 2017-11-06 PROCEDURE — 84478 ASSAY OF TRIGLYCERIDES: CPT | Performed by: INTERNAL MEDICINE

## 2017-11-06 PROCEDURE — 25010000002 CALCIUM GLUCONATE PER 10 ML: Performed by: INTERNAL MEDICINE

## 2017-11-06 PROCEDURE — 97116 GAIT TRAINING THERAPY: CPT

## 2017-11-06 PROCEDURE — 84100 ASSAY OF PHOSPHORUS: CPT | Performed by: INTERNAL MEDICINE

## 2017-11-06 RX ORDER — NYSTATIN 100000 U/G
CREAM TOPICAL EVERY 12 HOURS SCHEDULED
Status: DISCONTINUED | OUTPATIENT
Start: 2017-11-06 | End: 2017-12-14 | Stop reason: HOSPADM

## 2017-11-07 LAB — GLUCOSE BLDC GLUCOMTR-MCNC: 121 MG/DL (ref 70–130)

## 2017-11-07 PROCEDURE — 25010000002 KETOROLAC TROMETHAMINE PER 15 MG: Performed by: NURSE PRACTITIONER

## 2017-11-07 PROCEDURE — 25010000002 CALCIUM GLUCONATE PER 10 ML: Performed by: INTERNAL MEDICINE

## 2017-11-07 PROCEDURE — 25010000002 FUROSEMIDE PER 20 MG: Performed by: INTERNAL MEDICINE

## 2017-11-07 PROCEDURE — 97110 THERAPEUTIC EXERCISES: CPT

## 2017-11-07 PROCEDURE — 97535 SELF CARE MNGMENT TRAINING: CPT

## 2017-11-07 PROCEDURE — 97530 THERAPEUTIC ACTIVITIES: CPT

## 2017-11-07 PROCEDURE — 82962 GLUCOSE BLOOD TEST: CPT

## 2017-11-07 PROCEDURE — 25010000002 MAGNESIUM SULFATE PER 500 MG OF MAGNESIUM: Performed by: INTERNAL MEDICINE

## 2017-11-07 PROCEDURE — 97116 GAIT TRAINING THERAPY: CPT

## 2017-11-07 PROCEDURE — 25010000002 POTASSIUM CHLORIDE PER 2 MEQ OF POTASSIUM: Performed by: INTERNAL MEDICINE

## 2017-11-07 RX ORDER — KETOROLAC TROMETHAMINE 30 MG/ML
30 INJECTION, SOLUTION INTRAMUSCULAR; INTRAVENOUS EVERY 6 HOURS PRN
Status: DISPENSED | OUTPATIENT
Start: 2017-11-07 | End: 2017-11-12

## 2017-11-08 LAB
ALBUMIN SERPL-MCNC: 2.8 G/DL (ref 3.4–4.8)
ALBUMIN/GLOB SERPL: 1 G/DL (ref 1.1–1.8)
ALP SERPL-CCNC: 131 U/L (ref 38–126)
ALT SERPL W P-5'-P-CCNC: 35 U/L (ref 9–52)
ANION GAP SERPL CALCULATED.3IONS-SCNC: 7 MMOL/L (ref 5–15)
AST SERPL-CCNC: 37 U/L (ref 14–36)
BILIRUB SERPL-MCNC: 0.3 MG/DL (ref 0.2–1.3)
BUN BLD-MCNC: 33 MG/DL (ref 7–21)
BUN/CREAT SERPL: 28.4 (ref 7–25)
CALCIUM SPEC-SCNC: 8.2 MG/DL (ref 8.4–10.2)
CHLORIDE SERPL-SCNC: 109 MMOL/L (ref 95–110)
CO2 SERPL-SCNC: 26 MMOL/L (ref 22–31)
CREAT BLD-MCNC: 1.16 MG/DL (ref 0.5–1)
DEPRECATED RDW RBC AUTO: 43.8 FL (ref 36.4–46.3)
ERYTHROCYTE [DISTWIDTH] IN BLOOD BY AUTOMATED COUNT: 13.4 % (ref 11.5–14.5)
GFR SERPL CREATININE-BSD FRML MDRD: 47 ML/MIN/1.73 (ref 60–104)
GLOBULIN UR ELPH-MCNC: 2.9 GM/DL (ref 2.3–3.5)
GLUCOSE BLD-MCNC: 101 MG/DL (ref 60–100)
GLUCOSE BLDC GLUCOMTR-MCNC: 107 MG/DL (ref 70–130)
GLUCOSE BLDC GLUCOMTR-MCNC: 113 MG/DL (ref 70–130)
GLUCOSE BLDC GLUCOMTR-MCNC: 114 MG/DL (ref 70–130)
GLUCOSE BLDC GLUCOMTR-MCNC: 98 MG/DL (ref 70–130)
GLUCOSE BLDC GLUCOMTR-MCNC: 99 MG/DL (ref 70–130)
HCT VFR BLD AUTO: 23.9 % (ref 35–45)
HGB BLD-MCNC: 7.7 G/DL (ref 12–15.5)
MCH RBC QN AUTO: 28.4 PG (ref 26.5–34)
MCHC RBC AUTO-ENTMCNC: 32.2 G/DL (ref 31.4–36)
MCV RBC AUTO: 88.2 FL (ref 80–98)
PLATELET # BLD AUTO: 131 10*3/MM3 (ref 150–450)
PMV BLD AUTO: 10.4 FL (ref 8–12)
POTASSIUM BLD-SCNC: 3.9 MMOL/L (ref 3.5–5.1)
PROT SERPL-MCNC: 5.7 G/DL (ref 6.3–8.6)
RBC # BLD AUTO: 2.71 10*6/MM3 (ref 3.77–5.16)
SODIUM BLD-SCNC: 142 MMOL/L (ref 137–145)
WBC NRBC COR # BLD: 5.16 10*3/MM3 (ref 3.2–9.8)

## 2017-11-08 PROCEDURE — 82962 GLUCOSE BLOOD TEST: CPT

## 2017-11-08 PROCEDURE — 25010000002 POTASSIUM CHLORIDE PER 2 MEQ OF POTASSIUM: Performed by: INTERNAL MEDICINE

## 2017-11-08 PROCEDURE — 25010000002 MAGNESIUM SULFATE PER 500 MG OF MAGNESIUM: Performed by: INTERNAL MEDICINE

## 2017-11-08 PROCEDURE — 25010000002 KETOROLAC TROMETHAMINE PER 15 MG: Performed by: NURSE PRACTITIONER

## 2017-11-08 PROCEDURE — 25010000002 CALCIUM GLUCONATE PER 10 ML: Performed by: INTERNAL MEDICINE

## 2017-11-08 PROCEDURE — 25010000002 HEPARIN LOCK FLUSH 10 UNIT/ML SOLUTION: Performed by: INTERNAL MEDICINE

## 2017-11-08 PROCEDURE — 85027 COMPLETE CBC AUTOMATED: CPT | Performed by: INTERNAL MEDICINE

## 2017-11-08 PROCEDURE — 97530 THERAPEUTIC ACTIVITIES: CPT

## 2017-11-08 PROCEDURE — 25010000002 FUROSEMIDE PER 20 MG: Performed by: INTERNAL MEDICINE

## 2017-11-08 PROCEDURE — 97535 SELF CARE MNGMENT TRAINING: CPT

## 2017-11-08 PROCEDURE — 97116 GAIT TRAINING THERAPY: CPT

## 2017-11-08 PROCEDURE — 80053 COMPREHEN METABOLIC PANEL: CPT | Performed by: INTERNAL MEDICINE

## 2017-11-09 LAB
ALBUMIN SERPL-MCNC: 2.8 G/DL (ref 3.4–4.8)
ALBUMIN/GLOB SERPL: 1 G/DL (ref 1.1–1.8)
ALP SERPL-CCNC: 129 U/L (ref 38–126)
ALT SERPL W P-5'-P-CCNC: 34 U/L (ref 9–52)
ANION GAP SERPL CALCULATED.3IONS-SCNC: 9 MMOL/L (ref 5–15)
AST SERPL-CCNC: 25 U/L (ref 14–36)
BILIRUB SERPL-MCNC: 0.2 MG/DL (ref 0.2–1.3)
BUN BLD-MCNC: 34 MG/DL (ref 7–21)
BUN/CREAT SERPL: 27.9 (ref 7–25)
CALCIUM SPEC-SCNC: 8.3 MG/DL (ref 8.4–10.2)
CHLORIDE SERPL-SCNC: 106 MMOL/L (ref 95–110)
CO2 SERPL-SCNC: 27 MMOL/L (ref 22–31)
CREAT BLD-MCNC: 1.22 MG/DL (ref 0.5–1)
DEPRECATED RDW RBC AUTO: 46.1 FL (ref 36.4–46.3)
ERYTHROCYTE [DISTWIDTH] IN BLOOD BY AUTOMATED COUNT: 13.9 % (ref 11.5–14.5)
GFR SERPL CREATININE-BSD FRML MDRD: 44 ML/MIN/1.73 (ref 45–104)
GLOBULIN UR ELPH-MCNC: 2.9 GM/DL (ref 2.3–3.5)
GLUCOSE BLD-MCNC: 103 MG/DL (ref 60–100)
GLUCOSE BLDC GLUCOMTR-MCNC: 101 MG/DL (ref 70–130)
GLUCOSE BLDC GLUCOMTR-MCNC: 112 MG/DL (ref 70–130)
GLUCOSE BLDC GLUCOMTR-MCNC: 113 MG/DL (ref 70–130)
GLUCOSE BLDC GLUCOMTR-MCNC: 115 MG/DL (ref 70–130)
GLUCOSE BLDC GLUCOMTR-MCNC: 127 MG/DL (ref 70–130)
GLUCOSE BLDC GLUCOMTR-MCNC: 98 MG/DL (ref 70–130)
HCT VFR BLD AUTO: 25.2 % (ref 35–45)
HGB BLD-MCNC: 8 G/DL (ref 12–15.5)
MAGNESIUM SERPL-MCNC: 1.9 MG/DL (ref 1.6–2.3)
MCH RBC QN AUTO: 29 PG (ref 26.5–34)
MCHC RBC AUTO-ENTMCNC: 31.7 G/DL (ref 31.4–36)
MCV RBC AUTO: 91.3 FL (ref 80–98)
PHOSPHATE SERPL-MCNC: 3.6 MG/DL (ref 2.4–4.4)
PLATELET # BLD AUTO: 141 10*3/MM3 (ref 150–450)
PMV BLD AUTO: 10.3 FL (ref 8–12)
POTASSIUM BLD-SCNC: 4 MMOL/L (ref 3.5–5.1)
PROT SERPL-MCNC: 5.7 G/DL (ref 6.3–8.6)
RBC # BLD AUTO: 2.76 10*6/MM3 (ref 3.77–5.16)
SODIUM BLD-SCNC: 142 MMOL/L (ref 137–145)
WBC NRBC COR # BLD: 5.39 10*3/MM3 (ref 3.2–9.8)

## 2017-11-09 PROCEDURE — 97110 THERAPEUTIC EXERCISES: CPT

## 2017-11-09 PROCEDURE — 85027 COMPLETE CBC AUTOMATED: CPT | Performed by: INTERNAL MEDICINE

## 2017-11-09 PROCEDURE — 82962 GLUCOSE BLOOD TEST: CPT

## 2017-11-09 PROCEDURE — 25010000002 POTASSIUM CHLORIDE PER 2 MEQ OF POTASSIUM: Performed by: INTERNAL MEDICINE

## 2017-11-09 PROCEDURE — 25010000002 HEPARIN LOCK FLUSH 10 UNIT/ML SOLUTION: Performed by: INTERNAL MEDICINE

## 2017-11-09 PROCEDURE — 97116 GAIT TRAINING THERAPY: CPT

## 2017-11-09 PROCEDURE — 80053 COMPREHEN METABOLIC PANEL: CPT | Performed by: INTERNAL MEDICINE

## 2017-11-09 PROCEDURE — 97530 THERAPEUTIC ACTIVITIES: CPT

## 2017-11-09 PROCEDURE — 25010000002 MAGNESIUM SULFATE PER 500 MG OF MAGNESIUM: Performed by: INTERNAL MEDICINE

## 2017-11-09 PROCEDURE — 84100 ASSAY OF PHOSPHORUS: CPT | Performed by: INTERNAL MEDICINE

## 2017-11-09 PROCEDURE — 25010000002 CALCIUM GLUCONATE PER 10 ML: Performed by: INTERNAL MEDICINE

## 2017-11-09 PROCEDURE — 83735 ASSAY OF MAGNESIUM: CPT | Performed by: INTERNAL MEDICINE

## 2017-11-09 PROCEDURE — 25010000002 FUROSEMIDE PER 20 MG: Performed by: INTERNAL MEDICINE

## 2017-11-09 PROCEDURE — 25010000002 KETOROLAC TROMETHAMINE PER 15 MG: Performed by: NURSE PRACTITIONER

## 2017-11-10 LAB — GLUCOSE BLDC GLUCOMTR-MCNC: 125 MG/DL (ref 70–130)

## 2017-11-10 PROCEDURE — 25010000002 FUROSEMIDE PER 20 MG: Performed by: INTERNAL MEDICINE

## 2017-11-10 PROCEDURE — 25010000002 KETOROLAC TROMETHAMINE PER 15 MG: Performed by: NURSE PRACTITIONER

## 2017-11-10 PROCEDURE — 97530 THERAPEUTIC ACTIVITIES: CPT

## 2017-11-10 PROCEDURE — 25010000002 POTASSIUM CHLORIDE PER 2 MEQ OF POTASSIUM: Performed by: INTERNAL MEDICINE

## 2017-11-10 PROCEDURE — 82962 GLUCOSE BLOOD TEST: CPT

## 2017-11-10 PROCEDURE — 25010000002 HEPARIN LOCK FLUSH 10 UNIT/ML SOLUTION: Performed by: INTERNAL MEDICINE

## 2017-11-10 PROCEDURE — 25010000002 MAGNESIUM SULFATE PER 500 MG OF MAGNESIUM: Performed by: INTERNAL MEDICINE

## 2017-11-10 PROCEDURE — 25010000002 CALCIUM GLUCONATE PER 10 ML: Performed by: INTERNAL MEDICINE

## 2017-11-10 PROCEDURE — 97535 SELF CARE MNGMENT TRAINING: CPT

## 2017-11-10 PROCEDURE — 97116 GAIT TRAINING THERAPY: CPT

## 2017-11-11 LAB
ALBUMIN SERPL-MCNC: 2.9 G/DL (ref 3.4–4.8)
ALBUMIN/GLOB SERPL: 1 G/DL (ref 1.1–1.8)
ALP SERPL-CCNC: 138 U/L (ref 38–126)
ALT SERPL W P-5'-P-CCNC: 38 U/L (ref 9–52)
ANION GAP SERPL CALCULATED.3IONS-SCNC: 7 MMOL/L (ref 5–15)
AST SERPL-CCNC: 27 U/L (ref 14–36)
BILIRUB SERPL-MCNC: 0.3 MG/DL (ref 0.2–1.3)
BUN BLD-MCNC: 36 MG/DL (ref 7–21)
BUN/CREAT SERPL: 26.9 (ref 7–25)
CALCIUM SPEC-SCNC: 8.4 MG/DL (ref 8.4–10.2)
CHLORIDE SERPL-SCNC: 107 MMOL/L (ref 95–110)
CO2 SERPL-SCNC: 26 MMOL/L (ref 22–31)
CREAT BLD-MCNC: 1.34 MG/DL (ref 0.5–1)
DEPRECATED RDW RBC AUTO: 42.9 FL (ref 36.4–46.3)
ERYTHROCYTE [DISTWIDTH] IN BLOOD BY AUTOMATED COUNT: 13.3 % (ref 11.5–14.5)
GFR SERPL CREATININE-BSD FRML MDRD: 40 ML/MIN/1.73 (ref 60–104)
GLOBULIN UR ELPH-MCNC: 2.8 GM/DL (ref 2.3–3.5)
GLUCOSE BLD-MCNC: 123 MG/DL (ref 60–100)
GLUCOSE BLDC GLUCOMTR-MCNC: 118 MG/DL (ref 70–130)
GLUCOSE BLDC GLUCOMTR-MCNC: 121 MG/DL (ref 70–130)
GLUCOSE BLDC GLUCOMTR-MCNC: 133 MG/DL (ref 70–130)
HCT VFR BLD AUTO: 23.7 % (ref 35–45)
HGB BLD-MCNC: 7.6 G/DL (ref 12–15.5)
MCH RBC QN AUTO: 28.3 PG (ref 26.5–34)
MCHC RBC AUTO-ENTMCNC: 32.1 G/DL (ref 31.4–36)
MCV RBC AUTO: 88.1 FL (ref 80–98)
PLATELET # BLD AUTO: 142 10*3/MM3 (ref 150–450)
PMV BLD AUTO: 11 FL (ref 8–12)
POTASSIUM BLD-SCNC: 4.1 MMOL/L (ref 3.5–5.1)
PROT SERPL-MCNC: 5.7 G/DL (ref 6.3–8.6)
RBC # BLD AUTO: 2.69 10*6/MM3 (ref 3.77–5.16)
SODIUM BLD-SCNC: 140 MMOL/L (ref 137–145)
WBC NRBC COR # BLD: 6.19 10*3/MM3 (ref 3.2–9.8)

## 2017-11-11 PROCEDURE — 25010000002 HEPARIN LOCK FLUSH 10 UNIT/ML SOLUTION: Performed by: INTERNAL MEDICINE

## 2017-11-11 PROCEDURE — 80053 COMPREHEN METABOLIC PANEL: CPT | Performed by: INTERNAL MEDICINE

## 2017-11-11 PROCEDURE — 82962 GLUCOSE BLOOD TEST: CPT

## 2017-11-11 PROCEDURE — 25010000002 FUROSEMIDE PER 20 MG: Performed by: INTERNAL MEDICINE

## 2017-11-11 PROCEDURE — 97530 THERAPEUTIC ACTIVITIES: CPT

## 2017-11-11 PROCEDURE — 25010000002 MAGNESIUM SULFATE PER 500 MG OF MAGNESIUM: Performed by: INTERNAL MEDICINE

## 2017-11-11 PROCEDURE — 25010000002 POTASSIUM CHLORIDE PER 2 MEQ OF POTASSIUM: Performed by: INTERNAL MEDICINE

## 2017-11-11 PROCEDURE — 85027 COMPLETE CBC AUTOMATED: CPT | Performed by: INTERNAL MEDICINE

## 2017-11-11 PROCEDURE — 25010000002 KETOROLAC TROMETHAMINE PER 15 MG: Performed by: NURSE PRACTITIONER

## 2017-11-11 PROCEDURE — 25010000002 CALCIUM GLUCONATE PER 10 ML: Performed by: INTERNAL MEDICINE

## 2017-11-11 RX ORDER — DIPHENHYDRAMINE HCL 12.5MG/5ML
12.5 LIQUID (ML) ORAL EVERY 6 HOURS PRN
Status: DISCONTINUED | OUTPATIENT
Start: 2017-11-11 | End: 2017-12-14 | Stop reason: HOSPADM

## 2017-11-11 RX ORDER — POLYVINYL ALCOHOL 14 MG/ML
2 SOLUTION/ DROPS OPHTHALMIC AS NEEDED
Status: DISCONTINUED | OUTPATIENT
Start: 2017-11-11 | End: 2017-12-14 | Stop reason: HOSPADM

## 2017-11-11 RX ORDER — POLYVINYL ALCOHOL 14 MG/ML
2 SOLUTION/ DROPS OPHTHALMIC 2 TIMES DAILY
Status: DISCONTINUED | OUTPATIENT
Start: 2017-11-11 | End: 2017-12-14 | Stop reason: HOSPADM

## 2017-11-11 RX ORDER — ALBUTEROL SULFATE 2.5 MG/3ML
2.5 SOLUTION RESPIRATORY (INHALATION) EVERY 4 HOURS PRN
Status: DISCONTINUED | OUTPATIENT
Start: 2017-11-11 | End: 2017-12-14 | Stop reason: HOSPADM

## 2017-11-12 LAB
GLUCOSE BLDC GLUCOMTR-MCNC: 124 MG/DL (ref 70–130)
GLUCOSE BLDC GLUCOMTR-MCNC: 127 MG/DL (ref 70–130)
GLUCOSE BLDC GLUCOMTR-MCNC: 130 MG/DL (ref 70–130)
GLUCOSE BLDC GLUCOMTR-MCNC: 159 MG/DL (ref 70–130)
GLUCOSE BLDC GLUCOMTR-MCNC: 71 MG/DL (ref 70–130)

## 2017-11-12 PROCEDURE — 82962 GLUCOSE BLOOD TEST: CPT

## 2017-11-12 PROCEDURE — 25010000002 CALCIUM GLUCONATE PER 10 ML: Performed by: INTERNAL MEDICINE

## 2017-11-12 PROCEDURE — 25010000002 MAGNESIUM SULFATE PER 500 MG OF MAGNESIUM: Performed by: INTERNAL MEDICINE

## 2017-11-12 PROCEDURE — 25010000002 POTASSIUM CHLORIDE PER 2 MEQ OF POTASSIUM: Performed by: INTERNAL MEDICINE

## 2017-11-12 PROCEDURE — 25010000002 ONDANSETRON PER 1 MG: Performed by: INTERNAL MEDICINE

## 2017-11-12 PROCEDURE — 25010000002 KETOROLAC TROMETHAMINE PER 15 MG: Performed by: INTERNAL MEDICINE

## 2017-11-12 PROCEDURE — 25010000002 FUROSEMIDE PER 20 MG: Performed by: INTERNAL MEDICINE

## 2017-11-12 PROCEDURE — 25010000002 HEPARIN LOCK FLUSH 10 UNIT/ML SOLUTION: Performed by: INTERNAL MEDICINE

## 2017-11-12 PROCEDURE — 25010000002 KETOROLAC TROMETHAMINE PER 15 MG: Performed by: NURSE PRACTITIONER

## 2017-11-12 RX ORDER — KETOROLAC TROMETHAMINE 30 MG/ML
30 INJECTION, SOLUTION INTRAMUSCULAR; INTRAVENOUS EVERY 6 HOURS PRN
Status: DISCONTINUED | OUTPATIENT
Start: 2017-11-12 | End: 2017-11-14 | Stop reason: CLARIF

## 2017-11-12 RX ORDER — FLUCONAZOLE 150 MG/1
150 TABLET ORAL ONCE
Status: DISCONTINUED | OUTPATIENT
Start: 2017-11-12 | End: 2017-11-14

## 2017-11-12 RX ORDER — CIPROFLOXACIN HYDROCHLORIDE 3.5 MG/ML
2 SOLUTION/ DROPS TOPICAL
Status: DISCONTINUED | OUTPATIENT
Start: 2017-11-12 | End: 2017-11-26

## 2017-11-13 LAB
ALBUMIN SERPL-MCNC: 2.9 G/DL (ref 3.4–4.8)
ALBUMIN/GLOB SERPL: 1 G/DL (ref 1.1–1.8)
ALP SERPL-CCNC: 141 U/L (ref 38–126)
ALT SERPL W P-5'-P-CCNC: 29 U/L (ref 9–52)
ANION GAP SERPL CALCULATED.3IONS-SCNC: 10 MMOL/L (ref 5–15)
AST SERPL-CCNC: 31 U/L (ref 14–36)
BASOPHILS # BLD AUTO: 0.01 10*3/MM3 (ref 0–0.2)
BASOPHILS NFR BLD AUTO: 0.1 % (ref 0–2)
BILIRUB SERPL-MCNC: 0.3 MG/DL (ref 0.2–1.3)
BUN BLD-MCNC: 40 MG/DL (ref 7–21)
BUN/CREAT SERPL: 27.6 (ref 7–25)
CALCIUM SPEC-SCNC: 8.7 MG/DL (ref 8.4–10.2)
CHLORIDE SERPL-SCNC: 106 MMOL/L (ref 95–110)
CO2 SERPL-SCNC: 25 MMOL/L (ref 22–31)
CREAT BLD-MCNC: 1.45 MG/DL (ref 0.5–1)
DEPRECATED RDW RBC AUTO: 44 FL (ref 36.4–46.3)
EOSINOPHIL # BLD AUTO: 0.31 10*3/MM3 (ref 0–0.7)
EOSINOPHIL NFR BLD AUTO: 4.6 % (ref 0–7)
ERYTHROCYTE [DISTWIDTH] IN BLOOD BY AUTOMATED COUNT: 13.4 % (ref 11.5–14.5)
GFR SERPL CREATININE-BSD FRML MDRD: 36 ML/MIN/1.73 (ref 60–104)
GLOBULIN UR ELPH-MCNC: 3 GM/DL (ref 2.3–3.5)
GLUCOSE BLD-MCNC: 105 MG/DL (ref 60–100)
GLUCOSE BLDC GLUCOMTR-MCNC: 100 MG/DL (ref 70–130)
GLUCOSE BLDC GLUCOMTR-MCNC: 104 MG/DL (ref 70–130)
GLUCOSE BLDC GLUCOMTR-MCNC: 112 MG/DL (ref 70–130)
GLUCOSE BLDC GLUCOMTR-MCNC: 114 MG/DL (ref 70–130)
GLUCOSE BLDC GLUCOMTR-MCNC: 128 MG/DL (ref 70–130)
GLUCOSE BLDC GLUCOMTR-MCNC: 131 MG/DL (ref 70–130)
GLUCOSE BLDC GLUCOMTR-MCNC: 75 MG/DL (ref 70–130)
HCT VFR BLD AUTO: 24 % (ref 35–45)
HGB BLD-MCNC: 7.4 G/DL (ref 12–15.5)
IMM GRANULOCYTES # BLD: 0.02 10*3/MM3 (ref 0–0.02)
IMM GRANULOCYTES NFR BLD: 0.3 % (ref 0–0.5)
LYMPHOCYTES # BLD AUTO: 1.95 10*3/MM3 (ref 0.6–4.2)
LYMPHOCYTES NFR BLD AUTO: 28.9 % (ref 10–50)
MAGNESIUM SERPL-MCNC: 2 MG/DL (ref 1.6–2.3)
MCH RBC QN AUTO: 27.7 PG (ref 26.5–34)
MCHC RBC AUTO-ENTMCNC: 30.8 G/DL (ref 31.4–36)
MCV RBC AUTO: 89.9 FL (ref 80–98)
MONOCYTES # BLD AUTO: 0.47 10*3/MM3 (ref 0–0.9)
MONOCYTES NFR BLD AUTO: 7 % (ref 0–12)
NEUTROPHILS # BLD AUTO: 3.98 10*3/MM3 (ref 2–8.6)
NEUTROPHILS NFR BLD AUTO: 59.1 % (ref 37–80)
PHOSPHATE SERPL-MCNC: 3.3 MG/DL (ref 2.4–4.4)
PLATELET # BLD AUTO: 143 10*3/MM3 (ref 150–450)
PMV BLD AUTO: 10.9 FL (ref 8–12)
POTASSIUM BLD-SCNC: 4.2 MMOL/L (ref 3.5–5.1)
PROT SERPL-MCNC: 5.9 G/DL (ref 6.3–8.6)
RBC # BLD AUTO: 2.67 10*6/MM3 (ref 3.77–5.16)
SODIUM BLD-SCNC: 141 MMOL/L (ref 137–145)
TRIGL SERPL-MCNC: 136 MG/DL (ref 20–199)
WBC NRBC COR # BLD: 6.74 10*3/MM3 (ref 3.2–9.8)

## 2017-11-13 PROCEDURE — 80053 COMPREHEN METABOLIC PANEL: CPT | Performed by: INTERNAL MEDICINE

## 2017-11-13 PROCEDURE — 84478 ASSAY OF TRIGLYCERIDES: CPT | Performed by: INTERNAL MEDICINE

## 2017-11-13 PROCEDURE — 97530 THERAPEUTIC ACTIVITIES: CPT

## 2017-11-13 PROCEDURE — 82962 GLUCOSE BLOOD TEST: CPT

## 2017-11-13 PROCEDURE — 25010000002 CALCIUM GLUCONATE PER 10 ML: Performed by: INTERNAL MEDICINE

## 2017-11-13 PROCEDURE — 25010000002 MAGNESIUM SULFATE PER 500 MG OF MAGNESIUM: Performed by: INTERNAL MEDICINE

## 2017-11-13 PROCEDURE — 25010000002 POTASSIUM CHLORIDE PER 2 MEQ OF POTASSIUM: Performed by: INTERNAL MEDICINE

## 2017-11-13 PROCEDURE — 25010000002 FUROSEMIDE PER 20 MG: Performed by: INTERNAL MEDICINE

## 2017-11-13 PROCEDURE — 84100 ASSAY OF PHOSPHORUS: CPT | Performed by: INTERNAL MEDICINE

## 2017-11-13 PROCEDURE — 25010000002 KETOROLAC TROMETHAMINE PER 15 MG: Performed by: INTERNAL MEDICINE

## 2017-11-13 PROCEDURE — 85025 COMPLETE CBC W/AUTO DIFF WBC: CPT | Performed by: INTERNAL MEDICINE

## 2017-11-13 PROCEDURE — 25010000002 GLATIRAMER ACETATE 40 MG/ML SOLUTION PREFILLED SYRINGE: Performed by: INTERNAL MEDICINE

## 2017-11-13 PROCEDURE — 83735 ASSAY OF MAGNESIUM: CPT | Performed by: INTERNAL MEDICINE

## 2017-11-13 PROCEDURE — 97542 WHEELCHAIR MNGMENT TRAINING: CPT

## 2017-11-13 PROCEDURE — 97116 GAIT TRAINING THERAPY: CPT

## 2017-11-13 PROCEDURE — 97110 THERAPEUTIC EXERCISES: CPT

## 2017-11-13 RX ORDER — SODIUM CHLORIDE 9 MG/ML
INJECTION, SOLUTION INTRAVENOUS
Status: DISPENSED
Start: 2017-11-13 | End: 2017-11-14

## 2017-11-13 RX ORDER — SODIUM CHLORIDE 9 MG/ML
75 INJECTION, SOLUTION INTRAVENOUS CONTINUOUS
Status: DISCONTINUED | OUTPATIENT
Start: 2017-11-13 | End: 2017-11-16

## 2017-11-14 LAB
DEPRECATED RDW RBC AUTO: 42.9 FL (ref 36.4–46.3)
ERYTHROCYTE [DISTWIDTH] IN BLOOD BY AUTOMATED COUNT: 13.3 % (ref 11.5–14.5)
GLUCOSE BLDC GLUCOMTR-MCNC: 111 MG/DL (ref 70–130)
GLUCOSE BLDC GLUCOMTR-MCNC: 112 MG/DL (ref 70–130)
GLUCOSE BLDC GLUCOMTR-MCNC: 118 MG/DL (ref 70–130)
GLUCOSE BLDC GLUCOMTR-MCNC: 148 MG/DL (ref 70–130)
HCT VFR BLD AUTO: 23.3 % (ref 35–45)
HGB BLD-MCNC: 7.2 G/DL (ref 12–15.5)
MCH RBC QN AUTO: 27.5 PG (ref 26.5–34)
MCHC RBC AUTO-ENTMCNC: 30.9 G/DL (ref 31.4–36)
MCV RBC AUTO: 88.9 FL (ref 80–98)
PLATELET # BLD AUTO: 141 10*3/MM3 (ref 150–450)
PMV BLD AUTO: 11.2 FL (ref 8–12)
RBC # BLD AUTO: 2.62 10*6/MM3 (ref 3.77–5.16)
WBC NRBC COR # BLD: 6.68 10*3/MM3 (ref 3.2–9.8)

## 2017-11-14 PROCEDURE — 97535 SELF CARE MNGMENT TRAINING: CPT

## 2017-11-14 PROCEDURE — 25010000002 CALCIUM GLUCONATE PER 10 ML: Performed by: INTERNAL MEDICINE

## 2017-11-14 PROCEDURE — 97530 THERAPEUTIC ACTIVITIES: CPT

## 2017-11-14 PROCEDURE — 85027 COMPLETE CBC AUTOMATED: CPT | Performed by: NURSE PRACTITIONER

## 2017-11-14 PROCEDURE — 82962 GLUCOSE BLOOD TEST: CPT

## 2017-11-14 PROCEDURE — 25010000002 FUROSEMIDE PER 20 MG: Performed by: INTERNAL MEDICINE

## 2017-11-14 PROCEDURE — 25010000002 NA FERRIC GLUC CPLX PER 12.5 MG: Performed by: INTERNAL MEDICINE

## 2017-11-14 PROCEDURE — 25010000002 POTASSIUM CHLORIDE PER 2 MEQ OF POTASSIUM: Performed by: INTERNAL MEDICINE

## 2017-11-14 PROCEDURE — 25010000002 MAGNESIUM SULFATE PER 500 MG OF MAGNESIUM: Performed by: INTERNAL MEDICINE

## 2017-11-14 PROCEDURE — 25010000002 KETOROLAC TROMETHAMINE PER 15 MG: Performed by: INTERNAL MEDICINE

## 2017-11-14 PROCEDURE — 25010000002 ONDANSETRON PER 1 MG: Performed by: INTERNAL MEDICINE

## 2017-11-14 PROCEDURE — 97110 THERAPEUTIC EXERCISES: CPT

## 2017-11-15 ENCOUNTER — APPOINTMENT (OUTPATIENT)
Dept: GENERAL RADIOLOGY | Facility: HOSPITAL | Age: 67
End: 2017-11-15

## 2017-11-15 LAB
ALBUMIN SERPL-MCNC: 2.7 G/DL (ref 3.4–4.8)
ALBUMIN/GLOB SERPL: 1 G/DL (ref 1.1–1.8)
ALP SERPL-CCNC: 132 U/L (ref 38–126)
ALT SERPL W P-5'-P-CCNC: 31 U/L (ref 9–52)
ANION GAP SERPL CALCULATED.3IONS-SCNC: 9 MMOL/L (ref 5–15)
AST SERPL-CCNC: 26 U/L (ref 14–36)
BACTERIA UR QL AUTO: ABNORMAL /HPF
BILIRUB SERPL-MCNC: 0.1 MG/DL (ref 0.2–1.3)
BILIRUB UR QL STRIP: NEGATIVE
BUN BLD-MCNC: 30 MG/DL (ref 7–21)
BUN/CREAT SERPL: 24.2 (ref 7–25)
CALCIUM SPEC-SCNC: 8.3 MG/DL (ref 8.4–10.2)
CHLORIDE SERPL-SCNC: 111 MMOL/L (ref 95–110)
CLARITY UR: ABNORMAL
CO2 SERPL-SCNC: 22 MMOL/L (ref 22–31)
COLOR UR: YELLOW
CREAT BLD-MCNC: 1.24 MG/DL (ref 0.5–1)
DEPRECATED RDW RBC AUTO: 43.2 FL (ref 36.4–46.3)
ERYTHROCYTE [DISTWIDTH] IN BLOOD BY AUTOMATED COUNT: 13.3 % (ref 11.5–14.5)
GFR SERPL CREATININE-BSD FRML MDRD: 43 ML/MIN/1.73 (ref 60–104)
GLOBULIN UR ELPH-MCNC: 2.8 GM/DL (ref 2.3–3.5)
GLUCOSE BLD-MCNC: 132 MG/DL (ref 60–100)
GLUCOSE BLDC GLUCOMTR-MCNC: 102 MG/DL (ref 70–130)
GLUCOSE BLDC GLUCOMTR-MCNC: 114 MG/DL (ref 70–130)
GLUCOSE BLDC GLUCOMTR-MCNC: 145 MG/DL (ref 70–130)
GLUCOSE UR STRIP-MCNC: NEGATIVE MG/DL
HCT VFR BLD AUTO: 21.9 % (ref 35–45)
HGB BLD-MCNC: 6.9 G/DL (ref 12–15.5)
HGB UR QL STRIP.AUTO: ABNORMAL
HYALINE CASTS UR QL AUTO: ABNORMAL /LPF
IRON 24H UR-MRATE: 326 MCG/DL (ref 37–170)
IRON SATN MFR SERPL: >100 % (ref 15–50)
KETONES UR QL STRIP: NEGATIVE
LEUKOCYTE ESTERASE UR QL STRIP.AUTO: ABNORMAL
MCH RBC QN AUTO: 27.9 PG (ref 26.5–34)
MCHC RBC AUTO-ENTMCNC: 31.5 G/DL (ref 31.4–36)
MCV RBC AUTO: 88.7 FL (ref 80–98)
NITRITE UR QL STRIP: POSITIVE
PH UR STRIP.AUTO: 5.5 [PH] (ref 5–9)
PLATELET # BLD AUTO: 147 10*3/MM3 (ref 150–450)
PMV BLD AUTO: 11.1 FL (ref 8–12)
POTASSIUM BLD-SCNC: 3.9 MMOL/L (ref 3.5–5.1)
PROT SERPL-MCNC: 5.5 G/DL (ref 6.3–8.6)
PROT UR QL STRIP: NEGATIVE
RBC # BLD AUTO: 2.47 10*6/MM3 (ref 3.77–5.16)
RBC # UR: ABNORMAL /HPF
REF LAB TEST METHOD: ABNORMAL
SODIUM BLD-SCNC: 142 MMOL/L (ref 137–145)
SP GR UR STRIP: 1.01 (ref 1–1.03)
SQUAMOUS #/AREA URNS HPF: ABNORMAL /HPF
TIBC SERPL-MCNC: 296 MCG/DL (ref 265–497)
UROBILINOGEN UR QL STRIP: ABNORMAL
WBC NRBC COR # BLD: 6.04 10*3/MM3 (ref 3.2–9.8)
WBC UR QL AUTO: ABNORMAL /HPF

## 2017-11-15 PROCEDURE — 82962 GLUCOSE BLOOD TEST: CPT

## 2017-11-15 PROCEDURE — 25010000002 MAGNESIUM SULFATE PER 500 MG OF MAGNESIUM: Performed by: INTERNAL MEDICINE

## 2017-11-15 PROCEDURE — 97116 GAIT TRAINING THERAPY: CPT

## 2017-11-15 PROCEDURE — 25010000002 NA FERRIC GLUC CPLX PER 12.5 MG: Performed by: INTERNAL MEDICINE

## 2017-11-15 PROCEDURE — 87186 SC STD MICRODIL/AGAR DIL: CPT | Performed by: NURSE PRACTITIONER

## 2017-11-15 PROCEDURE — 83540 ASSAY OF IRON: CPT | Performed by: NURSE PRACTITIONER

## 2017-11-15 PROCEDURE — 97110 THERAPEUTIC EXERCISES: CPT

## 2017-11-15 PROCEDURE — 83550 IRON BINDING TEST: CPT | Performed by: NURSE PRACTITIONER

## 2017-11-15 PROCEDURE — 71010 HC CHEST PA OR AP: CPT

## 2017-11-15 PROCEDURE — 25010000002 CALCIUM GLUCONATE PER 10 ML: Performed by: INTERNAL MEDICINE

## 2017-11-15 PROCEDURE — 81001 URINALYSIS AUTO W/SCOPE: CPT | Performed by: NURSE PRACTITIONER

## 2017-11-15 PROCEDURE — 87077 CULTURE AEROBIC IDENTIFY: CPT | Performed by: NURSE PRACTITIONER

## 2017-11-15 PROCEDURE — 85027 COMPLETE CBC AUTOMATED: CPT | Performed by: INTERNAL MEDICINE

## 2017-11-15 PROCEDURE — 25010000002 POTASSIUM CHLORIDE PER 2 MEQ OF POTASSIUM: Performed by: INTERNAL MEDICINE

## 2017-11-15 PROCEDURE — 87086 URINE CULTURE/COLONY COUNT: CPT | Performed by: NURSE PRACTITIONER

## 2017-11-15 PROCEDURE — 25010000002 HEPARIN LOCK FLUSH 10 UNIT/ML SOLUTION: Performed by: INTERNAL MEDICINE

## 2017-11-15 PROCEDURE — 80053 COMPREHEN METABOLIC PANEL: CPT | Performed by: INTERNAL MEDICINE

## 2017-11-16 LAB
DEPRECATED RDW RBC AUTO: 43 FL (ref 36.4–46.3)
ERYTHROCYTE [DISTWIDTH] IN BLOOD BY AUTOMATED COUNT: 13.2 % (ref 11.5–14.5)
GLUCOSE BLDC GLUCOMTR-MCNC: 108 MG/DL (ref 70–130)
GLUCOSE BLDC GLUCOMTR-MCNC: 110 MG/DL (ref 70–130)
GLUCOSE BLDC GLUCOMTR-MCNC: 112 MG/DL (ref 70–130)
GLUCOSE BLDC GLUCOMTR-MCNC: 121 MG/DL (ref 70–130)
GLUCOSE BLDC GLUCOMTR-MCNC: 78 MG/DL (ref 70–130)
HCT VFR BLD AUTO: 24.4 % (ref 35–45)
HGB BLD-MCNC: 7.7 G/DL (ref 12–15.5)
MAGNESIUM SERPL-MCNC: 1.8 MG/DL (ref 1.6–2.3)
MCH RBC QN AUTO: 27.8 PG (ref 26.5–34)
MCHC RBC AUTO-ENTMCNC: 31.6 G/DL (ref 31.4–36)
MCV RBC AUTO: 88.1 FL (ref 80–98)
PHOSPHATE SERPL-MCNC: 3.2 MG/DL (ref 2.4–4.4)
PLATELET # BLD AUTO: 171 10*3/MM3 (ref 150–450)
PMV BLD AUTO: 11.2 FL (ref 8–12)
RBC # BLD AUTO: 2.77 10*6/MM3 (ref 3.77–5.16)
WBC NRBC COR # BLD: 7.61 10*3/MM3 (ref 3.2–9.8)

## 2017-11-16 PROCEDURE — 85027 COMPLETE CBC AUTOMATED: CPT | Performed by: NURSE PRACTITIONER

## 2017-11-16 PROCEDURE — 97542 WHEELCHAIR MNGMENT TRAINING: CPT

## 2017-11-16 PROCEDURE — 25010000002 NA FERRIC GLUC CPLX PER 12.5 MG: Performed by: INTERNAL MEDICINE

## 2017-11-16 PROCEDURE — 84100 ASSAY OF PHOSPHORUS: CPT | Performed by: INTERNAL MEDICINE

## 2017-11-16 PROCEDURE — 25010000002 CALCIUM GLUCONATE PER 10 ML: Performed by: INTERNAL MEDICINE

## 2017-11-16 PROCEDURE — 97530 THERAPEUTIC ACTIVITIES: CPT

## 2017-11-16 PROCEDURE — 82962 GLUCOSE BLOOD TEST: CPT

## 2017-11-16 PROCEDURE — 25010000002 HEPARIN LOCK FLUSH 10 UNIT/ML SOLUTION: Performed by: INTERNAL MEDICINE

## 2017-11-16 PROCEDURE — 25010000002 POTASSIUM CHLORIDE PER 2 MEQ OF POTASSIUM: Performed by: INTERNAL MEDICINE

## 2017-11-16 PROCEDURE — 97116 GAIT TRAINING THERAPY: CPT

## 2017-11-16 PROCEDURE — 83735 ASSAY OF MAGNESIUM: CPT | Performed by: INTERNAL MEDICINE

## 2017-11-16 PROCEDURE — 97110 THERAPEUTIC EXERCISES: CPT

## 2017-11-16 PROCEDURE — 97535 SELF CARE MNGMENT TRAINING: CPT

## 2017-11-16 PROCEDURE — 25010000002 MAGNESIUM SULFATE PER 500 MG OF MAGNESIUM: Performed by: INTERNAL MEDICINE

## 2017-11-16 RX ORDER — BENZONATATE 100 MG/1
100 CAPSULE ORAL 3 TIMES DAILY
Status: DISCONTINUED | OUTPATIENT
Start: 2017-11-16 | End: 2017-12-14 | Stop reason: HOSPADM

## 2017-11-16 RX ORDER — LANOLIN ALCOHOL/MO/W.PET/CERES
1000 CREAM (GRAM) TOPICAL DAILY
Status: DISCONTINUED | OUTPATIENT
Start: 2017-11-16 | End: 2017-11-16

## 2017-11-16 RX ORDER — LANOLIN ALCOHOL/MO/W.PET/CERES
1000 CREAM (GRAM) TOPICAL DAILY
Status: DISCONTINUED | OUTPATIENT
Start: 2017-11-16 | End: 2017-12-14 | Stop reason: HOSPADM

## 2017-11-16 RX ORDER — SODIUM CHLORIDE 9 MG/ML
30 INJECTION, SOLUTION INTRAVENOUS CONTINUOUS
Status: DISCONTINUED | OUTPATIENT
Start: 2017-11-16 | End: 2017-12-14 | Stop reason: HOSPADM

## 2017-11-16 RX ORDER — LEVOFLOXACIN 500 MG/1
500 TABLET, FILM COATED ORAL EVERY 24 HOURS
Status: DISCONTINUED | OUTPATIENT
Start: 2017-11-16 | End: 2017-11-17 | Stop reason: ALTCHOICE

## 2017-11-17 LAB
ALBUMIN SERPL-MCNC: 2.8 G/DL (ref 3.4–4.8)
ALBUMIN/GLOB SERPL: 1 G/DL (ref 1.1–1.8)
ALP SERPL-CCNC: 134 U/L (ref 38–126)
ALT SERPL W P-5'-P-CCNC: 29 U/L (ref 9–52)
ANION GAP SERPL CALCULATED.3IONS-SCNC: 6 MMOL/L (ref 5–15)
AST SERPL-CCNC: 24 U/L (ref 14–36)
BACTERIA SPEC AEROBE CULT: ABNORMAL
BACTERIA SPEC AEROBE CULT: ABNORMAL
BILIRUB SERPL-MCNC: 0.1 MG/DL (ref 0.2–1.3)
BUN BLD-MCNC: 19 MG/DL (ref 7–21)
BUN/CREAT SERPL: 18.3 (ref 7–25)
CALCIUM SPEC-SCNC: 8.5 MG/DL (ref 8.4–10.2)
CHLORIDE SERPL-SCNC: 112 MMOL/L (ref 95–110)
CO2 SERPL-SCNC: 22 MMOL/L (ref 22–31)
CREAT BLD-MCNC: 1.04 MG/DL (ref 0.5–1)
DEPRECATED RDW RBC AUTO: 43.1 FL (ref 36.4–46.3)
ERYTHROCYTE [DISTWIDTH] IN BLOOD BY AUTOMATED COUNT: 13.5 % (ref 11.5–14.5)
GFR SERPL CREATININE-BSD FRML MDRD: 53 ML/MIN/1.73 (ref 45–104)
GLOBULIN UR ELPH-MCNC: 2.7 GM/DL (ref 2.3–3.5)
GLUCOSE BLD-MCNC: 102 MG/DL (ref 60–100)
GLUCOSE BLDC GLUCOMTR-MCNC: 113 MG/DL (ref 70–130)
GLUCOSE BLDC GLUCOMTR-MCNC: 123 MG/DL (ref 70–130)
GLUCOSE BLDC GLUCOMTR-MCNC: 126 MG/DL (ref 70–130)
GLUCOSE BLDC GLUCOMTR-MCNC: 126 MG/DL (ref 70–130)
HCT VFR BLD AUTO: 23.6 % (ref 35–45)
HGB BLD-MCNC: 7.5 G/DL (ref 12–15.5)
MCH RBC QN AUTO: 28 PG (ref 26.5–34)
MCHC RBC AUTO-ENTMCNC: 31.8 G/DL (ref 31.4–36)
MCV RBC AUTO: 88.1 FL (ref 80–98)
PLATELET # BLD AUTO: 148 10*3/MM3 (ref 150–450)
PMV BLD AUTO: 10.9 FL (ref 8–12)
POTASSIUM BLD-SCNC: 3.9 MMOL/L (ref 3.5–5.1)
PROT SERPL-MCNC: 5.5 G/DL (ref 6.3–8.6)
RBC # BLD AUTO: 2.68 10*6/MM3 (ref 3.77–5.16)
SODIUM BLD-SCNC: 140 MMOL/L (ref 137–145)
WBC NRBC COR # BLD: 5.66 10*3/MM3 (ref 3.2–9.8)

## 2017-11-17 PROCEDURE — 25010000002 HEPARIN LOCK FLUSH 10 UNIT/ML SOLUTION: Performed by: INTERNAL MEDICINE

## 2017-11-17 PROCEDURE — 97110 THERAPEUTIC EXERCISES: CPT

## 2017-11-17 PROCEDURE — 82962 GLUCOSE BLOOD TEST: CPT

## 2017-11-17 PROCEDURE — 25010000002 NA FERRIC GLUC CPLX PER 12.5 MG: Performed by: INTERNAL MEDICINE

## 2017-11-17 PROCEDURE — 97530 THERAPEUTIC ACTIVITIES: CPT

## 2017-11-17 PROCEDURE — 85027 COMPLETE CBC AUTOMATED: CPT | Performed by: INTERNAL MEDICINE

## 2017-11-17 PROCEDURE — 97542 WHEELCHAIR MNGMENT TRAINING: CPT

## 2017-11-17 PROCEDURE — 25010000002 CALCIUM GLUCONATE PER 10 ML: Performed by: INTERNAL MEDICINE

## 2017-11-17 PROCEDURE — 25010000002 MAGNESIUM SULFATE PER 500 MG OF MAGNESIUM: Performed by: INTERNAL MEDICINE

## 2017-11-17 PROCEDURE — 25010000002 POTASSIUM CHLORIDE PER 2 MEQ OF POTASSIUM: Performed by: INTERNAL MEDICINE

## 2017-11-17 PROCEDURE — 25010000002 CEFOXITIN PER 1 G: Performed by: NURSE PRACTITIONER

## 2017-11-17 PROCEDURE — 97116 GAIT TRAINING THERAPY: CPT

## 2017-11-17 PROCEDURE — 80053 COMPREHEN METABOLIC PANEL: CPT | Performed by: INTERNAL MEDICINE

## 2017-11-17 RX ORDER — SODIUM CHLORIDE 9 MG/ML
INJECTION, SOLUTION INTRAVENOUS
Status: DISPENSED
Start: 2017-11-17 | End: 2017-11-17

## 2017-11-17 RX ORDER — OXYCODONE AND ACETAMINOPHEN 7.5; 325 MG/1; MG/1
1 TABLET ORAL EVERY 6 HOURS PRN
Status: DISPENSED | OUTPATIENT
Start: 2017-11-17 | End: 2017-11-27

## 2017-11-18 LAB
GLUCOSE BLDC GLUCOMTR-MCNC: 106 MG/DL (ref 70–130)
GLUCOSE BLDC GLUCOMTR-MCNC: 116 MG/DL (ref 70–130)
GLUCOSE BLDC GLUCOMTR-MCNC: 122 MG/DL (ref 70–130)

## 2017-11-18 PROCEDURE — 82962 GLUCOSE BLOOD TEST: CPT

## 2017-11-18 PROCEDURE — 97535 SELF CARE MNGMENT TRAINING: CPT

## 2017-11-18 PROCEDURE — 25010000002 CEFOXITIN PER 1 G: Performed by: NURSE PRACTITIONER

## 2017-11-18 PROCEDURE — 25010000002 HEPARIN LOCK FLUSH 10 UNIT/ML SOLUTION: Performed by: INTERNAL MEDICINE

## 2017-11-18 PROCEDURE — 25010000002 POTASSIUM CHLORIDE PER 2 MEQ OF POTASSIUM: Performed by: INTERNAL MEDICINE

## 2017-11-18 PROCEDURE — 25010000002 CALCIUM GLUCONATE PER 10 ML: Performed by: INTERNAL MEDICINE

## 2017-11-18 PROCEDURE — 25010000002 NA FERRIC GLUC CPLX PER 12.5 MG: Performed by: INTERNAL MEDICINE

## 2017-11-18 PROCEDURE — 97530 THERAPEUTIC ACTIVITIES: CPT

## 2017-11-18 PROCEDURE — 25010000002 MAGNESIUM SULFATE PER 500 MG OF MAGNESIUM: Performed by: INTERNAL MEDICINE

## 2017-11-18 PROCEDURE — 97110 THERAPEUTIC EXERCISES: CPT

## 2017-11-19 LAB
ALBUMIN SERPL-MCNC: 2.5 G/DL (ref 3.4–4.8)
ALBUMIN/GLOB SERPL: 0.9 G/DL (ref 1.1–1.8)
ALP SERPL-CCNC: 118 U/L (ref 38–126)
ALT SERPL W P-5'-P-CCNC: 23 U/L (ref 9–52)
ANION GAP SERPL CALCULATED.3IONS-SCNC: 8 MMOL/L (ref 5–15)
AST SERPL-CCNC: 28 U/L (ref 14–36)
BILIRUB SERPL-MCNC: <0.1 MG/DL (ref 0.2–1.3)
BUN BLD-MCNC: 20 MG/DL (ref 7–21)
BUN/CREAT SERPL: 18.5 (ref 7–25)
CALCIUM SPEC-SCNC: 8.5 MG/DL (ref 8.4–10.2)
CHLORIDE SERPL-SCNC: 111 MMOL/L (ref 95–110)
CO2 SERPL-SCNC: 21 MMOL/L (ref 22–31)
CREAT BLD-MCNC: 1.08 MG/DL (ref 0.5–1)
DEPRECATED RDW RBC AUTO: 44.2 FL (ref 36.4–46.3)
ERYTHROCYTE [DISTWIDTH] IN BLOOD BY AUTOMATED COUNT: 14.1 % (ref 11.5–14.5)
GFR SERPL CREATININE-BSD FRML MDRD: 51 ML/MIN/1.73 (ref 60–104)
GLOBULIN UR ELPH-MCNC: 2.7 GM/DL (ref 2.3–3.5)
GLUCOSE BLD-MCNC: 117 MG/DL (ref 60–100)
GLUCOSE BLDC GLUCOMTR-MCNC: 104 MG/DL (ref 70–130)
GLUCOSE BLDC GLUCOMTR-MCNC: 107 MG/DL (ref 70–130)
GLUCOSE BLDC GLUCOMTR-MCNC: 115 MG/DL (ref 70–130)
HCT VFR BLD AUTO: 22.8 % (ref 35–45)
HGB BLD-MCNC: 7.2 G/DL (ref 12–15.5)
MAGNESIUM SERPL-MCNC: 1.8 MG/DL (ref 1.6–2.3)
MCH RBC QN AUTO: 28.1 PG (ref 26.5–34)
MCHC RBC AUTO-ENTMCNC: 31.6 G/DL (ref 31.4–36)
MCV RBC AUTO: 89.1 FL (ref 80–98)
PHOSPHATE SERPL-MCNC: 3.4 MG/DL (ref 2.4–4.4)
PLATELET # BLD AUTO: 151 10*3/MM3 (ref 150–450)
PMV BLD AUTO: 11.1 FL (ref 8–12)
POTASSIUM BLD-SCNC: 4 MMOL/L (ref 3.5–5.1)
PROT SERPL-MCNC: 5.2 G/DL (ref 6.3–8.6)
RBC # BLD AUTO: 2.56 10*6/MM3 (ref 3.77–5.16)
SODIUM BLD-SCNC: 140 MMOL/L (ref 137–145)
WBC NRBC COR # BLD: 5.83 10*3/MM3 (ref 3.2–9.8)

## 2017-11-19 PROCEDURE — 25010000002 POTASSIUM CHLORIDE PER 2 MEQ OF POTASSIUM: Performed by: INTERNAL MEDICINE

## 2017-11-19 PROCEDURE — 85027 COMPLETE CBC AUTOMATED: CPT | Performed by: INTERNAL MEDICINE

## 2017-11-19 PROCEDURE — 25010000002 CALCIUM GLUCONATE PER 10 ML: Performed by: INTERNAL MEDICINE

## 2017-11-19 PROCEDURE — 84100 ASSAY OF PHOSPHORUS: CPT | Performed by: INTERNAL MEDICINE

## 2017-11-19 PROCEDURE — 25010000002 HEPARIN LOCK FLUSH 10 UNIT/ML SOLUTION: Performed by: INTERNAL MEDICINE

## 2017-11-19 PROCEDURE — 80053 COMPREHEN METABOLIC PANEL: CPT | Performed by: INTERNAL MEDICINE

## 2017-11-19 PROCEDURE — 82962 GLUCOSE BLOOD TEST: CPT

## 2017-11-19 PROCEDURE — 83735 ASSAY OF MAGNESIUM: CPT | Performed by: INTERNAL MEDICINE

## 2017-11-19 PROCEDURE — 25010000002 CEFOXITIN PER 1 G: Performed by: NURSE PRACTITIONER

## 2017-11-19 PROCEDURE — 25010000002 MAGNESIUM SULFATE PER 500 MG OF MAGNESIUM: Performed by: INTERNAL MEDICINE

## 2017-11-19 RX ORDER — GABAPENTIN 100 MG/1
100 CAPSULE ORAL ONCE
Status: DISCONTINUED | OUTPATIENT
Start: 2017-11-19 | End: 2017-11-20

## 2017-11-20 LAB
GLUCOSE BLDC GLUCOMTR-MCNC: 101 MG/DL (ref 70–130)
GLUCOSE BLDC GLUCOMTR-MCNC: 106 MG/DL (ref 70–130)
GLUCOSE BLDC GLUCOMTR-MCNC: 114 MG/DL (ref 70–130)
GLUCOSE BLDC GLUCOMTR-MCNC: 125 MG/DL (ref 70–130)
MAGNESIUM SERPL-MCNC: 2 MG/DL (ref 1.6–2.3)
PHOSPHATE SERPL-MCNC: 3.8 MG/DL (ref 2.4–4.4)
TRIGL SERPL-MCNC: 112 MG/DL (ref 20–199)

## 2017-11-20 PROCEDURE — 97530 THERAPEUTIC ACTIVITIES: CPT

## 2017-11-20 PROCEDURE — 97110 THERAPEUTIC EXERCISES: CPT

## 2017-11-20 PROCEDURE — 97535 SELF CARE MNGMENT TRAINING: CPT

## 2017-11-20 PROCEDURE — 84100 ASSAY OF PHOSPHORUS: CPT | Performed by: INTERNAL MEDICINE

## 2017-11-20 PROCEDURE — 82962 GLUCOSE BLOOD TEST: CPT

## 2017-11-20 PROCEDURE — 25010000002 POTASSIUM CHLORIDE PER 2 MEQ OF POTASSIUM: Performed by: INTERNAL MEDICINE

## 2017-11-20 PROCEDURE — 84478 ASSAY OF TRIGLYCERIDES: CPT | Performed by: INTERNAL MEDICINE

## 2017-11-20 PROCEDURE — 83735 ASSAY OF MAGNESIUM: CPT | Performed by: INTERNAL MEDICINE

## 2017-11-20 PROCEDURE — 25010000002 MAGNESIUM SULFATE PER 500 MG OF MAGNESIUM: Performed by: INTERNAL MEDICINE

## 2017-11-20 PROCEDURE — 97116 GAIT TRAINING THERAPY: CPT

## 2017-11-20 PROCEDURE — 25010000002 HEPARIN LOCK FLUSH 10 UNIT/ML SOLUTION: Performed by: INTERNAL MEDICINE

## 2017-11-20 PROCEDURE — 25010000002 CEFOXITIN PER 1 G: Performed by: NURSE PRACTITIONER

## 2017-11-20 PROCEDURE — 25010000002 CALCIUM GLUCONATE PER 10 ML: Performed by: INTERNAL MEDICINE

## 2017-11-20 RX ORDER — FERROUS SULFATE TAB EC 324 MG (65 MG FE EQUIVALENT) 324 (65 FE) MG
324 TABLET DELAYED RESPONSE ORAL
Status: DISCONTINUED | OUTPATIENT
Start: 2017-11-21 | End: 2017-12-14 | Stop reason: HOSPADM

## 2017-11-21 LAB
ALBUMIN SERPL-MCNC: 2.7 G/DL (ref 3.4–4.8)
ALBUMIN/GLOB SERPL: 0.9 G/DL (ref 1.1–1.8)
ALP SERPL-CCNC: 121 U/L (ref 38–126)
ALT SERPL W P-5'-P-CCNC: 24 U/L (ref 9–52)
ANION GAP SERPL CALCULATED.3IONS-SCNC: 9 MMOL/L (ref 5–15)
AST SERPL-CCNC: 19 U/L (ref 14–36)
BILIRUB SERPL-MCNC: 0.1 MG/DL (ref 0.2–1.3)
BUN BLD-MCNC: 20 MG/DL (ref 7–21)
BUN/CREAT SERPL: 21.1 (ref 7–25)
CALCIUM SPEC-SCNC: 8.3 MG/DL (ref 8.4–10.2)
CHLORIDE SERPL-SCNC: 110 MMOL/L (ref 95–110)
CO2 SERPL-SCNC: 20 MMOL/L (ref 22–31)
CREAT BLD-MCNC: 0.95 MG/DL (ref 0.5–1)
DEPRECATED RDW RBC AUTO: 46 FL (ref 36.4–46.3)
ERYTHROCYTE [DISTWIDTH] IN BLOOD BY AUTOMATED COUNT: 14.8 % (ref 11.5–14.5)
GFR SERPL CREATININE-BSD FRML MDRD: 59 ML/MIN/1.73 (ref 45–104)
GLOBULIN UR ELPH-MCNC: 2.9 GM/DL (ref 2.3–3.5)
GLUCOSE BLD-MCNC: 109 MG/DL (ref 60–100)
GLUCOSE BLDC GLUCOMTR-MCNC: 107 MG/DL (ref 70–130)
GLUCOSE BLDC GLUCOMTR-MCNC: 113 MG/DL (ref 70–130)
GLUCOSE BLDC GLUCOMTR-MCNC: 126 MG/DL (ref 70–130)
HCT VFR BLD AUTO: 25.5 % (ref 35–45)
HGB BLD-MCNC: 8.3 G/DL (ref 12–15.5)
HOLD SPECIMEN: NORMAL
HOLD SPECIMEN: NORMAL
MCH RBC QN AUTO: 28.9 PG (ref 26.5–34)
MCHC RBC AUTO-ENTMCNC: 32.5 G/DL (ref 31.4–36)
MCV RBC AUTO: 88.9 FL (ref 80–98)
PLATELET # BLD AUTO: 148 10*3/MM3 (ref 150–450)
PMV BLD AUTO: 11.1 FL (ref 8–12)
POTASSIUM BLD-SCNC: 4.6 MMOL/L (ref 3.5–5.1)
PROT SERPL-MCNC: 5.6 G/DL (ref 6.3–8.6)
RBC # BLD AUTO: 2.87 10*6/MM3 (ref 3.77–5.16)
SODIUM BLD-SCNC: 139 MMOL/L (ref 137–145)
WBC NRBC COR # BLD: 6.38 10*3/MM3 (ref 3.2–9.8)
WHOLE BLOOD HOLD SPECIMEN: NORMAL

## 2017-11-21 PROCEDURE — 25010000002 CEFOXITIN PER 1 G: Performed by: NURSE PRACTITIONER

## 2017-11-21 PROCEDURE — 82962 GLUCOSE BLOOD TEST: CPT

## 2017-11-21 PROCEDURE — 80053 COMPREHEN METABOLIC PANEL: CPT | Performed by: INTERNAL MEDICINE

## 2017-11-21 PROCEDURE — 25010000002 CEFOXITIN PER 1 G: Performed by: INTERNAL MEDICINE

## 2017-11-21 PROCEDURE — 25010000002 POTASSIUM CHLORIDE PER 2 MEQ OF POTASSIUM: Performed by: INTERNAL MEDICINE

## 2017-11-21 PROCEDURE — 97530 THERAPEUTIC ACTIVITIES: CPT

## 2017-11-21 PROCEDURE — 97535 SELF CARE MNGMENT TRAINING: CPT

## 2017-11-21 PROCEDURE — 25010000002 MAGNESIUM SULFATE PER 500 MG OF MAGNESIUM: Performed by: INTERNAL MEDICINE

## 2017-11-21 PROCEDURE — 97110 THERAPEUTIC EXERCISES: CPT

## 2017-11-21 PROCEDURE — 25010000002 CALCIUM GLUCONATE PER 10 ML: Performed by: INTERNAL MEDICINE

## 2017-11-21 PROCEDURE — 97116 GAIT TRAINING THERAPY: CPT

## 2017-11-21 PROCEDURE — 85027 COMPLETE CBC AUTOMATED: CPT | Performed by: INTERNAL MEDICINE

## 2017-11-21 PROCEDURE — 25010000002 HEPARIN LOCK FLUSH 10 UNIT/ML SOLUTION: Performed by: INTERNAL MEDICINE

## 2017-11-22 LAB
GLUCOSE BLDC GLUCOMTR-MCNC: 107 MG/DL (ref 70–130)
GLUCOSE BLDC GLUCOMTR-MCNC: 112 MG/DL (ref 70–130)
GLUCOSE BLDC GLUCOMTR-MCNC: 99 MG/DL (ref 70–130)

## 2017-11-22 PROCEDURE — 82962 GLUCOSE BLOOD TEST: CPT

## 2017-11-22 PROCEDURE — 97110 THERAPEUTIC EXERCISES: CPT

## 2017-11-22 PROCEDURE — 25010000002 CEFOXITIN PER 1 G: Performed by: INTERNAL MEDICINE

## 2017-11-22 PROCEDURE — 97530 THERAPEUTIC ACTIVITIES: CPT

## 2017-11-22 PROCEDURE — 25010000002 HEPARIN LOCK FLUSH 10 UNIT/ML SOLUTION: Performed by: INTERNAL MEDICINE

## 2017-11-22 PROCEDURE — 25010000002 MAGNESIUM SULFATE PER 500 MG OF MAGNESIUM: Performed by: INTERNAL MEDICINE

## 2017-11-22 PROCEDURE — 25010000002 CALCIUM GLUCONATE PER 10 ML: Performed by: INTERNAL MEDICINE

## 2017-11-22 PROCEDURE — 97116 GAIT TRAINING THERAPY: CPT

## 2017-11-23 LAB
ALBUMIN SERPL-MCNC: 2.5 G/DL (ref 3.4–4.8)
ALBUMIN/GLOB SERPL: 0.9 G/DL (ref 1.1–1.8)
ALP SERPL-CCNC: 115 U/L (ref 38–126)
ALT SERPL W P-5'-P-CCNC: 26 U/L (ref 9–52)
ANION GAP SERPL CALCULATED.3IONS-SCNC: 6 MMOL/L (ref 5–15)
AST SERPL-CCNC: 15 U/L (ref 14–36)
BILIRUB SERPL-MCNC: <0.1 MG/DL (ref 0.2–1.3)
BUN BLD-MCNC: 21 MG/DL (ref 7–21)
BUN/CREAT SERPL: 21.4 (ref 7–25)
CALCIUM SPEC-SCNC: 8.1 MG/DL (ref 8.4–10.2)
CHLORIDE SERPL-SCNC: 111 MMOL/L (ref 95–110)
CO2 SERPL-SCNC: 23 MMOL/L (ref 22–31)
CREAT BLD-MCNC: 0.98 MG/DL (ref 0.5–1)
DEPRECATED RDW RBC AUTO: 48.5 FL (ref 36.4–46.3)
ERYTHROCYTE [DISTWIDTH] IN BLOOD BY AUTOMATED COUNT: 15 % (ref 11.5–14.5)
GFR SERPL CREATININE-BSD FRML MDRD: 57 ML/MIN/1.73 (ref 45–104)
GLOBULIN UR ELPH-MCNC: 2.7 GM/DL (ref 2.3–3.5)
GLUCOSE BLD-MCNC: 95 MG/DL (ref 60–100)
GLUCOSE BLDC GLUCOMTR-MCNC: 100 MG/DL (ref 70–130)
GLUCOSE BLDC GLUCOMTR-MCNC: 112 MG/DL (ref 70–130)
GLUCOSE BLDC GLUCOMTR-MCNC: 116 MG/DL (ref 70–130)
GLUCOSE BLDC GLUCOMTR-MCNC: 117 MG/DL (ref 70–130)
GLUCOSE BLDC GLUCOMTR-MCNC: 117 MG/DL (ref 70–130)
GLUCOSE BLDC GLUCOMTR-MCNC: 97 MG/DL (ref 70–130)
HCT VFR BLD AUTO: 24.7 % (ref 35–45)
HGB BLD-MCNC: 7.6 G/DL (ref 12–15.5)
MAGNESIUM SERPL-MCNC: 1.9 MG/DL (ref 1.6–2.3)
MCH RBC QN AUTO: 27.5 PG (ref 26.5–34)
MCHC RBC AUTO-ENTMCNC: 30.8 G/DL (ref 31.4–36)
MCV RBC AUTO: 89.5 FL (ref 80–98)
PHOSPHATE SERPL-MCNC: 3.9 MG/DL (ref 2.4–4.4)
PLATELET # BLD AUTO: 120 10*3/MM3 (ref 150–450)
PMV BLD AUTO: 11.5 FL (ref 8–12)
POTASSIUM BLD-SCNC: 3.7 MMOL/L (ref 3.5–5.1)
PROT SERPL-MCNC: 5.2 G/DL (ref 6.3–8.6)
RBC # BLD AUTO: 2.76 10*6/MM3 (ref 3.77–5.16)
SODIUM BLD-SCNC: 140 MMOL/L (ref 137–145)
WBC NRBC COR # BLD: 5.92 10*3/MM3 (ref 3.2–9.8)

## 2017-11-23 PROCEDURE — 97110 THERAPEUTIC EXERCISES: CPT

## 2017-11-23 PROCEDURE — 82962 GLUCOSE BLOOD TEST: CPT

## 2017-11-23 PROCEDURE — 83735 ASSAY OF MAGNESIUM: CPT | Performed by: INTERNAL MEDICINE

## 2017-11-23 PROCEDURE — 97535 SELF CARE MNGMENT TRAINING: CPT

## 2017-11-23 PROCEDURE — 84100 ASSAY OF PHOSPHORUS: CPT | Performed by: INTERNAL MEDICINE

## 2017-11-23 PROCEDURE — 25010000002 CEFOXITIN PER 1 G: Performed by: INTERNAL MEDICINE

## 2017-11-23 PROCEDURE — 80053 COMPREHEN METABOLIC PANEL: CPT | Performed by: INTERNAL MEDICINE

## 2017-11-23 PROCEDURE — 25010000002 CALCIUM GLUCONATE PER 10 ML: Performed by: INTERNAL MEDICINE

## 2017-11-23 PROCEDURE — 25010000002 MAGNESIUM SULFATE PER 500 MG OF MAGNESIUM: Performed by: INTERNAL MEDICINE

## 2017-11-23 PROCEDURE — 85027 COMPLETE CBC AUTOMATED: CPT | Performed by: INTERNAL MEDICINE

## 2017-11-23 PROCEDURE — 25010000002 HEPARIN LOCK FLUSH 10 UNIT/ML SOLUTION: Performed by: INTERNAL MEDICINE

## 2017-11-24 LAB
GLUCOSE BLDC GLUCOMTR-MCNC: 104 MG/DL (ref 70–130)
GLUCOSE BLDC GLUCOMTR-MCNC: 108 MG/DL (ref 70–130)

## 2017-11-24 PROCEDURE — 82962 GLUCOSE BLOOD TEST: CPT

## 2017-11-24 PROCEDURE — 25010000002 HEPARIN LOCK FLUSH 10 UNIT/ML SOLUTION: Performed by: INTERNAL MEDICINE

## 2017-11-24 PROCEDURE — 97530 THERAPEUTIC ACTIVITIES: CPT

## 2017-11-24 PROCEDURE — 25010000002 MAGNESIUM SULFATE PER 500 MG OF MAGNESIUM: Performed by: INTERNAL MEDICINE

## 2017-11-24 PROCEDURE — 97116 GAIT TRAINING THERAPY: CPT

## 2017-11-24 PROCEDURE — 25010000002 CALCIUM GLUCONATE PER 10 ML: Performed by: INTERNAL MEDICINE

## 2017-11-24 PROCEDURE — 97110 THERAPEUTIC EXERCISES: CPT

## 2017-11-24 RX ORDER — FUROSEMIDE 10 MG/ML
20 INJECTION INTRAMUSCULAR; INTRAVENOUS ONCE
Status: DISCONTINUED | OUTPATIENT
Start: 2017-11-25 | End: 2017-11-26

## 2017-11-25 LAB
ALBUMIN SERPL-MCNC: 2.8 G/DL (ref 3.4–4.8)
ALBUMIN/GLOB SERPL: 1 G/DL (ref 1.1–1.8)
ALP SERPL-CCNC: 120 U/L (ref 38–126)
ALT SERPL W P-5'-P-CCNC: 26 U/L (ref 9–52)
ANION GAP SERPL CALCULATED.3IONS-SCNC: 8 MMOL/L (ref 5–15)
AST SERPL-CCNC: 25 U/L (ref 14–36)
BILIRUB SERPL-MCNC: 0.2 MG/DL (ref 0.2–1.3)
BUN BLD-MCNC: 22 MG/DL (ref 7–21)
BUN/CREAT SERPL: 21.6 (ref 7–25)
CALCIUM SPEC-SCNC: 8.1 MG/DL (ref 8.4–10.2)
CHLORIDE SERPL-SCNC: 109 MMOL/L (ref 95–110)
CO2 SERPL-SCNC: 24 MMOL/L (ref 22–31)
CREAT BLD-MCNC: 1.02 MG/DL (ref 0.5–1)
DEPRECATED RDW RBC AUTO: 48.6 FL (ref 36.4–46.3)
ERYTHROCYTE [DISTWIDTH] IN BLOOD BY AUTOMATED COUNT: 15.1 % (ref 11.5–14.5)
GFR SERPL CREATININE-BSD FRML MDRD: 54 ML/MIN/1.73 (ref 60–104)
GLOBULIN UR ELPH-MCNC: 2.9 GM/DL (ref 2.3–3.5)
GLUCOSE BLD-MCNC: 97 MG/DL (ref 60–100)
GLUCOSE BLDC GLUCOMTR-MCNC: 103 MG/DL (ref 70–130)
GLUCOSE BLDC GLUCOMTR-MCNC: 106 MG/DL (ref 70–130)
GLUCOSE BLDC GLUCOMTR-MCNC: 107 MG/DL (ref 70–130)
GLUCOSE BLDC GLUCOMTR-MCNC: 108 MG/DL (ref 70–130)
GLUCOSE BLDC GLUCOMTR-MCNC: 110 MG/DL (ref 70–130)
GLUCOSE BLDC GLUCOMTR-MCNC: 91 MG/DL (ref 70–130)
HCT VFR BLD AUTO: 25.9 % (ref 35–45)
HGB BLD-MCNC: 8.3 G/DL (ref 12–15.5)
MCH RBC QN AUTO: 28.4 PG (ref 26.5–34)
MCHC RBC AUTO-ENTMCNC: 32 G/DL (ref 31.4–36)
MCV RBC AUTO: 88.7 FL (ref 80–98)
PLATELET # BLD AUTO: 147 10*3/MM3 (ref 150–450)
PMV BLD AUTO: 11.3 FL (ref 8–12)
POTASSIUM BLD-SCNC: 3.9 MMOL/L (ref 3.5–5.1)
PROT SERPL-MCNC: 5.7 G/DL (ref 6.3–8.6)
RBC # BLD AUTO: 2.92 10*6/MM3 (ref 3.77–5.16)
SODIUM BLD-SCNC: 141 MMOL/L (ref 137–145)
WBC NRBC COR # BLD: 4.77 10*3/MM3 (ref 3.2–9.8)

## 2017-11-25 PROCEDURE — 97530 THERAPEUTIC ACTIVITIES: CPT

## 2017-11-25 PROCEDURE — 82962 GLUCOSE BLOOD TEST: CPT

## 2017-11-25 PROCEDURE — 25010000002 MAGNESIUM SULFATE PER 500 MG OF MAGNESIUM: Performed by: INTERNAL MEDICINE

## 2017-11-25 PROCEDURE — 97116 GAIT TRAINING THERAPY: CPT

## 2017-11-25 PROCEDURE — 97535 SELF CARE MNGMENT TRAINING: CPT

## 2017-11-25 PROCEDURE — 25010000002 FUROSEMIDE PER 20 MG: Performed by: INTERNAL MEDICINE

## 2017-11-25 PROCEDURE — 80053 COMPREHEN METABOLIC PANEL: CPT | Performed by: INTERNAL MEDICINE

## 2017-11-25 PROCEDURE — 25010000002 CALCIUM GLUCONATE PER 10 ML: Performed by: INTERNAL MEDICINE

## 2017-11-25 PROCEDURE — 25010000002 HEPARIN LOCK FLUSH 10 UNIT/ML SOLUTION: Performed by: INTERNAL MEDICINE

## 2017-11-25 PROCEDURE — 85027 COMPLETE CBC AUTOMATED: CPT | Performed by: INTERNAL MEDICINE

## 2017-11-26 LAB — GLUCOSE BLDC GLUCOMTR-MCNC: 119 MG/DL (ref 70–130)

## 2017-11-26 PROCEDURE — 82962 GLUCOSE BLOOD TEST: CPT

## 2017-11-26 PROCEDURE — 25010000002 CALCIUM GLUCONATE PER 10 ML: Performed by: INTERNAL MEDICINE

## 2017-11-26 PROCEDURE — 25010000002 HEPARIN LOCK FLUSH 10 UNIT/ML SOLUTION: Performed by: INTERNAL MEDICINE

## 2017-11-26 PROCEDURE — 25010000002 MAGNESIUM SULFATE PER 500 MG OF MAGNESIUM: Performed by: INTERNAL MEDICINE

## 2017-11-27 ENCOUNTER — APPOINTMENT (OUTPATIENT)
Dept: GENERAL RADIOLOGY | Facility: HOSPITAL | Age: 67
End: 2017-11-27

## 2017-11-27 LAB
ALBUMIN SERPL-MCNC: 2.7 G/DL (ref 3.4–4.8)
ALBUMIN/GLOB SERPL: 1 G/DL (ref 1.1–1.8)
ALP SERPL-CCNC: 116 U/L (ref 38–126)
ALT SERPL W P-5'-P-CCNC: 24 U/L (ref 9–52)
ANION GAP SERPL CALCULATED.3IONS-SCNC: 8 MMOL/L (ref 5–15)
AST SERPL-CCNC: 20 U/L (ref 14–36)
BILIRUB SERPL-MCNC: 0.1 MG/DL (ref 0.2–1.3)
BUN BLD-MCNC: 20 MG/DL (ref 7–21)
BUN/CREAT SERPL: 21.5 (ref 7–25)
CALCIUM SPEC-SCNC: 7.8 MG/DL (ref 8.4–10.2)
CHLORIDE SERPL-SCNC: 108 MMOL/L (ref 95–110)
CO2 SERPL-SCNC: 24 MMOL/L (ref 22–31)
CREAT BLD-MCNC: 0.93 MG/DL (ref 0.5–1)
DEPRECATED RDW RBC AUTO: 51.8 FL (ref 36.4–46.3)
ERYTHROCYTE [DISTWIDTH] IN BLOOD BY AUTOMATED COUNT: 15.5 % (ref 11.5–14.5)
GFR SERPL CREATININE-BSD FRML MDRD: 61 ML/MIN/1.73 (ref 45–104)
GLOBULIN UR ELPH-MCNC: 2.8 GM/DL (ref 2.3–3.5)
GLUCOSE BLD-MCNC: 94 MG/DL (ref 60–100)
GLUCOSE BLDC GLUCOMTR-MCNC: 101 MG/DL (ref 70–130)
GLUCOSE BLDC GLUCOMTR-MCNC: 103 MG/DL (ref 70–130)
GLUCOSE BLDC GLUCOMTR-MCNC: 111 MG/DL (ref 70–130)
GLUCOSE BLDC GLUCOMTR-MCNC: 112 MG/DL (ref 70–130)
GLUCOSE BLDC GLUCOMTR-MCNC: 114 MG/DL (ref 70–130)
GLUCOSE BLDC GLUCOMTR-MCNC: 122 MG/DL (ref 70–130)
GLUCOSE BLDC GLUCOMTR-MCNC: 94 MG/DL (ref 70–130)
HCT VFR BLD AUTO: 27.6 % (ref 35–45)
HGB BLD-MCNC: 8.4 G/DL (ref 12–15.5)
MAGNESIUM SERPL-MCNC: 2.2 MG/DL (ref 1.6–2.3)
MCH RBC QN AUTO: 28.3 PG (ref 26.5–34)
MCHC RBC AUTO-ENTMCNC: 30.4 G/DL (ref 31.4–36)
MCV RBC AUTO: 92.9 FL (ref 80–98)
PHOSPHATE SERPL-MCNC: 4 MG/DL (ref 2.4–4.4)
PLATELET # BLD AUTO: 133 10*3/MM3 (ref 150–450)
PMV BLD AUTO: 11 FL (ref 8–12)
POTASSIUM BLD-SCNC: 3.9 MMOL/L (ref 3.5–5.1)
PROT SERPL-MCNC: 5.5 G/DL (ref 6.3–8.6)
RBC # BLD AUTO: 2.97 10*6/MM3 (ref 3.77–5.16)
SODIUM BLD-SCNC: 140 MMOL/L (ref 137–145)
WBC NRBC COR # BLD: 3.89 10*3/MM3 (ref 3.2–9.8)

## 2017-11-27 PROCEDURE — 84100 ASSAY OF PHOSPHORUS: CPT | Performed by: INTERNAL MEDICINE

## 2017-11-27 PROCEDURE — 97110 THERAPEUTIC EXERCISES: CPT

## 2017-11-27 PROCEDURE — 97116 GAIT TRAINING THERAPY: CPT

## 2017-11-27 PROCEDURE — 80053 COMPREHEN METABOLIC PANEL: CPT | Performed by: INTERNAL MEDICINE

## 2017-11-27 PROCEDURE — 25010000002 CALCIUM GLUCONATE PER 10 ML: Performed by: INTERNAL MEDICINE

## 2017-11-27 PROCEDURE — 73110 X-RAY EXAM OF WRIST: CPT

## 2017-11-27 PROCEDURE — 82962 GLUCOSE BLOOD TEST: CPT

## 2017-11-27 PROCEDURE — 85027 COMPLETE CBC AUTOMATED: CPT | Performed by: INTERNAL MEDICINE

## 2017-11-27 PROCEDURE — 25010000002 HEPARIN LOCK FLUSH 10 UNIT/ML SOLUTION: Performed by: INTERNAL MEDICINE

## 2017-11-27 PROCEDURE — 25010000002 MAGNESIUM SULFATE PER 500 MG OF MAGNESIUM: Performed by: INTERNAL MEDICINE

## 2017-11-27 PROCEDURE — 83735 ASSAY OF MAGNESIUM: CPT | Performed by: INTERNAL MEDICINE

## 2017-11-27 RX ORDER — OXYCODONE AND ACETAMINOPHEN 7.5; 325 MG/1; MG/1
1 TABLET ORAL EVERY 6 HOURS PRN
Status: DISPENSED | OUTPATIENT
Start: 2017-11-27 | End: 2017-12-07

## 2017-11-28 LAB
GLUCOSE BLDC GLUCOMTR-MCNC: 101 MG/DL (ref 70–130)
GLUCOSE BLDC GLUCOMTR-MCNC: 105 MG/DL (ref 70–130)
GLUCOSE BLDC GLUCOMTR-MCNC: 99 MG/DL (ref 70–130)

## 2017-11-28 PROCEDURE — 82962 GLUCOSE BLOOD TEST: CPT

## 2017-11-28 PROCEDURE — 97110 THERAPEUTIC EXERCISES: CPT

## 2017-11-28 PROCEDURE — 25010000002 CALCIUM GLUCONATE PER 10 ML: Performed by: INTERNAL MEDICINE

## 2017-11-28 PROCEDURE — 97535 SELF CARE MNGMENT TRAINING: CPT

## 2017-11-28 PROCEDURE — 97530 THERAPEUTIC ACTIVITIES: CPT

## 2017-11-28 PROCEDURE — 25010000002 MAGNESIUM SULFATE PER 500 MG OF MAGNESIUM: Performed by: INTERNAL MEDICINE

## 2017-11-28 PROCEDURE — 97116 GAIT TRAINING THERAPY: CPT

## 2017-11-28 PROCEDURE — 25010000002 HEPARIN LOCK FLUSH 10 UNIT/ML SOLUTION: Performed by: INTERNAL MEDICINE

## 2017-11-28 RX ORDER — FUROSEMIDE 20 MG/1
20 TABLET ORAL DAILY
Status: DISCONTINUED | OUTPATIENT
Start: 2017-11-28 | End: 2017-12-14 | Stop reason: HOSPADM

## 2017-11-29 LAB
ALBUMIN SERPL-MCNC: 2.6 G/DL (ref 3.4–4.8)
ALBUMIN/GLOB SERPL: 1 G/DL (ref 1.1–1.8)
ALP SERPL-CCNC: 104 U/L (ref 38–126)
ALT SERPL W P-5'-P-CCNC: 21 U/L (ref 9–52)
ANION GAP SERPL CALCULATED.3IONS-SCNC: 7 MMOL/L (ref 5–15)
AST SERPL-CCNC: 22 U/L (ref 14–36)
BILIRUB SERPL-MCNC: 0.2 MG/DL (ref 0.2–1.3)
BUN BLD-MCNC: 19 MG/DL (ref 7–21)
BUN/CREAT SERPL: 19.8 (ref 7–25)
CALCIUM SPEC-SCNC: 8 MG/DL (ref 8.4–10.2)
CHLORIDE SERPL-SCNC: 106 MMOL/L (ref 95–110)
CO2 SERPL-SCNC: 26 MMOL/L (ref 22–31)
CREAT BLD-MCNC: 0.96 MG/DL (ref 0.5–1)
DEPRECATED RDW RBC AUTO: 51.6 FL (ref 36.4–46.3)
ERYTHROCYTE [DISTWIDTH] IN BLOOD BY AUTOMATED COUNT: 15.7 % (ref 11.5–14.5)
GFR SERPL CREATININE-BSD FRML MDRD: 58 ML/MIN/1.73 (ref 60–104)
GLOBULIN UR ELPH-MCNC: 2.7 GM/DL (ref 2.3–3.5)
GLUCOSE BLD-MCNC: 110 MG/DL (ref 60–100)
GLUCOSE BLDC GLUCOMTR-MCNC: 104 MG/DL (ref 70–130)
GLUCOSE BLDC GLUCOMTR-MCNC: 111 MG/DL (ref 70–130)
GLUCOSE BLDC GLUCOMTR-MCNC: 128 MG/DL (ref 70–130)
GLUCOSE BLDC GLUCOMTR-MCNC: 92 MG/DL (ref 70–130)
HCT VFR BLD AUTO: 25.3 % (ref 35–45)
HGB BLD-MCNC: 8 G/DL (ref 12–15.5)
MCH RBC QN AUTO: 28.6 PG (ref 26.5–34)
MCHC RBC AUTO-ENTMCNC: 31.6 G/DL (ref 31.4–36)
MCV RBC AUTO: 90.4 FL (ref 80–98)
PLATELET # BLD AUTO: 133 10*3/MM3 (ref 150–450)
PMV BLD AUTO: 11 FL (ref 8–12)
POTASSIUM BLD-SCNC: 3.6 MMOL/L (ref 3.5–5.1)
PROT SERPL-MCNC: 5.3 G/DL (ref 6.3–8.6)
RBC # BLD AUTO: 2.8 10*6/MM3 (ref 3.77–5.16)
SODIUM BLD-SCNC: 139 MMOL/L (ref 137–145)
WBC NRBC COR # BLD: 3.73 10*3/MM3 (ref 3.2–9.8)

## 2017-11-29 PROCEDURE — 85027 COMPLETE CBC AUTOMATED: CPT | Performed by: INTERNAL MEDICINE

## 2017-11-29 PROCEDURE — 25010000002 MAGNESIUM SULFATE PER 500 MG OF MAGNESIUM: Performed by: INTERNAL MEDICINE

## 2017-11-29 PROCEDURE — 82962 GLUCOSE BLOOD TEST: CPT

## 2017-11-29 PROCEDURE — 97116 GAIT TRAINING THERAPY: CPT

## 2017-11-29 PROCEDURE — 97530 THERAPEUTIC ACTIVITIES: CPT

## 2017-11-29 PROCEDURE — 80053 COMPREHEN METABOLIC PANEL: CPT | Performed by: INTERNAL MEDICINE

## 2017-11-29 PROCEDURE — 97110 THERAPEUTIC EXERCISES: CPT

## 2017-11-29 PROCEDURE — 97535 SELF CARE MNGMENT TRAINING: CPT

## 2017-11-29 PROCEDURE — 25010000002 HEPARIN LOCK FLUSH 10 UNIT/ML SOLUTION: Performed by: INTERNAL MEDICINE

## 2017-11-29 PROCEDURE — 25010000002 CALCIUM GLUCONATE PER 10 ML: Performed by: INTERNAL MEDICINE

## 2017-11-30 LAB
GLUCOSE BLDC GLUCOMTR-MCNC: 100 MG/DL (ref 70–130)
GLUCOSE BLDC GLUCOMTR-MCNC: 115 MG/DL (ref 70–130)
GLUCOSE BLDC GLUCOMTR-MCNC: 121 MG/DL (ref 70–130)
GLUCOSE BLDC GLUCOMTR-MCNC: 81 MG/DL (ref 70–130)
MAGNESIUM SERPL-MCNC: 1.9 MG/DL (ref 1.6–2.3)
PHOSPHATE SERPL-MCNC: 3.4 MG/DL (ref 2.4–4.4)
WHOLE BLOOD HOLD SPECIMEN: NORMAL

## 2017-11-30 PROCEDURE — 82962 GLUCOSE BLOOD TEST: CPT

## 2017-11-30 PROCEDURE — 25010000002 CALCIUM GLUCONATE PER 10 ML: Performed by: INTERNAL MEDICINE

## 2017-11-30 PROCEDURE — 83735 ASSAY OF MAGNESIUM: CPT | Performed by: INTERNAL MEDICINE

## 2017-11-30 PROCEDURE — 97530 THERAPEUTIC ACTIVITIES: CPT

## 2017-11-30 PROCEDURE — 84100 ASSAY OF PHOSPHORUS: CPT | Performed by: INTERNAL MEDICINE

## 2017-11-30 PROCEDURE — 97116 GAIT TRAINING THERAPY: CPT

## 2017-11-30 PROCEDURE — 97110 THERAPEUTIC EXERCISES: CPT

## 2017-11-30 PROCEDURE — 25010000002 MAGNESIUM SULFATE PER 500 MG OF MAGNESIUM: Performed by: INTERNAL MEDICINE

## 2017-11-30 PROCEDURE — 25010000002 ONDANSETRON PER 1 MG: Performed by: INTERNAL MEDICINE

## 2017-12-01 LAB
ALBUMIN SERPL-MCNC: 2.7 G/DL (ref 3.4–4.8)
ALBUMIN/GLOB SERPL: 1 G/DL (ref 1.1–1.8)
ALP SERPL-CCNC: 120 U/L (ref 38–126)
ALT SERPL W P-5'-P-CCNC: 27 U/L (ref 9–52)
ANION GAP SERPL CALCULATED.3IONS-SCNC: 8 MMOL/L (ref 5–15)
ANION GAP SERPL CALCULATED.3IONS-SCNC: 8 MMOL/L (ref 5–15)
AST SERPL-CCNC: 22 U/L (ref 14–36)
BILIRUB SERPL-MCNC: 0.1 MG/DL (ref 0.2–1.3)
BUN BLD-MCNC: 20 MG/DL (ref 7–21)
BUN BLD-MCNC: 20 MG/DL (ref 7–21)
BUN/CREAT SERPL: 19.8 (ref 7–25)
BUN/CREAT SERPL: 19.8 (ref 7–25)
CALCIUM SPEC-SCNC: 7.9 MG/DL (ref 8.4–10.2)
CALCIUM SPEC-SCNC: 7.9 MG/DL (ref 8.4–10.2)
CHLORIDE SERPL-SCNC: 105 MMOL/L (ref 95–110)
CHLORIDE SERPL-SCNC: 105 MMOL/L (ref 95–110)
CO2 SERPL-SCNC: 28 MMOL/L (ref 22–31)
CO2 SERPL-SCNC: 28 MMOL/L (ref 22–31)
CREAT BLD-MCNC: 1.01 MG/DL (ref 0.5–1)
CREAT BLD-MCNC: 1.01 MG/DL (ref 0.5–1)
DEPRECATED RDW RBC AUTO: 55.1 FL (ref 36.4–46.3)
ERYTHROCYTE [DISTWIDTH] IN BLOOD BY AUTOMATED COUNT: 16.2 % (ref 11.5–14.5)
GFR SERPL CREATININE-BSD FRML MDRD: 55 ML/MIN/1.73 (ref 45–104)
GFR SERPL CREATININE-BSD FRML MDRD: 55 ML/MIN/1.73 (ref 45–104)
GLOBULIN UR ELPH-MCNC: 2.7 GM/DL (ref 2.3–3.5)
GLUCOSE BLD-MCNC: 101 MG/DL (ref 60–100)
GLUCOSE BLD-MCNC: 101 MG/DL (ref 60–100)
GLUCOSE BLDC GLUCOMTR-MCNC: 110 MG/DL (ref 70–130)
GLUCOSE BLDC GLUCOMTR-MCNC: 112 MG/DL (ref 70–130)
GLUCOSE BLDC GLUCOMTR-MCNC: 112 MG/DL (ref 70–130)
GLUCOSE BLDC GLUCOMTR-MCNC: 122 MG/DL (ref 70–130)
GLUCOSE BLDC GLUCOMTR-MCNC: 81 MG/DL (ref 70–130)
HCT VFR BLD AUTO: 26.5 % (ref 35–45)
HGB BLD-MCNC: 8.1 G/DL (ref 12–15.5)
MCH RBC QN AUTO: 28.6 PG (ref 26.5–34)
MCHC RBC AUTO-ENTMCNC: 30.6 G/DL (ref 31.4–36)
MCV RBC AUTO: 93.6 FL (ref 80–98)
PLATELET # BLD AUTO: 128 10*3/MM3 (ref 150–450)
PMV BLD AUTO: 12.1 FL (ref 8–12)
POTASSIUM BLD-SCNC: 4.1 MMOL/L (ref 3.5–5.1)
POTASSIUM BLD-SCNC: 4.1 MMOL/L (ref 3.5–5.1)
PROT SERPL-MCNC: 5.4 G/DL (ref 6.3–8.6)
RBC # BLD AUTO: 2.83 10*6/MM3 (ref 3.77–5.16)
SODIUM BLD-SCNC: 141 MMOL/L (ref 137–145)
SODIUM BLD-SCNC: 141 MMOL/L (ref 137–145)
WBC NRBC COR # BLD: 5.09 10*3/MM3 (ref 3.2–9.8)

## 2017-12-01 PROCEDURE — 85027 COMPLETE CBC AUTOMATED: CPT | Performed by: INTERNAL MEDICINE

## 2017-12-01 PROCEDURE — 80053 COMPREHEN METABOLIC PANEL: CPT | Performed by: INTERNAL MEDICINE

## 2017-12-01 PROCEDURE — 25010000002 MAGNESIUM SULFATE PER 500 MG OF MAGNESIUM: Performed by: INTERNAL MEDICINE

## 2017-12-01 PROCEDURE — 97535 SELF CARE MNGMENT TRAINING: CPT

## 2017-12-01 PROCEDURE — 97530 THERAPEUTIC ACTIVITIES: CPT

## 2017-12-01 PROCEDURE — 25010000002 CALCIUM GLUCONATE PER 10 ML: Performed by: INTERNAL MEDICINE

## 2017-12-01 PROCEDURE — 82962 GLUCOSE BLOOD TEST: CPT

## 2017-12-01 PROCEDURE — 97116 GAIT TRAINING THERAPY: CPT

## 2017-12-01 PROCEDURE — 80048 BASIC METABOLIC PNL TOTAL CA: CPT

## 2017-12-01 PROCEDURE — 25010000002 HEPARIN LOCK FLUSH 10 UNIT/ML SOLUTION: Performed by: INTERNAL MEDICINE

## 2017-12-01 NOTE — PROGRESS NOTES
Parenteral Nutrition by Pharmacy    Patient: Irma Pacheco  MRN#: [id number]  Attending: No name on file.  Admission Date: 101717    Subjective/Objective  Progress notes reviewed.     Daily Assessment  Lab Results   Component Value Date    GLUCOSE 101 (H) 12/01/2017    GLUCOSE 101 (H) 12/01/2017    BUN 20 12/01/2017    BUN 20 12/01/2017    CREATININE 1.01 (H) 12/01/2017    CREATININE 1.01 (H) 12/01/2017    EGFRIFNONA 55 12/01/2017    EGFRIFNONA 55 12/01/2017    BCR 19.8 12/01/2017    BCR 19.8 12/01/2017    K 4.1 12/01/2017    K 4.1 12/01/2017    CO2 28.0 12/01/2017    CO2 28.0 12/01/2017    CALCIUM 7.9 (L) 12/01/2017    CALCIUM 7.9 (L) 12/01/2017    ALBUMIN 2.70 (L) 12/01/2017    LABIL2 1.0 (L) 12/01/2017    AST 22 12/01/2017    ALT 27 12/01/2017     Lab Results   Component Value Date    GLUCOSE 101 (H) 12/01/2017    GLUCOSE 101 (H) 12/01/2017    CALCIUM 7.9 (L) 12/01/2017    CALCIUM 7.9 (L) 12/01/2017     12/01/2017     12/01/2017    K 4.1 12/01/2017    K 4.1 12/01/2017    CO2 28.0 12/01/2017    CO2 28.0 12/01/2017     12/01/2017     12/01/2017    BUN 20 12/01/2017    BUN 20 12/01/2017    CREATININE 1.01 (H) 12/01/2017    CREATININE 1.01 (H) 12/01/2017    EGFRIFNONA 55 12/01/2017    EGFRIFNONA 55 12/01/2017    BCR 19.8 12/01/2017    BCR 19.8 12/01/2017    ANIONGAP 8.0 12/01/2017    ANIONGAP 8.0 12/01/2017     Magnesium   Date Value Ref Range Status   11/30/2017 1.9 1.6 - 2.3 mg/dL Final     Phosphorus   Date Value Ref Range Status   11/30/2017 3.4 2.4 - 4.4 mg/dL Final     Calcium   Date Value Ref Range Status   12/01/2017 7.9 (L) 8.4 - 10.2 mg/dL Final   12/01/2017 7.9 (L) 8.4 - 10.2 mg/dL Final     Triglycerides   Date Value Ref Range Status   11/20/2017 112 20 - 199 mg/dL Final     Above labs reviewed.    Plan   Above labs reviewed.  Mg 11/30 is 1.9.  Ph 11/30 is 3.4.  Albumin is 2.7, corrected calcium is 8.94.  Chloride has continued to trend down into range after adjustments to  formula.  Rate 83.3 ml/hr    Current TPN formulation:  Clinimix 4.25/20%  K Phos 11 meq /7.5 mMol  NaCl 110 mEq  Na Acetate 74 mEq  Calcium Gluconate 12.5 mEq  Magnesium Sulfate 5 mEq  MVI 10 ml  Infusing at 83.3 ml/hr    No changes to formula today.   Pharmacy will continue to monitor and adjust formula as needed.    Eugenio Tijerina [facility]  12/01/17  10:25 AM

## 2017-12-02 LAB — GLUCOSE BLDC GLUCOMTR-MCNC: 129 MG/DL (ref 70–130)

## 2017-12-02 PROCEDURE — 82962 GLUCOSE BLOOD TEST: CPT

## 2017-12-02 PROCEDURE — 25010000002 MAGNESIUM SULFATE PER 500 MG OF MAGNESIUM: Performed by: INTERNAL MEDICINE

## 2017-12-02 PROCEDURE — 25010000002 CALCIUM GLUCONATE PER 10 ML: Performed by: INTERNAL MEDICINE

## 2017-12-02 PROCEDURE — 97110 THERAPEUTIC EXERCISES: CPT

## 2017-12-02 RX ORDER — FUROSEMIDE 10 MG/ML
20 INJECTION INTRAMUSCULAR; INTRAVENOUS ONCE
Status: DISCONTINUED | OUTPATIENT
Start: 2017-12-02 | End: 2017-12-04

## 2017-12-03 LAB
ALBUMIN SERPL-MCNC: 2.7 G/DL (ref 3.4–4.8)
ALBUMIN/GLOB SERPL: 1 G/DL (ref 1.1–1.8)
ALP SERPL-CCNC: 113 U/L (ref 38–126)
ALT SERPL W P-5'-P-CCNC: 27 U/L (ref 9–52)
ANION GAP SERPL CALCULATED.3IONS-SCNC: 9 MMOL/L (ref 5–15)
AST SERPL-CCNC: 23 U/L (ref 14–36)
BILIRUB SERPL-MCNC: 0.2 MG/DL (ref 0.2–1.3)
BUN BLD-MCNC: 20 MG/DL (ref 7–21)
BUN/CREAT SERPL: 18.7 (ref 7–25)
CALCIUM SPEC-SCNC: 7.9 MG/DL (ref 8.4–10.2)
CHLORIDE SERPL-SCNC: 102 MMOL/L (ref 95–110)
CO2 SERPL-SCNC: 29 MMOL/L (ref 22–31)
CREAT BLD-MCNC: 1.07 MG/DL (ref 0.5–1)
DEPRECATED RDW RBC AUTO: 52.8 FL (ref 36.4–46.3)
ERYTHROCYTE [DISTWIDTH] IN BLOOD BY AUTOMATED COUNT: 16.1 % (ref 11.5–14.5)
GFR SERPL CREATININE-BSD FRML MDRD: 51 ML/MIN/1.73
GLOBULIN UR ELPH-MCNC: 2.7 GM/DL (ref 2.3–3.5)
GLUCOSE BLD-MCNC: 104 MG/DL (ref 60–100)
GLUCOSE BLDC GLUCOMTR-MCNC: 115 MG/DL (ref 70–130)
HCT VFR BLD AUTO: 27.3 % (ref 35–45)
HGB BLD-MCNC: 8.5 G/DL (ref 12–15.5)
MCH RBC QN AUTO: 28.3 PG (ref 26.5–34)
MCHC RBC AUTO-ENTMCNC: 31.1 G/DL (ref 31.4–36)
MCV RBC AUTO: 91 FL (ref 80–98)
PLATELET # BLD AUTO: 139 10*3/MM3 (ref 150–450)
PMV BLD AUTO: 11.2 FL (ref 8–12)
POTASSIUM BLD-SCNC: 3.5 MMOL/L (ref 3.5–5.1)
PROT SERPL-MCNC: 5.4 G/DL (ref 6.3–8.6)
RBC # BLD AUTO: 3 10*6/MM3 (ref 3.77–5.16)
SODIUM BLD-SCNC: 140 MMOL/L (ref 137–145)
WBC NRBC COR # BLD: 4.67 10*3/MM3 (ref 3.2–9.8)

## 2017-12-03 PROCEDURE — 82962 GLUCOSE BLOOD TEST: CPT

## 2017-12-03 PROCEDURE — 25010000002 MAGNESIUM SULFATE PER 500 MG OF MAGNESIUM: Performed by: INTERNAL MEDICINE

## 2017-12-03 PROCEDURE — 85027 COMPLETE CBC AUTOMATED: CPT | Performed by: INTERNAL MEDICINE

## 2017-12-03 PROCEDURE — 25010000002 POTASSIUM CHLORIDE PER 2 MEQ OF POTASSIUM: Performed by: INTERNAL MEDICINE

## 2017-12-03 PROCEDURE — 25010000002 CALCIUM GLUCONATE PER 10 ML: Performed by: INTERNAL MEDICINE

## 2017-12-03 PROCEDURE — 80053 COMPREHEN METABOLIC PANEL: CPT | Performed by: INTERNAL MEDICINE

## 2017-12-03 PROCEDURE — 25010000002 FUROSEMIDE PER 20 MG: Performed by: NURSE PRACTITIONER

## 2017-12-03 RX ORDER — PRIMIDONE 50 MG/1
100 TABLET ORAL 3 TIMES DAILY
Status: DISCONTINUED | OUTPATIENT
Start: 2017-12-03 | End: 2017-12-14 | Stop reason: HOSPADM

## 2017-12-03 RX ORDER — TIZANIDINE 4 MG/1
4 TABLET ORAL 2 TIMES DAILY
Status: DISCONTINUED | OUTPATIENT
Start: 2017-12-03 | End: 2017-12-14 | Stop reason: HOSPADM

## 2017-12-03 NOTE — PROGRESS NOTES
Parenteral Nutrition by Pharmacy    Patient: Irma Pacheco  MRN#: [id number]  Attending: No name on file.  Admission Date: 101717    Subjective/Objective  Progress notes reviewed.     Daily Assessment  Lab Results   Component Value Date    GLUCOSE 104 (H) 12/03/2017    BUN 20 12/03/2017    CREATININE 1.07 (H) 12/03/2017    EGFRIFNONA 51 (L) 12/03/2017    BCR 18.7 12/03/2017    K 3.5 12/03/2017    CO2 29.0 12/03/2017    CALCIUM 7.9 (L) 12/03/2017    ALBUMIN 2.70 (L) 12/03/2017    LABIL2 1.0 (L) 12/03/2017    AST 23 12/03/2017    ALT 27 12/03/2017     Lab Results   Component Value Date    GLUCOSE 104 (H) 12/03/2017    CALCIUM 7.9 (L) 12/03/2017     12/03/2017    K 3.5 12/03/2017    CO2 29.0 12/03/2017     12/03/2017    BUN 20 12/03/2017    CREATININE 1.07 (H) 12/03/2017    EGFRIFNONA 51 (L) 12/03/2017    BCR 18.7 12/03/2017    ANIONGAP 9.0 12/03/2017     Magnesium   Date Value Ref Range Status   11/30/2017 1.9 1.6 - 2.3 mg/dL Final     Phosphorus   Date Value Ref Range Status   11/30/2017 3.4 2.4 - 4.4 mg/dL Final     Calcium   Date Value Ref Range Status   12/03/2017 7.9 (L) 8.4 - 10.2 mg/dL Final     Triglycerides   Date Value Ref Range Status   11/20/2017 112 20 - 199 mg/dL Final     Above labs reviewed.    Plan   Electrolytes WNL.  Potassium trending down.  Will add 40 mEq KCL to TPN.  Pharmacy will continue to monitor and adjust formula as needed.    Teddy Lipscomb III, [facility]  12/03/17  11:09 AM

## 2017-12-04 ENCOUNTER — APPOINTMENT (OUTPATIENT)
Dept: GENERAL RADIOLOGY | Facility: HOSPITAL | Age: 67
End: 2017-12-04

## 2017-12-04 LAB
ALBUMIN SERPL-MCNC: 2.6 G/DL (ref 3.4–4.8)
ALBUMIN/GLOB SERPL: 1 G/DL (ref 1.1–1.8)
ALP SERPL-CCNC: 98 U/L (ref 38–126)
ALT SERPL W P-5'-P-CCNC: 31 U/L (ref 9–52)
ANION GAP SERPL CALCULATED.3IONS-SCNC: 8 MMOL/L (ref 5–15)
AST SERPL-CCNC: 33 U/L (ref 14–36)
BILIRUB SERPL-MCNC: 0.4 MG/DL (ref 0.2–1.3)
BUN BLD-MCNC: 24 MG/DL (ref 7–21)
BUN/CREAT SERPL: 22.2 (ref 7–25)
CALCIUM SPEC-SCNC: 7.4 MG/DL (ref 8.4–10.2)
CHLORIDE SERPL-SCNC: 98 MMOL/L (ref 95–110)
CO2 SERPL-SCNC: 28 MMOL/L (ref 22–31)
CREAT BLD-MCNC: 1.08 MG/DL (ref 0.5–1)
DEPRECATED RDW RBC AUTO: 52.1 FL (ref 36.4–46.3)
ERYTHROCYTE [DISTWIDTH] IN BLOOD BY AUTOMATED COUNT: 16 % (ref 11.5–14.5)
FLUAV AG NPH QL: NEGATIVE
FLUBV AG NPH QL IA: NEGATIVE
GFR SERPL CREATININE-BSD FRML MDRD: 51 ML/MIN/1.73 (ref 45–104)
GLOBULIN UR ELPH-MCNC: 2.6 GM/DL (ref 2.3–3.5)
GLUCOSE BLD-MCNC: 114 MG/DL (ref 60–100)
GLUCOSE BLDC GLUCOMTR-MCNC: 106 MG/DL (ref 70–130)
GLUCOSE BLDC GLUCOMTR-MCNC: 109 MG/DL (ref 70–130)
GLUCOSE BLDC GLUCOMTR-MCNC: 116 MG/DL (ref 70–130)
GLUCOSE BLDC GLUCOMTR-MCNC: 118 MG/DL (ref 70–130)
GLUCOSE BLDC GLUCOMTR-MCNC: 122 MG/DL (ref 70–130)
GLUCOSE BLDC GLUCOMTR-MCNC: 122 MG/DL (ref 70–130)
GLUCOSE BLDC GLUCOMTR-MCNC: 126 MG/DL (ref 70–130)
GLUCOSE BLDC GLUCOMTR-MCNC: 128 MG/DL (ref 70–130)
GLUCOSE BLDC GLUCOMTR-MCNC: 129 MG/DL (ref 70–130)
GLUCOSE BLDC GLUCOMTR-MCNC: 150 MG/DL (ref 70–130)
GLUCOSE BLDC GLUCOMTR-MCNC: 92 MG/DL (ref 70–130)
HCT VFR BLD AUTO: 26.5 % (ref 35–45)
HGB BLD-MCNC: 8.4 G/DL (ref 12–15.5)
HOLD SPECIMEN: NORMAL
HOLD SPECIMEN: NORMAL
MAGNESIUM SERPL-MCNC: 1.9 MG/DL (ref 1.6–2.3)
MCH RBC QN AUTO: 28.4 PG (ref 26.5–34)
MCHC RBC AUTO-ENTMCNC: 31.7 G/DL (ref 31.4–36)
MCV RBC AUTO: 89.5 FL (ref 80–98)
PHOSPHATE SERPL-MCNC: 2.6 MG/DL (ref 2.4–4.4)
PLATELET # BLD AUTO: 112 10*3/MM3 (ref 150–450)
PMV BLD AUTO: 11.1 FL (ref 8–12)
POTASSIUM BLD-SCNC: 4.1 MMOL/L (ref 3.5–5.1)
PROT SERPL-MCNC: 5.2 G/DL (ref 6.3–8.6)
RBC # BLD AUTO: 2.96 10*6/MM3 (ref 3.77–5.16)
SODIUM BLD-SCNC: 134 MMOL/L (ref 137–145)
WBC NRBC COR # BLD: 4.57 10*3/MM3 (ref 3.2–9.8)

## 2017-12-04 PROCEDURE — 25010000002 MAGNESIUM SULFATE PER 500 MG OF MAGNESIUM: Performed by: INTERNAL MEDICINE

## 2017-12-04 PROCEDURE — 87070 CULTURE OTHR SPECIMN AEROBIC: CPT | Performed by: INTERNAL MEDICINE

## 2017-12-04 PROCEDURE — 25010000002 CALCIUM GLUCONATE PER 10 ML: Performed by: INTERNAL MEDICINE

## 2017-12-04 PROCEDURE — 87147 CULTURE TYPE IMMUNOLOGIC: CPT | Performed by: INTERNAL MEDICINE

## 2017-12-04 PROCEDURE — 87150 DNA/RNA AMPLIFIED PROBE: CPT | Performed by: INTERNAL MEDICINE

## 2017-12-04 PROCEDURE — 87205 SMEAR GRAM STAIN: CPT | Performed by: INTERNAL MEDICINE

## 2017-12-04 PROCEDURE — 25010000002 POTASSIUM CHLORIDE PER 2 MEQ OF POTASSIUM: Performed by: INTERNAL MEDICINE

## 2017-12-04 PROCEDURE — 87186 SC STD MICRODIL/AGAR DIL: CPT | Performed by: INTERNAL MEDICINE

## 2017-12-04 PROCEDURE — 71010 HC CHEST PA OR AP: CPT

## 2017-12-04 PROCEDURE — 84100 ASSAY OF PHOSPHORUS: CPT | Performed by: INTERNAL MEDICINE

## 2017-12-04 PROCEDURE — 85027 COMPLETE CBC AUTOMATED: CPT | Performed by: NURSE PRACTITIONER

## 2017-12-04 PROCEDURE — 82962 GLUCOSE BLOOD TEST: CPT

## 2017-12-04 PROCEDURE — 87086 URINE CULTURE/COLONY COUNT: CPT | Performed by: INTERNAL MEDICINE

## 2017-12-04 PROCEDURE — 87804 INFLUENZA ASSAY W/OPTIC: CPT | Performed by: NURSE PRACTITIONER

## 2017-12-04 PROCEDURE — 83735 ASSAY OF MAGNESIUM: CPT | Performed by: INTERNAL MEDICINE

## 2017-12-04 PROCEDURE — 87077 CULTURE AEROBIC IDENTIFY: CPT | Performed by: INTERNAL MEDICINE

## 2017-12-04 PROCEDURE — 87040 BLOOD CULTURE FOR BACTERIA: CPT | Performed by: INTERNAL MEDICINE

## 2017-12-04 PROCEDURE — 80053 COMPREHEN METABOLIC PANEL: CPT | Performed by: NURSE PRACTITIONER

## 2017-12-04 PROCEDURE — 25010000002 LEVOFLOXACIN PER 250 MG: Performed by: INTERNAL MEDICINE

## 2017-12-04 RX ORDER — LEVOFLOXACIN 5 MG/ML
750 INJECTION, SOLUTION INTRAVENOUS EVERY 24 HOURS
Status: DISCONTINUED | OUTPATIENT
Start: 2017-12-04 | End: 2017-12-06 | Stop reason: ALTCHOICE

## 2017-12-04 RX ORDER — FLUCONAZOLE 150 MG/1
150 TABLET ORAL ONCE
Status: DISCONTINUED | OUTPATIENT
Start: 2017-12-04 | End: 2017-12-05

## 2017-12-04 NOTE — PROGRESS NOTES
Parenteral Nutrition by Pharmacy    Patient: Irma Pacheco  MRN#: [id number]  Attending: No name on file.  Admission Date: 101717    Subjective/Objective  Progress notes reviewed.     Daily Assessment  Lab Results   Component Value Date    GLUCOSE 104 (H) 12/03/2017    BUN 20 12/03/2017    CREATININE 1.07 (H) 12/03/2017    EGFRIFNONA 51 (L) 12/03/2017    BCR 18.7 12/03/2017    K 3.5 12/03/2017    CO2 29.0 12/03/2017    CALCIUM 7.9 (L) 12/03/2017    ALBUMIN 2.70 (L) 12/03/2017    LABIL2 1.0 (L) 12/03/2017    AST 23 12/03/2017    ALT 27 12/03/2017     Lab Results   Component Value Date    GLUCOSE 104 (H) 12/03/2017    CALCIUM 7.9 (L) 12/03/2017     12/03/2017    K 3.5 12/03/2017    CO2 29.0 12/03/2017     12/03/2017    BUN 20 12/03/2017    CREATININE 1.07 (H) 12/03/2017    EGFRIFNONA 51 (L) 12/03/2017    BCR 18.7 12/03/2017    ANIONGAP 9.0 12/03/2017     Magnesium   Date Value Ref Range Status   12/04/2017 1.9 1.6 - 2.3 mg/dL Final     Phosphorus   Date Value Ref Range Status   12/04/2017 2.6 2.4 - 4.4 mg/dL Final     Calcium   Date Value Ref Range Status   12/03/2017 7.9 (L) 8.4 - 10.2 mg/dL Final     Triglycerides   Date Value Ref Range Status   11/20/2017 112 20 - 199 mg/dL Final     Above labs reviewed.    Plan   No CMP today. Mag and Phos are WNL. No changes to current formula. Pharmacy will continue to monitor and adjust formula as needed.    Current TPN formulation:  Clinimix 4.25/20%  K Phos 11 meq /7.5 mMol  KCl 40 meq  NaCl 110 mEq  Na Acetate 74 mEq  Calcium Gluconate 12.5 mEq  Magnesium Sulfate 5 mEq  MVI 10 ml  Infusing at 83.3 ml/hr    Becka Moseley RPH  12/04/17  10:44 AM

## 2017-12-05 LAB
ALBUMIN SERPL-MCNC: 2.6 G/DL (ref 3.4–4.8)
ALBUMIN/GLOB SERPL: 1 G/DL (ref 1.1–1.8)
ALP SERPL-CCNC: 99 U/L (ref 38–126)
ALT SERPL W P-5'-P-CCNC: 31 U/L (ref 9–52)
ANION GAP SERPL CALCULATED.3IONS-SCNC: 9 MMOL/L (ref 5–15)
AST SERPL-CCNC: 26 U/L (ref 14–36)
BACTERIA BLD CULT: ABNORMAL
BILIRUB SERPL-MCNC: 0.5 MG/DL (ref 0.2–1.3)
BUN BLD-MCNC: 25 MG/DL (ref 7–21)
BUN/CREAT SERPL: 20.3 (ref 7–25)
CALCIUM SPEC-SCNC: 7.6 MG/DL (ref 8.4–10.2)
CHLORIDE SERPL-SCNC: 99 MMOL/L (ref 95–110)
CO2 SERPL-SCNC: 24 MMOL/L (ref 22–31)
CREAT BLD-MCNC: 1.23 MG/DL (ref 0.5–1)
DEPRECATED RDW RBC AUTO: 54.4 FL (ref 36.4–46.3)
ERYTHROCYTE [DISTWIDTH] IN BLOOD BY AUTOMATED COUNT: 16.2 % (ref 11.5–14.5)
GFR SERPL CREATININE-BSD FRML MDRD: 44 ML/MIN/1.73 (ref 60–104)
GLOBULIN UR ELPH-MCNC: 2.7 GM/DL (ref 2.3–3.5)
GLUCOSE BLD-MCNC: 132 MG/DL (ref 60–100)
GLUCOSE BLDC GLUCOMTR-MCNC: 124 MG/DL (ref 70–130)
GLUCOSE BLDC GLUCOMTR-MCNC: 97 MG/DL (ref 70–130)
HCT VFR BLD AUTO: 25.1 % (ref 35–45)
HGB BLD-MCNC: 7.9 G/DL (ref 12–15.5)
MCH RBC QN AUTO: 28.4 PG (ref 26.5–34)
MCHC RBC AUTO-ENTMCNC: 31.5 G/DL (ref 31.4–36)
MCV RBC AUTO: 90.3 FL (ref 80–98)
PLATELET # BLD AUTO: 101 10*3/MM3 (ref 150–450)
PMV BLD AUTO: 10.2 FL (ref 8–12)
POTASSIUM BLD-SCNC: 4.2 MMOL/L (ref 3.5–5.1)
PROT SERPL-MCNC: 5.3 G/DL (ref 6.3–8.6)
RBC # BLD AUTO: 2.78 10*6/MM3 (ref 3.77–5.16)
SODIUM BLD-SCNC: 132 MMOL/L (ref 137–145)
WBC NRBC COR # BLD: 5.27 10*3/MM3 (ref 3.2–9.8)

## 2017-12-05 PROCEDURE — 25010000002 LEVOFLOXACIN PER 250 MG: Performed by: INTERNAL MEDICINE

## 2017-12-05 PROCEDURE — 82962 GLUCOSE BLOOD TEST: CPT

## 2017-12-05 PROCEDURE — 25010000002 MAGNESIUM SULFATE PER 500 MG OF MAGNESIUM: Performed by: INTERNAL MEDICINE

## 2017-12-05 PROCEDURE — 85027 COMPLETE CBC AUTOMATED: CPT | Performed by: INTERNAL MEDICINE

## 2017-12-05 PROCEDURE — 80053 COMPREHEN METABOLIC PANEL: CPT | Performed by: INTERNAL MEDICINE

## 2017-12-05 PROCEDURE — 25010000002 HEPARIN LOCK FLUSH 10 UNIT/ML SOLUTION: Performed by: INTERNAL MEDICINE

## 2017-12-05 PROCEDURE — 97110 THERAPEUTIC EXERCISES: CPT

## 2017-12-05 PROCEDURE — 25010000002 POTASSIUM CHLORIDE PER 2 MEQ OF POTASSIUM: Performed by: INTERNAL MEDICINE

## 2017-12-05 PROCEDURE — 25010000002 CALCIUM GLUCONATE PER 10 ML: Performed by: INTERNAL MEDICINE

## 2017-12-05 RX ORDER — SODIUM CHLORIDE 9 MG/ML
INJECTION, SOLUTION INTRAVENOUS
Status: DISPENSED
Start: 2017-12-05 | End: 2017-12-06

## 2017-12-05 NOTE — PROGRESS NOTES
Parenteral Nutrition by Pharmacy    Patient: Irma Pacheco  MRN#: [id number]  Attending: No name on file.  Admission Date: 101717    Subjective/Objective  Progress notes reviewed.     Daily Assessment  Lab Results   Component Value Date    GLUCOSE 132 (H) 12/05/2017    BUN 25 (H) 12/05/2017    CREATININE 1.23 (H) 12/05/2017    EGFRIFNONA 44 (L) 12/05/2017    BCR 20.3 12/05/2017    K 4.2 12/05/2017    CO2 24.0 12/05/2017    CALCIUM 7.6 (L) 12/05/2017    ALBUMIN 2.60 (L) 12/05/2017    LABIL2 1.0 (L) 12/05/2017    AST 26 12/05/2017    ALT 31 12/05/2017     Lab Results   Component Value Date    GLUCOSE 132 (H) 12/05/2017    CALCIUM 7.6 (L) 12/05/2017     (L) 12/05/2017    K 4.2 12/05/2017    CO2 24.0 12/05/2017    CL 99 12/05/2017    BUN 25 (H) 12/05/2017    CREATININE 1.23 (H) 12/05/2017    EGFRIFNONA 44 (L) 12/05/2017    BCR 20.3 12/05/2017    ANIONGAP 9.0 12/05/2017     Magnesium   Date Value Ref Range Status   12/04/2017 1.9 1.6 - 2.3 mg/dL Final     Phosphorus   Date Value Ref Range Status   12/04/2017 2.6 2.4 - 4.4 mg/dL Final     Calcium   Date Value Ref Range Status   12/05/2017 7.6 (L) 8.4 - 10.2 mg/dL Final     Triglycerides   Date Value Ref Range Status   11/20/2017 112 20 - 199 mg/dL Final     Above labs reviewed.  Corrected Ca= 8.76  Plan   Na level is low this am.  All other labs WNL. Pt did have a fever yesterday. Cultures were taken. Urine culture does have gram negative bacilli. Pt is currently on Levofloxacin. Will continue current TPN formula and monitor. MD is looking to start an oral diet soon. Pharmacy will continue to monitor and adjust formula as needed.    Current TPN formulation:  Clinimix 4.25/20%  K Phos 11 meq /7.5 mMol  KCl 40 meq  NaCl 110 mEq  Na Acetate 74 mEq  Calcium Gluconate 12.5 mEq  Magnesium Sulfate 5 mEq  MVI 10 ml  Infusing at 83.3 ml/hr    Becka Moseley [facility]  12/05/17  8:45 AM

## 2017-12-06 LAB
BACTERIA SPEC AEROBE CULT: ABNORMAL
BACTERIA SPEC AEROBE CULT: ABNORMAL
GLUCOSE BLDC GLUCOMTR-MCNC: 101 MG/DL (ref 70–130)
GLUCOSE BLDC GLUCOMTR-MCNC: 102 MG/DL (ref 70–130)
GLUCOSE BLDC GLUCOMTR-MCNC: 135 MG/DL (ref 70–130)
GLUCOSE BLDC GLUCOMTR-MCNC: 175 MG/DL (ref 70–130)
GLUCOSE BLDC GLUCOMTR-MCNC: 94 MG/DL (ref 70–130)
GLUCOSE BLDC GLUCOMTR-MCNC: 96 MG/DL (ref 70–130)

## 2017-12-06 PROCEDURE — 25010000002 HEPARIN LOCK FLUSH 10 UNIT/ML SOLUTION: Performed by: INTERNAL MEDICINE

## 2017-12-06 PROCEDURE — 97530 THERAPEUTIC ACTIVITIES: CPT | Performed by: PHYSICAL THERAPIST

## 2017-12-06 PROCEDURE — 25010000002 POTASSIUM CHLORIDE PER 2 MEQ OF POTASSIUM: Performed by: INTERNAL MEDICINE

## 2017-12-06 PROCEDURE — 25010000002 CALCIUM GLUCONATE PER 10 ML: Performed by: INTERNAL MEDICINE

## 2017-12-06 PROCEDURE — 82962 GLUCOSE BLOOD TEST: CPT

## 2017-12-06 PROCEDURE — 25010000002 MEROPENEM: Performed by: NURSE PRACTITIONER

## 2017-12-06 PROCEDURE — 97110 THERAPEUTIC EXERCISES: CPT

## 2017-12-06 PROCEDURE — 25010000002 MAGNESIUM SULFATE PER 500 MG OF MAGNESIUM: Performed by: INTERNAL MEDICINE

## 2017-12-06 PROCEDURE — 97535 SELF CARE MNGMENT TRAINING: CPT

## 2017-12-06 PROCEDURE — 25010000002 LEVOFLOXACIN PER 250 MG: Performed by: INTERNAL MEDICINE

## 2017-12-06 RX ORDER — SODIUM CHLORIDE 9 MG/ML
INJECTION, SOLUTION INTRAVENOUS
Status: DISPENSED
Start: 2017-12-06 | End: 2017-12-07

## 2017-12-07 LAB
ALBUMIN SERPL-MCNC: 2.5 G/DL (ref 3.4–4.8)
ALBUMIN/GLOB SERPL: 0.9 G/DL (ref 1.1–1.8)
ALP SERPL-CCNC: 93 U/L (ref 38–126)
ALT SERPL W P-5'-P-CCNC: 32 U/L (ref 9–52)
ANION GAP SERPL CALCULATED.3IONS-SCNC: 5 MMOL/L (ref 5–15)
ANISOCYTOSIS BLD QL: NORMAL
AST SERPL-CCNC: 41 U/L (ref 14–36)
BACTERIA SPEC AEROBE CULT: ABNORMAL
BILIRUB SERPL-MCNC: 0.2 MG/DL (ref 0.2–1.3)
BUN BLD-MCNC: 25 MG/DL (ref 7–21)
BUN/CREAT SERPL: 17.2 (ref 7–25)
CALCIUM SPEC-SCNC: 7.8 MG/DL (ref 8.4–10.2)
CHLORIDE SERPL-SCNC: 101 MMOL/L (ref 95–110)
CO2 SERPL-SCNC: 26 MMOL/L (ref 22–31)
CREAT BLD-MCNC: 1.45 MG/DL (ref 0.5–1)
DEPRECATED RDW RBC AUTO: 49.1 FL (ref 36.4–46.3)
ERYTHROCYTE [DISTWIDTH] IN BLOOD BY AUTOMATED COUNT: 15.4 % (ref 11.5–14.5)
GFR SERPL CREATININE-BSD FRML MDRD: 36 ML/MIN/1.73 (ref 60–104)
GLOBULIN UR ELPH-MCNC: 2.8 GM/DL (ref 2.3–3.5)
GLUCOSE BLD-MCNC: 123 MG/DL (ref 60–100)
GLUCOSE BLDC GLUCOMTR-MCNC: 125 MG/DL (ref 70–130)
GLUCOSE BLDC GLUCOMTR-MCNC: 93 MG/DL (ref 70–130)
GRAM STN SPEC: ABNORMAL
HCT VFR BLD AUTO: 23.6 % (ref 35–45)
HGB BLD-MCNC: 7.6 G/DL (ref 12–15.5)
HYPOCHROMIA BLD QL: NORMAL
MAGNESIUM SERPL-MCNC: 2.3 MG/DL (ref 1.6–2.3)
MCH RBC QN AUTO: 28.3 PG (ref 26.5–34)
MCHC RBC AUTO-ENTMCNC: 32.2 G/DL (ref 31.4–36)
MCV RBC AUTO: 87.7 FL (ref 80–98)
PHOSPHATE SERPL-MCNC: 2.1 MG/DL (ref 2.4–4.4)
PLATELET # BLD AUTO: 82 10*3/MM3 (ref 150–450)
PMV BLD AUTO: 12.2 FL (ref 8–12)
POLYCHROMASIA BLD QL SMEAR: NORMAL
POTASSIUM BLD-SCNC: 4.2 MMOL/L (ref 3.5–5.1)
PROT SERPL-MCNC: 5.3 G/DL (ref 6.3–8.6)
RBC # BLD AUTO: 2.69 10*6/MM3 (ref 3.77–5.16)
SMALL PLATELETS BLD QL SMEAR: NORMAL
SODIUM BLD-SCNC: 132 MMOL/L (ref 137–145)
WBC MORPH BLD: NORMAL
WBC NRBC COR # BLD: 4.48 10*3/MM3 (ref 3.2–9.8)

## 2017-12-07 PROCEDURE — 97116 GAIT TRAINING THERAPY: CPT

## 2017-12-07 PROCEDURE — 85027 COMPLETE CBC AUTOMATED: CPT | Performed by: INTERNAL MEDICINE

## 2017-12-07 PROCEDURE — 25010000002 POTASSIUM CHLORIDE PER 2 MEQ OF POTASSIUM: Performed by: INTERNAL MEDICINE

## 2017-12-07 PROCEDURE — 25010000002 HEPARIN LOCK FLUSH 10 UNIT/ML SOLUTION: Performed by: INTERNAL MEDICINE

## 2017-12-07 PROCEDURE — 85007 BL SMEAR W/DIFF WBC COUNT: CPT | Performed by: INTERNAL MEDICINE

## 2017-12-07 PROCEDURE — 97530 THERAPEUTIC ACTIVITIES: CPT

## 2017-12-07 PROCEDURE — 82962 GLUCOSE BLOOD TEST: CPT

## 2017-12-07 PROCEDURE — 97542 WHEELCHAIR MNGMENT TRAINING: CPT

## 2017-12-07 PROCEDURE — 97110 THERAPEUTIC EXERCISES: CPT

## 2017-12-07 PROCEDURE — 84100 ASSAY OF PHOSPHORUS: CPT | Performed by: INTERNAL MEDICINE

## 2017-12-07 PROCEDURE — 80053 COMPREHEN METABOLIC PANEL: CPT | Performed by: INTERNAL MEDICINE

## 2017-12-07 PROCEDURE — 25010000002 MAGNESIUM SULFATE PER 500 MG OF MAGNESIUM: Performed by: INTERNAL MEDICINE

## 2017-12-07 PROCEDURE — 25010000002 MEROPENEM: Performed by: NURSE PRACTITIONER

## 2017-12-07 PROCEDURE — 83735 ASSAY OF MAGNESIUM: CPT | Performed by: INTERNAL MEDICINE

## 2017-12-07 PROCEDURE — 25010000002 CALCIUM GLUCONATE PER 10 ML: Performed by: INTERNAL MEDICINE

## 2017-12-07 NOTE — PROGRESS NOTES
Parenteral Nutrition by Pharmacy    Patient: Irma Pacheco  MRN#: [id number]  Attending: No name on file.  Admission Date: 101717    Subjective/Objective  Progress notes reviewed.     Daily Assessment  Lab Results   Component Value Date    GLUCOSE 123 (H) 12/07/2017    BUN 25 (H) 12/07/2017    CREATININE 1.45 (H) 12/07/2017    EGFRIFNONA 36 (L) 12/07/2017    BCR 17.2 12/07/2017    K 4.2 12/07/2017    CO2 26.0 12/07/2017    CALCIUM 7.8 (L) 12/07/2017    ALBUMIN 2.50 (L) 12/07/2017    LABIL2 0.9 (L) 12/07/2017    AST 41 (H) 12/07/2017    ALT 32 12/07/2017     Lab Results   Component Value Date    GLUCOSE 123 (H) 12/07/2017    CALCIUM 7.8 (L) 12/07/2017     (L) 12/07/2017    K 4.2 12/07/2017    CO2 26.0 12/07/2017     12/07/2017    BUN 25 (H) 12/07/2017    CREATININE 1.45 (H) 12/07/2017    EGFRIFNONA 36 (L) 12/07/2017    BCR 17.2 12/07/2017    ANIONGAP 5.0 12/07/2017     Magnesium   Date Value Ref Range Status   12/07/2017 2.3 1.6 - 2.3 mg/dL Final     Phosphorus   Date Value Ref Range Status   12/07/2017 2.1 (L) 2.4 - 4.4 mg/dL Final     Calcium   Date Value Ref Range Status   12/07/2017 7.8 (L) 8.4 - 10.2 mg/dL Final     Triglycerides   Date Value Ref Range Status   11/20/2017 112 20 - 199 mg/dL Final     Above labs reviewed.    Plan   Electrolytes within normal limits, Sodium slightly low but stable.  Starting clear liquid diet today.  No change to TPN.  Pharmacy will continue to monitor and adjust formula as needed.    Teddy Lipscomb III, [facility]  12/07/17  11:03 AM

## 2017-12-08 LAB
GLUCOSE BLDC GLUCOMTR-MCNC: 103 MG/DL (ref 70–130)
GLUCOSE BLDC GLUCOMTR-MCNC: 119 MG/DL (ref 70–130)
GLUCOSE BLDC GLUCOMTR-MCNC: 127 MG/DL (ref 70–130)
GLUCOSE BLDC GLUCOMTR-MCNC: 222 MG/DL (ref 70–130)

## 2017-12-08 PROCEDURE — 97535 SELF CARE MNGMENT TRAINING: CPT

## 2017-12-08 PROCEDURE — 97116 GAIT TRAINING THERAPY: CPT

## 2017-12-08 PROCEDURE — 25010000002 MAGNESIUM SULFATE PER 500 MG OF MAGNESIUM: Performed by: INTERNAL MEDICINE

## 2017-12-08 PROCEDURE — 82962 GLUCOSE BLOOD TEST: CPT

## 2017-12-08 PROCEDURE — 25010000002 CALCIUM GLUCONATE PER 10 ML: Performed by: INTERNAL MEDICINE

## 2017-12-08 PROCEDURE — 25010000002 POTASSIUM CHLORIDE PER 2 MEQ OF POTASSIUM: Performed by: INTERNAL MEDICINE

## 2017-12-08 PROCEDURE — 25010000002 MEROPENEM: Performed by: NURSE PRACTITIONER

## 2017-12-08 PROCEDURE — 97530 THERAPEUTIC ACTIVITIES: CPT

## 2017-12-08 PROCEDURE — 25010000002 HEPARIN LOCK FLUSH 10 UNIT/ML SOLUTION: Performed by: INTERNAL MEDICINE

## 2017-12-08 RX ORDER — OXYCODONE AND ACETAMINOPHEN 7.5; 325 MG/1; MG/1
1 TABLET ORAL EVERY 6 HOURS PRN
Status: DISCONTINUED | OUTPATIENT
Start: 2017-12-08 | End: 2017-12-14 | Stop reason: HOSPADM

## 2017-12-08 RX ORDER — SODIUM CHLORIDE 9 MG/ML
INJECTION, SOLUTION INTRAVENOUS
Status: DISPENSED
Start: 2017-12-08 | End: 2017-12-08

## 2017-12-08 RX ORDER — OXYCODONE HYDROCHLORIDE 5 MG/1
7.5 TABLET ORAL EVERY 6 HOURS PRN
Status: DISCONTINUED | OUTPATIENT
Start: 2017-12-08 | End: 2017-12-08 | Stop reason: ALTCHOICE

## 2017-12-08 NOTE — THERAPY TREATMENT NOTE
Acute Care - Occupational Therapy Treatment Note  [facility]     Patient Name: Irma Pacheco  : 1950  MRN: [id number]  Today's Date: 2017               Admit Date: 10/17/2017    Visit Dx:     ICD-10-CM ICD-9-CM   1. Impaired functional mobility, balance, gait, and endurance Z74.09 V49.89   2. Impaired mobility and ADLs Z74.09 799.89   3. Decreased activities of daily living (ADL) Z78.9 V49.89     Patient Active Problem List   Diagnosis   • Intra-abdominal abscess   • Enterocutaneous fistula                     OT Recommendation and Plan             Time Calculation:         Time Calculation- OT       17 1249          Time Calculation- OT    OT Start Time 0655  -BB      OT Stop Time 0754  -BB      OT Time Calculation (min) 59 min  -BB      Total Timed Code Minutes- OT 59 minute(s)  -BB      OT Received On 17  -BB        User Key  (r) = Recorded By, (t) = Taken By, (c) = Cosigned By    Initials Name Provider Type    BB GITA Malin/L Occupational Therapy Assistant           Therapy Charges for Today     Code Description Service Date Service Provider Modifiers Qty    09489508658 HC OT SELF CARE/MGMT/TRAIN EA 15 MIN 2017 FREDDY Malin GO 2    76835523750 HC OT THERAPEUTIC ACT EA 15 MIN 2017 FREDDY Malin GO 2               FREDDY Malin  2017

## 2017-12-09 LAB
ALBUMIN SERPL-MCNC: 2.7 G/DL (ref 3.4–4.8)
ALBUMIN/GLOB SERPL: 1 G/DL (ref 1.1–1.8)
ALP SERPL-CCNC: 90 U/L (ref 38–126)
ALT SERPL W P-5'-P-CCNC: 52 U/L (ref 9–52)
ANION GAP SERPL CALCULATED.3IONS-SCNC: 7 MMOL/L (ref 5–15)
AST SERPL-CCNC: 47 U/L (ref 14–36)
BILIRUB SERPL-MCNC: 0.1 MG/DL (ref 0.2–1.3)
BUN BLD-MCNC: 18 MG/DL (ref 7–21)
BUN/CREAT SERPL: 16.5 (ref 7–25)
CALCIUM SPEC-SCNC: 8.1 MG/DL (ref 8.4–10.2)
CHLORIDE SERPL-SCNC: 104 MMOL/L (ref 95–110)
CO2 SERPL-SCNC: 27 MMOL/L (ref 22–31)
CREAT BLD-MCNC: 1.09 MG/DL (ref 0.5–1)
DEPRECATED RDW RBC AUTO: 50.2 FL (ref 36.4–46.3)
ERYTHROCYTE [DISTWIDTH] IN BLOOD BY AUTOMATED COUNT: 15.5 % (ref 11.5–14.5)
GFR SERPL CREATININE-BSD FRML MDRD: 50 ML/MIN/1.73 (ref 45–104)
GLOBULIN UR ELPH-MCNC: 2.7 GM/DL (ref 2.3–3.5)
GLUCOSE BLD-MCNC: 104 MG/DL (ref 60–100)
GLUCOSE BLDC GLUCOMTR-MCNC: 106 MG/DL (ref 70–130)
GLUCOSE BLDC GLUCOMTR-MCNC: 108 MG/DL (ref 70–130)
GLUCOSE BLDC GLUCOMTR-MCNC: 119 MG/DL (ref 70–130)
GLUCOSE BLDC GLUCOMTR-MCNC: 138 MG/DL (ref 70–130)
GLUCOSE BLDC GLUCOMTR-MCNC: 144 MG/DL (ref 70–130)
HCT VFR BLD AUTO: 26.1 % (ref 35–45)
HGB BLD-MCNC: 8.2 G/DL (ref 12–15.5)
MCH RBC QN AUTO: 27.7 PG (ref 26.5–34)
MCHC RBC AUTO-ENTMCNC: 31.4 G/DL (ref 31.4–36)
MCV RBC AUTO: 88.2 FL (ref 80–98)
PLATELET # BLD AUTO: 113 10*3/MM3 (ref 150–450)
PMV BLD AUTO: 12 FL (ref 8–12)
POTASSIUM BLD-SCNC: 4.3 MMOL/L (ref 3.5–5.1)
PROT SERPL-MCNC: 5.4 G/DL (ref 6.3–8.6)
RBC # BLD AUTO: 2.96 10*6/MM3 (ref 3.77–5.16)
SODIUM BLD-SCNC: 138 MMOL/L (ref 137–145)
WBC NRBC COR # BLD: 4.63 10*3/MM3 (ref 3.2–9.8)

## 2017-12-09 PROCEDURE — 85027 COMPLETE CBC AUTOMATED: CPT | Performed by: INTERNAL MEDICINE

## 2017-12-09 PROCEDURE — 25010000002 CALCIUM GLUCONATE PER 10 ML: Performed by: INTERNAL MEDICINE

## 2017-12-09 PROCEDURE — 82962 GLUCOSE BLOOD TEST: CPT

## 2017-12-09 PROCEDURE — 25010000002 POTASSIUM CHLORIDE PER 2 MEQ OF POTASSIUM: Performed by: INTERNAL MEDICINE

## 2017-12-09 PROCEDURE — 25010000002 MEROPENEM: Performed by: NURSE PRACTITIONER

## 2017-12-09 PROCEDURE — 97110 THERAPEUTIC EXERCISES: CPT

## 2017-12-09 PROCEDURE — 80053 COMPREHEN METABOLIC PANEL: CPT | Performed by: INTERNAL MEDICINE

## 2017-12-09 PROCEDURE — 97535 SELF CARE MNGMENT TRAINING: CPT

## 2017-12-09 PROCEDURE — 97530 THERAPEUTIC ACTIVITIES: CPT

## 2017-12-09 PROCEDURE — 25010000002 HEPARIN LOCK FLUSH 10 UNIT/ML SOLUTION: Performed by: INTERNAL MEDICINE

## 2017-12-09 PROCEDURE — 25010000002 MAGNESIUM SULFATE PER 500 MG OF MAGNESIUM: Performed by: INTERNAL MEDICINE

## 2017-12-09 NOTE — PROGRESS NOTES
Parenteral Nutrition by Pharmacy    Patient: Irma Pacheco  MRN#: [id number]  Attending: No name on file.  Admission Date: 101717    Subjective/Objective  Progress notes reviewed.     Daily Assessment  Lab Results   Component Value Date    GLUCOSE 104 (H) 12/09/2017    BUN 18 12/09/2017    CREATININE 1.09 (H) 12/09/2017    EGFRIFNONA 50 12/09/2017    BCR 16.5 12/09/2017    K 4.3 12/09/2017    CO2 27.0 12/09/2017    CALCIUM 8.1 (L) 12/09/2017    ALBUMIN 2.70 (L) 12/09/2017    LABIL2 1.0 (L) 12/09/2017    AST 47 (H) 12/09/2017    ALT 52 12/09/2017     Lab Results   Component Value Date    GLUCOSE 104 (H) 12/09/2017    CALCIUM 8.1 (L) 12/09/2017     12/09/2017    K 4.3 12/09/2017    CO2 27.0 12/09/2017     12/09/2017    BUN 18 12/09/2017    CREATININE 1.09 (H) 12/09/2017    EGFRIFNONA 50 12/09/2017    BCR 16.5 12/09/2017    ANIONGAP 7.0 12/09/2017     Magnesium   Date Value Ref Range Status   12/07/2017 2.3 1.6 - 2.3 mg/dL Final     Phosphorus   Date Value Ref Range Status   12/07/2017 2.1 (L) 2.4 - 4.4 mg/dL Final     Calcium   Date Value Ref Range Status   12/09/2017 8.1 (L) 8.4 - 10.2 mg/dL Final     Triglycerides   Date Value Ref Range Status   11/20/2017 112 20 - 199 mg/dL Final     Above labs reviewed.  Corrected Ca: 9.14    Plan   Labs WNL. No changes to formula today.  Pharmacy will continue to monitor and adjust formula as needed.    Jovany Skinner RP  12/09/17  8:28 AM

## 2017-12-10 LAB — GLUCOSE BLDC GLUCOMTR-MCNC: 118 MG/DL (ref 70–130)

## 2017-12-10 PROCEDURE — 25010000002 MAGNESIUM SULFATE PER 500 MG OF MAGNESIUM: Performed by: INTERNAL MEDICINE

## 2017-12-10 PROCEDURE — 82962 GLUCOSE BLOOD TEST: CPT

## 2017-12-10 PROCEDURE — 25010000002 CALCIUM GLUCONATE PER 10 ML: Performed by: INTERNAL MEDICINE

## 2017-12-10 PROCEDURE — 25010000002 MEROPENEM: Performed by: NURSE PRACTITIONER

## 2017-12-10 PROCEDURE — 25010000002 POTASSIUM CHLORIDE PER 2 MEQ OF POTASSIUM: Performed by: INTERNAL MEDICINE

## 2017-12-10 PROCEDURE — 25010000002 HEPARIN LOCK FLUSH 10 UNIT/ML SOLUTION: Performed by: INTERNAL MEDICINE

## 2017-12-10 RX ORDER — FLUCONAZOLE 150 MG/1
150 TABLET ORAL ONCE
Status: DISCONTINUED | OUTPATIENT
Start: 2017-12-10 | End: 2017-12-11

## 2017-12-10 NOTE — PROGRESS NOTES
Parenteral Nutrition by Pharmacy    Patient: Irma Pacheco  MRN#: [id number]  Attending: No name on file.  Admission Date: 101717    Subjective/Objective  Progress notes reviewed.     Daily Assessment  Lab Results   Component Value Date    GLUCOSE 104 (H) 12/09/2017    BUN 18 12/09/2017    CREATININE 1.09 (H) 12/09/2017    EGFRIFNONA 50 12/09/2017    BCR 16.5 12/09/2017    K 4.3 12/09/2017    CO2 27.0 12/09/2017    CALCIUM 8.1 (L) 12/09/2017    ALBUMIN 2.70 (L) 12/09/2017    LABIL2 1.0 (L) 12/09/2017    AST 47 (H) 12/09/2017    ALT 52 12/09/2017     Lab Results   Component Value Date    GLUCOSE 104 (H) 12/09/2017    CALCIUM 8.1 (L) 12/09/2017     12/09/2017    K 4.3 12/09/2017    CO2 27.0 12/09/2017     12/09/2017    BUN 18 12/09/2017    CREATININE 1.09 (H) 12/09/2017    EGFRIFNONA 50 12/09/2017    BCR 16.5 12/09/2017    ANIONGAP 7.0 12/09/2017     Magnesium   Date Value Ref Range Status   12/07/2017 2.3 1.6 - 2.3 mg/dL Final     Phosphorus   Date Value Ref Range Status   12/07/2017 2.1 (L) 2.4 - 4.4 mg/dL Final     Calcium   Date Value Ref Range Status   12/09/2017 8.1 (L) 8.4 - 10.2 mg/dL Final     Triglycerides   Date Value Ref Range Status   11/20/2017 112 20 - 199 mg/dL Final     Above labs reviewed.    Plan   No new labs today. No changes to formula.  Pharmacy will continue to monitor and adjust formula as needed.    Jovany Skinner RPH  12/10/17  8:19 AM

## 2017-12-11 LAB
ALBUMIN SERPL-MCNC: 2.9 G/DL (ref 3.4–4.8)
ALBUMIN/GLOB SERPL: 0.9 G/DL (ref 1.1–1.8)
ALP SERPL-CCNC: 103 U/L (ref 38–126)
ALT SERPL W P-5'-P-CCNC: 39 U/L (ref 9–52)
ANION GAP SERPL CALCULATED.3IONS-SCNC: 6 MMOL/L (ref 5–15)
AST SERPL-CCNC: 41 U/L (ref 14–36)
BILIRUB SERPL-MCNC: 0.2 MG/DL (ref 0.2–1.3)
BUN BLD-MCNC: 20 MG/DL (ref 7–21)
BUN/CREAT SERPL: 17.5 (ref 7–25)
CALCIUM SPEC-SCNC: 8.5 MG/DL (ref 8.4–10.2)
CHLORIDE SERPL-SCNC: 100 MMOL/L (ref 95–110)
CO2 SERPL-SCNC: 31 MMOL/L (ref 22–31)
CREAT BLD-MCNC: 1.14 MG/DL (ref 0.5–1)
DEPRECATED RDW RBC AUTO: 51.4 FL (ref 36.4–46.3)
ERYTHROCYTE [DISTWIDTH] IN BLOOD BY AUTOMATED COUNT: 15.7 % (ref 11.5–14.5)
GFR SERPL CREATININE-BSD FRML MDRD: 48 ML/MIN/1.73 (ref 60–104)
GLOBULIN UR ELPH-MCNC: 3.1 GM/DL (ref 2.3–3.5)
GLUCOSE BLD-MCNC: 98 MG/DL (ref 60–100)
GLUCOSE BLDC GLUCOMTR-MCNC: 115 MG/DL (ref 70–130)
GLUCOSE BLDC GLUCOMTR-MCNC: 117 MG/DL (ref 70–130)
GLUCOSE BLDC GLUCOMTR-MCNC: 118 MG/DL (ref 70–130)
GLUCOSE BLDC GLUCOMTR-MCNC: 123 MG/DL (ref 70–130)
GLUCOSE BLDC GLUCOMTR-MCNC: 127 MG/DL (ref 70–130)
GLUCOSE BLDC GLUCOMTR-MCNC: 131 MG/DL (ref 70–130)
HCT VFR BLD AUTO: 29.1 % (ref 35–45)
HGB BLD-MCNC: 9.1 G/DL (ref 12–15.5)
MAGNESIUM SERPL-MCNC: 2.1 MG/DL (ref 1.6–2.3)
MCH RBC QN AUTO: 28.3 PG (ref 26.5–34)
MCHC RBC AUTO-ENTMCNC: 31.3 G/DL (ref 31.4–36)
MCV RBC AUTO: 90.4 FL (ref 80–98)
PHOSPHATE SERPL-MCNC: 2.8 MG/DL (ref 2.4–4.4)
PLATELET # BLD AUTO: 188 10*3/MM3 (ref 150–450)
PMV BLD AUTO: 11.1 FL (ref 8–12)
POTASSIUM BLD-SCNC: 4.4 MMOL/L (ref 3.5–5.1)
PROT SERPL-MCNC: 6 G/DL (ref 6.3–8.6)
RBC # BLD AUTO: 3.22 10*6/MM3 (ref 3.77–5.16)
SODIUM BLD-SCNC: 137 MMOL/L (ref 137–145)
WBC NRBC COR # BLD: 5.72 10*3/MM3 (ref 3.2–9.8)

## 2017-12-11 PROCEDURE — 85027 COMPLETE CBC AUTOMATED: CPT | Performed by: INTERNAL MEDICINE

## 2017-12-11 PROCEDURE — 25010000002 CALCIUM GLUCONATE PER 10 ML: Performed by: INTERNAL MEDICINE

## 2017-12-11 PROCEDURE — 82962 GLUCOSE BLOOD TEST: CPT

## 2017-12-11 PROCEDURE — 97535 SELF CARE MNGMENT TRAINING: CPT

## 2017-12-11 PROCEDURE — 80053 COMPREHEN METABOLIC PANEL: CPT | Performed by: INTERNAL MEDICINE

## 2017-12-11 PROCEDURE — 83735 ASSAY OF MAGNESIUM: CPT | Performed by: INTERNAL MEDICINE

## 2017-12-11 PROCEDURE — 25010000002 POTASSIUM CHLORIDE PER 2 MEQ OF POTASSIUM: Performed by: INTERNAL MEDICINE

## 2017-12-11 PROCEDURE — 25010000002 POTASSIUM CHLORIDE PER 2 MEQ OF POTASSIUM: Performed by: SURGERY

## 2017-12-11 PROCEDURE — 25010000002 MEROPENEM: Performed by: NURSE PRACTITIONER

## 2017-12-11 PROCEDURE — 25010000002 MAGNESIUM SULFATE PER 500 MG OF MAGNESIUM: Performed by: INTERNAL MEDICINE

## 2017-12-11 PROCEDURE — 25010000002 CALCIUM GLUCONATE PER 10 ML: Performed by: SURGERY

## 2017-12-11 PROCEDURE — 25010000002 MAGNESIUM SULFATE PER 500 MG OF MAGNESIUM: Performed by: SURGERY

## 2017-12-11 PROCEDURE — 97116 GAIT TRAINING THERAPY: CPT

## 2017-12-11 PROCEDURE — 97530 THERAPEUTIC ACTIVITIES: CPT

## 2017-12-11 PROCEDURE — 84100 ASSAY OF PHOSPHORUS: CPT | Performed by: INTERNAL MEDICINE

## 2017-12-11 RX ORDER — FLUCONAZOLE 150 MG/1
150 TABLET ORAL ONCE
Status: DISCONTINUED | OUTPATIENT
Start: 2017-12-11 | End: 2017-12-11

## 2017-12-11 RX ORDER — FLUCONAZOLE 150 MG/1
150 TABLET ORAL ONCE
Status: DISCONTINUED | OUTPATIENT
Start: 2017-12-13 | End: 2017-12-14 | Stop reason: HOSPADM

## 2017-12-11 NOTE — PROGRESS NOTES
Parenteral Nutrition by Pharmacy    Patient: Irma Pacheco  MRN#: [id number]  Attending: No name on file.  Admission Date: 101717    Subjective/Objective  Progress notes reviewed.     Daily Assessment  Lab Results   Component Value Date    GLUCOSE 98 12/11/2017    BUN 20 12/11/2017    CREATININE 1.14 (H) 12/11/2017    EGFRIFNONA 48 (L) 12/11/2017    BCR 17.5 12/11/2017    K 4.4 12/11/2017    CO2 31.0 12/11/2017    CALCIUM 8.5 12/11/2017    ALBUMIN 2.90 (L) 12/11/2017    LABIL2 0.9 (L) 12/11/2017    AST 41 (H) 12/11/2017    ALT 39 12/11/2017     Lab Results   Component Value Date    GLUCOSE 98 12/11/2017    CALCIUM 8.5 12/11/2017     12/11/2017    K 4.4 12/11/2017    CO2 31.0 12/11/2017     12/11/2017    BUN 20 12/11/2017    CREATININE 1.14 (H) 12/11/2017    EGFRIFNONA 48 (L) 12/11/2017    BCR 17.5 12/11/2017    ANIONGAP 6.0 12/11/2017     Magnesium   Date Value Ref Range Status   12/11/2017 2.1 1.6 - 2.3 mg/dL Final     Phosphorus   Date Value Ref Range Status   12/11/2017 2.8 2.4 - 4.4 mg/dL Final     Calcium   Date Value Ref Range Status   12/11/2017 8.5 8.4 - 10.2 mg/dL Final     Triglycerides   Date Value Ref Range Status   11/20/2017 112 20 - 199 mg/dL Final     Above labs reviewed.    Plan   Electrolytes WNL.  CO2 continues to rise, will remove sodium acetate from TPN.  Starting regular diet today, will continue TPN for now and see how patient tolerates diet.   Pharmacy will continue to monitor and adjust formula as needed.    Teddy Lipscomb III, [facility]  12/11/17  11:55 AM

## 2017-12-12 LAB
GLUCOSE BLDC GLUCOMTR-MCNC: 109 MG/DL (ref 70–130)
GLUCOSE BLDC GLUCOMTR-MCNC: 114 MG/DL (ref 70–130)
GLUCOSE BLDC GLUCOMTR-MCNC: 114 MG/DL (ref 70–130)
GLUCOSE BLDC GLUCOMTR-MCNC: 125 MG/DL (ref 70–130)
GLUCOSE BLDC GLUCOMTR-MCNC: 171 MG/DL (ref 70–130)

## 2017-12-12 PROCEDURE — 25010000002 MAGNESIUM SULFATE PER 500 MG OF MAGNESIUM: Performed by: SURGERY

## 2017-12-12 PROCEDURE — 82962 GLUCOSE BLOOD TEST: CPT

## 2017-12-12 PROCEDURE — 25010000002 HEPARIN LOCK FLUSH 10 UNIT/ML SOLUTION: Performed by: INTERNAL MEDICINE

## 2017-12-12 PROCEDURE — 25010000002 MEROPENEM: Performed by: NURSE PRACTITIONER

## 2017-12-12 PROCEDURE — 25010000002 CALCIUM GLUCONATE PER 10 ML: Performed by: SURGERY

## 2017-12-12 PROCEDURE — 97530 THERAPEUTIC ACTIVITIES: CPT

## 2017-12-12 PROCEDURE — 97116 GAIT TRAINING THERAPY: CPT

## 2017-12-12 PROCEDURE — 25010000002 POTASSIUM CHLORIDE PER 2 MEQ OF POTASSIUM: Performed by: SURGERY

## 2017-12-12 PROCEDURE — 97535 SELF CARE MNGMENT TRAINING: CPT

## 2017-12-12 NOTE — PROGRESS NOTES
Parenteral Nutrition by Pharmacy    Patient: Irma Pacheco  MRN#: [id number]  Attending: No name on file.  Admission Date: 101717    Subjective/Objective  Progress notes reviewed.     Daily Assessment  Lab Results   Component Value Date    GLUCOSE 98 12/11/2017    BUN 20 12/11/2017    CREATININE 1.14 (H) 12/11/2017    EGFRIFNONA 48 (L) 12/11/2017    BCR 17.5 12/11/2017    K 4.4 12/11/2017    CO2 31.0 12/11/2017    CALCIUM 8.5 12/11/2017    ALBUMIN 2.90 (L) 12/11/2017    LABIL2 0.9 (L) 12/11/2017    AST 41 (H) 12/11/2017    ALT 39 12/11/2017     Lab Results   Component Value Date    GLUCOSE 98 12/11/2017    CALCIUM 8.5 12/11/2017     12/11/2017    K 4.4 12/11/2017    CO2 31.0 12/11/2017     12/11/2017    BUN 20 12/11/2017    CREATININE 1.14 (H) 12/11/2017    EGFRIFNONA 48 (L) 12/11/2017    BCR 17.5 12/11/2017    ANIONGAP 6.0 12/11/2017     Magnesium   Date Value Ref Range Status   12/11/2017 2.1 1.6 - 2.3 mg/dL Final     Phosphorus   Date Value Ref Range Status   12/11/2017 2.8 2.4 - 4.4 mg/dL Final     Calcium   Date Value Ref Range Status   12/11/2017 8.5 8.4 - 10.2 mg/dL Final     Triglycerides   Date Value Ref Range Status   11/20/2017 112 20 - 199 mg/dL Final     Above labs reviewed.    Plan   No new labs today.  Patient tolerating normal diet, will decrease rate to 40 m/hr. Dr Marcum wishes to continue TPN  for now to make sure fistulas have resolved.     No changes to formula.     Pharmacy will continue to monitor and adjust formula as needed.    Jovany Skinner RPH  12/12/17  9:06 AM

## 2017-12-13 LAB
ALBUMIN SERPL-MCNC: 2.9 G/DL (ref 3.4–4.8)
ALBUMIN/GLOB SERPL: 1 G/DL (ref 1.1–1.8)
ALP SERPL-CCNC: 102 U/L (ref 38–126)
ALT SERPL W P-5'-P-CCNC: 42 U/L (ref 9–52)
ANION GAP SERPL CALCULATED.3IONS-SCNC: 8 MMOL/L (ref 5–15)
AST SERPL-CCNC: 41 U/L (ref 14–36)
BILIRUB SERPL-MCNC: 0.2 MG/DL (ref 0.2–1.3)
BUN BLD-MCNC: 23 MG/DL (ref 7–21)
BUN/CREAT SERPL: 18.4 (ref 7–25)
CALCIUM SPEC-SCNC: 8.2 MG/DL (ref 8.4–10.2)
CHLORIDE SERPL-SCNC: 102 MMOL/L (ref 95–110)
CO2 SERPL-SCNC: 28 MMOL/L (ref 22–31)
CREAT BLD-MCNC: 1.25 MG/DL (ref 0.5–1)
DEPRECATED RDW RBC AUTO: 53.6 FL (ref 36.4–46.3)
ERYTHROCYTE [DISTWIDTH] IN BLOOD BY AUTOMATED COUNT: 16.1 % (ref 11.5–14.5)
GFR SERPL CREATININE-BSD FRML MDRD: 43 ML/MIN/1.73 (ref 45–104)
GLOBULIN UR ELPH-MCNC: 2.8 GM/DL (ref 2.3–3.5)
GLUCOSE BLD-MCNC: 85 MG/DL (ref 60–100)
GLUCOSE BLDC GLUCOMTR-MCNC: 116 MG/DL (ref 70–130)
HCT VFR BLD AUTO: 27.5 % (ref 35–45)
HGB BLD-MCNC: 8.6 G/DL (ref 12–15.5)
MCH RBC QN AUTO: 28.5 PG (ref 26.5–34)
MCHC RBC AUTO-ENTMCNC: 31.3 G/DL (ref 31.4–36)
MCV RBC AUTO: 91.1 FL (ref 80–98)
PLATELET # BLD AUTO: 252 10*3/MM3 (ref 150–450)
PMV BLD AUTO: 10.1 FL (ref 8–12)
POTASSIUM BLD-SCNC: 4.2 MMOL/L (ref 3.5–5.1)
PROT SERPL-MCNC: 5.7 G/DL (ref 6.3–8.6)
RBC # BLD AUTO: 3.02 10*6/MM3 (ref 3.77–5.16)
SODIUM BLD-SCNC: 138 MMOL/L (ref 137–145)
WBC NRBC COR # BLD: 6.39 10*3/MM3 (ref 3.2–9.8)

## 2017-12-13 PROCEDURE — 80053 COMPREHEN METABOLIC PANEL: CPT | Performed by: INTERNAL MEDICINE

## 2017-12-13 PROCEDURE — 85027 COMPLETE CBC AUTOMATED: CPT | Performed by: INTERNAL MEDICINE

## 2017-12-13 PROCEDURE — 97535 SELF CARE MNGMENT TRAINING: CPT

## 2017-12-13 PROCEDURE — 97110 THERAPEUTIC EXERCISES: CPT

## 2017-12-13 PROCEDURE — 25010000002 HEPARIN LOCK FLUSH 10 UNIT/ML SOLUTION: Performed by: INTERNAL MEDICINE

## 2017-12-13 PROCEDURE — 97530 THERAPEUTIC ACTIVITIES: CPT

## 2017-12-13 PROCEDURE — 97116 GAIT TRAINING THERAPY: CPT

## 2017-12-13 PROCEDURE — 25010000002 MEROPENEM: Performed by: NURSE PRACTITIONER

## 2017-12-14 VITALS — HEART RATE: 73 BPM

## 2017-12-14 LAB
MAGNESIUM SERPL-MCNC: 2.3 MG/DL (ref 1.6–2.3)
PHOSPHATE SERPL-MCNC: 3.6 MG/DL (ref 2.4–4.4)
WHOLE BLOOD HOLD SPECIMEN: NORMAL

## 2017-12-14 PROCEDURE — 83735 ASSAY OF MAGNESIUM: CPT | Performed by: INTERNAL MEDICINE

## 2017-12-14 PROCEDURE — 97110 THERAPEUTIC EXERCISES: CPT

## 2017-12-14 PROCEDURE — 25010000002 HEPARIN LOCK FLUSH 10 UNIT/ML SOLUTION: Performed by: INTERNAL MEDICINE

## 2017-12-14 PROCEDURE — 84100 ASSAY OF PHOSPHORUS: CPT | Performed by: INTERNAL MEDICINE

## 2017-12-20 ENCOUNTER — OFFICE VISIT (OUTPATIENT)
Dept: SURGERY | Facility: CLINIC | Age: 67
End: 2017-12-20

## 2017-12-20 VITALS
WEIGHT: 276 LBS | HEIGHT: 60 IN | DIASTOLIC BLOOD PRESSURE: 74 MMHG | BODY MASS INDEX: 54.19 KG/M2 | SYSTOLIC BLOOD PRESSURE: 134 MMHG

## 2017-12-20 DIAGNOSIS — K63.2 ENTEROCUTANEOUS FISTULA: Primary | ICD-10-CM

## 2017-12-20 PROCEDURE — 99024 POSTOP FOLLOW-UP VISIT: CPT | Performed by: SURGERY

## 2017-12-20 RX ORDER — NYSTATIN 100000 U/G
1 CREAM TOPICAL AS NEEDED
COMMUNITY
Start: 2017-12-14

## 2017-12-20 RX ORDER — OXYCODONE AND ACETAMINOPHEN 7.5; 325 MG/1; MG/1
1 TABLET ORAL EVERY 6 HOURS PRN
Refills: 0 | COMMUNITY
Start: 2017-12-14

## 2017-12-20 RX ORDER — NYSTATIN 100000 [USP'U]/G
1 POWDER TOPICAL DAILY PRN
COMMUNITY
Start: 2017-12-14 | End: 2019-05-24

## 2017-12-20 RX ORDER — FUROSEMIDE 20 MG/1
1 TABLET ORAL DAILY
COMMUNITY
Start: 2017-12-19 | End: 2018-01-02 | Stop reason: DRUGHIGH

## 2017-12-20 NOTE — PROGRESS NOTES
Miss kennedy a 67-year-old lady who is recently hospitalized for almost 3 months.  She had stercoral or ulcerative rectal perforation followed by large intra-abdominal abscess.  Washout should postop she developed interspace fistula.  After long.  An L-type of nothing by mouth and TPN she finally healed her fistula.  She is at home with her daughter.  She is eating a regular diet.  Her ostomy is functioning.  Wound is greatly decreased in size and is mostly healed.  No further succus draining from her midline wound.  Satisfactory postoperative visit.  Follow-up in 2 weeks for another postop check.

## 2018-01-02 ENCOUNTER — OFFICE VISIT (OUTPATIENT)
Dept: SURGERY | Facility: CLINIC | Age: 68
End: 2018-01-02

## 2018-01-02 VITALS
HEIGHT: 60 IN | SYSTOLIC BLOOD PRESSURE: 124 MMHG | BODY MASS INDEX: 53.3 KG/M2 | WEIGHT: 271.5 LBS | DIASTOLIC BLOOD PRESSURE: 66 MMHG

## 2018-01-02 DIAGNOSIS — K65.1 INTRA-ABDOMINAL ABSCESS (HCC): Primary | ICD-10-CM

## 2018-01-02 PROCEDURE — 99024 POSTOP FOLLOW-UP VISIT: CPT | Performed by: SURGERY

## 2018-01-02 RX ORDER — NYSTATIN 100000 [USP'U]/G
POWDER TOPICAL
Qty: 30 G | Refills: 0 | Status: SHIPPED | OUTPATIENT
Start: 2018-01-02 | End: 2019-01-02

## 2018-01-02 RX ORDER — FUROSEMIDE 40 MG/1
40 TABLET ORAL DAILY
COMMUNITY
End: 2019-05-24

## 2018-01-02 NOTE — PROGRESS NOTES
Mrs. Naranjo is a 67-year-old lady removed status post perforated stercoral ulcer in her rectum followed by prolonged hospitalization.  She developed abscesses ease and entercutaneous fistulous.  She is now essentially totally healed.  Left lower quadrant end colostomy is functioning well.  She is eating and maintaining her weight.  She still in wheelchair which was beforehand.  Having a regular diet.  He still doing home health for wound care and ostomy care.  She's having some leakage around her ostomy appliance.  Wound is seen today and is essentially totally healed.  Has a small sliver to finish epithelializing.  We'll have her see Nicole the wound nurse today in clinic for further ostomy teaching.  Up with  In 1 month for another final wound check.  I discussed with her and she does not wish to have her ostomy reversed which I agree with.

## 2018-02-17 ENCOUNTER — APPOINTMENT (OUTPATIENT)
Dept: GENERAL RADIOLOGY | Facility: HOSPITAL | Age: 68
End: 2018-02-17

## 2018-02-17 ENCOUNTER — HOSPITAL ENCOUNTER (EMERGENCY)
Facility: HOSPITAL | Age: 68
Discharge: HOME | End: 2018-02-17
Attending: EMERGENCY MEDICINE | Admitting: EMERGENCY MEDICINE

## 2018-02-17 VITALS
WEIGHT: 275 LBS | OXYGEN SATURATION: 100 % | TEMPERATURE: 98.5 F | RESPIRATION RATE: 20 BRPM | SYSTOLIC BLOOD PRESSURE: 139 MMHG | HEART RATE: 68 BPM | DIASTOLIC BLOOD PRESSURE: 78 MMHG | BODY MASS INDEX: 53.99 KG/M2 | HEIGHT: 60 IN

## 2018-02-17 DIAGNOSIS — R06.00 DYSPNEA, UNSPECIFIED TYPE: ICD-10-CM

## 2018-02-17 DIAGNOSIS — I50.9 CONGESTIVE HEART FAILURE, UNSPECIFIED CONGESTIVE HEART FAILURE CHRONICITY, UNSPECIFIED CONGESTIVE HEART FAILURE TYPE: Primary | ICD-10-CM

## 2018-02-17 DIAGNOSIS — R60.9 NON-PITTING EDEMA: ICD-10-CM

## 2018-02-17 LAB
ALBUMIN SERPL-MCNC: 4 G/DL (ref 3.4–4.8)
ALBUMIN/GLOB SERPL: 1.3 G/DL (ref 1.1–1.8)
ALP SERPL-CCNC: 151 U/L (ref 38–126)
ALT SERPL W P-5'-P-CCNC: 32 U/L (ref 9–52)
ANION GAP SERPL CALCULATED.3IONS-SCNC: 13 MMOL/L (ref 5–15)
AST SERPL-CCNC: 39 U/L (ref 14–36)
BASOPHILS # BLD AUTO: 0.02 10*3/MM3 (ref 0–0.2)
BASOPHILS NFR BLD AUTO: 0.3 % (ref 0–2)
BILIRUB SERPL-MCNC: 0.1 MG/DL (ref 0.2–1.3)
BUN BLD-MCNC: 23 MG/DL (ref 7–21)
BUN/CREAT SERPL: 13.9 (ref 7–25)
CALCIUM SPEC-SCNC: 7.9 MG/DL (ref 8.4–10.2)
CHLORIDE SERPL-SCNC: 100 MMOL/L (ref 95–110)
CO2 SERPL-SCNC: 28 MMOL/L (ref 22–31)
CREAT BLD-MCNC: 1.65 MG/DL (ref 0.5–1)
DEPRECATED RDW RBC AUTO: 50 FL (ref 36.4–46.3)
EOSINOPHIL # BLD AUTO: 0.29 10*3/MM3 (ref 0–0.7)
EOSINOPHIL NFR BLD AUTO: 4.5 % (ref 0–7)
ERYTHROCYTE [DISTWIDTH] IN BLOOD BY AUTOMATED COUNT: 14.9 % (ref 11.5–14.5)
GFR SERPL CREATININE-BSD FRML MDRD: 31 ML/MIN/1.73 (ref 60–104)
GLOBULIN UR ELPH-MCNC: 3 GM/DL (ref 2.3–3.5)
GLUCOSE BLD-MCNC: 78 MG/DL (ref 60–100)
HCT VFR BLD AUTO: 35.5 % (ref 35–45)
HGB BLD-MCNC: 11.2 G/DL (ref 12–15.5)
HOLD SPECIMEN: NORMAL
IMM GRANULOCYTES # BLD: 0.01 10*3/MM3 (ref 0–0.02)
IMM GRANULOCYTES NFR BLD: 0.2 % (ref 0–0.5)
LYMPHOCYTES # BLD AUTO: 2.32 10*3/MM3 (ref 0.6–4.2)
LYMPHOCYTES NFR BLD AUTO: 36.1 % (ref 10–50)
MCH RBC QN AUTO: 29.6 PG (ref 26.5–34)
MCHC RBC AUTO-ENTMCNC: 31.5 G/DL (ref 31.4–36)
MCV RBC AUTO: 93.9 FL (ref 80–98)
MONOCYTES # BLD AUTO: 0.3 10*3/MM3 (ref 0–0.9)
MONOCYTES NFR BLD AUTO: 4.7 % (ref 0–12)
NEUTROPHILS # BLD AUTO: 3.48 10*3/MM3 (ref 2–8.6)
NEUTROPHILS NFR BLD AUTO: 54.2 % (ref 37–80)
NT-PROBNP SERPL-MCNC: 1680 PG/ML (ref 0–900)
PLATELET # BLD AUTO: 176 10*3/MM3 (ref 150–450)
PMV BLD AUTO: 10.4 FL (ref 8–12)
POTASSIUM BLD-SCNC: 4.7 MMOL/L (ref 3.5–5.1)
PROT SERPL-MCNC: 7 G/DL (ref 6.3–8.6)
RBC # BLD AUTO: 3.78 10*6/MM3 (ref 3.77–5.16)
SODIUM BLD-SCNC: 141 MMOL/L (ref 137–145)
TROPONIN I SERPL-MCNC: <0.012 NG/ML
WBC NRBC COR # BLD: 6.42 10*3/MM3 (ref 3.2–9.8)
WHOLE BLOOD HOLD SPECIMEN: NORMAL

## 2018-02-17 PROCEDURE — 80053 COMPREHEN METABOLIC PANEL: CPT | Performed by: EMERGENCY MEDICINE

## 2018-02-17 PROCEDURE — 71045 X-RAY EXAM CHEST 1 VIEW: CPT

## 2018-02-17 PROCEDURE — 84484 ASSAY OF TROPONIN QUANT: CPT | Performed by: EMERGENCY MEDICINE

## 2018-02-17 PROCEDURE — 93010 ELECTROCARDIOGRAM REPORT: CPT | Performed by: INTERNAL MEDICINE

## 2018-02-17 PROCEDURE — 99283 EMERGENCY DEPT VISIT LOW MDM: CPT

## 2018-02-17 PROCEDURE — 83880 ASSAY OF NATRIURETIC PEPTIDE: CPT | Performed by: EMERGENCY MEDICINE

## 2018-02-17 PROCEDURE — 93005 ELECTROCARDIOGRAM TRACING: CPT | Performed by: EMERGENCY MEDICINE

## 2018-02-17 PROCEDURE — 85025 COMPLETE CBC W/AUTO DIFF WBC: CPT | Performed by: EMERGENCY MEDICINE

## 2018-02-17 NOTE — ED PROVIDER NOTES
Subjective   HPI Comments: 68 years old female with history of hypertension, hyperlipidemia and, hypothyroidism, congestive heart failure with chronic bilateral lower extremity swelling presented in the ER with increasing swelling in the lower extremities.  It is bilateral.  She has minimal shortness of breath with exertion.  No chest pain or palpitations.  No abdominal pain.  She has been using Lasix 40 mg with no significant relief.  Her primary care switched her to 80 mg Lasix daily and her blood pressure was staying on the lower side so she stopped doing that.      History provided by:  Patient      Review of Systems   Constitutional: Negative for chills and fever.   HENT: Negative for congestion, facial swelling, rhinorrhea, sinus pressure and sore throat.    Eyes: Negative for photophobia and visual disturbance.   Respiratory: Positive for chest tightness and shortness of breath. Negative for cough and wheezing.    Cardiovascular: Negative for chest pain.   Gastrointestinal: Negative for abdominal pain, diarrhea, nausea and vomiting.   Endocrine: Negative for polyuria.   Genitourinary: Negative for flank pain.   Musculoskeletal: Negative for back pain, joint swelling and neck pain.   Skin: Negative for rash.   Neurological: Negative for seizures, numbness and headaches.   Hematological: Negative for adenopathy.   Psychiatric/Behavioral: Negative for agitation.       History reviewed. No pertinent past medical history.    Allergies   Allergen Reactions   • Codeine Palpitations     Chest Pain   • Milk-Related Compounds    • Motrin [Ibuprofen]    • Penicillins Other (See Comments)     Convulsions       Past Surgical History:   Procedure Laterality Date   • COLON RESECTION N/A 9/20/2017    Procedure: exploratory laparotomy, sigmoid colon resection and colostomy;  Surgeon: Timothy Dixon MD;  Location: [facility];  Service:    • EXPLORATORY LAPAROTOMY N/A 10/9/2017    Procedure: LAPAROTOMY EXPLORATORY,  drainage intra-abdominal abscess, washout;  Surgeon: Arnulfo Marcum MD;  Location: [facility] OR;  Service:        History reviewed. No pertinent family history.    Social History     Social History   • Marital status: Single     Spouse name: N/A   • Number of children: N/A   • Years of education: N/A     Social History Main Topics   • Smoking status: Former Smoker     Types: Cigarettes     Quit date: 9/20/1980   • Smokeless tobacco: Never Used   • Alcohol use 0.6 oz/week     1 Cans of beer per week      Comment: occasionally   • Drug use: No   • Sexual activity: No     Other Topics Concern   • None     Social History Narrative           Objective   Physical Exam   Constitutional: She is oriented to person, place, and time. She appears well-developed and well-nourished. No distress.   HENT:   Head: Normocephalic and atraumatic.   Nose: Nose normal.   Mouth/Throat: Oropharynx is clear and moist.   Eyes: Conjunctivae are normal.   Neck: Normal range of motion. Neck supple.   Cardiovascular: Normal rate, regular rhythm and normal heart sounds.    Pulmonary/Chest: Effort normal and breath sounds normal. No respiratory distress. She has no wheezes.   Abdominal: Soft. Bowel sounds are normal. There is no tenderness. There is no guarding.   Ostomy bag well applied.  Midline wound healing pretty good.  Minimal area of break in the skin of the midline scar.  No discharge.   Musculoskeletal: Normal range of motion. She exhibits edema (Nonpitting bilateral lower extremity swelling/edema, no calf tenderness.). She exhibits no deformity.   Neurological: She is alert and oriented to person, place, and time.   Skin: Skin is warm and dry. No rash noted.   Psychiatric: She has a normal mood and affect.   Nursing note and vitals reviewed.      ECG 12 Lead    Date/Time: 2/17/2018 2:30 PM  Performed by: MAGGIE BOYER  Authorized by: MAGGIE BOYER   Rhythm: sinus bradycardia  Rate: bradycardic  BPM: 54  QRS axis: normal  ST Segments: ST  segments normal  Clinical impression: abnormal ECG and low voltage               ED Course  ED Course        68 years old is evaluated for fluid overload.  I have obtained x-rays which did not show any congestion.  She has some elevation in BNP but she does not seems to be in acute congestive heart failure.  I have advised her to increase the dose of Lasix to 80 mg on alternate days and follow-up with her primary care physician.  At this point I do not think she needs inpatient admission and I believe it can be managed outpatient.  I would not give her any IV Lasix as with her history of acute blood pressure drop.  I have advised her to follow up with her cardiologist and primary care for further evaluation.        Labs Reviewed   COMPREHENSIVE METABOLIC PANEL - Abnormal; Notable for the following:        Result Value    BUN 23 (*)     Creatinine 1.65 (*)     Calcium 7.9 (*)     AST (SGOT) 39 (*)     Alkaline Phosphatase 151 (*)     Total Bilirubin 0.1 (*)     eGFR Non  Amer 31 (*)     All other components within normal limits   BNP (IN-HOUSE) - Abnormal; Notable for the following:     proBNP 1680.0 (*)     All other components within normal limits   CBC WITH AUTO DIFFERENTIAL - Abnormal; Notable for the following:     Hemoglobin 11.2 (*)     RDW 14.9 (*)     RDW-SD 50.0 (*)     All other components within normal limits   TROPONIN (IN-HOUSE) - Normal   CBC AND DIFFERENTIAL    Narrative:     The following orders were created for panel order CBC & Differential.  Procedure                               Abnormality         Status                     ---------                               -----------         ------                     CBC Auto Differential[[id number]]        Abnormal            Final result                 Please view results for these tests on the individual orders.   EXTRA TUBES    Narrative:     The following orders were created for panel order Extra Tubes.  Procedure                                Abnormality         Status                     ---------                               -----------         ------                     Light Blue Top[[id number]]                                   Final result               Gold Top - [facility][[id number]]                                   Final result                 Please view results for these tests on the individual orders.   LIGHT BLUE TOP   GOLD TOP - SST       Xr Chest 1 View    Result Date: 2/17/2018  Narrative: [facility] [place] Patient Name: KELBY VANN ORDERING: MAGGIE BOYER ATTENDING: MAGGIE BOYER REFERRING: MAGGIE BOYER ----------------------- PROCEDURE: Chest Single View TECHNIQUE: Single AP view of the chest COMPARISON: 12/4/2017 HISTORY: sob , chf FINDINGS:  Life-support devices: None. Lungs/pleura: There is similar mild elevation of the right diaphragm. There is mild bronchovascular accentuation present. There is thickening and/or atelectasis along the fissure on the right. There is some minimal linear atelectasis versus scarring within the right medial base. No dense parenchymal consolidation, effusion, or pneumothorax.. Heart, hilar and mediastinal structures: Heart size is stable to the comparison and considered within limits of normal. Mediastinal contours are smooth. The trachea is midline. Skeletal Structures: No acute findings. No free air beneath the diaphragm.     Impression: Mild chronic bronchovascular accentuation redemonstrated. There is some thickening and/or atelectasis along the fissure on the right redemonstrated. Minimal linear scarring versus atelectasis within the bases. No parenchymal consolidation or acute pleural finding. Electronically signed by:  Victor Manuel Caballero MD  2/17/2018 3:07 PM [facility] Workstation: [id number]          [facility]    Final diagnoses:   Congestive heart failure, unspecified congestive heart failure chronicity, unspecified congestive heart failure type   Dyspnea, unspecified type   Non-pitting edema             Tony Chapman MD  02/17/18 4700

## 2018-02-17 NOTE — DISCHARGE INSTRUCTIONS
Take Lasix 80 mg on alternate days for 1 week and follow-up with primary care physician and cardiology for further evaluation.  Return to ER for worsening swelling, shortness of breath.      Heart Failure  Heart failure means your heart has trouble pumping blood. This makes it hard for your body to work well. Heart failure is usually a long-term (chronic) condition. You must take good care of yourself and follow your doctor's treatment plan.  Follow these instructions at home:  · Take your heart medicine as told by your doctor.  ¨ Do not stop taking medicine unless your doctor tells you to.  ¨ Do not skip any dose of medicine.  ¨ Refill your medicines before they run out.  ¨ Take other medicines only as told by your doctor or pharmacist.  · Stay active if told by your doctor. The elderly and people with severe heart failure should talk with a doctor about physical activity.  · Eat heart-healthy foods. Choose foods that are without trans fat and are low in saturated fat, cholesterol, and salt (sodium). This includes fresh or frozen fruits and vegetables, fish, lean meats, fat-free or low-fat dairy foods, whole grains, and high-fiber foods. Lentils and dried peas and beans (legumes) are also good choices.  · Limit salt if told by your doctor.  · Cook in a healthy way. Roast, grill, broil, bake, poach, steam, or stir-puentes foods.  · Limit fluids as told by your doctor.  · Weigh yourself every morning. Do this after you pee (urinate) and before you eat breakfast. Write down your weight to give to your doctor.  · Take your blood pressure and write it down if your doctor tells you to.  · Ask your doctor how to check your pulse. Check your pulse as told.  · Lose weight if told by your doctor.  · Stop smoking or chewing tobacco. Do not use gum or patches that help you quit without your doctor's approval.  · Schedule and go to doctor visits as told.  · Nonpregnant women should have no more than 1 drink a day. Men should have  no more than 2 drinks a day. Talk to your doctor about drinking alcohol.  · Stop illegal drug use.  · Stay current with shots (immunizations).  · Manage your health conditions as told by your doctor.  · Learn to manage your stress.  · Rest when you are tired.  · If it is really hot outside:  ¨ Avoid intense activities.  ¨ Use air conditioning or fans, or get in a cooler place.  ¨ Avoid caffeine and alcohol.  ¨ Wear loose-fitting, lightweight, and light-colored clothing.  · If it is really cold outside:  ¨ Avoid intense activities.  ¨ Layer your clothing.  ¨ Wear mittens or gloves, a hat, and a scarf when going outside.  ¨ Avoid alcohol.  · Learn about heart failure and get support as needed.  · Get help to maintain or improve your quality of life and your ability to care for yourself as needed.  Contact a doctor if:  · You gain weight quickly.  · You are more short of breath than usual.  · You cannot do your normal activities.  · You tire easily.  · You cough more than normal, especially with activity.  · You have any or more puffiness (swelling) in areas such as your hands, feet, ankles, or belly (abdomen).  · You cannot sleep because it is hard to breathe.  · You feel like your heart is beating fast (palpitations).  · You get dizzy or light-headed when you stand up.  Get help right away if:  · You have trouble breathing.  · There is a change in mental status, such as becoming less alert or not being able to focus.  · You have chest pain or discomfort.  · You faint.  This information is not intended to replace advice given to you by your health care provider. Make sure you discuss any questions you have with your health care provider.  Document Released: 09/26/2009 Document Revised: 05/25/2017 Document Reviewed: 02/03/2014  [employer] Interactive Patient Education © 2017 [employer]      Edema  Edema is when you have too much fluid in your body or under your skin. Edema may make your legs, feet, and ankles swell  up. Swelling is also common in looser tissues, like around your eyes. This is a common condition. It gets more common as you get older. There are many possible causes of edema. Eating too much salt (sodium) and being on your feet or sitting for a long time can cause edema in your legs, feet, and ankles. Hot weather may make edema worse.  Edema is usually painless. Your skin may look swollen or shiny.  Follow these instructions at home:  · Keep the swollen body part raised (elevated) above the level of your heart when you are sitting or lying down.  · Do not sit still or stand for a long time.  · Do not wear tight clothes. Do not wear garters on your upper legs.  · Exercise your legs. This can help the swelling go down.  · Wear elastic bandages or support stockings as told by your doctor.  · Eat a low-salt (low-sodium) diet to reduce fluid as told by your doctor.  · Depending on the cause of your swelling, you may need to limit how much fluid you drink (fluid restriction).  · Take over-the-counter and prescription medicines only as told by your doctor.  Contact a doctor if:  · Treatment is not working.  · You have heart, liver, or kidney disease and have symptoms of edema.  · You have sudden and unexplained weight gain.  Get help right away if:  · You have shortness of breath or chest pain.  · You cannot breathe when you lie down.  · You have pain, redness, or warmth in the swollen areas.  · You have heart, liver, or kidney disease and get edema all of a sudden.  · You have a fever and your symptoms get worse all of a sudden.  Summary  · Edema is when you have too much fluid in your body or under your skin.  · Edema may make your legs, feet, and ankles swell up. Swelling is also common in looser tissues, like around your eyes.  · Raise (elevate) the swollen body part above the level of your heart when you are sitting or lying down.  · Follow your doctor's instructions about diet and how much fluid you can drink  (fluid restriction).  This information is not intended to replace advice given to you by your health care provider. Make sure you discuss any questions you have with your health care provider.  Document Released: 06/05/2009 Document Revised: 01/05/2018 Document Reviewed: 01/05/2018  [employer] Interactive Patient Education © 2017 [employer]

## 2018-08-22 DIAGNOSIS — E66.01 MORBID OBESITY (HCC): ICD-10-CM

## 2018-08-22 DIAGNOSIS — R06.09 OTHER FORM OF DYSPNEA: ICD-10-CM

## 2018-08-22 DIAGNOSIS — Z88.9 MULTIPLE DRUG ALLERGIES: ICD-10-CM

## 2018-08-22 DIAGNOSIS — G47.33 OSA (OBSTRUCTIVE SLEEP APNEA): ICD-10-CM

## 2018-08-22 DIAGNOSIS — R60.9 EDEMA, UNSPECIFIED TYPE: ICD-10-CM

## 2018-08-22 DIAGNOSIS — I51.89 DIASTOLIC DYSFUNCTION: ICD-10-CM

## 2018-08-22 DIAGNOSIS — Z98.890 HISTORY OF SURGICAL PROCEDURE: ICD-10-CM

## 2018-08-22 DIAGNOSIS — E03.8 OTHER SPECIFIED HYPOTHYROIDISM: ICD-10-CM

## 2018-08-22 DIAGNOSIS — G35 MULTIPLE SCLEROSIS (HCC): ICD-10-CM

## 2018-08-22 DIAGNOSIS — F32.A ANXIETY AND DEPRESSION: ICD-10-CM

## 2018-08-22 DIAGNOSIS — J45.20 MILD INTERMITTENT REACTIVE AIRWAY DISEASE WITHOUT COMPLICATION: ICD-10-CM

## 2018-08-22 DIAGNOSIS — F41.9 ANXIETY AND DEPRESSION: ICD-10-CM

## 2018-08-22 DIAGNOSIS — E78.5 DYSLIPIDEMIA: ICD-10-CM

## 2018-08-22 DIAGNOSIS — Z72.0 TOBACCO ABUSE: ICD-10-CM

## 2018-08-22 PROBLEM — E03.9 HYPOTHYROIDISM: Status: ACTIVE | Noted: 2018-08-22

## 2018-08-22 PROBLEM — N18.9 CKD (CHRONIC KIDNEY DISEASE): Status: ACTIVE | Noted: 2018-08-22

## 2018-08-22 PROBLEM — J44.9 COPD (CHRONIC OBSTRUCTIVE PULMONARY DISEASE) (HCC): Status: ACTIVE | Noted: 2018-08-22

## 2018-08-22 PROBLEM — G62.9 PERIPHERAL NEUROPATHY: Status: ACTIVE | Noted: 2018-08-22

## 2018-08-22 PROBLEM — R06.00 DYSPNEA: Status: ACTIVE | Noted: 2018-08-22

## 2018-08-22 PROBLEM — J45.909 REACTIVE AIRWAY DISEASE: Status: ACTIVE | Noted: 2018-08-22

## 2018-08-22 RX ORDER — HYDROCODONE BITARTRATE AND ACETAMINOPHEN 7.5; 325 MG/1; MG/1
1 TABLET ORAL EVERY 6 HOURS PRN
COMMUNITY

## 2018-08-22 RX ORDER — TRAMADOL HYDROCHLORIDE 50 MG/1
50 TABLET ORAL EVERY 6 HOURS PRN
COMMUNITY

## 2018-08-22 RX ORDER — MELOXICAM 15 MG/1
15 TABLET ORAL DAILY
COMMUNITY

## 2018-08-22 RX ORDER — FUROSEMIDE 40 MG/1
40 TABLET ORAL 2 TIMES DAILY
COMMUNITY

## 2018-08-22 RX ORDER — LEVOTHYROXINE SODIUM 0.15 MG/1
150 TABLET ORAL DAILY
COMMUNITY

## 2018-08-22 RX ORDER — MEMANTINE HYDROCHLORIDE 28 MG/1
CAPSULE, EXTENDED RELEASE ORAL DAILY
COMMUNITY

## 2018-08-22 RX ORDER — PREDNISONE 2.5 MG
2.5 TABLET ORAL DAILY
COMMUNITY

## 2018-08-22 RX ORDER — CITALOPRAM 40 MG/1
40 TABLET ORAL DAILY
COMMUNITY

## 2018-08-22 RX ORDER — ATORVASTATIN CALCIUM 20 MG/1
20 TABLET, FILM COATED ORAL DAILY
COMMUNITY

## 2018-08-22 RX ORDER — AMITRIPTYLINE HYDROCHLORIDE 25 MG/1
25 TABLET, FILM COATED ORAL NIGHTLY
COMMUNITY

## 2018-08-22 RX ORDER — VITAMIN B COMPLEX
1 CAPSULE ORAL DAILY
COMMUNITY

## 2018-08-22 RX ORDER — POTASSIUM CHLORIDE 750 MG/1
10 CAPSULE, EXTENDED RELEASE ORAL 2 TIMES DAILY
COMMUNITY

## 2018-08-22 RX ORDER — CHLORAL HYDRATE 500 MG
3000 CAPSULE ORAL 3 TIMES DAILY
COMMUNITY

## 2018-08-22 RX ORDER — HYDROCHLOROTHIAZIDE 25 MG/1
25 TABLET ORAL DAILY
COMMUNITY

## 2018-08-22 RX ORDER — LOPERAMIDE HYDROCHLORIDE 2 MG/1
2 CAPSULE ORAL 4 TIMES DAILY PRN
COMMUNITY

## 2018-08-22 RX ORDER — DONEPEZIL HYDROCHLORIDE 5 MG/1
5 TABLET, FILM COATED ORAL NIGHTLY
COMMUNITY

## 2018-08-22 RX ORDER — ROSUVASTATIN CALCIUM 10 MG/1
10 TABLET, COATED ORAL DAILY
COMMUNITY

## 2018-08-22 RX ORDER — CYCLOBENZAPRINE HCL 10 MG
10 TABLET ORAL 3 TIMES DAILY PRN
COMMUNITY

## 2018-08-22 RX ORDER — FUROSEMIDE 20 MG/1
20 TABLET ORAL 2 TIMES DAILY
COMMUNITY

## 2018-08-22 RX ORDER — MIRTAZAPINE 15 MG/1
15 TABLET, ORALLY DISINTEGRATING ORAL NIGHTLY
COMMUNITY

## 2018-08-22 RX ORDER — ALBUTEROL SULFATE 90 UG/1
2 AEROSOL, METERED RESPIRATORY (INHALATION) EVERY 6 HOURS PRN
COMMUNITY

## 2019-01-11 ENCOUNTER — TELEPHONE (OUTPATIENT)
Dept: CARDIOLOGY | Age: 69
End: 2019-01-11

## 2019-01-14 ENCOUNTER — TELEPHONE (OUTPATIENT)
Dept: CARDIOLOGY | Age: 69
End: 2019-01-14

## 2019-01-28 ENCOUNTER — TELEPHONE (OUTPATIENT)
Dept: CARDIOLOGY | Age: 69
End: 2019-01-28

## 2019-02-05 ENCOUNTER — TELEPHONE (OUTPATIENT)
Dept: CARDIOLOGY | Age: 69
End: 2019-02-05

## 2019-03-29 ENCOUNTER — OFFICE VISIT (OUTPATIENT)
Dept: SURGERY | Facility: CLINIC | Age: 69
End: 2019-03-29

## 2019-03-29 VITALS
TEMPERATURE: 98.1 F | HEART RATE: 76 BPM | DIASTOLIC BLOOD PRESSURE: 70 MMHG | SYSTOLIC BLOOD PRESSURE: 130 MMHG | WEIGHT: 251 LBS | HEIGHT: 60 IN | BODY MASS INDEX: 49.28 KG/M2

## 2019-03-29 DIAGNOSIS — R10.30 LOWER ABDOMINAL PAIN: Primary | ICD-10-CM

## 2019-03-29 PROCEDURE — 99213 OFFICE O/P EST LOW 20 MIN: CPT | Performed by: SURGERY

## 2019-04-02 ENCOUNTER — TELEPHONE (OUTPATIENT)
Dept: SURGERY | Facility: CLINIC | Age: 69
End: 2019-04-02

## 2019-04-02 NOTE — TELEPHONE ENCOUNTER
MS VANN WANTED TO LET YOU KNOW THAT STARTING THIS WEEKEND, SHE CAN NOT KEEP ANY FOOD DOWN EXCEPT OATMEAL.   ALSO, SHE IS HAVING UPPER STOMACH PAIN GOING AROUND TO THE RIGHT SIDE.    PT IS ASKING IF ANY THING OTHER THAN THE CT SCAN NEEDS TO BE DONE.   PLEASE ADVISE.   PT PH#  [phone]

## 2019-04-05 ENCOUNTER — TELEPHONE (OUTPATIENT)
Dept: SURGERY | Facility: CLINIC | Age: 69
End: 2019-04-05

## 2019-04-05 NOTE — TELEPHONE ENCOUNTER
Wants to make sure that her ct will be of her whole abd  And not just part of it.....ct is on Monday 4/8    Call pt

## 2019-04-08 ENCOUNTER — HOSPITAL ENCOUNTER (OUTPATIENT)
Dept: CT IMAGING | Facility: HOSPITAL | Age: 69
Discharge: HOME | End: 2019-04-08
Admitting: SURGERY

## 2019-04-08 ENCOUNTER — LAB (OUTPATIENT)
Dept: LAB | Facility: HOSPITAL | Age: 69
End: 2019-04-08

## 2019-04-08 DIAGNOSIS — R10.30 LOWER ABDOMINAL PAIN: ICD-10-CM

## 2019-04-08 LAB
ANION GAP SERPL CALCULATED.3IONS-SCNC: 12 MMOL/L
BUN BLD-MCNC: 7 MG/DL (ref 8–23)
BUN/CREAT SERPL: 6.3 (ref 7–25)
CALCIUM SPEC-SCNC: 9.2 MG/DL (ref 8.6–10.5)
CHLORIDE SERPL-SCNC: 96 MMOL/L (ref 98–107)
CO2 SERPL-SCNC: 29 MMOL/L (ref 22–29)
CREAT BLD-MCNC: 1.11 MG/DL (ref 0.57–1)
DEPRECATED RDW RBC AUTO: 47.3 FL (ref 37–54)
ERYTHROCYTE [DISTWIDTH] IN BLOOD BY AUTOMATED COUNT: 13.1 % (ref 12.3–15.4)
GFR SERPL CREATININE-BSD FRML MDRD: 49 ML/MIN/1.73
GLUCOSE BLD-MCNC: 90 MG/DL (ref 65–99)
HCT VFR BLD AUTO: 40.6 % (ref 34–46.6)
HGB BLD-MCNC: 13.7 G/DL (ref 12–15.9)
MCH RBC QN AUTO: 33.4 PG (ref 26.6–33)
MCHC RBC AUTO-ENTMCNC: 33.7 G/DL (ref 31.5–35.7)
MCV RBC AUTO: 99 FL (ref 79–97)
PLATELET # BLD AUTO: 205 10*3/MM3 (ref 140–450)
PMV BLD AUTO: 10.2 FL (ref 6–12)
POTASSIUM BLD-SCNC: 5.9 MMOL/L (ref 3.5–5.2)
RBC # BLD AUTO: 4.1 10*6/MM3 (ref 3.77–5.28)
SODIUM BLD-SCNC: 137 MMOL/L (ref 136–145)
WBC NRBC COR # BLD: 10.36 10*3/MM3 (ref 3.4–10.8)

## 2019-04-08 PROCEDURE — 36415 COLL VENOUS BLD VENIPUNCTURE: CPT | Performed by: SURGERY

## 2019-04-08 PROCEDURE — 80048 BASIC METABOLIC PNL TOTAL CA: CPT

## 2019-04-08 PROCEDURE — 0 IODIXANOL PER 1 ML: Performed by: SURGERY

## 2019-04-08 PROCEDURE — 85027 COMPLETE CBC AUTOMATED: CPT | Performed by: SURGERY

## 2019-04-08 PROCEDURE — 74177 CT ABD & PELVIS W/CONTRAST: CPT

## 2019-04-08 RX ORDER — IODIXANOL 320 MG/ML
100 INJECTION, SOLUTION INTRAVASCULAR
Status: COMPLETED | OUTPATIENT
Start: 2019-04-08 | End: 2019-04-08

## 2019-04-08 RX ADMIN — IODIXANOL 88 ML: 320 INJECTION, SOLUTION INTRAVASCULAR at 16:17

## 2019-04-09 ENCOUNTER — OFFICE VISIT (OUTPATIENT)
Dept: SURGERY | Facility: CLINIC | Age: 69
End: 2019-04-09

## 2019-04-09 VITALS
WEIGHT: 252 LBS | HEART RATE: 69 BPM | DIASTOLIC BLOOD PRESSURE: 62 MMHG | TEMPERATURE: 97.8 F | SYSTOLIC BLOOD PRESSURE: 126 MMHG | HEIGHT: 60 IN | BODY MASS INDEX: 49.48 KG/M2

## 2019-04-09 DIAGNOSIS — Z09 FOLLOW UP: ICD-10-CM

## 2019-04-09 DIAGNOSIS — N89.8 VAGINAL DISCHARGE: Primary | ICD-10-CM

## 2019-04-09 PROCEDURE — 99212 OFFICE O/P EST SF 10 MIN: CPT | Performed by: SURGERY

## 2019-04-09 RX ORDER — OMEPRAZOLE 20 MG/1
20 CAPSULE, DELAYED RELEASE ORAL 2 TIMES DAILY
Qty: 60 CAPSULE | Refills: 1 | Status: SHIPPED | OUTPATIENT
Start: 2019-04-09

## 2019-04-09 RX ORDER — BUMETANIDE 1 MG/1
1 TABLET ORAL DAILY
COMMUNITY

## 2019-04-13 NOTE — PROGRESS NOTES
CHIEF COMPLAINT:    Chief Complaint   Patient presents with   • Vaginal Discharge     Evaluate for possible fistula.       HISTORY OF PRESENT ILLNESS:    Irma Pacheco is a 69 y.o. female who underwent prior colon resection in September 2017 for a large intra-abdominal abscess related to a stercoral perforation at the upper rectum.  Following this the patient did develop an enterocutaneous fistula and was managed by Dr. Marcum at that time.  Her fistula did heal.  She states that overall she has been doing well.  I have not seen her in follow-up since her initial operation.    She returns today with complaints of new greenish vaginal discharge which began after she sustained a fall in which she struck her abdomen at home recently.  She reports that the drainage is saturating approximately 3 or 4 pads per day.  She notes lower abdominal pain following the fall.  She notes no fevers or chills.  Her ostomy continues to function.    Given her history of prior fistulas the patient and her daughter are concerned that she now has a fistula again, this time to her vagina.    EXAM:  Vitals:    03/29/19 1508   BP: 130/70   Pulse: 76   Temp: 98.1 °F (36.7 °C)         Patient currently in wheelchair, abdomen is soft without bruising or any drainage externally.  Ostomy functional with stool in bag    ASSESSMENT:    New onset of vaginal drainage status post fall at home    PLAN:    We will plan to check lab work as well as obtain a CT abdomen pelvis.  Given the patient's body habitus as well as inability to move her from the wheelchair I am not able to perform a vaginal examination on her today in the office.  She will follow-up after her CT scan.          This document has been electronically signed by Timothy Dixon MD on April 12, 2019 11:36 PM

## 2019-04-23 NOTE — PROGRESS NOTES
CHIEF COMPLAINT:    Chief Complaint   Patient presents with   • Follow-up     [facility]- Ct results.       HISTORY OF PRESENT ILLNESS:    Irma Pacheco is a 69 y.o. female who underwent CT of the abdomen and pelvis to assess for possible fistulae given new complaints of vaginal discharge after a fall at home.  She has a history of enterocutaneous fistulas following a bowel resection and abscess formation in 2017. She notes she is using about 4 pads per day for vaginal discharge at this time.  She has not seen a gynecologist for this.    Her labs were discussed with her today.  She was noted to be hyperkalemic and this was explained to her today.    The CT images were reviewed along with the report, no acute findings were seen.    EXAM:  Vitals:    04/09/19 1309   BP: 126/62   Pulse: 69   Temp: 97.8 °F (36.6 °C)         Abdomen soft. Wheelchair bound and refuses to get on exam table    ASSESSMENT:    Vaginal drainage  Hyperkalemia    PLAN:    She will need a vaginal exam and work up for her vaginal drainage.  I am unable to examine her completely in the office, but will refer her to gynecology to have a pelvic exam.  She will need to see her PCP to address her hyperkalemia which could be from her medications.  I will see her in one month to discuss further plans if needed.          This document has been electronically signed by Timothy Dixon MD on April 23, 2019 8:56 AM

## 2019-05-08 ENCOUNTER — HOSPITAL ENCOUNTER (EMERGENCY)
Facility: HOSPITAL | Age: 69
Discharge: HOME | End: 2019-05-08
Attending: FAMILY MEDICINE | Admitting: EMERGENCY MEDICINE

## 2019-05-08 ENCOUNTER — APPOINTMENT (OUTPATIENT)
Dept: GENERAL RADIOLOGY | Facility: HOSPITAL | Age: 69
End: 2019-05-08

## 2019-05-08 ENCOUNTER — APPOINTMENT (OUTPATIENT)
Dept: CT IMAGING | Facility: HOSPITAL | Age: 69
End: 2019-05-08

## 2019-05-08 VITALS
SYSTOLIC BLOOD PRESSURE: 126 MMHG | RESPIRATION RATE: 18 BRPM | HEART RATE: 72 BPM | HEIGHT: 60 IN | OXYGEN SATURATION: 99 % | BODY MASS INDEX: 51.48 KG/M2 | WEIGHT: 262.2 LBS | DIASTOLIC BLOOD PRESSURE: 78 MMHG | TEMPERATURE: 98.2 F

## 2019-05-08 DIAGNOSIS — A49.9 UTI (URINARY TRACT INFECTION), BACTERIAL: Primary | ICD-10-CM

## 2019-05-08 DIAGNOSIS — G35 MULTIPLE SCLEROSIS (HCC): ICD-10-CM

## 2019-05-08 DIAGNOSIS — N39.0 UTI (URINARY TRACT INFECTION), BACTERIAL: Primary | ICD-10-CM

## 2019-05-08 LAB
ALBUMIN SERPL-MCNC: 3.5 G/DL (ref 3.5–5.2)
ALBUMIN/GLOB SERPL: 1.3 G/DL
ALP SERPL-CCNC: 134 U/L (ref 39–117)
ALT SERPL W P-5'-P-CCNC: 12 U/L (ref 1–33)
ANION GAP SERPL CALCULATED.3IONS-SCNC: 13 MMOL/L
AST SERPL-CCNC: 16 U/L (ref 1–32)
BACTERIA UR QL AUTO: ABNORMAL /HPF
BASOPHILS # BLD AUTO: 0.04 10*3/MM3 (ref 0–0.2)
BASOPHILS NFR BLD AUTO: 0.4 % (ref 0–1.5)
BILIRUB SERPL-MCNC: 0.2 MG/DL (ref 0.2–1.2)
BILIRUB UR QL STRIP: NEGATIVE
BUN BLD-MCNC: 17 MG/DL (ref 8–23)
BUN/CREAT SERPL: 14.9 (ref 7–25)
CALCIUM SPEC-SCNC: 8.5 MG/DL (ref 8.6–10.5)
CHLORIDE SERPL-SCNC: 92 MMOL/L (ref 98–107)
CLARITY UR: ABNORMAL
CO2 SERPL-SCNC: 27 MMOL/L (ref 22–29)
COLOR UR: YELLOW
CREAT BLD-MCNC: 1.14 MG/DL (ref 0.57–1)
DEPRECATED RDW RBC AUTO: 46.2 FL (ref 37–54)
EOSINOPHIL # BLD AUTO: 0.35 10*3/MM3 (ref 0–0.4)
EOSINOPHIL NFR BLD AUTO: 3.2 % (ref 0.3–6.2)
ERYTHROCYTE [DISTWIDTH] IN BLOOD BY AUTOMATED COUNT: 12.9 % (ref 12.3–15.4)
GFR SERPL CREATININE-BSD FRML MDRD: 48 ML/MIN/1.73
GLOBULIN UR ELPH-MCNC: 2.7 GM/DL
GLUCOSE BLD-MCNC: 79 MG/DL (ref 65–99)
GLUCOSE UR STRIP-MCNC: NEGATIVE MG/DL
HCT VFR BLD AUTO: 39.2 % (ref 34–46.6)
HGB BLD-MCNC: 13 G/DL (ref 12–15.9)
HGB UR QL STRIP.AUTO: ABNORMAL
HOLD SPECIMEN: NORMAL
HYALINE CASTS UR QL AUTO: ABNORMAL /LPF
IMM GRANULOCYTES # BLD AUTO: 0.12 10*3/MM3 (ref 0–0.05)
IMM GRANULOCYTES NFR BLD AUTO: 1.1 % (ref 0–0.5)
KETONES UR QL STRIP: NEGATIVE
LEUKOCYTE ESTERASE UR QL STRIP.AUTO: ABNORMAL
LYMPHOCYTES # BLD AUTO: 3.4 10*3/MM3 (ref 0.7–3.1)
LYMPHOCYTES NFR BLD AUTO: 31.3 % (ref 19.6–45.3)
MAGNESIUM SERPL-MCNC: 2.2 MG/DL (ref 1.6–2.4)
MCH RBC QN AUTO: 32.6 PG (ref 26.6–33)
MCHC RBC AUTO-ENTMCNC: 33.2 G/DL (ref 31.5–35.7)
MCV RBC AUTO: 98.2 FL (ref 79–97)
MONOCYTES # BLD AUTO: 0.49 10*3/MM3 (ref 0.1–0.9)
MONOCYTES NFR BLD AUTO: 4.5 % (ref 5–12)
NEUTROPHILS # BLD AUTO: 6.45 10*3/MM3 (ref 1.7–7)
NEUTROPHILS NFR BLD AUTO: 59.5 % (ref 42.7–76)
NITRITE UR QL STRIP: POSITIVE
NRBC BLD AUTO-RTO: 0 /100 WBC (ref 0–0.2)
PH UR STRIP.AUTO: <=5 [PH] (ref 5–9)
PLATELET # BLD AUTO: 244 10*3/MM3 (ref 140–450)
PMV BLD AUTO: 10.5 FL (ref 6–12)
POTASSIUM BLD-SCNC: 5 MMOL/L (ref 3.5–5.2)
PROT SERPL-MCNC: 6.2 G/DL (ref 6–8.5)
PROT UR QL STRIP: ABNORMAL
RBC # BLD AUTO: 3.99 10*6/MM3 (ref 3.77–5.28)
RBC # UR: ABNORMAL /HPF
REF LAB TEST METHOD: ABNORMAL
SODIUM BLD-SCNC: 132 MMOL/L (ref 136–145)
SP GR UR STRIP: 1.02 (ref 1–1.03)
SQUAMOUS #/AREA URNS HPF: ABNORMAL /HPF
UROBILINOGEN UR QL STRIP: ABNORMAL
WBC NRBC COR # BLD: 10.85 10*3/MM3 (ref 3.4–10.8)
WBC UR QL AUTO: ABNORMAL /HPF
WHOLE BLOOD HOLD SPECIMEN: NORMAL

## 2019-05-08 PROCEDURE — 72125 CT NECK SPINE W/O DYE: CPT

## 2019-05-08 PROCEDURE — 71101 X-RAY EXAM UNILAT RIBS/CHEST: CPT

## 2019-05-08 PROCEDURE — 71046 X-RAY EXAM CHEST 2 VIEWS: CPT

## 2019-05-08 PROCEDURE — 70450 CT HEAD/BRAIN W/O DYE: CPT

## 2019-05-08 PROCEDURE — 73564 X-RAY EXAM KNEE 4 OR MORE: CPT

## 2019-05-08 PROCEDURE — 80053 COMPREHEN METABOLIC PANEL: CPT | Performed by: FAMILY MEDICINE

## 2019-05-08 PROCEDURE — 99284 EMERGENCY DEPT VISIT MOD MDM: CPT

## 2019-05-08 PROCEDURE — 25010000002 LEVOFLOXACIN PER 250 MG: Performed by: FAMILY MEDICINE

## 2019-05-08 PROCEDURE — 96365 THER/PROPH/DIAG IV INF INIT: CPT

## 2019-05-08 PROCEDURE — 85025 COMPLETE CBC W/AUTO DIFF WBC: CPT | Performed by: FAMILY MEDICINE

## 2019-05-08 PROCEDURE — 83735 ASSAY OF MAGNESIUM: CPT | Performed by: FAMILY MEDICINE

## 2019-05-08 PROCEDURE — 63710000001 PREDNISONE PER 1 MG: Performed by: EMERGENCY MEDICINE

## 2019-05-08 PROCEDURE — 81001 URINALYSIS AUTO W/SCOPE: CPT

## 2019-05-08 RX ORDER — PREDNISONE 20 MG/1
60 TABLET ORAL ONCE
Status: COMPLETED | OUTPATIENT
Start: 2019-05-08 | End: 2019-05-08

## 2019-05-08 RX ORDER — PREDNISONE 20 MG/1
60 TABLET ORAL DAILY
Qty: 21 TABLET | Refills: 0 | Status: SHIPPED | OUTPATIENT
Start: 2019-05-08 | End: 2019-05-15

## 2019-05-08 RX ORDER — LEVOFLOXACIN 5 MG/ML
500 INJECTION, SOLUTION INTRAVENOUS ONCE
Status: COMPLETED | OUTPATIENT
Start: 2019-05-08 | End: 2019-05-08

## 2019-05-08 RX ORDER — PHENAZOPYRIDINE HYDROCHLORIDE 100 MG/1
100 TABLET, FILM COATED ORAL 3 TIMES DAILY PRN
Qty: 6 TABLET | Refills: 0 | Status: SHIPPED | OUTPATIENT
Start: 2019-05-08 | End: 2019-05-24

## 2019-05-08 RX ORDER — SULFAMETHOXAZOLE AND TRIMETHOPRIM 800; 160 MG/1; MG/1
1 TABLET ORAL 2 TIMES DAILY
Qty: 14 TABLET | Refills: 0 | Status: SHIPPED | OUTPATIENT
Start: 2019-05-08 | End: 2019-05-13

## 2019-05-08 RX ADMIN — PREDNISONE 60 MG: 20 TABLET ORAL at 21:33

## 2019-05-08 RX ADMIN — LEVOFLOXACIN 500 MG: 5 INJECTION, SOLUTION INTRAVENOUS at 19:23

## 2019-05-08 NOTE — ED PROVIDER NOTES
"Subjective   Daughter states that patient has been having an MS flare-up for the past 5 days. Flare-ups occur 2-3 x/year. Pt finished a 5 day course of oral steroids yesterday. She has been having falls (3 and 5 days ago) secondary to her MS flares. Falls have lead to left knee, right ribs, head, and neck.  Pt's neurologist is Dr. Lopes in [place]. Pt normally ambulates with a walker at home. MS was diagnosed in 2001, now with \"secondary progression.\"        Fall   Mechanism of injury: fall    Injury location:  Head/neck, leg and torso  Head/neck injury location:  Head, L neck and R neck  Torso injury location:  R chest  Leg injury location:  L knee  Incident location:  Home  Time since incident:  5 days  Fall:     Fall occurred:  Walking    Height of fall:  GLF    Impact surface:  Hard floor and furniture    Point of impact:  Unable to specify    Entrapped after fall: no    Protective equipment: none    Suspicion of alcohol use: no    Suspicion of drug use: no    Prior to arrival data:     Blood loss:  None    Responsiveness at scene:  Alert    Orientation at scene:  Person, place, situation and time    Loss of consciousness: no      Amnesic to event: no      Airway interventions:  None  Associated symptoms: back pain and neck pain    Associated symptoms: no abdominal pain, no chest pain, no difficulty breathing, no headaches, no loss of consciousness, no nausea, no seizures and no vomiting    Neck Pain   Associated symptoms: weakness    Associated symptoms: no chest pain, no fever and no headaches    Back Pain   Associated symptoms: weakness    Associated symptoms: no abdominal pain, no chest pain, no dysuria, no fever and no headaches        Review of Systems   Constitutional: Positive for activity change. Negative for appetite change, chills, diaphoresis, fatigue and fever.   HENT: Negative for congestion, ear discharge, ear pain, nosebleeds, rhinorrhea, sinus pressure, sore throat and trouble swallowing.  "   Eyes: Negative for discharge and redness.   Respiratory: Negative for apnea, cough, chest tightness, shortness of breath and wheezing.    Cardiovascular: Negative for chest pain.   Gastrointestinal: Positive for diarrhea. Negative for abdominal pain, nausea and vomiting.   Endocrine: Negative for polyuria.   Genitourinary: Negative for dysuria, frequency and urgency.   Musculoskeletal: Positive for back pain and neck pain. Negative for myalgias.   Skin: Negative for color change and rash.   Allergic/Immunologic: Negative for immunocompromised state.   Neurological: Positive for weakness. Negative for dizziness, seizures, loss of consciousness, syncope, light-headedness and headaches.   Hematological: Negative for adenopathy. Does not bruise/bleed easily.   Psychiatric/Behavioral: Negative for behavioral problems and confusion.   All other systems reviewed and are negative.      Past Medical History:   Diagnosis Date   • Anxiety    • Depression    • Disease of thyroid gland    • Hyperlipidemia    • MS (multiple sclerosis) (CMS/[facility])        Allergies   Allergen Reactions   • Other Shortness Of Breath   • Codeine Palpitations     Chest Pain   • Milk-Related Compounds    • Motrin [Ibuprofen]    • Penicillins Other (See Comments)     Convulsions       Past Surgical History:   Procedure Laterality Date   • COLON RESECTION N/A 9/20/2017    Procedure: exploratory laparotomy, sigmoid colon resection and colostomy;  Surgeon: Timothy Dixon MD;  Location: [facility];  Service:    • EXPLORATORY LAPAROTOMY N/A 10/9/2017    Procedure: LAPAROTOMY EXPLORATORY, drainage intra-abdominal abscess, washout;  Surgeon: Arnulfo Marcum MD;  Location: [facility];  Service:        History reviewed. No pertinent family history.    Social History     Socioeconomic History   • Marital status: Single     Spouse name: Not on file   • Number of children: Not on file   • Years of education: Not on file   • Highest education level: Not on file    Tobacco Use   • Smoking status: Former Smoker     Types: Cigarettes     Last attempt to quit: 1980     Years since quittin.6   • Smokeless tobacco: Never Used   Substance and Sexual Activity   • Alcohol use: Yes     Alcohol/week: 0.6 oz     Types: 1 Cans of beer per week     Comment: occasionally   • Drug use: No   • Sexual activity: No           Objective   Physical Exam   Pulmonary/Chest: She exhibits tenderness.       Musculoskeletal:        Cervical back: She exhibits tenderness and bony tenderness. She exhibits no swelling, no edema, no deformity and no laceration.        Back:         Legs:      Procedures           ED Course  ED Course as of May 08 2126   Wed May 08, 2019   2122 Pt resting comfortably. Pt requests discharge home. Vss. Labs/imaging reviewed. Pt stable discharge.  [SD]      ED Course User Index  [SD] Lc Spear MD          Labs Reviewed   URINALYSIS W/ MICROSCOPIC IF INDICATED (NO CULTURE) - Abnormal; Notable for the following components:       Result Value    Appearance, UA Turbid (*)     Blood, UA Moderate (2+) (*)     Protein, UA 30 mg/dL (1+) (*)     Leuk Esterase, UA Large (3+) (*)     Nitrite, UA Positive (*)     All other components within normal limits   URINALYSIS, MICROSCOPIC ONLY - Abnormal; Notable for the following components:    RBC, UA 6-12 (*)     WBC, UA Too Numerous to Count (*)     Bacteria, UA 4+ (*)     All other components within normal limits   COMPREHENSIVE METABOLIC PANEL - Abnormal; Notable for the following components:    Creatinine 1.14 (*)     Sodium 132 (*)     Chloride 92 (*)     Calcium 8.5 (*)     Alkaline Phosphatase 134 (*)     eGFR Non  Amer 48 (*)     All other components within normal limits    Narrative:     GFR Normal >60  Chronic Kidney Disease <60  Kidney Failure <15   CBC WITH AUTO DIFFERENTIAL - Abnormal; Notable for the following components:    WBC 10.85 (*)     MCV 98.2 (*)     Monocyte % 4.5 (*)     Immature Grans % 1.1 (*)      Lymphocytes, Absolute 3.40 (*)     Immature Grans, Absolute 0.12 (*)     All other components within normal limits   MAGNESIUM - Normal   CBC AND DIFFERENTIAL    Narrative:     The following orders were created for panel order CBC & Differential.  Procedure                               Abnormality         Status                     ---------                               -----------         ------                     CBC Auto Differential[[id number]]        Abnormal            Final result                 Please view results for these tests on the individual orders.   EXTRA TUBES    Narrative:     The following orders were created for panel order Extra Tubes.  Procedure                               Abnormality         Status                     ---------                               -----------         ------                     Light Blue Top[[id number]]                                   In process                 Gold Top - SST[[id number]]                                   In process                   Please view results for these tests on the individual orders.   LIGHT BLUE TOP   GOLD TOP - SST       CT Cervical Spine Without Contrast   Final Result   No evidence of acute traumatic osseous injury.         If pain or symptoms persist beyond reasonable expectations, a   follow up radiographic and/or MRI examination is suggested, as is   deemed clinically indicated.      Electronically signed by:  Deidre Delacruz MD  5/8/2019 7:31 PM CDT   Workstation: [id number]      CT Head Without Contrast   Final Result   No acute intracranial abnormality.      If pain or symptoms persist beyond reasonable expectations, an   MRI examination is suggested as is deemed clinically appropriate.      Electronically signed by:  Deidre Delacruz MD  5/8/2019 7:19 PM CDT   Workstation: [id number]      XR Chest PA & Lateral   Final Result   No acute abnormality identified.      Electronically signed by:  Deidre Delacruz MD  5/8/2019 7:14 PM CDT    Workstation: [id number]      XR Ribs Right With PA Chest   Final Result   No acute abnormality identified      Electronically signed by:  Deidre Delacruz MD  5/8/2019 7:13 PM CDT   Workstation: [id number]      XR Knee 4+ View Left   Final Result   Degenerative changes as above. No acute osseous   abnormality identified      Note:  if pain or symptoms persist beyond reasonable expectations      and follow-up imaging is anticipated,  cross sectional imaging    (CT and/or MRI) is suggested, as is deemed clinically    appropriate.      Electronically signed by:  Deidre Delacruz MD  5/8/2019 7:07 PM CDT   Workstation: [id number]                    [facility]      Final diagnoses:   UTI (urinary tract infection), bacterial   Multiple sclerosis (CMS/HCC)            Lc Spear MD  05/08/19 5305

## 2019-05-08 NOTE — ED TRIAGE NOTES
Pt was walking with walker and fell x2 in last 4 days. Pt has hx MS. Pt reports hitting head both times and has neck and low back pain.

## 2019-05-13 ENCOUNTER — OFFICE VISIT (OUTPATIENT)
Dept: OBSTETRICS AND GYNECOLOGY | Facility: CLINIC | Age: 69
End: 2019-05-13

## 2019-05-13 VITALS
HEART RATE: 92 BPM | DIASTOLIC BLOOD PRESSURE: 79 MMHG | HEIGHT: 60 IN | WEIGHT: 262 LBS | SYSTOLIC BLOOD PRESSURE: 128 MMHG | BODY MASS INDEX: 51.44 KG/M2

## 2019-05-13 DIAGNOSIS — N82.2 FISTULA OF VAGINA TO SMALL INTESTINE: ICD-10-CM

## 2019-05-13 DIAGNOSIS — N89.8 FOUL SMELLING VAGINAL DISCHARGE: Primary | ICD-10-CM

## 2019-05-13 PROCEDURE — 99203 OFFICE O/P NEW LOW 30 MIN: CPT | Performed by: OBSTETRICS & GYNECOLOGY

## 2019-05-13 PROCEDURE — 87186 SC STD MICRODIL/AGAR DIL: CPT | Performed by: OBSTETRICS & GYNECOLOGY

## 2019-05-13 PROCEDURE — 87205 SMEAR GRAM STAIN: CPT | Performed by: OBSTETRICS & GYNECOLOGY

## 2019-05-13 PROCEDURE — 87070 CULTURE OTHR SPECIMN AEROBIC: CPT | Performed by: OBSTETRICS & GYNECOLOGY

## 2019-05-13 PROCEDURE — 87181 SC STD AGAR DILUTION PER AGT: CPT | Performed by: OBSTETRICS & GYNECOLOGY

## 2019-05-13 RX ORDER — GLATIRAMER ACETATE 40 MG/ML
INJECTION, SOLUTION SUBCUTANEOUS EVERY EVENING
COMMUNITY
Start: 2019-04-02

## 2019-05-14 NOTE — PROGRESS NOTES
Chief Complaint   Patient presents with   • Vaginal Discharge     New Gynecological     Irma Hayley Pacheco is a 69 y.o. year old .  No LMP recorded. Patient is postmenopausal.  She presents with a chief complaint of malodorous vaginal discharge from the vagina for 2 months.  Patient stated she had been evaluated for a fistula tract and the evaluation was negative for this occurrence.  Patient has a colostomy. She is not sexually active.        Past Medical History:   Diagnosis Date   • Anxiety    • Depression    • Disease of thyroid gland    • Hyperlipidemia    • MS (multiple sclerosis) (CMS/[facility])      Past Surgical History:   Procedure Laterality Date   • ABDOMINAL HYSTERECTOMY  1999    Has Cervix   • COLON RESECTION N/A 2017    Procedure: exploratory laparotomy, sigmoid colon resection and colostomy;  Surgeon: Timothy Dixon MD;  Location: [facility];  Service:    • EXPLORATORY LAPAROTOMY N/A 10/9/2017    Procedure: LAPAROTOMY EXPLORATORY, drainage intra-abdominal abscess, washout;  Surgeon: Arnulfo Marcum MD;  Location: [facility];  Service:        Current Outpatient Medications:   •  albuterol (PROVENTIL HFA;VENTOLIN HFA) 108 (90 Base) MCG/ACT inhaler, Inhale 2 puffs Every 4 (Four) Hours As Needed for Wheezing., Disp: , Rfl:   •  bumetanide (BUMEX) 1 MG tablet, Take 1 mg by mouth Daily., Disp: , Rfl:   •  citalopram (CeleXA) 40 MG tablet, Take 40 mg by mouth Daily., Disp: , Rfl:   •  COPAXONE 40 MG/ML solution prefilled syringe, , Disp: , Rfl:   •  dicyclomine (BENTYL) 10 MG capsule, Take 10 mg by mouth 4 (Four) Times a Day As Needed., Disp: , Rfl:   •  donepezil (ARICEPT) 5 MG tablet, Take 5 mg by mouth Every Night., Disp: , Rfl:   •  furosemide (LASIX) 40 MG tablet, Take 40 mg by mouth Daily., Disp: , Rfl:   •  gabapentin (NEURONTIN) 600 MG tablet, Take 600 mg by mouth 3 (Three) Times a Day., Disp: , Rfl:   •  Glatiramer Acetate (COPAXONE SC), Inject 40 mg under the skin 3 (Three) Times a  Week. M, W, F, Disp: , Rfl:   •  levothyroxine (SYNTHROID, LEVOTHROID) 150 MCG tablet, Take 150 mcg by mouth Daily., Disp: , Rfl:   •  memantine (NAMENDA XR) 7 MG capsule sustained-release 24 hr extended release capsule, Take 21 mg by mouth Daily., Disp: , Rfl:   •  mirtazapine (REMERON) 30 MG tablet, Take 30 mg by mouth Every Night., Disp: , Rfl:   •  nystatin (MYCOSTATIN) 575826 UNIT/GM cream, Apply 1 application topically As Needed., Disp: , Rfl:   •  nystatin (MYCOSTATIN) 317028 UNIT/GM powder, Apply 1 application topically Daily As Needed., Disp: , Rfl:   •  omeprazole (PRILOSEC) 20 MG capsule, Take 1 capsule by mouth 2 (Two) Times a Day., Disp: 60 capsule, Rfl: 1  •  ondansetron (ZOFRAN) 4 MG tablet, Take 4 mg by mouth Every 8 (Eight) Hours As Needed for Nausea or Vomiting., Disp: , Rfl:   •  oxyCODONE-acetaminophen (PERCOCET) 7.5-325 MG per tablet, Take 1 tablet by mouth Every 6 (Six) Hours As Needed., Disp: , Rfl: 0  •  phenazopyridine (PYRIDIUM) 100 MG tablet, Take 1 tablet by mouth 3 (Three) Times a Day As Needed for bladder spasms., Disp: 6 tablet, Rfl: 0  •  potassium chloride (MICRO-K) 10 MEQ CR capsule, Take 10 mEq by mouth 3 (Three) Times a Day., Disp: , Rfl:   •  predniSONE (DELTASONE) 20 MG tablet, Take 3 tablets by mouth Daily for 7 days., Disp: 21 tablet, Rfl: 0  •  primidone (MYSOLINE) 50 MG tablet, Take  by mouth 3 (Three) Times a Day., Disp: , Rfl:   •  rosuvastatin (CRESTOR) 10 MG tablet, Take 10 mg by mouth Daily., Disp: , Rfl:   Allergies   Allergen Reactions   • Other Shortness Of Breath   • Codeine Palpitations     Chest Pain   • Milk-Related Compounds    • Motrin [Ibuprofen]    • Penicillins Other (See Comments)     Convulsions     Social History    Tobacco Use      Smoking status: Former Smoker        Types: Cigarettes        Quit date: 1980        Years since quittin.6      Smokeless tobacco: Never Used    Review of Systems all systems reviewed and negative except for the  "vaginal discharge.    /79   Pulse 92   Ht 152.4 cm (60\")   Wt 119 kg (262 lb)   BMI 51.17 kg/m²     General:  well developed; well nourished  no acute distress  appears older than stated age  obese - Body mass index is 51.17 kg/m².   Thyroid: not examined   Lungs:  breathing is unlabored   Heart:  Not performed.   Breasts:  Not performed.   Abdomen: Not performed.   Pelvis: Exam limited by  body habitus  External genitalia:  normal external female genitalia with copious amounts of thin stool covering the entire labia majora  Vaginal:  atrophic mucosal changes are present; large amount of liquid stool fill the apex of the vagina     Lab Review   No data reviewed      Imaging   CT of abdomen/pelvis report Discussed the report with radiologist, Dr. Braswell, who reviewed the films.  Stated there was fistula tract present from what appears to be small bowel to the vagina.  There is contrast in the vagina from the procedure/fistula tract.    Assessment      Diagnosis Plan   1. 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 Will refer patient back to Dr. Dixon for further treatment.  If he does not repair these types of fistulas, I can refer patient to urogynecologist or gyn oncologist for repair of the fistula tract.         This note was electronically signed.    Vicky Maurice MD  May 14, 2019  "

## 2019-05-16 ENCOUNTER — OFFICE VISIT (OUTPATIENT)
Dept: SURGERY | Facility: CLINIC | Age: 69
End: 2019-05-16

## 2019-05-16 VITALS
HEIGHT: 60 IN | SYSTOLIC BLOOD PRESSURE: 138 MMHG | BODY MASS INDEX: 50.45 KG/M2 | HEART RATE: 94 BPM | WEIGHT: 257 LBS | DIASTOLIC BLOOD PRESSURE: 70 MMHG | TEMPERATURE: 99.4 F

## 2019-05-16 DIAGNOSIS — N82.2 FISTULA OF VAGINA TO SMALL INTESTINE: Primary | ICD-10-CM

## 2019-05-16 PROCEDURE — 99212 OFFICE O/P EST SF 10 MIN: CPT | Performed by: SURGERY

## 2019-05-16 RX ORDER — SODIUM CHLORIDE 9 MG/ML
100 INJECTION, SOLUTION INTRAVENOUS CONTINUOUS
Status: CANCELLED | OUTPATIENT
Start: 2019-05-29

## 2019-05-17 LAB
BACTERIA SPEC AEROBE CULT: ABNORMAL
BACTERIA SPEC AEROBE CULT: ABNORMAL
GRAM STN SPEC: ABNORMAL

## 2019-05-24 ENCOUNTER — APPOINTMENT (OUTPATIENT)
Dept: PREADMISSION TESTING | Facility: HOSPITAL | Age: 69
End: 2019-05-24

## 2019-05-24 VITALS
SYSTOLIC BLOOD PRESSURE: 120 MMHG | RESPIRATION RATE: 14 BRPM | WEIGHT: 246 LBS | BODY MASS INDEX: 48.29 KG/M2 | DIASTOLIC BLOOD PRESSURE: 68 MMHG | OXYGEN SATURATION: 94 % | HEART RATE: 71 BPM | HEIGHT: 60 IN

## 2019-05-24 DIAGNOSIS — N82.2 FISTULA OF VAGINA TO SMALL INTESTINE: ICD-10-CM

## 2019-05-24 LAB
ABO GROUP BLD: NORMAL
ANION GAP SERPL CALCULATED.3IONS-SCNC: 15 MMOL/L
BLD GP AB SCN SERPL QL: NEGATIVE
BUN BLD-MCNC: 16 MG/DL (ref 8–23)
BUN/CREAT SERPL: 15.1 (ref 7–25)
CALCIUM SPEC-SCNC: 8.9 MG/DL (ref 8.6–10.5)
CHLORIDE SERPL-SCNC: 85 MMOL/L (ref 98–107)
CO2 SERPL-SCNC: 32 MMOL/L (ref 22–29)
CREAT BLD-MCNC: 1.06 MG/DL (ref 0.57–1)
DEPRECATED RDW RBC AUTO: 45 FL (ref 37–54)
ERYTHROCYTE [DISTWIDTH] IN BLOOD BY AUTOMATED COUNT: 12.5 % (ref 12.3–15.4)
GFR SERPL CREATININE-BSD FRML MDRD: 52 ML/MIN/1.73
GLUCOSE BLD-MCNC: 85 MG/DL (ref 65–99)
HCT VFR BLD AUTO: 39.8 % (ref 34–46.6)
HGB BLD-MCNC: 13.4 G/DL (ref 12–15.9)
Lab: NORMAL
MCH RBC QN AUTO: 32.8 PG (ref 26.6–33)
MCHC RBC AUTO-ENTMCNC: 33.7 G/DL (ref 31.5–35.7)
MCV RBC AUTO: 97.3 FL (ref 79–97)
PLATELET # BLD AUTO: 235 10*3/MM3 (ref 140–450)
PMV BLD AUTO: 10.4 FL (ref 6–12)
POTASSIUM BLD-SCNC: 4.1 MMOL/L (ref 3.5–5.2)
RBC # BLD AUTO: 4.09 10*6/MM3 (ref 3.77–5.28)
RH BLD: POSITIVE
SODIUM BLD-SCNC: 132 MMOL/L (ref 136–145)
T&S EXPIRATION DATE: NORMAL
WBC NRBC COR # BLD: 10.73 10*3/MM3 (ref 3.4–10.8)

## 2019-05-24 PROCEDURE — 36415 COLL VENOUS BLD VENIPUNCTURE: CPT

## 2019-05-24 PROCEDURE — 93010 ELECTROCARDIOGRAM REPORT: CPT | Performed by: INTERNAL MEDICINE

## 2019-05-24 PROCEDURE — 86850 RBC ANTIBODY SCREEN: CPT | Performed by: ANESTHESIOLOGY

## 2019-05-24 PROCEDURE — 80048 BASIC METABOLIC PNL TOTAL CA: CPT | Performed by: SURGERY

## 2019-05-24 PROCEDURE — 85027 COMPLETE CBC AUTOMATED: CPT | Performed by: SURGERY

## 2019-05-24 PROCEDURE — 93005 ELECTROCARDIOGRAM TRACING: CPT

## 2019-05-24 PROCEDURE — 86901 BLOOD TYPING SEROLOGIC RH(D): CPT | Performed by: ANESTHESIOLOGY

## 2019-05-24 PROCEDURE — 86900 BLOOD TYPING SEROLOGIC ABO: CPT | Performed by: ANESTHESIOLOGY

## 2019-05-24 RX ORDER — LEVOTHYROXINE SODIUM 137 UG/1
137 TABLET ORAL DAILY
COMMUNITY

## 2019-05-24 RX ORDER — TIZANIDINE 4 MG/1
4 TABLET ORAL 2 TIMES DAILY PRN
COMMUNITY

## 2019-05-24 RX ORDER — CHOLECALCIFEROL (VITAMIN D3) 125 MCG
5 CAPSULE ORAL NIGHTLY
COMMUNITY

## 2019-05-24 RX ORDER — VILAZODONE HYDROCHLORIDE 20 MG/1
20 TABLET ORAL DAILY
COMMUNITY

## 2019-05-24 NOTE — PAT
Called Dr. Perez concerning patient reports progressive secondary Ms, patient relates no resp issues, mostly wc bound, received order for EKG at this time.    Called Dr. Perez with results of EKG, hx of CHF, echo 2012 with ef of 67, no further orders or instructions.  Opioid pain medication pamphlet given.  Chlorhexidine given with written and verbal instructions, patient and daughter relate that they understand information given and fernandez no questions

## 2019-05-24 NOTE — DISCHARGE INSTRUCTIONS
[facility]  Pre-op Information and Guidelines    You will be called after 2 p.m. the day before your surgery (Friday for Monday surgery) and notified of your time for arrival and approximate surgery time.  If you have not received a call by 4P.M., please contact Same Day Surgery at [phone] of if outside [facility] call [phone].    Please Follow these Important Safety Guidelines:    • The morning of your procedure, take only the medications listed below with   A sip of water:_____________________________________________       ______________________________________________    • DO NOT eat or drink anything after 12:00 midnight the night before surgery  Specific instructions concerning drinking clear liquids will be discussed during  the pre-surgery instruction call the day before your surgery.    • If you take a blood thinner (ex. Plavix, Coumadin, aspirin), ask your doctor when to stop it before surgery  STOP DATE: _________________    • Only 2 visitors are allowed in patient rooms at a time  Your visitors will be asked to wait in the lobby until the admission process is complete with the exception of a parent with a child and patients in need of special assistance.    • YOU CANNOT DRIVE YOURSELF HOME  You must be accompanied by someone who will be responsible for driving you home after surgery and for your care at home.    • DO NOT chew gum, use breath mints, hard candy, or smoke the day of surgery  • DO NOT drink alcohol for at least 24 hours before your surgery  • DO NOT wear any jewelry and remove all body piercing before coming to the hospital  • DO NOT wear make-up to the hospital  • If you are having surgery on an extremity (arm/leg/foot) remove nail polish/artificial nails on the surgical side  • Clothing, glasses, contacts, dentures, and hairpieces must be removed before surgery  • Bathe the night before or the morning of your surgery and do not use powders/lotions on  skin.

## 2019-05-26 NOTE — PROGRESS NOTES
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 Maurice.       HISTORY OF PRESENT ILLNESS:    Irma Pacheco is a 69 y.o. female who was previously seen for suspected fistula from the small intestine to the vagina.  She has now been seen by gynecology who requested repeat review of the CT scan.  Dr. Rivera does now believe that there is a fistula seen on the patient's CT scan.  Patient appeared to have succus within her vagina on pelvic exam.  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  However, she does appear to have a confirmed fistula between small bowel and the vagina.  She desires repair of the same.  We had a long discussion of the risks and benefits of laparotomy with repair of small bowel to vagina fistula. possible bowel resection.  She understands that that I do not intend to close her colostomy during this operation.  She understands that she will be in the intensive care unit following surgery.  She understands that she will likely have a long period of convalescence following this operation.  She is scheduled for surgery on 5/29/2019.            This document has been electronically signed by Timothy Dixon MD on May 26, 2019 4:07 PM

## 2019-05-28 ENCOUNTER — ANESTHESIA EVENT (OUTPATIENT)
Dept: PERIOP | Facility: HOSPITAL | Age: 69
End: 2019-05-28

## 2019-05-29 ENCOUNTER — ANESTHESIA (OUTPATIENT)
Dept: PERIOP | Facility: HOSPITAL | Age: 69
End: 2019-05-29

## 2019-05-29 ENCOUNTER — HOSPITAL ENCOUNTER (INPATIENT)
Facility: HOSPITAL | Age: 69
LOS: 16 days | Discharge: NURSING, REHAB OR LONG-TERM CARE | End: 2019-06-14
Attending: SURGERY | Admitting: SURGERY

## 2019-05-29 DIAGNOSIS — Z74.09 IMPAIRED FUNCTIONAL MOBILITY AND ACTIVITY TOLERANCE: ICD-10-CM

## 2019-05-29 DIAGNOSIS — Z74.09 IMPAIRED MOBILITY AND ADLS: ICD-10-CM

## 2019-05-29 DIAGNOSIS — N82.2 FISTULA OF VAGINA TO SMALL INTESTINE: ICD-10-CM

## 2019-05-29 DIAGNOSIS — Z78.9 IMPAIRED MOBILITY AND ADLS: ICD-10-CM

## 2019-05-29 LAB
ABO GROUP BLD: NORMAL
BLD GP AB SCN SERPL QL: NEGATIVE
Lab: NORMAL
RH BLD: POSITIVE
T&S EXPIRATION DATE: NORMAL

## 2019-05-29 PROCEDURE — 0DB80ZZ EXCISION OF SMALL INTESTINE, OPEN APPROACH: ICD-10-PCS | Performed by: SURGERY

## 2019-05-29 PROCEDURE — 25010000002 ONDANSETRON PER 1 MG: Performed by: NURSE ANESTHETIST, CERTIFIED REGISTERED

## 2019-05-29 PROCEDURE — 57305 REPAIR RECTUM-VAGINA FISTULA: CPT | Performed by: SURGERY

## 2019-05-29 PROCEDURE — 94799 UNLISTED PULMONARY SVC/PX: CPT

## 2019-05-29 PROCEDURE — 25010000002 PROPOFOL 10 MG/ML EMULSION: Performed by: NURSE ANESTHETIST, CERTIFIED REGISTERED

## 2019-05-29 PROCEDURE — 86901 BLOOD TYPING SEROLOGIC RH(D): CPT | Performed by: ANESTHESIOLOGY

## 2019-05-29 PROCEDURE — 25010000002 HYDROCORTISONE SODIUM SUCCINATE 100 MG RECONSTITUTED SOLUTION: Performed by: NURSE ANESTHETIST, CERTIFIED REGISTERED

## 2019-05-29 PROCEDURE — 25010000003 MORPHINE PER 10 MG: Performed by: SURGERY

## 2019-05-29 PROCEDURE — 25010000002 MIDAZOLAM PER 1 MG: Performed by: NURSE ANESTHETIST, CERTIFIED REGISTERED

## 2019-05-29 PROCEDURE — 86900 BLOOD TYPING SEROLOGIC ABO: CPT | Performed by: ANESTHESIOLOGY

## 2019-05-29 PROCEDURE — 88307 TISSUE EXAM BY PATHOLOGIST: CPT | Performed by: PATHOLOGY

## 2019-05-29 PROCEDURE — 25010000002 FENTANYL CITRATE (PF) 100 MCG/2ML SOLUTION: Performed by: NURSE ANESTHETIST, CERTIFIED REGISTERED

## 2019-05-29 PROCEDURE — 88307 TISSUE EXAM BY PATHOLOGIST: CPT | Performed by: SURGERY

## 2019-05-29 PROCEDURE — C1751 CATH, INF, PER/CENT/MIDLINE: HCPCS | Performed by: NURSE ANESTHETIST, CERTIFIED REGISTERED

## 2019-05-29 PROCEDURE — 86850 RBC ANTIBODY SCREEN: CPT | Performed by: ANESTHESIOLOGY

## 2019-05-29 PROCEDURE — 44120 REMOVAL OF SMALL INTESTINE: CPT | Performed by: SURGERY

## 2019-05-29 PROCEDURE — 0DN80ZZ RELEASE SMALL INTESTINE, OPEN APPROACH: ICD-10-PCS | Performed by: SURGERY

## 2019-05-29 PROCEDURE — 25010000002 HYDROMORPHONE 1 MG/ML SOLUTION: Performed by: NURSE ANESTHETIST, CERTIFIED REGISTERED

## 2019-05-29 DEVICE — PROXIMATE LINEAR CUTTER RELOAD, BLUE, 75MM
Type: IMPLANTABLE DEVICE | Status: FUNCTIONAL
Brand: PROXIMATE

## 2019-05-29 RX ORDER — PROPOFOL 10 MG/ML
VIAL (ML) INTRAVENOUS AS NEEDED
Status: DISCONTINUED | OUTPATIENT
Start: 2019-05-29 | End: 2019-05-29 | Stop reason: SURG

## 2019-05-29 RX ORDER — TIZANIDINE 4 MG/1
4 TABLET ORAL 2 TIMES DAILY PRN
Status: DISCONTINUED | OUTPATIENT
Start: 2019-05-29 | End: 2019-06-14 | Stop reason: HOSPADM

## 2019-05-29 RX ORDER — MEMANTINE HYDROCHLORIDE 5 MG/1
2.5 TABLET ORAL EVERY 12 HOURS SCHEDULED
Status: DISCONTINUED | OUTPATIENT
Start: 2019-05-29 | End: 2019-06-14 | Stop reason: HOSPADM

## 2019-05-29 RX ORDER — NALOXONE HCL 0.4 MG/ML
0.1 VIAL (ML) INJECTION
Status: DISCONTINUED | OUTPATIENT
Start: 2019-05-29 | End: 2019-05-29

## 2019-05-29 RX ORDER — ONDANSETRON 2 MG/ML
4 INJECTION INTRAMUSCULAR; INTRAVENOUS EVERY 6 HOURS PRN
Status: DISCONTINUED | OUTPATIENT
Start: 2019-05-29 | End: 2019-06-14 | Stop reason: HOSPADM

## 2019-05-29 RX ORDER — ONDANSETRON 2 MG/ML
4 INJECTION INTRAMUSCULAR; INTRAVENOUS ONCE AS NEEDED
Status: DISCONTINUED | OUTPATIENT
Start: 2019-05-29 | End: 2019-05-29 | Stop reason: HOSPADM

## 2019-05-29 RX ORDER — NALOXONE HCL 0.4 MG/ML
0.1 VIAL (ML) INJECTION
Status: DISCONTINUED | OUTPATIENT
Start: 2019-05-29 | End: 2019-06-14 | Stop reason: HOSPADM

## 2019-05-29 RX ORDER — SODIUM CHLORIDE 0.9 % (FLUSH) 0.9 %
10 SYRINGE (ML) INJECTION EVERY 12 HOURS SCHEDULED
Status: DISCONTINUED | OUTPATIENT
Start: 2019-05-29 | End: 2019-06-14 | Stop reason: HOSPADM

## 2019-05-29 RX ORDER — EPHEDRINE SULFATE 50 MG/ML
INJECTION, SOLUTION INTRAVENOUS AS NEEDED
Status: DISCONTINUED | OUTPATIENT
Start: 2019-05-29 | End: 2019-05-29 | Stop reason: SURG

## 2019-05-29 RX ORDER — NALOXONE HCL 0.4 MG/ML
0.1 VIAL (ML) INJECTION
Status: DISCONTINUED | OUTPATIENT
Start: 2019-05-29 | End: 2019-06-01

## 2019-05-29 RX ORDER — HEPARIN SODIUM 5000 [USP'U]/ML
5000 INJECTION, SOLUTION INTRAVENOUS; SUBCUTANEOUS EVERY 8 HOURS SCHEDULED
Status: DISCONTINUED | OUTPATIENT
Start: 2019-05-30 | End: 2019-06-14 | Stop reason: HOSPADM

## 2019-05-29 RX ORDER — FENTANYL CITRATE 50 UG/ML
INJECTION, SOLUTION INTRAMUSCULAR; INTRAVENOUS AS NEEDED
Status: DISCONTINUED | OUTPATIENT
Start: 2019-05-29 | End: 2019-05-29 | Stop reason: SURG

## 2019-05-29 RX ORDER — ONDANSETRON 2 MG/ML
INJECTION INTRAMUSCULAR; INTRAVENOUS AS NEEDED
Status: DISCONTINUED | OUTPATIENT
Start: 2019-05-29 | End: 2019-05-29 | Stop reason: SURG

## 2019-05-29 RX ORDER — MORPHINE SULFATE 1 MG/ML
INJECTION INTRAVENOUS CONTINUOUS
Status: DISCONTINUED | OUTPATIENT
Start: 2019-05-29 | End: 2019-06-01

## 2019-05-29 RX ORDER — MIDAZOLAM HYDROCHLORIDE 1 MG/ML
INJECTION INTRAMUSCULAR; INTRAVENOUS AS NEEDED
Status: DISCONTINUED | OUTPATIENT
Start: 2019-05-29 | End: 2019-05-29 | Stop reason: SURG

## 2019-05-29 RX ORDER — LIDOCAINE HYDROCHLORIDE 20 MG/ML
INJECTION, SOLUTION INFILTRATION; PERINEURAL AS NEEDED
Status: DISCONTINUED | OUTPATIENT
Start: 2019-05-29 | End: 2019-05-29 | Stop reason: SURG

## 2019-05-29 RX ORDER — DONEPEZIL HYDROCHLORIDE 5 MG/1
5 TABLET, FILM COATED ORAL NIGHTLY
Status: DISCONTINUED | OUTPATIENT
Start: 2019-05-29 | End: 2019-06-14 | Stop reason: HOSPADM

## 2019-05-29 RX ORDER — PRIMIDONE 50 MG/1
50 TABLET ORAL EVERY 8 HOURS SCHEDULED
Status: DISCONTINUED | OUTPATIENT
Start: 2019-05-29 | End: 2019-06-14 | Stop reason: HOSPADM

## 2019-05-29 RX ORDER — DOCUSATE SODIUM 100 MG/1
100 CAPSULE, LIQUID FILLED ORAL 2 TIMES DAILY PRN
Status: DISCONTINUED | OUTPATIENT
Start: 2019-05-29 | End: 2019-06-01

## 2019-05-29 RX ORDER — ROCURONIUM BROMIDE 10 MG/ML
INJECTION, SOLUTION INTRAVENOUS AS NEEDED
Status: DISCONTINUED | OUTPATIENT
Start: 2019-05-29 | End: 2019-05-29 | Stop reason: SURG

## 2019-05-29 RX ORDER — ONDANSETRON 4 MG/1
4 TABLET, FILM COATED ORAL EVERY 6 HOURS PRN
Status: DISCONTINUED | OUTPATIENT
Start: 2019-05-29 | End: 2019-06-14 | Stop reason: HOSPADM

## 2019-05-29 RX ORDER — SODIUM CHLORIDE, SODIUM GLUCONATE, SODIUM ACETATE, POTASSIUM CHLORIDE, AND MAGNESIUM CHLORIDE 526; 502; 368; 37; 30 MG/100ML; MG/100ML; MG/100ML; MG/100ML; MG/100ML
INJECTION, SOLUTION INTRAVENOUS CONTINUOUS PRN
Status: DISCONTINUED | OUTPATIENT
Start: 2019-05-29 | End: 2019-05-29 | Stop reason: SURG

## 2019-05-29 RX ORDER — FAMOTIDINE 10 MG/ML
20 INJECTION, SOLUTION INTRAVENOUS EVERY 12 HOURS SCHEDULED
Status: DISCONTINUED | OUTPATIENT
Start: 2019-05-29 | End: 2019-06-07

## 2019-05-29 RX ORDER — SODIUM CHLORIDE 0.9 % (FLUSH) 0.9 %
20 SYRINGE (ML) INJECTION AS NEEDED
Status: DISCONTINUED | OUTPATIENT
Start: 2019-05-29 | End: 2019-06-14 | Stop reason: HOSPADM

## 2019-05-29 RX ORDER — HYDROMORPHONE HCL 110MG/55ML
0.5 PATIENT CONTROLLED ANALGESIA SYRINGE INTRAVENOUS
Status: DISCONTINUED | OUTPATIENT
Start: 2019-05-29 | End: 2019-05-29

## 2019-05-29 RX ORDER — SODIUM CHLORIDE 9 MG/ML
100 INJECTION, SOLUTION INTRAVENOUS CONTINUOUS
Status: DISCONTINUED | OUTPATIENT
Start: 2019-05-29 | End: 2019-05-30 | Stop reason: SDUPTHER

## 2019-05-29 RX ORDER — SODIUM CHLORIDE 0.9 % (FLUSH) 0.9 %
10 SYRINGE (ML) INJECTION AS NEEDED
Status: DISCONTINUED | OUTPATIENT
Start: 2019-05-29 | End: 2019-06-14 | Stop reason: HOSPADM

## 2019-05-29 RX ORDER — GABAPENTIN 300 MG/1
600 CAPSULE ORAL EVERY 8 HOURS SCHEDULED
Status: DISCONTINUED | OUTPATIENT
Start: 2019-05-29 | End: 2019-06-14 | Stop reason: HOSPADM

## 2019-05-29 RX ORDER — ALBUTEROL SULFATE 90 UG/1
2 AEROSOL, METERED RESPIRATORY (INHALATION) EVERY 4 HOURS PRN
Status: DISCONTINUED | OUTPATIENT
Start: 2019-05-29 | End: 2019-06-14 | Stop reason: HOSPADM

## 2019-05-29 RX ADMIN — MEMANTINE 2.5 MG: 5 TABLET ORAL at 21:08

## 2019-05-29 RX ADMIN — EPHEDRINE SULFATE 10 MG: 50 INJECTION INTRAVENOUS at 11:31

## 2019-05-29 RX ADMIN — HYDROMORPHONE HYDROCHLORIDE 0.5 MG: 1 INJECTION, SOLUTION INTRAMUSCULAR; INTRAVENOUS; SUBCUTANEOUS at 14:49

## 2019-05-29 RX ADMIN — FENTANYL CITRATE 50 MCG: 50 INJECTION, SOLUTION INTRAMUSCULAR; INTRAVENOUS at 10:33

## 2019-05-29 RX ADMIN — SODIUM CHLORIDE, PRESERVATIVE FREE 10 ML: 5 INJECTION INTRAVENOUS at 22:28

## 2019-05-29 RX ADMIN — SODIUM CHLORIDE 100 ML/HR: 9 INJECTION, SOLUTION INTRAVENOUS at 18:53

## 2019-05-29 RX ADMIN — TIZANIDINE 4 MG: 4 TABLET ORAL at 22:27

## 2019-05-29 RX ADMIN — ROCURONIUM BROMIDE 20 MG: 10 INJECTION INTRAVENOUS at 11:23

## 2019-05-29 RX ADMIN — SODIUM CHLORIDE, SODIUM GLUCONATE, SODIUM ACETATE, POTASSIUM CHLORIDE, AND MAGNESIUM CHLORIDE: 526; 502; 368; 37; 30 INJECTION, SOLUTION INTRAVENOUS at 10:10

## 2019-05-29 RX ADMIN — DONEPEZIL HYDROCHLORIDE 5 MG: 5 TABLET, FILM COATED ORAL at 21:10

## 2019-05-29 RX ADMIN — FENTANYL CITRATE 100 MCG: 50 INJECTION, SOLUTION INTRAMUSCULAR; INTRAVENOUS at 11:01

## 2019-05-29 RX ADMIN — ROCURONIUM BROMIDE 50 MG: 10 INJECTION INTRAVENOUS at 09:52

## 2019-05-29 RX ADMIN — AZTREONAM 1 G: 1 INJECTION, POWDER, LYOPHILIZED, FOR SOLUTION INTRAMUSCULAR; INTRAVENOUS at 18:52

## 2019-05-29 RX ADMIN — FENTANYL CITRATE 50 MCG: 50 INJECTION, SOLUTION INTRAMUSCULAR; INTRAVENOUS at 13:40

## 2019-05-29 RX ADMIN — SODIUM CHLORIDE 100 ML/HR: 9 INJECTION, SOLUTION INTRAVENOUS at 07:20

## 2019-05-29 RX ADMIN — SODIUM CHLORIDE, SODIUM GLUCONATE, SODIUM ACETATE, POTASSIUM CHLORIDE, AND MAGNESIUM CHLORIDE: 526; 502; 368; 37; 30 INJECTION, SOLUTION INTRAVENOUS at 13:52

## 2019-05-29 RX ADMIN — HYDROMORPHONE HYDROCHLORIDE 0.5 MG: 1 INJECTION, SOLUTION INTRAMUSCULAR; INTRAVENOUS; SUBCUTANEOUS at 14:30

## 2019-05-29 RX ADMIN — FAMOTIDINE 20 MG: 10 INJECTION INTRAVENOUS at 21:08

## 2019-05-29 RX ADMIN — SODIUM CHLORIDE, SODIUM GLUCONATE, SODIUM ACETATE, POTASSIUM CHLORIDE, AND MAGNESIUM CHLORIDE: 526; 502; 368; 37; 30 INJECTION, SOLUTION INTRAVENOUS at 11:00

## 2019-05-29 RX ADMIN — FENTANYL CITRATE 50 MCG: 50 INJECTION, SOLUTION INTRAMUSCULAR; INTRAVENOUS at 09:52

## 2019-05-29 RX ADMIN — GABAPENTIN 600 MG: 300 CAPSULE ORAL at 22:29

## 2019-05-29 RX ADMIN — ONDANSETRON 4 MG: 2 INJECTION INTRAMUSCULAR; INTRAVENOUS at 13:29

## 2019-05-29 RX ADMIN — MIDAZOLAM HYDROCHLORIDE 1 MG: 2 INJECTION, SOLUTION INTRAMUSCULAR; INTRAVENOUS at 09:36

## 2019-05-29 RX ADMIN — MORPHINE SULFATE: 1 INJECTION INTRAVENOUS at 14:35

## 2019-05-29 RX ADMIN — FENTANYL CITRATE 50 MCG: 50 INJECTION, SOLUTION INTRAMUSCULAR; INTRAVENOUS at 13:47

## 2019-05-29 RX ADMIN — FENTANYL CITRATE 50 MCG: 50 INJECTION, SOLUTION INTRAMUSCULAR; INTRAVENOUS at 09:43

## 2019-05-29 RX ADMIN — MIDAZOLAM HYDROCHLORIDE 1 MG: 2 INJECTION, SOLUTION INTRAMUSCULAR; INTRAVENOUS at 09:43

## 2019-05-29 RX ADMIN — LIDOCAINE HYDROCHLORIDE 80 MG: 20 INJECTION, SOLUTION INFILTRATION; PERINEURAL at 09:52

## 2019-05-29 RX ADMIN — HYDROCORTISONE SODIUM SUCCINATE 100 MG: 100 INJECTION, POWDER, FOR SOLUTION INTRAMUSCULAR; INTRAVENOUS at 10:30

## 2019-05-29 RX ADMIN — SODIUM CHLORIDE, PRESERVATIVE FREE 10 ML: 5 INJECTION INTRAVENOUS at 22:27

## 2019-05-29 RX ADMIN — ROCURONIUM BROMIDE 30 MG: 10 INJECTION INTRAVENOUS at 10:35

## 2019-05-29 RX ADMIN — AZTREONAM 2 G: 2 INJECTION, POWDER, FOR SOLUTION INTRAMUSCULAR; INTRAVENOUS at 10:25

## 2019-05-29 RX ADMIN — PROPOFOL 140 MG: 10 INJECTION, EMULSION INTRAVENOUS at 09:52

## 2019-05-29 RX ADMIN — PRIMIDONE 50 MG: 50 TABLET ORAL at 22:27

## 2019-05-29 NOTE — ANESTHESIA PREPROCEDURE EVALUATION
Anesthesia Evaluation     NPO Solid Status: > 8 hours             Airway   Mallampati: III  Dental    (+) poor dentition    Pulmonary - normal exam   (+) a smoker Former, asthma,   (-) sleep apnea    ROS comment: Negative patient screen for TANJA    Cardiovascular - normal exam    (+) CHF, hyperlipidemia,       Neuro/Psych  (+) psychiatric history Anxiety and Depression,     GI/Hepatic/Renal/Endo    (+) morbid obesity, GERD,  renal disease CRI, hypothyroidism,     Musculoskeletal     Abdominal    Substance History      OB/GYN          Other                        Anesthesia Plan    ASA 3     general   (Will need stress dose steroids (on prednisone 20 mg tid)  Discussed post op ventilation  Discussed risk of bleeding and use of blood products.  Patient is Sikh, understands the risks and refuses blood products.)  Anesthetic plan, all risks, benefits, and alternatives have been provided, discussed and informed consent has been obtained with: patient.  Use of blood products discussed with patient  Did not consent to blood products. Special considerations: Sikh.

## 2019-05-29 NOTE — ANESTHESIA PROCEDURE NOTES
Airway  Urgency: elective    Airway not difficult    General Information and Staff    Patient location during procedure: OR  CRNA: Ese Montanez CRNA    Indications and Patient Condition  Indications for airway management: airway protection    Preoxygenated: yes  Mask difficulty assessment: 1 - vent by mask    Final Airway Details  Final airway type: endotracheal airway      Successful airway: ETT  Cuffed: yes   Successful intubation technique: direct laryngoscopy  Endotracheal tube insertion site: oral  Blade: Srikanth  Blade size: 3  ETT size (mm): 7.5  Cormack-Lehane Classification: grade IIa - partial view of glottis  Placement verified by: chest auscultation and capnometry   Cuff volume (mL): 8  Measured from: lips  ETT to lips (cm): 20  Number of attempts at approach: 1

## 2019-05-29 NOTE — ANESTHESIA PROCEDURE NOTES
Arterial Line      Patient reassessed immediately prior to procedure    Patient location during procedure: OR  Start time: 5/29/2019 9:44 AM  Stop Time:5/29/2019 9:48 AM       Line placed for hemodynamic monitoring.  Performed By   Anesthesiologist: Rufino Perez MD  Arterial Line Prep   Sterile Tech: gloves  Prep: ChloraPrep  Patient monitoring: blood pressure monitoring, continuous pulse oximetry and EKG  Arterial Line Procedure   Laterality:right  Location:  radial artery  Catheter size: 20 G   Guidance: palpation technique  Successful placement: yes          Post Assessment   Dressing Type: secured with tape and occlusive dressing applied.   Complications no  Circ/Move/Sens Assessment: normal.   Patient Tolerance: patient tolerated the procedure well with no apparent complications

## 2019-05-29 NOTE — ANESTHESIA PROCEDURE NOTES
Central Line      Patient reassessed immediately prior to procedure    Patient location during procedure: OR  Start time: 5/29/2019 10:00 AM  Stop Time:5/29/2019 10:06 AM  Indications: central pressure monitoring and vascular access  Staff  Anesthesiologist: Armando Cortez MD  Preanesthetic Checklist  Completed: patient identified, surgical consent, pre-op evaluation, timeout performed, risks and benefits discussed and monitors and equipment checked  Central Line Prep  Sterile Tech:cap, gloves, gown, mask and sterile barriers  Prep: chloraprep  Patient monitoring: blood pressure monitoring, continuous pulse oximetry and EKG  Central Line Procedure  Laterality:right  Location:internal jugular  Catheter Type:double lumen  Catheter Size:7 Fr  Guidance:ultrasound guided  PROCEDURE NOTE/ULTRASOUND INTERPRETATION.  Using ultrasound guidance the potential vascular sites for insertion of the catheter were visualized to determine the patency of the vessel to be used for vascular access.  After selecting the appropriate site for insertion, the needle was visualized under ultrasound being inserted into the internal jugular vein, followed by ultrasound confirmation of wire and catheter placement. There were no abnormalities seen on ultrasound; an image was taken; and the patient tolerated the procedure with no complications. Images: still images not obtained  Assessment  Post procedure:biopatch applied, line sutured, occlusive dressing applied and secured with tape  Assessement:blood return through all ports and free fluid flow  Complications:no  Patient Tolerance:patient tolerated the procedure well with no apparent complications

## 2019-05-29 NOTE — ANESTHESIA POSTPROCEDURE EVALUATION
Patient: Irma Pacheco    Procedure Summary     Date:  05/29/19 Room / Location:  [facility] OR 06 / [facility] OR    Anesthesia Start:  0938 Anesthesia Stop:  1405    Procedure:  laparotomy with lysis of adhesions, repair of small bowel to vagina fistula. 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 [N82.2])    Surgeon:  Timothy Dixon MD Provider:  Ese Montanez CRNA    Anesthesia Type:  general ASA Status:  3          Anesthesia Type: general  Last vitals  BP   169/99 (05/29/19 0709)   Temp   97.4 °F (36.3 °C) (05/29/19 0709)   Pulse   62 (05/29/19 0709)   Resp   20 (05/29/19 0709)     SpO2   97 % (05/29/19 0709)     Post Anesthesia Care and Evaluation    Patient location during evaluation: PACU  Level of consciousness: awake  Pain score: 0  Pain management: adequate  Airway patency: patent  Anesthetic complications: No anesthetic complications  PONV Status: none  Cardiovascular status: acceptable and hemodynamically stable  Respiratory status: acceptable, spontaneous ventilation and oral airway  Hydration status: acceptable    Comments: o2 via simple mask

## 2019-05-30 LAB
ANION GAP SERPL CALCULATED.3IONS-SCNC: 16 MMOL/L
BASOPHILS # BLD AUTO: 0.04 10*3/MM3 (ref 0–0.2)
BASOPHILS NFR BLD AUTO: 0.3 % (ref 0–1.5)
BUN BLD-MCNC: 13 MG/DL (ref 8–23)
BUN/CREAT SERPL: 12.4 (ref 7–25)
CALCIUM SPEC-SCNC: 8.1 MG/DL (ref 8.6–10.5)
CHLORIDE SERPL-SCNC: 95 MMOL/L (ref 98–107)
CO2 SERPL-SCNC: 28 MMOL/L (ref 22–29)
CREAT BLD-MCNC: 1.05 MG/DL (ref 0.57–1)
DEPRECATED RDW RBC AUTO: 46.6 FL (ref 37–54)
EOSINOPHIL # BLD AUTO: 0 10*3/MM3 (ref 0–0.4)
EOSINOPHIL NFR BLD AUTO: 0 % (ref 0.3–6.2)
ERYTHROCYTE [DISTWIDTH] IN BLOOD BY AUTOMATED COUNT: 12.9 % (ref 12.3–15.4)
GFR SERPL CREATININE-BSD FRML MDRD: 52 ML/MIN/1.73
GLUCOSE BLD-MCNC: 104 MG/DL (ref 65–99)
HCT VFR BLD AUTO: 36.5 % (ref 34–46.6)
HGB BLD-MCNC: 12.3 G/DL (ref 12–15.9)
IMM GRANULOCYTES # BLD AUTO: 0.09 10*3/MM3 (ref 0–0.05)
IMM GRANULOCYTES NFR BLD AUTO: 0.6 % (ref 0–0.5)
LAB AP CASE REPORT: NORMAL
LYMPHOCYTES # BLD AUTO: 2.18 10*3/MM3 (ref 0.7–3.1)
LYMPHOCYTES NFR BLD AUTO: 14 % (ref 19.6–45.3)
MCH RBC QN AUTO: 33.3 PG (ref 26.6–33)
MCHC RBC AUTO-ENTMCNC: 33.7 G/DL (ref 31.5–35.7)
MCV RBC AUTO: 98.9 FL (ref 79–97)
MONOCYTES # BLD AUTO: 0.84 10*3/MM3 (ref 0.1–0.9)
MONOCYTES NFR BLD AUTO: 5.4 % (ref 5–12)
NEUTROPHILS # BLD AUTO: 12.4 10*3/MM3 (ref 1.7–7)
NEUTROPHILS NFR BLD AUTO: 79.7 % (ref 42.7–76)
NRBC BLD AUTO-RTO: 0 /100 WBC (ref 0–0.2)
PATH REPORT.FINAL DX SPEC: NORMAL
PATH REPORT.GROSS SPEC: NORMAL
PLATELET # BLD AUTO: 285 10*3/MM3 (ref 140–450)
PMV BLD AUTO: 9.9 FL (ref 6–12)
POTASSIUM BLD-SCNC: 3.4 MMOL/L (ref 3.5–5.2)
RBC # BLD AUTO: 3.69 10*6/MM3 (ref 3.77–5.28)
SODIUM BLD-SCNC: 139 MMOL/L (ref 136–145)
WBC NRBC COR # BLD: 15.55 10*3/MM3 (ref 3.4–10.8)

## 2019-05-30 PROCEDURE — 97163 PT EVAL HIGH COMPLEX 45 MIN: CPT

## 2019-05-30 PROCEDURE — 85025 COMPLETE CBC W/AUTO DIFF WBC: CPT | Performed by: SURGERY

## 2019-05-30 PROCEDURE — 25010000002 ONDANSETRON PER 1 MG: Performed by: SURGERY

## 2019-05-30 PROCEDURE — 25010000003 MORPHINE PER 10 MG: Performed by: SURGERY

## 2019-05-30 PROCEDURE — 80048 BASIC METABOLIC PNL TOTAL CA: CPT | Performed by: SURGERY

## 2019-05-30 PROCEDURE — 94760 N-INVAS EAR/PLS OXIMETRY 1: CPT

## 2019-05-30 PROCEDURE — 97167 OT EVAL HIGH COMPLEX 60 MIN: CPT

## 2019-05-30 PROCEDURE — 99024 POSTOP FOLLOW-UP VISIT: CPT | Performed by: SURGERY

## 2019-05-30 PROCEDURE — 25010000002 HEPARIN (PORCINE) PER 1000 UNITS: Performed by: SURGERY

## 2019-05-30 PROCEDURE — 25010000002 FUROSEMIDE PER 20 MG: Performed by: SURGERY

## 2019-05-30 RX ORDER — FUROSEMIDE 10 MG/ML
20 INJECTION INTRAMUSCULAR; INTRAVENOUS ONCE
Status: COMPLETED | OUTPATIENT
Start: 2019-05-30 | End: 2019-05-30

## 2019-05-30 RX ORDER — SODIUM CHLORIDE 9 MG/ML
100 INJECTION, SOLUTION INTRAVENOUS CONTINUOUS
Status: DISCONTINUED | OUTPATIENT
Start: 2019-05-30 | End: 2019-06-01

## 2019-05-30 RX ORDER — GLATIRAMER 40 MG/ML
40 INJECTION, SOLUTION SUBCUTANEOUS 3 TIMES WEEKLY
Status: DISCONTINUED | OUTPATIENT
Start: 2019-05-31 | End: 2019-06-14 | Stop reason: HOSPADM

## 2019-05-30 RX ORDER — SODIUM CHLORIDE 9 MG/ML
150 INJECTION, SOLUTION INTRAVENOUS CONTINUOUS
Status: DISPENSED | OUTPATIENT
Start: 2019-05-30 | End: 2019-05-30

## 2019-05-30 RX ORDER — VILAZODONE HYDROCHLORIDE 20 MG/1
20 TABLET ORAL DAILY
Status: DISCONTINUED | OUTPATIENT
Start: 2019-05-30 | End: 2019-05-30

## 2019-05-30 RX ORDER — NYSTATIN 100000 [USP'U]/G
POWDER TOPICAL EVERY 12 HOURS SCHEDULED
Status: DISCONTINUED | OUTPATIENT
Start: 2019-05-30 | End: 2019-06-14 | Stop reason: HOSPADM

## 2019-05-30 RX ORDER — VILAZODONE HYDROCHLORIDE 20 MG/1
20 TABLET ORAL NIGHTLY
Status: DISCONTINUED | OUTPATIENT
Start: 2019-05-30 | End: 2019-06-14 | Stop reason: HOSPADM

## 2019-05-30 RX ORDER — GLATIRAMER 40 MG/ML
40 INJECTION, SOLUTION SUBCUTANEOUS 3 TIMES WEEKLY
Status: DISCONTINUED | OUTPATIENT
Start: 2019-05-31 | End: 2019-05-30

## 2019-05-30 RX ADMIN — MEMANTINE 2.5 MG: 5 TABLET ORAL at 20:11

## 2019-05-30 RX ADMIN — GABAPENTIN 600 MG: 300 CAPSULE ORAL at 21:06

## 2019-05-30 RX ADMIN — AZTREONAM 1 G: 1 INJECTION, POWDER, LYOPHILIZED, FOR SOLUTION INTRAMUSCULAR; INTRAVENOUS at 04:48

## 2019-05-30 RX ADMIN — GABAPENTIN 600 MG: 300 CAPSULE ORAL at 06:00

## 2019-05-30 RX ADMIN — FAMOTIDINE 20 MG: 10 INJECTION INTRAVENOUS at 20:06

## 2019-05-30 RX ADMIN — LEVOTHYROXINE SODIUM 137 MCG: 25 TABLET ORAL at 08:54

## 2019-05-30 RX ADMIN — FAMOTIDINE 20 MG: 10 INJECTION INTRAVENOUS at 08:54

## 2019-05-30 RX ADMIN — ONDANSETRON 4 MG: 2 INJECTION INTRAMUSCULAR; INTRAVENOUS at 20:05

## 2019-05-30 RX ADMIN — MORPHINE SULFATE: 1 INJECTION INTRAVENOUS at 22:18

## 2019-05-30 RX ADMIN — SODIUM CHLORIDE 100 ML/HR: 9 INJECTION, SOLUTION INTRAVENOUS at 04:48

## 2019-05-30 RX ADMIN — NYSTATIN: 100000 POWDER TOPICAL at 20:07

## 2019-05-30 RX ADMIN — MORPHINE SULFATE: 1 INJECTION INTRAVENOUS at 04:48

## 2019-05-30 RX ADMIN — SODIUM CHLORIDE, PRESERVATIVE FREE 10 ML: 5 INJECTION INTRAVENOUS at 20:08

## 2019-05-30 RX ADMIN — VILAZODONE HYDROCHLORIDE 20 MG: 20 TABLET ORAL at 20:08

## 2019-05-30 RX ADMIN — PRIMIDONE 50 MG: 50 TABLET ORAL at 14:07

## 2019-05-30 RX ADMIN — DONEPEZIL HYDROCHLORIDE 5 MG: 5 TABLET, FILM COATED ORAL at 20:06

## 2019-05-30 RX ADMIN — FUROSEMIDE 20 MG: 10 INJECTION, SOLUTION INTRAVENOUS at 20:06

## 2019-05-30 RX ADMIN — PRIMIDONE 50 MG: 50 TABLET ORAL at 06:00

## 2019-05-30 RX ADMIN — MEMANTINE 2.5 MG: 5 TABLET ORAL at 08:55

## 2019-05-30 RX ADMIN — PRIMIDONE 50 MG: 50 TABLET ORAL at 21:07

## 2019-05-30 RX ADMIN — SODIUM CHLORIDE 150 ML/HR: 9 INJECTION, SOLUTION INTRAVENOUS at 17:58

## 2019-05-30 RX ADMIN — AZTREONAM 1 G: 1 INJECTION, POWDER, LYOPHILIZED, FOR SOLUTION INTRAMUSCULAR; INTRAVENOUS at 10:10

## 2019-05-30 RX ADMIN — HEPARIN SODIUM 5000 UNITS: 5000 INJECTION INTRAVENOUS; SUBCUTANEOUS at 21:06

## 2019-05-30 RX ADMIN — GABAPENTIN 600 MG: 300 CAPSULE ORAL at 14:07

## 2019-05-30 RX ADMIN — HEPARIN SODIUM 5000 UNITS: 5000 INJECTION INTRAVENOUS; SUBCUTANEOUS at 06:00

## 2019-05-30 RX ADMIN — HEPARIN SODIUM 5000 UNITS: 5000 INJECTION INTRAVENOUS; SUBCUTANEOUS at 14:07

## 2019-05-30 RX ADMIN — SODIUM CHLORIDE 100 ML/HR: 9 INJECTION, SOLUTION INTRAVENOUS at 20:05

## 2019-05-31 LAB
ANION GAP SERPL CALCULATED.3IONS-SCNC: 11 MMOL/L
BUN BLD-MCNC: 17 MG/DL (ref 8–23)
BUN/CREAT SERPL: 15.9 (ref 7–25)
CALCIUM SPEC-SCNC: 8.2 MG/DL (ref 8.6–10.5)
CHLORIDE SERPL-SCNC: 97 MMOL/L (ref 98–107)
CO2 SERPL-SCNC: 28 MMOL/L (ref 22–29)
CREAT BLD-MCNC: 1.07 MG/DL (ref 0.57–1)
DEPRECATED RDW RBC AUTO: 48 FL (ref 37–54)
ERYTHROCYTE [DISTWIDTH] IN BLOOD BY AUTOMATED COUNT: 13.1 % (ref 12.3–15.4)
GFR SERPL CREATININE-BSD FRML MDRD: 51 ML/MIN/1.73
GLUCOSE BLD-MCNC: 97 MG/DL (ref 65–99)
HCT VFR BLD AUTO: 33.9 % (ref 34–46.6)
HGB BLD-MCNC: 11.1 G/DL (ref 12–15.9)
MCH RBC QN AUTO: 32.9 PG (ref 26.6–33)
MCHC RBC AUTO-ENTMCNC: 32.7 G/DL (ref 31.5–35.7)
MCV RBC AUTO: 100.6 FL (ref 79–97)
PLATELET # BLD AUTO: 224 10*3/MM3 (ref 140–450)
PMV BLD AUTO: 10.1 FL (ref 6–12)
POTASSIUM BLD-SCNC: 3.1 MMOL/L (ref 3.5–5.2)
RBC # BLD AUTO: 3.37 10*6/MM3 (ref 3.77–5.28)
SODIUM BLD-SCNC: 136 MMOL/L (ref 136–145)
WBC NRBC COR # BLD: 19.15 10*3/MM3 (ref 3.4–10.8)

## 2019-05-31 PROCEDURE — 97110 THERAPEUTIC EXERCISES: CPT

## 2019-05-31 PROCEDURE — 85027 COMPLETE CBC AUTOMATED: CPT | Performed by: SURGERY

## 2019-05-31 PROCEDURE — 25010000002 HYDROMORPHONE 1 MG/ML SOLUTION: Performed by: SURGERY

## 2019-05-31 PROCEDURE — 80048 BASIC METABOLIC PNL TOTAL CA: CPT | Performed by: SURGERY

## 2019-05-31 PROCEDURE — 97530 THERAPEUTIC ACTIVITIES: CPT

## 2019-05-31 PROCEDURE — 25010000002 HEPARIN (PORCINE) PER 1000 UNITS: Performed by: SURGERY

## 2019-05-31 PROCEDURE — 25010000003 MORPHINE PER 10 MG: Performed by: SURGERY

## 2019-05-31 PROCEDURE — 99024 POSTOP FOLLOW-UP VISIT: CPT | Performed by: SURGERY

## 2019-05-31 RX ADMIN — PRIMIDONE 50 MG: 50 TABLET ORAL at 14:35

## 2019-05-31 RX ADMIN — SODIUM CHLORIDE 100 ML/HR: 9 INJECTION, SOLUTION INTRAVENOUS at 21:44

## 2019-05-31 RX ADMIN — MEMANTINE 2.5 MG: 5 TABLET ORAL at 21:29

## 2019-05-31 RX ADMIN — HYDROMORPHONE HYDROCHLORIDE 0.5 MG: 1 INJECTION, SOLUTION INTRAMUSCULAR; INTRAVENOUS; SUBCUTANEOUS at 10:55

## 2019-05-31 RX ADMIN — DONEPEZIL HYDROCHLORIDE 5 MG: 5 TABLET, FILM COATED ORAL at 21:29

## 2019-05-31 RX ADMIN — NYSTATIN 1 APPLICATION: 100000 POWDER TOPICAL at 21:30

## 2019-05-31 RX ADMIN — SODIUM CHLORIDE 100 ML/HR: 9 INJECTION, SOLUTION INTRAVENOUS at 03:10

## 2019-05-31 RX ADMIN — MEMANTINE 2.5 MG: 5 TABLET ORAL at 08:27

## 2019-05-31 RX ADMIN — SODIUM CHLORIDE 100 ML/HR: 9 INJECTION, SOLUTION INTRAVENOUS at 14:35

## 2019-05-31 RX ADMIN — HEPARIN SODIUM 5000 UNITS: 5000 INJECTION INTRAVENOUS; SUBCUTANEOUS at 21:29

## 2019-05-31 RX ADMIN — GABAPENTIN 600 MG: 300 CAPSULE ORAL at 21:29

## 2019-05-31 RX ADMIN — HYDROMORPHONE HYDROCHLORIDE 0.5 MG: 1 INJECTION, SOLUTION INTRAMUSCULAR; INTRAVENOUS; SUBCUTANEOUS at 23:34

## 2019-05-31 RX ADMIN — HEPARIN SODIUM 5000 UNITS: 5000 INJECTION INTRAVENOUS; SUBCUTANEOUS at 14:34

## 2019-05-31 RX ADMIN — FAMOTIDINE 20 MG: 10 INJECTION INTRAVENOUS at 08:26

## 2019-05-31 RX ADMIN — SODIUM CHLORIDE, PRESERVATIVE FREE 10 ML: 5 INJECTION INTRAVENOUS at 08:26

## 2019-05-31 RX ADMIN — GLATIRAMER 40 MG: 40 INJECTION, SOLUTION SUBCUTANEOUS at 21:28

## 2019-05-31 RX ADMIN — MORPHINE SULFATE: 1 INJECTION INTRAVENOUS at 23:34

## 2019-05-31 RX ADMIN — FAMOTIDINE 20 MG: 10 INJECTION INTRAVENOUS at 21:31

## 2019-05-31 RX ADMIN — NYSTATIN: 100000 POWDER TOPICAL at 08:27

## 2019-05-31 RX ADMIN — HEPARIN SODIUM 5000 UNITS: 5000 INJECTION INTRAVENOUS; SUBCUTANEOUS at 05:31

## 2019-05-31 RX ADMIN — GABAPENTIN 600 MG: 300 CAPSULE ORAL at 14:34

## 2019-05-31 RX ADMIN — LEVOTHYROXINE SODIUM 137 MCG: 25 TABLET ORAL at 08:26

## 2019-05-31 RX ADMIN — VILAZODONE HYDROCHLORIDE 20 MG: 20 TABLET ORAL at 21:30

## 2019-05-31 RX ADMIN — GABAPENTIN 600 MG: 300 CAPSULE ORAL at 05:31

## 2019-05-31 RX ADMIN — SODIUM CHLORIDE, PRESERVATIVE FREE 10 ML: 5 INJECTION INTRAVENOUS at 08:25

## 2019-05-31 RX ADMIN — PRIMIDONE 50 MG: 50 TABLET ORAL at 05:31

## 2019-05-31 RX ADMIN — PRIMIDONE 50 MG: 50 TABLET ORAL at 21:33

## 2019-06-01 LAB
ALBUMIN SERPL-MCNC: 2.1 G/DL (ref 3.5–5.2)
ALBUMIN/GLOB SERPL: 0.9 G/DL
ALP SERPL-CCNC: 110 U/L (ref 39–117)
ALT SERPL W P-5'-P-CCNC: 11 U/L (ref 1–33)
ANION GAP SERPL CALCULATED.3IONS-SCNC: 11 MMOL/L
AST SERPL-CCNC: 17 U/L (ref 1–32)
BASOPHILS # BLD AUTO: 0.03 10*3/MM3 (ref 0–0.2)
BASOPHILS NFR BLD AUTO: 0.2 % (ref 0–1.5)
BILIRUB SERPL-MCNC: 0.3 MG/DL (ref 0.2–1.2)
BUN BLD-MCNC: 20 MG/DL (ref 8–23)
BUN/CREAT SERPL: 25.6 (ref 7–25)
CALCIUM SPEC-SCNC: 8 MG/DL (ref 8.6–10.5)
CHLORIDE SERPL-SCNC: 101 MMOL/L (ref 98–107)
CO2 SERPL-SCNC: 27 MMOL/L (ref 22–29)
CREAT BLD-MCNC: 0.78 MG/DL (ref 0.57–1)
DEPRECATED RDW RBC AUTO: 47.2 FL (ref 37–54)
EOSINOPHIL # BLD AUTO: 0.03 10*3/MM3 (ref 0–0.4)
EOSINOPHIL NFR BLD AUTO: 0.2 % (ref 0.3–6.2)
ERYTHROCYTE [DISTWIDTH] IN BLOOD BY AUTOMATED COUNT: 13 % (ref 12.3–15.4)
GFR SERPL CREATININE-BSD FRML MDRD: 74 ML/MIN/1.73
GLOBULIN UR ELPH-MCNC: 2.4 GM/DL
GLUCOSE BLD-MCNC: 94 MG/DL (ref 65–99)
HCT VFR BLD AUTO: 29.7 % (ref 34–46.6)
HGB BLD-MCNC: 9.8 G/DL (ref 12–15.9)
IMM GRANULOCYTES # BLD AUTO: 0.12 10*3/MM3 (ref 0–0.05)
IMM GRANULOCYTES NFR BLD AUTO: 0.7 % (ref 0–0.5)
LYMPHOCYTES # BLD AUTO: 1.69 10*3/MM3 (ref 0.7–3.1)
LYMPHOCYTES NFR BLD AUTO: 10.2 % (ref 19.6–45.3)
MCH RBC QN AUTO: 32.8 PG (ref 26.6–33)
MCHC RBC AUTO-ENTMCNC: 33 G/DL (ref 31.5–35.7)
MCV RBC AUTO: 99.3 FL (ref 79–97)
MONOCYTES # BLD AUTO: 0.71 10*3/MM3 (ref 0.1–0.9)
MONOCYTES NFR BLD AUTO: 4.3 % (ref 5–12)
NEUTROPHILS # BLD AUTO: 14.06 10*3/MM3 (ref 1.7–7)
NEUTROPHILS NFR BLD AUTO: 84.4 % (ref 42.7–76)
NRBC BLD AUTO-RTO: 0 /100 WBC (ref 0–0.2)
PLATELET # BLD AUTO: 182 10*3/MM3 (ref 140–450)
PMV BLD AUTO: 10.6 FL (ref 6–12)
POTASSIUM BLD-SCNC: 3.2 MMOL/L (ref 3.5–5.2)
PROT SERPL-MCNC: 4.5 G/DL (ref 6–8.5)
RBC # BLD AUTO: 2.99 10*6/MM3 (ref 3.77–5.28)
SODIUM BLD-SCNC: 139 MMOL/L (ref 136–145)
WBC NRBC COR # BLD: 16.64 10*3/MM3 (ref 3.4–10.8)

## 2019-06-01 PROCEDURE — 80053 COMPREHEN METABOLIC PANEL: CPT | Performed by: SURGERY

## 2019-06-01 PROCEDURE — 85025 COMPLETE CBC W/AUTO DIFF WBC: CPT | Performed by: SURGERY

## 2019-06-01 PROCEDURE — 25010000002 MAGNESIUM SULFATE 2 GM/50ML SOLUTION: Performed by: SURGERY

## 2019-06-01 PROCEDURE — 94640 AIRWAY INHALATION TREATMENT: CPT | Performed by: NURSE PRACTITIONER

## 2019-06-01 PROCEDURE — 99024 POSTOP FOLLOW-UP VISIT: CPT | Performed by: SURGERY

## 2019-06-01 PROCEDURE — 25010000002 HYDROMORPHONE 1 MG/ML SOLUTION: Performed by: SURGERY

## 2019-06-01 PROCEDURE — 25010000003 POTASSIUM CHLORIDE 10 MEQ/100ML SOLUTION: Performed by: SURGERY

## 2019-06-01 PROCEDURE — 25010000002 HEPARIN (PORCINE) PER 1000 UNITS: Performed by: SURGERY

## 2019-06-01 RX ORDER — DEXTROSE, SODIUM CHLORIDE, AND POTASSIUM CHLORIDE 5; .45; .15 G/100ML; G/100ML; G/100ML
50 INJECTION INTRAVENOUS CONTINUOUS
Status: DISCONTINUED | OUTPATIENT
Start: 2019-06-01 | End: 2019-06-10

## 2019-06-01 RX ORDER — POTASSIUM CHLORIDE 7.45 MG/ML
10 INJECTION INTRAVENOUS
Status: COMPLETED | OUTPATIENT
Start: 2019-06-01 | End: 2019-06-01

## 2019-06-01 RX ORDER — MAGNESIUM SULFATE HEPTAHYDRATE 40 MG/ML
2 INJECTION, SOLUTION INTRAVENOUS ONCE
Status: COMPLETED | OUTPATIENT
Start: 2019-06-01 | End: 2019-06-01

## 2019-06-01 RX ADMIN — PRIMIDONE 50 MG: 50 TABLET ORAL at 22:31

## 2019-06-01 RX ADMIN — LEVOTHYROXINE SODIUM 137 MCG: 25 TABLET ORAL at 09:29

## 2019-06-01 RX ADMIN — MEMANTINE 2.5 MG: 5 TABLET ORAL at 09:29

## 2019-06-01 RX ADMIN — PRIMIDONE 50 MG: 50 TABLET ORAL at 05:39

## 2019-06-01 RX ADMIN — DONEPEZIL HYDROCHLORIDE 5 MG: 5 TABLET, FILM COATED ORAL at 20:06

## 2019-06-01 RX ADMIN — HYDROMORPHONE HYDROCHLORIDE 0.5 MG: 1 INJECTION, SOLUTION INTRAMUSCULAR; INTRAVENOUS; SUBCUTANEOUS at 13:16

## 2019-06-01 RX ADMIN — HEPARIN SODIUM 5000 UNITS: 5000 INJECTION INTRAVENOUS; SUBCUTANEOUS at 13:14

## 2019-06-01 RX ADMIN — ALBUTEROL SULFATE 2 PUFF: 90 AEROSOL, METERED RESPIRATORY (INHALATION) at 19:34

## 2019-06-01 RX ADMIN — SODIUM CHLORIDE, PRESERVATIVE FREE 10 ML: 5 INJECTION INTRAVENOUS at 09:55

## 2019-06-01 RX ADMIN — POTASSIUM CHLORIDE 10 MEQ: 7.46 INJECTION, SOLUTION INTRAVENOUS at 09:28

## 2019-06-01 RX ADMIN — VILAZODONE HYDROCHLORIDE 20 MG: 20 TABLET ORAL at 20:08

## 2019-06-01 RX ADMIN — PRIMIDONE 50 MG: 50 TABLET ORAL at 13:14

## 2019-06-01 RX ADMIN — POTASSIUM CHLORIDE 10 MEQ: 7.46 INJECTION, SOLUTION INTRAVENOUS at 14:37

## 2019-06-01 RX ADMIN — TIZANIDINE 4 MG: 4 TABLET ORAL at 01:44

## 2019-06-01 RX ADMIN — FAMOTIDINE 20 MG: 10 INJECTION INTRAVENOUS at 20:08

## 2019-06-01 RX ADMIN — MAGNESIUM SULFATE HEPTAHYDRATE 2 G: 40 INJECTION, SOLUTION INTRAVENOUS at 09:43

## 2019-06-01 RX ADMIN — FAMOTIDINE 20 MG: 10 INJECTION INTRAVENOUS at 09:29

## 2019-06-01 RX ADMIN — NYSTATIN 1 APPLICATION: 100000 POWDER TOPICAL at 20:09

## 2019-06-01 RX ADMIN — HYDROMORPHONE HYDROCHLORIDE 0.5 MG: 1 INJECTION, SOLUTION INTRAMUSCULAR; INTRAVENOUS; SUBCUTANEOUS at 16:39

## 2019-06-01 RX ADMIN — GABAPENTIN 600 MG: 300 CAPSULE ORAL at 22:31

## 2019-06-01 RX ADMIN — GABAPENTIN 600 MG: 300 CAPSULE ORAL at 13:14

## 2019-06-01 RX ADMIN — HYDROMORPHONE HYDROCHLORIDE 0.5 MG: 1 INJECTION, SOLUTION INTRAMUSCULAR; INTRAVENOUS; SUBCUTANEOUS at 20:03

## 2019-06-01 RX ADMIN — POTASSIUM CHLORIDE, DEXTROSE MONOHYDRATE AND SODIUM CHLORIDE 100 ML/HR: 150; 5; 450 INJECTION, SOLUTION INTRAVENOUS at 09:43

## 2019-06-01 RX ADMIN — HEPARIN SODIUM 5000 UNITS: 5000 INJECTION INTRAVENOUS; SUBCUTANEOUS at 05:39

## 2019-06-01 RX ADMIN — HYDROMORPHONE HYDROCHLORIDE 0.5 MG: 1 INJECTION, SOLUTION INTRAMUSCULAR; INTRAVENOUS; SUBCUTANEOUS at 23:58

## 2019-06-01 RX ADMIN — HEPARIN SODIUM 5000 UNITS: 5000 INJECTION INTRAVENOUS; SUBCUTANEOUS at 22:31

## 2019-06-01 RX ADMIN — HYDROMORPHONE HYDROCHLORIDE 0.5 MG: 1 INJECTION, SOLUTION INTRAMUSCULAR; INTRAVENOUS; SUBCUTANEOUS at 09:55

## 2019-06-01 RX ADMIN — POTASSIUM CHLORIDE, DEXTROSE MONOHYDRATE AND SODIUM CHLORIDE 100 ML/HR: 150; 5; 450 INJECTION, SOLUTION INTRAVENOUS at 20:08

## 2019-06-01 RX ADMIN — MEMANTINE 2.5 MG: 5 TABLET ORAL at 20:05

## 2019-06-01 RX ADMIN — POTASSIUM CHLORIDE 10 MEQ: 7.46 INJECTION, SOLUTION INTRAVENOUS at 13:13

## 2019-06-01 RX ADMIN — POTASSIUM CHLORIDE 10 MEQ: 7.46 INJECTION, SOLUTION INTRAVENOUS at 11:12

## 2019-06-01 RX ADMIN — NYSTATIN: 100000 POWDER TOPICAL at 09:29

## 2019-06-01 NOTE — CONSULTS
Adult Outpatient Nutrition  Assessment    Patient Name:  Irma Pacheco  YOB: 1950  MRN: [id number]    Assessment Date:  6/1/2019    Comments: Follow-up visit to clarify food allergy prior to diet order. Pt states does not drink milk, but small amounts in recipes tolerated. Only has 4 teeth--will need soft texture (when diet ordered). Once again stated does not eat meat--likes eggs/cheese/fruit/vegetables.Documented prefs on dietary work sheet.                        Electronically signed by:  Jasmin Clement RD  06/01/19 3:04 PM

## 2019-06-01 NOTE — PROGRESS NOTES
"   LOS: 3 days   Patient Care Team:  Chloe Duran APRN as PCP - General (Family Medicine)    Chief Complaint: POD #3    Subjective     History of Present Illness    Subjective:  Symptoms:  Stable.  She reports malaise, weakness and anxiety.  No shortness of breath, cough, chest pain, headache, chest pressure, anorexia or diarrhea.    Diet:  NPO.  No nausea or vomiting.    Activity level: Impaired due to weakness.    Pain:  She reports no pain.        History taken from: patient chart RN    Objective     Vital Signs  Temp:  [98 °F (36.7 °C)-98.3 °F (36.8 °C)] 98 °F (36.7 °C)  Heart Rate:  [] 78  Resp:  [12-22] 18  BP: (107-157)/(50-94) 116/56    Objective:  General Appearance:  In no acute distress and ill-appearing (Confusion overnight).    Vital signs: (most recent): Blood pressure 116/56, pulse 78, temperature 98 °F (36.7 °C), temperature source Oral, resp. rate 18, height 152.4 cm (60\"), weight 116 kg (255 lb 15.3 oz), SpO2 95 %.  Vital signs are normal.  No fever.    Output: Producing urine and no stool output.    Lungs:  Normal effort and normal respiratory rate.    Heart: Normal rate.  Regular rhythm.    Abdomen: Abdomen is soft.  (Her incision is clean and dry, no cellulitis ).    Neurological: (Confused and disoriented, normal oxygen saturation.).              Results Review:     I reviewed the patient's new clinical results.  Results for KELBY VANN (MRN [id number]) as of 6/1/2019 09:06   Ref. Range 5/31/2019 03:46 6/1/2019 04:43 6/1/2019 05:51   Glucose Latest Ref Range: 65 - 99 mg/dL 97 94    Sodium Latest Ref Range: 136 - 145 mmol/L 136 139    Potassium Latest Ref Range: 3.5 - 5.2 mmol/L 3.1 (L) 3.2 (L)    CO2 Latest Ref Range: 22.0 - 29.0 mmol/L 28.0 27.0    Chloride Latest Ref Range: 98 - 107 mmol/L 97 (L) 101    Anion Gap Latest Units: mmol/L 11.0 11.0    Creatinine Latest Ref Range: 0.57 - 1.00 mg/dL 1.07 (H) 0.78    BUN Latest Ref Range: 8 - 23 mg/dL 17 20    BUN/Creatinine " Ratio Latest Ref Range: 7.0 - 25.0  15.9 25.6 (H)    Calcium Latest Ref Range: 8.6 - 10.5 mg/dL 8.2 (L) 8.0 (L)    eGFR Non  Am Latest Ref Range: >60 mL/min/1.73 51 (L) 74    Alkaline Phosphatase Latest Ref Range: 39 - 117 U/L  110    Total Protein Latest Ref Range: 6.0 - 8.5 g/dL  4.5 (L)    ALT (SGPT) Latest Ref Range: 1 - 33 U/L  11    AST (SGOT) Latest Ref Range: 1 - 32 U/L  17    Total Bilirubin Latest Ref Range: 0.2 - 1.2 mg/dL  0.3    Albumin Latest Ref Range: 3.50 - 5.20 g/dL  2.10 (L)    Globulin Latest Units: gm/dL  2.4    A/G Ratio Latest Units: g/dL  0.9    WBC Latest Ref Range: 3.40 - 10.80 10*3/mm3 19.15 (H)  16.64 (H)   RBC Latest Ref Range: 3.77 - 5.28 10*6/mm3 3.37 (L)  2.99 (L)   Hemoglobin Latest Ref Range: 12.0 - 15.9 g/dL 11.1 (L)  9.8 (L)   Hematocrit Latest Ref Range: 34.0 - 46.6 % 33.9 (L)  29.7 (L)   RDW Latest Ref Range: 12.3 - 15.4 % 13.1  13.0   MCV Latest Ref Range: 79.0 - 97.0 fL 100.6 (H)  99.3 (H)   MCH Latest Ref Range: 26.6 - 33.0 pg 32.9  32.8   MCHC Latest Ref Range: 31.5 - 35.7 g/dL 32.7  33.0   MPV Latest Ref Range: 6.0 - 12.0 fL 10.1  10.6   Platelets Latest Ref Range: 140 - 450 10*3/mm3 224  182   RDW-SD Latest Ref Range: 37.0 - 54.0 fl 48.0  47.2   Neutrophil Rel % Latest Ref Range: 42.7 - 76.0 %   84.4 (H)   Lymphocyte Rel % Latest Ref Range: 19.6 - 45.3 %   10.2 (L)   Monocyte Rel % Latest Ref Range: 5.0 - 12.0 %   4.3 (L)   Eosinophil Rel % Latest Ref Range: 0.3 - 6.2 %   0.2 (L)   Basophil Rel % Latest Ref Range: 0.0 - 1.5 %   0.2   Immature Granulocyte Rel % Latest Ref Range: 0.0 - 0.5 %   0.7 (H)   Neutrophils Absolute Latest Ref Range: 1.70 - 7.00 10*3/mm3   14.06 (H)   Lymphocytes Absolute Latest Ref Range: 0.70 - 3.10 10*3/mm3   1.69   Monocytes Absolute Latest Ref Range: 0.10 - 0.90 10*3/mm3   0.71   Eosinophils Absolute Latest Ref Range: 0.00 - 0.40 10*3/mm3   0.03   Basophils Absolute Latest Ref Range: 0.00 - 0.20 10*3/mm3   0.03   Immature Grans, Absolute  Latest Ref Range: 0.00 - 0.05 10*3/mm3   0.12 (H)   nRBC Latest Ref Range: 0.0 - 0.2 /100 WBC   0.0     Medication Review: Done.    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   (Progressive confusion.).     Plan:   (F - NPO  A - IV Dilaudid, stopped IV Morphine  S - None  T - Has SCDs and is on Heparin  H - HUB is up 30 degrees  U - On Famotidine  G - Controlled  S - N/A  B - N/A  I - Does not have central line. MIKE Aguilera need to stay  D - N/A).       Rafael Ochoa MD  06/01/19  9:05 AM    Time: 30 minutes

## 2019-06-02 LAB
ANION GAP SERPL CALCULATED.3IONS-SCNC: 15 MMOL/L
BASOPHILS # BLD AUTO: 0.04 10*3/MM3 (ref 0–0.2)
BASOPHILS NFR BLD AUTO: 0.2 % (ref 0–1.5)
BUN BLD-MCNC: 13 MG/DL (ref 8–23)
BUN/CREAT SERPL: 17.8 (ref 7–25)
CALCIUM SPEC-SCNC: 8.5 MG/DL (ref 8.6–10.5)
CHLORIDE SERPL-SCNC: 102 MMOL/L (ref 98–107)
CO2 SERPL-SCNC: 23 MMOL/L (ref 22–29)
CREAT BLD-MCNC: 0.73 MG/DL (ref 0.57–1)
DEPRECATED RDW RBC AUTO: 45.4 FL (ref 37–54)
EOSINOPHIL # BLD AUTO: 0.04 10*3/MM3 (ref 0–0.4)
EOSINOPHIL NFR BLD AUTO: 0.2 % (ref 0.3–6.2)
ERYTHROCYTE [DISTWIDTH] IN BLOOD BY AUTOMATED COUNT: 12.6 % (ref 12.3–15.4)
GFR SERPL CREATININE-BSD FRML MDRD: 80 ML/MIN/1.73
GLUCOSE BLD-MCNC: 120 MG/DL (ref 65–99)
HCT VFR BLD AUTO: 32.3 % (ref 34–46.6)
HGB BLD-MCNC: 10.6 G/DL (ref 12–15.9)
IMM GRANULOCYTES # BLD AUTO: 0.23 10*3/MM3 (ref 0–0.05)
IMM GRANULOCYTES NFR BLD AUTO: 1.4 % (ref 0–0.5)
LYMPHOCYTES # BLD AUTO: 1.2 10*3/MM3 (ref 0.7–3.1)
LYMPHOCYTES NFR BLD AUTO: 7.5 % (ref 19.6–45.3)
MAGNESIUM SERPL-MCNC: 2.1 MG/DL (ref 1.6–2.4)
MCH RBC QN AUTO: 32.4 PG (ref 26.6–33)
MCHC RBC AUTO-ENTMCNC: 32.8 G/DL (ref 31.5–35.7)
MCV RBC AUTO: 98.8 FL (ref 79–97)
MONOCYTES # BLD AUTO: 0.69 10*3/MM3 (ref 0.1–0.9)
MONOCYTES NFR BLD AUTO: 4.3 % (ref 5–12)
NEUTROPHILS # BLD AUTO: 13.85 10*3/MM3 (ref 1.7–7)
NEUTROPHILS NFR BLD AUTO: 86.4 % (ref 42.7–76)
NRBC BLD AUTO-RTO: 0 /100 WBC (ref 0–0.2)
PHOSPHATE SERPL-MCNC: 1.5 MG/DL (ref 2.5–4.5)
PHOSPHATE SERPL-MCNC: 2.4 MG/DL (ref 2.5–4.5)
PLATELET # BLD AUTO: 231 10*3/MM3 (ref 140–450)
PMV BLD AUTO: 10.4 FL (ref 6–12)
POTASSIUM BLD-SCNC: 3.5 MMOL/L (ref 3.5–5.2)
RBC # BLD AUTO: 3.27 10*6/MM3 (ref 3.77–5.28)
SODIUM BLD-SCNC: 140 MMOL/L (ref 136–145)
WBC NRBC COR # BLD: 16.05 10*3/MM3 (ref 3.4–10.8)

## 2019-06-02 PROCEDURE — 84100 ASSAY OF PHOSPHORUS: CPT | Performed by: SURGERY

## 2019-06-02 PROCEDURE — 25010000002 HYDROMORPHONE 1 MG/ML SOLUTION: Performed by: SURGERY

## 2019-06-02 PROCEDURE — 99024 POSTOP FOLLOW-UP VISIT: CPT | Performed by: SURGERY

## 2019-06-02 PROCEDURE — 85025 COMPLETE CBC W/AUTO DIFF WBC: CPT | Performed by: SURGERY

## 2019-06-02 PROCEDURE — 80048 BASIC METABOLIC PNL TOTAL CA: CPT | Performed by: SURGERY

## 2019-06-02 PROCEDURE — 97110 THERAPEUTIC EXERCISES: CPT

## 2019-06-02 PROCEDURE — 83735 ASSAY OF MAGNESIUM: CPT | Performed by: SURGERY

## 2019-06-02 PROCEDURE — 25010000002 HEPARIN (PORCINE) PER 1000 UNITS: Performed by: SURGERY

## 2019-06-02 RX ADMIN — GABAPENTIN 600 MG: 300 CAPSULE ORAL at 21:30

## 2019-06-02 RX ADMIN — LEVOTHYROXINE SODIUM 137 MCG: 25 TABLET ORAL at 09:49

## 2019-06-02 RX ADMIN — DONEPEZIL HYDROCHLORIDE 5 MG: 5 TABLET, FILM COATED ORAL at 20:08

## 2019-06-02 RX ADMIN — PRIMIDONE 50 MG: 50 TABLET ORAL at 05:29

## 2019-06-02 RX ADMIN — GABAPENTIN 600 MG: 300 CAPSULE ORAL at 05:28

## 2019-06-02 RX ADMIN — SODIUM CHLORIDE, PRESERVATIVE FREE 10 ML: 5 INJECTION INTRAVENOUS at 20:07

## 2019-06-02 RX ADMIN — MEMANTINE 2.5 MG: 5 TABLET ORAL at 20:11

## 2019-06-02 RX ADMIN — POTASSIUM CHLORIDE, DEXTROSE MONOHYDRATE AND SODIUM CHLORIDE 100 ML/HR: 150; 5; 450 INJECTION, SOLUTION INTRAVENOUS at 09:49

## 2019-06-02 RX ADMIN — MEMANTINE 2.5 MG: 5 TABLET ORAL at 09:50

## 2019-06-02 RX ADMIN — POTASSIUM PHOSPHATE, MONOBASIC AND POTASSIUM PHOSPHATE, DIBASIC 30 MMOL: 224; 236 INJECTION, SOLUTION, CONCENTRATE INTRAVENOUS at 07:00

## 2019-06-02 RX ADMIN — POTASSIUM PHOSPHATE, MONOBASIC AND POTASSIUM PHOSPHATE, DIBASIC 15 MMOL: 224; 236 INJECTION, SOLUTION, CONCENTRATE INTRAVENOUS at 23:30

## 2019-06-02 RX ADMIN — NYSTATIN: 100000 POWDER TOPICAL at 17:34

## 2019-06-02 RX ADMIN — HYDROMORPHONE HYDROCHLORIDE 0.5 MG: 1 INJECTION, SOLUTION INTRAMUSCULAR; INTRAVENOUS; SUBCUTANEOUS at 11:27

## 2019-06-02 RX ADMIN — FAMOTIDINE 20 MG: 10 INJECTION INTRAVENOUS at 09:49

## 2019-06-02 RX ADMIN — HEPARIN SODIUM 5000 UNITS: 5000 INJECTION INTRAVENOUS; SUBCUTANEOUS at 21:31

## 2019-06-02 RX ADMIN — HEPARIN SODIUM 5000 UNITS: 5000 INJECTION INTRAVENOUS; SUBCUTANEOUS at 15:03

## 2019-06-02 RX ADMIN — HYDROMORPHONE HYDROCHLORIDE 0.5 MG: 1 INJECTION, SOLUTION INTRAMUSCULAR; INTRAVENOUS; SUBCUTANEOUS at 05:28

## 2019-06-02 RX ADMIN — FAMOTIDINE 20 MG: 10 INJECTION INTRAVENOUS at 20:08

## 2019-06-02 RX ADMIN — PRIMIDONE 50 MG: 50 TABLET ORAL at 15:02

## 2019-06-02 RX ADMIN — NYSTATIN: 100000 POWDER TOPICAL at 20:10

## 2019-06-02 RX ADMIN — POTASSIUM CHLORIDE, DEXTROSE MONOHYDRATE AND SODIUM CHLORIDE 100 ML/HR: 150; 5; 450 INJECTION, SOLUTION INTRAVENOUS at 05:28

## 2019-06-02 RX ADMIN — PRIMIDONE 50 MG: 50 TABLET ORAL at 21:31

## 2019-06-02 RX ADMIN — GABAPENTIN 600 MG: 300 CAPSULE ORAL at 15:02

## 2019-06-02 RX ADMIN — VILAZODONE HYDROCHLORIDE 20 MG: 20 TABLET ORAL at 20:11

## 2019-06-02 RX ADMIN — HEPARIN SODIUM 5000 UNITS: 5000 INJECTION INTRAVENOUS; SUBCUTANEOUS at 05:29

## 2019-06-02 RX ADMIN — HYDROMORPHONE HYDROCHLORIDE 0.5 MG: 1 INJECTION, SOLUTION INTRAMUSCULAR; INTRAVENOUS; SUBCUTANEOUS at 17:02

## 2019-06-02 NOTE — SIGNIFICANT NOTE
06/02/19 0926   Rehab Treatment   Discipline occupational therapy assistant   Reason Treatment Not Performed other (see comments)   pt in and out of sleep  Extremely fatigued.  Pt family stated they are getting ready to have their [employer] meeting

## 2019-06-02 NOTE — PLAN OF CARE
Problem: Patient Care Overview  Goal: Plan of Care Review  Outcome: Ongoing (interventions implemented as appropriate)   06/02/19 9941   Coping/Psychosocial   Plan of Care Reviewed With patient;daughter   Plan of Care Review   Progress improving   OTHER   Outcome Summary Patient alert and oriented to self today. Colostomy producing stool so NG tube removed this am, patient took oral meds with no complications. Urine output remains adequate. Pain controlled with prn pushes. Abdomen soft. K Phos given today, will recheck labs in the am.       Problem: Fall Risk (Adult)  Goal: Absence of Fall  Outcome: Ongoing (interventions implemented as appropriate)      Problem: Skin Injury Risk (Adult)  Goal: Skin Health and Integrity  Outcome: Ongoing (interventions implemented as appropriate)      Problem: Surgery Nonspecified (Adult)  Goal: Signs and Symptoms of Listed Potential Problems Will be Absent, Minimized or Managed (Surgery Nonspecified)  Outcome: Ongoing (interventions implemented as appropriate)      Problem: Restraint, Nonbehavioral (Nonviolent)  Goal: Rationale and Justification  Outcome: Ongoing (interventions implemented as appropriate)

## 2019-06-02 NOTE — PROGRESS NOTES
LOS: 4 days   Patient Care Team:  Chloe Duran APRN as PCP - General (Family Medicine)    Chief Complaint: POD #4    Subjective     Daughter was in room with patient and requested that she not be woken up since pt has had trouble sleeping. Per nurse and daughter, patient doing much better today. Uses CPAP machine at home but not using while in hospital. Slept well overnight, but still in restraints because she was trying to pull out NG tube. Daughter says pt is oriented to person and date but not to place. First day of passing stool. Potassium phosphate given for low phosphate.        Subjective    History taken from: chart family RN    Objective     Vital Signs  Temp:  [97.9 °F (36.6 °C)-100.4 °F (38 °C)] 99.8 °F (37.7 °C)  Heart Rate:  [] 91  Resp:  [12-22] 17  BP: (111-178)/() 148/75    Objective    Results Review:     I reviewed the patient's new clinical results.    Results for KELBY VANN (MRN [id number]) as of 6/2/2019 07:16   Ref. Range 6/2/2019 03:12   Glucose Latest Ref Range: 65 - 99 mg/dL 120 (H)   Sodium Latest Ref Range: 136 - 145 mmol/L 140   Potassium Latest Ref Range: 3.5 - 5.2 mmol/L 3.5   CO2 Latest Ref Range: 22.0 - 29.0 mmol/L 23.0   Chloride Latest Ref Range: 98 - 107 mmol/L 102   Anion Gap Latest Units: mmol/L 15.0   Creatinine Latest Ref Range: 0.57 - 1.00 mg/dL 0.73   BUN Latest Ref Range: 8 - 23 mg/dL 13   BUN/Creatinine Ratio Latest Ref Range: 7.0 - 25.0  17.8   Calcium Latest Ref Range: 8.6 - 10.5 mg/dL 8.5 (L)   eGFR Non African Am Latest Ref Range: >60 mL/min/1.73 80   Phosphorus Latest Ref Range: 2.5 - 4.5 mg/dL 1.5 (C)   Magnesium Latest Ref Range: 1.6 - 2.4 mg/dL 2.1   WBC Latest Ref Range: 3.40 - 10.80 10*3/mm3 16.05 (H)   RBC Latest Ref Range: 3.77 - 5.28 10*6/mm3 3.27 (L)   Hemoglobin Latest Ref Range: 12.0 - 15.9 g/dL 10.6 (L)   Hematocrit Latest Ref Range: 34.0 - 46.6 % 32.3 (L)   RDW Latest Ref Range: 12.3 - 15.4 % 12.6   MCV Latest Ref Range:  79.0 - 97.0 fL 98.8 (H)   MCH Latest Ref Range: 26.6 - 33.0 pg 32.4   MCHC Latest Ref Range: 31.5 - 35.7 g/dL 32.8   MPV Latest Ref Range: 6.0 - 12.0 fL 10.4   Platelets Latest Ref Range: 140 - 450 10*3/mm3 231   RDW-SD Latest Ref Range: 37.0 - 54.0 fl 45.4   Neutrophil Rel % Latest Ref Range: 42.7 - 76.0 % 86.4 (H)   Lymphocyte Rel % Latest Ref Range: 19.6 - 45.3 % 7.5 (L)   Monocyte Rel % Latest Ref Range: 5.0 - 12.0 % 4.3 (L)   Eosinophil Rel % Latest Ref Range: 0.3 - 6.2 % 0.2 (L)   Basophil Rel % Latest Ref Range: 0.0 - 1.5 % 0.2   Immature Granulocyte Rel % Latest Ref Range: 0.0 - 0.5 % 1.4 (H)   Neutrophils Absolute Latest Ref Range: 1.70 - 7.00 10*3/mm3 13.85 (H)   Lymphocytes Absolute Latest Ref Range: 0.70 - 3.10 10*3/mm3 1.20   Monocytes Absolute Latest Ref Range: 0.10 - 0.90 10*3/mm3 0.69   Eosinophils Absolute Latest Ref Range: 0.00 - 0.40 10*3/mm3 0.04   Basophils Absolute Latest Ref Range: 0.00 - 0.20 10*3/mm3 0.04   Immature Grans, Absolute Latest Ref Range: 0.00 - 0.05 10*3/mm3 0.23 (H)   nRBC Latest Ref Range: 0.0 - 0.2 /100 WBC 0.0     Medication Review: Done.    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  Improving.       Plan:   Per physical therapy.  NPO and sips/ice chips.  (Patient improving. Possible transfer to floor today. Since pt had a bowel movement and is more oriented, consider removing NG tube. If NG tube is removed, advance diet as tolerated.  DC NG    F - NPO  A - IV Dilaudid  S - None  T - Is on Heparin; not currently using SCDs  H - HUB is up 30 degrees  U - On Famotidine  G - Slightly high (120), but has not required insulin  S - N/A  B - N/A  I - Does not have central line. MIKE Aguilera  D - N/A).       Michelle Dinh, Medical Student  06/02/19  6:55 AM    Time: 15 minutes

## 2019-06-02 NOTE — THERAPY TREATMENT NOTE
Acute Care - Physical Therapy Treatment Note  [facility]     Patient Name: Irma Pacheco  : 1950  MRN: [id number]  Today's Date: 2019  Onset of Illness/Injury or Date of Surgery: 19  Date of Referral to PT: 19  Referring Physician: Dr. Dixon     Admit Date: 2019    Visit Dx:    ICD-10-CM ICD-9-CM   1. 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 Impaired functional mobility and activity tolerance Z74.09 V49.89   3. 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       Therapy Treatment    Rehabilitation Treatment Summary     Row Name 19 1327             Treatment Time/Intention    Discipline  physical therapy assistant  -TA      Document Type  therapy note (daily note)  -TA      Subjective Information  complains of;pain  -TA      Mode of Treatment  physical therapy  -TA      Existing Precautions/Restrictions  fall;other (see comments) colostomy  -TA      Patient Response to Treatment  pt agreed to bed exercises  -TA      Recorded by [TA] Brenda Murphy PTA 19 1443      Row Name 19 1327             Vital Signs    Pre Systolic BP Rehab  134  -TA      Pre Treatment Diastolic BP  63  -TA      Post Systolic BP Rehab  141  -TA      Post Treatment Diastolic BP  69  -TA      Pretreatment Heart Rate (beats/min)  99  -TA      Posttreatment Heart Rate (beats/min)  95  -TA      Pre SpO2 (%)  94  -TA      O2 Delivery Pre Treatment  room air  -TA      Post SpO2 (%)  95  -TA      O2 Delivery Post Treatment  room air  -TA      Pre Patient Position  Supine  -TA      Intra Patient Position  Supine  -TA      Post Patient Position  Supine  -TA      Recorded by [TA] Brenda Murphy PTA 19 1443      Row Name 19 1327             Cognitive Assessment/Intervention- PT/OT    Orientation Status (Cognition)  oriented x 4  -TA      Recorded by [TA] Brenda Murphy PTA 19 1449       Row Name 06/02/19 1327             Functional Mobility    Functional Mobility- Ind. Level  not tested  -TA      Recorded by [TA] Brenda Murphy, PTA 06/02/19 1443      Row Name 06/02/19 1327             Therapeutic Exercise    Lower Extremity (Therapeutic Exercise)  gluteal sets;quad sets, bilateral;heel slides, bilateral  -TA      Lower Extremity Range of Motion (Therapeutic Exercise)  hip abduction/adduction, bilateral;ankle dorsiflexion/plantar flexion, bilateral  -TA      Exercise Type (Therapeutic Exercise)  AROM (active range of motion);AAROM (active assistive range of motion)  -TA2      Position (Therapeutic Exercise)  supine  -TA      Sets/Reps (Therapeutic Exercise)  15-20  -TA      Expected Outcome (Therapeutic Exercise)  facilitate normal movement patterns;improve functional stability;improve motor control;increase active range of motion;increase passive range of motion  -TA      Recorded by [TA] Brenda Murphy, PTA 06/02/19 1443  [TA2] Brenda Murphy, PTA 06/02/19 1445      Row Name 06/02/19 1327             Positioning and Restraints    Pre-Treatment Position  in bed  -TA      Post Treatment Position  bed  -TA      In Bed  supine;call light within reach;exit alarm on  -TA      Recorded by [TA] Brenda Murphy, PTA 06/02/19 1443      Row Name 06/02/19 1327             Pain Scale: Numbers Pre/Post-Treatment    Pain Scale: Numbers, Pretreatment  9/10  -TA      Pain Scale: Numbers, Post-Treatment  9/10  -TA      Pain Location  back neck  -TA      Recorded by [TA] Brenda Murphy, PTA 06/02/19 1443      Row Name                Wound 05/29/19 1327 abdomen incision;surgical    Wound - Properties Group Date first assessed: 05/29/19 [KM] Time first assessed: 1327 [KM] Present On Admission : no [KM] Location: abdomen [KM] Type: incision;surgical [KM] Recorded by:  [KM] Geno Blackmon RN 05/29/19 1327    Row Name 06/02/19 1327             Outcome Summary/Treatment Plan (PT)    Daily Summary of Progress  (PT)  progress toward functional goals is gradual  -TA      Plan for Continued Treatment (PT)  continue  -TA      Anticipated Discharge Disposition (PT)  skilled nursing facility  -TA      Recorded by [TA] Brenda Murphy, PTA 06/02/19 1443        User Key  (r) = Recorded By, (t) = Taken By, (c) = Cosigned By    Initials Name Effective Dates Discipline    TA Brenda Murphy, PTA 03/07/18 -  PT    KM Geno Blackmon, RN 05/21/18 -  Nurse          Wound 05/29/19 1327 abdomen incision;surgical (Active)   Dressing Appearance dry;intact 6/2/2019 12:00 PM   Closure ELIZABETH 6/2/2019 12:00 PM   Base dressing in place, unable to visualize 6/2/2019 12:00 PM   Periwound dry;intact 6/2/2019 12:00 PM   Periwound Temperature warm 6/2/2019 12:00 PM   Drainage Characteristics/Odor serosanguineous 6/2/2019 12:00 PM   Drainage Amount moderate 6/2/2019 12:00 PM   Care, Wound cleansed with 6/2/2019  4:20 AM   Dressing Care, Wound dressing changed 6/2/2019  4:20 AM       Rehab Goal Summary     Row Name 06/02/19 1327             Physical Therapy Goals    Bed Mobility Goal Selection (PT)  bed mobility, PT goal 1  -TA      Transfer Goal Selection (PT)  transfer, PT goal 1  -TA      Gait Training Goal Selection (PT)  gait training, PT goal 1  -TA      Strength Goal Selection (PT)  strength, PT goal 1  -TA         Bed Mobility Goal 1 (PT)    Activity/Assistive Device (Bed Mobility Goal 1, PT)  sit to supine/supine to sit  -TA      [place] Level/Cues Needed (Bed Mobility Goal 1, PT)  minimum assist (75% or more patient effort)  -TA      Time Frame (Bed Mobility Goal 1, PT)  10 days  -TA      Barriers (Bed Mobility Goal 1, PT)  abdominal pain, previous functional deficit  -TA      Progress/Outcomes (Bed Mobility Goal 1, PT)  goal not met  -TA         Transfer Goal 1 (PT)    Activity/Assistive Device (Transfer Goal 1, PT)  sit-to-stand/stand-to-sit;bed-to-chair/chair-to-bed;walker, rolling  -TA      [place] Level/Cues Needed (Transfer Goal  1, PT)  contact guard assist;verbal cues required  -TA      Time Frame (Transfer Goal 1, PT)  1 week  -TA      Barriers (Transfers Goal 1, PT)  abdominal pain, previous functional deficit  -TA      Progress/Outcome (Transfer Goal 1, PT)  goal not met  -TA         Gait Training Goal 1 (PT)    Activity/Assistive Device (Gait Training Goal 1, PT)  walker, rolling;decrease fall risk;increase endurance/gait distance  -TA      [place] Level (Gait Training Goal 1, PT)  contact guard assist;verbal cues required  -TA      Distance (Gait Goal 1, PT)  10 feet  -TA      Time Frame (Gait Training Goal 1, PT)  10 days  -TA      Barriers (Gait Training Goal 1, PT)  abdominal pain, previous functional deficit  -TA      Progress/Outcome (Gait Training Goal 1, PT)  goal not met  -TA         Strength Goal 1 (PT)    Strength Goal 1 (PT)  Pt will be able to complete 20 reps of at least 4 LE exercises in order to demo improved strength for transfers  -TA      Time Frame (Strength Goal 1, PT)  1 week  -TA      Barriers (Strength Goal 1, PT)  abdominal pain, previous functional deficit  -TA      Progress/Outcome (Strength Goal 1, PT)  goal not met  -TA        User Key  (r) = Recorded By, (t) = Taken By, (c) = Cosigned By    Initials Name Provider Type Discipline    Brenda Morris, PTA Physical Therapy Assistant PT          Physical Therapy Education     Title: PT OT SLP Therapies (In Progress)     Topic: Physical Therapy (Done)     Point: Mobility training (Done)     Learning Progress Summary           Patient Acceptance, E, VU,NR by LF at 5/30/2019 12:58 PM    Comment:  Role of PT, PT POC, transfer technique                   Point: Home exercise program (Done)     Learning Progress Summary           Patient Acceptance, E,TB, VU by LN at 5/31/2019 12:58 PM                   Point: Precautions (Done)     Learning Progress Summary           Patient Acceptance, E,TB, VU by LN at 5/31/2019 12:58 PM    Acceptance, E, VU by  at  5/30/2019  1:00 PM    Comment:  Gait belt, call light, staff assistance with mobility                               User Key     Initials Effective Dates Name Provider Type Discipline    LN 03/07/18 -  Jannet Garcia PTA Physical Therapy Assistant PT    LF 07/23/18 -  Donna Stacy PT Physical Therapist PT                PT Recommendation and Plan  Anticipated Discharge Disposition (PT): skilled nursing facility  Outcome Summary/Treatment Plan (PT)  Daily Summary of Progress (PT): progress toward functional goals is gradual  Plan for Continued Treatment (PT): continue  Anticipated Discharge Disposition (PT): skilled nursing facility  Outcome Summary: Pt performed AAROM-AROM B LE exercise in supine. pt will require 24/7 care @ D/C  Outcome Measures     Row Name 06/02/19 1400 05/31/19 1300 05/31/19 1022       How much help from another person do you currently need...    Turning from your back to your side while in flat bed without using bedrails?  1  -TA  --  1  -LN    Moving from lying on back to sitting on the side of a flat bed without bedrails?  1  -TA  --  1  -LN    Moving to and from a bed to a chair (including a wheelchair)?  2  -TA  --  2  -LN    Standing up from a chair using your arms (e.g., wheelchair, bedside chair)?  2  -TA  --  2  -LN    Climbing 3-5 steps with a railing?  1  -TA  --  1  -LN    To walk in hospital room?  1  -TA  --  1  -LN    AM-PAC 6 Clicks Score  8  -TA  --  8  -LN       How much help from another is currently needed...    Putting on and taking off regular lower body clothing?  --  1  -CS  --    Bathing (including washing, rinsing, and drying)  --  1  -CS  --    Toileting (which includes using toilet bed pan or urinal)  --  1  -CS  --    Putting on and taking off regular upper body clothing  --  2  -CS  --    Taking care of personal grooming (such as brushing teeth)  --  2  -CS  --    Eating meals  --  2  -CS  --    Score  --  9  -CS  --       Functional Assessment    Outcome Measure  Options  AM-PAC 6 Clicks Basic Mobility (PT)  -TA  --  AM-PAC 6 Clicks Basic Mobility (PT)  -LN      User Key  (r) = Recorded By, (t) = Taken By, (c) = Cosigned By    Initials Name Provider Type    Brenda Morris PTA Physical Therapy Assistant    Jannet Melchor PTA Physical Therapy Assistant    Cathy Shane, PELAYO/DERICK Occupational Therapy Assistant         Time Calculation:   PT Charges     Row Name 06/02/19 1446             Time Calculation    Start Time  1327  -TA      Stop Time  1351  -TA      Time Calculation (min)  24 min  -TA         Time Calculation- PT    Total Timed Code Minutes- PT  24 minute(s)  -TA        User Key  (r) = Recorded By, (t) = Taken By, (c) = Cosigned By    Initials Name Provider Type    Brenda Morris PTA Physical Therapy Assistant        Therapy Charges for Today     Code Description Service Date Service Provider Modifiers Qty    90268274502 HC PT THER PROC EA 15 MIN 6/2/2019 Brenda Murphy PTA GP 2          PT G-Codes  Outcome Measure Options: AM-PAC 6 Clicks Basic Mobility (PT)  AM-PAC 6 Clicks Score: 8  Score: 9    Brenda Murphy PTA  6/2/2019

## 2019-06-02 NOTE — PLAN OF CARE
Problem: Patient Care Overview  Goal: Plan of Care Review  Outcome: Ongoing (interventions implemented as appropriate)   05/31/19 1312 06/02/19 1327   Coping/Psychosocial   Plan of Care Reviewed With patient --    Plan of Care Review   Progress improving --    OTHER   Outcome Summary --  Pt performed AAROM-AROM B LE exercise in supine. pt will require 24/7 care @ D/C

## 2019-06-03 LAB
ANION GAP SERPL CALCULATED.3IONS-SCNC: 13 MMOL/L
BASOPHILS # BLD AUTO: 0.05 10*3/MM3 (ref 0–0.2)
BASOPHILS NFR BLD AUTO: 0.4 % (ref 0–1.5)
BUN BLD-MCNC: 7 MG/DL (ref 8–23)
BUN/CREAT SERPL: 10.6 (ref 7–25)
CALCIUM SPEC-SCNC: 8.2 MG/DL (ref 8.6–10.5)
CHLORIDE SERPL-SCNC: 99 MMOL/L (ref 98–107)
CO2 SERPL-SCNC: 25 MMOL/L (ref 22–29)
CREAT BLD-MCNC: 0.66 MG/DL (ref 0.57–1)
DEPRECATED RDW RBC AUTO: 44.1 FL (ref 37–54)
EOSINOPHIL # BLD AUTO: 0.02 10*3/MM3 (ref 0–0.4)
EOSINOPHIL NFR BLD AUTO: 0.1 % (ref 0.3–6.2)
ERYTHROCYTE [DISTWIDTH] IN BLOOD BY AUTOMATED COUNT: 12.5 % (ref 12.3–15.4)
GFR SERPL CREATININE-BSD FRML MDRD: 89 ML/MIN/1.73
GLUCOSE BLD-MCNC: 121 MG/DL (ref 65–99)
HCT VFR BLD AUTO: 33.5 % (ref 34–46.6)
HGB BLD-MCNC: 11.3 G/DL (ref 12–15.9)
IMM GRANULOCYTES # BLD AUTO: 0.12 10*3/MM3 (ref 0–0.05)
IMM GRANULOCYTES NFR BLD AUTO: 0.9 % (ref 0–0.5)
LYMPHOCYTES # BLD AUTO: 1.35 10*3/MM3 (ref 0.7–3.1)
LYMPHOCYTES NFR BLD AUTO: 9.9 % (ref 19.6–45.3)
MAGNESIUM SERPL-MCNC: 1.9 MG/DL (ref 1.6–2.4)
MCH RBC QN AUTO: 32.7 PG (ref 26.6–33)
MCHC RBC AUTO-ENTMCNC: 33.7 G/DL (ref 31.5–35.7)
MCV RBC AUTO: 96.8 FL (ref 79–97)
MONOCYTES # BLD AUTO: 0.67 10*3/MM3 (ref 0.1–0.9)
MONOCYTES NFR BLD AUTO: 4.9 % (ref 5–12)
NEUTROPHILS # BLD AUTO: 11.49 10*3/MM3 (ref 1.7–7)
NEUTROPHILS NFR BLD AUTO: 83.8 % (ref 42.7–76)
NRBC BLD AUTO-RTO: 0 /100 WBC (ref 0–0.2)
PHOSPHATE SERPL-MCNC: 2.8 MG/DL (ref 2.5–4.5)
PLATELET # BLD AUTO: 273 10*3/MM3 (ref 140–450)
PMV BLD AUTO: 10.5 FL (ref 6–12)
POTASSIUM BLD-SCNC: 4.2 MMOL/L (ref 3.5–5.2)
RBC # BLD AUTO: 3.46 10*6/MM3 (ref 3.77–5.28)
SODIUM BLD-SCNC: 137 MMOL/L (ref 136–145)
WBC NRBC COR # BLD: 13.7 10*3/MM3 (ref 3.4–10.8)

## 2019-06-03 PROCEDURE — 25010000002 HYDROMORPHONE 1 MG/ML SOLUTION: Performed by: SURGERY

## 2019-06-03 PROCEDURE — 80048 BASIC METABOLIC PNL TOTAL CA: CPT | Performed by: SURGERY

## 2019-06-03 PROCEDURE — 83735 ASSAY OF MAGNESIUM: CPT | Performed by: SURGERY

## 2019-06-03 PROCEDURE — 97530 THERAPEUTIC ACTIVITIES: CPT

## 2019-06-03 PROCEDURE — 97110 THERAPEUTIC EXERCISES: CPT

## 2019-06-03 PROCEDURE — 85025 COMPLETE CBC W/AUTO DIFF WBC: CPT | Performed by: SURGERY

## 2019-06-03 PROCEDURE — 99024 POSTOP FOLLOW-UP VISIT: CPT | Performed by: SURGERY

## 2019-06-03 PROCEDURE — 94799 UNLISTED PULMONARY SVC/PX: CPT | Performed by: NURSE PRACTITIONER

## 2019-06-03 PROCEDURE — 84100 ASSAY OF PHOSPHORUS: CPT | Performed by: SURGERY

## 2019-06-03 PROCEDURE — 25010000002 HEPARIN (PORCINE) PER 1000 UNITS: Performed by: SURGERY

## 2019-06-03 PROCEDURE — 25010000002 ONDANSETRON PER 1 MG: Performed by: SURGERY

## 2019-06-03 RX ADMIN — HYDROMORPHONE HYDROCHLORIDE 0.5 MG: 1 INJECTION, SOLUTION INTRAMUSCULAR; INTRAVENOUS; SUBCUTANEOUS at 14:01

## 2019-06-03 RX ADMIN — HEPARIN SODIUM 5000 UNITS: 5000 INJECTION INTRAVENOUS; SUBCUTANEOUS at 13:54

## 2019-06-03 RX ADMIN — FAMOTIDINE 20 MG: 10 INJECTION INTRAVENOUS at 20:04

## 2019-06-03 RX ADMIN — GABAPENTIN 600 MG: 300 CAPSULE ORAL at 05:34

## 2019-06-03 RX ADMIN — HYDROMORPHONE HYDROCHLORIDE 0.5 MG: 1 INJECTION, SOLUTION INTRAMUSCULAR; INTRAVENOUS; SUBCUTANEOUS at 22:08

## 2019-06-03 RX ADMIN — MEMANTINE 2.5 MG: 5 TABLET ORAL at 20:04

## 2019-06-03 RX ADMIN — NYSTATIN 1 APPLICATION: 100000 POWDER TOPICAL at 20:07

## 2019-06-03 RX ADMIN — FAMOTIDINE 20 MG: 10 INJECTION INTRAVENOUS at 08:24

## 2019-06-03 RX ADMIN — VILAZODONE HYDROCHLORIDE 20 MG: 20 TABLET ORAL at 20:07

## 2019-06-03 RX ADMIN — GABAPENTIN 600 MG: 300 CAPSULE ORAL at 21:16

## 2019-06-03 RX ADMIN — DONEPEZIL HYDROCHLORIDE 5 MG: 5 TABLET, FILM COATED ORAL at 20:04

## 2019-06-03 RX ADMIN — POTASSIUM CHLORIDE, DEXTROSE MONOHYDRATE AND SODIUM CHLORIDE 100 ML/HR: 150; 5; 450 INJECTION, SOLUTION INTRAVENOUS at 17:29

## 2019-06-03 RX ADMIN — PRIMIDONE 50 MG: 50 TABLET ORAL at 20:05

## 2019-06-03 RX ADMIN — POTASSIUM CHLORIDE, DEXTROSE MONOHYDRATE AND SODIUM CHLORIDE 100 ML/HR: 150; 5; 450 INJECTION, SOLUTION INTRAVENOUS at 07:31

## 2019-06-03 RX ADMIN — HYDROMORPHONE HYDROCHLORIDE 0.5 MG: 1 INJECTION, SOLUTION INTRAMUSCULAR; INTRAVENOUS; SUBCUTANEOUS at 19:21

## 2019-06-03 RX ADMIN — HYDROMORPHONE HYDROCHLORIDE 0.5 MG: 1 INJECTION, SOLUTION INTRAMUSCULAR; INTRAVENOUS; SUBCUTANEOUS at 11:50

## 2019-06-03 RX ADMIN — HEPARIN SODIUM 5000 UNITS: 5000 INJECTION INTRAVENOUS; SUBCUTANEOUS at 21:16

## 2019-06-03 RX ADMIN — PRIMIDONE 50 MG: 50 TABLET ORAL at 05:35

## 2019-06-03 RX ADMIN — GLATIRAMER 40 MG: 40 INJECTION, SOLUTION SUBCUTANEOUS at 19:36

## 2019-06-03 RX ADMIN — PRIMIDONE 50 MG: 50 TABLET ORAL at 13:53

## 2019-06-03 RX ADMIN — MEMANTINE 2.5 MG: 5 TABLET ORAL at 08:26

## 2019-06-03 RX ADMIN — NYSTATIN: 100000 POWDER TOPICAL at 08:26

## 2019-06-03 RX ADMIN — GABAPENTIN 600 MG: 300 CAPSULE ORAL at 13:53

## 2019-06-03 RX ADMIN — LEVOTHYROXINE SODIUM 137 MCG: 25 TABLET ORAL at 08:26

## 2019-06-03 RX ADMIN — POTASSIUM CHLORIDE, DEXTROSE MONOHYDRATE AND SODIUM CHLORIDE 100 ML/HR: 150; 5; 450 INJECTION, SOLUTION INTRAVENOUS at 19:35

## 2019-06-03 RX ADMIN — ONDANSETRON 4 MG: 2 INJECTION INTRAMUSCULAR; INTRAVENOUS at 19:21

## 2019-06-03 RX ADMIN — ALBUTEROL SULFATE 2 PUFF: 90 AEROSOL, METERED RESPIRATORY (INHALATION) at 19:34

## 2019-06-03 NOTE — CONSULTS
Adult Nutrition  Assessment    Patient Name:  Irma Pacheco  YOB: 1950  MRN: [id number]  Admit Date:  5/29/2019    Assessment Date:  6/3/2019    Comments:  Pt is s/p Small bowel resection and vaginal Fistula repair.  She has been NPO or on clear liquids since her admit.  She is taking minimal po with intermittent confusion.  She may need nutrition support if po diet cannot be advanced.  Rd will add Boost breeze to all trays and monitor. Of note--incision is leaking.  MD aware.     Reason for Assessment     Row Name 06/03/19 1519          Reason for Assessment    Reason For Assessment  follow-up protocol         Nutrition/Diet History     Row Name 06/03/19 1519          Nutrition/Diet History    Typical Food/Fluid Intake  Pt resting.  Per staff she has been very confused.  Her incision is leaking.  She has taken very little of her clear liquids           Labs/Tests/Procedures/Meds     Row Name 06/03/19 1520          Labs/Procedures/Meds    Lab Results Reviewed  reviewed, pertinent     Lab Results Comments  Glucose 121; Bun 7; Alb 2.10        Diagnostic Tests/Procedures    Diagnostic Test/Procedure Reviewed  reviewed, pertinent        Medications    Pertinent Medications Reviewed  reviewed, pertinent         Physical Findings     Row Name 06/03/19 1521          Physical Findings    Overall Physical Appearance  on oxygen therapy     Gastrointestinal  colostomy           Nutrition Prescription Ordered     Row Name 06/03/19 1521          Nutrition Prescription PO    Current PO Diet  Clear Liquid     Fluid Consistency  Thin                 Electronically signed by:  Nancy Gilmore RD  06/03/19 3:28 PM

## 2019-06-03 NOTE — PLAN OF CARE
Problem: Patient Care Overview  Goal: Plan of Care Review   06/03/19 1527 06/03/19 1553   Coping/Psychosocial   Plan of Care Reviewed With --  patient   Plan of Care Review   Progress no change --    OTHER   Outcome Summary --  resp 20-40 this rx so no eob-pt performed 20 reps chaitanya le supine ex aa-arom;no goals met-if d/c may benefit from short stay snf with PT     Goal: Discharge Needs Assessment   05/29/19 1842 05/30/19 0600 06/03/19 0241   Discharge Needs Assessment   Readmission Within the Last 30 Days no previous admission in last 30 days --  --    Concerns to be Addressed --  --  no discharge needs identified   Patient/Family Anticipates Transition to --  home with family --    Patient/Family Anticipated Services at Transition --  home health care --    Transportation Concerns --  car, none --    Transportation Anticipated --  family or friend will provide --    Current Discharge Risk chronically ill;dependent with mobility/activities of daily living --  --    Disability   Equipment Currently Used at Home --  none --

## 2019-06-03 NOTE — PLAN OF CARE
Problem: Patient Care Overview  Goal: Plan of Care Review  Outcome: Ongoing (interventions implemented as appropriate)   06/03/19 1776   Coping/Psychosocial   Plan of Care Reviewed With patient   Plan of Care Review   Progress improving   OTHER   Outcome Summary pt still on room air, V/S stable, pt more alert and oriented - able to remove restraints, pt pain controlled by IV Dilaudid       Problem: Fall Risk (Adult)  Goal: Absence of Fall  Outcome: Ongoing (interventions implemented as appropriate)      Problem: Skin Injury Risk (Adult)  Goal: Skin Health and Integrity  Outcome: Ongoing (interventions implemented as appropriate)      Problem: Surgery Nonspecified (Adult)  Goal: Signs and Symptoms of Listed Potential Problems Will be Absent, Minimized or Managed (Surgery Nonspecified)  Outcome: Ongoing (interventions implemented as appropriate)      Problem: Restraint, Nonbehavioral (Nonviolent)  Goal: Rationale and Justification  Outcome: Outcome(s) achieved Date Met: 06/03/19    Goal: Nonbehavioral (Nonviolent) Restraint: Absence of Injury/Harm  Outcome: Outcome(s) achieved Date Met: 06/03/19    Goal: Nonbehavioral (Nonviolent) Restraint: Achievement of Discontinuation Criteria  Outcome: Outcome(s) achieved Date Met: 06/03/19    Goal: Nonbehavioral (Nonviolent) Restraint: Preservation of Dignity and Wellbeing  Outcome: Outcome(s) achieved Date Met: 06/03/19

## 2019-06-03 NOTE — PLAN OF CARE
Problem: Patient Care Overview  Goal: Plan of Care Review  Outcome: Ongoing (interventions implemented as appropriate)   06/03/19 1526   Coping/Psychosocial   Plan of Care Reviewed With patient   Plan of Care Review   Progress improving   OTHER   Outcome Summary pt perf well with OT pt very alert and partly oriented this pm perf ther ex and very motivated

## 2019-06-03 NOTE — THERAPY TREATMENT NOTE
Acute Care - Occupational Therapy Treatment Note  [facility]     Patient Name: Irma Pacheco  : 1950  MRN: [id number]  Today's Date: 6/3/2019  Onset of Illness/Injury or Date of Surgery: 19  Date of Referral to OT: 19  Referring Physician: Dr. Dixon     Admit Date: 2019       ICD-10-CM ICD-9-CM   1. 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 Impaired functional mobility and activity tolerance Z74.09 V49.89   3. 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     Past Medical History:   Diagnosis Date   • Anxiety    • Arthritis    • Asthma    • CHF (congestive heart failure) (CMS/[facility])    • Colostomy care (CMS/[facility])    • Concussion     x2   • Depression    • Disease of thyroid gland    • Hyperlipidemia    • Hypotension    • Kidney disease, chronic, stage III (GFR 30-59 ml/min) (CMS/[facility])    • MS (multiple sclerosis) (CMS/[facility])    • Osteoarthritis    • Tremor, essential    • Wears glasses      Past Surgical History:   Procedure Laterality Date   • ABDOMINAL HYSTERECTOMY  1999    Has Cervix   • APPENDECTOMY     • CHOLECYSTECTOMY     • COLON RESECTION N/A 2017    Procedure: exploratory laparotomy, sigmoid colon resection and colostomy;  Surgeon: Timothy Dixon MD;  Location: [facility];  Service:    • COLON RESECTION N/A 2019    Procedure: laparotomy with lysis of adhesions, repair of small bowel to vagina fistula. small bowel resection ;  Surgeon: Timothy Dixon MD;  Location: [facility] OR;  Service: General   • EXPLORATORY LAPAROTOMY N/A 10/9/2017    Procedure: LAPAROTOMY EXPLORATORY, drainage intra-abdominal abscess, washout;  Surgeon: Arnulfo Marcum MD;  Location: [facility];  Service:    • SPIDER BITE EXCISION         Therapy Treatment    Rehabilitation Treatment Summary     Row Name 19 1420             Treatment Time/Intention    Discipline  occupational  therapy assistant  -LM      Document Type  therapy note (daily note)  -LM      Subjective Information  no complaints  -LM      Mode of Treatment  individual therapy;occupational therapy  -LM      Recorded by [LM] Rain Hess COTA/L 06/03/19 1524      Row Name 06/03/19 1420             Cognitive Assessment/Intervention- PT/OT    Affect/Mental Status (Cognitive)  WFL  -LM      Orientation Status (Cognition)  oriented x 4  -LM      Follows Commands (Cognition)  follows one step commands  -LM      Recorded by [LM] Rain Hess COTA/L 06/03/19 1524      Row Name 06/03/19 1420             Sit-Stand Transfer    Sit-Stand [place] (Transfers)  moderate assist (50% patient effort)  -LM      Assistive Device (Sit-Stand Transfers)  walker, front-wheeled  -LM      Recorded by [LM] Rain Hess COTA/L 06/03/19 1524      Row Name 06/03/19 1420             Stand-Sit Transfer    Stand-Sit [place] (Transfers)  moderate assist (50% patient effort)  -LM      Assistive Device (Stand-Sit Transfers)  walker, front-wheeled  -LM      Recorded by [LM] Rain Hess COTA/L 06/03/19 1524      Row Name 06/03/19 1420             Motor Skills Assessment/Interventions    Additional Documentation  Balance (Group)  -LM      Recorded by [LM] Rain Hess COTA/L 06/03/19 1524      Row Name 06/03/19 1420             Therapeutic Exercise    Upper Extremity (Therapeutic Exercise)  bicep curl, bilateral chest press   -LM      Upper Extremity Range of Motion (Therapeutic Exercise)  shoulder flexion/extension, bilateral;shoulder abduction/adduction, bilateral;shoulder horizontal abduction/adduction, bilateral;elbow flexion/extension, bilateral;forearm supination/pronation, bilateral;wrist flexion/extension, bilateral one ue at time as ananth and [place] if not ananth well  -LM      Recorded by [LM] Rain Hess COTA/L 06/03/19 1524      Row Name 06/03/19 1420             Positioning and Restraints    Pre-Treatment  Position  in bed  -LM      Post Treatment Position  bed  -LM      In Bed  supine  -LM      Recorded by [LM] Rain Hess PELAYO/L 06/03/19 1524      Row Name 06/03/19 1420             Pain Scale: Numbers Pre/Post-Treatment    Pain Scale: Numbers, Pretreatment  9/10  -LM      Pain Scale: Numbers, Post-Treatment  9/10  -LM      Recorded by [LM] Rain Hess PELAYO/L 06/03/19 1524      Row Name                Wound 05/29/19 1327 abdomen incision;surgical    Wound - Properties Group Date first assessed: 05/29/19 [KM] Time first assessed: 1327 [KM] Present On Admission : no [KM] Location: abdomen [KM] Type: incision;surgical [KM] Recorded by:  [KM] Geno Blackmon RN 05/29/19 1327    Row Name 06/03/19 1420             Outcome Summary/Treatment Plan (OT)    Daily Summary of Progress (OT)  progress toward functional goals as expected  -LM      Recorded by [LM] Rain Hess PELAYO/L 06/03/19 1524        User Key  (r) = Recorded By, (t) = Taken By, (c) = Cosigned By    Initials Name Effective Dates Discipline    LM Rain Hess PELAYO/L 03/07/18 -  OT    KM Geno Blackmon, RN 05/21/18 -  Nurse        Wound 05/29/19 1327 abdomen incision;surgical (Active)   Dressing Appearance moist drainage 6/3/2019 12:00 PM   Closure [place] 6/3/2019 12:00 PM   Base moist;pink 6/3/2019 12:00 PM   Periwound dry;intact 6/3/2019 12:00 PM   Periwound Temperature warm 6/3/2019 12:00 PM   Periwound Skin Turgor soft 6/3/2019 12:00 PM   Drainage Characteristics/Odor serosanguineous 6/3/2019 12:00 PM   Drainage Amount moderate 6/3/2019 12:00 PM   Care, Wound cleansed with;soap and water 6/3/2019 12:00 PM   Dressing Care, Wound dressing changed;other (see comments) 6/3/2019 12:00 PM       Occupational Therapy Education     Title: PT OT SLP Therapies (In Progress)     Topic: Occupational Therapy (In Progress)     Point: ADL training (In Progress)     Description: Instruct learner(s) on proper safety adaptation and remediation  techniques during self care or transfers.   Instruct in proper use of assistive devices.    Learning Progress Summary           Patient Acceptance, E,TB,D, NR by  at 5/31/2019  1:12 PM    Acceptance, E, VU,NR by  at 5/30/2019  1:28 PM    Comment:  Educated about OT and POC. Educated to call for assist. Educated on safety throughout.                   Point: Home exercise program (In Progress)     Description: Instruct learner(s) on appropriate technique for monitoring, assisting and/or progressing therapeutic exercises/activities.    Learning Progress Summary           Patient Acceptance, E,TB,D, NR by  at 5/31/2019  1:12 PM                   Point: Precautions (In Progress)     Description: Instruct learner(s) on prescribed precautions during self-care and functional transfers.    Learning Progress Summary           Patient Acceptance, E,TB,D, NR by  at 5/31/2019  1:12 PM    Acceptance, E, VU,NR by  at 5/30/2019  1:28 PM    Comment:  Educated about OT and POC. Educated to call for assist. Educated on safety throughout.                   Point: Body mechanics (In Progress)     Description: Instruct learner(s) on proper positioning and spine alignment during self-care, functional mobility activities and/or exercises.    Learning Progress Summary           Patient Acceptance, E,TB,D, NR by  at 5/31/2019  1:12 PM                               User Key     Initials Effective Dates Name Provider Type Discipline     06/08/18 -  Dee Mendez, OTR/L Occupational Therapist OT     03/07/18 -  Cathy Dillon, PELAYO/L Occupational Therapy Assistant OT                OT Recommendation and Plan  Outcome Summary/Treatment Plan (OT)  Daily Summary of Progress (OT): progress toward functional goals as expected  Daily Summary of Progress (OT): progress toward functional goals as expected  Plan of Care Review  Plan of Care Reviewed With: patient  Plan of Care Reviewed With: patient  Outcome Summary: pt perf well  with OT  pt very alert and partly oriented this pm  perf ther ex and very motivated   Outcome Measures     Row Name 06/02/19 1400             How much help from another person do you currently need...    Turning from your back to your side while in flat bed without using bedrails?  1  -TA      Moving from lying on back to sitting on the side of a flat bed without bedrails?  1  -TA      Moving to and from a bed to a chair (including a wheelchair)?  2  -TA      Standing up from a chair using your arms (e.g., wheelchair, bedside chair)?  2  -TA      Climbing 3-5 steps with a railing?  1  -TA      To walk in hospital room?  1  -TA      AM-PAC 6 Clicks Score  8  -TA         Functional Assessment    Outcome Measure Options  AM-PAC 6 Clicks Basic Mobility (PT)  -TA        User Key  (r) = Recorded By, (t) = Taken By, (c) = Cosigned By    Initials Name Provider Type    TA Brenda Murphy, PTA Physical Therapy Assistant           Time Calculation:   Time Calculation- OT     Row Name 06/03/19 1515             Time Calculation- OT    OT Start Time  1420  -LM      OT Stop Time  1450  -LM      OT Time Calculation (min)  30 min  -LM      Total Timed Code Minutes- OT  30 minute(s)  -LM      OT Received On  06/03/19  -LM        User Key  (r) = Recorded By, (t) = Taken By, (c) = Cosigned By    Initials Name Provider Type     Rain Hess COTA/L Occupational Therapy Assistant        Therapy Charges for Today     Code Description Service Date Service Provider Modifiers Qty    37266388306 HC OT THER SUPP EA 15 MIN 6/2/2019 Rain Hess COTA/L GO 1    28920339759 HC OT THER PROC EA 15 MIN 6/3/2019 Rain Hess COTA/L GO 2               GITA Cast/DERICK  6/3/2019

## 2019-06-03 NOTE — NURSING NOTE
I called Dr. Ochoa to make him aware that this morning during the dressing change I noticed purulent fluid coming from one of the bottom stitches. Pus, bloody drainage is constantly draining but site does not appear to dehisced. I had Samaria and Darby both lay eyes on the incision and they both recommended that I call Dr. Ochoa to let him know what is going on. Dr. Ochoa is not concerned unless it appears that the wound has dehisced. No new orders received just was told to inform Dr. Dixon when he comes on the floor.

## 2019-06-03 NOTE — THERAPY TREATMENT NOTE
Acute Care - Physical Therapy Treatment Note  [facility]     Patient Name: Irma Pacheco  : 1950  MRN: [id number]  Today's Date: 6/3/2019  Onset of Illness/Injury or Date of Surgery: 19  Date of Referral to PT: 19  Referring Physician: Dr. Dixon     Admit Date: 2019    Visit Dx:    ICD-10-CM ICD-9-CM   1. 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 Impaired functional mobility and activity tolerance Z74.09 V49.89   3. 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       Therapy Treatment    Rehabilitation Treatment Summary     Row Name 19 1457 19 1420          Treatment Time/Intention    Discipline  physical therapy assistant  -LN  occupational therapy assistant  -LM     Document Type  therapy note (daily note)  -LN  therapy note (daily note)  -LM     Subjective Information  complains of;pain;dyspnea  -LN  no complaints  -LM     Mode of Treatment  physical therapy  -LN  individual therapy;occupational therapy  -LM     Existing Precautions/Restrictions  fall;other (see comments) colostomy  -LN  --     Recorded by [LN] Jannet Garcia, TERRY 19 7802 [LM] Rain Hess COTA/L 19 1524     Row Name 19 1457             Vital Signs    Pre Systolic BP Rehab  142  -LN      Pre Treatment Diastolic BP  57  -LN      Post Systolic BP Rehab  148  -LN      Post Treatment Diastolic BP  67  -LN      Pretreatment Heart Rate (beats/min)  99  -LN      Posttreatment Heart Rate (beats/min)  96  -LN      Pre SpO2 (%)  100  -LN      O2 Delivery Pre Treatment  room air  -LN      Post SpO2 (%)  100  -LN      Pre Patient Position  Side Lying  -LN      Intra Patient Position  Supine  -LN      Post Patient Position  Side Lying  -LN      Recorded by [LN] Jannet Garcia PTA 19 1552      Row Name 19 1457 19 1420          Cognitive Assessment/Intervention- PT/OT     Affect/Mental Status (Cognitive)  --  WFL  -LM     Orientation Status (Cognition)  oriented x 4  -LN  oriented x 4  -LM     Follows Commands (Cognition)  --  follows one step commands  -LM     Recorded by [LN] Jannet Garcia, PTA 06/03/19 1552 [LM] Rain Hess, PELAYO/L 06/03/19 1524     Row Name 06/03/19 1457             Bed Mobility Assessment/Treatment    Rolling Left [place] (Bed Mobility)  not tested  -LN      Rolling Right [place] (Bed Mobility)  maximum assist (25% patient effort);1 person assist  -LN      Recorded by [LN] Jannet Garcia, PTA 06/03/19 1552      Row Name 06/03/19 1457             Functional Mobility    Functional Mobility- Ind. Level  not tested  -LN      Recorded by [LN] Jannet Garcia, PTA 06/03/19 1552      Row Name 06/03/19 1457             Chair-Bed Transfer    Chair-Bed [place] (Transfers)  not tested  -LN      Recorded by [LN] Jannet Garcia, PTA 06/03/19 1552      Row Name 06/03/19 1457 06/03/19 1420          Sit-Stand Transfer    Sit-Stand [place] (Transfers)  not tested  -LN  moderate assist (50% patient effort)  -LM     Assistive Device (Sit-Stand Transfers)  --  walker, front-wheeled  -LM     Recorded by [LN] Jannet Garcia, PTA 06/03/19 1552 [LM] Rain Hess PELAYO/L 06/03/19 1524     Row Name 06/03/19 1420             Stand-Sit Transfer    Stand-Sit [place] (Transfers)  moderate assist (50% patient effort)  -LM      Assistive Device (Stand-Sit Transfers)  walker, front-wheeled  -LM      Recorded by [LM] Rain Hess PELAYO/L 06/03/19 1524      Row Name 06/03/19 1457             Gait/Stairs Assessment/Training    [place] Level (Gait)  not tested  -LN      Recorded by [LN] Jannet Garcia, PTA 06/03/19 1552      Row Name 06/03/19 1420             Motor Skills Assessment/Interventions    Additional Documentation  Balance (Group)  -LM      Recorded by [LM] Rain Hess PELAYO/L 06/03/19 1524      Row Name 06/03/19 1457             Lower  Extremity Supine Therapeutic Exercise    Performed, Supine Lower Extremity (Therapeutic Exercise)  hip abduction/adduction;hip external/internal rotation;gluteal sets;heel slides;ankle pumps  -LN      Exercise Type, Supine Lower Extremity (Therapeutic Exercise)  AROM (active range of motion);AAROM (active assistive range of motion)  -LN      Sets/Reps Detail, Supine Lower Extremity (Therapeutic Exercise)  1/20  -LN      Recorded by [LN] Jannet Garcia, PTA 06/03/19 1552      Row Name 06/03/19 1420             Therapeutic Exercise    Upper Extremity (Therapeutic Exercise)  bicep curl, bilateral chest press   -LM      Upper Extremity Range of Motion (Therapeutic Exercise)  shoulder flexion/extension, bilateral;shoulder abduction/adduction, bilateral;shoulder horizontal abduction/adduction, bilateral;elbow flexion/extension, bilateral;forearm supination/pronation, bilateral;wrist flexion/extension, bilateral one ue at time as ananth and [place] if not ananth well  -LM      Recorded by [LM] Rain Hess PELAYO/L 06/03/19 1524      Row Name 06/03/19 1457 06/03/19 1420          Positioning and Restraints    Pre-Treatment Position  --  in bed  -LM     Post Treatment Position  bed  -LN  bed  -LM     In Bed  notified nsg;side lying right;call light within reach;encouraged to call for assist;with family/caregiver;patient within staff view  -LN  supine  -LM     Recorded by [LN] Jannet Garcia, PTA 06/03/19 1552 [LM] Rain Hess PELAYO/L 06/03/19 1524     Row Name 06/03/19 1457 06/03/19 1420          Pain Scale: Numbers Pre/Post-Treatment    Pain Scale: Numbers, Pretreatment  7/10  -LN  9/10  -LM     Pain Scale: Numbers, Post-Treatment  10/10  -LN  9/10  -LM     Pain Location  back right side of head and neck  -LN  --     Recorded by [LN] Jannet Garcia, PTA 06/03/19 1552 [LM] Rain Hess PELAYO/L 06/03/19 1524     Row Name                Wound 05/29/19 1327 abdomen incision;surgical    Wound - Properties Group Date first  assessed: 05/29/19 [KM] Time first assessed: 1327 [KM] Present On Admission : no [KM] Location: abdomen [KM] Type: incision;surgical [KM] Recorded by:  [KM] Geno Blackmon RN 05/29/19 1327    Row Name 06/03/19 1457             Plan of Care Review    Plan of Care Reviewed With  patient  -LN      Recorded by [LN] Jannet Garcia, PTA 06/03/19 1552      Row Name 06/03/19 1420             Outcome Summary/Treatment Plan (OT)    Daily Summary of Progress (OT)  progress toward functional goals as expected  -LM      Recorded by [LM] Rain Hess, PELAYO/L 06/03/19 1524      Row Name 06/03/19 1451             Outcome Summary/Treatment Plan (PT)    Plan for Continued Treatment (PT)  cont  -LN      Anticipated Discharge Disposition (PT)  skilled nursing facility  -LN      Recorded by [LN] Jannet Garcia, PTA 06/03/19 1552        User Key  (r) = Recorded By, (t) = Taken By, (c) = Cosigned By    Initials Name Effective Dates Discipline    LN Jannet Garcia S, PTA 03/07/18 -  PT    LM Rain Hess, PELAYO/L 03/07/18 -  OT    KM Geno Blackmon RN 05/21/18 -  Nurse          Wound 05/29/19 1327 abdomen incision;surgical (Active)   Dressing Appearance moist drainage 6/3/2019 12:00 PM   Closure Staples 6/3/2019 12:00 PM   Base moist;pink 6/3/2019 12:00 PM   Periwound dry;intact 6/3/2019 12:00 PM   Periwound Temperature warm 6/3/2019 12:00 PM   Periwound Skin Turgor soft 6/3/2019 12:00 PM   Drainage Characteristics/Odor serosanguineous 6/3/2019 12:00 PM   Drainage Amount moderate 6/3/2019 12:00 PM   Care, Wound cleansed with;soap and water 6/3/2019 12:00 PM   Dressing Care, Wound dressing changed;other (see comments) 6/3/2019 12:00 PM       Rehab Goal Summary     Row Name 06/03/19 1457             Physical Therapy Goals    Bed Mobility Goal Selection (PT)  bed mobility, PT goal 1  -LN      Transfer Goal Selection (PT)  transfer, PT goal 1  -LN      Gait Training Goal Selection (PT)  gait training, PT goal 1  -LN      Strength Goal  Selection (PT)  strength, PT goal 1  -LN         Bed Mobility Goal 1 (PT)    Activity/Assistive Device (Bed Mobility Goal 1, PT)  sit to supine/supine to sit  -LN      [place] Level/Cues Needed (Bed Mobility Goal 1, PT)  minimum assist (75% or more patient effort)  -LN      Time Frame (Bed Mobility Goal 1, PT)  10 days  -LN      Barriers (Bed Mobility Goal 1, PT)  abdominal pain, previous functional deficit  -LN      Progress/Outcomes (Bed Mobility Goal 1, PT)  goal not met  -LN         Transfer Goal 1 (PT)    Activity/Assistive Device (Transfer Goal 1, PT)  sit-to-stand/stand-to-sit;bed-to-chair/chair-to-bed;walker, rolling  -LN      [place] Level/Cues Needed (Transfer Goal 1, PT)  contact guard assist;verbal cues required  -LN      Time Frame (Transfer Goal 1, PT)  1 week  -LN      Barriers (Transfers Goal 1, PT)  abdominal pain, previous functional deficit  -LN      Progress/Outcome (Transfer Goal 1, PT)  goal not met  -LN         Gait Training Goal 1 (PT)    Activity/Assistive Device (Gait Training Goal 1, PT)  walker, rolling;decrease fall risk;increase endurance/gait distance  -LN      [place] Level (Gait Training Goal 1, PT)  contact guard assist;verbal cues required  -LN      Distance (Gait Goal 1, PT)  10 feet  -LN      Time Frame (Gait Training Goal 1, PT)  10 days  -LN      Barriers (Gait Training Goal 1, PT)  abdominal pain, previous functional deficit  -LN      Progress/Outcome (Gait Training Goal 1, PT)  goal not met  -LN         Strength Goal 1 (PT)    Strength Goal 1 (PT)  Pt will be able to complete 20 reps of at least 4 LE exercises in order to demo improved strength for transfers  -LN      Time Frame (Strength Goal 1, PT)  1 week  -LN      Barriers (Strength Goal 1, PT)  abdominal pain, previous functional deficit  -LN      Progress/Outcome (Strength Goal 1, PT)  goal not met  -LN        User Key  (r) = Recorded By, (t) = Taken By, (c) = Cosigned By    Initials Name Provider Type  Discipline    LN Jannet Garcia PTA Physical Therapy Assistant PT          Physical Therapy Education     Title: PT OT SLP Therapies (In Progress)     Topic: Physical Therapy (In Progress)     Point: Mobility training (Done)     Learning Progress Summary           Patient Acceptance, E, VU,NR by  at 5/30/2019 12:58 PM    Comment:  Role of PT, PT POC, transfer technique                   Point: Home exercise program (Done)     Learning Progress Summary           Patient Acceptance, E,TB, VU by  at 6/3/2019  3:53 PM    Acceptance, E,TB, VU by  at 5/31/2019 12:58 PM                   Point: Precautions (Done)     Learning Progress Summary           Patient Acceptance, E,TB, VU by KATH at 6/3/2019  3:53 PM    Acceptance, E,TB, VU by  at 5/31/2019 12:58 PM    Acceptance, E, VU by  at 5/30/2019  1:00 PM    Comment:  Gait belt, call light, staff assistance with mobility                               User Key     Initials Effective Dates Name Provider Type Discipline     03/07/18 -  Jannet Garcia PTA Physical Therapy Assistant PT     07/23/18 -  Donna Stacy, LUCRECIA Physical Therapist PT                PT Recommendation and Plan  Anticipated Discharge Disposition (PT): skilled nursing facility  Outcome Summary/Treatment Plan (PT)  Plan for Continued Treatment (PT): cont  Anticipated Discharge Disposition (PT): skilled nursing facility  Plan of Care Reviewed With: patient  Outcome Summary: resp 20-40 this rx so no eob-pt performed 20 reps chaitanya le supine ex aa-arom;no goals met-if d/c may benefit from short stay snf with PT  Outcome Measures     Row Name 06/03/19 1457 06/02/19 1400          How much help from another person do you currently need...    Turning from your back to your side while in flat bed without using bedrails?  2  -LN  1  -TA     Moving from lying on back to sitting on the side of a flat bed without bedrails?  1  -LN  1  -TA     Moving to and from a bed to a chair (including a wheelchair)?  2  -LN  2   -TA     Standing up from a chair using your arms (e.g., wheelchair, bedside chair)?  2  -LN  2  -TA     Climbing 3-5 steps with a railing?  1  -LN  1  -TA     To walk in hospital room?  1  -LN  1  -TA     AM-PAC 6 Clicks Score  9  -LN  8  -TA        Functional Assessment    Outcome Measure Options  AM-PAC 6 Clicks Basic Mobility (PT)  -LN  AM-PAC 6 Clicks Basic Mobility (PT)  -TA       User Key  (r) = Recorded By, (t) = Taken By, (c) = Cosigned By    Initials Name Provider Type    Brenda Morris PTA Physical Therapy Assistant    Jannet Melchor PTA Physical Therapy Assistant         Time Calculation:   PT Charges     Row Name 06/03/19 1555             Time Calculation    Start Time  1457  -LN      Stop Time  1522  -LN      Time Calculation (min)  25 min  -LN      PT Received On  06/03/19  -LN         Time Calculation- PT    Total Timed Code Minutes- PT  25 minute(s)  -LN        User Key  (r) = Recorded By, (t) = Taken By, (c) = Cosigned By    Initials Name Provider Type    Jannet Melchor PTA Physical Therapy Assistant        Therapy Charges for Today     Code Description Service Date Service Provider Modifiers Qty    70337680088 HC PT THERAPEUTIC ACT EA 15 MIN 6/3/2019 Jannet Garcia PTA GP 1    04726961018 HC PT THER PROC EA 15 MIN 6/3/2019 Jannet Garcia PTA GP 1          PT G-Codes  Outcome Measure Options: AM-PAC 6 Clicks Basic Mobility (PT)  AM-PAC 6 Clicks Score: 9  Score: 9    Jannet Garcia PTA  6/3/2019

## 2019-06-03 NOTE — PLAN OF CARE
Problem: Patient Care Overview  Goal: Plan of Care Review   06/03/19 1527   Coping/Psychosocial   Plan of Care Reviewed With caregiver   Plan of Care Review   Progress no change   OTHER   Outcome Summary New Care Plan: Pt NPO following small bowel resection and vaginal fistula repair. She has been NPO or on clears since admit on 5/29/2019. Will add Boost breeze on all trays       Problem: Skin Injury Risk (Adult)  Intervention: Promote/Optimize Nutrition   06/03/19 1527   Nutrition Interventions   Oral Nutrition Promotion calorie dense foods provided;calorie dense liquids provided;nutritional therapy counseling provided

## 2019-06-03 NOTE — PLAN OF CARE
"Problem: Patient Care Overview  Goal: Plan of Care Review  Outcome: Ongoing (interventions implemented as appropriate)   06/03/19 0241   Coping/Psychosocial   Plan of Care Reviewed With patient   Plan of Care Review   Progress improving   OTHER   Outcome Summary Patient has tolerated treatment plan well. She was increasingly confused as the night went on but it is understood the patient has \"sun-downers\". VSS. afebrile. Surgical site looks appropriate. Will continue to monitor closely.     Goal: Individualization and Mutuality  Outcome: Ongoing (interventions implemented as appropriate)    Goal: Discharge Needs Assessment  Outcome: Ongoing (interventions implemented as appropriate)    Goal: Interprofessional Rounds/Family Conf  Outcome: Ongoing (interventions implemented as appropriate)      Problem: Fall Risk (Adult)  Goal: Absence of Fall  Outcome: Ongoing (interventions implemented as appropriate)      Problem: Skin Injury Risk (Adult)  Goal: Skin Health and Integrity  Outcome: Ongoing (interventions implemented as appropriate)      Problem: Surgery Nonspecified (Adult)  Goal: Signs and Symptoms of Listed Potential Problems Will be Absent, Minimized or Managed (Surgery Nonspecified)  Outcome: Ongoing (interventions implemented as appropriate)      Problem: Restraint, Nonbehavioral (Nonviolent)  Goal: Rationale and Justification  Outcome: Ongoing (interventions implemented as appropriate)    Goal: Nonbehavioral (Nonviolent) Restraint: Absence of Injury/Harm  Outcome: Ongoing (interventions implemented as appropriate)    Goal: Nonbehavioral (Nonviolent) Restraint: Achievement of Discontinuation Criteria  Outcome: Ongoing (interventions implemented as appropriate)    Goal: Nonbehavioral (Nonviolent) Restraint: Preservation of Dignity and Wellbeing  Outcome: Ongoing (interventions implemented as appropriate)        "

## 2019-06-03 NOTE — PROGRESS NOTES
GENERAL SURGERY PROGRESS NOTE  Chief Complaint:  Surgery Follow up   LOS: 5 days       Subjective     Interval History:     Confusion over weekend. Resting comfortably this AM in soft restraints.     Objective     Vital Signs  Temp:  [97.5 °F (36.4 °C)-98.8 °F (37.1 °C)] 98.8 °F (37.1 °C)  Heart Rate:  [] 78  Resp:  [14-16] 14  BP: ()/() 119/57    Physical Exam:   Small amount of ostomy output. Lower portion of incision below pannus with redness and small amounts of drainage.    Labs:  Lab Results (last 24 hours)     Procedure Component Value Units Date/Time    Basic Metabolic Panel [[id number]]  (Abnormal) Collected:  06/03/19 0451    Specimen:  Blood Updated:  06/03/19 0523     Glucose 121 mg/dL      BUN 7 mg/dL      Creatinine 0.66 mg/dL      Sodium 137 mmol/L      Potassium 4.2 mmol/L      Chloride 99 mmol/L      CO2 25.0 mmol/L      Calcium 8.2 mg/dL      eGFR Non African Amer 89 mL/min/1.73      BUN/Creatinine Ratio 10.6     Anion Gap 13.0 mmol/L     Narrative:       GFR Normal >60  Chronic Kidney Disease <60  Kidney Failure <15    Magnesium [[id number]]  (Normal) Collected:  06/03/19 0451    Specimen:  Blood Updated:  06/03/19 0520     Magnesium 1.9 mg/dL     CBC & Differential [[id number]] Collected:  06/03/19 0451    Specimen:  Blood Updated:  06/03/19 0459    Narrative:       The following orders were created for panel order CBC & Differential.  Procedure                               Abnormality         Status                     ---------                               -----------         ------                     CBC Auto Differential[[id number]]        Abnormal            Final result                 Please view results for these tests on the individual orders.    CBC Auto Differential [[id number]]  (Abnormal) Collected:  06/03/19 0451    Specimen:  Blood Updated:  06/03/19 0459     WBC 13.70 10*3/mm3      RBC 3.46 10*6/mm3      Hemoglobin 11.3 g/dL      Hematocrit 33.5 %      MCV 96.8 fL       MCH 32.7 pg      MCHC 33.7 g/dL      RDW 12.5 %      RDW-SD 44.1 fl      MPV 10.5 fL      Platelets 273 10*3/mm3      Neutrophil % 83.8 %      Lymphocyte % 9.9 %      Monocyte % 4.9 %      Eosinophil % 0.1 %      Basophil % 0.4 %      Immature Grans % 0.9 %      Neutrophils, Absolute 11.49 10*3/mm3      Lymphocytes, Absolute 1.35 10*3/mm3      Monocytes, Absolute 0.67 10*3/mm3      Eosinophils, Absolute 0.02 10*3/mm3      Basophils, Absolute 0.05 10*3/mm3      Immature Grans, Absolute 0.12 10*3/mm3      nRBC 0.0 /100 WBC     Phosphorus [[id number]]  (Abnormal) Collected:  06/02/19 1906    Specimen:  Blood Updated:  06/02/19 1927     Phosphorus 2.4 mg/dL            Results Review:     Labs and imaging for today were reviewed.    Assessment/Plan     Irma Pacheco is a 69 y.o. female who is s/p small bowel resection for entero-vaginal fistula      Continue current care.  Being oral diet if patient awake and alert.  Dry gauze under pannus to protect wound.          This document has been electronically signed by Timothy Dixon MD on [place] 3, 2019 7:50 AM        Timothy Dixon MD  06/03/19  7:50 AM

## 2019-06-04 LAB
ANION GAP SERPL CALCULATED.3IONS-SCNC: 8 MMOL/L
BUN BLD-MCNC: 5 MG/DL (ref 8–23)
BUN/CREAT SERPL: 8.2 (ref 7–25)
CALCIUM SPEC-SCNC: 7.6 MG/DL (ref 8.6–10.5)
CHLORIDE SERPL-SCNC: 103 MMOL/L (ref 98–107)
CO2 SERPL-SCNC: 25 MMOL/L (ref 22–29)
CREAT BLD-MCNC: 0.61 MG/DL (ref 0.57–1)
DEPRECATED RDW RBC AUTO: 45.1 FL (ref 37–54)
ERYTHROCYTE [DISTWIDTH] IN BLOOD BY AUTOMATED COUNT: 12.5 % (ref 12.3–15.4)
GFR SERPL CREATININE-BSD FRML MDRD: 98 ML/MIN/1.73
GLUCOSE BLD-MCNC: 88 MG/DL (ref 65–99)
HCT VFR BLD AUTO: 31 % (ref 34–46.6)
HGB BLD-MCNC: 10.2 G/DL (ref 12–15.9)
MCH RBC QN AUTO: 32.1 PG (ref 26.6–33)
MCHC RBC AUTO-ENTMCNC: 32.9 G/DL (ref 31.5–35.7)
MCV RBC AUTO: 97.5 FL (ref 79–97)
PLATELET # BLD AUTO: 228 10*3/MM3 (ref 140–450)
PMV BLD AUTO: 10.6 FL (ref 6–12)
POTASSIUM BLD-SCNC: 4.3 MMOL/L (ref 3.5–5.2)
RBC # BLD AUTO: 3.18 10*6/MM3 (ref 3.77–5.28)
SODIUM BLD-SCNC: 136 MMOL/L (ref 136–145)
WBC NRBC COR # BLD: 13.23 10*3/MM3 (ref 3.4–10.8)

## 2019-06-04 PROCEDURE — 99024 POSTOP FOLLOW-UP VISIT: CPT | Performed by: SURGERY

## 2019-06-04 PROCEDURE — 80048 BASIC METABOLIC PNL TOTAL CA: CPT | Performed by: SURGERY

## 2019-06-04 PROCEDURE — 25010000002 HEPARIN (PORCINE) PER 1000 UNITS: Performed by: SURGERY

## 2019-06-04 PROCEDURE — 25010000002 HYDROMORPHONE 1 MG/ML SOLUTION: Performed by: SURGERY

## 2019-06-04 PROCEDURE — 85027 COMPLETE CBC AUTOMATED: CPT | Performed by: SURGERY

## 2019-06-04 PROCEDURE — 94799 UNLISTED PULMONARY SVC/PX: CPT

## 2019-06-04 PROCEDURE — 97530 THERAPEUTIC ACTIVITIES: CPT

## 2019-06-04 PROCEDURE — 94799 UNLISTED PULMONARY SVC/PX: CPT | Performed by: NURSE PRACTITIONER

## 2019-06-04 PROCEDURE — 97110 THERAPEUTIC EXERCISES: CPT

## 2019-06-04 RX ADMIN — POTASSIUM CHLORIDE, DEXTROSE MONOHYDRATE AND SODIUM CHLORIDE 100 ML/HR: 150; 5; 450 INJECTION, SOLUTION INTRAVENOUS at 13:39

## 2019-06-04 RX ADMIN — LEVOTHYROXINE SODIUM 137 MCG: 25 TABLET ORAL at 09:05

## 2019-06-04 RX ADMIN — MEMANTINE 2.5 MG: 5 TABLET ORAL at 20:24

## 2019-06-04 RX ADMIN — PRIMIDONE 50 MG: 50 TABLET ORAL at 20:24

## 2019-06-04 RX ADMIN — HEPARIN SODIUM 5000 UNITS: 5000 INJECTION INTRAVENOUS; SUBCUTANEOUS at 06:14

## 2019-06-04 RX ADMIN — GABAPENTIN 600 MG: 300 CAPSULE ORAL at 20:23

## 2019-06-04 RX ADMIN — PRIMIDONE 50 MG: 50 TABLET ORAL at 06:07

## 2019-06-04 RX ADMIN — DONEPEZIL HYDROCHLORIDE 5 MG: 5 TABLET, FILM COATED ORAL at 20:22

## 2019-06-04 RX ADMIN — FAMOTIDINE 20 MG: 10 INJECTION INTRAVENOUS at 09:06

## 2019-06-04 RX ADMIN — HEPARIN SODIUM 5000 UNITS: 5000 INJECTION INTRAVENOUS; SUBCUTANEOUS at 20:22

## 2019-06-04 RX ADMIN — FAMOTIDINE 20 MG: 10 INJECTION INTRAVENOUS at 20:23

## 2019-06-04 RX ADMIN — HYDROMORPHONE HYDROCHLORIDE 0.5 MG: 1 INJECTION, SOLUTION INTRAMUSCULAR; INTRAVENOUS; SUBCUTANEOUS at 10:01

## 2019-06-04 RX ADMIN — PRIMIDONE 50 MG: 50 TABLET ORAL at 14:08

## 2019-06-04 RX ADMIN — VILAZODONE HYDROCHLORIDE 20 MG: 20 TABLET ORAL at 20:23

## 2019-06-04 RX ADMIN — HYDROMORPHONE HYDROCHLORIDE 0.5 MG: 1 INJECTION, SOLUTION INTRAMUSCULAR; INTRAVENOUS; SUBCUTANEOUS at 06:05

## 2019-06-04 RX ADMIN — NYSTATIN: 100000 POWDER TOPICAL at 09:09

## 2019-06-04 RX ADMIN — MEMANTINE 2.5 MG: 5 TABLET ORAL at 09:05

## 2019-06-04 RX ADMIN — SODIUM CHLORIDE, PRESERVATIVE FREE 10 ML: 5 INJECTION INTRAVENOUS at 20:31

## 2019-06-04 RX ADMIN — HYDROMORPHONE HYDROCHLORIDE 0.5 MG: 1 INJECTION, SOLUTION INTRAMUSCULAR; INTRAVENOUS; SUBCUTANEOUS at 20:22

## 2019-06-04 RX ADMIN — ALBUTEROL SULFATE 2 PUFF: 90 AEROSOL, METERED RESPIRATORY (INHALATION) at 06:14

## 2019-06-04 RX ADMIN — HEPARIN SODIUM 5000 UNITS: 5000 INJECTION INTRAVENOUS; SUBCUTANEOUS at 14:08

## 2019-06-04 RX ADMIN — NYSTATIN: 100000 POWDER TOPICAL at 20:23

## 2019-06-04 RX ADMIN — HYDROMORPHONE HYDROCHLORIDE 0.5 MG: 1 INJECTION, SOLUTION INTRAMUSCULAR; INTRAVENOUS; SUBCUTANEOUS at 14:11

## 2019-06-04 RX ADMIN — GABAPENTIN 600 MG: 300 CAPSULE ORAL at 14:08

## 2019-06-04 RX ADMIN — GABAPENTIN 600 MG: 300 CAPSULE ORAL at 06:05

## 2019-06-04 NOTE — CONSULTS
Adult Nutrition  Assessment    Patient Name:  Irma Pacheco  YOB: 1950  MRN: [id number]  Admit Date:  5/29/2019    Assessment Date:  6/4/2019    Comments:  Pt is alert and oriented this am. Her diet has been advanced and she ate 100% this am per staff.  SHe reports eating well pta but intentionally eating less and losing ~25#.  She understands the need for adequate nutrition for healing at this time.  She is having no Gi distress at this time.  Does not remember drinking the boost breeze but she will try it.  Staples to be removed from incision this afternoon and an area may be opened.  Poor indention requiring a soft diet with grd meats.   Confirmed that pt cannot drink milk or eat foods with milk cooked in them.  Pt with a BMI of 48.14 and she presents @ 246% of her IBW (information is based on admit wt). RD will monitor tx plans and po intake    Reason for Assessment     Row Name 06/04/19 1413          Reason for Assessment    Reason For Assessment  follow-up protocol         Nutrition/Diet History     Row Name 06/04/19 1413          Nutrition/Diet History    Typical Food/Fluid Intake  Pt alert and oriented.  Per staff she ate 100% of her breakfast.  Pt reports eating well pta.  Her and her daughter were on a diet and she has lsot ~25# intentionally.  She cannot drink milk nor can she have foods with milk cooked in them.  RD will monitor.  She cannot remember if she has drank the Boost breeze yet           Labs/Tests/Procedures/Meds     Row Name 06/04/19 1415          Labs/Procedures/Meds    Lab Results Reviewed  reviewed, pertinent     Lab Results Comments  Bun 5; Alb 2.10        Diagnostic Tests/Procedures    Diagnostic Test/Procedure Reviewed  reviewed, pertinent        Medications    Pertinent Medications Reviewed  reviewed, pertinent         Physical Findings     Row Name 06/04/19 1416          Physical Findings    Overall Physical Appearance  on oxygen therapy     Gastrointestinal   colostomy     Skin  other (see comments) surgical wound           Nutrition Prescription Ordered     Row Name 06/04/19 1416          Nutrition Prescription PO    Current PO Diet  Soft Texture     Fluid Consistency  Thin     Supplement  Boost Breeze (Ensure)     Supplement Frequency  3 times a day         Evaluation of Received Nutrient/Fluid Intake     Row Name 06/04/19 1417          PO Evaluation    Number of Days PO Intake Evaluated  Insufficient Data     Number of Meals  1     % PO Intake  100% this am               Electronically signed by:  Nancy Gilmore RD  06/04/19 2:22 PM

## 2019-06-04 NOTE — PLAN OF CARE
Problem: Patient Care Overview  Goal: Plan of Care Review  Outcome: Ongoing (interventions implemented as appropriate)   06/04/19 0792   Coping/Psychosocial   Plan of Care Reviewed With patient   Plan of Care Review   Progress no change   OTHER   Outcome Summary pt still on room air, V/S stable, pain controlled with IV Dilaudid, incision still having drainage       Problem: Fall Risk (Adult)  Goal: Absence of Fall  Outcome: Ongoing (interventions implemented as appropriate)      Problem: Skin Injury Risk (Adult)  Goal: Identify Related Risk Factors and Signs and Symptoms  Outcome: Outcome(s) achieved Date Met: 06/04/19    Goal: Skin Health and Integrity  Outcome: Ongoing (interventions implemented as appropriate)      Problem: Surgery Nonspecified (Adult)  Goal: Signs and Symptoms of Listed Potential Problems Will be Absent, Minimized or Managed (Surgery Nonspecified)  Outcome: Ongoing (interventions implemented as appropriate)

## 2019-06-04 NOTE — THERAPY TREATMENT NOTE
Acute Care - Occupational Therapy Treatment Note  [facility]     Patient Name: Irma Pacheco  : 1950  MRN: [id number]  Today's Date: 2019  Onset of Illness/Injury or Date of Surgery: 19  Date of Referral to OT: 19  Referring Physician: Dr. Dixon     Admit Date: 2019       ICD-10-CM ICD-9-CM   1. 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 Impaired functional mobility and activity tolerance Z74.09 V49.89   3. 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     Past Medical History:   Diagnosis Date   • Anxiety    • Arthritis    • Asthma    • CHF (congestive heart failure) (CMS/[facility])    • Colostomy care (CMS/[facility])    • Concussion     x2   • Depression    • Disease of thyroid gland    • Hyperlipidemia    • Hypotension    • Kidney disease, chronic, stage III (GFR 30-59 ml/min) (CMS/[facility])    • MS (multiple sclerosis) (CMS/[facility])    • Osteoarthritis    • Tremor, essential    • Wears glasses      Past Surgical History:   Procedure Laterality Date   • ABDOMINAL HYSTERECTOMY  1999    Has Cervix   • APPENDECTOMY     • CHOLECYSTECTOMY     • COLON RESECTION N/A 2017    Procedure: exploratory laparotomy, sigmoid colon resection and colostomy;  Surgeon: Timothy Dixon MD;  Location: [facility];  Service:    • COLON RESECTION N/A 2019    Procedure: laparotomy with lysis of adhesions, repair of small bowel to vagina fistula. small bowel resection ;  Surgeon: Timothy Dixon MD;  Location: [facility] OR;  Service: General   • EXPLORATORY LAPAROTOMY N/A 10/9/2017    Procedure: LAPAROTOMY EXPLORATORY, drainage intra-abdominal abscess, washout;  Surgeon: Arnulfo Marcum MD;  Location: [facility];  Service:    • SPIDER BITE EXCISION         Therapy Treatment    Rehabilitation Treatment Summary     Row Name 19 1233 19 0924          Treatment Time/Intention    Discipline   occupational therapy assistant  -BB  physical therapy assistant  -LN     Document Type  therapy note (daily note)  -BB  therapy note (daily note)  -LN     Subjective Information  complains of;pain;weakness  -BB  complains of;pain;weakness  -LN     Mode of Treatment  individual therapy;occupational therapy  -BB  physical therapy  -LN     Patient/Family Observations  no family present  -BB  --     Therapy Frequency (PT Clinical Impression)  --  other (see comments) 6x/wk  -LN     Total Minutes, Occupational Therapy Treatment  23  -BB  --     Therapy Frequency (OT Eval)  other (see comments) 5-7 days/wk  -BB  other (see comments) 5-7 days a week   -LN     Patient Effort  good  -BB  good  -LN     Reason Treatment Not Performed  --  other (see comments)  -LN     Existing Precautions/Restrictions  fall;other (see comments) colostomy  -BB  fall;other (see comments) colostomy  -LN     Recorded by [BB] Anila Rodriguez COTA/L 06/04/19 1503 [LN] Jannet Garcia, PTA 06/04/19 1319     Row Name 06/04/19 1233 06/04/19 0924          Vital Signs    Pre Systolic BP Rehab  121  -BB  118  -LN     Pre Treatment Diastolic BP  66  -BB  69  -LN     Post Systolic BP Rehab  --  129  -LN     Post Treatment Diastolic BP  --  78  -LN     Pretreatment Heart Rate (beats/min)  87  -BB  97  -LN     Posttreatment Heart Rate (beats/min)  93  -BB  101  -LN     Pre SpO2 (%)  97  -BB  96  -LN     O2 Delivery Pre Treatment  room air  -BB  room air  -LN     Post SpO2 (%)  96  -BB  96  -LN     O2 Delivery Post Treatment  room air  -BB  --     Pre Patient Position  Supine  -BB  Side Lying  -LN     Intra Patient Position  --  Sitting  -LN     Post Patient Position  -- long sitting  -BB  Side Lying  -LN     Recorded by [BB] Anila Rodriguez PELAYO/L 06/04/19 1503 [LN] Jannet Garcia, PTA 06/04/19 1319     Row Name 06/04/19 1233 06/04/19 0924          Cognitive Assessment/Intervention- PT/OT    Affect/Mental Status (Cognitive)  WFL  -BB  WFL  -LN      Orientation Status (Cognition)  oriented x 4  -BB  oriented x 4  -LN     Follows Commands (Cognition)  --  follows one step commands  -LN     Personal Safety Interventions  fall prevention program maintained;gait belt;muscle strengthening facilitated;nonskid shoes/slippers when out of bed  -BB  --     Recorded by [BB] Anila Rodriguez COTA/DERICK 06/04/19 1503 [LN] Jannet Garcia, [facility] 06/04/19 1319     Row Name 06/04/19 0924             Safety Issues, Functional Mobility    Impairments Affecting Function (Mobility)  balance;coordination;endurance/activity tolerance;pain;postural/trunk control;strength;motor control;motor planning  -LN      Recorded by [LN] Jannet Garcia, [facility] 06/04/19 1319      Row Name 06/04/19 0924             Bed Mobility Assessment/Treatment    Rolling Left [place] (Bed Mobility)  not tested  -LN      Rolling Right [place] (Bed Mobility)  maximum assist (25% patient effort);1 person assist  -LN      Supine-Sit [place] (Bed Mobility)  moderate assist (50% patient effort);2 person assist  -LN      Sit-Supine [place] (Bed Mobility)  maximum assist (25% patient effort);2 person assist  -LN      Comment (Bed Mobility)  -eob x20' bracing self on walker  -LN      Recorded by [LN] Jannet Garcia, [facility] 06/04/19 1319      Row Name 06/04/19 0924             Functional Mobility    Functional Mobility- Ind. Level  not tested  -LN      Recorded by [LN] Jannet Garcia, [facility] 06/04/19 1319      Row Name 06/04/19 0924             Transfer Assessment/Treatment    Transfer Assessment/Treatment  sit-stand transfer;stand-sit transfer  -LN      Recorded by [LN] Jannet Garcia, [facility] 06/04/19 1319      Row Name 06/04/19 0924             Chair-Bed Transfer    Chair-Bed [place] (Transfers)  not tested  -LN      Assistive Device (Chair-Bed Transfers)  walker, front-wheeled  -LN      Recorded by [LN] Jannet Garcia, [facility] 06/04/19 1319      Row Name 06/04/19 0924             Sit-Stand Transfer    Sit-Stand  [place] (Transfers)  not tested  -LN      Assistive Device (Sit-Stand Transfers)  walker, front-wheeled  -LN      Recorded by [LN] Jannet Garcia, PTA 06/04/19 1319      Row Name 06/04/19 0924             Stand-Sit Transfer    Stand-Sit [place] (Transfers)  moderate assist (50% patient effort)  -LN      Assistive Device (Stand-Sit Transfers)  walker, front-wheeled  -LN      Recorded by [LN] Jannet Garcia, PTA 06/04/19 1319      Row Name 06/04/19 0924             Gait/Stairs Assessment/Training    [place] Level (Gait)  not tested  -LN      Recorded by [LN] Jannet Garcia, PTA 06/04/19 1319      Row Name 06/04/19 0924             Lower Body Dressing Assessment/Training    Lower Body Dressing [place] Level  --  -LN      Lower Body Dressing Position  --  -LN      Recorded by [LN] Jannet Garcia, PTA 06/04/19 1319      Row Name 06/04/19 1233             Therapeutic Exercise    Upper Extremity Range of Motion (Therapeutic Exercise)  elbow flexion/extension, bilateral;forearm supination/pronation, bilateral;wrist flexion/extension, bilateral;shoulder internal/external rotation, bilateral;shoulder flexion/extension, bilateral one UE at a time  -BB      Hand (Therapeutic Exercise)  finger flexion/extension, bilateral  -BB      Exercise Type (Therapeutic Exercise)  AAROM (active assistive range of motion)  -BB      Position (Therapeutic Exercise)  -- long sitting  -BB      Sets/Reps (Therapeutic Exercise)  1x15  -BB      Expected Outcome (Therapeutic Exercise)  improve functional stability;improve performance, transfer skills;improve functional tolerance, self-care activity;improve performance, BADLs  -BB      Recorded by [BB] Anila Rodriguez COTA/L 06/04/19 1503      Row Name 06/04/19 1233 06/04/19 0924          Positioning and Restraints    Pre-Treatment Position  in bed  -BB  --     Post Treatment Position  bed  -BB  bed  -LN     In Bed  encouraged to call for assist;call light within reach;patient  within staff view long sitting  -BB  notified nsg;side lying right;call light within reach;encouraged to call for assist;patient within staff view;waffle boots/both  -LN     Recorded by [BB] Anila Rodriguez COTA/DERICK 06/04/19 1503 [LN] Jannet Garcia, PTA 06/04/19 1319     Row Name 06/04/19 1233 06/04/19 0924          Pain Scale: Numbers Pre/Post-Treatment    Pain Scale: Numbers, Pretreatment  7/10  -BB  7/10  -LN     Pain Scale: Numbers, Post-Treatment  8/10  -BB  10/10  -LN     Pain Location - Orientation  incisional  -BB  --  -LN     Pain Location  --  back right side of head and neck  -LN     Pain Intervention(s)  -- nsg aware  -BB  -- nsg made aware  -LN     Recorded by [BB] Anila Rodriguez COTA/DERICK 06/04/19 1503 [LN] Jannet Garcia, PTA 06/04/19 1319     Row Name 06/04/19 0924             Sensory Assessment/Intervention    Sensory General Assessment  --  -LN      Recorded by [LN] Jannet Garcia, PTA 06/04/19 1319      Row Name 06/04/19 0924             Vision Assessment/Intervention    Visual Impairment/Limitations  corrective lenses full time  -LN      Recorded by [LN] Jannet Garcia, PTA 06/04/19 1319      Row Name 06/04/19 0924             Light Touch Sensation Assessment    Left Upper Extremity: Light Touch Sensation Assessment  --  -LN      Right Upper Extremity: Light Touch Sensation Assessment  --  -LN      Left Lower Extremity: Light Touch Sensation Assessment  --  -LN      Right Lower Extremity: Light Touch Sensation Assessment  --  -LN      Recorded by [LN] Jannet Garcia S, PTA 06/04/19 1319      Row Name 06/04/19 0924             Respiratory WDL    Respiratory WDL  --  -LN      Nailbeds  --  -LN      Recorded by [LN] Jannet Garcia S, PTA 06/04/19 1319      Row Name 06/04/19 0924             Breath Sounds    Breath Sounds  --  -LN      All Lung Fields Breath Sounds  --  -LN      LLL Breath Sounds  --  -LN      Recorded by [LN] Jannet Garcia, PTA 06/04/19 1319      Row Name 06/04/19 0924             Skin  WDL    Skin WDL  --  -LN      Skin Temperature  --  -LN      Skin Moisture  --  -LN      Skin Elasticity  --  -LN      Skin Integrity  --  -LN      Recorded by [LN] Jannet Garcia, PTA 06/04/19 1319      Row Name 06/04/19 0924             Wound 05/29/19 1327 abdomen incision;surgical    Wound - Properties Group Date first assessed: 05/29/19 [KM] Time first assessed: 1327 [KM] Present On Admission : no [KM] Location: abdomen [KM] Type: incision;surgical [KM] Recorded by:  [KM] Geno Blackmon RN 05/29/19 1327    Dressing Appearance  --  -LN      Closure  --  -LN      Base  --  -LN      Periwound  --  -LN      Periwound Temperature  --  -LN      Periwound Skin Turgor  --  -LN      Drainage Characteristics/Odor  --  -LN      Drainage Amount  --  -LN      Recorded by [LN] Jannet Garcia, PTA 06/04/19 1319      Row Name 06/04/19 0924             Coping    Observed Emotional State  calm;cooperative  -LN      Verbalized Emotional State  acceptance  -LN      Recorded by [LN] Jannet Garcia, PTA 06/04/19 1319      Row Name 06/04/19 1233 06/04/19 0924          Plan of Care Review    Plan of Care Reviewed With  patient  -BB  patient  -LN     Recorded by [BB] Anila Rodriguez COTA/DERICK 06/04/19 1503 [LN] Jannet Garcia, PTA 06/04/19 1319     Row Name 06/04/19 1233 06/04/19 0924          Outcome Summary/Treatment Plan (OT)    Daily Summary of Progress (OT)  progress toward functional goals is gradual  -BB  --     Plan for Continued Treatment (OT)  continue POC  -BB  --     Anticipated Discharge Disposition (OT)  skilled nursing facility  -BB  skilled nursing facility  -LN     Recorded by [BB] Anila Rodriguez COTA/L 06/04/19 1503 [LN] Jannet Garcia, PTA 06/04/19 1319     Row Name 06/04/19 0924             Outcome Summary/Treatment Plan (PT)    Plan for Continued Treatment (PT)  cont  -LN      Anticipated Discharge Disposition (PT)  skilled nursing facility  -LN      Recorded by [LN] Jannet Garcia PTA 06/04/19 1319        User  Key  (r) = Recorded By, (t) = Taken By, (c) = Cosigned By    Initials Name Effective Dates Discipline    LN Jannet Garcia, TERRY 03/07/18 -  PT    BB Anila Rodriguez, PELAYO/L 03/07/18 -  OT    KM Geno Blackmon RN 05/21/18 -  Nurse        Wound 05/29/19 1327 abdomen incision;surgical (Active)   Dressing Appearance moist drainage 6/4/2019  1:49 PM   Closure [place] 6/4/2019  1:49 PM   Base moist;pink 6/4/2019  1:49 PM   Periwound dry;intact 6/4/2019  1:49 PM   Periwound Temperature warm 6/4/2019  1:49 PM   Periwound Skin Turgor soft 6/4/2019  1:49 PM   Drainage Characteristics/Odor serosanguineous 6/4/2019  1:49 PM   Drainage Amount moderate 6/4/2019  1:49 PM   Care, Wound cleansed with;soap and water;antimicrobial agent applied 6/4/2019  1:49 PM   Dressing Care, Wound dressing changed 6/4/2019  1:49 PM     Rehab Goal Summary     Row Name 06/04/19 1233 06/04/19 0924          Physical Therapy Goals    Bed Mobility Goal Selection (PT)  --  bed mobility, PT goal 1  -LN     Transfer Goal Selection (PT)  --  transfer, PT goal 1  -LN     Gait Training Goal Selection (PT)  --  gait training, PT goal 1  -LN     Strength Goal Selection (PT)  --  strength, PT goal 1  -LN        Bed Mobility Goal 1 (PT)    Activity/Assistive Device (Bed Mobility Goal 1, PT)  --  sit to supine/supine to sit  -LN     [place] Level/Cues Needed (Bed Mobility Goal 1, PT)  --  minimum assist (75% or more patient effort)  -LN     Time Frame (Bed Mobility Goal 1, PT)  --  10 days  -LN     Barriers (Bed Mobility Goal 1, PT)  --  abdominal pain, previous functional deficit  -LN     Progress/Outcomes (Bed Mobility Goal 1, PT)  --  goal not met  -LN        Transfer Goal 1 (PT)    Activity/Assistive Device (Transfer Goal 1, PT)  --  sit-to-stand/stand-to-sit;bed-to-chair/chair-to-bed;walker, rolling  -LN     [place] Level/Cues Needed (Transfer Goal 1, PT)  --  contact guard assist;verbal cues required  -LN     Time Frame (Transfer Goal 1, PT)   --  1 week  -LN     Barriers (Transfers Goal 1, PT)  --  abdominal pain, previous functional deficit  -LN     Progress/Outcome (Transfer Goal 1, PT)  --  goal not met  -LN        Gait Training Goal 1 (PT)    Activity/Assistive Device (Gait Training Goal 1, PT)  --  walker, rolling;decrease fall risk;increase endurance/gait distance  -LN     [place] Level (Gait Training Goal 1, PT)  --  contact guard assist;verbal cues required  -LN     Distance (Gait Goal 1, PT)  --  10 feet  -LN     Time Frame (Gait Training Goal 1, PT)  --  10 days  -LN     Barriers (Gait Training Goal 1, PT)  --  abdominal pain, previous functional deficit  -LN     Progress/Outcome (Gait Training Goal 1, PT)  --  goal not met  -LN        Strength Goal 1 (PT)    Strength Goal 1 (PT)  --  Pt will be able to complete 20 reps of at least 4 LE exercises in order to demo improved strength for transfers  -LN     Time Frame (Strength Goal 1, PT)  --  1 week  -LN     Barriers (Strength Goal 1, PT)  --  abdominal pain, previous functional deficit  -LN     Progress/Outcome (Strength Goal 1, PT)  --  goal not met  -LN        Transfer Goal 1 (OT)    Activity/Assistive Device (Transfer Goal 1, OT)  commode, bedside with drop arms;commode, bedside without drop arms  -BB  --     [place] Level/Cues Needed (Transfer Goal 1, OT)  minimum assist (75% or more patient effort)  -BB  --     Time Frame (Transfer Goal 1, OT)  long term goal (LTG);by discharge  -BB  --     Progress/Outcome (Transfer Goal 1, OT)  goal not met  -BB  --        Bathing Goal 1 (OT)    Activity/Assistive Device (Bathing Goal 1, OT)  upper body bathing  -BB  --     [place] Level/Cues Needed (Bathing Goal 1, OT)  minimum assist (75% or more patient effort)  -BB  --     Time Frame (Bathing Goal 1, OT)  long term goal (LTG);by discharge  -BB  --     Progress/Outcomes (Bathing Goal 1, OT)  goal not met  -BB  --        Dressing Goal 1 (OT)    Activity/Assistive Device (Dressing Goal 1,  OT)  upper body dressing  -BB  --     [place]/Cues Needed (Dressing Goal 1, OT)  minimum assist (75% or more patient effort)  -BB  --     Time Frame (Dressing Goal 1, OT)  long term goal (LTG);by discharge  -BB  --     Progress/Outcome (Dressing Goal 1, OT)  goal not met  -BB  --        Grooming Goal 1 (OT)    Activity/Device (Grooming Goal 1, OT)  grooming skills, all  -BB  --     [place] (Grooming Goal 1, OT)  set-up required;supervision required;verbal cues required  -BB  --     Time Frame (Grooming Goal 1, OT)  long term goal (LTG);by discharge  -BB  --     Progress/Outcome (Grooming Goal 1, OT)  goal not met  -BB  --        Self-Feeding Goal 1 (OT)    Activity/Assistive Device (Self-Feeding Goal 1, OT)  self-feeding skills, all  -BB  --     [place] Level/Cues Needed (Self-Feeding Goal 1, OT)  set-up required;supervision required;verbal cues required  -BB  --     Time Frame (Self-Feeding Goal 1, OT)  long term goal (LTG);by discharge  -BB  --     Barriers (Self-Feeding Goal 1, OT)  address when medically appropriate.   -BB  --     Progress/Outcomes (Self-Feeding Goal 1, OT)  goal not met  -BB  --       User Key  (r) = Recorded By, (t) = Taken By, (c) = Cosigned By    Initials Name Provider Type Discipline    Jannet Melchor, PTA Physical Therapy Assistant PT    BB Anila Rodriguez, PELAYO/L Occupational Therapy Assistant OT        Occupational Therapy Education     Title: PT OT SLP Therapies (In Progress)     Topic: Occupational Therapy (In Progress)     Point: ADL training (In Progress)     Description: Instruct learner(s) on proper safety adaptation and remediation techniques during self care or transfers.   Instruct in proper use of assistive devices.    Learning Progress Summary           Patient Acceptance, E,TB,D, NR by CS at 5/31/2019  1:12 PM    Acceptance, E, VU,NR by  at 5/30/2019  1:28 PM    Comment:  Educated about OT and POC. Educated to call for assist. Educated on safety  throughout.                   Point: Home exercise program (In Progress)     Description: Instruct learner(s) on appropriate technique for monitoring, assisting and/or progressing therapeutic exercises/activities.    Learning Progress Summary           Patient Acceptance, E,TB,D, NR by  at 5/31/2019  1:12 PM                   Point: Precautions (In Progress)     Description: Instruct learner(s) on prescribed precautions during self-care and functional transfers.    Learning Progress Summary           Patient Acceptance, E,TB,D, NR by  at 5/31/2019  1:12 PM    Acceptance, E, VU,NR by  at 5/30/2019  1:28 PM    Comment:  Educated about OT and POC. Educated to call for assist. Educated on safety throughout.                   Point: Body mechanics (In Progress)     Description: Instruct learner(s) on proper positioning and spine alignment during self-care, functional mobility activities and/or exercises.    Learning Progress Summary           Patient Acceptance, E,TB,D, NR by  at 5/31/2019  1:12 PM                               User Key     Initials Effective Dates Name Provider Type Discipline     06/08/18 -  Dee Mendez OTR/L Occupational Therapist OT     03/07/18 -  Cathy Dillon PELAYO/DERICK Occupational Therapy Assistant OT                OT Recommendation and Plan  Outcome Summary/Treatment Plan (OT)  Daily Summary of Progress (OT): progress toward functional goals is gradual  Plan for Continued Treatment (OT): continue POC  Anticipated Discharge Disposition (OT): skilled nursing facility  Therapy Frequency (OT Eval): other (see comments)(5-7 days/wk)  Daily Summary of Progress (OT): progress toward functional goals is gradual  Plan of Care Review  Plan of Care Reviewed With: patient  Plan of Care Reviewed With: patient  Outcome Summary: Pt tolerated UE exercises fair this date. No goals met this tx.  Outcome Measures     Row Name 06/04/19 1233 06/04/19 0924 06/03/19 1457       How much help from  another person do you currently need...    Turning from your back to your side while in flat bed without using bedrails?  --  2  -LN  2  -LN    Moving from lying on back to sitting on the side of a flat bed without bedrails?  --  2  -LN  1  -LN    Moving to and from a bed to a chair (including a wheelchair)?  --  2  -LN  2  -LN    Standing up from a chair using your arms (e.g., wheelchair, bedside chair)?  --  2  -LN  2  -LN    Climbing 3-5 steps with a railing?  --  1  -LN  1  -LN    To walk in hospital room?  --  1  -LN  1  -LN    AM-PAC 6 Clicks Score  --  10  -LN  9  -LN       How much help from another is currently needed...    Putting on and taking off regular lower body clothing?  1  -BB  --  --    Bathing (including washing, rinsing, and drying)  1  -BB  --  --    Toileting (which includes using toilet bed pan or urinal)  1  -BB  --  --    Putting on and taking off regular upper body clothing  2  -BB  --  --    Taking care of personal grooming (such as brushing teeth)  2  -BB  --  --    Eating meals  2  -BB  --  --    Score  9  -BB  --  --       Functional Assessment    Outcome Measure Options  --  AM-PAC 6 Clicks Basic Mobility (PT)  -LN  AM-PAC 6 Clicks Basic Mobility (PT)  -LN    Row Name 06/02/19 1400             How much help from another person do you currently need...    Turning from your back to your side while in flat bed without using bedrails?  1  -TA      Moving from lying on back to sitting on the side of a flat bed without bedrails?  1  -TA      Moving to and from a bed to a chair (including a wheelchair)?  2  -TA      Standing up from a chair using your arms (e.g., wheelchair, bedside chair)?  2  -TA      Climbing 3-5 steps with a railing?  1  -TA      To walk in hospital room?  1  -TA      AM-PAC 6 Clicks Score  8  -TA         Functional Assessment    Outcome Measure Options  AM-PAC 6 Clicks Basic Mobility (PT)  -TA        User Key  (r) = Recorded By, (t) = Taken By, (c) = Cosigned By     Initials Name Provider Type    TA Brenda Murphy, PTA Physical Therapy Assistant    LN Jannet Garcia, PTA Physical Therapy Assistant    BB Anila Rodriguez COTA/L Occupational Therapy Assistant           Time Calculation:   Time Calculation- OT     Row Name 06/04/19 1505             Time Calculation- OT    OT Start Time  1233  -BB      OT Stop Time  1256  -BB      OT Time Calculation (min)  23 min  -BB      Total Timed Code Minutes- OT  23 minute(s)  -BB      OT Received On  06/04/19  -        User Key  (r) = Recorded By, (t) = Taken By, (c) = Cosigned By    Initials Name Provider Type    Anila Escobar, GITA/L Occupational Therapy Assistant        Therapy Charges for Today     Code Description Service Date Service Provider Modifiers Qty    [id number] HC OT THER PROC EA 15 MIN 6/4/2019 Anila Rodriguez COTA/L GO 2               FREDDY Malin  6/4/2019

## 2019-06-04 NOTE — PROGRESS NOTES
GENERAL SURGERY PROGRESS NOTE  Chief Complaint:  Surgery Follow up   LOS: 6 days       Subjective     Interval History:     Awake and alert this AM. Seems oriented.    Objective     Vital Signs  Temp:  [97.9 °F (36.6 °C)-99.5 °F (37.5 °C)] 99.2 °F (37.3 °C)  Heart Rate:  [] 86  Resp:  [13-24] 22  BP: ()/() 94/66    Physical Exam:   Ostomy with stool out  Labs:  Lab Results (last 24 hours)     Procedure Component Value Units Date/Time    Basic Metabolic Panel [[id number]]  (Abnormal) Collected:  06/04/19 0302    Specimen:  Blood Updated:  06/04/19 0346     Glucose 88 mg/dL      BUN 5 mg/dL      Creatinine 0.61 mg/dL      Sodium 136 mmol/L      Potassium 4.3 mmol/L      Chloride 103 mmol/L      CO2 25.0 mmol/L      Calcium 7.6 mg/dL      eGFR Non African Amer 98 mL/min/1.73      BUN/Creatinine Ratio 8.2     Anion Gap 8.0 mmol/L     Narrative:       GFR Normal >60  Chronic Kidney Disease <60  Kidney Failure <15    CBC (No Diff) [[id number]]  (Abnormal) Collected:  06/04/19 0302    Specimen:  Blood Updated:  06/04/19 0325     WBC 13.23 10*3/mm3      RBC 3.18 10*6/mm3      Hemoglobin 10.2 g/dL      Hematocrit 31.0 %      MCV 97.5 fL      MCH 32.1 pg      MCHC 32.9 g/dL      RDW 12.5 %      RDW-SD 45.1 fl      MPV 10.6 fL      Platelets 228 10*3/mm3     Phosphorus [[id number]]  (Normal) Collected:  06/03/19 0800    Specimen:  Blood Updated:  06/03/19 0829     Phosphorus 2.8 mg/dL            Results Review:     Labs and imaging for today were reviewed.    Assessment/Plan     Irma Pacheco is a 69 y.o. 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      Will allow diet today.  Work on mobility.  Need to begin working on discharge planning, patient will likely need LTAC placement.  Anticipate removing staples at lower wound later today.          This document has been electronically signed by Timothy Dixon MD on June 4, 2019 7:15 AM        Timothy Dixon  MD  06/04/19  7:15 AM

## 2019-06-04 NOTE — PROGRESS NOTES
Multiple staples removed from lower aspect of midline incision with necrotic fat drained from wound. Sterile dressing placed.          This document has been electronically signed by Timothy Dixon MD on June 4, 2019 5:31 PM

## 2019-06-04 NOTE — THERAPY TREATMENT NOTE
Acute Care - Physical Therapy Treatment Note  [facility]     Patient Name: Irma Pacheco  : 1950  MRN: [id number]  Today's Date: 2019  Onset of Illness/Injury or Date of Surgery: 19  Date of Referral to PT: 19  Referring Physician: Dr. Dixon     Admit Date: 2019    Visit Dx:    ICD-10-CM ICD-9-CM   1. 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 Impaired functional mobility and activity tolerance Z74.09 V49.89   3. 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       Therapy Treatment    Rehabilitation Treatment Summary     Row Name 19 0924             Treatment Time/Intention    Discipline  physical therapy assistant  -LN      Document Type  therapy note (daily note)  -LN      Subjective Information  complains of;pain;weakness  -LN      Mode of Treatment  physical therapy  -LN      Therapy Frequency (PT Clinical Impression)  other (see comments) 6x/wk  -LN      Therapy Frequency (OT Eval)  other (see comments) 5-7 days a week   -LN      Patient Effort  good  -LN      Reason Treatment Not Performed  other (see comments)  -LN      Existing Precautions/Restrictions  fall;other (see comments) colostomy  -LN      Recorded by [LN] Jannet Garcia PTA 19 1319      Row Name 19 0924             Vital Signs    Pre Systolic BP Rehab  118  -LN      Pre Treatment Diastolic BP  69  -LN      Post Systolic BP Rehab  129  -LN      Post Treatment Diastolic BP  78  -LN      Pretreatment Heart Rate (beats/min)  97  -LN      Posttreatment Heart Rate (beats/min)  101  -LN      Pre SpO2 (%)  96  -LN      O2 Delivery Pre Treatment  room air  -LN      Post SpO2 (%)  96  -LN      Pre Patient Position  Side Lying  -LN      Intra Patient Position  Sitting  -LN      Post Patient Position  Side Lying  -LN      Recorded by [LN] Jannet Garcia PTA 19 1319      Row Name 19 0924              Cognitive Assessment/Intervention- PT/OT    Affect/Mental Status (Cognitive)  WFL  -LN      Orientation Status (Cognition)  oriented x 4  -LN      Follows Commands (Cognition)  follows one step commands  -LN      Recorded by [LN] Jannet Garcia, [facility] 06/04/19 1319      Row Name 06/04/19 0924             Safety Issues, Functional Mobility    Impairments Affecting Function (Mobility)  balance;coordination;endurance/activity tolerance;pain;postural/trunk control;strength;motor control;motor planning  -LN      Recorded by [LN] Jannet Garcia, [facility] 06/04/19 1319      Row Name 06/04/19 0924             Bed Mobility Assessment/Treatment    Rolling Left [place] (Bed Mobility)  not tested  -LN      Rolling Right [place] (Bed Mobility)  maximum assist (25% patient effort);1 person assist  -LN      Supine-Sit [place] (Bed Mobility)  moderate assist (50% patient effort);2 person assist  -LN      Sit-Supine [place] (Bed Mobility)  maximum assist (25% patient effort);2 person assist  -LN      Comment (Bed Mobility)  -eob x20' bracing self on walker  -LN      Recorded by [LN] Jannet Garcia, [facility] 06/04/19 1319      Row Name 06/04/19 0924             Functional Mobility    Functional Mobility- Ind. Level  not tested  -LN      Recorded by [LN] Jnanet Garcia, [facility] 06/04/19 1319      Row Name 06/04/19 0924             Transfer Assessment/Treatment    Transfer Assessment/Treatment  sit-stand transfer;stand-sit transfer  -LN      Recorded by [LN] Jannet Garcia, PTA 06/04/19 1319      Row Name 06/04/19 0924             Chair-Bed Transfer    Chair-Bed [place] (Transfers)  not tested  -LN      Assistive Device (Chair-Bed Transfers)  walker, front-wheeled  -LN      Recorded by [LN] Jannet Garcia, PTA 06/04/19 1319      Row Name 06/04/19 0924             Sit-Stand Transfer    Sit-Stand [place] (Transfers)  not tested  -LN      Assistive Device (Sit-Stand Transfers)  walker, front-wheeled  -LN      Recorded by [LN]  Jannet Garcia, [facility] 06/04/19 1319      Row Name 06/04/19 0924             Stand-Sit Transfer    Stand-Sit [place] (Transfers)  moderate assist (50% patient effort)  -LN      Assistive Device (Stand-Sit Transfers)  walker, front-wheeled  -LN      Recorded by [LN] Jannet Garcia, [facility] 06/04/19 1319      Row Name 06/04/19 0924             Gait/Stairs Assessment/Training    [place] Level (Gait)  not tested  -LN      Recorded by [LN] Jannet Garcia, [facility] 06/04/19 1319      Row Name 06/04/19 0924             Lower Body Dressing Assessment/Training    Lower Body Dressing [place] Level  --  -LN      Lower Body Dressing Position  --  -LN      Recorded by [LN] Jannet Garcia, [facility] 06/04/19 1319      Row Name 06/04/19 0924             Positioning and Restraints    Post Treatment Position  bed  -LN      In Bed  notified nsg;side lying right;call light within reach;encouraged to call for assist;patient within staff view;waffle boots/both  -LN      Recorded by [LN] Jannet Garcia, [facility] 06/04/19 1319      Row Name 06/04/19 0924             Pain Scale: Numbers Pre/Post-Treatment    Pain Scale: Numbers, Pretreatment  7/10  -LN      Pain Scale: Numbers, Post-Treatment  10/10  -LN      Pain Location - Orientation  --  -LN      Pain Location  back right side of head and neck  -LN      Pain Intervention(s)  -- nsg made aware  -LN      Recorded by [LN] Jannet Garcia, [facility] 06/04/19 1319      Row Name 06/04/19 0924             Sensory Assessment/Intervention    Sensory General Assessment  --  -LN      Recorded by [LN] Jannet Garcia, [facility] 06/04/19 1319      Row Name 06/04/19 0924             Vision Assessment/Intervention    Visual Impairment/Limitations  corrective lenses full time  -LN      Recorded by [LN] Jannet Garcia, [facility] 06/04/19 1319      Row Name 06/04/19 0924             Light Touch Sensation Assessment    Left Upper Extremity: Light Touch Sensation Assessment  --  -LN      Right Upper Extremity: Light Touch Sensation  Assessment  --  -LN      Left Lower Extremity: Light Touch Sensation Assessment  --  -LN      Right Lower Extremity: Light Touch Sensation Assessment  --  -LN      Recorded by [LN] Jannet Garcia S, PTA 06/04/19 1319      Row Name 06/04/19 0924             Respiratory WDL    Respiratory WDL  --  -LN      Nailbeds  --  -LN      Recorded by [LN] Jannet Garcia S, PTA 06/04/19 1319      Row Name 06/04/19 0924             Breath Sounds    Breath Sounds  --  -LN      All Lung Fields Breath Sounds  --  -LN      LLL Breath Sounds  --  -LN      Recorded by [LN] Jannet Garcia S, PTA 06/04/19 1319      Row Name 06/04/19 0924             Skin WDL    Skin WDL  --  -LN      Skin Temperature  --  -LN      Skin Moisture  --  -LN      Skin Elasticity  --  -LN      Skin Integrity  --  -LN      Recorded by [LN] Jannet Garcia S, PTA 06/04/19 1319      Row Name 06/04/19 0924             Wound 05/29/19 1327 abdomen incision;surgical    Wound - Properties Group Date first assessed: 05/29/19 [KM] Time first assessed: 1327 [KM] Present On Admission : no [KM] Location: abdomen [KM] Type: incision;surgical [KM] Recorded by:  [KM] Geno Blackmon RN 05/29/19 1327    Dressing Appearance  --  -LN      Closure  --  -LN      Base  --  -LN      Periwound  --  -LN      Periwound Temperature  --  -LN      Periwound Skin Turgor  --  -LN      Drainage Characteristics/Odor  --  -LN      Drainage Amount  --  -LN      Recorded by [LN] Jannet Garcia, PTA 06/04/19 1319      Row Name 06/04/19 0924             Coping    Observed Emotional State  calm;cooperative  -LN      Verbalized Emotional State  acceptance  -LN      Recorded by [LN] Jannet Garcia, PTA 06/04/19 1319      Row Name 06/04/19 0924             Plan of Care Review    Plan of Care Reviewed With  patient  -LN      Recorded by [LN] Jannet Garcia, PTA 06/04/19 1319      Row Name 06/04/19 0924             Outcome Summary/Treatment Plan (OT)    Anticipated Discharge Disposition (OT)  skilled nursing facility   -LN      Recorded by [LN] Jannet Garcia, PTA 06/04/19 1319      Row Name 06/04/19 0924             Outcome Summary/Treatment Plan (PT)    Plan for Continued Treatment (PT)  cont  -LN      Anticipated Discharge Disposition (PT)  skilled nursing facility  -LN      Recorded by [LN] Jannet Garcia, PTA 06/04/19 1319        User Key  (r) = Recorded By, (t) = Taken By, (c) = Cosigned By    Initials Name Effective Dates Discipline    LN Jannet Garcia, PTA 03/07/18 -  PT    KM Geno Blackmon RN 05/21/18 -  Nurse          Wound 05/29/19 1327 abdomen incision;surgical (Active)   Dressing Appearance moist drainage 6/4/2019 12:00 PM   Closure Staples 6/4/2019 12:00 PM   Base moist;pink 6/4/2019 12:00 PM   Periwound dry;intact 6/4/2019 12:00 PM   Periwound Temperature warm 6/4/2019 12:00 PM   Periwound Skin Turgor soft 6/4/2019 12:00 PM   Drainage Characteristics/Odor serosanguineous 6/4/2019 12:00 PM   Drainage Amount moderate 6/4/2019 12:00 PM   Care, Wound cleansed with 6/4/2019  5:00 AM   Dressing Care, Wound dressing changed 6/4/2019  5:00 AM       Rehab Goal Summary     Row Name 06/04/19 0924             Physical Therapy Goals    Bed Mobility Goal Selection (PT)  bed mobility, PT goal 1  -LN      Transfer Goal Selection (PT)  transfer, PT goal 1  -LN      Gait Training Goal Selection (PT)  gait training, PT goal 1  -LN      Strength Goal Selection (PT)  strength, PT goal 1  -LN         Bed Mobility Goal 1 (PT)    Activity/Assistive Device (Bed Mobility Goal 1, PT)  sit to supine/supine to sit  -LN      [place] Level/Cues Needed (Bed Mobility Goal 1, PT)  minimum assist (75% or more patient effort)  -LN      Time Frame (Bed Mobility Goal 1, PT)  10 days  -LN      Barriers (Bed Mobility Goal 1, PT)  abdominal pain, previous functional deficit  -LN      Progress/Outcomes (Bed Mobility Goal 1, PT)  goal not met  -LN         Transfer Goal 1 (PT)    Activity/Assistive Device (Transfer Goal 1, PT)   sit-to-stand/stand-to-sit;bed-to-chair/chair-to-bed;walker, rolling  -LN      [place] Level/Cues Needed (Transfer Goal 1, PT)  contact guard assist;verbal cues required  -LN      Time Frame (Transfer Goal 1, PT)  1 week  -LN      Barriers (Transfers Goal 1, PT)  abdominal pain, previous functional deficit  -LN      Progress/Outcome (Transfer Goal 1, PT)  goal not met  -LN         Gait Training Goal 1 (PT)    Activity/Assistive Device (Gait Training Goal 1, PT)  walker, rolling;decrease fall risk;increase endurance/gait distance  -LN      [place] Level (Gait Training Goal 1, PT)  contact guard assist;verbal cues required  -LN      Distance (Gait Goal 1, PT)  10 feet  -LN      Time Frame (Gait Training Goal 1, PT)  10 days  -LN      Barriers (Gait Training Goal 1, PT)  abdominal pain, previous functional deficit  -LN      Progress/Outcome (Gait Training Goal 1, PT)  goal not met  -LN         Strength Goal 1 (PT)    Strength Goal 1 (PT)  Pt will be able to complete 20 reps of at least 4 LE exercises in order to demo improved strength for transfers  -LN      Time Frame (Strength Goal 1, PT)  1 week  -LN      Barriers (Strength Goal 1, PT)  abdominal pain, previous functional deficit  -LN      Progress/Outcome (Strength Goal 1, PT)  goal not met  -LN        User Key  (r) = Recorded By, (t) = Taken By, (c) = Cosigned By    Initials Name Provider Type Discipline    Jannet Meclhor PTA Physical Therapy Assistant PT          Physical Therapy Education     Title: PT OT SLP Therapies (In Progress)     Topic: Physical Therapy (Done)     Point: Mobility training (Done)     Learning Progress Summary           Patient Acceptance, E,TB, VU by KATH at 6/4/2019  1:23 PM    Acceptance, E, VU,NR by JAVI at 5/30/2019 12:58 PM    Comment:  Role of PT, PT POC, transfer technique                   Point: Home exercise program (Done)     Learning Progress Summary           Patient Acceptance, E,TB, VU by KATH at 6/3/2019  3:53 PM     Acceptance, E,TB, VU by LN at 5/31/2019 12:58 PM                   Point: Body mechanics (Done)     Learning Progress Summary           Patient Acceptance, E,TB, VU by LN at 6/4/2019  1:23 PM    Acceptance, E,TB, VU by ADELINA at 6/4/2019 10:07 AM                   Point: Precautions (Done)     Learning Progress Summary           Patient Acceptance, E,TB, VU by LN at 6/4/2019  1:23 PM    Acceptance, E,TB, VU by LN at 6/3/2019  3:53 PM    Acceptance, E,TB, VU by LN at 5/31/2019 12:58 PM    Acceptance, E, VU by LF at 5/30/2019  1:00 PM    Comment:  Gait belt, call light, staff assistance with mobility                               User Key     Initials Effective Dates Name Provider Type Discipline    ADELINA 10/17/16 -  Alem Garrido RN Registered Nurse Nurse    KATH 03/07/18 -  Jannet Garcia PTA Physical Therapy Assistant PT     07/23/18 -  Donna Stacy, PT Physical Therapist PT                PT Recommendation and Plan  Anticipated Discharge Disposition (PT): skilled nursing facility  Therapy Frequency (PT Clinical Impression): other (see comments)(6x/wk)  Outcome Summary/Treatment Plan (PT)  Plan for Continued Treatment (PT): cont  Anticipated Discharge Disposition (PT): skilled nursing facility  Plan of Care Reviewed With: patient  Outcome Summary: sup-sit-sup mod/max of 2,sat eob x 20' bracing self with walker and sba,pt willing to attempt stand next rx but doesn't feel she can walk-no goals met-if d/c would benefit from snf with PT  Outcome Measures     Row Name 06/04/19 0924 06/03/19 1457 06/02/19 1400       How much help from another person do you currently need...    Turning from your back to your side while in flat bed without using bedrails?  2  -LN  2  -LN  1  -TA    Moving from lying on back to sitting on the side of a flat bed without bedrails?  2  -LN  1  -LN  1  -TA    Moving to and from a bed to a chair (including a wheelchair)?  2  -LN  2  -LN  2  -TA    Standing up from a chair using your arms (e.g.,  wheelchair, bedside chair)?  2  -LN  2  -LN  2  -TA    Climbing 3-5 steps with a railing?  1  -LN  1  -LN  1  -TA    To walk in hospital room?  1  -LN  1  -LN  1  -TA    AM-PAC 6 Clicks Score  10  -LN  9  -LN  8  -TA       Functional Assessment    Outcome Measure Options  AM-PAC 6 Clicks Basic Mobility (PT)  -LN  AM-PAC 6 Clicks Basic Mobility (PT)  -LN  AM-PAC 6 Clicks Basic Mobility (PT)  -TA      User Key  (r) = Recorded By, (t) = Taken By, (c) = Cosigned By    Initials Name Provider Type    Brenda Morris, PTA Physical Therapy Assistant    LN Jannet Garcia PTA Physical Therapy Assistant         Time Calculation:   PT Charges     Row Name 06/04/19 1325             Time Calculation    Start Time  0924  -LN      Stop Time  0955  -LN      Time Calculation (min)  31 min  -LN      PT Received On  06/04/19  -LN         Time Calculation- PT    Total Timed Code Minutes- PT  31 minute(s)  -LN        User Key  (r) = Recorded By, (t) = Taken By, (c) = Cosigned By    Initials Name Provider Type    LN Jose Jannet S, PTA Physical Therapy Assistant        Therapy Charges for Today     Code Description Service Date Service Provider Modifiers Qty    81993712440 HC PT THERAPEUTIC ACT EA 15 MIN 6/3/2019 Amanda Garciai S, PTA GP 1    51526087437 HC PT THER PROC EA 15 MIN 6/3/2019 Amanda Garciai S, PTA GP 1    87257614480 HC PT THERAPEUTIC ACT EA 15 MIN 6/4/2019 Amanda Garciai S, PTA GP 2          PT G-Codes  Outcome Measure Options: AM-PAC 6 Clicks Basic Mobility (PT)  AM-PAC 6 Clicks Score: 10  Score: 9    Jannetjulio Garcia PTA  6/4/2019

## 2019-06-04 NOTE — PLAN OF CARE
Problem: Patient Care Overview  Goal: Plan of Care Review  Outcome: Ongoing (interventions implemented as appropriate)   06/04/19 1504   Coping/Psychosocial   Plan of Care Reviewed With patient   Plan of Care Review   Progress improving   OTHER   Outcome Summary Pt tolerated UE exercises fair this date. No goals met this tx.

## 2019-06-04 NOTE — PLAN OF CARE
Problem: Patient Care Overview  Goal: Plan of Care Review  Outcome: Ongoing (interventions implemented as appropriate)   06/04/19 0644   Coping/Psychosocial   Plan of Care Reviewed With patient   Plan of Care Review   Progress improving   OTHER   Outcome Summary Patient remains on room air. VS WNL, A/O all night with no episodes of confusion-restraints remain off. Pain remains controlled with PRN dilaudid. Incision has opened up some at the bottom, one staple no longer in place. Serosangious-purulent drainage oozing that has a foul odor. Dressing changed.      Goal: Discharge Needs Assessment  Outcome: Ongoing (interventions implemented as appropriate)      Problem: Fall Risk (Adult)  Goal: Absence of Fall  Outcome: Ongoing (interventions implemented as appropriate)      Problem: Skin Injury Risk (Adult)  Goal: Identify Related Risk Factors and Signs and Symptoms  Outcome: Ongoing (interventions implemented as appropriate)    Goal: Skin Health and Integrity  Outcome: Ongoing (interventions implemented as appropriate)      Problem: Surgery Nonspecified (Adult)  Goal: Signs and Symptoms of Listed Potential Problems Will be Absent, Minimized or Managed (Surgery Nonspecified)  Outcome: Ongoing (interventions implemented as appropriate)

## 2019-06-04 NOTE — PLAN OF CARE
Problem: Patient Care Overview  Goal: Plan of Care Review  Outcome: Ongoing (interventions implemented as appropriate)   06/04/19 1421   Coping/Psychosocial   Plan of Care Reviewed With caregiver;patient   Plan of Care Review   Progress improving   OTHER   Outcome Summary Pt much improved mentally today. PO diet advanced to soft diet and pt ate 100% this am. Will continue boost breeze and monitor       Problem: Skin Injury Risk (Adult)  Intervention: Promote/Optimize Nutrition   06/04/19 1421   Nutrition Interventions   Oral Nutrition Promotion calorie dense foods provided;calorie dense liquids provided;nutritional therapy counseling provided

## 2019-06-04 NOTE — PLAN OF CARE
Problem: Patient Care Overview  Goal: Plan of Care Review   06/04/19 0644 06/04/19 1323   Coping/Psychosocial   Plan of Care Reviewed With --  patient   Plan of Care Review   Progress improving --    OTHER   Outcome Summary --  sup-sit-sup mod/max of 2,sat eob x 20' bracing self with walker and sba,pt willing to attempt stand next rx but doesn't feel she can walk-no goals met-if d/c would benefit from snf with PT     Goal: Discharge Needs Assessment   05/29/19 1842 05/30/19 0600 06/03/19 0241   Discharge Needs Assessment   Readmission Within the Last 30 Days no previous admission in last 30 days --  --    Concerns to be Addressed --  --  no discharge needs identified   Patient/Family Anticipates Transition to --  home with family --    Patient/Family Anticipated Services at Transition --  home health care --    Transportation Concerns --  car, none --    Transportation Anticipated --  family or friend will provide --    Discharge Facility/Level of Care Needs --  --  --    Current Discharge Risk chronically ill;dependent with mobility/activities of daily living --  --    Disability   Equipment Currently Used at Home --  none --     06/04/19 1323   Discharge Needs Assessment   Readmission Within the Last 30 Days --    Concerns to be Addressed --    Patient/Family Anticipates Transition to --    Patient/Family Anticipated Services at Transition --    Transportation Concerns --    Transportation Anticipated --    Discharge Facility/Level of Care Needs nursing facility, skilled   Current Discharge Risk --    Disability   Equipment Currently Used at Home --

## 2019-06-05 LAB
ANION GAP SERPL CALCULATED.3IONS-SCNC: 9 MMOL/L
BUN BLD-MCNC: 4 MG/DL (ref 8–23)
BUN/CREAT SERPL: 6 (ref 7–25)
CALCIUM SPEC-SCNC: 8 MG/DL (ref 8.6–10.5)
CHLORIDE SERPL-SCNC: 101 MMOL/L (ref 98–107)
CO2 SERPL-SCNC: 26 MMOL/L (ref 22–29)
CREAT BLD-MCNC: 0.67 MG/DL (ref 0.57–1)
DEPRECATED RDW RBC AUTO: 45.4 FL (ref 37–54)
ERYTHROCYTE [DISTWIDTH] IN BLOOD BY AUTOMATED COUNT: 12.5 % (ref 12.3–15.4)
GFR SERPL CREATININE-BSD FRML MDRD: 88 ML/MIN/1.73
GLUCOSE BLD-MCNC: 87 MG/DL (ref 65–99)
HCT VFR BLD AUTO: 35.6 % (ref 34–46.6)
HGB BLD-MCNC: 11.6 G/DL (ref 12–15.9)
MCH RBC QN AUTO: 32.2 PG (ref 26.6–33)
MCHC RBC AUTO-ENTMCNC: 32.6 G/DL (ref 31.5–35.7)
MCV RBC AUTO: 98.9 FL (ref 79–97)
PLATELET # BLD AUTO: 261 10*3/MM3 (ref 140–450)
PMV BLD AUTO: 10.6 FL (ref 6–12)
POTASSIUM BLD-SCNC: 4.1 MMOL/L (ref 3.5–5.2)
RBC # BLD AUTO: 3.6 10*6/MM3 (ref 3.77–5.28)
SODIUM BLD-SCNC: 136 MMOL/L (ref 136–145)
WBC NRBC COR # BLD: 13.55 10*3/MM3 (ref 3.4–10.8)

## 2019-06-05 PROCEDURE — 85027 COMPLETE CBC AUTOMATED: CPT | Performed by: SURGERY

## 2019-06-05 PROCEDURE — 80048 BASIC METABOLIC PNL TOTAL CA: CPT | Performed by: SURGERY

## 2019-06-05 PROCEDURE — 97530 THERAPEUTIC ACTIVITIES: CPT

## 2019-06-05 PROCEDURE — 97110 THERAPEUTIC EXERCISES: CPT

## 2019-06-05 PROCEDURE — 25010000002 HEPARIN (PORCINE) PER 1000 UNITS: Performed by: SURGERY

## 2019-06-05 PROCEDURE — 99024 POSTOP FOLLOW-UP VISIT: CPT | Performed by: SURGERY

## 2019-06-05 PROCEDURE — 97535 SELF CARE MNGMENT TRAINING: CPT

## 2019-06-05 PROCEDURE — 25010000002 HYDROMORPHONE 1 MG/ML SOLUTION: Performed by: SURGERY

## 2019-06-05 RX ADMIN — HYDROMORPHONE HYDROCHLORIDE 0.5 MG: 1 INJECTION, SOLUTION INTRAMUSCULAR; INTRAVENOUS; SUBCUTANEOUS at 13:33

## 2019-06-05 RX ADMIN — NYSTATIN: 100000 POWDER TOPICAL at 04:00

## 2019-06-05 RX ADMIN — FAMOTIDINE 20 MG: 10 INJECTION INTRAVENOUS at 21:52

## 2019-06-05 RX ADMIN — HYDROMORPHONE HYDROCHLORIDE 0.5 MG: 1 INJECTION, SOLUTION INTRAMUSCULAR; INTRAVENOUS; SUBCUTANEOUS at 16:05

## 2019-06-05 RX ADMIN — HYDROMORPHONE HYDROCHLORIDE 0.5 MG: 1 INJECTION, SOLUTION INTRAMUSCULAR; INTRAVENOUS; SUBCUTANEOUS at 06:32

## 2019-06-05 RX ADMIN — SODIUM CHLORIDE, PRESERVATIVE FREE 10 ML: 5 INJECTION INTRAVENOUS at 21:52

## 2019-06-05 RX ADMIN — LEVOTHYROXINE SODIUM 137 MCG: 25 TABLET ORAL at 08:39

## 2019-06-05 RX ADMIN — HYDROMORPHONE HYDROCHLORIDE 0.5 MG: 1 INJECTION, SOLUTION INTRAMUSCULAR; INTRAVENOUS; SUBCUTANEOUS at 19:19

## 2019-06-05 RX ADMIN — POTASSIUM CHLORIDE, DEXTROSE MONOHYDRATE AND SODIUM CHLORIDE 100 ML/HR: 150; 5; 450 INJECTION, SOLUTION INTRAVENOUS at 00:26

## 2019-06-05 RX ADMIN — MEMANTINE 2.5 MG: 5 TABLET ORAL at 08:39

## 2019-06-05 RX ADMIN — HEPARIN SODIUM 5000 UNITS: 5000 INJECTION INTRAVENOUS; SUBCUTANEOUS at 06:24

## 2019-06-05 RX ADMIN — GLATIRAMER 40 MG: 40 INJECTION, SOLUTION SUBCUTANEOUS at 18:44

## 2019-06-05 RX ADMIN — GABAPENTIN 600 MG: 300 CAPSULE ORAL at 21:50

## 2019-06-05 RX ADMIN — DONEPEZIL HYDROCHLORIDE 5 MG: 5 TABLET, FILM COATED ORAL at 21:50

## 2019-06-05 RX ADMIN — PRIMIDONE 50 MG: 50 TABLET ORAL at 13:27

## 2019-06-05 RX ADMIN — FAMOTIDINE 20 MG: 10 INJECTION INTRAVENOUS at 08:38

## 2019-06-05 RX ADMIN — POTASSIUM CHLORIDE, DEXTROSE MONOHYDRATE AND SODIUM CHLORIDE 100 ML/HR: 150; 5; 450 INJECTION, SOLUTION INTRAVENOUS at 11:38

## 2019-06-05 RX ADMIN — PRIMIDONE 50 MG: 50 TABLET ORAL at 06:24

## 2019-06-05 RX ADMIN — ALBUTEROL SULFATE 2 PUFF: 90 AEROSOL, METERED RESPIRATORY (INHALATION) at 04:40

## 2019-06-05 RX ADMIN — HYDROMORPHONE HYDROCHLORIDE 0.5 MG: 1 INJECTION, SOLUTION INTRAMUSCULAR; INTRAVENOUS; SUBCUTANEOUS at 01:41

## 2019-06-05 RX ADMIN — HEPARIN SODIUM 5000 UNITS: 5000 INJECTION INTRAVENOUS; SUBCUTANEOUS at 13:27

## 2019-06-05 RX ADMIN — GABAPENTIN 600 MG: 300 CAPSULE ORAL at 13:27

## 2019-06-05 RX ADMIN — MEMANTINE 2.5 MG: 5 TABLET ORAL at 21:50

## 2019-06-05 RX ADMIN — GABAPENTIN 600 MG: 300 CAPSULE ORAL at 06:23

## 2019-06-05 RX ADMIN — PRIMIDONE 50 MG: 50 TABLET ORAL at 21:50

## 2019-06-05 RX ADMIN — SODIUM CHLORIDE, PRESERVATIVE FREE 10 ML: 5 INJECTION INTRAVENOUS at 08:44

## 2019-06-05 RX ADMIN — NYSTATIN: 100000 POWDER TOPICAL at 09:50

## 2019-06-05 RX ADMIN — HEPARIN SODIUM 5000 UNITS: 5000 INJECTION INTRAVENOUS; SUBCUTANEOUS at 21:50

## 2019-06-05 NOTE — PLAN OF CARE
Problem: Patient Care Overview  Goal: Plan of Care Review  Outcome: Ongoing (interventions implemented as appropriate)   06/05/19 2923   Coping/Psychosocial   Plan of Care Reviewed With patient   Plan of Care Review   Progress improving   OTHER   Outcome Summary Pt performed B LE supine ex's and then requested to use bed pan. Pt encouraged by nsg and PTA to get up to BSC. Pt required mod of 2 to t/f to EOB and then max of 2 to stand and amb to BSC. Pt able to void and then stand to allow nsg to perform pericare and amb to back to bed but prior to getting to the bed pt was unable to move her feet which forced PTA and nsg to sit her on EOB sooner than anticipated and when pt wasnt able to regain her strength enough to scoot back on bed she had to be dependently returned to supine. Pt VS were stable throughout tx this date. Pt was extensively insttructed in safe t/f back to bed with good understanding verb by pt.

## 2019-06-05 NOTE — PROGRESS NOTES
Discharge Planning Assessment  [facility]     Patient Name: Irma Pacheco  MRN: [id number]  Today's Date: 6/5/2019    Admit Date: 5/29/2019    Discharge Needs Assessment     Row Name 06/05/19 1024       Discharge Needs Assessment    Readmission Within the Last 30 Days  no previous admission in last 30 days    Row Name 06/05/19 1014       Living Environment    Lives With  child(laine), adult    Current Living Arrangements  home/apartment/condo    Provides Primary Care For  no one, unable/limited ability to care for self    Family Caregiver if Needed  child(laine), adult       Resource/Environmental Concerns    Transportation Concerns  car, none       Transition Planning    Patient/Family Anticipates Transition to  home with family    Patient/Family Anticipated Services at Transition  home health care    Transportation Anticipated  family or friend will provide       Discharge Needs Assessment    Equipment Currently Used at Home  wheelchair, motorized;rollator;commode;lift device    Equipment Needed After Discharge  none    Current Discharge Risk  chronically ill        Discharge Plan     Row Name 06/05/19 1017       Plan    Plan  Daughter lives with patient.  Daughter provides care for patient.  Both sleep in a recliner.  Patient is not able to ambulate at this time but can transfer with assistance.  [facility]ses [facility] and wishes to use [facility] at discharge.  Per MD, may need LTACH.  Discussed discharge plans with patient and she wants to go home with [facility].          Destination      No service coordination in this encounter.      Durable Medical Equipment      No service coordination in this encounter.      Dialysis/Infusion      No service coordination in this encounter.      Home Medical Care      No service coordination in this encounter.      Therapy      No service coordination in this encounter.      Community Resources      No service coordination in this encounter.          Demographic  Summary     Row Name 06/05/19 1012       General Information    Admission Type  inpatient    Preferred Language  English        Functional Status     Row Name 06/05/19 1012       Functional Status, IADL    Medications  independent    Meal Preparation  other (see comments) Daughter cooks and patient washes the dishes.     Housekeeping  other (see comments) Has a .     Laundry  other (see comments) Daughter does the laundry.     Shopping  other (see comments) Daughter does the grocery shopping.        Psychosocial    No documentation.       Abuse/Neglect    No documentation.       Legal    No documentation.       Substance Abuse    No documentation.       Patient Forms    No documentation.           Mara Osorio

## 2019-06-05 NOTE — PLAN OF CARE
Problem: Patient Care Overview  Goal: Plan of Care Review  Outcome: Ongoing (interventions implemented as appropriate)   06/05/19 0450   Coping/Psychosocial   Plan of Care Reviewed With patient   Plan of Care Review   Progress no change   OTHER   Outcome Summary gave pt one   06/05/19 0450   Coping/Psychosocial   Plan of Care Reviewed With patient   Plan of Care Review   Progress no change   OTHER   Outcome Summary gave pt on dose of inhaler due to wheezing. encourage pt to cough and deep breath as needed. pt tolerated bath well. pt tolerating treatment at this time. pt incision dressing changed.     dose of inhaler due to wheezing. encourage pt to cough and deep breath as needed. pt tolerated bath well. pt tolerating treatment at this time. pt incision dressing changed.      Goal: Individualization and Mutuality  Outcome: Ongoing (interventions implemented as appropriate)    Goal: Interprofessional Rounds/Family Conf  Outcome: Ongoing (interventions implemented as appropriate)      Problem: Fall Risk (Adult)  Goal: Absence of Fall  Outcome: Ongoing (interventions implemented as appropriate)      Problem: Skin Injury Risk (Adult)  Goal: Skin Health and Integrity  Outcome: Ongoing (interventions implemented as appropriate)      Problem: Surgery Nonspecified (Adult)  Goal: Signs and Symptoms of Listed Potential Problems Will be Absent, Minimized or Managed (Surgery Nonspecified)  Outcome: Ongoing (interventions implemented as appropriate)      Problem: Wound (Includes Pressure Injury) (Adult)  Goal: Signs and Symptoms of Listed Potential Problems Will be Absent, Minimized or Managed (Wound)  Outcome: Ongoing (interventions implemented as appropriate)

## 2019-06-05 NOTE — THERAPY TREATMENT NOTE
Acute Care - Occupational Therapy Treatment Note  [facility]     Patient Name: Irma Pacheco  : 1950  MRN: [id number]  Today's Date: 2019  Onset of Illness/Injury or Date of Surgery: 19  Date of Referral to OT: 19  Referring Physician: Dr. Dixon     Admit Date: 2019       ICD-10-CM ICD-9-CM   1. 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 Impaired functional mobility and activity tolerance Z74.09 V49.89   3. 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     Past Medical History:   Diagnosis Date   • Anxiety    • Arthritis    • Asthma    • CHF (congestive heart failure) (CMS/[facility])    • Colostomy care (CMS/[facility])    • Concussion     x2   • Depression    • Disease of thyroid gland    • Hyperlipidemia    • Hypotension    • Kidney disease, chronic, stage III (GFR 30-59 ml/min) (CMS/[facility])    • MS (multiple sclerosis) (CMS/[facility])    • Osteoarthritis    • Tremor, essential    • Wears glasses      Past Surgical History:   Procedure Laterality Date   • ABDOMINAL HYSTERECTOMY  1999    Has Cervix   • APPENDECTOMY     • CHOLECYSTECTOMY     • COLON RESECTION N/A 2017    Procedure: exploratory laparotomy, sigmoid colon resection and colostomy;  Surgeon: Timothy Dixon MD;  Location: [facility];  Service:    • COLON RESECTION N/A 2019    Procedure: laparotomy with lysis of adhesions, repair of small bowel to vagina fistula. small bowel resection ;  Surgeon: Timothy Dixon MD;  Location: [facility] OR;  Service: General   • EXPLORATORY LAPAROTOMY N/A 10/9/2017    Procedure: LAPAROTOMY EXPLORATORY, drainage intra-abdominal abscess, washout;  Surgeon: Arnulfo Marcum MD;  Location: [facility];  Service:    • SPIDER BITE EXCISION         Therapy Treatment    Rehabilitation Treatment Summary     Row Name 19 0720             Treatment Time/Intention    Discipline  occupational  therapy assistant  -BB      Document Type  therapy note (daily note)  -BB      Subjective Information  complains of;pain  -BB      Mode of Treatment  individual therapy;occupational therapy  -BB      Patient/Family Observations  no family present  -BB      Total Minutes, Occupational Therapy Treatment  130  -BB      Therapy Frequency (OT Eval)  other (see comments) 5-7 days/wk  -BB      Patient Effort  good  -BB      Existing Precautions/Restrictions  fall;other (see comments) colostomy  -BB      Recorded by [BB] Anila Rodriguez COTA/L 06/05/19 1126      Row Name 06/05/19 0720             Vital Signs    Pre Systolic BP Rehab  114  -BB      Pre Treatment Diastolic BP  58  -BB      Pretreatment Heart Rate (beats/min)  87  -BB      Intratreatment Heart Rate (beats/min)  90  -BB      Posttreatment Heart Rate (beats/min)  88  -BB      Pre SpO2 (%)  92  -BB      O2 Delivery Pre Treatment  room air  -BB      Intra SpO2 (%)  90  -BB      O2 Delivery Intra Treatment  room air  -BB      Post SpO2 (%)  92  -BB      O2 Delivery Post Treatment  room air  -BB      Pre Patient Position  Supine  -BB      Intra Patient Position  -- long sitting  -BB      Post Patient Position  -- long sitting  -BB      Recorded by [BB] Anila Rodriguez COTA/L 06/05/19 1126      Row Name 06/05/19 0720             Cognitive Assessment/Intervention- PT/OT    Affect/Mental Status (Cognitive)  WFL  -BB      Orientation Status (Cognition)  oriented x 4  -BB      Follows Commands (Cognition)  follows one step commands  -BB      Personal Safety Interventions  fall prevention program maintained;gait belt;muscle strengthening facilitated;nonskid shoes/slippers when out of bed;supervised activity  -BB      Recorded by [BB] Anila Rodriguez COTA/L 06/05/19 1126      Row Name 06/05/19 0720             Bathing Assessment/Intervention    Comment (Bathing)  -- Pt states she already had a bath this morning  -BB      Recorded by [BB] Anila Rodriguez  GITA/L 06/05/19 1126      Row Name 06/05/19 0720             Grooming Assessment/Training    [place] Level (Grooming)  hair care, combing/brushing;oral care regimen;wash face, hands;set up;minimum assist (75% patient effort)  -BB      Grooming Position  long sitting  -BB      Recorded by [BB] Anila Rodriguez COTA/L 06/05/19 1126      Row Name 06/05/19 0720             Self-Feeding Assessment/Training    [place] Level (Feeding)  liquids to mouth;set up;contact guard assist;prepare tray/open items;scoop food and bring to mouth;verbal cues;dependent (less than 25% patient effort) Pt dep for trayset up & for self feeding  -BB      Self-Feeding Assess/Train, Spillage Amount  minimal for drinking with cup with lid   -BB      Position (Self-Feeding)  -- long sitting  -BB      Recorded by [BB] Anila Rodriguez COTA/L 06/05/19 1126      Row Name 06/05/19 0720             Therapeutic Exercise    Upper Extremity Range of Motion (Therapeutic Exercise)  shoulder flexion/extension, bilateral;elbow flexion/extension, bilateral;forearm supination/pronation, bilateral;wrist flexion/extension, bilateral;shoulder internal/external rotation, bilateral chest press  -BB      Hand (Therapeutic Exercise)  finger flexion/extension, bilateral  -BB      Exercise Type (Therapeutic Exercise)  AROM (active range of motion)  -BB      Sets/Reps (Therapeutic Exercise)  2x15  -BB      Expected Outcome (Therapeutic Exercise)  improve functional tolerance, self-care activity;improve performance, transfer skills  -BB      Recorded by [BB] Anila Rodriguez COTA/L 06/05/19 1126      Row Name 06/05/19 0720             Positioning and Restraints    Pre-Treatment Position  in bed  -BB      Post Treatment Position  bed  -BB      In Bed  call light within reach;encouraged to call for assist;fowlers;patient within staff view  -BB      Recorded by [BB] Anila Rodriguez COTA/L 06/05/19 1126      Row Name 06/05/19 0720             Pain  Scale: Numbers Pre/Post-Treatment    Pain Scale: Numbers, Pretreatment  8/10  -BB      Pain Scale: Numbers, Post-Treatment  7/10  -BB      Pain Location - Orientation  incisional  -BB      Pain Intervention(s)  Medication (See MAR)  -BB      Recorded by [BB] Anila Rodriguez COTA/L 06/05/19 1126      Row Name                Wound 05/29/19 1327 abdomen incision;surgical    Wound - Properties Group Date first assessed: 05/29/19 [KM] Time first assessed: 1327 [KM] Present On Admission : no [KM] Location: abdomen [KM] Type: incision;surgical [KM] Recorded by:  [KM] Geno Blackmon, RN 05/29/19 1327    Row Name 06/05/19 0720             Plan of Care Review    Plan of Care Reviewed With  patient  -BB      Recorded by [BB] Anila Rodriguez COTA/L 06/05/19 1126      Row Name 06/05/19 0720             Outcome Summary/Treatment Plan (OT)    Daily Summary of Progress (OT)  progress toward functional goals is gradual  -BB      Plan for Continued Treatment (OT)  continue POC  -BB      Anticipated Discharge Disposition (OT)  skilled nursing facility  -BB      Recorded by [BB] Anila Rodriguez COTA/L 06/05/19 1126        User Key  (r) = Recorded By, (t) = Taken By, (c) = Cosigned By    Initials Name Effective Dates Discipline    BB Anila Rodriguez COTA/L 03/07/18 -  OT    KM Geno Blackmon RN 05/21/18 -  Nurse        Wound 05/29/19 1327 abdomen incision;surgical (Active)   Dressing Appearance dry;intact 6/5/2019  8:00 AM   Closure ELIZABETH 6/5/2019  8:00 AM   Base dressing in place, unable to visualize 6/5/2019  8:00 AM   Periwound intact;dry 6/5/2019  4:00 AM   Periwound Temperature warm 6/5/2019  4:00 AM   Periwound Skin Turgor soft 6/5/2019  4:00 AM   Drainage Characteristics/Odor serosanguineous;creamy;tan 6/5/2019  4:00 AM   Drainage Amount moderate 6/5/2019  4:00 AM   Care, Wound cleansed with;antimicrobial agent applied;soap and water 6/5/2019  4:00 AM   Dressing Care, Wound gauze 6/5/2019  8:00 AM   Number of  Staples Removed 4 6/4/2019  5:38 PM     Rehab Goal Summary     Row Name 06/05/19 0720             Transfer Goal 1 (OT)    Activity/Assistive Device (Transfer Goal 1, OT)  commode, bedside with drop arms;commode, bedside without drop arms  -BB      [place] Level/Cues Needed (Transfer Goal 1, OT)  minimum assist (75% or more patient effort)  -BB      Time Frame (Transfer Goal 1, OT)  long term goal (LTG);by discharge  -BB      Progress/Outcome (Transfer Goal 1, OT)  goal not met  -BB         Bathing Goal 1 (OT)    Activity/Assistive Device (Bathing Goal 1, OT)  upper body bathing  -BB      [place] Level/Cues Needed (Bathing Goal 1, OT)  minimum assist (75% or more patient effort)  -BB      Time Frame (Bathing Goal 1, OT)  long term goal (LTG);by discharge  -BB      Progress/Outcomes (Bathing Goal 1, OT)  goal not met  -BB         Dressing Goal 1 (OT)    Activity/Assistive Device (Dressing Goal 1, OT)  upper body dressing  -BB      [place]/Cues Needed (Dressing Goal 1, OT)  minimum assist (75% or more patient effort)  -BB      Time Frame (Dressing Goal 1, OT)  long term goal (LTG);by discharge  -BB      Progress/Outcome (Dressing Goal 1, OT)  goal not met  -BB         Grooming Goal 1 (OT)    Activity/Device (Grooming Goal 1, OT)  grooming skills, all  -BB      [place] (Grooming Goal 1, OT)  set-up required;supervision required;verbal cues required  -BB      Time Frame (Grooming Goal 1, OT)  long term goal (LTG);by discharge  -BB      Progress/Outcome (Grooming Goal 1, OT)  goal not met  -BB         Self-Feeding Goal 1 (OT)    Activity/Assistive Device (Self-Feeding Goal 1, OT)  self-feeding skills, all  -BB      [place] Level/Cues Needed (Self-Feeding Goal 1, OT)  set-up required;supervision required;verbal cues required  -BB      Time Frame (Self-Feeding Goal 1, OT)  long term goal (LTG);by discharge  -BB      Barriers (Self-Feeding Goal 1, OT)  address when medically appropriate.   -BB       Progress/Outcomes (Self-Feeding Goal 1, OT)  goal not met  -REGINALD        User Key  (r) = Recorded By, (t) = Taken By, (c) = Cosigned By    Initials Name Provider Type Discipline    Anila Escobar COTA/L Occupational Therapy Assistant OT        Occupational Therapy Education     Title: PT OT SLP Therapies (In Progress)     Topic: Occupational Therapy (In Progress)     Point: ADL training (In Progress)     Description: Instruct learner(s) on proper safety adaptation and remediation techniques during self care or transfers.   Instruct in proper use of assistive devices.    Learning Progress Summary           Patient Acceptance, E, NR by REGINALD at 6/5/2019 11:27 AM    Acceptance, E,TB,D, NR by  at 5/31/2019  1:12 PM    Acceptance, E, VU,NR by  at 5/30/2019  1:28 PM    Comment:  Educated about OT and POC. Educated to call for assist. Educated on safety throughout.                   Point: Home exercise program (In Progress)     Description: Instruct learner(s) on appropriate technique for monitoring, assisting and/or progressing therapeutic exercises/activities.    Learning Progress Summary           Patient Acceptance, E,TB,D, NR by CS at 5/31/2019  1:12 PM                   Point: Precautions (In Progress)     Description: Instruct learner(s) on prescribed precautions during self-care and functional transfers.    Learning Progress Summary           Patient Acceptance, E,TB,D, NR by CS at 5/31/2019  1:12 PM    Acceptance, E, VU,NR by  at 5/30/2019  1:28 PM    Comment:  Educated about OT and POC. Educated to call for assist. Educated on safety throughout.                   Point: Body mechanics (In Progress)     Description: Instruct learner(s) on proper positioning and spine alignment during self-care, functional mobility activities and/or exercises.    Learning Progress Summary           Patient Acceptance, E,TB,D, NR by  at 5/31/2019  1:12 PM                               User Key     Initials Effective  Dates Name Provider Type Discipline     06/08/18 -  Dee Mendez, OTR/L Occupational Therapist OT    BB 03/07/18 -  Anila Rodriguez, PELAYO/L Occupational Therapy Assistant OT    CS 03/07/18 -  Cathy Dillon, PELAYO/L Occupational Therapy Assistant OT                OT Recommendation and Plan  Outcome Summary/Treatment Plan (OT)  Daily Summary of Progress (OT): progress toward functional goals is gradual  Plan for Continued Treatment (OT): continue POC  Anticipated Discharge Disposition (OT): skilled nursing facility  Therapy Frequency (OT Eval): other (see comments)(5-7 days/wk)  Daily Summary of Progress (OT): progress toward functional goals is gradual  Plan of Care Review  Plan of Care Reviewed With: patient  Plan of Care Reviewed With: patient  Outcome Summary: Pt already had a bath this am, however agrees to grooming tasks and exercises. Pt performed AROM B UE exercises with good tolerance. Pt required RBs during exercises. Pt dependent for self feeding (pt attempted several bites)  2' to UE shaking and CGA for holding cup and drinking from it.Increased time and effort required with all tasks this tx.   Outcome Measures     Row Name 06/05/19 0720 06/04/19 1233 06/04/19 0924       How much help from another person do you currently need...    Turning from your back to your side while in flat bed without using bedrails?  --  --  2  -LN    Moving from lying on back to sitting on the side of a flat bed without bedrails?  --  --  2  -LN    Moving to and from a bed to a chair (including a wheelchair)?  --  --  2  -LN    Standing up from a chair using your arms (e.g., wheelchair, bedside chair)?  --  --  2  -LN    Climbing 3-5 steps with a railing?  --  --  1  -LN    To walk in hospital room?  --  --  1  -LN    AM-PAC 6 Clicks Score  --  --  10  -LN       How much help from another is currently needed...    Putting on and taking off regular lower body clothing?  1  -BB  1  -BB  --    Bathing (including  washing, rinsing, and drying)  1  -BB  1  -BB  --    Toileting (which includes using toilet bed pan or urinal)  1  -BB  1  -BB  --    Putting on and taking off regular upper body clothing  2  -BB  2  -BB  --    Taking care of personal grooming (such as brushing teeth)  2  -BB  2  -BB  --    Eating meals  1  -BB  2  -BB  --    Score  8  -BB  9  -BB  --       Functional Assessment    Outcome Measure Options  --  --  AM-PAC 6 Clicks Basic Mobility (PT)  -LN    Row Name 06/03/19 1457 06/02/19 1400          How much help from another person do you currently need...    Turning from your back to your side while in flat bed without using bedrails?  2  -LN  1  -TA     Moving from lying on back to sitting on the side of a flat bed without bedrails?  1  -LN  1  -TA     Moving to and from a bed to a chair (including a wheelchair)?  2  -LN  2  -TA     Standing up from a chair using your arms (e.g., wheelchair, bedside chair)?  2  -LN  2  -TA     Climbing 3-5 steps with a railing?  1  -LN  1  -TA     To walk in hospital room?  1  -LN  1  -TA     AM-PAC 6 Clicks Score  9  -LN  8  -TA        Functional Assessment    Outcome Measure Options  AM-PAC 6 Clicks Basic Mobility (PT)  -LN  AM-PAC 6 Clicks Basic Mobility (PT)  -TA       User Key  (r) = Recorded By, (t) = Taken By, (c) = Cosigned By    Initials Name Provider Type    TA Brenda Murphy, PTA Physical Therapy Assistant    LN Jannet Garcia, PTA Physical Therapy Assistant    Anila Escobar COTA/L Occupational Therapy Assistant           Time Calculation:   Time Calculation- OT     Row Name 06/05/19 1132             Time Calculation- OT    OT Start Time  0720  -BB      OT Stop Time  0930  -BB      OT Time Calculation (min)  130 min  -BB      Total Timed Code Minutes- OT  130 minute(s)  -BB      OT Received On  06/05/19  -BB        User Key  (r) = Recorded By, (t) = Taken By, (c) = Cosigned By    Initials Name Provider Type    Anila Escobar COTA/DERICK Occupational  Therapy Assistant        Therapy Charges for Today     Code Description Service Date Service Provider Modifiers Qty    72613330003 HC OT THER PROC EA 15 MIN 6/4/2019 Anila Rodriguez COTA/L GO 2    82860329701 HC OT THER PROC EA 15 MIN 6/5/2019 Anila Rodriguez COTA/L GO 3    66879243647 HC OT SELF CARE/MGMT/TRAIN EA 15 MIN 6/5/2019 Anila Rodriguez COTA/L GO 6               GITA Malin/DERICK  6/5/2019

## 2019-06-05 NOTE — PLAN OF CARE
Problem: Patient Care Overview  Goal: Plan of Care Review  Outcome: Ongoing (interventions implemented as appropriate)   06/05/19 1127   Coping/Psychosocial   Plan of Care Reviewed With patient   Plan of Care Review   Progress no change   OTHER   Outcome Summary Pt already had a bath this am, however agrees to grooming tasks and exercises. Pt performed AROM B UE exercises with good tolerance. Pt required RBs during exercises. Pt dependent for self feeding (pt attempted several bites) 2' to UE shaking and CGA for holding cup and drinking from it.Increased time and effort required with all tasks this tx.

## 2019-06-05 NOTE — PROGRESS NOTES
GENERAL SURGERY PROGRESS NOTE  Chief Complaint:  Surgery Follow up   LOS: 7 days       Subjective     Interval History:     Feels well. Some bloating with lunch today. Ostomy with output. Serous drainage from lower wound.    Objective     Vital Signs  Temp:  [96.8 °F (36 °C)-98.7 °F (37.1 °C)] 96.8 °F (36 °C)  Heart Rate:  [] 97  Resp:  [17-27] 20  BP: ()/() 111/59    Physical Exam:   Dressing in place. Ostomy with output  Labs:  Lab Results (last 24 hours)     Procedure Component Value Units Date/Time    Basic Metabolic Panel [[id number]]  (Abnormal) Collected:  06/05/19 0617    Specimen:  Blood Updated:  06/05/19 0657     Glucose 87 mg/dL      BUN 4 mg/dL      Creatinine 0.67 mg/dL      Sodium 136 mmol/L      Potassium 4.1 mmol/L      Chloride 101 mmol/L      CO2 26.0 mmol/L      Calcium 8.0 mg/dL      eGFR Non African Amer 88 mL/min/1.73      BUN/Creatinine Ratio 6.0     Anion Gap 9.0 mmol/L     Narrative:       GFR Normal >60  Chronic Kidney Disease <60  Kidney Failure <15    CBC (No Diff) [[id number]]  (Abnormal) Collected:  06/05/19 0617    Specimen:  Blood Updated:  06/05/19 0626     WBC 13.55 10*3/mm3      RBC 3.60 10*6/mm3      Hemoglobin 11.6 g/dL      Hematocrit 35.6 %      MCV 98.9 fL      MCH 32.2 pg      MCHC 32.6 g/dL      RDW 12.5 %      RDW-SD 45.4 fl      MPV 10.6 fL      Platelets 261 10*3/mm3            Results Review:     Labs and imaging for today were reviewed.    Assessment/Plan     Irma Pacheco is a 69 y.o. 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      Local wound care to incision.   Diet as able.  PT/OT  Transfer to floor.  Patient says she is not willing to go to a nursing home post-hospitalization.          This document has been electronically signed by Timothy Dixon MD on June 5, 2019 11:41 AM        Timothy Dixon MD  06/05/19  11:41 AM

## 2019-06-05 NOTE — PLAN OF CARE
Problem: Patient Care Overview  Goal: Plan of Care Review  Outcome: Ongoing (interventions implemented as appropriate)   06/05/19 5508   Coping/Psychosocial   Plan of Care Reviewed With patient   Plan of Care Review   Progress no change   OTHER   Outcome Summary New admit, VSS, working towards pain control, resting between care, continue to monitor     Goal: Individualization and Mutuality  Outcome: Ongoing (interventions implemented as appropriate)      Problem: Fall Risk (Adult)  Goal: Absence of Fall  Outcome: Ongoing (interventions implemented as appropriate)      Problem: Skin Injury Risk (Adult)  Goal: Skin Health and Integrity  Outcome: Ongoing (interventions implemented as appropriate)      Problem: Surgery Nonspecified (Adult)  Goal: Signs and Symptoms of Listed Potential Problems Will be Absent, Minimized or Managed (Surgery Nonspecified)  Outcome: Ongoing (interventions implemented as appropriate)      Problem: Wound (Includes Pressure Injury) (Adult)  Goal: Signs and Symptoms of Listed Potential Problems Will be Absent, Minimized or Managed (Wound)  Outcome: Ongoing (interventions implemented as appropriate)

## 2019-06-05 NOTE — THERAPY TREATMENT NOTE
Acute Care - Physical Therapy Treatment Note  [facility]     Patient Name: Irma Pacheco  : 1950  MRN: [id number]  Today's Date: 2019  Onset of Illness/Injury or Date of Surgery: 19  Date of Referral to PT: 19  Referring Physician: Dr. Dixon     Admit Date: 2019    Visit Dx:    ICD-10-CM ICD-9-CM   1. 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 Impaired functional mobility and activity tolerance Z74.09 V49.89   3. 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 Nsg okayed tx and assisted with t/f's.  -TW  no family present  -BB     Total Minutes, Occupational Therapy Treatment  --  130  -BB     Therapy Frequency (OT Eval)  --  other (see comments) 5-7 days/wk  -BB     Patient Effort  good  -TW  good  -BB     Existing Precautions/Restrictions  fall;other (see comments) colostomy  -TW  fall;other (see comments) colostomy  -BB     Recorded by [TW] Karl Zheng, TERRY 19 1501 [BB] Anila Rodriguez COTA/L 19 1126     Row Name 19 1315 19 0720          Vital Signs    Pre Systolic BP Rehab  114  -TW  114  -BB     Pre Treatment Diastolic BP  77  -TW  58  -BB     Post Systolic BP Rehab  131  -TW  --     Post Treatment Diastolic BP  80  -TW  --     Pretreatment Heart Rate (beats/min)  94  -TW  87  -BB     Intratreatment Heart  Rate (beats/min)  --  90  -BB     Posttreatment Heart Rate (beats/min)  100  -TW  88  -BB     Pre SpO2 (%)  92  -TW  92  -BB     O2 Delivery Pre Treatment  room air  -TW  room air  -BB     Intra SpO2 (%)  --  90  -BB     O2 Delivery Intra Treatment  --  room air  -BB     Post SpO2 (%)  94  -TW  92  -BB     O2 Delivery Post Treatment  --  room air  -BB     Pre Patient Position  Supine  -TW  Supine  -BB     Intra Patient Position  Standing  -TW  -- long sitting  -BB     Post Patient Position  Supine  -TW  -- long sitting  -BB     Recorded by [TW] Karl Zheng, PTA 06/05/19 1501 [BB] Anila Rodriguez COTA/L 06/05/19 1126     Row Name 06/05/19 1315 06/05/19 0720          Cognitive Assessment/Intervention- PT/OT    Affect/Mental Status (Cognitive)  WFL  -TW  WFL  -BB     Orientation Status (Cognition)  oriented x 4  -TW  oriented x 4  -BB     Follows Commands (Cognition)  follows one step commands  -TW  follows one step commands  -BB     Safety Deficit (Cognitive)  mild deficit  -TW  --     Personal Safety Interventions  fall prevention program maintained;gait belt;nonskid shoes/slippers when out of bed  -TW  fall prevention program maintained;gait belt;muscle strengthening facilitated;nonskid shoes/slippers when out of bed;supervised activity  -BB     Recorded by [TW] Karl Zheng, PTA 06/05/19 1501 [BB] Anila Rodriguez COTA/L 06/05/19 1126     Row Name 06/05/19 1315             Bed Mobility Assessment/Treatment    Rolling Right [place] (Bed Mobility)  moderate assist (50% patient effort);2 person assist  -TW      Supine-Sit [place] (Bed Mobility)  moderate assist (50% patient effort);2 person assist  -TW      Sit-Supine [place] (Bed Mobility)  maximum assist (25% patient effort);dependent (less than 25% patient effort);2 person assist  -TW      Comment (Bed Mobility)  Pt sat EOB for ~6 minutes trying to rest to scoot back onto bed but pt unable to scoot herself back and required  dep of 2 to return to supine.  -TW      Recorded by [TW] Karl Zheng, PTA 06/05/19 1501      Row Name 06/05/19 1315             Transfer Assessment/Treatment    Transfer Assessment/Treatment  bed-chair transfer;toilet transfer  -TW      Recorded by [TW] Karl Zheng, PTA 06/05/19 1501      Row Name 06/05/19 1315             Bed-Chair Transfer    Bed-Chair [place] (Transfers)  moderate assist (50% patient effort);2 person assist;verbal cues  -TW      Assistive Device (Bed-Chair Transfers)  walker, front-wheeled  -TW      Recorded by [TW] Karl Zheng, PTA 06/05/19 1501      Row Name 06/05/19 1315             Chair-Bed Transfer    Chair-Bed [place] (Transfers)  moderate assist (50% patient effort);maximum assist (25% patient effort);2 person assist  -TW      Assistive Device (Chair-Bed Transfers)  walker, front-wheeled  -TW      Recorded by [TW] Karl Zheng, PTA 06/05/19 1501      Row Name 06/05/19 1315             Sit-Stand Transfer    Sit-Stand [place] (Transfers)  moderate assist (50% patient effort);2 person assist  -TW      Assistive Device (Sit-Stand Transfers)  walker, front-wheeled  -TW      Recorded by [TW] Karl Zheng, PTA 06/05/19 1501      Row Name 06/05/19 1315             Stand-Sit Transfer    Stand-Sit [place] (Transfers)  moderate assist (50% patient effort);2 person assist  -TW      Assistive Device (Stand-Sit Transfers)  walker, front-wheeled  -TW      Recorded by [TW] Karl Zheng, PTA 06/05/19 1501      Row Name 06/05/19 1315             Toilet Transfer    Type (Toilet Transfer)  sit-stand;stand-sit  -TW      [place] Level (Toilet Transfer)  moderate assist (50% patient effort);2 person assist  -TW      Assistive Device (Toilet Transfer)  commode, bedside without drop arms  -TW      Recorded by [TW] Karl Zheng, PTA 06/05/19 1501      Row Name 06/05/19 1315             Gait/Stairs Assessment/Training    Gait/Stairs  Assessment/Training  gait/ambulation assistive device  -TW      [place] Level (Gait)  maximum assist (25% patient effort);2 person assist  -TW      Assistive Device (Gait)  walker, front-wheeled  -TW      Distance in Feet (Gait)  stand step to BSC and back to bed.  -TW      Pattern (Gait)  step-to  -TW      Deviations/Abnormal Patterns (Gait)  base of support, wide;festinating/shuffling  -TW      Bilateral Gait Deviations  forward flexed posture  -TW      Comment (Gait/Stairs)  Pt was able to take steps to/from [facility] but requires max cueing and encouragment.   -TW      Recorded by [TW] Karl Zheng, PTA 06/05/19 1501      Row Name 06/05/19 0720             Bathing Assessment/Intervention    Comment (Bathing)  -- Pt states she already had a bath this morning  -BB      Recorded by [BB] Anila Rodriguez COTA/L 06/05/19 1126      Row Name 06/05/19 0720             Grooming Assessment/Training    [place] Level (Grooming)  hair care, combing/brushing;oral care regimen;wash face, hands;set up;minimum assist (75% patient effort)  -BB      Grooming Position  long sitting  -BB      Recorded by [BB] Anila Rodriguez COTA/L 06/05/19 1126      Row Name 06/05/19 0720             Self-Feeding Assessment/Training    [place] Level (Feeding)  liquids to mouth;set up;contact guard assist;prepare tray/open items;scoop food and bring to mouth;verbal cues;dependent (less than 25% patient effort) Pt dep for trayset up & for self feeding  -BB      Self-Feeding Assess/Train, Spillage Amount  minimal for drinking with cup with lid   -BB      Position (Self-Feeding)  -- long sitting  -BB      Recorded by [BB] Anila Rodriguez COTA/L 06/05/19 1126      Row Name 06/05/19 1315             Lower Extremity Supine Therapeutic Exercise    Performed, Supine Lower Extremity (Therapeutic Exercise)  hip abduction/adduction;hip external/internal rotation;ankle dorsiflexion/plantarflexion;quadriceps sets;gluteal sets;ankle  pumps;heel slides  -TW      Exercise Type, Supine Lower Extremity (Therapeutic Exercise)  AAROM (active assistive range of motion)  -TW      Sets/Reps Detail, Supine Lower Extremity (Therapeutic Exercise)  1/15-20  -TW      Recorded by [TW] Karl Zheng PTA 06/05/19 1501      Row Name 06/05/19 0720             Therapeutic Exercise    Upper Extremity Range of Motion (Therapeutic Exercise)  shoulder flexion/extension, bilateral;elbow flexion/extension, bilateral;forearm supination/pronation, bilateral;wrist flexion/extension, bilateral;shoulder internal/external rotation, bilateral chest press  -BB      Hand (Therapeutic Exercise)  finger flexion/extension, bilateral  -BB      Exercise Type (Therapeutic Exercise)  AROM (active range of motion)  -BB      Sets/Reps (Therapeutic Exercise)  2x15  -BB      Expected Outcome (Therapeutic Exercise)  improve functional tolerance, self-care activity;improve performance, transfer skills  -BB      Recorded by [BB] Anila Rodriguez COTA/L 06/05/19 1126      Row Name 06/05/19 1315 06/05/19 0720          Positioning and Restraints    Pre-Treatment Position  in bed  -TW  in bed  -BB     Post Treatment Position  bed  -TW  bed  -BB     In Bed  supine;call light within reach;encouraged to call for assist;exit alarm on  -TW  call light within reach;encouraged to call for assist;fowlers;patient within staff view  -BB     Recorded by [TW] Karl Zheng PTA 06/05/19 1501 [BB] Anila Rodriguez COTA/L 06/05/19 1126     Row Name 06/05/19 1315 06/05/19 0720          Pain Scale: Numbers Pre/Post-Treatment    Pain Scale: Numbers, Pretreatment  10/10  -TW  8/10  -BB     Pain Scale: Numbers, Post-Treatment  10/10  -TW  7/10  -BB     Pain Location - Orientation  incisional  -TW  incisional  -BB     Pain Location  back  -TW  --     Pain Intervention(s)  Medication (See MAR)  -TW  Medication (See MAR)  -BB     Recorded by [TW] Karl Zheng PTA 06/05/19 1501 [BB]  Anila Rodriguez COTA/L 06/05/19 1126     Row Name                Wound 05/29/19 1327 abdomen incision;surgical    Wound - Properties Group Date first assessed: 05/29/19 [KM] Time first assessed: 1327 [KM] Present On Admission : no [KM] Location: abdomen [KM] Type: incision;surgical [KM] Recorded by:  [KM] Geno Blackmon, RN 05/29/19 1327    Row Name 06/05/19 0720             Plan of Care Review    Plan of Care Reviewed With  patient  -BB      Recorded by [BB] Anila Rodriguez COTA/L 06/05/19 1126      Row Name 06/05/19 0720             Outcome Summary/Treatment Plan (OT)    Daily Summary of Progress (OT)  progress toward functional goals is gradual  -BB      Plan for Continued Treatment (OT)  continue POC  -BB      Anticipated Discharge Disposition (OT)  skilled nursing facility  -BB      Recorded by [BB] Anila Rodriguez COTA/L 06/05/19 1126      Row Name 06/05/19 1315             Outcome Summary/Treatment Plan (PT)    Daily Summary of Progress (PT)  progress toward functional goals is gradual  -TW      Barriers to Overall Progress (PT)  Weakened state   -TW      Plan for Continued Treatment (PT)  Cont   -TW      Anticipated Discharge Disposition (PT)  anticipate therapy at next level of care  -TW      Recorded by [TW] Karl Zheng PTA 06/05/19 1501        User Key  (r) = Recorded By, (t) = Taken By, (c) = Cosigned By    Initials Name Effective Dates Discipline    TW Karl Zheng PTA 03/07/18 -  PT    BB Anila Rodriguez COTA/L 03/07/18 -  OT    KM Geno Blackmon, RN 05/21/18 -  Nurse          Wound 05/29/19 1327 abdomen incision;surgical (Active)   Dressing Appearance moist drainage 6/5/2019  2:00 PM   Closure Staples 6/5/2019  2:00 PM   Base red;yellow 6/5/2019  2:00 PM   Periwound intact;dry;pink;redness 6/5/2019  2:00 PM   Periwound Temperature warm 6/5/2019  2:00 PM   [place] Skin Turgor soft 6/5/2019  2:00 PM   Edges open 6/5/2019  2:00 PM   Drainage Characteristics/Odor  creamy;tan;serosanguineous;yellow 6/5/2019  2:00 PM   Drainage Amount moderate 6/5/2019  2:00 PM   Care, Wound cleansed with;neutral pH skin disinfectant applied 6/5/2019  2:00 PM   Dressing Care, Wound dressing changed;gauze;packed with;gauze, dry 6/5/2019  2:00 PM   Number of Staples Removed 4 6/4/2019  5:38 PM       Rehab Goal Summary     Row Name 06/05/19 1315 06/05/19 0720          Physical Therapy Goals    Bed Mobility Goal Selection (PT)  bed mobility, PT goal 1  -TW  --     Transfer Goal Selection (PT)  transfer, PT goal 1  -TW  --     Gait Training Goal Selection (PT)  gait training, PT goal 1  -TW  --     Strength Goal Selection (PT)  strength, PT goal 1  -TW  --        Bed Mobility Goal 1 (PT)    Activity/Assistive Device (Bed Mobility Goal 1, PT)  sit to supine/supine to sit  -TW  --     [place] Level/Cues Needed (Bed Mobility Goal 1, PT)  minimum assist (75% or more patient effort)  -TW  --     Time Frame (Bed Mobility Goal 1, PT)  10 days  -TW  --     Barriers (Bed Mobility Goal 1, PT)  abdominal pain, previous functional deficit  -TW  --     Progress/Outcomes (Bed Mobility Goal 1, PT)  goal not met  -TW  --        Transfer Goal 1 (PT)    Activity/Assistive Device (Transfer Goal 1, PT)  sit-to-stand/stand-to-sit;bed-to-chair/chair-to-bed;walker, rolling  -TW  --     [place] Level/Cues Needed (Transfer Goal 1, PT)  contact guard assist;verbal cues required  -TW  --     Time Frame (Transfer Goal 1, PT)  1 week  -TW  --     Barriers (Transfers Goal 1, PT)  abdominal pain, previous functional deficit  -TW  --     Progress/Outcome (Transfer Goal 1, PT)  goal not met  -TW  --        Gait Training Goal 1 (PT)    Activity/Assistive Device (Gait Training Goal 1, PT)  walker, rolling;decrease fall risk;increase endurance/gait distance  -TW  --     [place] Level (Gait Training Goal 1, PT)  contact guard assist;verbal cues required  -TW  --     Distance (Gait Goal 1, PT)  10 feet  -TW  --     Time  Frame (Gait Training Goal 1, PT)  10 days  -TW  --     Barriers (Gait Training Goal 1, PT)  abdominal pain, previous functional deficit  -TW  --     Progress/Outcome (Gait Training Goal 1, PT)  goal not met  -TW  --        Strength Goal 1 (PT)    Strength Goal 1 (PT)  Pt will be able to complete 20 reps of at least 4 LE exercises in order to demo improved strength for transfers  -TW  --     Time Frame (Strength Goal 1, PT)  1 week  -TW  --     Barriers (Strength Goal 1, PT)  abdominal pain, previous functional deficit  -TW  --     Progress/Outcome (Strength Goal 1, PT)  goal not met  -TW  --        Transfer Goal 1 (OT)    Activity/Assistive Device (Transfer Goal 1, OT)  --  commode, bedside with drop arms;commode, bedside without drop arms  -BB     [place] Level/Cues Needed (Transfer Goal 1, OT)  --  minimum assist (75% or more patient effort)  -BB     Time Frame (Transfer Goal 1, OT)  --  long term goal (LTG);by discharge  -BB     Progress/Outcome (Transfer Goal 1, OT)  --  goal not met  -BB        Bathing Goal 1 (OT)    Activity/Assistive Device (Bathing Goal 1, OT)  --  upper body bathing  -BB     [place] Level/Cues Needed (Bathing Goal 1, OT)  --  minimum assist (75% or more patient effort)  -BB     Time Frame (Bathing Goal 1, OT)  --  long term goal (LTG);by discharge  -BB     Progress/Outcomes (Bathing Goal 1, OT)  --  goal not met  -BB        Dressing Goal 1 (OT)    Activity/Assistive Device (Dressing Goal 1, OT)  --  upper body dressing  -BB     [place]/Cues Needed (Dressing Goal 1, OT)  --  minimum assist (75% or more patient effort)  -BB     Time Frame (Dressing Goal 1, OT)  --  long term goal (LTG);by discharge  -BB     Progress/Outcome (Dressing Goal 1, OT)  --  goal not met  -BB        Grooming Goal 1 (OT)    Activity/Device (Grooming Goal 1, OT)  --  grooming skills, all  -BB     [place] (Grooming Goal 1, OT)  --  set-up required;supervision required;verbal cues required  -BB      Time Frame (Grooming Goal 1, OT)  --  long term goal (LTG);by discharge  -BB     Progress/Outcome (Grooming Goal 1, OT)  --  goal not met  -BB        Self-Feeding Goal 1 (OT)    Activity/Assistive Device (Self-Feeding Goal 1, OT)  --  self-feeding skills, all  -BB     [place] Level/Cues Needed (Self-Feeding Goal 1, OT)  --  set-up required;supervision required;verbal cues required  -BB     Time Frame (Self-Feeding Goal 1, OT)  --  long term goal (LTG);by discharge  -BB     Barriers (Self-Feeding Goal 1, OT)  --  address when medically appropriate.   -BB     Progress/Outcomes (Self-Feeding Goal 1, OT)  --  goal not met  -BB       User Key  (r) = Recorded By, (t) = Taken By, (c) = Cosigned By    Initials Name Provider Type Discipline    TW Karl Zheng, PTA Physical Therapy Assistant PT    BB Anila Rodriguez COTA/L Occupational Therapy Assistant OT          Physical Therapy Education     Title: PT OT SLP Therapies (In Progress)     Topic: Physical Therapy (Done)     Point: Mobility training (Done)     Learning Progress Summary           Patient Acceptance, E,TB, VU by LN at 6/4/2019  1:23 PM    Acceptance, E, VU,NR by JAVI at 5/30/2019 12:58 PM    Comment:  Role of PT, PT POC, transfer technique                   Point: Home exercise program (Done)     Learning Progress Summary           Patient Acceptance, E,TB, VU by LN at 6/3/2019  3:53 PM    Acceptance, E,TB, VU by KATH at 5/31/2019 12:58 PM                   Point: Body mechanics (Done)     Learning Progress Summary           Patient Acceptance, E,TB, VU by LN at 6/4/2019  1:23 PM    Acceptance, E,TB, VU by ADELINA at 6/4/2019 10:07 AM                   Point: Precautions (Done)     Learning Progress Summary           Patient Acceptance, E,TB, VU by LN at 6/4/2019  1:23 PM    Acceptance, E,TB, VU by LN at 6/3/2019  3:53 PM    Acceptance, E,TB, VU by LN at 5/31/2019 12:58 PM    Acceptance, E, VU by JAVI at 5/30/2019  1:00 PM    Comment:  Gait belt, call  light, staff assistance with mobility                               User Key     Initials Effective Dates Name Provider Type Discipline    J 10/17/16 -  Alem Garrido RN Registered Nurse Nurse    LN 03/07/18 -  Jannet Garcia PTA Physical Therapy Assistant PT     07/23/18 -  Donna Stacy, LUCRECIA Physical Therapist PT                PT Recommendation and Plan  Anticipated Discharge Disposition (PT): anticipate therapy at next level of care  Outcome Summary/Treatment Plan (PT)  Daily Summary of Progress (PT): progress toward functional goals is gradual  Barriers to Overall Progress (PT): Weakened state   Plan for Continued Treatment (PT): Cont   Anticipated Discharge Disposition (PT): anticipate therapy at next level of care  Plan of Care Reviewed With: patient  Progress: improving  Outcome Summary: Pt performed B LE supine ex's and then requested to use bed pan. Pt encouraged by nsg and PTA to get up to BSC. Pt required mod of 2 to t/f to EOB and then max of 2 to stand and amb to BSC. Pt able to void and then stand to allow nsg to perform pericare and amb to back to bed but prior to getting to the bed pt was unable to move her feet which forced PTA and nsg to sit her on EOB sooner than anticipated and when pt wasnt able to regain her strength enough to scoot back on bed she had to be dependently returned to supine. Pt VS were stable throughout tx this date. Pt was extensively insttructed in safe t/f back to bed with good understanding verb by pt.   Outcome Measures     Row Name 06/05/19 1315 06/05/19 0720 06/04/19 1233       How much help from another person do you currently need...    Turning from your back to your side while in flat bed without using bedrails?  2  -TW  --  --    Moving from lying on back to sitting on the side of a flat bed without bedrails?  2  -TW  --  --    Moving to and from a bed to a chair (including a wheelchair)?  2  -TW  --  --    Standing up from a chair using your arms (e.g.,  wheelchair, bedside chair)?  2  -TW  --  --    Climbing 3-5 steps with a railing?  1  -TW  --  --    To walk in hospital room?  1  -TW  --  --    AM-PAC 6 Clicks Score  10  -TW  --  --       How much help from another is currently needed...    Putting on and taking off regular lower body clothing?  --  1  -BB  1  -BB    Bathing (including washing, rinsing, and drying)  --  1  -BB  1  -BB    Toileting (which includes using toilet bed pan or urinal)  --  1  -BB  1  -BB    Putting on and taking off regular upper body clothing  --  2  -BB  2  -BB    Taking care of personal grooming (such as brushing teeth)  --  2  -BB  2  -BB    Eating meals  --  1  -BB  2  -BB    Score  --  8  -BB  9  -BB       Functional Assessment    Outcome Measure Options  AM-PAC 6 Clicks Basic Mobility (PT)  -TW  --  --    Row Name 06/04/19 0924 06/03/19 7384          How much help from another person do you currently need...    Turning from your back to your side while in flat bed without using bedrails?  2  -LN  2  -LN     Moving from lying on back to sitting on the side of a flat bed without bedrails?  2  -LN  1  -LN     Moving to and from a bed to a chair (including a wheelchair)?  2  -LN  2  -LN     Standing up from a chair using your arms (e.g., wheelchair, bedside chair)?  2  -LN  2  -LN     Climbing 3-5 steps with a railing?  1  -LN  1  -LN     To walk in hospital room?  1  -LN  1  -LN     AM-PAC 6 Clicks Score  10  -LN  9  -LN        Functional Assessment    Outcome Measure Options  AM-PAC 6 Clicks Basic Mobility (PT)  -LN  AM-PAC 6 Clicks Basic Mobility (PT)  -LN       User Key  (r) = Recorded By, (t) = Taken By, (c) = Cosigned By    Initials Name Provider Type    LN Jannet Garcia, PTA Physical Therapy Assistant    TW Karl Zheng, TERRY Physical Therapy Assistant    BB Anila Rodriguez, GITA/L Occupational Therapy Assistant         Time Calculation:   PT Charges     Row Name 06/05/19 4214             Time Calculation    Start  Time  1315  -TW      Stop Time  1403  -TW      Time Calculation (min)  48 min  -TW      PT Received On  06/05/19  -TW      PT Goal Re-Cert Due Date  06/12/19  -TW         Time Calculation- PT    Total Timed Code Minutes- PT  48 minute(s)  -TW        User Key  (r) = Recorded By, (t) = Taken By, (c) = Cosigned By    Initials Name Provider Type    TW Karl Zheng PTA Physical Therapy Assistant        Therapy Charges for Today     Code Description Service Date Service Provider Modifiers Qty    01119468470 HC PT THERAPEUTIC ACT EA 15 MIN 6/5/2019 Karl Zheng PTA GP 2    81900299874 HC PT THER PROC EA 15 MIN 6/5/2019 Karl Zheng, TERRY GP 1          PT G-Codes  Outcome Measure Options: AM-PAC 6 Clicks Basic Mobility (PT)  AM-PAC 6 Clicks Score: 10  Score: 8    Karl Zheng PTA  6/5/2019

## 2019-06-06 ENCOUNTER — APPOINTMENT (OUTPATIENT)
Dept: CT IMAGING | Facility: HOSPITAL | Age: 69
End: 2019-06-06

## 2019-06-06 PROCEDURE — 97535 SELF CARE MNGMENT TRAINING: CPT

## 2019-06-06 PROCEDURE — 0 DIATRIZOATE MEGLUMINE & SODIUM PER 1 ML: Performed by: SURGERY

## 2019-06-06 PROCEDURE — 25010000002 IOPAMIDOL 61 % SOLUTION: Performed by: SURGERY

## 2019-06-06 PROCEDURE — 97110 THERAPEUTIC EXERCISES: CPT

## 2019-06-06 PROCEDURE — 25010000002 HYDROMORPHONE 1 MG/ML SOLUTION: Performed by: SURGERY

## 2019-06-06 PROCEDURE — 25010000002 HEPARIN (PORCINE) PER 1000 UNITS: Performed by: SURGERY

## 2019-06-06 PROCEDURE — 74177 CT ABD & PELVIS W/CONTRAST: CPT

## 2019-06-06 PROCEDURE — 97530 THERAPEUTIC ACTIVITIES: CPT

## 2019-06-06 PROCEDURE — 99024 POSTOP FOLLOW-UP VISIT: CPT | Performed by: SURGERY

## 2019-06-06 RX ORDER — BUMETANIDE 0.25 MG/ML
0.5 INJECTION INTRAMUSCULAR; INTRAVENOUS EVERY 12 HOURS
Status: DISCONTINUED | OUTPATIENT
Start: 2019-06-06 | End: 2019-06-14 | Stop reason: HOSPADM

## 2019-06-06 RX ADMIN — HYDROMORPHONE HYDROCHLORIDE 0.5 MG: 1 INJECTION, SOLUTION INTRAMUSCULAR; INTRAVENOUS; SUBCUTANEOUS at 04:11

## 2019-06-06 RX ADMIN — AZTREONAM 1 G: 1 INJECTION, POWDER, LYOPHILIZED, FOR SOLUTION INTRAMUSCULAR; INTRAVENOUS at 22:14

## 2019-06-06 RX ADMIN — MEMANTINE 2.5 MG: 5 TABLET ORAL at 08:35

## 2019-06-06 RX ADMIN — BUMETANIDE 0.5 MG: 0.25 INJECTION INTRAMUSCULAR; INTRAVENOUS at 14:27

## 2019-06-06 RX ADMIN — GABAPENTIN 600 MG: 300 CAPSULE ORAL at 14:28

## 2019-06-06 RX ADMIN — PRIMIDONE 50 MG: 50 TABLET ORAL at 14:28

## 2019-06-06 RX ADMIN — IOPAMIDOL 90 ML: 612 INJECTION, SOLUTION INTRAVENOUS at 17:30

## 2019-06-06 RX ADMIN — POTASSIUM CHLORIDE, DEXTROSE MONOHYDRATE AND SODIUM CHLORIDE 100 ML/HR: 150; 5; 450 INJECTION, SOLUTION INTRAVENOUS at 08:48

## 2019-06-06 RX ADMIN — FAMOTIDINE 20 MG: 10 INJECTION INTRAVENOUS at 08:34

## 2019-06-06 RX ADMIN — HEPARIN SODIUM 5000 UNITS: 5000 INJECTION INTRAVENOUS; SUBCUTANEOUS at 22:04

## 2019-06-06 RX ADMIN — HYDROMORPHONE HYDROCHLORIDE 0.5 MG: 1 INJECTION, SOLUTION INTRAMUSCULAR; INTRAVENOUS; SUBCUTANEOUS at 22:03

## 2019-06-06 RX ADMIN — HEPARIN SODIUM 5000 UNITS: 5000 INJECTION INTRAVENOUS; SUBCUTANEOUS at 05:46

## 2019-06-06 RX ADMIN — GABAPENTIN 600 MG: 300 CAPSULE ORAL at 22:04

## 2019-06-06 RX ADMIN — SODIUM CHLORIDE, PRESERVATIVE FREE 10 ML: 5 INJECTION INTRAVENOUS at 08:35

## 2019-06-06 RX ADMIN — LEVOTHYROXINE SODIUM 137 MCG: 25 TABLET ORAL at 08:34

## 2019-06-06 RX ADMIN — DIATRIZOATE MEGLUMINE AND DIATRIZOATE SODIUM 30 ML: 660; 100 LIQUID ORAL; RECTAL at 17:30

## 2019-06-06 RX ADMIN — HYDROMORPHONE HYDROCHLORIDE 0.5 MG: 1 INJECTION, SOLUTION INTRAMUSCULAR; INTRAVENOUS; SUBCUTANEOUS at 11:20

## 2019-06-06 RX ADMIN — HEPARIN SODIUM 5000 UNITS: 5000 INJECTION INTRAVENOUS; SUBCUTANEOUS at 14:28

## 2019-06-06 RX ADMIN — DONEPEZIL HYDROCHLORIDE 5 MG: 5 TABLET, FILM COATED ORAL at 22:04

## 2019-06-06 RX ADMIN — HYDROMORPHONE HYDROCHLORIDE 0.5 MG: 1 INJECTION, SOLUTION INTRAMUSCULAR; INTRAVENOUS; SUBCUTANEOUS at 07:04

## 2019-06-06 RX ADMIN — AZTREONAM 1 G: 1 INJECTION, POWDER, LYOPHILIZED, FOR SOLUTION INTRAMUSCULAR; INTRAVENOUS at 14:27

## 2019-06-06 RX ADMIN — GABAPENTIN 600 MG: 300 CAPSULE ORAL at 05:46

## 2019-06-06 RX ADMIN — PRIMIDONE 50 MG: 50 TABLET ORAL at 22:04

## 2019-06-06 RX ADMIN — MEMANTINE 2.5 MG: 5 TABLET ORAL at 22:05

## 2019-06-06 RX ADMIN — FAMOTIDINE 20 MG: 10 INJECTION INTRAVENOUS at 22:05

## 2019-06-06 RX ADMIN — NYSTATIN 1 APPLICATION: 100000 POWDER TOPICAL at 08:35

## 2019-06-06 RX ADMIN — NYSTATIN: 100000 POWDER TOPICAL at 22:05

## 2019-06-06 RX ADMIN — SODIUM CHLORIDE, PRESERVATIVE FREE 10 ML: 5 INJECTION INTRAVENOUS at 22:07

## 2019-06-06 RX ADMIN — HYDROMORPHONE HYDROCHLORIDE 0.5 MG: 1 INJECTION, SOLUTION INTRAMUSCULAR; INTRAVENOUS; SUBCUTANEOUS at 17:07

## 2019-06-06 RX ADMIN — PRIMIDONE 50 MG: 50 TABLET ORAL at 05:47

## 2019-06-06 RX ADMIN — VILAZODONE HYDROCHLORIDE 20 MG: 20 TABLET ORAL at 22:05

## 2019-06-06 NOTE — PLAN OF CARE
Problem: Patient Care Overview  Goal: Plan of Care Review   06/06/19 0029 06/06/19 1332   Coping/Psychosocial   Plan of Care Reviewed With --  patient;daughter   Plan of Care Review   Progress no change --    OTHER   Outcome Summary --  sup-sit mod of 1,sit-sup max of 3,able to stand with mod of 1 and take 3-4 sidesteps up to hob-20 reps aa/arom le ex-PT FATIGUES QUICKLY-if d/c may benefit inpt rehab or nh rehab to home     Goal: Discharge Needs Assessment   06/03/19 0241 06/04/19 1323 06/05/19 1014   Discharge Needs Assessment   Readmission Within the Last 30 Days --  --  --    Concerns to be Addressed no discharge needs identified --  --    Patient/Family Anticipates Transition to --  --  home with family   Patient/Family Anticipated Services at Transition --  --  home health care   Transportation Concerns --  --  car, none   Transportation Anticipated --  --  family or friend will provide   Equipment Needed After Discharge --  --  none   Discharge Facility/Level of Care Needs --  nursing facility, skilled --    Current Discharge Risk --  --  chronically ill   Disability   Equipment Currently Used at Home --  --  wheelchair, motorized;rollator;commode;lift device    06/05/19 1024   Discharge Needs Assessment   Readmission Within the Last 30 Days no previous admission in last 30 days   Concerns to be Addressed --    Patient/Family Anticipates Transition to --    Patient/Family Anticipated Services at Transition --    Transportation Concerns --    Transportation Anticipated --    Equipment Needed After Discharge --    Discharge Facility/Level of Care Needs --    Current Discharge Risk --    Disability   Equipment Currently Used at Home --

## 2019-06-06 NOTE — PLAN OF CARE
Problem: Patient Care Overview  Goal: Plan of Care Review  Outcome: Ongoing (interventions implemented as appropriate)   06/06/19 0029   Coping/Psychosocial   Plan of Care Reviewed With patient   Plan of Care Review   Progress no change   OTHER   Outcome Summary VSS, pain controlled with IV medication, patient requires frequent reorientation but remains calm and pleasant       Problem: Fall Risk (Adult)  Goal: Absence of Fall  Outcome: Ongoing (interventions implemented as appropriate)   06/06/19 0029   Fall Risk (Adult)   Absence of Fall achieves outcome       Problem: Skin Injury Risk (Adult)  Goal: Skin Health and Integrity  Outcome: Ongoing (interventions implemented as appropriate)   06/06/19 0029   Skin Injury Risk (Adult)   Skin Health and Integrity making progress toward outcome       Problem: Surgery Nonspecified (Adult)  Goal: Signs and Symptoms of Listed Potential Problems Will be Absent, Minimized or Managed (Surgery Nonspecified)  Outcome: Ongoing (interventions implemented as appropriate)   06/06/19 0029   Goal/Outcome Evaluation   Problems Assessed (Surgery) all   Problems Present (Surgery) pain       Problem: Wound (Includes Pressure Injury) (Adult)  Goal: Signs and Symptoms of Listed Potential Problems Will be Absent, Minimized or Managed (Wound)  Outcome: Ongoing (interventions implemented as appropriate)   06/06/19 0029   Goal/Outcome Evaluation   Problems Assessed (Wound) all   Problems Present (Wound) pain

## 2019-06-06 NOTE — CONSULTS
Adult Nutrition  Assessment    Patient Name:  Irma Pacheco  YOB: 1950  MRN: [id number]  Admit Date:  5/29/2019    Assessment Date:  6/6/2019    Comments:  Pt tolerating po diet with improving appetite, intake ~67% for the last 3 meals.  She is drinking the Boost Breeze with good acceptance.  She does report difficulty with the meat being too dry, will add extra gravy to help with the ease of eating.  No Gi distress.  Per note--CT tomorrow to assess for abscess.  RD will continue to monitor.     Reason for Assessment     Row Name 06/06/19 1546          Reason for Assessment    Reason For Assessment  follow-up protocol         Nutrition/Diet History     Row Name 06/06/19 1542          Nutrition/Diet History    Typical Food/Fluid Intake  Pt states that her appetite is getting better.  SHe is having difficulty with the meats because they are so dry.  She would like to have extra gravy on them.  She is drinking the Boost Breeze. No Gi distress.             Labs/Tests/Procedures/Meds     Row Name 06/06/19 1546          Labs/Procedures/Meds    Lab Results Reviewed  reviewed, pertinent     Lab Results Comments  Bun 4; Alb 2.10        Diagnostic Tests/Procedures    Diagnostic Test/Procedure Reviewed  reviewed, pertinent     Diagnostic Test/Procedures Comments  CT tomorrow        Medications    Pertinent Medications Reviewed  reviewed, pertinent     Pertinent Medications Comments  Bumex         Physical Findings     Row Name 06/06/19 1543          Physical Findings    Overall Physical Appearance  on oxygen therapy     Gastrointestinal  colostomy     Skin  other (see comments) surgical wound           Nutrition Prescription Ordered     Row Name 06/06/19 154          Nutrition Prescription PO    Current PO Diet  Soft Texture     Texture  Ground     Fluid Consistency  Thin     Supplement  Boost Breeze (Ensure)         Evaluation of Received Nutrient/Fluid Intake     Row Name 06/06/19 154          PO  Evaluation    Number of Days PO Intake Evaluated  2 days     Number of Meals  3     % PO Intake  67% average               Electronically signed by:  Nancy Gilmore RD  06/06/19 4:01 PM

## 2019-06-06 NOTE — PROGRESS NOTES
GENERAL SURGERY PROGRESS NOTE  Chief Complaint:  Surgery Follow up   LOS: 8 days       Subjective     Interval History:     Now with foul smelling drainage from wound and from vagina.     Objective     Vital Signs  Temp:  [96.5 °F (35.8 °C)-99.1 °F (37.3 °C)] 98.7 °F (37.1 °C)  Heart Rate:  [] 96  Resp:  [18-21] 18  BP: (104-148)/(58-98) 113/58    Physical Exam:   Foul smelling purulent drainage from wound and apparently vagina. Ostomy healthy in appearance  Labs:  Lab Results (last 24 hours)     ** No results found for the last 24 hours. **           Results Review:     Labs and imaging for today were reviewed.    Assessment/Plan     Irma Pacheco is a 69 y.o. 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          This document has been electronically signed by Timothy Dixon MD on June 6, 2019 1:05 PM        Timothy Dixon MD  06/06/19  1:05 PM

## 2019-06-06 NOTE — PLAN OF CARE
Problem: Patient Care Overview  Goal: Plan of Care Review  Outcome: Ongoing (interventions implemented as appropriate)   06/06/19 7482   Coping/Psychosocial   Plan of Care Reviewed With patient;caregiver;daughter   Plan of Care Review   Progress improving   OTHER   Outcome Summary Pt tolerating po diet. Intake and appetite improving. Will add gravy to meats to help her eat them better. Drinking the suppelements. Will monitor.       Problem: Skin Injury Risk (Adult)  Intervention: Promote/Optimize Nutrition   06/06/19 0132   Nutrition Interventions   Oral Nutrition Promotion calorie dense foods provided;calorie dense liquids provided;nutritional therapy counseling provided

## 2019-06-06 NOTE — THERAPY TREATMENT NOTE
Acute Care - Physical Therapy Treatment Note  [facility]     Patient Name: Irma Pacheco  : 1950  MRN: [id number]  Today's Date: 2019  Onset of Illness/Injury or Date of Surgery: 19  Date of Referral to PT: 19  Referring Physician: Dr. Dixon     Admit Date: 2019    Visit Dx:    ICD-10-CM ICD-9-CM   1. 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 Impaired functional mobility and activity tolerance Z74.09 V49.89   3. 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       Therapy Treatment    Rehabilitation Treatment Summary     Row Name 19 1318 19 0935          Treatment Time/Intention    Discipline  occupational therapy assistant  -CS  physical therapy assistant  -LN     Document Type  therapy note (daily note)  -CS  therapy note (daily note)  -LN     Subjective Information  complains of;pain  -CS  complains of;pain  -LN     Mode of Treatment  occupational therapy  -CS  physical therapy;individual therapy  -LN     Therapy Frequency (PT Clinical Impression)  --  other (see comments) 6x/wk  -LN     Therapy Frequency (OT Eval)  other (see comments) 5-7 days a week   -CS  other (see comments) 5-7 days/wk  -LN     Patient Effort  --  good  -LN     Reason Treatment Not Performed  --  other (see comments)  -LN     Existing Precautions/Restrictions  fall;other (see comments)  -CS  fall;other (see comments) colostomy  -LN     Recorded by [CS] Cathy Dillon, GITA/DERICK 19 1325 [LN] Jannet Garcia PTA 19 1330     Row Name 19 1318 19 0935          Vital Signs    Pre Systolic BP Rehab  --  140  -LN     Pre Treatment Diastolic BP  --  68  -LN     Post Systolic BP Rehab  --  155  -LN     Post Treatment Diastolic BP  --  72  -LN     Pretreatment Heart Rate (beats/min)  99  -CS  100  -LN     Posttreatment Heart Rate (beats/min)  --  104  -LN     Pre SpO2 (%)  97  -CS  96   -LN     O2 Delivery Pre Treatment  room air  -CS  room air  -LN     Post SpO2 (%)  --  95  -LN     Pre Patient Position  Supine  -CS  Side Lying  -LN     Intra Patient Position  --  Standing  -LN     Post Patient Position  --  Supine  -LN     Recorded by [CS] Cathy Dillon COTA/L 06/06/19 1325 [LN] Jannet Garcia, PTA 06/06/19 1330     Row Name 06/06/19 1318 06/06/19 0935          Cognitive Assessment/Intervention- PT/OT    Affect/Mental Status (Cognitive)  WFL  -CS  WFL  -LN     Orientation Status (Cognition)  oriented x 4  -CS  oriented x 4  -LN     Follows Commands (Cognition)  follows one step commands  -CS  follows one step commands  -LN     Recorded by [CS] Cathy Dillon COTA/DERICK 06/06/19 1325 [LN] Jannet Garcia, PTA 06/06/19 1330     Row Name 06/06/19 0935             Safety Issues, Functional Mobility    Impairments Affecting Function (Mobility)  balance;coordination;endurance/activity tolerance;pain;postural/trunk control;strength;motor control;motor planning  -LN      Recorded by [LN] Jannet Garcia, PTA 06/06/19 1330      Row Name 06/06/19 0935             Bed Mobility Assessment/Treatment    Rolling Left [place] (Bed Mobility)  not tested  -LN      Rolling Right [place] (Bed Mobility)  moderate assist (50% patient effort);2 person assist  -LN      Supine-Sit [place] (Bed Mobility)  moderate assist (50% patient effort);1 person assist  -LN      Sit-Supine [place] (Bed Mobility)  maximum assist (25% patient effort) 3 people  -LN      Assistive Device (Bed Mobility)  bed rails;head of bed elevated  -LN      Recorded by [LN] Jannet Garcia, PTA 06/06/19 1330      Row Name 06/06/19 0935             Functional Mobility    Functional Mobility- Ind. Level  not tested  -LN      Recorded by [LN] Jannet Garcia, PTA 06/06/19 1330      Row Name 06/06/19 0935             Transfer Assessment/Treatment    Transfer Assessment/Treatment  bed-chair transfer;toilet transfer  -LN      Recorded by  [LN] Jannet Garcia, PTA 06/06/19 1330      Row Name 06/06/19 0935             Bed-Chair Transfer    Bed-Chair [place] (Transfers)  moderate assist (50% patient effort);2 person assist;verbal cues  -LN      Assistive Device (Bed-Chair Transfers)  walker, front-wheeled  -LN      Recorded by [LN] Jannet Garcia, PTA 06/06/19 1330      Row Name 06/06/19 0935             Chair-Bed Transfer    Chair-Bed [place] (Transfers)  not tested  -LN      Assistive Device (Chair-Bed Transfers)  walker, front-wheeled  -LN      Recorded by [LN] Jannet Garcia, PTA 06/06/19 1330      Row Name 06/06/19 0935             Sit-Stand Transfer    Sit-Stand [place] (Transfers)  moderate assist (50% patient effort);1 person assist  -LN      Assistive Device (Sit-Stand Transfers)  walker, front-wheeled  -LN      Recorded by [LN] Jannet Garcia, PTA 06/06/19 1330      Row Name 06/06/19 0935             Stand-Sit Transfer    Stand-Sit [place] (Transfers)  moderate assist (50% patient effort);1 person assist  -LN      Assistive Device (Stand-Sit Transfers)  walker, front-wheeled  -LN      Recorded by [LN] Jannet Garcia, PTA 06/06/19 1330      Row Name 06/06/19 0935             Toilet Transfer    Type (Toilet Transfer)  sit-stand;stand-sit  -LN      [place] Level (Toilet Transfer)  not tested  -LN      Assistive Device (Toilet Transfer)  commode, bedside without drop arms  -LN      Recorded by [LN] Jannet Garcia, PTA 06/06/19 1330      Row Name 06/06/19 0935             Gait/Stairs Assessment/Training    Gait/Stairs Assessment/Training  gait/ambulation assistive device  -LN      [place] Level (Gait)  moderate assist (50% patient effort);1 person assist  -LN      Assistive Device (Gait)  walker, front-wheeled  -LN      Distance in Feet (Gait)  3-4 steps chaitanya up to hob  -LN      Pattern (Gait)  step-to  -LN      Deviations/Abnormal Patterns (Gait)  base of support, wide;festinating/shuffling  -LN      Bilateral Gait  Deviations  forward flexed posture  -LN      Recorded by [LN] Jannet Garcia, PTA 06/06/19 1330      Row Name 06/06/19 0935             Lower Extremity Supine Therapeutic Exercise    Performed, Supine Lower Extremity (Therapeutic Exercise)  hip abduction/adduction;ankle dorsiflexion/plantarflexion;quadriceps sets;gluteal sets  -LN      Exercise Type, Supine Lower Extremity (Therapeutic Exercise)  AROM (active range of motion);AAROM (active assistive range of motion)  -LN      Sets/Reps Detail, Supine Lower Extremity (Therapeutic Exercise)  1/20  -LN      Comment, Supine Lower Extremity (Therapeutic Exercise)  instructed pt to perform ex throughout the day-pt/dtr verbalized understanding  -LN      Recorded by [LN] Jannet Garcia, PTA 06/06/19 1330      Row Name 06/06/19 1318 06/06/19 0935          Positioning and Restraints    Pre-Treatment Position  in bed  -CS  --     Post Treatment Position  --  bed  -LN     In Bed  --  notified nsg;supine;call light within reach;encouraged to call for assist;exit alarm on;with family/caregiver  -LN     Recorded by [CS] Cathy Dillon COTA/DERICK 06/06/19 1325 [LN] Jannet Garcia, PTA 06/06/19 1330     Row Name 06/06/19 1318 06/06/19 0935          Pain Scale: Numbers Pre/Post-Treatment    Pain Scale: Numbers, Pretreatment  8/10  -CS  7/10  -LN     Pain Scale: Numbers, Post-Treatment  --  7/10  -LN     Pain Location - Orientation  incisional  -CS  --  -LN     Pain Location  back  -CS  head and neck  -LN     Pain Intervention(s)  --  Declines  -LN     Recorded by [CS] Cathy Dillon COTA/DERICK 06/06/19 1325 [LN] Jannet Garcia, PTA 06/06/19 1330     Row Name 06/06/19 0935             Pain Scale 2: Numbers Pre/Post-Treatment    Pain Scale 2: Numbers, Pretreatment  7/10  -LN      Pain Scale 2: Numbers, Post-Treatment  9/10  -LN      Pain Location 2  back  -LN      Pain Intervention(s) 2  Declines  -LN      Recorded by [LN] Janent Garcia, PTA 06/06/19 1330      Row Name 06/06/19 0908              Sensory Assessment/Intervention    Sensory General Assessment  --  -LN      Recorded by [LN] Jose Jannet S, PTA 06/06/19 1330      Row Name 06/06/19 0935             Vision Assessment/Intervention    Visual Impairment/Limitations  corrective lenses full time  -LN      Recorded by [LN] Jose Jannet S, PTA 06/06/19 1330      Row Name 06/06/19 0935             Light Touch Sensation Assessment    Left Upper Extremity: Light Touch Sensation Assessment  --  -LN      Right Upper Extremity: Light Touch Sensation Assessment  --  -LN      Left Lower Extremity: Light Touch Sensation Assessment  --  -LN      Right Lower Extremity: Light Touch Sensation Assessment  --  -LN      Recorded by [LN] Jose Jannet S, PTA 06/06/19 1330      Row Name 06/06/19 0935             Respiratory WDL    Respiratory WDL  --  -LN      Nailbeds  --  -LN      Recorded by [LN] Jannet Garcia S, PTA 06/06/19 1330      Row Name 06/06/19 0935             Breath Sounds    Breath Sounds  --  -LN      All Lung Fields Breath Sounds  --  -LN      LLL Breath Sounds  --  -LN      Recorded by [LN] Jannet Garcia S, PTA 06/06/19 1330      Row Name 06/06/19 0935             Skin WDL    Skin WDL  --  -LN      Skin Temperature  --  -LN      Skin Moisture  --  -LN      Skin Elasticity  --  -LN      Skin Integrity  --  -LN      Recorded by [LN] Jannet Garcia S, PTA 06/06/19 1330      Row Name 06/06/19 0935             Wound 05/29/19 1327 abdomen incision;surgical    Wound - Properties Group Date first assessed: 05/29/19 [KM] Time first assessed: 1327 [KM] Present On Admission : no [KM] Location: abdomen [KM] Type: incision;surgical [KM] Recorded by:  [KM] Geno Blackmon RN 05/29/19 1327    Dressing Appearance  --  -LN      Closure  --  -LN      Base  --  -LN      Periwound  --  -LN      Periwound Temperature  --  -LN      Periwound Skin Turgor  soft  -LN      Edges  --  -LN      Drainage Characteristics/Odor  --  -LN      Drainage Amount  --  -LN      Recorded by [LN]  Amanda Garciai S, PTA 06/06/19 1330      Row Name 06/06/19 0935             Wound 06/05/19 1818 Bilateral other (see notes) other (see comments)    Wound - Properties Group Date first assessed: 06/05/19 [KMA] Time first assessed: 1818 [KMA] Present On Admission : yes [KMA], Admission to   Side: Bilateral [KMA] Location: other (see notes) [KMA2], Bilateral groin & skin fold excoriation  Type: other (see comments) [KMA] Recorded by:  [KMA] Geno Onofre RN 06/05/19 1819 [KMA2] Geno Onofre RN 06/05/19 1821    Dressing Appearance  --  -LN      Closure  --  -LN      Base  --  -LN      Periwound  --  -LN      Edges  --  -LN      Drainage Amount  --  -LN      Recorded by [LN] Amanda Garciai S, PTA 06/06/19 1330      Row Name 06/06/19 0935             Coping    Observed Emotional State  accepting;calm;cooperative  -LN      Verbalized Emotional State  acceptance  -LN      Recorded by [LN] Amanda Garciai S, PTA 06/06/19 1330      Row Name 06/06/19 0935             Plan of Care Review    Plan of Care Reviewed With  patient;daughter  -LN      Recorded by [LN] Amanda Garciai S, PTA 06/06/19 1330      Row Name 06/06/19 0935             Outcome Summary/Treatment Plan (OT)    Anticipated Discharge Disposition (OT)  skilled nursing facility  -LN      Recorded by [LN] Jannet Garcia S, PTA 06/06/19 1330      Row Name 06/06/19 0935             Outcome Summary/Treatment Plan (PT)    Plan for Continued Treatment (PT)  cont  -LN      Anticipated Discharge Disposition (PT)  anticipate therapy at next level of care  -LN      Recorded by [LN] Jannet Garcia S, PTA 06/06/19 1330        User Key  (r) = Recorded By, (t) = Taken By, (c) = Cosigned By    Initials Name Effective Dates Discipline    Geno Isaac RN 10/17/16 -  Nurse    LN Jannet Garcia S, PTA 03/07/18 -  PT    Cathy Shane PELAYO/L 03/07/18 -  OT    Geno Limon RN 05/21/18 -  Nurse          Wound 05/29/19 1324 abdomen incision;surgical (Active)   Dressing  Appearance dry;intact 6/6/2019  8:31 AM   Closure ELIZABETH 6/6/2019  8:31 AM   Base dressing in place, unable to visualize 6/6/2019  8:31 AM   Periwound intact;dry;pink;redness 6/5/2019  2:00 PM   Periwound Temperature warm 6/5/2019  2:00 PM   Periwound Skin Turgor soft 6/6/2019  9:35 AM   Edges open 6/5/2019  2:00 PM   Drainage Characteristics/Odor creamy;tan 6/6/2019 12:29 PM   Drainage Amount moderate 6/6/2019 12:29 PM   Care, Wound cleansed with;neutral pH skin disinfectant applied 6/5/2019  2:00 PM   Dressing Care, Wound dressing changed;gauze, dry 6/6/2019 12:29 PM       Wound 06/05/19 1818 Bilateral other (see notes) other (see comments) (Active)   Dressing Appearance open to air 6/6/2019  8:31 AM   Closure None 6/6/2019  8:31 AM   Base pink 6/6/2019  8:31 AM   Periwound pink;moist 6/6/2019  8:31 AM   Edges open 6/5/2019  8:45 PM   Drainage Amount none 6/6/2019  8:31 AM       Rehab Goal Summary     Row Name 06/06/19 4942             Physical Therapy Goals    Bed Mobility Goal Selection (PT)  bed mobility, PT goal 1  -LN      Transfer Goal Selection (PT)  transfer, PT goal 1  -LN      Gait Training Goal Selection (PT)  gait training, PT goal 1  -LN      Strength Goal Selection (PT)  strength, PT goal 1  -LN         Bed Mobility Goal 1 (PT)    Activity/Assistive Device (Bed Mobility Goal 1, PT)  sit to supine/supine to sit  -LN      [place] Level/Cues Needed (Bed Mobility Goal 1, PT)  minimum assist (75% or more patient effort)  -LN      Time Frame (Bed Mobility Goal 1, PT)  10 days  -LN      Barriers (Bed Mobility Goal 1, PT)  abdominal pain, previous functional deficit  -LN      Progress/Outcomes (Bed Mobility Goal 1, PT)  goal not met  -LN         Transfer Goal 1 (PT)    Activity/Assistive Device (Transfer Goal 1, PT)  sit-to-stand/stand-to-sit;bed-to-chair/chair-to-bed;walker, rolling  -LN      [place] Level/Cues Needed (Transfer Goal 1, PT)  contact guard assist;verbal cues required  -LN      Time  Frame (Transfer Goal 1, PT)  1 week  -LN      Barriers (Transfers Goal 1, PT)  abdominal pain, previous functional deficit  -LN      Progress/Outcome (Transfer Goal 1, PT)  goal not met  -LN         Gait Training Goal 1 (PT)    Activity/Assistive Device (Gait Training Goal 1, PT)  walker, rolling;decrease fall risk;increase endurance/gait distance  -LN      [place] Level (Gait Training Goal 1, PT)  contact guard assist;verbal cues required  -LN      Distance (Gait Goal 1, PT)  10 feet  -LN      Time Frame (Gait Training Goal 1, PT)  10 days  -LN      Barriers (Gait Training Goal 1, PT)  abdominal pain, previous functional deficit  -LN      Progress/Outcome (Gait Training Goal 1, PT)  goal not met  -LN         Strength Goal 1 (PT)    Strength Goal 1 (PT)  Pt will be able to complete 20 reps of at least 4 LE exercises in order to demo improved strength for transfers  -LN      Time Frame (Strength Goal 1, PT)  1 week  -LN      Barriers (Strength Goal 1, PT)  abdominal pain, previous functional deficit  -LN      Progress/Outcome (Strength Goal 1, PT)  goal not met  -LN        User Key  (r) = Recorded By, (t) = Taken By, (c) = Cosigned By    Initials Name Provider Type Discipline    Jannet Melchor PTA Physical Therapy Assistant PT          Physical Therapy Education     Title: PT OT SLP Therapies (In Progress)     Topic: Physical Therapy (Done)     Point: Mobility training (Done)     Learning Progress Summary           Patient Acceptance, E,TB, VU by KATH at 6/6/2019  1:31 PM    Comment:  ex throughout the day    Acceptance, E,TB, VU by KATH at 6/4/2019  1:23 PM    Acceptance, E, VU,NR by JAVI at 5/30/2019 12:58 PM    Comment:  Role of PT, PT POC, transfer technique   Family Acceptance, E,TB, VU by KATH at 6/6/2019  1:31 PM    Comment:  ex throughout the day                   Point: Home exercise program (Done)     Learning Progress Summary           Patient Acceptance, E,TB, VU by LN at 6/6/2019  1:31 PM    Comment:  ex  throughout the day    Acceptance, E,TB, VU by LN at 6/3/2019  3:53 PM    Acceptance, E,TB, VU by LN at 5/31/2019 12:58 PM   Family Acceptance, E,TB, VU by LN at 6/6/2019  1:31 PM    Comment:  ex throughout the day                   Point: Body mechanics (Done)     Learning Progress Summary           Patient Acceptance, E,TB, VU by LN at 6/6/2019  1:31 PM    Comment:  ex throughout the day    Acceptance, E,TB, VU by LN at 6/4/2019  1:23 PM    Acceptance, E,TB, VU by JJ at 6/4/2019 10:07 AM   Family Acceptance, E,TB, VU by LN at 6/6/2019  1:31 PM    Comment:  ex throughout the day                   Point: Precautions (Done)     Learning Progress Summary           Patient Acceptance, E,TB, VU by LN at 6/6/2019  1:31 PM    Comment:  ex throughout the day    Acceptance, E,TB, VU by LN at 6/4/2019  1:23 PM    Acceptance, E,TB, VU by LN at 6/3/2019  3:53 PM    Acceptance, E,TB, VU by LN at 5/31/2019 12:58 PM    Acceptance, E, VU by  at 5/30/2019  1:00 PM    Comment:  Gait belt, call light, staff assistance with mobility   Family Acceptance, E,TB, VU by LN at 6/6/2019  1:31 PM    Comment:  ex throughout the day                               User Key     Initials Effective Dates Name Provider Type Discipline     10/17/16 -  Alem Garrido RN Registered Nurse Nurse    KATH 03/07/18 -  Jannet Garcia PTA Physical Therapy Assistant PT     07/23/18 -  Donna Stacy, PT Physical Therapist PT                PT Recommendation and Plan  Anticipated Discharge Disposition (PT): anticipate therapy at next level of care  Therapy Frequency (PT Clinical Impression): other (see comments)(6x/wk)  Outcome Summary/Treatment Plan (PT)  Plan for Continued Treatment (PT): cont  Anticipated Discharge Disposition (PT): anticipate therapy at next level of care  Plan of Care Reviewed With: patient, daughter  Outcome Summary: sup-sit mod of 1,sit-sup max of 3,able to stand with mod of 1 and take 3-4 sidesteps up to hob-20 reps aa/arom le  ex-PT FATIGUES QUICKLY-if d/c may benefit inpt rehab or [facility] rehab to home  Outcome Measures     Row Name 06/06/19 0935 06/05/19 1315 06/05/19 0720       How much help from another person do you currently need...    Turning from your back to your side while in flat bed without using bedrails?  2  -LN  2  -TW  --    Moving from lying on back to sitting on the side of a flat bed without bedrails?  2  -LN  2  -TW  --    Moving to and from a bed to a chair (including a wheelchair)?  2  -LN  2  -TW  --    Standing up from a chair using your arms (e.g., wheelchair, bedside chair)?  2  -LN  2  -TW  --    Climbing 3-5 steps with a railing?  1  -LN  1  -TW  --    To walk in hospital room?  2  -LN  1  -TW  --    AM-PAC 6 Clicks Score  11  -LN  10  -TW  --       How much help from another is currently needed...    Putting on and taking off regular lower body clothing?  --  --  1  -BB    Bathing (including washing, rinsing, and drying)  --  --  1  -BB    Toileting (which includes using toilet bed pan or urinal)  --  --  1  -BB    Putting on and taking off regular upper body clothing  --  --  2  -BB    Taking care of personal grooming (such as brushing teeth)  --  --  2  -BB    Eating meals  --  --  1  -BB    Score  --  --  8  -BB       Functional Assessment    Outcome Measure Options  AM-PAC 6 Clicks Basic Mobility (PT)  -LN  AM-PAC 6 Clicks Basic Mobility (PT)  -TW  --    Row Name 06/04/19 1233 06/04/19 0924 06/03/19 1457       How much help from another person do you currently need...    Turning from your back to your side while in flat bed without using bedrails?  --  2  -LN  2  -LN    Moving from lying on back to sitting on the side of a flat bed without bedrails?  --  2  -LN  1  -LN    Moving to and from a bed to a chair (including a wheelchair)?  --  2  -LN  2  -LN    Standing up from a chair using your arms (e.g., wheelchair, bedside chair)?  --  2  -LN  2  -LN    Climbing 3-5 steps with a railing?  --  1  -LN  1  -LN     To walk in hospital room?  --  1  -LN  1  -LN    AM-PAC 6 Clicks Score  --  10  -LN  9  -LN       How much help from another is currently needed...    Putting on and taking off regular lower body clothing?  1  -BB  --  --    Bathing (including washing, rinsing, and drying)  1  -BB  --  --    Toileting (which includes using toilet bed pan or urinal)  1  -BB  --  --    Putting on and taking off regular upper body clothing  2  -BB  --  --    Taking care of personal grooming (such as brushing teeth)  2  -BB  --  --    Eating meals  2  -BB  --  --    Score  9  -BB  --  --       Functional Assessment    Outcome Measure Options  --  AM-PAC 6 Clicks Basic Mobility (PT)  -LN  AM-PAC 6 Clicks Basic Mobility (PT)  -LN      User Key  (r) = Recorded By, (t) = Taken By, (c) = Cosigned By    Initials Name Provider Type    LN Jannet Garcia PTA Physical Therapy Assistant    TW Karl Zheng, TERRY Physical Therapy Assistant    BB Anila Rodriguez, GITA/L Occupational Therapy Assistant         Time Calculation:   PT Charges     Row Name 06/06/19 1334             Time Calculation    Start Time  0935  -LN      Stop Time  1035  -LN      Time Calculation (min)  60 min  -LN      PT Received On  06/06/19  -LN         Time Calculation- PT    Total Timed Code Minutes- PT  60 minute(s)  -LN        User Key  (r) = Recorded By, (t) = Taken By, (c) = Cosigned By    Initials Name Provider Type    LN Jannet Garcia PTA Physical Therapy Assistant        Therapy Charges for Today     Code Description Service Date Service Provider Modifiers Qty    72659935113 HC PT THER PROC EA 15 MIN 6/6/2019 Jannet Gacria PTA GP 1    18288334496 HC PT THERAPEUTIC ACT EA 15 MIN 6/6/2019 Jannet Garcia PTA GP 3          PT G-Codes  Outcome Measure Options: AM-PAC 6 Clicks Basic Mobility (PT)  AM-PAC 6 Clicks Score: 11  Score: 8    Jannet Garcia PTA  6/6/2019

## 2019-06-06 NOTE — THERAPY TREATMENT NOTE
Acute Care - Occupational Therapy Treatment Note  [facility]     Patient Name: Irma Pacheco  : 1950  MRN: [id number]  Today's Date: 2019  Onset of Illness/Injury or Date of Surgery: 19  Date of Referral to OT: 19  Referring Physician: Dr. Dixon     Admit Date: 2019       ICD-10-CM ICD-9-CM   1. 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 Impaired functional mobility and activity tolerance Z74.09 V49.89   3. 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     Past Medical History:   Diagnosis Date   • Anxiety    • Arthritis    • Asthma    • CHF (congestive heart failure) (CMS/[facility])    • Colostomy care (CMS/[facility])    • Concussion     x2   • Depression    • Disease of thyroid gland    • Hyperlipidemia    • Hypotension    • Kidney disease, chronic, stage III (GFR 30-59 ml/min) (CMS/[facility])    • MS (multiple sclerosis) (CMS/[facility])    • Osteoarthritis    • Tremor, essential    • Wears glasses      Past Surgical History:   Procedure Laterality Date   • ABDOMINAL HYSTERECTOMY  1999    Has Cervix   • APPENDECTOMY     • CHOLECYSTECTOMY     • COLON RESECTION N/A 2017    Procedure: exploratory laparotomy, sigmoid colon resection and colostomy;  Surgeon: Timothy Dixon MD;  Location: [facility];  Service:    • COLON RESECTION N/A 2019    Procedure: laparotomy with lysis of adhesions, repair of small bowel to vagina fistula. small bowel resection ;  Surgeon: Timothy Dixon MD;  Location: [facility] OR;  Service: General   • EXPLORATORY LAPAROTOMY N/A 10/9/2017    Procedure: LAPAROTOMY EXPLORATORY, drainage intra-abdominal abscess, washout;  Surgeon: Arnulfo Marcum MD;  Location: [facility];  Service:    • SPIDER BITE EXCISION         Therapy Treatment    Rehabilitation Treatment Summary     Row Name 19 1318 19 0935          Treatment Time/Intention    Discipline   occupational therapy assistant  -CS  physical therapy assistant  -LN     Document Type  therapy note (daily note)  -CS  therapy note (daily note)  -LN     Subjective Information  complains of;pain  -CS  complains of;pain  -LN     Mode of Treatment  occupational therapy  -CS  physical therapy;individual therapy  -LN     Therapy Frequency (PT Clinical Impression)  --  other (see comments) 6x/wk  -LN     Therapy Frequency (OT Eval)  other (see comments) 5-7 days a week   -CS  other (see comments) 5-7 days/wk  -LN     Patient Effort  adequate  -CS2  good  -LN     Reason Treatment Not Performed  --  other (see comments)  -LN     Existing Precautions/Restrictions  fall;other (see comments)  -CS  fall;other (see comments) colostomy  -LN     Recorded by [CS] Cathy Dillon PELAYO/L 06/06/19 1325  [CS2] Cathy Dillon PELAYO/L 06/06/19 1556 [LN] Jannet Garcia, PTA 06/06/19 1330     Row Name 06/06/19 1318 06/06/19 0935          Vital Signs    Pre Systolic BP Rehab  --  140  -LN     Pre Treatment Diastolic BP  --  68  -LN     Post Systolic BP Rehab  --  155  -LN     Post Treatment Diastolic BP  --  72  -LN     Pretreatment Heart Rate (beats/min)  99  -CS  100  -LN     Posttreatment Heart Rate (beats/min)  --  104  -LN     Pre SpO2 (%)  97  -CS  96  -LN     O2 Delivery Pre Treatment  room air  -CS  room air  -LN     Post SpO2 (%)  --  95  -LN     Pre Patient Position  Supine  -CS  Side Lying  -LN     Intra Patient Position  --  Standing  -LN     Post Patient Position  Supine  -CS2  Supine  -LN     Recorded by [CS] Cathy Dillon PELAYO/L 06/06/19 1325  [CS2] Cathy Dillon, PELAYO/L 06/06/19 1410 [LN] Jannet Garcia S, PTA 06/06/19 1330     Row Name 06/06/19 1318 06/06/19 0935          Cognitive Assessment/Intervention- PT/OT    Affect/Mental Status (Cognitive)  WFL  -CS  WFL  -LN     Orientation Status (Cognition)  oriented x 4  -CS  oriented x 4  -LN     Follows Commands (Cognition)  follows one step commands  -CS   follows one step commands  -LN     Recorded by [CS] Cathy Dillon, GITA/DERICK 06/06/19 1325 [LN] Jannet Garcia, PTA 06/06/19 1330     Row Name 06/06/19 0935             Safety Issues, Functional Mobility    Impairments Affecting Function (Mobility)  balance;coordination;endurance/activity tolerance;pain;postural/trunk control;strength;motor control;motor planning  -LN      Recorded by [LN] Jannet Garcia, PTA 06/06/19 1330      Row Name 06/06/19 0935             Bed Mobility Assessment/Treatment    Rolling Left [place] (Bed Mobility)  not tested  -LN      Rolling Right [place] (Bed Mobility)  moderate assist (50% patient effort);2 person assist  -LN      Supine-Sit [place] (Bed Mobility)  moderate assist (50% patient effort);1 person assist  -LN      Sit-Supine [place] (Bed Mobility)  maximum assist (25% patient effort) 3 people  -LN      Assistive Device (Bed Mobility)  bed rails;head of bed elevated  -LN      Recorded by [LN] Jannet Garcia, PTA 06/06/19 1330      Row Name 06/06/19 0935             Functional Mobility    Functional Mobility- Ind. Level  not tested  -LN      Recorded by [LN] Jannet Garcia, PTA 06/06/19 1330      Row Name 06/06/19 0935             Transfer Assessment/Treatment    Transfer Assessment/Treatment  bed-chair transfer;toilet transfer  -LN      Recorded by [LN] Jannet Garcia, PTA 06/06/19 1330      Row Name 06/06/19 0935             Bed-Chair Transfer    Bed-Chair [place] (Transfers)  moderate assist (50% patient effort);2 person assist;verbal cues  -LN      Assistive Device (Bed-Chair Transfers)  walker, front-wheeled  -LN      Recorded by [LN] Jannet Garcia, PTA 06/06/19 1330      Row Name 06/06/19 0935             Chair-Bed Transfer    Chair-Bed [place] (Transfers)  not tested  -LN      Assistive Device (Chair-Bed Transfers)  walker, front-wheeled  -LN      Recorded by [LN] Jannet Garcia, PTA 06/06/19 1330      Row Name 06/06/19 0935             Sit-Stand  Transfer    Sit-Stand [place] (Transfers)  moderate assist (50% patient effort);1 person assist  -LN      Assistive Device (Sit-Stand Transfers)  walker, front-wheeled  -LN      Recorded by [LN] Jannet Garcia, PTA 06/06/19 1330      Row Name 06/06/19 0935             Stand-Sit Transfer    Stand-Sit [place] (Transfers)  moderate assist (50% patient effort);1 person assist  -LN      Assistive Device (Stand-Sit Transfers)  walker, front-wheeled  -LN      Recorded by [LN] Jannet Garcia, PTA 06/06/19 1330      Row Name 06/06/19 0935             Toilet Transfer    Type (Toilet Transfer)  sit-stand;stand-sit  -LN      [place] Level (Toilet Transfer)  not tested  -LN      Assistive Device (Toilet Transfer)  commode, bedside without drop arms  -LN      Recorded by [LN] Jannet Garcia, PTA 06/06/19 1330      Row Name 06/06/19 0935             Gait/Stairs Assessment/Training    Gait/Stairs Assessment/Training  gait/ambulation assistive device  -LN      [place] Level (Gait)  moderate assist (50% patient effort);1 person assist  -LN      Assistive Device (Gait)  walker, front-wheeled  -LN      Distance in Feet (Gait)  3-4 steps chaitanya up to hob  -LN      Pattern (Gait)  step-to  -LN      Deviations/Abnormal Patterns (Gait)  base of support, wide;festinating/shuffling  -LN      Bilateral Gait Deviations  forward flexed posture  -LN      Recorded by [LN] Jannet Garcia, PTA 06/06/19 1330      Row Name 06/06/19 1318             Grooming Assessment/Training    [place] Level (Grooming)  grooming skills;hair care, combing/brushing;oral care regimen;wash face, hands;contact guard assist  -CS      Grooming Position  long sitting  -CS      Recorded by [CS] Cathy Dillon COTA/L 06/06/19 1556      Row Name 06/06/19 0935             Lower Extremity Supine Therapeutic Exercise    Performed, Supine Lower Extremity (Therapeutic Exercise)  hip abduction/adduction;ankle dorsiflexion/plantarflexion;quadriceps sets;gluteal  sets  -LN      Exercise Type, Supine Lower Extremity (Therapeutic Exercise)  AROM (active range of motion);AAROM (active assistive range of motion)  -LN      Sets/Reps Detail, Supine Lower Extremity (Therapeutic Exercise)  1/20  -LN      Comment, Supine Lower Extremity (Therapeutic Exercise)  instructed pt to perform ex throughout the day-pt/dtr verbalized understanding  -LN      Recorded by [LN] Jannet Garcia, TERRY 06/06/19 1330      Row Name 06/06/19 1318             Therapeutic Exercise    Upper Extremity (Therapeutic Exercise)  bicep curl, bilateral  -CS      Upper Extremity Range of Motion (Therapeutic Exercise)  shoulder flexion/extension, bilateral;shoulder internal/external rotation, bilateral;elbow flexion/extension, bilateral;forearm supination/pronation, bilateral;wrist flexion/extension, bilateral  -CS      Hand (Therapeutic Exercise)  finger flexion/extension, bilateral  -CS      Weight/Resistance (Therapeutic Exercise)  1 pound  -CS      Exercise Type (Therapeutic Exercise)  AROM (active range of motion)  -CS      Position (Therapeutic Exercise)  seated  -CS      Sets/Reps (Therapeutic Exercise)  1/20  -CS      Equipment (Therapeutic Exercise)  free weight, barbell  -CS      Expected Outcome (Therapeutic Exercise)  improve functional tolerance, self-care activity;improve performance, BADLs;improve performance, transfer skills;increase active range of motion  -CS      Recorded by [CS] Cathy Dillon COTA/L 06/06/19 1556      Row Name 06/06/19 1318 06/06/19 0935          Positioning and Restraints    Pre-Treatment Position  in bed  -CS  --     Post Treatment Position  bed  -CS2  bed  -LN     In Bed  supine;call light within reach;encouraged to call for assist;exit alarm on;with family/caregiver  -CS2  notified nsg;supine;call light within reach;encouraged to call for assist;exit alarm on;with family/caregiver  -LN     Recorded by [CS] Cathy Dillon COTA/L 06/06/19 1325  [CS2] Cathy Dillon,  PELAYO/L 06/06/19 1410 [LN] Jose Jannet S, PTA 06/06/19 1330     Row Name 06/06/19 1318 06/06/19 0935          Pain Scale: Numbers Pre/Post-Treatment    Pain Scale: Numbers, Pretreatment  8/10  -CS  7/10  -LN     Pain Scale: Numbers, Post-Treatment  8/10  -CS2  7/10  -LN     Pain Location - Orientation  incisional  -CS  --  -LN     Pain Location  back  -CS  head and neck  -LN     Pain Intervention(s)  --  Declines  -LN     Recorded by [CS] Cathy Dillon, PELAYO/L 06/06/19 1325  [CS2] Cathy Dillon, PELAYO/L 06/06/19 1410 [LN] Amanda Garciai S, PTA 06/06/19 1330     Row Name 06/06/19 0935             Pain Scale 2: Numbers Pre/Post-Treatment    Pain Scale 2: Numbers, Pretreatment  7/10  -LN      Pain Scale 2: Numbers, Post-Treatment  9/10  -LN      Pain Location 2  back  -LN      Pain Intervention(s) 2  Declines  -LN      Recorded by [LN] Amanda Garciai S, PTA 06/06/19 1330      Row Name 06/06/19 0935             Sensory Assessment/Intervention    Sensory General Assessment  --  -LN      Recorded by [LN] Jose Jannet S, PTA 06/06/19 1330      Row Name 06/06/19 0935             Vision Assessment/Intervention    Visual Impairment/Limitations  corrective lenses full time  -LN      Recorded by [LN] Amanda Garciai S, PTA 06/06/19 1330      Row Name 06/06/19 0935             Light Touch Sensation Assessment    Left Upper Extremity: Light Touch Sensation Assessment  --  -LN      Right Upper Extremity: Light Touch Sensation Assessment  --  -LN      Left Lower Extremity: Light Touch Sensation Assessment  --  -LN      Right Lower Extremity: Light Touch Sensation Assessment  --  -LN      Recorded by [LN] Amanda Garciai S, PTA 06/06/19 1330      Row Name 06/06/19 0935             Respiratory WDL    Respiratory WDL  --  -LN      Nailbeds  --  -LN      Recorded by [LN] Jose Jannet S, PTA 06/06/19 1330      Row Name 06/06/19 0935             Breath Sounds    Breath Sounds  --  -LN      All Lung Fields Breath Sounds  --  -LN      LLL Breath  Sounds  --  -LN      Recorded by [LN] Jannet Garcia, PTA 06/06/19 1330      Row Name 06/06/19 0935             Skin WDL    Skin WDL  --  -LN      Skin Temperature  --  -LN      Skin Moisture  --  -LN      Skin Elasticity  --  -LN      Skin Integrity  --  -LN      Recorded by [LN] Jannet Garcia, PTA 06/06/19 1330      Row Name 06/06/19 0935             Wound 05/29/19 1327 abdomen incision;surgical    Wound - Properties Group Date first assessed: 05/29/19 [KM] Time first assessed: 1327 [KM] Present On Admission : no [KM] Location: abdomen [KM] Type: incision;surgical [KM] Recorded by:  [KM] Geno Blackmon RN 05/29/19 1327    Dressing Appearance  --  -LN      Closure  --  -LN      Base  --  -LN      Periwound  --  -LN      Periwound Temperature  --  -LN      Periwound Skin Turgor  soft  -LN      Edges  --  -LN      Drainage Characteristics/Odor  --  -LN      Drainage Amount  --  -LN      Recorded by [LN] Jannet Garcia, PTA 06/06/19 1330      Row Name 06/06/19 0935             Wound 06/05/19 1818 Bilateral other (see notes) other (see comments)    Wound - Properties Group Date first assessed: 06/05/19 [KMA] Time first assessed: 1818 [KMA] Present On Admission : yes [KMA], Admission to   Side: Bilateral [KMA] Location: other (see notes) [KMA2], Bilateral groin & skin fold excoriation  Type: other (see comments) [KMA] Recorded by:  [KMA] Geno Onofre, RN 06/05/19 1819 [KMA2] Geno Onofre, RN 06/05/19 1821    Dressing Appearance  --  -LN      Closure  --  -LN      Base  --  -LN      Periwound  --  -LN      Edges  --  -LN      Drainage Amount  --  -LN      Recorded by [LN] Jannet Garcia, PTA 06/06/19 1330      Row Name 06/06/19 0935             Coping    Observed Emotional State  accepting;calm;cooperative  -LN      Verbalized Emotional State  acceptance  -LN      Recorded by [LN] Jannet Garcia, PTA 06/06/19 1330      Row Name 06/06/19 2418             Plan of Care Review    Plan of Care Reviewed With   patient;daughter  -LN      Recorded by [LN] Jannet Garcia S, PTA 06/06/19 1330      Row Name 06/06/19 1318 06/06/19 0935          Outcome Summary/Treatment Plan (OT)    Daily Summary of Progress (OT)  progress toward functional goals is good  -CS  --     Plan for Continued Treatment (OT)  cont OT POC  -CS  --     Anticipated Discharge Disposition (OT)  skilled nursing facility  -CS  skilled nursing facility  -LN     Recorded by [CS] Cathy Dillon PELAYO/DERICK 06/06/19 1556 [LN] Jannet Garcia S, PTA 06/06/19 1330     Row Name 06/06/19 0935             Outcome Summary/Treatment Plan (PT)    Plan for Continued Treatment (PT)  cont  -LN      Anticipated Discharge Disposition (PT)  anticipate therapy at next level of care  -LN      Recorded by [LN] Jannet Garcia S, PTA 06/06/19 1330        User Key  (r) = Recorded By, (t) = Taken By, (c) = Cosigned By    Initials Name Effective Dates Discipline    Geno Isaac, RN 10/17/16 -  Nurse    LN Jannet Garcia S, PTA 03/07/18 -  PT    CS Cathy Dillon, PELAYO/L 03/07/18 -  OT     Geno Blackmon, RN 05/21/18 -  Nurse        Wound 05/29/19 1327 abdomen incision;surgical (Active)   Dressing Appearance dry;intact 6/6/2019  8:31 AM   Closure [place] 6/6/2019  8:31 AM   Base dressing in place, unable to visualize 6/6/2019  8:31 AM   Periwound Skin Turgor soft 6/6/2019  9:35 AM   Drainage Characteristics/Odor creamy;tan 6/6/2019 12:29 PM   Drainage Amount moderate 6/6/2019 12:29 PM   Dressing Care, Wound dressing changed;gauze, dry 6/6/2019 12:29 PM       Wound 06/05/19 1818 Bilateral other (see notes) other (see comments) (Active)   Dressing Appearance open to air 6/6/2019  8:31 AM   Closure None 6/6/2019  8:31 AM   Base pink 6/6/2019  8:31 AM   Periwound pink;moist 6/6/2019  8:31 AM   Edges open 6/5/2019  8:45 PM   Drainage Amount none 6/6/2019  8:31 AM     Rehab Goal Summary     Row Name 06/06/19 1318 06/06/19 0935          Physical Therapy Goals    Bed Mobility Goal Selection (PT)   --  bed mobility, PT goal 1  -LN     Transfer Goal Selection (PT)  --  transfer, PT goal 1  -LN     Gait Training Goal Selection (PT)  --  gait training, PT goal 1  -LN     Strength Goal Selection (PT)  --  strength, PT goal 1  -LN        Bed Mobility Goal 1 (PT)    Activity/Assistive Device (Bed Mobility Goal 1, PT)  --  sit to supine/supine to sit  -LN     [place] Level/Cues Needed (Bed Mobility Goal 1, PT)  --  minimum assist (75% or more patient effort)  -LN     Time Frame (Bed Mobility Goal 1, PT)  --  10 days  -LN     Barriers (Bed Mobility Goal 1, PT)  --  abdominal pain, previous functional deficit  -LN     Progress/Outcomes (Bed Mobility Goal 1, PT)  --  goal not met  -LN        Transfer Goal 1 (PT)    Activity/Assistive Device (Transfer Goal 1, PT)  --  sit-to-stand/stand-to-sit;bed-to-chair/chair-to-bed;walker, rolling  -LN     [place] Level/Cues Needed (Transfer Goal 1, PT)  --  contact guard assist;verbal cues required  -LN     Time Frame (Transfer Goal 1, PT)  --  1 week  -LN     Barriers (Transfers Goal 1, PT)  --  abdominal pain, previous functional deficit  -LN     Progress/Outcome (Transfer Goal 1, PT)  --  goal not met  -LN        Gait Training Goal 1 (PT)    Activity/Assistive Device (Gait Training Goal 1, PT)  --  walker, rolling;decrease fall risk;increase endurance/gait distance  -LN     [place] Level (Gait Training Goal 1, PT)  --  contact guard assist;verbal cues required  -LN     Distance (Gait Goal 1, PT)  --  10 feet  -LN     Time Frame (Gait Training Goal 1, PT)  --  10 days  -LN     Barriers (Gait Training Goal 1, PT)  --  abdominal pain, previous functional deficit  -LN     Progress/Outcome (Gait Training Goal 1, PT)  --  goal not met  -LN        Strength Goal 1 (PT)    Strength Goal 1 (PT)  --  Pt will be able to complete 20 reps of at least 4 LE exercises in order to demo improved strength for transfers  -LN     Time Frame (Strength Goal 1, PT)  --  1 week  -LN      Barriers (Strength Goal 1, PT)  --  abdominal pain, previous functional deficit  -LN     Progress/Outcome (Strength Goal 1, PT)  --  goal not met  -LN        Transfer Goal 1 (OT)    Activity/Assistive Device (Transfer Goal 1, OT)  commode, bedside with drop arms;commode, bedside without drop arms  -CS  --     [place] Level/Cues Needed (Transfer Goal 1, OT)  minimum assist (75% or more patient effort)  -CS  --     Time Frame (Transfer Goal 1, OT)  long term goal (LTG);by discharge  -CS  --     Progress/Outcome (Transfer Goal 1, OT)  goal not met  -CS  --        Bathing Goal 1 (OT)    Activity/Assistive Device (Bathing Goal 1, OT)  upper body bathing  -CS  --     [place] Level/Cues Needed (Bathing Goal 1, OT)  minimum assist (75% or more patient effort)  -CS  --     Time Frame (Bathing Goal 1, OT)  long term goal (LTG);by discharge  -CS  --     Progress/Outcomes (Bathing Goal 1, OT)  goal not met  -CS  --        Dressing Goal 1 (OT)    Activity/Assistive Device (Dressing Goal 1, OT)  upper body dressing  -CS  --     [place]/Cues Needed (Dressing Goal 1, OT)  minimum assist (75% or more patient effort)  -CS  --     Time Frame (Dressing Goal 1, OT)  long term goal (LTG);by discharge  -CS  --     Progress/Outcome (Dressing Goal 1, OT)  goal not met  -CS  --        Grooming Goal 1 (OT)    Activity/Device (Grooming Goal 1, OT)  grooming skills, all  -CS  --     [place] (Grooming Goal 1, OT)  set-up required;supervision required;verbal cues required  -CS  --     Time Frame (Grooming Goal 1, OT)  long term goal (LTG);by discharge  -CS  --     Progress/Outcome (Grooming Goal 1, OT)  goal not met  -CS  --        Self-Feeding Goal 1 (OT)    Activity/Assistive Device (Self-Feeding Goal 1, OT)  self-feeding skills, all  -CS  --     [place] Level/Cues Needed (Self-Feeding Goal 1, OT)  set-up required;supervision required;verbal cues required  -CS  --     Time Frame (Self-Feeding Goal 1, OT)  long term goal  (LTG);by discharge  -CS  --     Barriers (Self-Feeding Goal 1, OT)  address when medically appropriate.   -CS  --     Progress/Outcomes (Self-Feeding Goal 1, OT)  goal not met  -CS  --       User Key  (r) = Recorded By, (t) = Taken By, (c) = Cosigned By    Initials Name Provider Type Discipline    Jannet Melchor, PTA Physical Therapy Assistant PT    Cathy Shane COTA/DERICK Occupational Therapy Assistant OT        Occupational Therapy Education     Title: PT OT SLP Therapies (In Progress)     Topic: Occupational Therapy (In Progress)     Point: ADL training (In Progress)     Description: Instruct learner(s) on proper safety adaptation and remediation techniques during self care or transfers.   Instruct in proper use of assistive devices.    Learning Progress Summary           Patient Acceptance, E,TB,D, NR by CS at 6/6/2019  3:56 PM    Acceptance, E, NR by  at 6/5/2019 11:27 AM    Acceptance, E,TB,D, NR by CS at 5/31/2019  1:12 PM    Acceptance, E, VU,NR by  at 5/30/2019  1:28 PM    Comment:  Educated about OT and POC. Educated to call for assist. Educated on safety throughout.                   Point: Home exercise program (In Progress)     Description: Instruct learner(s) on appropriate technique for monitoring, assisting and/or progressing therapeutic exercises/activities.    Learning Progress Summary           Patient Acceptance, E,TB,D, NR by CS at 6/6/2019  3:56 PM    Acceptance, E,TB,D, NR by CS at 5/31/2019  1:12 PM                   Point: Precautions (In Progress)     Description: Instruct learner(s) on prescribed precautions during self-care and functional transfers.    Learning Progress Summary           Patient Acceptance, E,TB,D, NR by CS at 6/6/2019  3:56 PM    Acceptance, E,TB,D, NR by CS at 5/31/2019  1:12 PM    Acceptance, E, VU,NR by  at 5/30/2019  1:28 PM    Comment:  Educated about OT and POC. Educated to call for assist. Educated on safety throughout.                   Point: Body  mechanics (In Progress)     Description: Instruct learner(s) on proper positioning and spine alignment during self-care, functional mobility activities and/or exercises.    Learning Progress Summary           Patient Acceptance, E,TB,D, NR by  at 6/6/2019  3:56 PM    Acceptance, E,TB,D, NR by  at 5/31/2019  1:12 PM                               User Key     Initials Effective Dates Name Provider Type Discipline     06/08/18 -  Dee Mendez, OTR/L Occupational Therapist OT     03/07/18 -  Anila Rodriguez, PELAYO/L Occupational Therapy Assistant OT     03/07/18 -  Cathy Dillon PELAYO/L Occupational Therapy Assistant OT                OT Recommendation and Plan  Outcome Summary/Treatment Plan (OT)  Daily Summary of Progress (OT): progress toward functional goals is good  Plan for Continued Treatment (OT): cont OT POC  Anticipated Discharge Disposition (OT): skilled nursing facility  Therapy Frequency (OT Eval): other (see comments)(5-7 days a week )  Daily Summary of Progress (OT): progress toward functional goals is good  Plan of Care Review  Plan of Care Reviewed With: patient(Simultaneous filing. User may be unaware of other data.)  Plan of Care Reviewed With: patient(Simultaneous filing. User may be unaware of other data.)  Outcome Summary: Pt tolerated tx fair this date. Pt was CGA with grooming task. Pt gave good/fair effort with UE ther ex. Pt was educated on home safety. Continue OT POC.(Simultaneous filing. User may be unaware of other data.)  Outcome Measures     Row Name 06/06/19 0935 06/05/19 1315 06/05/19 0720       How much help from another person do you currently need...    Turning from your back to your side while in flat bed without using bedrails?  2  -LN  2  -TW  --    Moving from lying on back to sitting on the side of a flat bed without bedrails?  2  -LN  2  -TW  --    Moving to and from a bed to a chair (including a wheelchair)?  2  -LN  2  -TW  --    Standing up from a chair  using your arms (e.g., wheelchair, bedside chair)?  2  -LN  2  -TW  --    Climbing 3-5 steps with a railing?  1  -LN  1  -TW  --    To walk in hospital room?  2  -LN  1  -TW  --    AM-PAC 6 Clicks Score  11  -LN  10  -TW  --       How much help from another is currently needed...    Putting on and taking off regular lower body clothing?  1  -CS  --  1  -BB    Bathing (including washing, rinsing, and drying)  1  -CS  --  1  -BB    Toileting (which includes using toilet bed pan or urinal)  1  -CS  --  1  -BB    Putting on and taking off regular upper body clothing  2  -CS  --  2  -BB    Taking care of personal grooming (such as brushing teeth)  2  -CS  --  2  -BB    Eating meals  1  -CS  --  1  -BB    Score  8  -CS  --  8  -BB       Functional Assessment    Outcome Measure Options  AM-PAC 6 Clicks Basic Mobility (PT)  -LN  -[facility] 6 Clicks Basic Mobility (PT)  -TW  --    Row Name 06/04/19 1233 06/04/19 0924          How much help from another person do you currently need...    Turning from your back to your side while in flat bed without using bedrails?  --  2  -LN     Moving from lying on back to sitting on the side of a flat bed without bedrails?  --  2  -LN     Moving to and from a bed to a chair (including a wheelchair)?  --  2  -LN     Standing up from a chair using your arms (e.g., wheelchair, bedside chair)?  --  2  -LN     Climbing 3-5 steps with a railing?  --  1  -LN     To walk in hospital room?  --  1  -LN     AM-PAC 6 Clicks Score  --  10  -LN        How much help from another is currently needed...    Putting on and taking off regular lower body clothing?  1  -BB  --     Bathing (including washing, rinsing, and drying)  1  -BB  --     Toileting (which includes using toilet bed pan or urinal)  1  -BB  --     Putting on and taking off regular upper body clothing  2  -BB  --     Taking care of personal grooming (such as brushing teeth)  2  -BB  --     Eating meals  2  -BB  --     Score  9  -BB  --         Functional Assessment    Outcome Measure Options  --  AM-PAC 6 Clicks Basic Mobility (PT)  -LN       User Key  (r) = Recorded By, (t) = Taken By, (c) = Cosigned By    Initials Name Provider Type    LN Jannet Garcia, PTA Physical Therapy Assistant    TW Karl Zheng, TERRY Physical Therapy Assistant    BB Anila Rodriguez, PELAYO/L Occupational Therapy Assistant    CS Cathy Dillon COTA/DERICK Occupational Therapy Assistant           Time Calculation:   Time Calculation- OT     Row Name 06/06/19 1557             Time Calculation- OT    OT Start Time  1318  -CS      OT Stop Time  1415  -CS      OT Time Calculation (min)  57 min  -CS      Total Timed Code Minutes- OT  57 minute(s)  -CS      OT Received On  06/06/19  -        User Key  (r) = Recorded By, (t) = Taken By, (c) = Cosigned By    Initials Name Provider Type    CS Cathy Dillon COTA/DERICK Occupational Therapy Assistant        Therapy Charges for Today     Code Description Service Date Service Provider Modifiers Qty    46376737321 HC OT SELF CARE/MGMT/TRAIN EA 15 MIN 6/6/2019 Cathy Dillon COTA/L GO 2    88242539436 HC OT THER PROC EA 15 MIN 6/6/2019 Cathy Dillon COTA/L GO 2               FREDDY Nguyen  6/6/2019

## 2019-06-06 NOTE — PLAN OF CARE
Problem: Patient Care Overview  Goal: Plan of Care Review  Outcome: Ongoing (interventions implemented as appropriate)   06/06/19 0106   Coping/Psychosocial   Plan of Care Reviewed With patient   Plan of Care Review   Progress improving   OTHER   Outcome Summary Pt tolerated tx fair this date. Pt was CGA with grooming task. Pt gave good/fair effort with UE ther ex. Pt was educated on home safety. Continue OT POC.

## 2019-06-06 NOTE — PLAN OF CARE
Problem: Patient Care Overview  Goal: Plan of Care Review  Outcome: Ongoing (interventions implemented as appropriate)   06/06/19 5775   Coping/Psychosocial   Plan of Care Reviewed With patient   Plan of Care Review   Progress no change   OTHER   Outcome Summary VS stable; new purulent drainage from vagina-physican aware; cont drsg changes; CT abd today; meds adjusted     Goal: Individualization and Mutuality  Outcome: Ongoing (interventions implemented as appropriate)    Goal: Discharge Needs Assessment  Outcome: Ongoing (interventions implemented as appropriate)    Goal: Interprofessional Rounds/Family Conf  Outcome: Ongoing (interventions implemented as appropriate)      Problem: Fall Risk (Adult)  Goal: Absence of Fall  Outcome: Ongoing (interventions implemented as appropriate)      Problem: Skin Injury Risk (Adult)  Goal: Skin Health and Integrity  Outcome: Ongoing (interventions implemented as appropriate)      Problem: Surgery Nonspecified (Adult)  Goal: Signs and Symptoms of Listed Potential Problems Will be Absent, Minimized or Managed (Surgery Nonspecified)  Outcome: Ongoing (interventions implemented as appropriate)      Problem: Wound (Includes Pressure Injury) (Adult)  Goal: Signs and Symptoms of Listed Potential Problems Will be Absent, Minimized or Managed (Wound)  Outcome: Ongoing (interventions implemented as appropriate)

## 2019-06-07 LAB
ANION GAP SERPL CALCULATED.3IONS-SCNC: 9 MMOL/L
BUN BLD-MCNC: 4 MG/DL (ref 8–23)
BUN/CREAT SERPL: 6.5 (ref 7–25)
CALCIUM SPEC-SCNC: 7.9 MG/DL (ref 8.6–10.5)
CHLORIDE SERPL-SCNC: 97 MMOL/L (ref 98–107)
CO2 SERPL-SCNC: 24 MMOL/L (ref 22–29)
CREAT BLD-MCNC: 0.62 MG/DL (ref 0.57–1)
DEPRECATED RDW RBC AUTO: 45.9 FL (ref 37–54)
ERYTHROCYTE [DISTWIDTH] IN BLOOD BY AUTOMATED COUNT: 12.8 % (ref 12.3–15.4)
GFR SERPL CREATININE-BSD FRML MDRD: 96 ML/MIN/1.73
GLUCOSE BLD-MCNC: 82 MG/DL (ref 65–99)
HCT VFR BLD AUTO: 35.3 % (ref 34–46.6)
HGB BLD-MCNC: 11.4 G/DL (ref 12–15.9)
MCH RBC QN AUTO: 31.7 PG (ref 26.6–33)
MCHC RBC AUTO-ENTMCNC: 32.3 G/DL (ref 31.5–35.7)
MCV RBC AUTO: 98.1 FL (ref 79–97)
PLATELET # BLD AUTO: 314 10*3/MM3 (ref 140–450)
PMV BLD AUTO: 11.1 FL (ref 6–12)
POTASSIUM BLD-SCNC: 4.2 MMOL/L (ref 3.5–5.2)
RBC # BLD AUTO: 3.6 10*6/MM3 (ref 3.77–5.28)
SODIUM BLD-SCNC: 130 MMOL/L (ref 136–145)
WBC NRBC COR # BLD: 11.19 10*3/MM3 (ref 3.4–10.8)

## 2019-06-07 PROCEDURE — 97530 THERAPEUTIC ACTIVITIES: CPT

## 2019-06-07 PROCEDURE — 80048 BASIC METABOLIC PNL TOTAL CA: CPT | Performed by: SURGERY

## 2019-06-07 PROCEDURE — 25010000002 HEPARIN (PORCINE) PER 1000 UNITS: Performed by: SURGERY

## 2019-06-07 PROCEDURE — 99024 POSTOP FOLLOW-UP VISIT: CPT | Performed by: SURGERY

## 2019-06-07 PROCEDURE — 85027 COMPLETE CBC AUTOMATED: CPT | Performed by: SURGERY

## 2019-06-07 PROCEDURE — 97110 THERAPEUTIC EXERCISES: CPT

## 2019-06-07 PROCEDURE — 25010000002 HYDROMORPHONE 1 MG/ML SOLUTION: Performed by: SURGERY

## 2019-06-07 RX ORDER — FAMOTIDINE 20 MG/1
20 TABLET, FILM COATED ORAL
Status: DISCONTINUED | OUTPATIENT
Start: 2019-06-07 | End: 2019-06-14 | Stop reason: HOSPADM

## 2019-06-07 RX ADMIN — GLATIRAMER 40 MG: 40 INJECTION, SOLUTION SUBCUTANEOUS at 20:11

## 2019-06-07 RX ADMIN — HEPARIN SODIUM 5000 UNITS: 5000 INJECTION INTRAVENOUS; SUBCUTANEOUS at 14:29

## 2019-06-07 RX ADMIN — HYDROMORPHONE HYDROCHLORIDE 0.5 MG: 1 INJECTION, SOLUTION INTRAMUSCULAR; INTRAVENOUS; SUBCUTANEOUS at 09:12

## 2019-06-07 RX ADMIN — BUMETANIDE 0.5 MG: 0.25 INJECTION INTRAMUSCULAR; INTRAVENOUS at 14:29

## 2019-06-07 RX ADMIN — POTASSIUM CHLORIDE, DEXTROSE MONOHYDRATE AND SODIUM CHLORIDE 100 ML/HR: 150; 5; 450 INJECTION, SOLUTION INTRAVENOUS at 06:15

## 2019-06-07 RX ADMIN — AZTREONAM 1 G: 1 INJECTION, POWDER, LYOPHILIZED, FOR SOLUTION INTRAMUSCULAR; INTRAVENOUS at 20:09

## 2019-06-07 RX ADMIN — MEMANTINE 2.5 MG: 5 TABLET ORAL at 20:10

## 2019-06-07 RX ADMIN — PRIMIDONE 50 MG: 50 TABLET ORAL at 20:10

## 2019-06-07 RX ADMIN — BUMETANIDE 0.5 MG: 0.25 INJECTION INTRAMUSCULAR; INTRAVENOUS at 02:06

## 2019-06-07 RX ADMIN — SODIUM CHLORIDE, PRESERVATIVE FREE 10 ML: 5 INJECTION INTRAVENOUS at 08:16

## 2019-06-07 RX ADMIN — SODIUM CHLORIDE, PRESERVATIVE FREE 10 ML: 5 INJECTION INTRAVENOUS at 20:13

## 2019-06-07 RX ADMIN — HYDROMORPHONE HYDROCHLORIDE 0.5 MG: 1 INJECTION, SOLUTION INTRAMUSCULAR; INTRAVENOUS; SUBCUTANEOUS at 18:21

## 2019-06-07 RX ADMIN — GABAPENTIN 600 MG: 300 CAPSULE ORAL at 20:09

## 2019-06-07 RX ADMIN — VILAZODONE HYDROCHLORIDE 20 MG: 20 TABLET ORAL at 20:14

## 2019-06-07 RX ADMIN — DONEPEZIL HYDROCHLORIDE 5 MG: 5 TABLET, FILM COATED ORAL at 20:10

## 2019-06-07 RX ADMIN — GABAPENTIN 600 MG: 300 CAPSULE ORAL at 14:29

## 2019-06-07 RX ADMIN — LEVOTHYROXINE SODIUM 137 MCG: 25 TABLET ORAL at 08:13

## 2019-06-07 RX ADMIN — AZTREONAM 1 G: 1 INJECTION, POWDER, LYOPHILIZED, FOR SOLUTION INTRAMUSCULAR; INTRAVENOUS at 14:29

## 2019-06-07 RX ADMIN — MEMANTINE 2.5 MG: 5 TABLET ORAL at 08:14

## 2019-06-07 RX ADMIN — HEPARIN SODIUM 5000 UNITS: 5000 INJECTION INTRAVENOUS; SUBCUTANEOUS at 06:15

## 2019-06-07 RX ADMIN — GABAPENTIN 600 MG: 300 CAPSULE ORAL at 06:15

## 2019-06-07 RX ADMIN — HEPARIN SODIUM 5000 UNITS: 5000 INJECTION INTRAVENOUS; SUBCUTANEOUS at 20:10

## 2019-06-07 RX ADMIN — NYSTATIN: 100000 POWDER TOPICAL at 20:14

## 2019-06-07 RX ADMIN — HYDROMORPHONE HYDROCHLORIDE 0.5 MG: 1 INJECTION, SOLUTION INTRAMUSCULAR; INTRAVENOUS; SUBCUTANEOUS at 23:03

## 2019-06-07 RX ADMIN — HYDROMORPHONE HYDROCHLORIDE 0.5 MG: 1 INJECTION, SOLUTION INTRAMUSCULAR; INTRAVENOUS; SUBCUTANEOUS at 14:30

## 2019-06-07 RX ADMIN — PRIMIDONE 50 MG: 50 TABLET ORAL at 06:15

## 2019-06-07 RX ADMIN — AZTREONAM 1 G: 1 INJECTION, POWDER, LYOPHILIZED, FOR SOLUTION INTRAMUSCULAR; INTRAVENOUS at 06:15

## 2019-06-07 RX ADMIN — FAMOTIDINE 20 MG: 10 INJECTION INTRAVENOUS at 08:16

## 2019-06-07 RX ADMIN — HYDROMORPHONE HYDROCHLORIDE 0.5 MG: 1 INJECTION, SOLUTION INTRAMUSCULAR; INTRAVENOUS; SUBCUTANEOUS at 20:11

## 2019-06-07 RX ADMIN — PRIMIDONE 50 MG: 50 TABLET ORAL at 14:29

## 2019-06-07 RX ADMIN — HYDROMORPHONE HYDROCHLORIDE 0.5 MG: 1 INJECTION, SOLUTION INTRAMUSCULAR; INTRAVENOUS; SUBCUTANEOUS at 02:06

## 2019-06-07 NOTE — PLAN OF CARE
Problem: Patient Care Overview  Goal: Plan of Care Review  Outcome: Ongoing (interventions implemented as appropriate)   06/07/19 6762   Coping/Psychosocial   Plan of Care Reviewed With patient   Plan of Care Review   Progress improving   OTHER   Outcome Summary Pt tolerated tx well this date. Pt was min A x 2 with sup-sit and min-mod A x 2 for sit-stand and t/fs. Pt gave good effort with UE ther ex. Continue OT POC.

## 2019-06-07 NOTE — THERAPY TREATMENT NOTE
Acute Care - Physical Therapy Treatment Note  [facility]     Patient Name: Irma Pacheco  : 1950  MRN: [id number]  Today's Date: 2019  Onset of Illness/Injury or Date of Surgery: 19  Date of Referral to PT: 19  Referring Physician: Dr. Dixon     Admit Date: 2019    Visit Dx:    ICD-10-CM ICD-9-CM   1. 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 Impaired functional mobility and activity tolerance Z74.09 V49.89   3. 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       Therapy Treatment    Rehabilitation Treatment Summary     Row Name 19 1015 19 1014          Treatment Time/Intention    Discipline  physical therapy assistant  -SHRAVAN  occupational therapy assistant  -CS     Document Type  therapy note (daily note)  -SHRAVAN  therapy note (daily note)  -CS     Subjective Information  --  complains of;pain  -CS     Mode of Treatment  physical therapy;co-treatment  -JC2  occupational therapy;co-treatment  -CS     Therapy Frequency (PT Clinical Impression)  other (see comments) 6x/wk  -SHRAVAN  --     Therapy Frequency (OT Eval)  other (see comments) 5-7 days/wk  -SHRAVAN  other (see comments) 5-7 days a week   -CS     Patient Effort  good  -SHRAVAN  good  -CS     Reason Treatment Not Performed  other (see comments)  -SHRAVAN  --     Existing Precautions/Restrictions  fall;other (see comments) colostomy  -SHRAVAN  fall;other (see comments) colostomy  -CS     Recorded by [SHRAVAN] Thai Swanson, PTA 19 1026  [JC2] Thai Swanson, PTA 19 1321 [CS] Cathy Dillon COTA/L 19 1324     Row Name 19 1015 19 1014          Vital Signs    Pre Systolic BP Rehab  139  -SHRAVAN  139  -CS     Pre Treatment Diastolic BP  55  -SHRAVAN  55  -CS     Post Systolic BP Rehab  -- pt w/ OT at end of tx.   -JC2  --     Pretreatment Heart Rate (beats/min)  86  -SHRAVAN  86  -CS     Intratreatment Heart Rate (beats/min)   108  -JC2  --     Posttreatment Heart Rate (beats/min)  --  82  -CS     Pre SpO2 (%)  95  -SHRAVAN  95  -CS     O2 Delivery Pre Treatment  room air  -SHRAVAN  room air  -CS     Intra SpO2 (%)  95  -JC2  --     O2 Delivery Intra Treatment  room air  -JC2  --     Post SpO2 (%)  --  92  -CS     O2 Delivery Post Treatment  --  room air  -CS     Pre Patient Position  Supine  -SHRAVAN  Supine  -CS     Intra Patient Position  --  Standing  -CS     Post Patient Position  Sitting  -JC2  Sitting  -CS     Recorded by [SHRAVAN] Thai Swanson, PTA 06/07/19 1026  [JC2] Thai Swanson, PTA 06/07/19 1321 [CS] Cathy Dillon PELAYO/L 06/07/19 1324     Row Name 06/07/19 1015 06/07/19 1014          Cognitive Assessment/Intervention- PT/OT    Affect/Mental Status (Cognitive)  WFL  -SHRAVAN  WFL  -CS     Orientation Status (Cognition)  oriented x 4  -SHRAVAN  oriented x 4  -CS     Follows Commands (Cognition)  follows one step commands  -SHRAVAN  follows one step commands  -CS     Personal Safety Interventions  fall prevention program maintained;gait belt;muscle strengthening facilitated;nonskid shoes/slippers when out of bed;supervised activity  -SHRAVAN  gait belt;nonskid shoes/slippers when out of bed  -CS     Recorded by [SHRAVAN] Thai Swanson, PTA 06/07/19 1026 [CS] Cathy Dillon PELAYO/L 06/07/19 1324     Row Name 06/07/19 1015 06/07/19 1014          Safety Issues, Functional Mobility    Impairments Affecting Function (Mobility)  balance;coordination;endurance/activity tolerance;pain;postural/trunk control;strength;motor control;motor planning  -SHRAVAN  balance;coordination;endurance/activity tolerance;pain;postural/trunk control;strength;motor control;motor planning  -CS     Recorded by [SHRAVAN] Thai Swanson, PTA 06/07/19 1026 [CS] Cathy Dillon COTA/L 06/07/19 1324     Row Name 06/07/19 1015 06/07/19 1014          Bed Mobility Assessment/Treatment    Bed Mobility Assessment/Treatment  supine-sit  -SHRAVAN  --     Rolling Left [place] (Bed Mobility)  --  -SHRAVAN   not tested  -CS     Rolling Right [place] (Bed Mobility)  --  -SHRAVAN  moderate assist (50% patient effort);2 person assist  -CS     Supine-Sit [place] (Bed Mobility)  minimum assist (75% patient effort);2 person assist  -SHRAVAN  moderate assist (50% patient effort);1 person assist  -CS     Sit-Supine [place] (Bed Mobility)  --  -SHRAVAN  --     Assistive Device (Bed Mobility)  bed rails;head of bed elevated  -JC2  bed rails;head of bed elevated  -CS     Recorded by [SHRAVAN] Thai Swanson, PTA 06/07/19 1321  [JC2] Thai Swanson, PTA 06/07/19 1026 [CS] Cathy Dillon, PELAYO/L 06/07/19 1324     Row Name 06/07/19 1015             Functional Mobility    Functional Mobility- Ind. Level  --  -SHRAVAN      Recorded by [SHRAVAN] Thai Swanson, PTA 06/07/19 1321      Row Name 06/07/19 1015 06/07/19 1014          Transfer Assessment/Treatment    Transfer Assessment/Treatment  bed-chair transfer  -SHRAVAN  bed-chair transfer;sit-stand transfer;stand-sit transfer  -CS     Recorded by [SHRAVAN] Thai Swanson, PTA 06/07/19 1321 [CS] Cathy Dillon, PELAYO/L 06/07/19 1324     Row Name 06/07/19 1015 06/07/19 1014          Bed-Chair Transfer    Bed-Chair [place] (Transfers)  minimum assist (75% patient effort);moderate assist (50% patient effort);2 person assist;verbal cues  -SHRAVAN  moderate assist (50% patient effort);2 person assist  -CS     Assistive Device (Bed-Chair Transfers)  walker, front-wheeled  -JC2  --     Recorded by [SHRAVAN] Thai Swanson, PTA 06/07/19 1321  [JC2] Thai Swanson, PTA 06/07/19 1026 [CS] Cathy Dillon, PELAYO/L 06/07/19 1324     Row Name 06/07/19 1015             Chair-Bed Transfer    Chair-Bed [place] (Transfers)  --  -SHRAVAN      Assistive Device (Chair-Bed Transfers)  --  -SHRAVAN      Recorded by [SHRAVAN] Thai Swanson, PTA 06/07/19 1321      Row Name 06/07/19 1015 06/07/19 1014          Sit-Stand Transfer    Sit-Stand [place] (Transfers)  minimum assist (75% patient effort);moderate assist (50%  patient effort);2 person assist  -SHRAVAN  moderate assist (50% patient effort)  -CS     Assistive Device (Sit-Stand Transfers)  walker, front-wheeled  -JC2  walker, front-wheeled  -CS     Recorded by [SHRAVAN] Thai Swanson, PTA 06/07/19 1321  [JC2] Thai Swanson, PTA 06/07/19 1026 [CS] Cathy Dillon PELAYO/L 06/07/19 1324     Row Name 06/07/19 1015 06/07/19 1014          Stand-Sit Transfer    Stand-Sit [place] (Transfers)  minimum assist (75% patient effort);moderate assist (50% patient effort);2 person assist  -SHRAVAN  moderate assist (50% patient effort)  -CS     Assistive Device (Stand-Sit Transfers)  walker, front-wheeled  -JC2  walker, front-wheeled  -CS     Recorded by [SHRAVAN] Thai Swanson, PTA 06/07/19 1321  [JC2] Thai Swanson, PTA 06/07/19 1026 [CS] Cathy Dillon PELAYO/L 06/07/19 1324     Row Name 06/07/19 1015             Toilet Transfer    Type (Toilet Transfer)  --  -SHRAVAN      [place] Level (Toilet Transfer)  --  -SHRAVAN      Assistive Device (Toilet Transfer)  --  -SHRAVAN      Recorded by [SHRAVAN] Thai Swanson, PTA 06/07/19 1321      Row Name 06/07/19 1015             Gait/Stairs Assessment/Training    Gait/Stairs Assessment/Training  gait/ambulation assistive device  -SHRAVAN      [place] Level (Gait)  contact guard;2 person assist  -JC2      Assistive Device (Gait)  walker, front-wheeled  -SHRAVAN      Distance in Feet (Gait)  3  -JC2      Pattern (Gait)  step-to  -SHRAVAN      Deviations/Abnormal Patterns (Gait)  base of support, wide;festinating/shuffling  -SHRAVAN      Bilateral Gait Deviations  forward flexed posture  -SHRAVAN      Comment (Gait/Stairs)  pt fatigues quickly.   -JC2      Recorded by [SHRAVAN] Thai Swanson, PTA 06/07/19 1026  [JC2] Thai Swanson, PTA 06/07/19 1321      Row Name 06/07/19 1015             Positioning and Restraints    Pre-Treatment Position  in bed  -SHRAVAN      Post Treatment Position  chair  -JC2      In Chair  reclined;call light within reach;encouraged to call for assist;with  family/caregiver in ortho chair, nsg notified. states pt doesn't need alarm.   -JC2      Recorded by [SHRAVAN] Thai Swanson, PTA 06/07/19 1026  [JC2] Thai Swanson, PTA 06/07/19 1321      Row Name 06/07/19 1015             Pain Assessment    Additional Documentation  Pain Scale: Numbers Pre/Post-Treatment (Group)  -SHRAVAN      Recorded by [SHRAVAN] Thai Swanson, PTA 06/07/19 1321      Row Name 06/07/19 1015             Pain Scale: Numbers Pre/Post-Treatment    Pain Scale: Numbers, Pretreatment  9/10  -SHRAVAN      Pain Scale: Numbers, Post-Treatment  -- pt w/ OT at end of tx.   -JC2      Pain Location  -- R kidney  -JC2      Recorded by [SHRAVAN] Thai Swanson, PTA 06/07/19 1026  [JC2] Thai Swanson, PTA 06/07/19 1321      Row Name 06/07/19 1015             Vision Assessment/Intervention    Visual Impairment/Limitations  --  -SHRAVAN      Recorded by [SHRAVAN] Thai Sawnson, PTA 06/07/19 1321      Row Name 06/07/19 1015             Wound 05/29/19 1327 abdomen incision;surgical    Wound - Properties Group Date first assessed: 05/29/19 [KM] Time first assessed: 1327 [KM] Present On Admission : no [KM] Location: abdomen [KM] Type: incision;surgical [KM] Recorded by:  [KM] Geno Blackmon RN 05/29/19 1327    Periwound Skin Turgor  soft  -SHRAVAN      Recorded by [SHRAVAN] Thai Swanson, PTA 06/07/19 1026      Row Name                Wound 06/05/19 1818 Bilateral other (see notes) other (see comments)    Wound - Properties Group Date first assessed: 06/05/19 [KMA] Time first assessed: 1818 [KMA] Present On Admission : yes [KMA], Admission to   Side: Bilateral [KMA] Location: other (see notes) [KMA2], Bilateral groin & skin fold excoriation  Type: other (see comments) [KMA] Recorded by:  [KMA] Geno Onofre RN 06/05/19 1819 [KMA2] Geno Onofre RN 06/05/19 1821    Row Name 06/07/19 1015             Coping    Observed Emotional State  accepting;calm;cooperative  -SHRAVAN      Verbalized Emotional State  acceptance  -SHRAVAN      Recorded  by [SHRAVAN] Thai Swanson, PTA 06/07/19 1026      Row Name 06/07/19 1015             Outcome Summary/Treatment Plan (OT)    Anticipated Discharge Disposition (OT)  skilled nursing facility  -      Recorded by [SHRAVAN] Thai Swanson, PTA 06/07/19 1026      Row Name 06/07/19 1015             Outcome Summary/Treatment Plan (PT)    Daily Summary of Progress (PT)  progress toward functional goals as expected  -SHRAVAN      Plan for Continued Treatment (PT)  continue  -SHRAVAN      Anticipated Discharge Disposition (PT)  anticipate therapy at next level of care  -JC2      Recorded by [SHRAVAN] Thai Swanson, PTA 06/07/19 1321  [JC2] Thai Swanson, PTA 06/07/19 1026        User Key  (r) = Recorded By, (t) = Taken By, (c) = Cosigned By    Initials Name Effective Dates Discipline    Geno Isaac, RN 10/17/16 -  Nurse    SHRAVAN Thai Swanson, PTA 03/07/18 -  PT    Cathy Shane, PELAYO/L 03/07/18 -  OT    Geno Limon, RN 05/21/18 -  Nurse          Wound 05/29/19 1327 abdomen incision;surgical (Active)   Dressing Appearance copious drainage 6/7/2019  6:35 AM   Closure Other (Comment) 6/7/2019  6:35 AM   Base dressing in place, unable to visualize 6/6/2019  7:41 PM   Periwound Skin Turgor soft 6/7/2019 10:15 AM   Drainage Characteristics/Odor creamy;tan 6/7/2019  6:35 AM   Drainage Amount moderate 6/7/2019  6:35 AM   Dressing Care, Wound dressing changed;gauze, dry 6/7/2019  6:35 AM       Wound 06/05/19 1818 Bilateral other (see notes) other (see comments) (Active)   Dressing Appearance open to air 6/6/2019  7:41 PM   Closure None 6/6/2019  7:41 PM   Base pink 6/6/2019  7:41 PM   Periwound pink;moist 6/6/2019  7:41 PM   Drainage Amount none 6/6/2019  7:41 PM       Rehab Goal Summary     Row Name 06/07/19 1015             Physical Therapy Goals    Bed Mobility Goal Selection (PT)  bed mobility, PT goal 1  -      Transfer Goal Selection (PT)  transfer, PT goal 1  -SHRAVAN      Gait Training Goal Selection (PT)  gait  training, PT goal 1  -SHRAVAN      Strength Goal Selection (PT)  strength, PT goal 1  -SHRAVAN         Bed Mobility Goal 1 (PT)    Activity/Assistive Device (Bed Mobility Goal 1, PT)  sit to supine/supine to sit  -SHRAVAN      [place] Level/Cues Needed (Bed Mobility Goal 1, PT)  minimum assist (75% or more patient effort)  -SHRAVAN      Time Frame (Bed Mobility Goal 1, PT)  10 days  -SHRAVAN      Barriers (Bed Mobility Goal 1, PT)  abdominal pain, previous functional deficit  -SHRAVAN      Progress/Outcomes (Bed Mobility Goal 1, PT)  goal not met  -SHRAVAN         Transfer Goal 1 (PT)    Activity/Assistive Device (Transfer Goal 1, PT)  sit-to-stand/stand-to-sit;bed-to-chair/chair-to-bed;walker, rolling  -SHRAVAN      [place] Level/Cues Needed (Transfer Goal 1, PT)  contact guard assist;verbal cues required  -SHRAVAN      Time Frame (Transfer Goal 1, PT)  1 week  -SHRAVAN      Barriers (Transfers Goal 1, PT)  abdominal pain, previous functional deficit  -SHRAVAN      Progress/Outcome (Transfer Goal 1, PT)  goal not met  -SHRAVAN         Gait Training Goal 1 (PT)    Activity/Assistive Device (Gait Training Goal 1, PT)  walker, rolling;decrease fall risk;increase endurance/gait distance  -SHRAVAN      [place] Level (Gait Training Goal 1, PT)  contact guard assist;verbal cues required  -SHRAVAN      Distance (Gait Goal 1, PT)  10 feet  -SHRAVAN      Time Frame (Gait Training Goal 1, PT)  10 days  -SHRAVAN      Barriers (Gait Training Goal 1, PT)  abdominal pain, previous functional deficit  -SHRAVAN      Progress/Outcome (Gait Training Goal 1, PT)  goal not met  -SHRAVAN         Strength Goal 1 (PT)    Strength Goal 1 (PT)  Pt will be able to complete 20 reps of at least 4 LE exercises in order to demo improved strength for transfers  -SHRAVAN      Time Frame (Strength Goal 1, PT)  1 week  -SHRAVAN      Barriers (Strength Goal 1, PT)  abdominal pain, previous functional deficit  -SHRAVAN      Progress/Outcome (Strength Goal 1, PT)  goal not met  -SHRAVAN        User Key  (r) = Recorded By, (t) = Taken By, (c) =  Cosigned By    Initials Name Provider Type Discipline    Thai Palma PTA Physical Therapy Assistant PT          Physical Therapy Education     Title: PT OT SLP Therapies (In Progress)     Topic: Physical Therapy (In Progress)     Point: Mobility training (In Progress)     Learning Progress Summary           Patient Acceptance, E, NR by SHRAVAN at 6/7/2019  1:22 PM    Acceptance, E,TB, VU by KATH at 6/6/2019  1:31 PM    Comment:  ex throughout the day    Acceptance, E,TB, VU by LN at 6/4/2019  1:23 PM    Acceptance, E, VU,NR by JAVI at 5/30/2019 12:58 PM    Comment:  Role of PT, PT POC, transfer technique   Family Acceptance, E,TB, VU by LN at 6/6/2019  1:31 PM    Comment:  ex throughout the day                   Point: Home exercise program (Done)     Learning Progress Summary           Patient Acceptance, E,TB, VU by LN at 6/6/2019  1:31 PM    Comment:  ex throughout the day    Acceptance, E,TB, VU by LN at 6/3/2019  3:53 PM    Acceptance, E,TB, VU by LN at 5/31/2019 12:58 PM   Family Acceptance, E,TB, VU by LN at 6/6/2019  1:31 PM    Comment:  ex throughout the day                   Point: Body mechanics (Done)     Learning Progress Summary           Patient Acceptance, E,TB, VU by LN at 6/6/2019  1:31 PM    Comment:  ex throughout the day    Acceptance, E,TB, VU by LN at 6/4/2019  1:23 PM    Acceptance, E,TB, VU by ADELINA at 6/4/2019 10:07 AM   Family Acceptance, E,TB, VU by LN at 6/6/2019  1:31 PM    Comment:  ex throughout the day                   Point: Precautions (Done)     Learning Progress Summary           Patient Acceptance, E,TB, VU by LN at 6/6/2019  1:31 PM    Comment:  ex throughout the day    Acceptance, E,TB, VU by LN at 6/4/2019  1:23 PM    Acceptance, E,TB, VU by LN at 6/3/2019  3:53 PM    Acceptance, E,TB, VU by LN at 5/31/2019 12:58 PM    Acceptance, E, VU by JAVI at 5/30/2019  1:00 PM    Comment:  Gait belt, call light, staff assistance with mobility   Family Acceptance, E,TB, VU by LN at 6/6/2019   1:31 PM    Comment:  ex throughout the day                               User Key     Initials Effective Dates Name Provider Type Discipline    J 10/17/16 -  Alem Garrido RN Registered Nurse Nurse    SHRAVAN 03/07/18 -  Thai Swanson PTA Physical Therapy Assistant PT    LN 03/07/18 -  Jannet Garcia PTA Physical Therapy Assistant PT    LF 07/23/18 -  Donna Stacy, PT Physical Therapist PT                PT Recommendation and Plan  Anticipated Discharge Disposition (PT): anticipate therapy at next level of care  Therapy Frequency (PT Clinical Impression): other (see comments)(6x/wk)  Outcome Summary/Treatment Plan (PT)  Daily Summary of Progress (PT): progress toward functional goals as expected  Plan for Continued Treatment (PT): continue  Anticipated Discharge Disposition (PT): anticipate therapy at next level of care  Plan of Care Reviewed With: patient  Progress: improving  Outcome Summary: pt responded well to therapy. pt required min A x2 for sup-sit and min-mod x2 for sit-stand and t/fs. pt amb 3 ft CGA x2 from bed to ortho chair. pt fatigues quickly. no new goals met at this time. pt would continue to benefit from PT services.   Outcome Measures     Row Name 06/07/19 1015 06/06/19 0935 06/05/19 1315       How much help from another person do you currently need...    Turning from your back to your side while in flat bed without using bedrails?  3  -SHRAVAN  2  -LN  2  -TW    Moving from lying on back to sitting on the side of a flat bed without bedrails?  3  -SHRAVAN  2  -LN  2  -TW    Moving to and from a bed to a chair (including a wheelchair)?  2  -SHRAVAN  2  -LN  2  -TW    Standing up from a chair using your arms (e.g., wheelchair, bedside chair)?  2  -SHRAVAN  2  -LN  2  -TW    Climbing 3-5 steps with a railing?  1  -SHRAVAN  1  -LN  1  -TW    To walk in hospital room?  2  -SHRAVAN  2  -LN  1  -TW    AM-PAC 6 Clicks Score  13  -SHRAVAN  11  -LN  10  -TW       How much help from another is currently needed...    Putting on and  taking off regular lower body clothing?  --  1  -CS  --    Bathing (including washing, rinsing, and drying)  --  1  -CS  --    Toileting (which includes using toilet bed pan or urinal)  --  1  -CS  --    Putting on and taking off regular upper body clothing  --  2  -CS  --    Taking care of personal grooming (such as brushing teeth)  --  2  -CS  --    Eating meals  --  1  -CS  --    Score  --  8  -CS  --       Functional Assessment    Outcome Measure Options  AM-PAC 6 Clicks Basic Mobility (PT)  -SHRAVAN  AM-PAC 6 Clicks Basic Mobility (PT)  -LN  AM-PAC 6 Clicks Basic Mobility (PT)  -TW    Row Name 06/05/19 0720             How much help from another is currently needed...    Putting on and taking off regular lower body clothing?  1  -BB      Bathing (including washing, rinsing, and drying)  1  -BB      Toileting (which includes using toilet bed pan or urinal)  1  -BB      Putting on and taking off regular upper body clothing  2  -BB      Taking care of personal grooming (such as brushing teeth)  2  -BB      Eating meals  1  -BB      Score  8  -BB        User Key  (r) = Recorded By, (t) = Taken By, (c) = Cosigned By    Initials Name Provider Type    Thai Palma, PTA Physical Therapy Assistant    LN Jannet Garcia, PTA Physical Therapy Assistant    TW Karl Zheng, PTA Physical Therapy Assistant    BB Anila Rodriguez, PELAYO/L Occupational Therapy Assistant    CS Cathy Dillon, PELAYO/L Occupational Therapy Assistant         Time Calculation:   PT Charges     Row Name 06/07/19 1322             Time Calculation    Start Time  1015  -SHRAVAN      Stop Time  1043  -SHRAVAN      Time Calculation (min)  28 min  -SHRAVAN      PT Non-Billable Time (min)  13 min co-tx  -SHRAVAN         Time Calculation- PT    Total Timed Code Minutes- PT  15 minute(s)  -SHRAVAN        User Key  (r) = Recorded By, (t) = Taken By, (c) = Cosigned By    Initials Name Provider Type    Thai Palma, PTA Physical Therapy Assistant        Therapy Charges  for Today     Code Description Service Date Service Provider Modifiers Qty    38064714615 HC PT THERAPEUTIC ACT EA 15 MIN 6/7/2019 Thai Swanson, PTA GP 1    46324924860 HC PT THER SUPP EA 15 MIN 6/7/2019 Thai Swanson, TERRY GP 1          PT G-Codes  Outcome Measure Options: AM-PAC 6 Clicks Basic Mobility (PT)  AM-PAC 6 Clicks Score: 13  Score: 8    Thai Swanson PTA  6/7/2019

## 2019-06-07 NOTE — THERAPY TREATMENT NOTE
Acute Care - Occupational Therapy Treatment Note  [facility]     Patient Name: Irma Pacheco  : 1950  MRN: [id number]  Today's Date: 2019  Onset of Illness/Injury or Date of Surgery: 19  Date of Referral to OT: 19  Referring Physician: Dr. Dixon     Admit Date: 2019       ICD-10-CM ICD-9-CM   1. 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 Impaired functional mobility and activity tolerance Z74.09 V49.89   3. 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     Past Medical History:   Diagnosis Date   • Anxiety    • Arthritis    • Asthma    • CHF (congestive heart failure) (CMS/Formerly [facility])    • Colostomy care (CMS/Formerly [facility])    • Concussion     x2   • Depression    • Disease of thyroid gland    • Hyperlipidemia    • Hypotension    • Kidney disease, chronic, stage III (GFR 30-59 ml/min) (CMS/Formerly [facility])    • MS (multiple sclerosis) (CMS/Formerly [facility])    • Osteoarthritis    • Tremor, essential    • Wears glasses      Past Surgical History:   Procedure Laterality Date   • ABDOMINAL HYSTERECTOMY  1999    Has Cervix   • APPENDECTOMY     • CHOLECYSTECTOMY     • COLON RESECTION N/A 2017    Procedure: exploratory laparotomy, sigmoid colon resection and colostomy;  Surgeon: Timothy Dixon MD;  Location: [facility];  Service:    • COLON RESECTION N/A 2019    Procedure: laparotomy with lysis of adhesions, repair of small bowel to vagina fistula. small bowel resection ;  Surgeon: Timothy Dixon MD;  Location: [facility] OR;  Service: General   • EXPLORATORY LAPAROTOMY N/A 10/9/2017    Procedure: LAPAROTOMY EXPLORATORY, drainage intra-abdominal abscess, washout;  Surgeon: Arnulfo Marcum MD;  Location: [facility];  Service:    • SPIDER BITE EXCISION         Therapy Treatment    Rehabilitation Treatment Summary     Row Name 19 1015 19 1014          Treatment Time/Intention    Discipline  physical  therapy assistant  -SHRAVAN  occupational therapy assistant  -CS     Document Type  therapy note (daily note)  -SHRAVAN  therapy note (daily note)  -CS     Subjective Information  --  complains of;pain  -CS     Mode of Treatment  physical therapy;co-treatment  -JC2  occupational therapy;co-treatment  -CS     Therapy Frequency (PT Clinical Impression)  other (see comments) 6x/wk  -SHRAVAN  --     Therapy Frequency (OT Eval)  other (see comments) 5-7 days/wk  -SHRAVAN  other (see comments) 5-7 days a week   -CS     Patient Effort  good  -SHRAVAN  good  -CS     Reason Treatment Not Performed  other (see comments)  -SHRAVAN  --     Existing Precautions/Restrictions  fall;other (see comments) colostomy  -SHRAVAN  fall;other (see comments) colostomy  -CS     Recorded by [SHRAVAN] Thai Swanson, PTA 06/07/19 1026  [JC2] Thai Swanson, PTA 06/07/19 1321 [CS] Cathy Dillon PELAYO/L 06/07/19 1324     Row Name 06/07/19 1015 06/07/19 1014          Vital Signs    Pre Systolic BP Rehab  139  -SHRAVAN  139  -CS     Pre Treatment Diastolic BP  55  -SHRAVAN  55  -CS     Post Systolic BP Rehab  -- pt w/ OT at end of tx.   -JC2  --     Pretreatment Heart Rate (beats/min)  86  -SHRAVAN  86  -CS     Intratreatment Heart Rate (beats/min)  108  -JC2  --     Posttreatment Heart Rate (beats/min)  --  82  -CS     Pre SpO2 (%)  95  -SHRAVAN  95  -CS     O2 Delivery Pre Treatment  room air  -SHRAVAN  room air  -CS     Intra SpO2 (%)  95  -JC2  --     O2 Delivery Intra Treatment  room air  -JC2  --     Post SpO2 (%)  --  92  -CS     O2 Delivery Post Treatment  --  room air  -CS     Pre Patient Position  Supine  -SHRAVAN  Supine  -CS     Intra Patient Position  --  Standing  -CS     Post Patient Position  Sitting  -JC2  Sitting  -CS     Recorded by [SHRAVAN] Thai Swanson, PTA 06/07/19 1026  [JC2] Thai Swanson, PTA 06/07/19 1321 [CS] Cathy Dillon PELAYO/L 06/07/19 1324     Row Name 06/07/19 1015 06/07/19 1014          Cognitive Assessment/Intervention- PT/OT    Affect/Mental Status (Cognitive)  L   -SHRAVAN  WFL  -CS     Orientation Status (Cognition)  oriented x 4  -SHRAVAN  oriented x 4  -CS     Follows Commands (Cognition)  follows one step commands  -SHRAVAN  follows one step commands  -CS     Personal Safety Interventions  fall prevention program maintained;gait belt;muscle strengthening facilitated;nonskid shoes/slippers when out of bed;supervised activity  -SHRAVAN  gait belt;nonskid shoes/slippers when out of bed  -CS     Recorded by [SHRAVAN] Thai Swanson, PTA 06/07/19 1026 [CS] Cathy Dillon PELAYO/L 06/07/19 1324     Row Name 06/07/19 1015 06/07/19 1014          Safety Issues, Functional Mobility    Impairments Affecting Function (Mobility)  balance;coordination;endurance/activity tolerance;pain;postural/trunk control;strength;motor control;motor planning  -SHRAVAN  balance;coordination;endurance/activity tolerance;pain;postural/trunk control;strength;motor control;motor planning  -CS     Recorded by [SHRAVAN] Thai Swanson, PTA 06/07/19 1026 [CS] Cathy Dillon PELAYO/L 06/07/19 1324     Row Name 06/07/19 1015 06/07/19 1014          Bed Mobility Assessment/Treatment    Bed Mobility Assessment/Treatment  supine-sit  -SHRAVAN  --     Rolling Left [place] (Bed Mobility)  --  -SHRAVAN  not tested  -CS     Rolling Right [place] (Bed Mobility)  --  -SHRAVAN  --  -CS2     Supine-Sit [place] (Bed Mobility)  minimum assist (75% patient effort);2 person assist  -SHRAVAN  minimum assist (75% patient effort);2 person assist  -CS2     Sit-Supine [place] (Bed Mobility)  --  -SHRAVAN  --     Assistive Device (Bed Mobility)  bed rails;head of bed elevated  -JC2  bed rails;head of bed elevated  -CS     Recorded by [SHRAVAN] Thai Swanson, PTA 06/07/19 1321  [JC2] Thai Swanson, PTA 06/07/19 1026 [CS] Cathy Dillon PELAYO/L 06/07/19 1324  [CS2] Cathy Dillon PELAYO/L 06/07/19 1330     Row Name 06/07/19 1015 06/07/19 1014          Functional Mobility    Functional Mobility- Ind. Level  --  -SHRAVAN  contact guard assist  -CS     Functional  Mobility- Device  --  rolling walker  -CS     Functional Mobility-Distance (Feet)  --  3  -CS     Recorded by [SHRAVAN] Thai Swanson, PTA 06/07/19 1321 [CS] Cathy Dillon COTA/L 06/07/19 1330     Row Name 06/07/19 1015 06/07/19 1014          Transfer Assessment/Treatment    Transfer Assessment/Treatment  bed-chair transfer  -SHRAVAN  bed-chair transfer;sit-stand transfer;stand-sit transfer  -CS     Recorded by [SHRAVAN] Thai Swanson, PTA 06/07/19 1321 [CS] Cathy Dillon PELAYO/L 06/07/19 1324     Row Name 06/07/19 1015 06/07/19 1014          Bed-Chair Transfer    Bed-Chair [place] (Transfers)  minimum assist (75% patient effort);moderate assist (50% patient effort);2 person assist;verbal cues  -SHRAVAN  minimum assist (75% patient effort);moderate assist (50% patient effort);2 person assist  -CS     Assistive Device (Bed-Chair Transfers)  walker, front-wheeled  -JC2  walker, front-wheeled  -CS     Recorded by [SHRAVAN] Thai Swanson, PTA 06/07/19 1321  [JC2] Thai Swanson, PTA 06/07/19 1026 [CS] Cathy Dillon COTA/L 06/07/19 1330     Row Name 06/07/19 1015             Chair-Bed Transfer    Chair-Bed [place] (Transfers)  --  -SHRAVAN      Assistive Device (Chair-Bed Transfers)  --  -SHRAVAN      Recorded by [SHRAVAN] Thai Swanson, PTA 06/07/19 1321      Row Name 06/07/19 1015 06/07/19 1014          Sit-Stand Transfer    Sit-Stand [place] (Transfers)  minimum assist (75% patient effort);moderate assist (50% patient effort);2 person assist  -SHRAVAN  minimum assist (75% patient effort);moderate assist (50% patient effort);2 person assist  -CS     Assistive Device (Sit-Stand Transfers)  walker, front-wheeled  -JC2  walker, front-wheeled  -CS2     Recorded by [SHRAVAN] Thai Swanson, PTA 06/07/19 1321  [JC2] Thai Swanson, PTA 06/07/19 1026 [CS] Cathy Dillon COTA/L 06/07/19 1330  [CS2] Cathy Dillon COTA/L 06/07/19 1324     Row Name 06/07/19 1015 06/07/19 1014          Stand-Sit Transfer    Stand-Sit  [place] (Transfers)  minimum assist (75% patient effort);moderate assist (50% patient effort);2 person assist  -SHRAVAN  minimum assist (75% patient effort);moderate assist (50% patient effort);2 person assist  -CS     Assistive Device (Stand-Sit Transfers)  walker, front-wheeled  -JC2  walker, front-wheeled  -CS2     Recorded by [SHRAVAN] Thai Swanson, PTA 06/07/19 1321  [JC2] Thai Swanson, PTA 06/07/19 1026 [CS] Cathy Dillon, PELAYO/L 06/07/19 1330  [CS2] Cathy Dillon, PELAYO/L 06/07/19 1324     Row Name 06/07/19 1015             Toilet Transfer    Type (Toilet Transfer)  --  -SHRAVAN      [place] Level (Toilet Transfer)  --  -SHRAVAN      Assistive Device (Toilet Transfer)  --  -SHRAVAN      Recorded by [SHRAVAN] Thai Swanson, PTA 06/07/19 1321      Row Name 06/07/19 1015             Gait/Stairs Assessment/Training    Gait/Stairs Assessment/Training  gait/ambulation assistive device  -SHRAVAN      [place] Level (Gait)  contact guard;2 person assist  -JC2      Assistive Device (Gait)  walker, front-wheeled  -SHRAVAN      Distance in Feet (Gait)  3  -JC2      Pattern (Gait)  step-to  -SHRAVAN      Deviations/Abnormal Patterns (Gait)  base of support, wide;festinating/shuffling  -SHRAVAN      Bilateral Gait Deviations  forward flexed posture  -SHRAVAN      Comment (Gait/Stairs)  pt fatigues quickly.   -JC2      Recorded by [SHRAVAN] Thai Swanson, PTA 06/07/19 1026  [JC2] Thai Swanson, PTA 06/07/19 1321      Row Name 06/07/19 1014             Therapeutic Exercise    Upper Extremity (Therapeutic Exercise)  bicep curl, bilateral  -CS      Upper Extremity Range of Motion (Therapeutic Exercise)  shoulder flexion/extension, bilateral;shoulder internal/external rotation, bilateral;elbow flexion/extension, bilateral;forearm supination/pronation, bilateral;wrist flexion/extension, bilateral  -CS      Hand (Therapeutic Exercise)  finger flexion/extension, bilateral  -CS      Weight/Resistance (Therapeutic Exercise)  1 pound  -CS      Exercise  Type (Therapeutic Exercise)  AROM (active range of motion)  -CS      Position (Therapeutic Exercise)  seated  -CS      Sets/Reps (Therapeutic Exercise)  1/20  -CS      Equipment (Therapeutic Exercise)  free weight, barbell  -CS      Expected Outcome (Therapeutic Exercise)  improve functional tolerance, self-care activity;improve performance, BADLs;improve performance, transfer skills;increase active range of motion  -CS      Recorded by [CS] Cathy Dillon, PELAYO/L 06/07/19 1330      Row Name 06/07/19 1015 06/07/19 1014          Positioning and Restraints    Pre-Treatment Position  in bed  -SHRAVAN  in bed  -CS     Post Treatment Position  chair  -JC2  chair  -CS     In Chair  reclined;call light within reach;encouraged to call for assist;with family/caregiver in ortho chair, nsg notified. states pt doesn't need alarm.   -JC2  reclined;call light within reach;encouraged to call for assist;with family/caregiver  -CS     Recorded by [SHRAVAN] Thai Swanson, PTA 06/07/19 1026  [JC2] Thai Swanson, PTA 06/07/19 1321 [CS] Cathy Dillon, PELAYO/L 06/07/19 1330     Row Name 06/07/19 1015             Pain Assessment    Additional Documentation  Pain Scale: Numbers Pre/Post-Treatment (Group)  -SHRAVAN      Recorded by [SHRAVAN] Thai Swanson, PTA 06/07/19 1321      Row Name 06/07/19 1015 06/07/19 1014          Pain Scale: Numbers Pre/Post-Treatment    Pain Scale: Numbers, Pretreatment  9/10  -SHRAVAN  9/10  -CS     Pain Scale: Numbers, Post-Treatment  -- pt w/ OT at end of tx.   -JC2  7/10  -CS     Pain Location  -- R kidney  -JC2  back R kidney  -CS     Pain Intervention(s)  --  Repositioned;Medication (See MAR)  -CS     Recorded by [SHRAVAN] Thai Swanson, PTA 06/07/19 1026  [JC2] Thai Swanson, PTA 06/07/19 1321 [CS] Cathy Dillon, PELAYO/L 06/07/19 1330     Row Name 06/07/19 1015             Vision Assessment/Intervention    Visual Impairment/Limitations  --  -SHRAVAN      Recorded by [SHRAVAN] Thai Swanson, PTA 06/07/19 1321      Row  Name 06/07/19 1015             Wound 05/29/19 1327 abdomen incision;surgical    Wound - Properties Group Date first assessed: 05/29/19 [KM] Time first assessed: 1327 [KM] Present On Admission : no [KM] Location: abdomen [KM] Type: incision;surgical [KM] Recorded by:  [KM] Geno Blackmon RN 05/29/19 1327    Periwound Skin Turgor  soft  -SHRAVAN      Recorded by [SHRAVAN] Thai Swanson, PTA 06/07/19 1026      Row Name                Wound 06/05/19 1818 Bilateral other (see notes) other (see comments)    Wound - Properties Group Date first assessed: 06/05/19 [KMA] Time first assessed: 1818 [KMA] Present On Admission : yes [KMA], Admission to   Side: Bilateral [KMA] Location: other (see notes) [KMA2], Bilateral groin & skin fold excoriation  Type: other (see comments) [KMA] Recorded by:  [KMA] Geno Onofre RN 06/05/19 1819 [KMA2] Geno Onofre RN 06/05/19 1821    Row Name 06/07/19 1015             Coping    Observed Emotional State  accepting;calm;cooperative  -SHRAVAN      Verbalized Emotional State  acceptance  -SHRAVAN      Recorded by [SHRAVAN] Thai Swanson, PTA 06/07/19 1026      Row Name 06/07/19 1015 06/07/19 1014          Outcome Summary/Treatment Plan (OT)    Daily Summary of Progress (OT)  --  progress toward functional goals is good  -CS     Plan for Continued Treatment (OT)  --  cont OT POC  -CS     Anticipated Discharge Disposition (OT)  skilled nursing facility  -SHRAVAN  anticipate therapy at next level of care;skilled nursing facility  -CS     Recorded by [SHRAVAN] Thai Swanson, PTA 06/07/19 1026 [CS] Cathy Dillon COTA/L 06/07/19 1330     Row Name 06/07/19 1015             Outcome Summary/Treatment Plan (PT)    Daily Summary of Progress (PT)  progress toward functional goals as expected  -SHRAVAN      Plan for Continued Treatment (PT)  continue  -SHRAVAN      Anticipated Discharge Disposition (PT)  anticipate therapy at next level of care  -JC2      Recorded by [SHRAVAN] Thai Swanson, PTA 06/07/19 1321  [JC2] Sky  Thai MARTINEZ, PTA 06/07/19 1026        User Key  (r) = Recorded By, (t) = Taken By, (c) = Cosigned By    Initials Name Effective Dates Discipline    Geno Isaac, RN 10/17/16 -  Nurse    Thai Palma, PTA 03/07/18 -  PT    LONDON Dillon Cathy L, PELAYO/L 03/07/18 -  OT    Geno Limon RN 05/21/18 -  Nurse        Wound 05/29/19 1327 abdomen incision;surgical (Active)   Dressing Appearance copious drainage 6/7/2019  6:35 AM   Closure Other (Comment) 6/7/2019  6:35 AM   Base dressing in place, unable to visualize 6/6/2019  7:41 PM   Periwound Skin Turgor soft 6/7/2019 10:15 AM   Drainage Characteristics/Odor creamy;tan 6/7/2019  6:35 AM   Drainage Amount moderate 6/7/2019  6:35 AM   Dressing Care, Wound dressing changed;gauze, dry 6/7/2019  6:35 AM       Wound 06/05/19 1818 Bilateral other (see notes) other (see comments) (Active)   Dressing Appearance open to air 6/6/2019  7:41 PM   Closure None 6/6/2019  7:41 PM   Base pink 6/6/2019  7:41 PM   Periwound pink;moist 6/6/2019  7:41 PM   Drainage Amount none 6/6/2019  7:41 PM     Rehab Goal Summary     Row Name 06/07/19 1015 06/07/19 1014          Physical Therapy Goals    Bed Mobility Goal Selection (PT)  bed mobility, PT goal 1  -SHRAVAN  --     Transfer Goal Selection (PT)  transfer, PT goal 1  -SHRAVAN  --     Gait Training Goal Selection (PT)  gait training, PT goal 1  -SHRAVAN  --     Strength Goal Selection (PT)  strength, PT goal 1  -SHRAVAN  --        Bed Mobility Goal 1 (PT)    Activity/Assistive Device (Bed Mobility Goal 1, PT)  sit to supine/supine to sit  -SHRAVAN  --     [place] Level/Cues Needed (Bed Mobility Goal 1, PT)  minimum assist (75% or more patient effort)  -SHRAVAN  --     Time Frame (Bed Mobility Goal 1, PT)  10 days  -  --     Barriers (Bed Mobility Goal 1, PT)  abdominal pain, previous functional deficit  -  --     Progress/Outcomes (Bed Mobility Goal 1, PT)  goal not met  -SHRAVAN  --        Transfer Goal 1 (PT)    Activity/Assistive Device (Transfer  Goal 1, PT)  sit-to-stand/stand-to-sit;bed-to-chair/chair-to-bed;walker, rolling  -SHRAVAN  --     [place] Level/Cues Needed (Transfer Goal 1, PT)  contact guard assist;verbal cues required  -SHRAVAN  --     Time Frame (Transfer Goal 1, PT)  1 week  -SHRAVAN  --     Barriers (Transfers Goal 1, PT)  abdominal pain, previous functional deficit  -SHRAVAN  --     Progress/Outcome (Transfer Goal 1, PT)  goal not met  -SHRAVAN  --        Gait Training Goal 1 (PT)    Activity/Assistive Device (Gait Training Goal 1, PT)  walker, rolling;decrease fall risk;increase endurance/gait distance  -SHRAVAN  --     [place] Level (Gait Training Goal 1, PT)  contact guard assist;verbal cues required  -SHRAVAN  --     Distance (Gait Goal 1, PT)  10 feet  -SHRAVAN  --     Time Frame (Gait Training Goal 1, PT)  10 days  -SHRAVAN  --     Barriers (Gait Training Goal 1, PT)  abdominal pain, previous functional deficit  -SHRAVAN  --     Progress/Outcome (Gait Training Goal 1, PT)  goal not met  -SHRAVAN  --        Strength Goal 1 (PT)    Strength Goal 1 (PT)  Pt will be able to complete 20 reps of at least 4 LE exercises in order to demo improved strength for transfers  -SHRAVAN  --     Time Frame (Strength Goal 1, PT)  1 week  -SHRAVAN  --     Barriers (Strength Goal 1, PT)  abdominal pain, previous functional deficit  -SHRAVAN  --     Progress/Outcome (Strength Goal 1, PT)  goal not met  -SHRAVAN  --        Transfer Goal 1 (OT)    Activity/Assistive Device (Transfer Goal 1, OT)  --  commode, bedside with drop arms;commode, bedside without drop arms  -CS     [place] Level/Cues Needed (Transfer Goal 1, OT)  --  minimum assist (75% or more patient effort)  -CS     Time Frame (Transfer Goal 1, OT)  --  long term goal (LTG);by discharge  -CS     Progress/Outcome (Transfer Goal 1, OT)  --  goal not met  -CS        Bathing Goal 1 (OT)    Activity/Assistive Device (Bathing Goal 1, OT)  --  upper body bathing  -CS     [place] Level/Cues Needed (Bathing Goal 1, OT)  --  minimum assist (75% or more  patient effort)  -CS     Time Frame (Bathing Goal 1, OT)  --  long term goal (LTG);by discharge  -CS     Progress/Outcomes (Bathing Goal 1, OT)  --  goal not met  -CS        Dressing Goal 1 (OT)    Activity/Assistive Device (Dressing Goal 1, OT)  --  upper body dressing  -CS     [place]/Cues Needed (Dressing Goal 1, OT)  --  minimum assist (75% or more patient effort)  -CS     Time Frame (Dressing Goal 1, OT)  --  long term goal (LTG);by discharge  -CS     Progress/Outcome (Dressing Goal 1, OT)  --  goal not met  -CS        Grooming Goal 1 (OT)    Activity/Device (Grooming Goal 1, OT)  --  grooming skills, all  -CS     [place] (Grooming Goal 1, OT)  --  set-up required;supervision required;verbal cues required  -CS     Time Frame (Grooming Goal 1, OT)  --  long term goal (LTG);by discharge  -CS     Progress/Outcome (Grooming Goal 1, OT)  --  goal not met  -CS        Self-Feeding Goal 1 (OT)    Activity/Assistive Device (Self-Feeding Goal 1, OT)  --  self-feeding skills, all  -CS     [place] Level/Cues Needed (Self-Feeding Goal 1, OT)  --  set-up required;supervision required;verbal cues required  -CS     Time Frame (Self-Feeding Goal 1, OT)  --  long term goal (LTG);by discharge  -CS     Barriers (Self-Feeding Goal 1, OT)  --  address when medically appropriate.   -CS     Progress/Outcomes (Self-Feeding Goal 1, OT)  --  goal not met  -CS       User Key  (r) = Recorded By, (t) = Taken By, (c) = Cosigned By    Initials Name Provider Type Discipline    SHRAVAN Thai Swanson, PTA Physical Therapy Assistant PT    CS Cathy Dillon COTA/DERICK Occupational Therapy Assistant OT        Occupational Therapy Education     Title: PT OT SLP Therapies (In Progress)     Topic: Occupational Therapy (In Progress)     Point: ADL training (In Progress)     Description: Instruct learner(s) on proper safety adaptation and remediation techniques during self care or transfers.   Instruct in proper use of assistive devices.     Learning Progress Summary           Patient Acceptance, E,TB,D, NR by CS at 6/7/2019  1:32 PM    Acceptance, E,TB,D, NR by CS at 6/6/2019  3:56 PM    Acceptance, E, NR by  at 6/5/2019 11:27 AM    Acceptance, E,TB,D, NR by CS at 5/31/2019  1:12 PM    Acceptance, E, VU,NR by  at 5/30/2019  1:28 PM    Comment:  Educated about OT and POC. Educated to call for assist. Educated on safety throughout.                   Point: Home exercise program (In Progress)     Description: Instruct learner(s) on appropriate technique for monitoring, assisting and/or progressing therapeutic exercises/activities.    Learning Progress Summary           Patient Acceptance, E,TB,D, NR by CS at 6/7/2019  1:32 PM    Acceptance, E,TB,D, NR by CS at 6/6/2019  3:56 PM    Acceptance, E,TB,D, NR by CS at 5/31/2019  1:12 PM                   Point: Precautions (In Progress)     Description: Instruct learner(s) on prescribed precautions during self-care and functional transfers.    Learning Progress Summary           Patient Acceptance, E,TB,D, NR by CS at 6/7/2019  1:32 PM    Acceptance, E,TB,D, NR by CS at 6/6/2019  3:56 PM    Acceptance, E,TB,D, NR by  at 5/31/2019  1:12 PM    Acceptance, E, VU,NR by  at 5/30/2019  1:28 PM    Comment:  Educated about OT and POC. Educated to call for assist. Educated on safety throughout.                   Point: Body mechanics (In Progress)     Description: Instruct learner(s) on proper positioning and spine alignment during self-care, functional mobility activities and/or exercises.    Learning Progress Summary           Patient Acceptance, E,TB,D, NR by CS at 6/7/2019  1:32 PM    Acceptance, E,TB,D, NR by CS at 6/6/2019  3:56 PM    Acceptance, E,TB,D, NR by  at 5/31/2019  1:12 PM                               User Key     Initials Effective Dates Name Provider Type Discipline     06/08/18 -  Dee Mendez OTR/L Occupational Therapist OT     03/07/18 -  Anila Rodriguez COTA/DERICK Occupational  Therapy Assistant OT    LONDON 03/07/18 -  Cathy Dillon COTA/DERIKC Occupational Therapy Assistant OT                OT Recommendation and Plan  Outcome Summary/Treatment Plan (OT)  Daily Summary of Progress (OT): progress toward functional goals is good  Plan for Continued Treatment (OT): cont OT POC  Anticipated Discharge Disposition (OT): anticipate therapy at next level of care, skilled nursing facility  Therapy Frequency (OT Eval): other (see comments)(5-7 days a week )  Daily Summary of Progress (OT): progress toward functional goals is good  Plan of Care Review  Plan of Care Reviewed With: patient  Plan of Care Reviewed With: patient  Outcome Summary: Pt tolerated tx well this date. Pt was min A x 2 with sup-sit and min-mod A x 2 for sit-stand and t/fs. Pt gave good effort with UE ther ex. Continue OT POC.  Outcome Measures     Row Name 06/07/19 1300 06/07/19 1015 06/06/19 0935       How much help from another person do you currently need...    Turning from your back to your side while in flat bed without using bedrails?  --  3  -SHRAVAN  2  -LN    Moving from lying on back to sitting on the side of a flat bed without bedrails?  --  3  -SHRAVAN  2  -LN    Moving to and from a bed to a chair (including a wheelchair)?  --  2  -SHRAVAN  2  -LN    Standing up from a chair using your arms (e.g., wheelchair, bedside chair)?  --  2  -SHRAVAN  2  -LN    Climbing 3-5 steps with a railing?  --  1  -SHRAVAN  1  -LN    To walk in hospital room?  --  2  -SHRAVAN  2  -LN    AM-PAC 6 Clicks Score  --  13  -SHRAVAN  11  -LN       How much help from another is currently needed...    Putting on and taking off regular lower body clothing?  1  -CS  --  1  -CS    Bathing (including washing, rinsing, and drying)  1  -CS  --  1  -CS    Toileting (which includes using toilet bed pan or urinal)  1  -CS  --  1  -CS    Putting on and taking off regular upper body clothing  2  -CS  --  2  -CS    Taking care of personal grooming (such as brushing teeth)  2  -CS  --  2  -CS     Eating meals  1  -CS  --  1  -CS    Score  8  -CS  --  8  -CS       Functional Assessment    Outcome Measure Options  AM-PAC 6 Clicks Daily Activity (OT)  -CS  AM-PAC 6 Clicks Basic Mobility (PT)  -SHRAVAN  AM-[facility] 6 Clicks Basic Mobility (PT)  -LN    Row Name 06/05/19 1315 06/05/19 0720          How much help from another person do you currently need...    Turning from your back to your side while in flat bed without using bedrails?  2  -TW  --     Moving from lying on back to sitting on the side of a flat bed without bedrails?  2  -TW  --     Moving to and from a bed to a chair (including a wheelchair)?  2  -TW  --     Standing up from a chair using your arms (e.g., wheelchair, bedside chair)?  2  -TW  --     Climbing 3-5 steps with a railing?  1  -TW  --     To walk in hospital room?  1  -TW  --     AM-PAC 6 Clicks Score  10  -TW  --        How much help from another is currently needed...    Putting on and taking off regular lower body clothing?  --  1  -BB     Bathing (including washing, rinsing, and drying)  --  1  -BB     Toileting (which includes using toilet bed pan or urinal)  --  1  -BB     Putting on and taking off regular upper body clothing  --  2  -BB     Taking care of personal grooming (such as brushing teeth)  --  2  -BB     Eating meals  --  1  -BB     Score  --  8  -BB        Functional Assessment    Outcome Measure Options  [facility] 6 Clicks Basic Mobility (PT)  -TW  --       User Key  (r) = Recorded By, (t) = Taken By, (c) = Cosigned By    Initials Name Provider Type    SHRAVAN Thai Swanson, PTA Physical Therapy Assistant    LN Jannet Garcia, PTA Physical Therapy Assistant    TW Karl Zheng, PTA Physical Therapy Assistant    BB Anila Rodriguez, PELAYO/L Occupational Therapy Assistant    CS Cathy Dillon, GITA/L Occupational Therapy Assistant           Time Calculation:   Time Calculation- OT     Row Name 06/07/19 1994             Time Calculation- OT    OT Start Time  1014  -CS      OT  Stop Time  1113  -CS      OT Time Calculation (min)  59 min  -CS      Total Timed Code Minutes- OT  44 minute(s)  -CS      OT Non-Billable Time (min)  15 min co-tx with PTA  -CS      OT Received On  06/07/19  -CS        User Key  (r) = Recorded By, (t) = Taken By, (c) = Cosigned By    Initials Name Provider Type    CS Cathy Dillon PELAYO/L Occupational Therapy Assistant        Therapy Charges for Today     Code Description Service Date Service Provider Modifiers Qty    39092079381 HC OT SELF CARE/MGMT/TRAIN EA 15 MIN 6/6/2019 Cathy Dillon COTA/L GO 2    47788860061 HC OT THER PROC EA 15 MIN 6/6/2019 Cathy Dillon PELAYO/L GO 2    48318613208 HC OT THER PROC EA 15 MIN 6/7/2019 Cathy Dillon PELAYO/L GO 2    55046248750 HC OT THERAPEUTIC ACT EA 15 MIN 6/7/2019 Cathy Dillon PELAYO/L GO 1    11041795505 HC OT THER SUPP EA 15 MIN 6/7/2019 Cathy Dillon PELAYO/L GO 1               GITA Nguyen/DERICK  6/7/2019

## 2019-06-07 NOTE — PLAN OF CARE
Problem: Patient Care Overview  Goal: Plan of Care Review  Outcome: Ongoing (interventions implemented as appropriate)   06/07/19 0211   Coping/Psychosocial   Plan of Care Reviewed With patient   Plan of Care Review   Progress improving       Problem: Fall Risk (Adult)  Goal: Absence of Fall  Outcome: Ongoing (interventions implemented as appropriate)      Problem: Skin Injury Risk (Adult)  Goal: Skin Health and Integrity  Outcome: Ongoing (interventions implemented as appropriate)      Problem: Surgery Nonspecified (Adult)  Goal: Signs and Symptoms of Listed Potential Problems Will be Absent, Minimized or Managed (Surgery Nonspecified)  Outcome: Ongoing (interventions implemented as appropriate)      Problem: Wound (Includes Pressure Injury) (Adult)  Goal: Signs and Symptoms of Listed Potential Problems Will be Absent, Minimized or Managed (Wound)  Outcome: Ongoing (interventions implemented as appropriate)

## 2019-06-07 NOTE — PLAN OF CARE
Problem: Patient Care Overview  Goal: Plan of Care Review  Outcome: Ongoing (interventions implemented as appropriate)   06/07/19 1015   Coping/Psychosocial   Plan of Care Reviewed With patient   Plan of Care Review   Progress improving   OTHER   Outcome Summary pt responded well to therapy. pt required min A x2 for sup-sit and min-mod x2 for sit-stand and t/fs. pt amb 3 ft CGA x2 from bed to ortho chair. pt fatigues quickly. no new goals met at this time. pt would continue to benefit from PT services.

## 2019-06-07 NOTE — PROGRESS NOTES
GENERAL SURGERY PROGRESS NOTE  Chief Complaint:  Surgery Follow up   LOS: 9 days       Subjective     Interval History:     Overall feels well, some decrease in drainage. Tolerating diet and having ostomy function    Objective     Vital Signs  Temp:  [97.9 °F (36.6 °C)-98.3 °F (36.8 °C)] 97.9 °F (36.6 °C)  Heart Rate:  [] 87  Resp:  [18-20] 20  BP: (117-138)/(56-85) 117/85    Physical Exam:   Dressing place at lower incision.   Labs:  Lab Results (last 24 hours)     Procedure Component Value Units Date/Time    Basic Metabolic Panel [[id number]]  (Abnormal) Collected:  06/07/19 0632    Specimen:  Blood Updated:  06/07/19 0750     Glucose 82 mg/dL      BUN 4 mg/dL      Creatinine 0.62 mg/dL      Sodium 130 mmol/L      Potassium 4.2 mmol/L      Chloride 97 mmol/L      CO2 24.0 mmol/L      Calcium 7.9 mg/dL      eGFR Non African Amer 96 mL/min/1.73      BUN/Creatinine Ratio 6.5     Anion Gap 9.0 mmol/L     Narrative:       GFR Normal >60  Chronic Kidney Disease <60  Kidney Failure <15    CBC (No Diff) [[id number]]  (Abnormal) Collected:  06/07/19 0632    Specimen:  Blood Updated:  06/07/19 0725     WBC 11.19 10*3/mm3      RBC 3.60 10*6/mm3      Hemoglobin 11.4 g/dL      Hematocrit 35.3 %      MCV 98.1 fL      MCH 31.7 pg      MCHC 32.3 g/dL      RDW 12.8 %      RDW-SD 45.9 fl      MPV 11.1 fL      Platelets 314 10*3/mm3            Results Review:     Labs and imaging for today were reviewed.    Assessment/Plan     Irma Pacheco is a 69 y.o. 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      Continue ABx for apparent intra-abdominal abscess forming on CT.   PT/OT  Anticipate working on placement next week (?[facility])  Dr. Betts to see this weekend.          This document has been electronically signed by Timothy Dixon MD on June 7, 2019 1:54 PM        Timothy Dixon MD  06/07/19  1:54 PM

## 2019-06-08 LAB
ANION GAP SERPL CALCULATED.3IONS-SCNC: 10 MMOL/L
BUN BLD-MCNC: 5 MG/DL (ref 8–23)
BUN/CREAT SERPL: 7.5 (ref 7–25)
CALCIUM SPEC-SCNC: 7.9 MG/DL (ref 8.6–10.5)
CHLORIDE SERPL-SCNC: 96 MMOL/L (ref 98–107)
CO2 SERPL-SCNC: 24 MMOL/L (ref 22–29)
CREAT BLD-MCNC: 0.67 MG/DL (ref 0.57–1)
DEPRECATED RDW RBC AUTO: 45.7 FL (ref 37–54)
ERYTHROCYTE [DISTWIDTH] IN BLOOD BY AUTOMATED COUNT: 12.8 % (ref 12.3–15.4)
GFR SERPL CREATININE-BSD FRML MDRD: 88 ML/MIN/1.73
GLUCOSE BLD-MCNC: 100 MG/DL (ref 65–99)
HCT VFR BLD AUTO: 34.8 % (ref 34–46.6)
HGB BLD-MCNC: 11.7 G/DL (ref 12–15.9)
MCH RBC QN AUTO: 32.7 PG (ref 26.6–33)
MCHC RBC AUTO-ENTMCNC: 33.6 G/DL (ref 31.5–35.7)
MCV RBC AUTO: 97.2 FL (ref 79–97)
PLATELET # BLD AUTO: 388 10*3/MM3 (ref 140–450)
PMV BLD AUTO: 10.7 FL (ref 6–12)
POTASSIUM BLD-SCNC: 4.6 MMOL/L (ref 3.5–5.2)
RBC # BLD AUTO: 3.58 10*6/MM3 (ref 3.77–5.28)
SODIUM BLD-SCNC: 130 MMOL/L (ref 136–145)
WBC NRBC COR # BLD: 16.57 10*3/MM3 (ref 3.4–10.8)

## 2019-06-08 PROCEDURE — 25010000002 HEPARIN (PORCINE) PER 1000 UNITS: Performed by: SURGERY

## 2019-06-08 PROCEDURE — 97535 SELF CARE MNGMENT TRAINING: CPT

## 2019-06-08 PROCEDURE — 25010000002 HYDROMORPHONE 1 MG/ML SOLUTION: Performed by: SURGERY

## 2019-06-08 PROCEDURE — 80048 BASIC METABOLIC PNL TOTAL CA: CPT | Performed by: SURGERY

## 2019-06-08 PROCEDURE — 97110 THERAPEUTIC EXERCISES: CPT

## 2019-06-08 PROCEDURE — 85027 COMPLETE CBC AUTOMATED: CPT | Performed by: SURGERY

## 2019-06-08 RX ORDER — HYDROCODONE BITARTRATE AND ACETAMINOPHEN 7.5; 325 MG/1; MG/1
1 TABLET ORAL EVERY 4 HOURS PRN
Status: DISCONTINUED | OUTPATIENT
Start: 2019-06-08 | End: 2019-06-14 | Stop reason: HOSPADM

## 2019-06-08 RX ORDER — HYDROCODONE BITARTRATE AND ACETAMINOPHEN 7.5; 325 MG/1; MG/1
1 TABLET ORAL EVERY 6 HOURS PRN
Status: DISCONTINUED | OUTPATIENT
Start: 2019-06-08 | End: 2019-06-08

## 2019-06-08 RX ORDER — LANOLIN ALCOHOL/MO/W.PET/CERES
6 CREAM (GRAM) TOPICAL NIGHTLY PRN
Status: DISCONTINUED | OUTPATIENT
Start: 2019-06-08 | End: 2019-06-14 | Stop reason: HOSPADM

## 2019-06-08 RX ADMIN — MEMANTINE 2.5 MG: 5 TABLET ORAL at 09:10

## 2019-06-08 RX ADMIN — PRIMIDONE 50 MG: 50 TABLET ORAL at 20:48

## 2019-06-08 RX ADMIN — NYSTATIN: 100000 POWDER TOPICAL at 20:49

## 2019-06-08 RX ADMIN — FAMOTIDINE 20 MG: 20 TABLET ORAL at 17:30

## 2019-06-08 RX ADMIN — AZTREONAM 1 G: 1 INJECTION, POWDER, LYOPHILIZED, FOR SOLUTION INTRAMUSCULAR; INTRAVENOUS at 14:33

## 2019-06-08 RX ADMIN — LEVOTHYROXINE SODIUM 137 MCG: 25 TABLET ORAL at 09:10

## 2019-06-08 RX ADMIN — DONEPEZIL HYDROCHLORIDE 5 MG: 5 TABLET, FILM COATED ORAL at 20:48

## 2019-06-08 RX ADMIN — SODIUM CHLORIDE, PRESERVATIVE FREE 10 ML: 5 INJECTION INTRAVENOUS at 20:50

## 2019-06-08 RX ADMIN — HYDROCODONE BITARTRATE AND ACETAMINOPHEN 1 TABLET: 7.5; 325 TABLET ORAL at 22:24

## 2019-06-08 RX ADMIN — HYDROMORPHONE HYDROCHLORIDE 0.5 MG: 1 INJECTION, SOLUTION INTRAMUSCULAR; INTRAVENOUS; SUBCUTANEOUS at 02:37

## 2019-06-08 RX ADMIN — HEPARIN SODIUM 5000 UNITS: 5000 INJECTION INTRAVENOUS; SUBCUTANEOUS at 14:33

## 2019-06-08 RX ADMIN — MELATONIN 6 MG: at 22:24

## 2019-06-08 RX ADMIN — GABAPENTIN 600 MG: 300 CAPSULE ORAL at 05:25

## 2019-06-08 RX ADMIN — AZTREONAM 1 G: 1 INJECTION, POWDER, LYOPHILIZED, FOR SOLUTION INTRAMUSCULAR; INTRAVENOUS at 05:26

## 2019-06-08 RX ADMIN — HEPARIN SODIUM 5000 UNITS: 5000 INJECTION INTRAVENOUS; SUBCUTANEOUS at 20:48

## 2019-06-08 RX ADMIN — PRIMIDONE 50 MG: 50 TABLET ORAL at 05:25

## 2019-06-08 RX ADMIN — HYDROMORPHONE HYDROCHLORIDE 0.5 MG: 1 INJECTION, SOLUTION INTRAMUSCULAR; INTRAVENOUS; SUBCUTANEOUS at 05:25

## 2019-06-08 RX ADMIN — GABAPENTIN 600 MG: 300 CAPSULE ORAL at 14:33

## 2019-06-08 RX ADMIN — HYDROCODONE BITARTRATE AND ACETAMINOPHEN 1 TABLET: 7.5; 325 TABLET ORAL at 17:31

## 2019-06-08 RX ADMIN — POTASSIUM CHLORIDE, DEXTROSE MONOHYDRATE AND SODIUM CHLORIDE 50 ML/HR: 150; 5; 450 INJECTION, SOLUTION INTRAVENOUS at 09:09

## 2019-06-08 RX ADMIN — HYDROCODONE BITARTRATE AND ACETAMINOPHEN 1 TABLET: 7.5; 325 TABLET ORAL at 10:15

## 2019-06-08 RX ADMIN — PRIMIDONE 50 MG: 50 TABLET ORAL at 14:33

## 2019-06-08 RX ADMIN — BUMETANIDE 0.5 MG: 0.25 INJECTION INTRAMUSCULAR; INTRAVENOUS at 02:37

## 2019-06-08 RX ADMIN — FAMOTIDINE 20 MG: 20 TABLET ORAL at 09:11

## 2019-06-08 RX ADMIN — VILAZODONE HYDROCHLORIDE 20 MG: 20 TABLET ORAL at 21:09

## 2019-06-08 RX ADMIN — AZTREONAM 1 G: 1 INJECTION, POWDER, LYOPHILIZED, FOR SOLUTION INTRAMUSCULAR; INTRAVENOUS at 21:09

## 2019-06-08 RX ADMIN — GABAPENTIN 600 MG: 300 CAPSULE ORAL at 20:47

## 2019-06-08 RX ADMIN — HEPARIN SODIUM 5000 UNITS: 5000 INJECTION INTRAVENOUS; SUBCUTANEOUS at 05:26

## 2019-06-08 RX ADMIN — BUMETANIDE 0.5 MG: 0.25 INJECTION INTRAMUSCULAR; INTRAVENOUS at 14:33

## 2019-06-08 RX ADMIN — MEMANTINE 2.5 MG: 5 TABLET ORAL at 20:48

## 2019-06-08 NOTE — PLAN OF CARE
Problem: Patient Care Overview  Goal: Plan of Care Review  Outcome: Ongoing (interventions implemented as appropriate)   06/08/19 0023   Coping/Psychosocial   Plan of Care Reviewed With patient   Plan of Care Review   Progress no change   OTHER   Outcome Summary Patient VSS, working on pain control, continue to monitor     Goal: Individualization and Mutuality  Outcome: Ongoing (interventions implemented as appropriate)    Goal: Discharge Needs Assessment  Outcome: Ongoing (interventions implemented as appropriate)      Problem: Fall Risk (Adult)  Goal: Absence of Fall  Outcome: Ongoing (interventions implemented as appropriate)      Problem: Skin Injury Risk (Adult)  Goal: Skin Health and Integrity  Outcome: Ongoing (interventions implemented as appropriate)      Problem: Surgery Nonspecified (Adult)  Goal: Signs and Symptoms of Listed Potential Problems Will be Absent, Minimized or Managed (Surgery Nonspecified)  Outcome: Ongoing (interventions implemented as appropriate)      Problem: Wound (Includes Pressure Injury) (Adult)  Goal: Signs and Symptoms of Listed Potential Problems Will be Absent, Minimized or Managed (Wound)  Outcome: Ongoing (interventions implemented as appropriate)

## 2019-06-08 NOTE — THERAPY TREATMENT NOTE
Acute Care - Occupational Therapy Treatment Note  [facility]     Patient Name: Irma Pacheco  : 1950  MRN: [id number]  Today's Date: 2019  Onset of Illness/Injury or Date of Surgery: 19  Date of Referral to OT: 19  Referring Physician: Dr. Dixon     Admit Date: 2019       ICD-10-CM ICD-9-CM   1. 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 Impaired functional mobility and activity tolerance Z74.09 V49.89   3. 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     Past Medical History:   Diagnosis Date   • Anxiety    • Arthritis    • Asthma    • CHF (congestive heart failure) (CMS/[facility])    • Colostomy care (CMS/[facility])    • Concussion     x2   • Depression    • Disease of thyroid gland    • Hyperlipidemia    • Hypotension    • Kidney disease, chronic, stage III (GFR 30-59 ml/min) (CMS/[facility])    • MS (multiple sclerosis) (CMS/[facility])    • Osteoarthritis    • Tremor, essential    • Wears glasses      Past Surgical History:   Procedure Laterality Date   • ABDOMINAL HYSTERECTOMY  1999    Has Cervix   • APPENDECTOMY     • CHOLECYSTECTOMY     • COLON RESECTION N/A 2017    Procedure: exploratory laparotomy, sigmoid colon resection and colostomy;  Surgeon: Timothy Dixon MD;  Location: [facility];  Service:    • COLON RESECTION N/A 2019    Procedure: laparotomy with lysis of adhesions, repair of small bowel to vagina fistula. small bowel resection ;  Surgeon: Timothy Dixon MD;  Location: [facility] OR;  Service: General   • EXPLORATORY LAPAROTOMY N/A 10/9/2017    Procedure: LAPAROTOMY EXPLORATORY, drainage intra-abdominal abscess, washout;  Surgeon: Arnulfo Marcum MD;  Location: [facility];  Service:    • SPIDER BITE EXCISION         Therapy Treatment    Rehabilitation Treatment Summary     Row Name 19 1105 19 1033          Treatment Time/Intention    Discipline   occupational therapy assistant  -LW  physical therapy assistant  -SHRAVAN     Document Type  therapy note (daily note)  -LW  therapy note (daily note)  -SHRAVAN     Subjective Information  no complaints  -LW  --     Mode of Treatment  occupational therapy  -LW  physical therapy;co-treatment  -SHRAVAN     Patient/Family Observations  Pt supine No family present.  -LW  --     Care Plan Review  care plan/treatment goals reviewed  -LW  --     Therapy Frequency (PT Clinical Impression)  --  other (see comments) 6x/wk  -SHRAVAN     Total Minutes, Occupational Therapy Treatment  55  -LW  --     Therapy Frequency (OT Eval)  other (see comments) 5-7 days per week  -LW  other (see comments) 5-7 days/wk  -SHRAVAN     Patient Effort  good  -LW  good  -SHRAVAN     Comment  --  pt declined EOB/OOB due to pain. pt agreeable to LE ther ex.   -SHRAVAN     Reason Treatment Not Performed  --  other (see comments)  -SHRAVAN     Existing Precautions/Restrictions  fall;other (see comments) colostomy  -LW  fall;other (see comments) colostomy  -SHRAVAN     Equipment Issued to Patient  gait belt  -LW  --     Recorded by [LW] Leyla Angela COTA/DERICK 06/08/19 1421 [SHRAVAN] Thai Swanson PTA 06/08/19 1052     Row Name 06/08/19 1105 06/08/19 1033          Vital Signs    Pre Systolic BP Rehab  122  -LW  119  -SHRAVAN     Pre Treatment Diastolic BP  53  -LW  42  -SHRAVAN     Post Systolic BP Rehab  --  116  -JC2     Post Treatment Diastolic BP  --  60  -JC2     Pretreatment Heart Rate (beats/min)  81  -LW  80  -SHRAVAN     Posttreatment Heart Rate (beats/min)  82  -LW  88  -JC2     Pre SpO2 (%)  99  -LW  98  -SHRAVAN     O2 Delivery Pre Treatment  room air  -LW  room air  -SHRAVAN     Post SpO2 (%)  97  -LW  96  -JC2     O2 Delivery Post Treatment  room air  -LW  room air  -JC2     Pre Patient Position  Supine  -LW  Supine  -SHRAVAN     Post Patient Position  Supine  -LW  Supine  -JC3     Recorded by [LW] Leyla Angela COTA/DERICK 06/08/19 1421 [SHRAVAN] Thai Swanson PTA 06/08/19 1052  [JC2] Thai Swanson, PTA  06/08/19 1057  [JC3] Thai Swanson, PTA 06/08/19 1149     Row Name 06/08/19 1105 06/08/19 1033          Cognitive Assessment/Intervention- PT/OT    Affect/Mental Status (Cognitive)  WFL  -LW  WFL  -SHRAVAN     Orientation Status (Cognition)  oriented x 4  -LW  oriented x 4  -SHRAVAN     Follows Commands (Cognition)  follows one step commands;over 90% accuracy  -LW  follows one step commands  -SHRAVAN     Safety Deficit (Cognitive)  mild deficit  -LW  --     Personal Safety Interventions  fall prevention program maintained;gait belt;nonskid shoes/slippers when out of bed  -LW  fall prevention program maintained;gait belt;muscle strengthening facilitated;nonskid shoes/slippers when out of bed;supervised activity  -SHRAVAN     Recorded by [LW] Leyla Angela COTA/DERICK 06/08/19 1421 [SHRAVAN] Thai Swanson, PTA 06/08/19 1052     Row Name 06/08/19 1105 06/08/19 1033          Safety Issues, Functional Mobility    Safety Issues Affecting Function (Mobility)  awareness of need for assistance  -LW  --     Impairments Affecting Function (Mobility)  balance;coordination;endurance/activity tolerance  -LW  balance;coordination;endurance/activity tolerance;pain;postural/trunk control;strength;motor control;motor planning  -SHRAVAN     Recorded by [LW] Leyla Angela COTA/DERICK 06/08/19 1421 [SHRAVAN] Thai Swanson, PTA 06/08/19 1052     Row Name 06/08/19 1105             Transfer Assessment/Treatment    Comment (Transfers)  Pt deferred OOB  -LW      Recorded by [LW] Leyla Angela COTA/L 06/08/19 1421      Row Name 06/08/19 1105             ADL Assessment/Intervention    BADL Assessment/Intervention  grooming;feeding  -LW      Recorded by [LW] Leyla Angela COTA/L 06/08/19 1421      Row Name 06/08/19 1105             Bathing Assessment/Intervention    Comment (Bathing)  Pt deferred bathing.   -LW      Recorded by [LW] Leyla Angela COTA/L 06/08/19 1421      Row Name 06/08/19 1105             Grooming Assessment/Training    [place] Level  (Grooming)  grooming skills;hair care, combing/brushing;oral care regimen;wash face, hands;supervision;set up  -LW      Recorded by [LW] Leyla Angela COTA/L 06/08/19 1421      Row Name 06/08/19 1105             Self-Feeding Assessment/Training    [place] Level (Feeding)  feeding skills;liquids to mouth;prepare tray/open items;scoop food and bring to mouth;set up;maximum assist (25% patient effort)  -LW      Self-Feeding Assess/Train, Spillage Amount  moderate  -LW      Position (Self-Feeding)  other (see comments) long sitting  -LW      Comment (Feeding)  Pt has tremors that hinder food from staying on utencil.   -LW      Recorded by [LW] Leyla Angela COTA/L 06/08/19 1421      Row Name 06/08/19 1105             BADL Safety/Performance    Impairments, BADL Safety/Performance  balance;endurance/activity tolerance  -LW      Recorded by [LW] Leyla Angela COTA/L 06/08/19 1421      Row Name 06/08/19 1033             Therapeutic Exercise    Therapeutic Exercise  supine, lower extremities  -SHRAVAN      Recorded by [SHRAVAN] Thai Swanson, PTA 06/08/19 1052      Row Name 06/08/19 1033             Lower Extremity Supine Therapeutic Exercise    Performed, Supine Lower Extremity (Therapeutic Exercise)  ankle dorsiflexion/plantarflexion;quadriceps sets;gluteal sets;hip abduction/adduction;heel slides  -SHRAVAN      Exercise Type, Supine Lower Extremity (Therapeutic Exercise)  AROM (active range of motion)  -SHRAVAN      Sets/Reps Detail, Supine Lower Extremity (Therapeutic Exercise)  20x1-2 chaitanya  -SHRAVAN      Recorded by [SHRAVAN] Thai Swanson, PTA 06/08/19 1052      Row Name 06/08/19 1105             Therapeutic Exercise    Upper Extremity Range of Motion (Therapeutic Exercise)  shoulder flexion/extension, bilateral;shoulder horizontal abduction/adduction, bilateral;shoulder internal/external rotation, bilateral;elbow flexion/extension, bilateral;forearm supination/pronation, bilateral;wrist flexion/extension, bilateral  -LW       "Hand (Therapeutic Exercise)  finger flexion/extension, bilateral  -LW      Weight/Resistance (Therapeutic Exercise)  2 pounds  -LW      Exercise Type (Therapeutic Exercise)  AROM (active range of motion)  -LW      Position (Therapeutic Exercise)  other (see comments) long sitting  -LW      Sets/Reps (Therapeutic Exercise)  20X1  -LW      Equipment (Therapeutic Exercise)  free weight, barbell  -LW      Expected Outcome (Therapeutic Exercise)  improve functional tolerance, self-care activity  -LW      Recorded by [LW] Leyla Angela COTA/L 06/08/19 1421      Row Name 06/08/19 1105 06/08/19 1033          Positioning and Restraints    Pre-Treatment Position  in bed  -LW  in bed  -SHRAVAN     Post Treatment Position  bed  -LW  bed  -SHRAVAN     In Bed  fowlers;call light within reach;encouraged to call for assist;exit alarm on  -LW  fowlers;call light within reach;encouraged to call for assist;exit alarm on all needs met.   -SHRAVAN     Recorded by [LW] Leyla Angela COTA/DERICK 06/08/19 1421 [SHRAVAN] Thai Swanson, PTA 06/08/19 1052     Row Name 06/08/19 1033             Pain Assessment    Additional Documentation  Pain Scale: Numbers Pre/Post-Treatment (Group)  -SHRAVAN      Recorded by [SHRAVAN] Thai Swanson, PTA 06/08/19 1052      Row Name 06/08/19 1105 06/08/19 1033          Pain Scale: Numbers Pre/Post-Treatment    Pain Scale: Numbers, Pretreatment  7/10  -LW  10/10  -     Pain Scale: Numbers, Post-Treatment  6/10  -  10/10  -     Pain Location - Side  Right  -LW  Right  -SHRAVAN     Pain Location - Orientation  incisional  -LW  --     Pain Location  --  -- \"kidney\"  -     Pain Intervention(s)  --  Repositioned pain meds prior to tx.   -SHRAVAN     Recorded by [LW] Leyla Angela COTA/DERICK 06/08/19 1421 [SHRAVAN] Thai Swanson, PTA 06/08/19 1052     Row Name 06/08/19 1033             Wound 05/29/19 1327 abdomen incision;surgical    Wound - Properties Group Date first assessed: 05/29/19 [KM] Time first assessed: 1327 [KM] Present On " Admission : no [KM] Location: abdomen [KM] Type: incision;surgical [KM] Recorded by:  [KM] Geno Blackmon RN 05/29/19 1327    Periwound Skin Turgor  soft  -SHRAVAN      Recorded by [SHRAVAN] Thai Swanson, PTA 06/08/19 1052      Row Name                Wound 06/05/19 1818 Bilateral other (see notes) other (see comments)    Wound - Properties Group Date first assessed: 06/05/19 [KMA] Time first assessed: 1818 [KMA] Present On Admission : yes [KMA], Admission to   Side: Bilateral [KMA] Location: other (see notes) [KMA2], Bilateral groin & skin fold excoriation  Type: other (see comments) [KMA] Recorded by:  [KMA] Geno Onofre RN 06/05/19 1819 [KMA2] Geno Onofre RN 06/05/19 1821    Row Name 06/08/19 1105 06/08/19 1033          Coping    Observed Emotional State  accepting;cooperative  -LW  accepting;calm;cooperative  -SHRAVAN     Verbalized Emotional State  acceptance  -LW  acceptance  -SHRAVAN     Recorded by [LW] Leyla Angela COTA/DERICK 06/08/19 1421 [SHRAVAN] Thai Swanson, PTA 06/08/19 1052     Row Name 06/08/19 1105             Plan of Care Review    Plan of Care Reviewed With  patient  -LW      Recorded by [LW] Leyla Angela COTA/L 06/08/19 1421      Row Name 06/08/19 1105             Outcome Summary/Treatment Plan (OT)    Daily Summary of Progress (OT)  progress toward functional goals is good  -LW      Plan for Continued Treatment (OT)  Continue POC  -LW      Anticipated Discharge Disposition (OT)  anticipate therapy at next level of care  -LW      Recorded by [LW] Leyla Angela COTA/L 06/08/19 1421      Row Name 06/08/19 1033             Outcome Summary/Treatment Plan (PT)    Daily Summary of Progress (PT)  progress toward functional goals as expected  -SHRAVAN      Plan for Continued Treatment (PT)  continue.   -SHRAVAN      Anticipated Discharge Disposition (PT)  anticipate therapy at next level of care  -SHRAVAN      Recorded by [SHRAVAN] Thai Swanson PTA 06/08/19 1052        User Key  (r) = Recorded By, (t) =  Taken By, (c) = Cosigned By    Initials Name Effective Dates Discipline    Geno Isaac, RN 10/17/16 -  Nurse    Thai Palma, PTA 03/07/18 -  PT    Leyla Giordano, PELAYO/L 03/07/18 -  OT    Geno Limon, RN 05/21/18 -  Nurse        Wound 05/29/19 1327 abdomen incision;surgical (Active)   Dressing Appearance copious drainage 6/8/2019  5:53 AM   Base dressing in place, unable to visualize 6/8/2019  7:25 AM   Periwound Skin Turgor soft 6/8/2019 10:33 AM   Drainage Characteristics/Odor creamy;yellow;purulent 6/8/2019  5:53 AM   Drainage Amount moderate 6/8/2019  5:53 AM   Care, Wound cleansed with;soap and water 6/8/2019  5:53 AM   Dressing Care, Wound dressing changed;gauze, dry 6/8/2019  5:53 AM       Wound 06/05/19 1818 Bilateral other (see notes) other (see comments) (Active)   Dressing Appearance open to air 6/8/2019  7:25 AM     Rehab Goal Summary     Row Name 06/08/19 1105 06/08/19 1033          Physical Therapy Goals    Bed Mobility Goal Selection (PT)  --  bed mobility, PT goal 1  -     Transfer Goal Selection (PT)  --  transfer, PT goal 1  -     Gait Training Goal Selection (PT)  --  gait training, PT goal 1  -     Strength Goal Selection (PT)  --  strength, PT goal 1  -SHRAVAN        Bed Mobility Goal 1 (PT)    Activity/Assistive Device (Bed Mobility Goal 1, PT)  --  sit to supine/supine to sit  -     [place] Level/Cues Needed (Bed Mobility Goal 1, PT)  --  minimum assist (75% or more patient effort)  -     Time Frame (Bed Mobility Goal 1, PT)  --  10 days  -     Barriers (Bed Mobility Goal 1, PT)  --  abdominal pain, previous functional deficit  -     Progress/Outcomes (Bed Mobility Goal 1, PT)  --  goal not met  -        Transfer Goal 1 (PT)    Activity/Assistive Device (Transfer Goal 1, PT)  --  sit-to-stand/stand-to-sit;bed-to-chair/chair-to-bed;walker, rolling  -     [place] Level/Cues Needed (Transfer Goal 1, PT)  --  contact guard assist;verbal cues  required  -SHRAVAN     Time Frame (Transfer Goal 1, PT)  --  1 week  -SHRAVAN     Barriers (Transfers Goal 1, PT)  --  abdominal pain, previous functional deficit  -SHRAVAN     Progress/Outcome (Transfer Goal 1, PT)  --  goal not met  -SHRAVAN        Gait Training Goal 1 (PT)    Activity/Assistive Device (Gait Training Goal 1, PT)  --  walker, rolling;decrease fall risk;increase endurance/gait distance  -SHRAVAN     [place] Level (Gait Training Goal 1, PT)  --  contact guard assist;verbal cues required  -SHRAVAN     Distance (Gait Goal 1, PT)  --  10 feet  -SHRAVAN     Time Frame (Gait Training Goal 1, PT)  --  10 days  -SHRAVAN     Barriers (Gait Training Goal 1, PT)  --  abdominal pain, previous functional deficit  -SHRAVAN     Progress/Outcome (Gait Training Goal 1, PT)  --  goal not met  -SHRAVAN        Strength Goal 1 (PT)    Strength Goal 1 (PT)  --  Pt will be able to complete 20 reps of at least 4 LE exercises in order to demo improved strength for transfers  -SHRAVAN     Time Frame (Strength Goal 1, PT)  --  1 week  -SHRAVAN     Barriers (Strength Goal 1, PT)  --  abdominal pain, previous functional deficit  -SHRAVAN     Progress/Outcome (Strength Goal 1, PT)  --  goal not met  -SHRAVAN        Transfer Goal 1 (OT)    Activity/Assistive Device (Transfer Goal 1, OT)  commode, bedside with drop arms;commode, bedside without drop arms  -LW  --     [place] Level/Cues Needed (Transfer Goal 1, OT)  minimum assist (75% or more patient effort)  -LW  --     Time Frame (Transfer Goal 1, OT)  long term goal (LTG);by discharge  -LW  --     Progress/Outcome (Transfer Goal 1, OT)  goal not met  -LW  --        Bathing Goal 1 (OT)    Activity/Assistive Device (Bathing Goal 1, OT)  upper body bathing  -LW  --     [place] Level/Cues Needed (Bathing Goal 1, OT)  minimum assist (75% or more patient effort)  -LW  --     Time Frame (Bathing Goal 1, OT)  long term goal (LTG);by discharge  -LW  --     Progress/Outcomes (Bathing Goal 1, OT)  goal not met  -LW  --        Dressing Goal 1  (OT)    Activity/Assistive Device (Dressing Goal 1, OT)  upper body dressing  -LW  --     [place]/Cues Needed (Dressing Goal 1, OT)  minimum assist (75% or more patient effort)  -LW  --     Time Frame (Dressing Goal 1, OT)  long term goal (LTG);by discharge  -LW  --     Progress/Outcome (Dressing Goal 1, OT)  goal not met  -LW  --        Grooming Goal 1 (OT)    Activity/Device (Grooming Goal 1, OT)  grooming skills, all  -LW  --     [place] (Grooming Goal 1, OT)  set-up required;supervision required;verbal cues required  -LW  --     Time Frame (Grooming Goal 1, OT)  long term goal (LTG);by discharge  -LW  --     Progress/Outcome (Grooming Goal 1, OT)  goal not met  -LW  --        Self-Feeding Goal 1 (OT)    Activity/Assistive Device (Self-Feeding Goal 1, OT)  self-feeding skills, all  -LW  --     [place] Level/Cues Needed (Self-Feeding Goal 1, OT)  set-up required;supervision required;verbal cues required  -LW  --     Time Frame (Self-Feeding Goal 1, OT)  long term goal (LTG);by discharge  -LW  --     Barriers (Self-Feeding Goal 1, OT)  address when medically appropriate.   -LW  --     Progress/Outcomes (Self-Feeding Goal 1, OT)  goal not met  -LW  --       User Key  (r) = Recorded By, (t) = Taken By, (c) = Cosigned By    Initials Name Provider Type Discipline    Thai Palma, PTA Physical Therapy Assistant PT    Leyla Giordano COTA/L Occupational Therapy Assistant OT        Occupational Therapy Education     Title: PT OT SLP Therapies (In Progress)     Topic: Occupational Therapy (Done)     Point: ADL training (Done)     Description: Instruct learner(s) on proper safety adaptation and remediation techniques during self care or transfers.   Instruct in proper use of assistive devices.    Learning Progress Summary           Patient Acceptance, E,TB, VU by BREA at 6/8/2019  2:21 PM    Acceptance, E,TB,D, NR by LONDON at 6/7/2019  1:32 PM    Acceptance, E,TB,D, NR by CS at 6/6/2019  3:56 PM     Acceptance, E, NR by  at 6/5/2019 11:27 AM    Acceptance, E,TB,D, NR by  at 5/31/2019  1:12 PM    Acceptance, E, VU,NR by  at 5/30/2019  1:28 PM    Comment:  Educated about OT and POC. Educated to call for assist. Educated on safety throughout.                   Point: Home exercise program (Done)     Description: Instruct learner(s) on appropriate technique for monitoring, assisting and/or progressing therapeutic exercises/activities.    Learning Progress Summary           Patient Acceptance, E,TB, VU by LW at 6/8/2019  2:21 PM    Acceptance, E,TB,D, NR by CS at 6/7/2019  1:32 PM    Acceptance, E,TB,D, NR by  at 6/6/2019  3:56 PM    Acceptance, E,TB,D, NR by  at 5/31/2019  1:12 PM                   Point: Precautions (Done)     Description: Instruct learner(s) on prescribed precautions during self-care and functional transfers.    Learning Progress Summary           Patient Acceptance, E,TB, VU by LW at 6/8/2019  2:21 PM    Acceptance, E,TB,D, NR by  at 6/7/2019  1:32 PM    Acceptance, E,TB,D, NR by  at 6/6/2019  3:56 PM    Acceptance, E,TB,D, NR by  at 5/31/2019  1:12 PM    Acceptance, E, VU,NR by  at 5/30/2019  1:28 PM    Comment:  Educated about OT and POC. Educated to call for assist. Educated on safety throughout.                   Point: Body mechanics (Done)     Description: Instruct learner(s) on proper positioning and spine alignment during self-care, functional mobility activities and/or exercises.    Learning Progress Summary           Patient Acceptance, E,TB, VU by LW at 6/8/2019  2:21 PM    Acceptance, E,TB,D, NR by  at 6/7/2019  1:32 PM    Acceptance, E,TB,D, NR by  at 6/6/2019  3:56 PM    Acceptance, E,TB,D, NR by  at 5/31/2019  1:12 PM                               User Key     Initials Effective Dates Name Provider Type Discipline     06/08/18 -  Dee Mendez, OTR/L Occupational Therapist OT     03/07/18 -  Anila Rodriguez COTA/L Occupational Therapy Assistant  OT    CS 03/07/18 -  Cathy Dillon COTA/DERICK Occupational Therapy Assistant OT    LW 03/07/18 -  Leyla Angela COTA/L Occupational Therapy Assistant OT                OT Recommendation and Plan  Outcome Summary/Treatment Plan (OT)  Daily Summary of Progress (OT): progress toward functional goals is good  Plan for Continued Treatment (OT): Continue POC  Anticipated Discharge Disposition (OT): anticipate therapy at next level of care  Therapy Frequency (OT Eval): other (see comments)(5-7 days per week)  Daily Summary of Progress (OT): progress toward functional goals is good  Plan of Care Review  Plan of Care Reviewed With: patient  Plan of Care Reviewed With: patient  Outcome Summary: Pt deferred bathing but did agree to grooming  Supervision with set up. Pt was max to dependent with feeding goal. Pt has tremors that keeps food from getting to her mouth. Pt worked on UB strengthening for independence with ADL's   Outcome Measures     Row Name 06/08/19 1105 06/08/19 1033 06/07/19 1300       How much help from another person do you currently need...    Turning from your back to your side while in flat bed without using bedrails?  --  3  -SHRAVAN  --    Moving from lying on back to sitting on the side of a flat bed without bedrails?  --  3  -SHRAVAN  --    Moving to and from a bed to a chair (including a wheelchair)?  --  2  -SHRAVAN  --    Standing up from a chair using your arms (e.g., wheelchair, bedside chair)?  --  2  -SHRAVAN  --    Climbing 3-5 steps with a railing?  --  1  -SHRAVAN  --    To walk in hospital room?  --  2  -SHRAVAN  --    AM-PAC 6 Clicks Score  --  13  -SHRAVAN  --       How much help from another is currently needed...    Putting on and taking off regular lower body clothing?  1  -LW  --  1  -CS    Bathing (including washing, rinsing, and drying)  1  -LW  --  1  -CS    Toileting (which includes using toilet bed pan or urinal)  1  -LW  --  1  -CS    Putting on and taking off regular upper body clothing  2  -LW  --  2  -CS     Taking care of personal grooming (such as brushing teeth)  2  -LW  --  2  -CS    Eating meals  1  -LW  --  1  -CS    Score  8  -LW  --  8  -CS       Functional Assessment    Outcome Measure Options  --  AM-PAC 6 Clicks Basic Mobility (PT)  -SHRAVAN  AM-PAC 6 Clicks Daily Activity (OT)  -CS    Row Name 06/07/19 1015 06/06/19 0935          How much help from another person do you currently need...    Turning from your back to your side while in flat bed without using bedrails?  3  -SHRAVAN  2  -LN     Moving from lying on back to sitting on the side of a flat bed without bedrails?  3  -SHRAVAN  2  -LN     Moving to and from a bed to a chair (including a wheelchair)?  2  -SHRAVAN  2  -LN     Standing up from a chair using your arms (e.g., wheelchair, bedside chair)?  2  -SHRAVAN  2  -LN     Climbing 3-5 steps with a railing?  1  -SHRAVAN  1  -LN     To walk in hospital room?  2  -SHRAVAN  2  -LN     AM-PAC 6 Clicks Score  13  -SHRAVAN  11  -LN        How much help from another is currently needed...    Putting on and taking off regular lower body clothing?  --  1  -CS     Bathing (including washing, rinsing, and drying)  --  1  -CS     Toileting (which includes using toilet bed pan or urinal)  --  1  -CS     Putting on and taking off regular upper body clothing  --  2  -CS     Taking care of personal grooming (such as brushing teeth)  --  2  -CS     Eating meals  --  1  -CS     Score  --  8  -CS        Functional Assessment    Outcome Measure Options  AM-PAC 6 Clicks Basic Mobility (PT)  -[facility] 6 Clicks Basic Mobility (PT)  -LN       User Key  (r) = Recorded By, (t) = Taken By, (c) = Cosigned By    Initials Name Provider Type    SHRAVAN Thai Swanson PTA Physical Therapy Assistant    LN Jannet Garcia PTA Physical Therapy Assistant    CS Cathy Dillon, GITA/L Occupational Therapy Assistant    Leyla Giordano, GITA/L Occupational Therapy Assistant           Time Calculation:   Time Calculation- OT     Row Name 06/08/19 7564             Time  Calculation- OT    OT Start Time  1105  -LW      OT Stop Time  1200  -LW      OT Time Calculation (min)  55 min  -LW      Total Timed Code Minutes- OT  55 minute(s)  -LW      OT Received On  06/08/19  -LW        User Key  (r) = Recorded By, (t) = Taken By, (c) = Cosigned By    Initials Name Provider Type    Leyla Giordano COTA/L Occupational Therapy Assistant        Therapy Charges for Today     Code Description Service Date Service Provider Modifiers Qty    95159480736 HC OT SELF CARE/MGMT/TRAIN EA 15 MIN 6/8/2019 Leyla Angela COTA/L GO 2    [id number] HC OT THER PROC EA 15 MIN 6/8/2019 Leyla Angela COTA/L GO 2      Non-skid socks and gait belt in place. Toileting offered. Call light and needs within reach. Pt advised to not get up alone and call the nurse for assistance.  Bed alarm on.            FREDDY Cortes  6/8/2019

## 2019-06-08 NOTE — PLAN OF CARE
Problem: Patient Care Overview  Goal: Plan of Care Review  Outcome: Ongoing (interventions implemented as appropriate)   06/08/19 1033   Coping/Psychosocial   Plan of Care Reviewed With patient   Plan of Care Review   Progress improving   OTHER   Outcome Summary pt c/o pain and declined a EOB/OOB. pt participated in LE ther ex in supine, AROM. no new goals met at this time. pt would continue to benefit from PT services.

## 2019-06-08 NOTE — PLAN OF CARE
Problem: Patient Care Overview  Goal: Plan of Care Review  Outcome: Ongoing (interventions implemented as appropriate)   06/08/19 2979   Coping/Psychosocial   Plan of Care Reviewed With patient   Plan of Care Review   Progress improving   OTHER   Outcome Summary Pt deferred bathing but did agree to grooming Supervision with set up. Pt was max to dependent with feeding goal. Pt has tremors that keeps food from getting to her mouth. Pt worked on UB strengthening for independence with ADL's

## 2019-06-08 NOTE — PROGRESS NOTES
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  Likely has intra-abdominal abscess is draining through the vagina at this time.  Drainage is decreased compared to the previous amounts.  Patient continues to tolerate her diet continues to have ostomy output.  Is on Azactam.  White count 17,000     /74 (BP Location: Left arm, Patient Position: Lying)   Pulse 90   Temp 96.8 °F (36 °C) (Oral)   Resp 18   Ht 152.4 cm (60\")   Wt 124 kg (272 lb 11.3 oz)   SpO2 96%   BMI 53.26 kg/m²     Lab Results (last 24 hours)     Procedure Component Value Units Date/Time    CBC (No Diff) [[id number]]  (Abnormal) Collected:  06/08/19 0836    Specimen:  Blood Updated:  06/08/19 0909     WBC 16.57 10*3/mm3      RBC 3.58 10*6/mm3      Hemoglobin 11.7 g/dL      Hematocrit 34.8 %      MCV 97.2 fL      MCH 32.7 pg      MCHC 33.6 g/dL      RDW 12.8 %      RDW-SD 45.7 fl      MPV 10.7 fL      Platelets 388 10*3/mm3     Basic Metabolic Panel [[id number]]  (Abnormal) Collected:  06/08/19 0645    Specimen:  Blood Updated:  06/08/19 0725     Glucose 100 mg/dL      BUN 5 mg/dL      Creatinine 0.67 mg/dL      Sodium 130 mmol/L      Potassium 4.6 mmol/L      Chloride 96 mmol/L      CO2 24.0 mmol/L      Calcium 7.9 mg/dL      eGFR Non African Amer 88 mL/min/1.73      BUN/Creatinine Ratio 7.5     Anion Gap 10.0 mmol/L     Narrative:       GFR Normal >60  Chronic Kidney Disease <60  Kidney Failure <15          Current Medications:  Current Facility-Administered Medications   Medication Dose Route Frequency Provider Last Rate Last Dose   • albuterol sulfate HFA (PROVENTIL HFA;VENTOLIN HFA;PROAIR HFA) inhaler 2 puff  2 puff Inhalation Q4H PRN Timothy Dixon MD   2 puff at 06/05/19 0440   • aztreonam (AZACTAM) 1 g/100 mL 0.9% NS IVPB (mbp)  1 g Intravenous Q8H Timothy Dixon MD   1 g at 06/08/19 0526   • bumetanide (BUMEX) injection 0.5 mg  0.5 mg Intravenous Q12H Destiny, " Timothy Jessica MD   0.5 mg at 06/08/19 0237   • dextrose 5 % and sodium chloride 0.45 % with KCl 20 mEq/L infusion  50 mL/hr Intravenous Continuous Timothy Dixon MD 50 mL/hr at 06/08/19 0909 50 mL/hr at 06/08/19 0909   • donepezil (ARICEPT) tablet 5 mg  5 mg Oral Nightly Timothy Dixon MD   5 mg at 06/07/19 2010   • famotidine (PEPCID) tablet 20 mg  20 mg Oral BID AC Timothy Dixon MD       • gabapentin (NEURONTIN) capsule 600 mg  600 mg Oral Q8H Timothy Dixon MD   600 mg at 06/08/19 0525   • Glatiramer Acetate solution prefilled syringe 40 mg  40 mg Subcutaneous Once per day on Mon Wed Fri Timothy Dixon MD   40 mg at 06/07/19 2011   • heparin (porcine) 5000 UNIT/ML injection 5,000 Units  5,000 Units Subcutaneous Q8H Timothy Dixon MD   5,000 Units at 06/08/19 0526   • HYDROcodone-acetaminophen (NORCO) 7.5-325 MG per tablet 1 tablet  1 tablet Oral Q6H PRN Fran Betts MD       • levothyroxine (SYNTHROID, LEVOTHROID) tablet 137 mcg  137 mcg Oral Daily Timothy Dixon MD   137 mcg at 06/08/19 0910   • memantine (NAMENDA) tablet 2.5 mg  2.5 mg Oral Q12H Timothy Dixon MD   2.5 mg at 06/08/19 0910   • naloxone (NARCAN) injection 0.1 mg  0.1 mg Intravenous Q5 Min PRN Timothy Dixon MD       • nystatin (MYCOSTATIN) powder   Topical Q12H Timothy Dixon MD       • ondansetron (ZOFRAN) tablet 4 mg  4 mg Oral Q6H PRN Timothy Dixon MD        Or   • ondansetron (ZOFRAN) injection 4 mg  4 mg Intravenous Q6H PRN Timothy Dixon MD   4 mg at 06/03/19 1921   • potassium phosphate 45 mmol in sodium chloride 0.9 % 500 mL infusion  45 mmol Intravenous PRN Rafael Ochoa MD        Or   • potassium phosphate 30 mmol in sodium chloride 0.9 % 250 mL infusion  30 mmol Intravenous PRN Rafael Ochoa MD 62.5 mL/hr at 06/02/19 0700 30 mmol at 06/02/19 0700    Or   • potassium phosphate 15 mmol in sodium chloride 0.9 %  100 mL infusion  15 mmol Intravenous PRN Rafael Ochoa MD 50 mL/hr at 06/02/19 2330 15 mmol at 06/02/19 2330    Or   • sodium phosphates 45 mmol in sodium chloride 0.9 % 250 mL IVPB  45 mmol Intravenous PRN Rafael Ochoa MD        Or   • sodium phosphates 30 mmol in sodium chloride 0.9 % 100 mL IVPB  30 mmol Intravenous PRN Rafael Ochoa MD        Or   • sodium phosphates 15 mmol in sodium chloride 0.9 % 100 mL IVPB  15 mmol Intravenous PRN Rafael Ochoa MD       • primidone (MYSOLINE) tablet 50 mg  50 mg Oral Q8H Timothy Dixon MD   50 mg at 06/08/19 0525   • sodium chloride 0.9 % flush 10 mL  10 mL Intravenous Q12H Timothy Dixon MD   10 mL at 06/07/19 2013   • sodium chloride 0.9 % flush 10 mL  10 mL Intravenous Q12H Timothy Dixon MD   10 mL at 06/07/19 2013   • sodium chloride 0.9 % flush 10 mL  10 mL Intravenous PRN Timothy Dixon MD       • sodium chloride 0.9 % flush 20 mL  20 mL Intravenous PRN Timothy Dixon MD       • tiZANidine (ZANAFLEX) tablet 4 mg  4 mg Oral BID PRN Timothy Dixon MD   4 mg at 06/01/19 0144   • vilazodone (VIIBRYD) tablet 20 mg  20 mg Oral Nightly Timothy Dixon MD   20 mg at 06/07/19 2014       Prior to admission medications:  Medications Prior to Admission   Medication Sig Dispense Refill Last Dose   • albuterol (PROVENTIL HFA;VENTOLIN HFA) 108 (90 Base) MCG/ACT inhaler Inhale 2 puffs Every 4 (Four) Hours As Needed for Wheezing.   Past Month at Unknown time   • bumetanide (BUMEX) 1 MG tablet Take 1 mg by mouth Daily.   5/28/2019 at 1600   • levothyroxine (SYNTHROID, LEVOTHROID) 137 MCG tablet Take 137 mcg by mouth Daily. No med changes after last   level check   5/29/2019 at 0400   • memantine (NAMENDA XR) 7 MG capsule sustained-release 24 hr extended release capsule Take 28 mg by mouth Daily.   5/28/2019 at 0900   • Multiple Vitamins-Calcium (ONE-A-DAY WOMENS FORMULA PO) Take 1 tablet by mouth Daily.   5/28/2019  at 0900   • nystatin (MYCOSTATIN) 220608 UNIT/GM cream Apply 1 application topically As Needed.   5/28/2019 at 0900   • omeprazole (PRILOSEC) 20 MG capsule Take 1 capsule by mouth 2 (Two) Times a Day. 60 capsule 1 5/29/2019 at 0400   • oxyCODONE-acetaminophen (PERCOCET) 7.5-325 MG per tablet Take 1 tablet by mouth Every 6 (Six) Hours As Needed.  0 5/28/2019 at 0900   • potassium chloride (MICRO-K) 10 MEQ CR capsule Take 10 mEq by mouth 3 (Three) Times a Day.   5/28/2019 at 0900   • primidone (MYSOLINE) 50 MG tablet Take  by mouth 3 (Three) Times a Day.   5/28/2019 at 2100   • rosuvastatin (CRESTOR) 10 MG tablet Take 10 mg by mouth As Needed.   5/27/2019 at Unknown time   • tiZANidine (ZANAFLEX) 4 MG tablet Take 4 mg by mouth 2 (Two) Times a Day As Needed for Muscle Spasms.   5/28/2019 at 2100   • COPAXONE 40 MG/ML solution prefilled syringe Every Evening. Monday Wednesday and Friday 5/27/2019   • donepezil (ARICEPT) 5 MG tablet Take 5 mg by mouth Every Night.   5/27/2019   • gabapentin (NEURONTIN) 600 MG tablet Take 600 mg by mouth 3 (Three) Times a Day.   5/27/2019   • melatonin 5 MG tablet tablet Take 5 mg by mouth Every Night.   5/27/2019   • ondansetron (ZOFRAN) 4 MG tablet Take 4 mg by mouth Every 8 (Eight) Hours As Needed for Nausea or Vomiting.   More than a month at Unknown time   • vilazodone (VIIBRYD) 20 MG tablet tablet Take 20 mg by mouth Daily.   5/26/2019       Physical exam: Nontoxic  Small amount of drainage the upper portion of the incision and small amount from the lower tip of the incision.  Small amount of drainage from vagina.  Abdomen soft    Assessment : Slow improvement.    Plan: Switch to oral pain medication  Continue present care otherwise

## 2019-06-09 PROCEDURE — 25010000002 HEPARIN (PORCINE) PER 1000 UNITS: Performed by: SURGERY

## 2019-06-09 RX ADMIN — PRIMIDONE 50 MG: 50 TABLET ORAL at 05:50

## 2019-06-09 RX ADMIN — PRIMIDONE 50 MG: 50 TABLET ORAL at 17:14

## 2019-06-09 RX ADMIN — MEMANTINE 2.5 MG: 5 TABLET ORAL at 21:43

## 2019-06-09 RX ADMIN — AZTREONAM 1 G: 1 INJECTION, POWDER, LYOPHILIZED, FOR SOLUTION INTRAMUSCULAR; INTRAVENOUS at 05:50

## 2019-06-09 RX ADMIN — HYDROCODONE BITARTRATE AND ACETAMINOPHEN 1 TABLET: 7.5; 325 TABLET ORAL at 06:24

## 2019-06-09 RX ADMIN — PRIMIDONE 50 MG: 50 TABLET ORAL at 21:43

## 2019-06-09 RX ADMIN — HYDROCODONE BITARTRATE AND ACETAMINOPHEN 1 TABLET: 7.5; 325 TABLET ORAL at 21:44

## 2019-06-09 RX ADMIN — HYDROCODONE BITARTRATE AND ACETAMINOPHEN 1 TABLET: 7.5; 325 TABLET ORAL at 17:14

## 2019-06-09 RX ADMIN — SODIUM CHLORIDE, PRESERVATIVE FREE 10 ML: 5 INJECTION INTRAVENOUS at 21:45

## 2019-06-09 RX ADMIN — HEPARIN SODIUM 5000 UNITS: 5000 INJECTION INTRAVENOUS; SUBCUTANEOUS at 21:43

## 2019-06-09 RX ADMIN — FAMOTIDINE 20 MG: 20 TABLET ORAL at 17:14

## 2019-06-09 RX ADMIN — GABAPENTIN 600 MG: 300 CAPSULE ORAL at 21:43

## 2019-06-09 RX ADMIN — HEPARIN SODIUM 5000 UNITS: 5000 INJECTION INTRAVENOUS; SUBCUTANEOUS at 15:23

## 2019-06-09 RX ADMIN — VILAZODONE HYDROCHLORIDE 20 MG: 20 TABLET ORAL at 21:44

## 2019-06-09 RX ADMIN — SODIUM CHLORIDE, PRESERVATIVE FREE 10 ML: 5 INJECTION INTRAVENOUS at 09:49

## 2019-06-09 RX ADMIN — BUMETANIDE 0.5 MG: 0.25 INJECTION INTRAMUSCULAR; INTRAVENOUS at 15:23

## 2019-06-09 RX ADMIN — POTASSIUM CHLORIDE, DEXTROSE MONOHYDRATE AND SODIUM CHLORIDE 50 ML/HR: 150; 5; 450 INJECTION, SOLUTION INTRAVENOUS at 21:45

## 2019-06-09 RX ADMIN — BUMETANIDE 0.5 MG: 0.25 INJECTION INTRAMUSCULAR; INTRAVENOUS at 02:04

## 2019-06-09 RX ADMIN — HYDROCODONE BITARTRATE AND ACETAMINOPHEN 1 TABLET: 7.5; 325 TABLET ORAL at 13:12

## 2019-06-09 RX ADMIN — DONEPEZIL HYDROCHLORIDE 5 MG: 5 TABLET, FILM COATED ORAL at 21:43

## 2019-06-09 RX ADMIN — NYSTATIN: 100000 POWDER TOPICAL at 21:45

## 2019-06-09 RX ADMIN — MELATONIN 6 MG: at 21:50

## 2019-06-09 RX ADMIN — FAMOTIDINE 20 MG: 20 TABLET ORAL at 08:51

## 2019-06-09 RX ADMIN — GABAPENTIN 600 MG: 300 CAPSULE ORAL at 15:23

## 2019-06-09 RX ADMIN — HEPARIN SODIUM 5000 UNITS: 5000 INJECTION INTRAVENOUS; SUBCUTANEOUS at 05:50

## 2019-06-09 RX ADMIN — LEVOTHYROXINE SODIUM 137 MCG: 25 TABLET ORAL at 08:51

## 2019-06-09 RX ADMIN — GABAPENTIN 600 MG: 300 CAPSULE ORAL at 05:50

## 2019-06-09 RX ADMIN — AZTREONAM 1 G: 1 INJECTION, POWDER, LYOPHILIZED, FOR SOLUTION INTRAMUSCULAR; INTRAVENOUS at 15:23

## 2019-06-09 RX ADMIN — MEMANTINE 2.5 MG: 5 TABLET ORAL at 08:51

## 2019-06-09 RX ADMIN — AZTREONAM 1 G: 1 INJECTION, POWDER, LYOPHILIZED, FOR SOLUTION INTRAMUSCULAR; INTRAVENOUS at 21:42

## 2019-06-09 NOTE — PLAN OF CARE
Problem: Patient Care Overview  Goal: Plan of Care Review  Outcome: Ongoing (interventions implemented as appropriate)   06/09/19 6593   Coping/Psychosocial   Plan of Care Reviewed With patient   Plan of Care Review   Progress improving   OTHER   Outcome Summary pt complains of stomach pain occurring 20-30 minutes after meals       Problem: Fall Risk (Adult)  Goal: Absence of Fall  Outcome: Ongoing (interventions implemented as appropriate)      Problem: Skin Injury Risk (Adult)  Goal: Skin Health and Integrity  Outcome: Ongoing (interventions implemented as appropriate)      Problem: Surgery Nonspecified (Adult)  Goal: Signs and Symptoms of Listed Potential Problems Will be Absent, Minimized or Managed (Surgery Nonspecified)  Outcome: Ongoing (interventions implemented as appropriate)

## 2019-06-09 NOTE — OP NOTE
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 small bowel resection     Surgeon(s):  Timothy Dixon MD    Anesthesia: General    Staff:   Circulator: Kisha Corado RN; Abby Vega RN; Geno Blackmon RN  Scrub Person: Clifford Cassidy; Darcy Cid  Assistant: Gina Gutierrez CSA    Estimated Blood Loss: 250 mL    Specimens:                ID Type Source Tests Collected by Time   A : small bowel Tissue Small Intestine TISSUE PATHOLOGY EXAM Timothy Dixon MD 5/29/2019 1326         Drains:   Colostomy LLQ (Active)   Stomal Appliance 2 piece 6/8/2019  8:40 PM   Stoma Appearance rosebud appearance 6/8/2019  8:40 PM   Peristomal Assessment Clean;Intact 6/8/2019  8:40 PM   Accessories/Skin Care wafer barrier over peristomal skin 6/8/2019  8:40 PM   Stoma Function stool 6/8/2019  8:40 PM   Stool Color brown 6/8/2019  8:40 PM   Stool Consistency loose 6/8/2019  8:40 PM   Output (mL) 300 mL 6/9/2019  1:00 PM       Urethral Catheter Silicone;Double-lumen 16 Fr. (Active)   Daily Indications Selected surgeries ( tract, abdomen) 6/8/2019  8:40 PM   Site Assessment Clean;Skin intact 6/8/2019  8:40 PM   Collection Container Standard drainage bag 6/8/2019  8:40 PM   Securement Method Securing device 6/8/2019  8:40 PM   Catheter care complete Yes 6/9/2019  8:00 AM   Irrigation/Instillation Indication patency maintenance 6/2/2019  8:00 PM   Output (mL) 600 mL 6/9/2019  2:00 AM       [REMOVED] NG/OG Tube Nasogastric 18 Fr Right nostril (Removed)   Placement Verification Other (Comment) 6/2/2019  8:00 AM   Site Assessment Clean;Dry;Intact 6/2/2019  8:00 AM   Securement taped to nostril center 6/2/2019   8:00 AM   Secured at (cm) 65 6/2/2019  4:20 AM   Status Suction-low intermittent 6/2/2019  8:00 AM   Drainage Appearance Bile 6/2/2019  8:00 AM   Surrounding Skin Dry;Intact 6/2/2019  8:00 AM   Flush/ Irrigation Intake (mL) 30 5/30/2019  4:00 AM   Output (mL) 200 mL 6/2/2019  6:00 AM       Findings: Dense intra-abdominal adhesions, loop of small bowel adjacent to vaginal cuff with apparent fistula    Complications: None    Indication: Fistula between small intestine and vagina    Operative Note:    The patient was seen and consented preoperatively.  Following this she was brought to the operating room placed in supine position on the OR table.  General anesthetic was administered and the patient was orotracheally intubated without incident.  A nasogastric tube was then placed by the anesthesia service.  Aguilera catheter was then placed by the nursing staff.  A preoperative briefing was performed.  The patient's ostomy appliance in the left lower quadrant was removed and a silk suture was used to temporarily close the patient's colostomy.  Owing to the large size of the patient's leg she was not able to be placed in yellowfin stirrups and was therefore left in the supine position.    The abdomen was then prepped and draped in normal sterile fashion a timeout was then performed.  The scar from the lower portion of the patient's midline wound was then excised using a scalpel as well as electrocautery.  The fascia was then identified and opened using electrocautery.  The peritoneum was then seen and opened sharply.  Immediately on opening the peritoneum near the upper portion of the wound there appeared to be a dense adhesions of the omentum and small intestine to the posterior abdominal wall.  Sharp dissection was then undertaken to free the small bowel and omentum away from the posterior abdominal wall so that the fascial incision could be extended inferiorly.  This was done in a piece meal process lysing adhesions and  then extending the incision 1 or 2 cm at the time.  A few partial-thickness enterotomies were created during this adhesio lysis and were repaired with interrupted 3-0 Vicryl sutures.  Once all the adhesions to the posterior abdominal wall had been divided a wound protector was then placed.  The patient was then positioned in a headdown position to allow for better visualization into the pelvis.  Additional adhesions of the small intestine into the pelvis were then lysed until only a single loop of small intestine remained in the pelvis.  This loop of bowel appeared to be the loop that was fistulized to the vaginal cuff.  This tract was divided using electrocautery and the bowel was then brought up and into the abdominal wound.  On visualization this did appear to be the fistulous tract.  In the pelvis this tract was debrided and the vaginal cuff was closed using a figure-of-eight 2-0 Vicryl stitch.  This portion of the small intestine was felt to require resection.  A MONIKA stapler was used to divide the bowel proximal and distal to the fistula site.  The omentum was then clamped and divided.  The 2 ends of small bowel were then brought next to one another and stay sutures were placed for creation of a side-to-side stapled anastomosis.  A portion of each staple line was then removed.  A separate firing of the MONIKA stapler was then used to create the side-to-side anastomosis.  A TA stapler was then used to close the open portion of the staple lines.  Reinforcing sutures were then placed and the staple line was oversewn using interrupted 3-0 Vicryl sutures.  The mesenteric defect was likewise closed using interrupted 3-0 Vicryl suture.  The abdomen was then irrigated copiously.  All sponge, instrument, and needle counts were confirmed to be correct.    Attention was then turned to closure.  The wound protector was removed.  The low midline wound was then closed using a running #1 PDS suture.  Staples were then used to  close the skin.  The suture within the ostomy that had closed at the beginning the case was removed and an ostomy appliance was replaced.  Sterile dressings were placed.  The patient was then returned to the intensive care unit.          This document has been electronically signed by Timothy Dixon MD on June 9, 2019 4:30 PM        Timothy Dixon MD     Date: 6/9/2019  Time: 4:23 PM

## 2019-06-09 NOTE — PLAN OF CARE
Problem: Patient Care Overview  Goal: Plan of Care Review  Outcome: Ongoing (interventions implemented as appropriate)   06/09/19 0301   Coping/Psychosocial   Plan of Care Reviewed With patient   Plan of Care Review   Progress improving   OTHER   Outcome Summary VSS, complains of pain relieved by pain meds. Pt stated she had trouble sleeping, so melatonin was ordered. Slept well through night, continue to monitor.      Goal: Individualization and Mutuality  Outcome: Ongoing (interventions implemented as appropriate)    Goal: Discharge Needs Assessment  Outcome: Ongoing (interventions implemented as appropriate)    Goal: Interprofessional Rounds/Family Conf  Outcome: Ongoing (interventions implemented as appropriate)      Problem: Fall Risk (Adult)  Goal: Absence of Fall  Outcome: Ongoing (interventions implemented as appropriate)      Problem: Skin Injury Risk (Adult)  Goal: Skin Health and Integrity  Outcome: Ongoing (interventions implemented as appropriate)      Problem: Surgery Nonspecified (Adult)  Goal: Signs and Symptoms of Listed Potential Problems Will be Absent, Minimized or Managed (Surgery Nonspecified)  Outcome: Ongoing (interventions implemented as appropriate)      Problem: Wound (Includes Pressure Injury) (Adult)  Goal: Signs and Symptoms of Listed Potential Problems Will be Absent, Minimized or Managed (Wound)  Outcome: Ongoing (interventions implemented as appropriate)

## 2019-06-09 NOTE — SIGNIFICANT NOTE
06/09/19 1056   Rehab Treatment   Discipline physical therapy assistant   Reason Treatment Not Performed unavailable for treatment  (pt having a Scientologist meeting. no tx. )

## 2019-06-09 NOTE — PROGRESS NOTES
"  Subjective:  Postop day 11.  Some right flank discomfort but otherwise no complaints.  No nausea no vomiting       /59 (BP Location: Right arm, Patient Position: Lying)   Pulse 72   Temp 97 °F (36.1 °C) (Oral)   Resp 18   Ht 152.4 cm (60\")   Wt 124 kg (272 lb 11.3 oz)   SpO2 95%   BMI 53.26 kg/m²     Lab Results (last 24 hours)     ** No results found for the last 24 hours. **          Current Medications:  Current Facility-Administered Medications   Medication Dose Route Frequency Provider Last Rate Last Dose   • albuterol sulfate HFA (PROVENTIL HFA;VENTOLIN HFA;PROAIR HFA) inhaler 2 puff  2 puff Inhalation Q4H PRN Timohty Dixon MD   2 puff at 06/05/19 0440   • aztreonam (AZACTAM) 1 g/100 mL 0.9% NS IVPB (mbp)  1 g Intravenous Q8H Timothy Dixon MD   1 g at 06/09/19 0550   • bumetanide (BUMEX) injection 0.5 mg  0.5 mg Intravenous Q12H Timothy Dixon MD   0.5 mg at 06/09/19 0204   • dextrose 5 % and sodium chloride 0.45 % with KCl 20 mEq/L infusion  50 mL/hr Intravenous Continuous Timothy Dixon MD 50 mL/hr at 06/08/19 0909 50 mL/hr at 06/08/19 0909   • donepezil (ARICEPT) tablet 5 mg  5 mg Oral Nightly Timothy Dixon MD   5 mg at 06/08/19 2048   • famotidine (PEPCID) tablet 20 mg  20 mg Oral BID AC Timothy Dixon MD   20 mg at 06/09/19 0851   • gabapentin (NEURONTIN) capsule 600 mg  600 mg Oral Q8H Timothy Dixon MD   600 mg at 06/09/19 0550   • Glatiramer Acetate solution prefilled syringe 40 mg  40 mg Subcutaneous Once per day on Mon Wed Fri Timothy Dixon MD   40 mg at 06/07/19 2011   • heparin (porcine) 5000 UNIT/ML injection 5,000 Units  5,000 Units Subcutaneous Q8H Timothy Dixon MD   5,000 Units at 06/09/19 0550   • HYDROcodone-acetaminophen (NORCO) 7.5-325 MG per tablet 1 tablet  1 tablet Oral Q4H PRN Fran Betts MD   1 tablet at 06/09/19 0624   • levothyroxine (SYNTHROID, LEVOTHROID) tablet 137 " mcg  137 mcg Oral Daily Timothy Dixon MD   137 mcg at 06/09/19 0851   • melatonin tablet 6 mg  6 mg Oral Nightly PRN Fran Betts MD   6 mg at 06/08/19 2224   • memantine (NAMENDA) tablet 2.5 mg  2.5 mg Oral Q12H Timothy Dixon MD   2.5 mg at 06/09/19 0851   • naloxone (NARCAN) injection 0.1 mg  0.1 mg Intravenous Q5 Min PRN Timothy Dixon MD       • nystatin (MYCOSTATIN) powder   Topical Q12H Timothy Dixon MD       • ondansetron (ZOFRAN) tablet 4 mg  4 mg Oral Q6H PRN Timothy Dixon MD        Or   • ondansetron (ZOFRAN) injection 4 mg  4 mg Intravenous Q6H PRN Timothy Dixon MD   4 mg at 06/03/19 1921   • potassium phosphate 45 mmol in sodium chloride 0.9 % 500 mL infusion  45 mmol Intravenous PRN Rafael Ochoa MD        Or   • potassium phosphate 30 mmol in sodium chloride 0.9 % 250 mL infusion  30 mmol Intravenous PRN Rafael Ochoa MD 62.5 mL/hr at 06/02/19 0700 30 mmol at 06/02/19 0700    Or   • potassium phosphate 15 mmol in sodium chloride 0.9 % 100 mL infusion  15 mmol Intravenous PRN Rafael Ochoa MD 50 mL/hr at 06/02/19 2330 15 mmol at 06/02/19 2330    Or   • sodium phosphates 45 mmol in sodium chloride 0.9 % 250 mL IVPB  45 mmol Intravenous PRN Rafael Ochoa MD        Or   • sodium phosphates 30 mmol in sodium chloride 0.9 % 100 mL IVPB  30 mmol Intravenous PRN Rafael Ochoa MD        Or   • sodium phosphates 15 mmol in sodium chloride 0.9 % 100 mL IVPB  15 mmol Intravenous PRN Rafael Ochoa MD       • primidone (MYSOLINE) tablet 50 mg  50 mg Oral Q8H Timothy Dixon MD   50 mg at 06/09/19 0550   • sodium chloride 0.9 % flush 10 mL  10 mL Intravenous Q12H Timothy Dixon MD   10 mL at 06/08/19 2050   • sodium chloride 0.9 % flush 10 mL  10 mL Intravenous Q12H Timothy Dixon MD   10 mL at 06/08/19 2050   • sodium chloride 0.9 % flush 10 mL  10 mL Intravenous PRN Timothy Dixon MD       • sodium chloride  0.9 % flush 20 mL  20 mL Intravenous PRN Timothy Dixon MD       • tiZANidine (ZANAFLEX) tablet 4 mg  4 mg Oral BID PRN Timothy Dixon MD   4 mg at 06/01/19 0144   • vilazodone (VIIBRYD) tablet 20 mg  20 mg Oral Nightly Timothy Dixon MD   20 mg at 06/08/19 2109       Prior to admission medications:  Medications Prior to Admission   Medication Sig Dispense Refill Last Dose   • albuterol (PROVENTIL HFA;VENTOLIN HFA) 108 (90 Base) MCG/ACT inhaler Inhale 2 puffs Every 4 (Four) Hours As Needed for Wheezing.   Past Month at Unknown time   • bumetanide (BUMEX) 1 MG tablet Take 1 mg by mouth Daily.   5/28/2019 at 1600   • levothyroxine (SYNTHROID, LEVOTHROID) 137 MCG tablet Take 137 mcg by mouth Daily. No med changes after last   level check   5/29/2019 at 0400   • memantine (NAMENDA XR) 7 MG capsule sustained-release 24 hr extended release capsule Take 28 mg by mouth Daily.   5/28/2019 at 0900   • Multiple Vitamins-Calcium (ONE-A-DAY WOMENS FORMULA PO) Take 1 tablet by mouth Daily.   5/28/2019 at 0900   • nystatin (MYCOSTATIN) 996892 UNIT/GM cream Apply 1 application topically As Needed.   5/28/2019 at 0900   • omeprazole (PRILOSEC) 20 MG capsule Take 1 capsule by mouth 2 (Two) Times a Day. 60 capsule 1 5/29/2019 at 0400   • oxyCODONE-acetaminophen (PERCOCET) 7.5-325 MG per tablet Take 1 tablet by mouth Every 6 (Six) Hours As Needed.  0 5/28/2019 at 0900   • potassium chloride (MICRO-K) 10 MEQ CR capsule Take 10 mEq by mouth 3 (Three) Times a Day.   5/28/2019 at 0900   • primidone (MYSOLINE) 50 MG tablet Take  by mouth 3 (Three) Times a Day.   5/28/2019 at 2100   • rosuvastatin (CRESTOR) 10 MG tablet Take 10 mg by mouth As Needed.   5/27/2019 at Unknown time   • tiZANidine (ZANAFLEX) 4 MG tablet Take 4 mg by mouth 2 (Two) Times a Day As Needed for Muscle Spasms.   5/28/2019 at 2100   • COPAXONE 40 MG/ML solution prefilled syringe Every Evening. Monday Wednesday and Friday 5/27/2019   •  donepezil (ARICEPT) 5 MG tablet Take 5 mg by mouth Every Night.   5/27/2019   • gabapentin (NEURONTIN) 600 MG tablet Take 600 mg by mouth 3 (Three) Times a Day.   5/27/2019   • melatonin 5 MG tablet tablet Take 5 mg by mouth Every Night.   5/27/2019   • ondansetron (ZOFRAN) 4 MG tablet Take 4 mg by mouth Every 8 (Eight) Hours As Needed for Nausea or Vomiting.   More than a month at Unknown time   • vilazodone (VIIBRYD) 20 MG tablet tablet Take 20 mg by mouth Daily.   5/26/2019       Physical exam: Alert and appropriate nontoxic-appearing  Exam unchanged  Small amount of drainage from the lower portion of incision small amount of vaginal drainage    Assessment : Continue slow improvement    Plan: Continue present care

## 2019-06-09 NOTE — SIGNIFICANT NOTE
06/09/19 1344   Rehab Treatment   Discipline physical therapy assistant   Reason Treatment Not Performed patient/family declined treatment  (pt states that her abdomin has been hurting since she ate. pt declines tx at this time. )

## 2019-06-10 LAB
BACTERIA UR QL AUTO: ABNORMAL /HPF
BILIRUB UR QL STRIP: NEGATIVE
CLARITY UR: ABNORMAL
COLOR UR: YELLOW
GLUCOSE UR STRIP-MCNC: NEGATIVE MG/DL
HGB UR QL STRIP.AUTO: ABNORMAL
HYALINE CASTS UR QL AUTO: ABNORMAL /LPF
KETONES UR QL STRIP: NEGATIVE
LEUKOCYTE ESTERASE UR QL STRIP.AUTO: ABNORMAL
NITRITE UR QL STRIP: POSITIVE
PH UR STRIP.AUTO: 6 [PH] (ref 5–9)
PROT UR QL STRIP: NEGATIVE
RBC # UR: ABNORMAL /HPF
REF LAB TEST METHOD: ABNORMAL
SP GR UR STRIP: 1.01 (ref 1–1.03)
SQUAMOUS #/AREA URNS HPF: ABNORMAL /HPF
UROBILINOGEN UR QL STRIP: ABNORMAL
WBC UR QL AUTO: ABNORMAL /HPF
YEAST URNS QL MICRO: ABNORMAL /HPF

## 2019-06-10 PROCEDURE — 87086 URINE CULTURE/COLONY COUNT: CPT | Performed by: SURGERY

## 2019-06-10 PROCEDURE — 99024 POSTOP FOLLOW-UP VISIT: CPT | Performed by: SURGERY

## 2019-06-10 PROCEDURE — 87147 CULTURE TYPE IMMUNOLOGIC: CPT | Performed by: SURGERY

## 2019-06-10 PROCEDURE — 87088 URINE BACTERIA CULTURE: CPT | Performed by: SURGERY

## 2019-06-10 PROCEDURE — 25010000002 HEPARIN (PORCINE) PER 1000 UNITS: Performed by: SURGERY

## 2019-06-10 PROCEDURE — 97110 THERAPEUTIC EXERCISES: CPT

## 2019-06-10 PROCEDURE — 81001 URINALYSIS AUTO W/SCOPE: CPT | Performed by: SURGERY

## 2019-06-10 PROCEDURE — 87186 SC STD MICRODIL/AGAR DIL: CPT | Performed by: SURGERY

## 2019-06-10 PROCEDURE — 87077 CULTURE AEROBIC IDENTIFY: CPT | Performed by: SURGERY

## 2019-06-10 RX ORDER — SODIUM CHLORIDE 9 MG/ML
INJECTION, SOLUTION INTRAVENOUS
Status: COMPLETED
Start: 2019-06-10 | End: 2019-06-10

## 2019-06-10 RX ADMIN — HYDROCODONE BITARTRATE AND ACETAMINOPHEN 1 TABLET: 7.5; 325 TABLET ORAL at 01:52

## 2019-06-10 RX ADMIN — LEVOTHYROXINE SODIUM 137 MCG: 25 TABLET ORAL at 08:50

## 2019-06-10 RX ADMIN — FAMOTIDINE 20 MG: 20 TABLET ORAL at 18:03

## 2019-06-10 RX ADMIN — BUMETANIDE 0.5 MG: 0.25 INJECTION INTRAMUSCULAR; INTRAVENOUS at 15:01

## 2019-06-10 RX ADMIN — GABAPENTIN 600 MG: 300 CAPSULE ORAL at 06:01

## 2019-06-10 RX ADMIN — BUMETANIDE 0.5 MG: 0.25 INJECTION INTRAMUSCULAR; INTRAVENOUS at 01:54

## 2019-06-10 RX ADMIN — MEMANTINE 2.5 MG: 5 TABLET ORAL at 08:51

## 2019-06-10 RX ADMIN — GABAPENTIN 600 MG: 300 CAPSULE ORAL at 21:54

## 2019-06-10 RX ADMIN — SODIUM CHLORIDE, PRESERVATIVE FREE 10 ML: 5 INJECTION INTRAVENOUS at 21:55

## 2019-06-10 RX ADMIN — AZTREONAM 1 G: 1 INJECTION, POWDER, LYOPHILIZED, FOR SOLUTION INTRAMUSCULAR; INTRAVENOUS at 22:32

## 2019-06-10 RX ADMIN — HYDROCODONE BITARTRATE AND ACETAMINOPHEN 1 TABLET: 7.5; 325 TABLET ORAL at 21:54

## 2019-06-10 RX ADMIN — GLATIRAMER 40 MG: 40 INJECTION, SOLUTION SUBCUTANEOUS at 21:53

## 2019-06-10 RX ADMIN — NYSTATIN: 100000 POWDER TOPICAL at 08:52

## 2019-06-10 RX ADMIN — SODIUM CHLORIDE, PRESERVATIVE FREE 10 ML: 5 INJECTION INTRAVENOUS at 08:56

## 2019-06-10 RX ADMIN — DONEPEZIL HYDROCHLORIDE 5 MG: 5 TABLET, FILM COATED ORAL at 21:54

## 2019-06-10 RX ADMIN — NYSTATIN: 100000 POWDER TOPICAL at 21:57

## 2019-06-10 RX ADMIN — PRIMIDONE 50 MG: 50 TABLET ORAL at 14:59

## 2019-06-10 RX ADMIN — MEMANTINE 2.5 MG: 5 TABLET ORAL at 21:55

## 2019-06-10 RX ADMIN — VILAZODONE HYDROCHLORIDE 20 MG: 20 TABLET ORAL at 21:54

## 2019-06-10 RX ADMIN — AZTREONAM 1 G: 1 INJECTION, POWDER, LYOPHILIZED, FOR SOLUTION INTRAMUSCULAR; INTRAVENOUS at 15:00

## 2019-06-10 RX ADMIN — AZTREONAM 1 G: 1 INJECTION, POWDER, LYOPHILIZED, FOR SOLUTION INTRAMUSCULAR; INTRAVENOUS at 06:01

## 2019-06-10 RX ADMIN — HEPARIN SODIUM 5000 UNITS: 5000 INJECTION INTRAVENOUS; SUBCUTANEOUS at 15:00

## 2019-06-10 RX ADMIN — MELATONIN 6 MG: at 22:31

## 2019-06-10 RX ADMIN — HEPARIN SODIUM 5000 UNITS: 5000 INJECTION INTRAVENOUS; SUBCUTANEOUS at 06:01

## 2019-06-10 RX ADMIN — HYDROCODONE BITARTRATE AND ACETAMINOPHEN 1 TABLET: 7.5; 325 TABLET ORAL at 06:01

## 2019-06-10 RX ADMIN — PRIMIDONE 50 MG: 50 TABLET ORAL at 06:01

## 2019-06-10 RX ADMIN — FAMOTIDINE 20 MG: 20 TABLET ORAL at 08:51

## 2019-06-10 RX ADMIN — SODIUM CHLORIDE 1000 ML: 9 INJECTION, SOLUTION INTRAVENOUS at 15:01

## 2019-06-10 RX ADMIN — HEPARIN SODIUM 5000 UNITS: 5000 INJECTION INTRAVENOUS; SUBCUTANEOUS at 21:54

## 2019-06-10 RX ADMIN — TIZANIDINE 4 MG: 4 TABLET ORAL at 03:33

## 2019-06-10 RX ADMIN — PRIMIDONE 50 MG: 50 TABLET ORAL at 21:54

## 2019-06-10 RX ADMIN — GABAPENTIN 600 MG: 300 CAPSULE ORAL at 14:59

## 2019-06-10 NOTE — PROGRESS NOTES
GENERAL SURGERY PROGRESS NOTE  Chief Complaint:  Surgery Follow up   LOS: 12 days       Subjective     Interval History:     Tolerating diet with good ostomy function. Reports significantly less drainage from lower aspect of incision. Complains of right kidney pain, epigastric pain    Objective     Vital Signs  Temp:  [96.4 °F (35.8 °C)-97.8 °F (36.6 °C)] 96.4 °F (35.8 °C)  Heart Rate:  [72-90] 75  Resp:  [18] 18  BP: (125-138)/(53-64) 138/64    Physical Exam:   Dressing in place. Ostomy with stool out  Labs:  Lab Results (last 24 hours)     ** No results found for the last 24 hours. **           Results Review:     Labs and imaging for today were reviewed.    Assessment/Plan     Irma Pacheco is a 69 y.o. 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  Will check UA  On Pepcid and heparin.  WBC elevated, may need repeat CT in a few days.  Will get rid of central line.            This document has been electronically signed by Timothy Dixon MD on [place] 10, 2019 8:12 AM        Timothy Dixon MD  06/10/19  8:12 AM

## 2019-06-10 NOTE — PLAN OF CARE
Problem: Patient Care Overview  Goal: Plan of Care Review  Outcome: Ongoing (interventions implemented as appropriate)   06/10/19 1300   Coping/Psychosocial   Plan of Care Reviewed With patient   Plan of Care Review   Progress no change   OTHER   Outcome Summary VS stable; peripheral IV started-take out central line today; cont therapy and pain control; labs in the am; urine sent to lab today     Goal: Individualization and Mutuality  Outcome: Ongoing (interventions implemented as appropriate)    Goal: Discharge Needs Assessment  Outcome: Ongoing (interventions implemented as appropriate)    Goal: Interprofessional Rounds/Family Conf  Outcome: Ongoing (interventions implemented as appropriate)      Problem: Fall Risk (Adult)  Goal: Absence of Fall  Outcome: Ongoing (interventions implemented as appropriate)      Problem: Skin Injury Risk (Adult)  Goal: Skin Health and Integrity  Outcome: Ongoing (interventions implemented as appropriate)      Problem: Surgery Nonspecified (Adult)  Goal: Signs and Symptoms of Listed Potential Problems Will be Absent, Minimized or Managed (Surgery Nonspecified)  Outcome: Ongoing (interventions implemented as appropriate)      Problem: Wound (Includes Pressure Injury) (Adult)  Goal: Signs and Symptoms of Listed Potential Problems Will be Absent, Minimized or Managed (Wound)  Outcome: Ongoing (interventions implemented as appropriate)

## 2019-06-10 NOTE — PLAN OF CARE
Problem: Patient Care Overview  Goal: Plan of Care Review  Outcome: Ongoing (interventions implemented as appropriate)   06/10/19 1616   Coping/Psychosocial   Plan of Care Reviewed With caregiver;patient;daughter   Plan of Care Review   Progress declining   OTHER   Outcome Summary Pt with a decline in her po intake--38% average. She reports stomach pain when she eats. MD aware. RD will continue supplements and monitor.        Problem: Skin Injury Risk (Adult)  Intervention: Promote/Optimize Nutrition   06/10/19 0616   Nutrition Interventions   Oral Nutrition Promotion calorie dense foods provided;calorie dense liquids provided;nutritional therapy counseling provided

## 2019-06-10 NOTE — PLAN OF CARE
Problem: Patient Care Overview  Goal: Plan of Care Review  Outcome: Ongoing (interventions implemented as appropriate)   06/10/19 8762   Coping/Psychosocial   Plan of Care Reviewed With patient   OTHER   Outcome Summary Pt tolerated tx well this date but not wanting to get OOB or EOB but agreeable to supine therex. Pt completed 1x20 reps of supine therex. No new goals met. Cont PT POC

## 2019-06-10 NOTE — CONSULTS
Adult Nutrition  Assessment    Patient Name:  Irma Pacheco  YOB: 1950  MRN: [id number]  Admit Date:  5/29/2019    Assessment Date:  6/10/2019    Comments:  Pt with a decline in po.  She reports that her stomach hurts when she eats.  She is not eating the meat--states that even the grd meats are choke.  Notes that this is nothing new but has been going on for some time due to her MS.  Pt is drinking her boost Breeze.  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  Good Ostomy fxn.  Noted that there is less drainage from lower incision.  WBC continues to trend up.  RD will monitor and ask SLP to do a bedside eval to offer suggestions.     Reason for Assessment     Row Name 06/10/19 1640          Reason for Assessment    Reason For Assessment  follow-up protocol         Nutrition/Diet History     Row Name 06/10/19 1640          Nutrition/Diet History    Typical Food/Fluid Intake  Pt states that she she can't eat.  Everytime she eats her stomach hurts.  She can't eat the meat because it chokes her.  This has been going on a long time due to her MS.  She is drinking the boost Breeze.  She has tried eating smaller meals, but her stomach still hurts           Labs/Tests/Procedures/Meds     Row Name 06/10/19 1641          Labs/Procedures/Meds    Lab Results Reviewed  reviewed, pertinent     Lab Results Comments  Na 130' WBC 16.57        Diagnostic Tests/Procedures    Diagnostic Test/Procedure Reviewed  reviewed, pertinent        Medications    Pertinent Medications Reviewed  reviewed, pertinent     Pertinent Medications Comments  Bumex         Physical Findings     Row Name 06/10/19 1642          Physical Findings    Overall Physical Appearance  obese;overweight;on oxygen therapy     Gastrointestinal  colostomy     Skin  other (see comments) surgical wound           Nutrition Prescription Ordered     Row Name  06/10/19 1643          Nutrition Prescription PO    Current PO Diet  Soft Texture     Texture  Ground     Fluid Consistency  Thin     Supplement  Boost Breeze (Ensure)     Supplement Frequency  3 times a day         Evaluation of Received Nutrient/Fluid Intake     Row Name 06/10/19 1643          PO Evaluation    Number of Days PO Intake Evaluated  3 days     Number of Meals  6     % PO Intake  37% average               Electronically signed by:  Nancy Gilmore RD  06/10/19 4:47 PM

## 2019-06-10 NOTE — THERAPY TREATMENT NOTE
Acute Care - Physical Therapy Treatment Note  [facility]     Patient Name: Irma Pcaheco  : 1950  MRN: [id number]  Today's Date: 6/10/2019  Onset of Illness/Injury or Date of Surgery: 19  Date of Referral to PT: 19  Referring Physician: Dr. Dixon     Admit Date: 2019    Visit Dx:    ICD-10-CM ICD-9-CM   1. 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 Impaired functional mobility and activity tolerance Z74.09 V49.89   3. 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       Therapy Treatment    Rehabilitation Treatment Summary     Row Name 06/10/19 1436 06/10/19 0942          Treatment Time/Intention    Discipline  physical therapist  -KW  occupational therapy assistant  -CS     Document Type  therapy note (daily note)  -KW  therapy note (daily note)  -CS     Subjective Information  no complaints  -KW  complains of;pain  -CS     Mode of Treatment  physical therapy  -KW  occupational therapy  -CS     Patient/Family Observations  Pt supine in bed, daughter present  -KW  --     Care Plan Review  care plan/treatment goals reviewed  -KW  --     Therapy Frequency (OT Eval)  --  other (see comments)  5-7 days a week   -CS     Patient Effort  adequate  -KW  good  -CS2     Existing Precautions/Restrictions  --  fall;other (see comments)  -CS     Recorded by [KW] Martine Tam, PT 06/10/19 1522 [CS] Cathy Dillon COTA/L 06/10/19 0950  [CS2] Cathy Dillon COTA/L 06/10/19 1315     Row Name 06/10/19 1436 06/10/19 0942          Vital Signs    Pretreatment Heart Rate (beats/min)  68  -KW  60  -CS     Posttreatment Heart Rate (beats/min)  60  -KW  58  -CS2     Pre SpO2 (%)  98  -KW  95  -CS     O2 Delivery Pre Treatment  room air  -KW  room air  -CS     Post SpO2 (%)  98  -KW  98  -CS2     O2 Delivery Post Treatment  room air  -KW  room air  -CS2     Pre Patient Position  Supine  -KW  Supine  -CS      Intra Patient Position  Supine  -KW  Sitting long  -CS2     Post Patient Position  Supine  -KW  Supine  -CS2     Recorded by [KW] Martine Tam, PT 06/10/19 1522 [CS] Cathy Dillon COTA/L 06/10/19 0950  [CS2] Cathy Dillon PELAYO/L 06/10/19 1015     Row Name 06/10/19 1436 06/10/19 0942          Cognitive Assessment/Intervention- PT/OT    Affect/Mental Status (Cognitive)  WFL  -KW  WFL  -CS     Orientation Status (Cognition)  oriented x 4  -KW  oriented x 4  -CS     Follows Commands (Cognition)  follows one step commands;over 90% accuracy  -KW  follows one step commands;over 90% accuracy  -CS     Personal Safety Interventions  fall prevention program maintained;muscle strengthening facilitated;supervised activity  -KW  --     Recorded by [KW] Martine Tam, PT 06/10/19 1522 [CS] Cathy Dillon COTA/L 06/10/19 0950     Row Name 06/10/19 1436             Bed Mobility Assessment/Treatment    Comment (Bed Mobility)  Pt deferring OOB/ EOB  -KW      Recorded by [KW] Martine Tam, PT 06/10/19 1522      Row Name 06/10/19 1436             Lower Extremity Supine Therapeutic Exercise    Performed, Supine Lower Extremity (Therapeutic Exercise)  hip abduction/adduction;heel slides;ankle pumps;gluteal sets  -KW      Exercise Type, Supine Lower Extremity (Therapeutic Exercise)  AROM (active range of motion)  -KW      Expected Outcomes, Supine Lower Extremity (Therapeutic Exercise)  improve performance, gait skills;improve performance, gait skills on uneven ground  -KW      Sets/Reps Detail, Supine Lower Extremity (Therapeutic Exercise)  1*20  -KW      Recorded by [KW] Martine Tam, PT 06/10/19 1522      Row Name 06/10/19 0942             Therapeutic Exercise    Upper Extremity (Therapeutic Exercise)  bicep curl, bilateral  -CS      Upper Extremity Range of Motion (Therapeutic Exercise)  shoulder flexion/extension, bilateral;shoulder internal/external rotation, bilateral;elbow flexion/extension, bilateral;forearm  supination/pronation, bilateral;wrist flexion/extension, bilateral  -CS      Hand (Therapeutic Exercise)  finger flexion/extension, bilateral  -CS      Weight/Resistance (Therapeutic Exercise)  1 pound  -CS      Exercise Type (Therapeutic Exercise)  AROM (active range of motion)  -CS      Position (Therapeutic Exercise)  seated  -CS      Sets/Reps (Therapeutic Exercise)  20  -CS      Equipment (Therapeutic Exercise)  free weight, barbell  -CS      Expected Outcome (Therapeutic Exercise)  improve functional tolerance, self-care activity;improve performance, BADLs;improve performance, transfer skills;increase active range of motion  -CS      Recorded by [CS] Cathy Dillon COTA/L 06/10/19 1015      Row Name 06/10/19 1436 06/10/19 0942          Positioning and Restraints    Pre-Treatment Position  in bed  -KW  in bed  -CS     Post Treatment Position  bed  -KW  bed  -CS     In Bed  supine;call light within reach;encouraged to call for assist;exit alarm on;with nsg;with family/caregiver;side rails up x2  -KW  supine;call light within reach;encouraged to call for assist;exit alarm on;with family/caregiver  -CS     Recorded by [KW] Martine Tam, PT 06/10/19 1522 [CS] Cathy Dillon COTA/L 06/10/19 1015     Row Name 06/10/19 1436 06/10/19 0942          Pain Scale: Numbers Pre/Post-Treatment    Pain Scale: Numbers, Pretreatment  0/10 - no pain  -KW  9/10  -CS     Pain Scale: Numbers, Post-Treatment  0/10 - no pain  -KW  9/10  -CS2     Pain Location - Orientation  --  incisional  -CS     Pain Location  --  abdomen  -CS     Pain Intervention(s)  --  Medication (See MAR)  -CS2     Recorded by [KW] Martine Tam, PT 06/10/19 1522 [CS] Cathy Dillon COTA/L 06/10/19 0950  [CS2] Cathy Dillon COTA/L 06/10/19 1015     Row Name                Wound 05/29/19 1327 abdomen incision;surgical    Wound - Properties Group Date first assessed: 05/29/19 [KM] Time first assessed: 1327 [KM] Present On Admission : no [KM]  Location: abdomen [KM] Type: incision;surgical [KM] Recorded by:  [KM] Gneo Blackmon, RN 05/29/19 1327    Row Name                Wound 06/05/19 1818 Bilateral other (see notes) other (see comments)    Wound - Properties Group Date first assessed: 06/05/19 [KMA] Time first assessed: 1818 [KMA] Present On Admission : yes [KMA], Admission to 3E  Side: Bilateral [KMA] Location: other (see notes) [KMA2], Bilateral groin & skin fold excoriation  Type: other (see comments) [KMA] Recorded by:  [KMA] Geno Onfore RN 06/05/19 1819 [KMA2] Geno Onofre RN 06/05/19 1821    Row Name 06/10/19 1436             Plan of Care Review    Plan of Care Reviewed With  patient  -KW      Recorded by [KW] Martine Tam, PT 06/10/19 1522      Row Name 06/10/19 0942             Outcome Summary/Treatment Plan (OT)    Daily Summary of Progress (OT)  progress toward functional goals is good  -CS      Plan for Continued Treatment (OT)  cont OT POC  -CS      Anticipated Discharge Disposition (OT)  anticipate therapy at next level of care  -CS      Recorded by [CS] Cathy Dillon COTA/L 06/10/19 0950      Row Name 06/10/19 1436             Outcome Summary/Treatment Plan (PT)    Daily Summary of Progress (PT)  progress toward functional goals as expected  -KW      Plan for Continued Treatment (PT)  cont PT POC   -KW      Anticipated Discharge Disposition (PT)  anticipate therapy at next level of care;skilled nursing facility  -KW      Recorded by [KW] Martine Tam, PT 06/10/19 1522        User Key  (r) = Recorded By, (t) = Taken By, (c) = Cosigned By    Initials Name Effective Dates Discipline    JOSE ALFREDOA Geno Onofre RN 10/17/16 -  Nurse    CS Cathy Dillon COTA/L 03/07/18 -  OT    KM Geno Blackmon, RN 05/21/18 -  Nurse    KW Martine Tam, PT 07/23/18 -  PT          Wound 05/29/19 1327 abdomen incision;surgical (Active)   Dressing Appearance dry;intact 6/10/2019  8:48 AM   Closure ELIZABETH 6/10/2019  8:48 AM   Base dressing  in place, unable to visualize 6/10/2019  8:48 AM   Drainage Characteristics/Odor creamy;yellow 6/10/2019  7:00 AM   Drainage Amount small 6/10/2019  7:00 AM   Dressing Care, Wound dressing changed 6/10/2019  7:00 AM       Wound 06/05/19 1818 Bilateral other (see notes) other (see comments) (Active)   Dressing Appearance open to air 6/10/2019  8:48 AM   Closure Open to air 6/10/2019  8:48 AM   Drainage Amount none 6/10/2019  8:48 AM   Care, Wound barrier applied 6/10/2019  8:48 AM       Rehab Goal Summary     Row Name 06/10/19 1436 06/10/19 0942          Physical Therapy Goals    Bed Mobility Goal Selection (PT)  bed mobility, PT goal 1  -KW  --     Transfer Goal Selection (PT)  transfer, PT goal 1  -KW  --     Gait Training Goal Selection (PT)  gait training, PT goal 1  -KW  --     Strength Goal Selection (PT)  strength, PT goal 1  -KW  --        Bed Mobility Goal 1 (PT)    Activity/Assistive Device (Bed Mobility Goal 1, PT)  sit to supine/supine to sit  -KW  --     [place] Level/Cues Needed (Bed Mobility Goal 1, PT)  minimum assist (75% or more patient effort)  -KW  --     Time Frame (Bed Mobility Goal 1, PT)  10 days  -KW  --     Barriers (Bed Mobility Goal 1, PT)  abdominal pain, previous functional deficit  -KW  --     Progress/Outcomes (Bed Mobility Goal 1, PT)  goal not met  -KW  --        Transfer Goal 1 (PT)    Activity/Assistive Device (Transfer Goal 1, PT)  sit-to-stand/stand-to-sit;bed-to-chair/chair-to-bed;walker, rolling  -KW  --     [place] Level/Cues Needed (Transfer Goal 1, PT)  contact guard assist;verbal cues required  -KW  --     Time Frame (Transfer Goal 1, PT)  1 week  -KW  --     Barriers (Transfers Goal 1, PT)  abdominal pain, previous functional deficit  -KW  --     Progress/Outcome (Transfer Goal 1, PT)  goal not met  -KW  --        Gait Training Goal 1 (PT)    Activity/Assistive Device (Gait Training Goal 1, PT)  walker, rolling;decrease fall risk;increase endurance/gait distance   -KW  --     [place] Level (Gait Training Goal 1, PT)  contact guard assist;verbal cues required  -KW  --     Distance (Gait Goal 1, PT)  10 feet  -KW  --     Time Frame (Gait Training Goal 1, PT)  10 days  -KW  --     Barriers (Gait Training Goal 1, PT)  abdominal pain, previous functional deficit  -KW  --     Progress/Outcome (Gait Training Goal 1, PT)  goal not met  -KW  --        Strength Goal 1 (PT)    Strength Goal 1 (PT)  Pt will be able to complete 20 reps of at least 4 LE exercises in order to demo improved strength for transfers  -KW  --     Time Frame (Strength Goal 1, PT)  1 week  -KW  --     Barriers (Strength Goal 1, PT)  abdominal pain, previous functional deficit  -KW  --     Progress/Outcome (Strength Goal 1, PT)  goal not met  -KW  --        Transfer Goal 1 (OT)    Activity/Assistive Device (Transfer Goal 1, OT)  --  commode, bedside with drop arms;commode, bedside without drop arms  -CS     [place] Level/Cues Needed (Transfer Goal 1, OT)  --  minimum assist (75% or more patient effort)  -CS     Time Frame (Transfer Goal 1, OT)  --  long term goal (LTG);by discharge  -CS     Progress/Outcome (Transfer Goal 1, OT)  --  goal not met  -CS        Bathing Goal 1 (OT)    Activity/Assistive Device (Bathing Goal 1, OT)  --  upper body bathing  -CS     [place] Level/Cues Needed (Bathing Goal 1, OT)  --  minimum assist (75% or more patient effort)  -CS     Time Frame (Bathing Goal 1, OT)  --  long term goal (LTG);by discharge  -CS     Progress/Outcomes (Bathing Goal 1, OT)  --  goal not met  -CS        Dressing Goal 1 (OT)    Activity/Assistive Device (Dressing Goal 1, OT)  --  upper body dressing  -CS     [place]/Cues Needed (Dressing Goal 1, OT)  --  minimum assist (75% or more patient effort)  -CS     Time Frame (Dressing Goal 1, OT)  --  long term goal (LTG);by discharge  -CS     Progress/Outcome (Dressing Goal 1, OT)  --  goal not met  -CS        Grooming Goal 1 (OT)     Activity/Device (Grooming Goal 1, OT)  --  grooming skills, all  -CS     [employer] (Grooming Goal 1, OT)  --  set-up required;supervision required;verbal cues required  -CS     Time Frame (Grooming Goal 1, OT)  --  long term goal (LTG);by discharge  -CS     Progress/Outcome (Grooming Goal 1, OT)  --  goal not met  -CS        Self-Feeding Goal 1 (OT)    Activity/Assistive Device (Self-Feeding Goal 1, OT)  --  self-feeding skills, all  -CS     [employer] Level/Cues Needed (Self-Feeding Goal 1, OT)  --  set-up required;supervision required;verbal cues required  -CS     Time Frame (Self-Feeding Goal 1, OT)  --  long term goal (LTG);by discharge  -CS     Barriers (Self-Feeding Goal 1, OT)  --  address when medically appropriate.   -CS     Progress/Outcomes (Self-Feeding Goal 1, OT)  --  goal not met  -CS       User Key  (r) = Recorded By, (t) = Taken By, (c) = Cosigned By    Initials Name Provider Type Discipline    CS Cathy Dillon COTA/L Occupational Therapy Assistant OT    Martine Ling, PT Physical Therapist PT          Physical Therapy Education     Title: PT OT SLP Therapies (In Progress)     Topic: Physical Therapy (In Progress)     Point: Mobility training (In Progress)     Learning Progress Summary           Patient Acceptance, E, NR by SHRAVAN at 6/8/2019 10:53 AM    Acceptance, E, NR by SHRAVAN at 6/7/2019  1:22 PM    Acceptance, E,TB, VU by KATH at 6/6/2019  1:31 PM    Comment:  ex throughout the day    Acceptance, E,TB, VU by KATH at 6/4/2019  1:23 PM    Acceptance, E, VU,NR by JAVI at 5/30/2019 12:58 PM    Comment:  Role of PT, PT POC, transfer technique   Family Acceptance, E,TB, VU by KATH at 6/6/2019  1:31 PM    Comment:  ex throughout the day                   Point: Home exercise program (Done)     Learning Progress Summary           Patient Acceptance, E,TB, VU by AKTH at 6/6/2019  1:31 PM    Comment:  ex throughout the day    Acceptance, E,TB, VU by KATH at 6/3/2019  3:53 PM    Acceptance, E,TB, VU by KATH at  5/31/2019 12:58 PM   Family Acceptance, E,TB, VU by LN at 6/6/2019  1:31 PM    Comment:  ex throughout the day                   Point: Body mechanics (Done)     Learning Progress Summary           Patient Acceptance, E,TB, VU by LN at 6/6/2019  1:31 PM    Comment:  ex throughout the day    Acceptance, E,TB, VU by LN at 6/4/2019  1:23 PM    Acceptance, E,TB, VU by ADELINA at 6/4/2019 10:07 AM   Family Acceptance, E,TB, VU by LN at 6/6/2019  1:31 PM    Comment:  ex throughout the day                   Point: Precautions (Done)     Learning Progress Summary           Patient Acceptance, E,TB, VU by LN at 6/6/2019  1:31 PM    Comment:  ex throughout the day    Acceptance, E,TB, VU by LN at 6/4/2019  1:23 PM    Acceptance, E,TB, VU by LN at 6/3/2019  3:53 PM    Acceptance, E,TB, VU by LN at 5/31/2019 12:58 PM    Acceptance, E, VU by  at 5/30/2019  1:00 PM    Comment:  Gait belt, call light, staff assistance with mobility   Family Acceptance, E,TB, VU by LN at 6/6/2019  1:31 PM    Comment:  ex throughout the day                               User Key     Initials Effective Dates Name Provider Type Discipline     10/17/16 -  Alem Garrido RN Registered Nurse Nurse     03/07/18 -  Thai Swanson PTA Physical Therapy Assistant PT    LN 03/07/18 -  Jannet Garcia PTA Physical Therapy Assistant PT     07/23/18 -  Donna Stacy PT Physical Therapist PT                PT Recommendation and Plan  Anticipated Discharge Disposition (PT): anticipate therapy at next level of care, skilled nursing facility  Outcome Summary/Treatment Plan (PT)  Daily Summary of Progress (PT): progress toward functional goals as expected  Plan for Continued Treatment (PT): cont PT POC   Anticipated Discharge Disposition (PT): anticipate therapy at next level of care, skilled nursing facility  Plan of Care Reviewed With: patient  Outcome Summary: Pt tolerated tx well this date but not wanting to get OOB or EOB but agreeable to supine therex.  Pt completed 1x20 reps of supine therex. No new goals met. Cont PT POC  Outcome Measures     Row Name 06/10/19 1436 06/08/19 1105 06/08/19 1033       How much help from another person do you currently need...    Turning from your back to your side while in flat bed without using bedrails?  3  -KW  --  3  -SHRAVAN    Moving from lying on back to sitting on the side of a flat bed without bedrails?  3  -KW  --  3  -SHRAVAN    Moving to and from a bed to a chair (including a wheelchair)?  2  -KW  --  2  -SHRAVAN    Standing up from a chair using your arms (e.g., wheelchair, bedside chair)?  2  -KW  --  2  -SHRAVAN    Climbing 3-5 steps with a railing?  1  -KW  --  1  -SHRAVAN    To walk in hospital room?  2  -KW  --  2  -SHRAVAN    AM-PAC 6 Clicks Score  13  -KW  --  13  -SHRAVAN       How much help from another is currently needed...    Putting on and taking off regular lower body clothing?  --  1  -LW  --    Bathing (including washing, rinsing, and drying)  --  1  -LW  --    Toileting (which includes using toilet bed pan or urinal)  --  1  -LW  --    Putting on and taking off regular upper body clothing  --  2  -LW  --    Taking care of personal grooming (such as brushing teeth)  --  2  -LW  --    Eating meals  --  1  -LW  --    Score  --  8  -LW  --       Functional Assessment    Outcome Measure Options  AM-PAC 6 Clicks Basic Mobility (PT)  -KW  --  AM-PAC 6 Clicks Basic Mobility (PT)  -SHRAVAN      User Key  (r) = Recorded By, (t) = Taken By, (c) = Cosigned By    Initials Name Provider Type    Thai Palma, PTA Physical Therapy Assistant    LW Leyla Angela, GITA/L Occupational Therapy Assistant    Martine Ling, PT Physical Therapist         Time Calculation:   PT Charges     Row Name 06/10/19 1525             Time Calculation    Start Time  1436  -KW      Stop Time  1459  -KW      Time Calculation (min)  23 min  -KW         Time Calculation- PT    Total Timed Code Minutes- PT  23 minute(s)  -KW        User Key  (r) = Recorded By, (t) =  Taken By, (c) = Cosigned By    Initials Name Provider Type    KW Martine Tam, PT Physical Therapist        Therapy Charges for Today     Code Description Service Date Service Provider Modifiers Qty    87481230956 HC PT THER PROC EA 15 MIN 6/10/2019 Martine Tam, PT GP 2          PT G-Codes  Outcome Measure Options: AM-PAC 6 Clicks Basic Mobility (PT)  AM-PAC 6 Clicks Score: 13  Score: 8    Martine Tam, LUCRECIA  6/10/2019

## 2019-06-10 NOTE — PLAN OF CARE
Problem: Patient Care Overview  Goal: Plan of Care Review  Outcome: Ongoing (interventions implemented as appropriate)   06/10/19 1700   Coping/Psychosocial   Plan of Care Reviewed With patient   Plan of Care Review   Progress no change   OTHER   Outcome Summary IJ removed, no changes, VSS, resting between care, continue to monitor     Goal: Individualization and Mutuality  Outcome: Ongoing (interventions implemented as appropriate)      Problem: Fall Risk (Adult)  Goal: Absence of Fall  Outcome: Ongoing (interventions implemented as appropriate)      Problem: Skin Injury Risk (Adult)  Goal: Skin Health and Integrity  Outcome: Ongoing (interventions implemented as appropriate)      Problem: Surgery Nonspecified (Adult)  Goal: Signs and Symptoms of Listed Potential Problems Will be Absent, Minimized or Managed (Surgery Nonspecified)  Outcome: Ongoing (interventions implemented as appropriate)      Problem: Wound (Includes Pressure Injury) (Adult)  Goal: Signs and Symptoms of Listed Potential Problems Will be Absent, Minimized or Managed (Wound)  Outcome: Ongoing (interventions implemented as appropriate)

## 2019-06-10 NOTE — PLAN OF CARE
Problem: Patient Care Overview  Goal: Plan of Care Review  Outcome: Ongoing (interventions implemented as appropriate)   06/10/19 1316   Coping/Psychosocial   Plan of Care Reviewed With patient   Plan of Care Review   Progress improving   OTHER   Outcome Summary Pt tolerated tx well this date. Pt c/o pain. Pt gave good effort with UE ther ex. Continue OT POC.

## 2019-06-10 NOTE — THERAPY TREATMENT NOTE
Acute Care - Occupational Therapy Treatment Note  [facility]     Patient Name: Irma Pacheco  : 1950  MRN: [id number]  Today's Date: 6/10/2019  Onset of Illness/Injury or Date of Surgery: 19  Date of Referral to OT: 19  Referring Physician: Dr. Dixon     Admit Date: 2019       ICD-10-CM ICD-9-CM   1. 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 Impaired functional mobility and activity tolerance Z74.09 V49.89   3. 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     Past Medical History:   Diagnosis Date   • Anxiety    • Arthritis    • Asthma    • CHF (congestive heart failure) (CMS/Formerly [facility])    • Colostomy care (CMS/Formerly [facility])    • Concussion     x2   • Depression    • Disease of thyroid gland    • Hyperlipidemia    • Hypotension    • Kidney disease, chronic, stage III (GFR 30-59 ml/min) (CMS/Formerly [facility])    • MS (multiple sclerosis) (CMS/Formerly [facility])    • Osteoarthritis    • Tremor, essential    • Wears glasses      Past Surgical History:   Procedure Laterality Date   • ABDOMINAL HYSTERECTOMY  1999    Has Cervix   • APPENDECTOMY     • CHOLECYSTECTOMY     • COLON RESECTION N/A 2017    Procedure: exploratory laparotomy, sigmoid colon resection and colostomy;  Surgeon: Timothy Dixon MD;  Location: [facility];  Service:    • COLON RESECTION N/A 2019    Procedure: laparotomy with lysis of adhesions, repair of small bowel to vagina fistula. small bowel resection ;  Surgeon: Timothy Dixon MD;  Location: [facility] OR;  Service: General   • EXPLORATORY LAPAROTOMY N/A 10/9/2017    Procedure: LAPAROTOMY EXPLORATORY, drainage intra-abdominal abscess, washout;  Surgeon: Arnulfo Marcum MD;  Location: [facility];  Service:    • SPIDER BITE EXCISION         Therapy Treatment    Rehabilitation Treatment Summary     Row Name 06/10/19 0942             Treatment Time/Intention    Discipline  occupational  therapy assistant  -CS      Document Type  therapy note (daily note)  -CS      Subjective Information  complains of;pain  -CS      Mode of Treatment  occupational therapy  -CS      Therapy Frequency (OT Eval)  other (see comments)  5-7 days a week   -CS      Patient Effort  good  -CS2      Existing Precautions/Restrictions  fall;other (see comments)  -CS      Recorded by [CS] Cathy Dillon COTA/L 06/10/19 0950  [CS2] Cathy Dillon COTA/L 06/10/19 1315      Row Name 06/10/19 0942             Vital Signs    Pretreatment Heart Rate (beats/min)  60  -CS      Posttreatment Heart Rate (beats/min)  58  -CS2      Pre SpO2 (%)  95  -CS      O2 Delivery Pre Treatment  room air  -CS      Post SpO2 (%)  98  -CS2      O2 Delivery Post Treatment  room air  -CS2      Pre Patient Position  Supine  -CS      Intra Patient Position  Sitting long  -CS2      Post Patient Position  Supine  -CS2      Recorded by [CS] Cathy Dillon COTA/L 06/10/19 0950  [CS2] Cathy Dillon PELAYO/L 06/10/19 1015      Row Name 06/10/19 0942             Cognitive Assessment/Intervention- PT/OT    Affect/Mental Status (Cognitive)  WFL  -CS      Orientation Status (Cognition)  oriented x 4  -CS      Follows Commands (Cognition)  follows one step commands;over 90% accuracy  -CS      Recorded by [CS] Cathy Dillon COTA/L 06/10/19 0950      Row Name 06/10/19 0942             Therapeutic Exercise    Upper Extremity (Therapeutic Exercise)  bicep curl, bilateral  -CS      Upper Extremity Range of Motion (Therapeutic Exercise)  shoulder flexion/extension, bilateral;shoulder internal/external rotation, bilateral;elbow flexion/extension, bilateral;forearm supination/pronation, bilateral;wrist flexion/extension, bilateral  -CS      Hand (Therapeutic Exercise)  finger flexion/extension, bilateral  -CS      Weight/Resistance (Therapeutic Exercise)  1 pound  -CS      Exercise Type (Therapeutic Exercise)  AROM (active range of motion)  -CS       Position (Therapeutic Exercise)  seated  -CS      Sets/Reps (Therapeutic Exercise)  20  -CS      Equipment (Therapeutic Exercise)  free weight, barbell  -CS      Expected Outcome (Therapeutic Exercise)  improve functional tolerance, self-care activity;improve performance, BADLs;improve performance, transfer skills;increase active range of motion  -CS      Recorded by [CS] Cathy Dillon COTA/L 06/10/19 1015      Row Name 06/10/19 0942             Positioning and Restraints    Pre-Treatment Position  in bed  -CS      Post Treatment Position  bed  -CS      In Bed  supine;call light within reach;encouraged to call for assist;exit alarm on;with family/caregiver  -CS      Recorded by [CS] Cathy Dillon COTA/L 06/10/19 1015      Row Name 06/10/19 0942             Pain Scale: Numbers Pre/Post-Treatment    Pain Scale: Numbers, Pretreatment  9/10  -CS      Pain Scale: Numbers, Post-Treatment  9/10  -CS2      Pain Location - Orientation  incisional  -CS      Pain Location  abdomen  -CS      Pain Intervention(s)  Medication (See MAR)  -CS2      Recorded by [CS] Cathy Dillon COTA/L 06/10/19 0950  [CS2] Cathy Dillon PELAYO/L 06/10/19 1015      Row Name                Wound 05/29/19 1327 abdomen incision;surgical    Wound - Properties Group Date first assessed: 05/29/19 [KM] Time first assessed: 1327 [KM] Present On Admission : no [KM] Location: abdomen [KM] Type: incision;surgical [KM] Recorded by:  [KM] Geno Blackmon RN 05/29/19 1327    Row Name                Wound 06/05/19 1818 Bilateral other (see notes) other (see comments)    Wound - Properties Group Date first assessed: 06/05/19 [KMA] Time first assessed: 1818 [KMA] Present On Admission : yes [KMA], Admission to   Side: Bilateral [KMA] Location: other (see notes) [KMA2], Bilateral groin & skin fold excoriation  Type: other (see comments) [KMA] Recorded by:  [KMA] Geno Onofre RN 06/05/19 1819 [KMA2] Geno Onofre RN 06/05/19 1821    Row  Name 06/10/19 0942             Outcome Summary/Treatment Plan (OT)    Daily Summary of Progress (OT)  progress toward functional goals is good  -CS      Plan for Continued Treatment (OT)  cont OT POC  -CS      Anticipated Discharge Disposition (OT)  anticipate therapy at next level of care  -CS      Recorded by [CS] Cathy Dillon PELAYO/L 06/10/19 0950        User Key  (r) = Recorded By, (t) = Taken By, (c) = Cosigned By    Initials Name Effective Dates Discipline    Geno Isaac, RN 10/17/16 -  Nurse    CS Cathy Dillon PELAYO/L 03/07/18 -  OT    Geno Limon, RN 05/21/18 -  Nurse        Wound 05/29/19 1327 abdomen incision;surgical (Active)   Dressing Appearance dry;intact 6/10/2019  8:48 AM   Closure ELIZABETH 6/10/2019  8:48 AM   Base dressing in place, unable to visualize 6/10/2019  8:48 AM   Drainage Characteristics/Odor creamy;yellow 6/10/2019  7:00 AM   Drainage Amount small 6/10/2019  7:00 AM   Dressing Care, Wound dressing changed 6/10/2019  7:00 AM       Wound 06/05/19 1818 Bilateral other (see notes) other (see comments) (Active)   Dressing Appearance open to air 6/10/2019  8:48 AM   Closure Open to air 6/10/2019  8:48 AM   Drainage Amount none 6/10/2019  8:48 AM   Care, Wound barrier applied 6/10/2019  8:48 AM     Rehab Goal Summary     Row Name 06/10/19 0942             Transfer Goal 1 (OT)    Activity/Assistive Device (Transfer Goal 1, OT)  commode, bedside with drop arms;commode, bedside without drop arms  -CS      [place] Level/Cues Needed (Transfer Goal 1, OT)  minimum assist (75% or more patient effort)  -CS      Time Frame (Transfer Goal 1, OT)  long term goal (LTG);by discharge  -CS      Progress/Outcome (Transfer Goal 1, OT)  goal not met  -CS         Bathing Goal 1 (OT)    Activity/Assistive Device (Bathing Goal 1, OT)  upper body bathing  -CS      [place] Level/Cues Needed (Bathing Goal 1, OT)  minimum assist (75% or more patient effort)  -CS      Time Frame (Bathing  Goal 1, OT)  long term goal (LTG);by discharge  -CS      Progress/Outcomes (Bathing Goal 1, OT)  goal not met  -CS         Dressing Goal 1 (OT)    Activity/Assistive Device (Dressing Goal 1, OT)  upper body dressing  -CS      [place]/Cues Needed (Dressing Goal 1, OT)  minimum assist (75% or more patient effort)  -CS      Time Frame (Dressing Goal 1, OT)  long term goal (LTG);by discharge  -CS      Progress/Outcome (Dressing Goal 1, OT)  goal not met  -CS         Grooming Goal 1 (OT)    Activity/Device (Grooming Goal 1, OT)  grooming skills, all  -CS      [place] (Grooming Goal 1, OT)  set-up required;supervision required;verbal cues required  -CS      Time Frame (Grooming Goal 1, OT)  long term goal (LTG);by discharge  -CS      Progress/Outcome (Grooming Goal 1, OT)  goal not met  -CS         Self-Feeding Goal 1 (OT)    Activity/Assistive Device (Self-Feeding Goal 1, OT)  self-feeding skills, all  -CS      [place] Level/Cues Needed (Self-Feeding Goal 1, OT)  set-up required;supervision required;verbal cues required  -CS      Time Frame (Self-Feeding Goal 1, OT)  long term goal (LTG);by discharge  -CS      Barriers (Self-Feeding Goal 1, OT)  address when medically appropriate.   -CS      Progress/Outcomes (Self-Feeding Goal 1, OT)  goal not met  -CS        User Key  (r) = Recorded By, (t) = Taken By, (c) = Cosigned By    Initials Name Provider Type Discipline    CS Cathy Dillon COTA/DERICK Occupational Therapy Assistant OT        Occupational Therapy Education     Title: PT OT SLP Therapies (In Progress)     Topic: Occupational Therapy (In Progress)     Point: ADL training (In Progress)     Description: Instruct learner(s) on proper safety adaptation and remediation techniques during self care or transfers.   Instruct in proper use of assistive devices.    Learning Progress Summary           Patient Acceptance, E,TB,D, NR by LONDON at 6/10/2019  1:16 PM    Acceptance, E,TB, VU by BREA at 6/8/2019  2:21 PM     Acceptance, E,TB,D, NR by CS at 6/7/2019  1:32 PM    Acceptance, E,TB,D, NR by CS at 6/6/2019  3:56 PM    Acceptance, E, NR by  at 6/5/2019 11:27 AM    Acceptance, E,TB,D, NR by CS at 5/31/2019  1:12 PM    Acceptance, E, VU,NR by  at 5/30/2019  1:28 PM    Comment:  Educated about OT and POC. Educated to call for assist. Educated on safety throughout.                   Point: Home exercise program (In Progress)     Description: Instruct learner(s) on appropriate technique for monitoring, assisting and/or progressing therapeutic exercises/activities.    Learning Progress Summary           Patient Acceptance, E,TB,D, NR by CS at 6/10/2019  1:16 PM    Acceptance, E,TB, VU by  at 6/8/2019  2:21 PM    Acceptance, E,TB,D, NR by CS at 6/7/2019  1:32 PM    Acceptance, E,TB,D, NR by CS at 6/6/2019  3:56 PM    Acceptance, E,TB,D, NR by CS at 5/31/2019  1:12 PM                   Point: Precautions (In Progress)     Description: Instruct learner(s) on prescribed precautions during self-care and functional transfers.    Learning Progress Summary           Patient Acceptance, E,TB,D, NR by CS at 6/10/2019  1:16 PM    Acceptance, E,TB, VU by  at 6/8/2019  2:21 PM    Acceptance, E,TB,D, NR by CS at 6/7/2019  1:32 PM    Acceptance, E,TB,D, NR by CS at 6/6/2019  3:56 PM    Acceptance, E,TB,D, NR by  at 5/31/2019  1:12 PM    Acceptance, E, VU,NR by  at 5/30/2019  1:28 PM    Comment:  Educated about OT and POC. Educated to call for assist. Educated on safety throughout.                   Point: Body mechanics (In Progress)     Description: Instruct learner(s) on proper positioning and spine alignment during self-care, functional mobility activities and/or exercises.    Learning Progress Summary           Patient Acceptance, E,TB,D, NR by CS at 6/10/2019  1:16 PM    Acceptance, E,TB, VU by  at 6/8/2019  2:21 PM    Acceptance, E,TB,D, NR by CS at 6/7/2019  1:32 PM    Acceptance, E,TB,D, NR by CS at 6/6/2019  3:56 PM     Acceptance, E,TB,D, NR by  at 5/31/2019  1:12 PM                               User Key     Initials Effective Dates Name Provider Type Discipline     06/08/18 -  Dee Mendez, OTR/L Occupational Therapist OT    BB 03/07/18 -  Anila Rodriguez, PELAYO/L Occupational Therapy Assistant OT    CS 03/07/18 -  Cathy Dillon PELAYO/L Occupational Therapy Assistant OT    LW 03/07/18 -  Leyla Angela, PELAYO/L Occupational Therapy Assistant OT                OT Recommendation and Plan  Outcome Summary/Treatment Plan (OT)  Daily Summary of Progress (OT): progress toward functional goals is good  Plan for Continued Treatment (OT): cont OT POC  Anticipated Discharge Disposition (OT): anticipate therapy at next level of care  Therapy Frequency (OT Eval): other (see comments)( 5-7 days a week )  Daily Summary of Progress (OT): progress toward functional goals is good  Plan of Care Review  Plan of Care Reviewed With: patient  Plan of Care Reviewed With: patient  Outcome Summary: Pt tolerated tx well this date. Pt c/o pain. Pt gave good effort with UE ther ex. Continue OT POC.  Outcome Measures     Row Name 06/08/19 1105 06/08/19 1033          How much help from another person do you currently need...    Turning from your back to your side while in flat bed without using bedrails?  --  3  -SHRAVAN     Moving from lying on back to sitting on the side of a flat bed without bedrails?  --  3  -SHRAVAN     Moving to and from a bed to a chair (including a wheelchair)?  --  2  -SHRAVAN     Standing up from a chair using your arms (e.g., wheelchair, bedside chair)?  --  2  -SHRAVAN     Climbing 3-5 steps with a railing?  --  1  -SHRAVAN     To walk in hospital room?  --  2  -SHRAVAN     AM-PAC 6 Clicks Score  --  13  -SHRAVAN        How much help from another is currently needed...    Putting on and taking off regular lower body clothing?  1  -LW  --     Bathing (including washing, rinsing, and drying)  1  -LW  --     Toileting (which includes using toilet bed  pan or urinal)  1  -LW  --     Putting on and taking off regular upper body clothing  2  -LW  --     Taking care of personal grooming (such as brushing teeth)  2  -LW  --     Eating meals  1  -LW  --     Score  8  -LW  --        Functional Assessment    Outcome Measure Options  --  AM-PAC 6 Clicks Basic Mobility (PT)  -       User Key  (r) = Recorded By, (t) = Taken By, (c) = Cosigned By    Initials Name Provider Type    SHRAVAN Thai Swanson, TERRY Physical Therapy Assistant    Leyla Giordano COTA/L Occupational Therapy Assistant           Time Calculation:   Time Calculation- OT     Row Name 06/10/19 1317             Time Calculation- OT    OT Start Time  0942  -CS      OT Stop Time  1022  -CS      OT Time Calculation (min)  40 min  -CS      Total Timed Code Minutes- OT  40 minute(s)  -      OT Received On  06/10/19  -        User Key  (r) = Recorded By, (t) = Taken By, (c) = Cosigned By    Initials Name Provider Type     Cathy Dillon COTA/DERICK Occupational Therapy Assistant        Therapy Charges for Today     Code Description Service Date Service Provider Modifiers Qty    [id number]  OT THER PROC EA 15 MIN 6/10/2019 Cathy Dillon COTA/L GO 3               FREDDY Nguyen  6/10/2019

## 2019-06-10 NOTE — PLAN OF CARE
Problem: Patient Care Overview  Goal: Plan of Care Review  Outcome: Ongoing (interventions implemented as appropriate)   06/10/19 0994   Coping/Psychosocial   Plan of Care Reviewed With patient   Plan of Care Review   Progress no change   OTHER   Outcome Summary VSS, pain controlled with po pain meds, pt turned q two hours     Goal: Individualization and Mutuality  Outcome: Ongoing (interventions implemented as appropriate)    Goal: Discharge Needs Assessment  Outcome: Ongoing (interventions implemented as appropriate)    Goal: Interprofessional Rounds/Family Conf  Outcome: Ongoing (interventions implemented as appropriate)      Problem: Fall Risk (Adult)  Goal: Absence of Fall  Outcome: Ongoing (interventions implemented as appropriate)      Problem: Skin Injury Risk (Adult)  Goal: Skin Health and Integrity  Outcome: Ongoing (interventions implemented as appropriate)      Problem: Surgery Nonspecified (Adult)  Goal: Signs and Symptoms of Listed Potential Problems Will be Absent, Minimized or Managed (Surgery Nonspecified)  Outcome: Ongoing (interventions implemented as appropriate)      Problem: Wound (Includes Pressure Injury) (Adult)  Goal: Signs and Symptoms of Listed Potential Problems Will be Absent, Minimized or Managed (Wound)  Outcome: Ongoing (interventions implemented as appropriate)

## 2019-06-11 LAB
ANION GAP SERPL CALCULATED.3IONS-SCNC: 10 MMOL/L
BUN BLD-MCNC: 7 MG/DL (ref 8–23)
BUN/CREAT SERPL: 9.3 (ref 7–25)
CALCIUM SPEC-SCNC: 8 MG/DL (ref 8.6–10.5)
CHLORIDE SERPL-SCNC: 101 MMOL/L (ref 98–107)
CO2 SERPL-SCNC: 27 MMOL/L (ref 22–29)
CREAT BLD-MCNC: 0.75 MG/DL (ref 0.57–1)
DEPRECATED RDW RBC AUTO: 46.9 FL (ref 37–54)
ERYTHROCYTE [DISTWIDTH] IN BLOOD BY AUTOMATED COUNT: 12.9 % (ref 12.3–15.4)
GFR SERPL CREATININE-BSD FRML MDRD: 77 ML/MIN/1.73
GLUCOSE BLD-MCNC: 79 MG/DL (ref 65–99)
HCT VFR BLD AUTO: 32 % (ref 34–46.6)
HGB BLD-MCNC: 10.1 G/DL (ref 12–15.9)
MCH RBC QN AUTO: 31.7 PG (ref 26.6–33)
MCHC RBC AUTO-ENTMCNC: 31.6 G/DL (ref 31.5–35.7)
MCV RBC AUTO: 100.3 FL (ref 79–97)
PLATELET # BLD AUTO: 275 10*3/MM3 (ref 140–450)
PMV BLD AUTO: 9.7 FL (ref 6–12)
POTASSIUM BLD-SCNC: 3.8 MMOL/L (ref 3.5–5.2)
RBC # BLD AUTO: 3.19 10*6/MM3 (ref 3.77–5.28)
SODIUM BLD-SCNC: 138 MMOL/L (ref 136–145)
WBC NRBC COR # BLD: 7.4 10*3/MM3 (ref 3.4–10.8)

## 2019-06-11 PROCEDURE — 25010000002 VANCOMYCIN 5 G RECONSTITUTED SOLUTION: Performed by: SURGERY

## 2019-06-11 PROCEDURE — 99024 POSTOP FOLLOW-UP VISIT: CPT | Performed by: SURGERY

## 2019-06-11 PROCEDURE — 25010000002 FLUCONAZOLE PER 200 MG: Performed by: SURGERY

## 2019-06-11 PROCEDURE — 85027 COMPLETE CBC AUTOMATED: CPT | Performed by: SURGERY

## 2019-06-11 PROCEDURE — 97110 THERAPEUTIC EXERCISES: CPT

## 2019-06-11 PROCEDURE — 80048 BASIC METABOLIC PNL TOTAL CA: CPT | Performed by: SURGERY

## 2019-06-11 PROCEDURE — 25010000002 LEVOFLOXACIN PER 250 MG: Performed by: SURGERY

## 2019-06-11 PROCEDURE — 25010000002 HEPARIN (PORCINE) PER 1000 UNITS: Performed by: SURGERY

## 2019-06-11 PROCEDURE — 97530 THERAPEUTIC ACTIVITIES: CPT

## 2019-06-11 RX ORDER — FLUCONAZOLE 2 MG/ML
200 INJECTION, SOLUTION INTRAVENOUS DAILY
Status: DISCONTINUED | OUTPATIENT
Start: 2019-06-11 | End: 2019-06-14 | Stop reason: HOSPADM

## 2019-06-11 RX ORDER — LEVOFLOXACIN 5 MG/ML
500 INJECTION, SOLUTION INTRAVENOUS EVERY 24 HOURS
Status: DISCONTINUED | OUTPATIENT
Start: 2019-06-11 | End: 2019-06-11 | Stop reason: ALTCHOICE

## 2019-06-11 RX ADMIN — PRIMIDONE 50 MG: 50 TABLET ORAL at 14:37

## 2019-06-11 RX ADMIN — PRIMIDONE 50 MG: 50 TABLET ORAL at 22:13

## 2019-06-11 RX ADMIN — BUMETANIDE 0.5 MG: 0.25 INJECTION INTRAMUSCULAR; INTRAVENOUS at 01:47

## 2019-06-11 RX ADMIN — VANCOMYCIN HYDROCHLORIDE 1750 MG: 5 INJECTION, POWDER, LYOPHILIZED, FOR SOLUTION INTRAVENOUS at 14:22

## 2019-06-11 RX ADMIN — MELATONIN 6 MG: at 22:38

## 2019-06-11 RX ADMIN — HYDROCODONE BITARTRATE AND ACETAMINOPHEN 1 TABLET: 7.5; 325 TABLET ORAL at 18:35

## 2019-06-11 RX ADMIN — LEVOFLOXACIN 500 MG: 5 INJECTION, SOLUTION INTRAVENOUS at 08:42

## 2019-06-11 RX ADMIN — HYDROCODONE BITARTRATE AND ACETAMINOPHEN 1 TABLET: 7.5; 325 TABLET ORAL at 05:21

## 2019-06-11 RX ADMIN — FAMOTIDINE 20 MG: 20 TABLET ORAL at 17:34

## 2019-06-11 RX ADMIN — MEMANTINE 2.5 MG: 5 TABLET ORAL at 22:13

## 2019-06-11 RX ADMIN — HEPARIN SODIUM 5000 UNITS: 5000 INJECTION INTRAVENOUS; SUBCUTANEOUS at 05:21

## 2019-06-11 RX ADMIN — MEMANTINE 2.5 MG: 5 TABLET ORAL at 08:42

## 2019-06-11 RX ADMIN — PRIMIDONE 50 MG: 50 TABLET ORAL at 05:21

## 2019-06-11 RX ADMIN — HEPARIN SODIUM 5000 UNITS: 5000 INJECTION INTRAVENOUS; SUBCUTANEOUS at 14:37

## 2019-06-11 RX ADMIN — HYDROCODONE BITARTRATE AND ACETAMINOPHEN 1 TABLET: 7.5; 325 TABLET ORAL at 22:23

## 2019-06-11 RX ADMIN — VILAZODONE HYDROCHLORIDE 20 MG: 20 TABLET ORAL at 22:14

## 2019-06-11 RX ADMIN — HEPARIN SODIUM 5000 UNITS: 5000 INJECTION INTRAVENOUS; SUBCUTANEOUS at 22:14

## 2019-06-11 RX ADMIN — FAMOTIDINE 20 MG: 20 TABLET ORAL at 08:42

## 2019-06-11 RX ADMIN — NYSTATIN: 100000 POWDER TOPICAL at 08:47

## 2019-06-11 RX ADMIN — NYSTATIN: 100000 POWDER TOPICAL at 22:15

## 2019-06-11 RX ADMIN — GABAPENTIN 600 MG: 300 CAPSULE ORAL at 05:21

## 2019-06-11 RX ADMIN — SODIUM CHLORIDE, PRESERVATIVE FREE 10 ML: 5 INJECTION INTRAVENOUS at 22:15

## 2019-06-11 RX ADMIN — GABAPENTIN 600 MG: 300 CAPSULE ORAL at 22:13

## 2019-06-11 RX ADMIN — DONEPEZIL HYDROCHLORIDE 5 MG: 5 TABLET, FILM COATED ORAL at 22:13

## 2019-06-11 RX ADMIN — LEVOTHYROXINE SODIUM 137 MCG: 25 TABLET ORAL at 08:42

## 2019-06-11 RX ADMIN — BUMETANIDE 0.5 MG: 0.25 INJECTION INTRAMUSCULAR; INTRAVENOUS at 14:37

## 2019-06-11 RX ADMIN — FLUCONAZOLE 200 MG: 200 INJECTION, SOLUTION INTRAVENOUS at 11:06

## 2019-06-11 RX ADMIN — SODIUM CHLORIDE, PRESERVATIVE FREE 10 ML: 5 INJECTION INTRAVENOUS at 08:42

## 2019-06-11 RX ADMIN — HYDROCODONE BITARTRATE AND ACETAMINOPHEN 1 TABLET: 7.5; 325 TABLET ORAL at 11:13

## 2019-06-11 RX ADMIN — GABAPENTIN 600 MG: 300 CAPSULE ORAL at 14:37

## 2019-06-11 RX ADMIN — AZTREONAM 1 G: 1 INJECTION, POWDER, LYOPHILIZED, FOR SOLUTION INTRAMUSCULAR; INTRAVENOUS at 05:21

## 2019-06-11 NOTE — PLAN OF CARE
Problem: Patient Care Overview  Goal: Plan of Care Review  Outcome: Ongoing (interventions implemented as appropriate)   06/11/19 0327   Coping/Psychosocial   Plan of Care Reviewed With patient   Plan of Care Review   Progress no change   OTHER   Outcome Summary VSS, working on pain control, dressing change in am, slept well through night. Continue to monitor.      Goal: Individualization and Mutuality  Outcome: Ongoing (interventions implemented as appropriate)    Goal: Discharge Needs Assessment  Outcome: Ongoing (interventions implemented as appropriate)    Goal: Interprofessional Rounds/Family Conf  Outcome: Ongoing (interventions implemented as appropriate)      Problem: Fall Risk (Adult)  Goal: Absence of Fall  Outcome: Ongoing (interventions implemented as appropriate)      Problem: Skin Injury Risk (Adult)  Goal: Skin Health and Integrity  Outcome: Ongoing (interventions implemented as appropriate)      Problem: Surgery Nonspecified (Adult)  Goal: Signs and Symptoms of Listed Potential Problems Will be Absent, Minimized or Managed (Surgery Nonspecified)  Outcome: Ongoing (interventions implemented as appropriate)      Problem: Wound (Includes Pressure Injury) (Adult)  Goal: Signs and Symptoms of Listed Potential Problems Will be Absent, Minimized or Managed (Wound)  Outcome: Ongoing (interventions implemented as appropriate)

## 2019-06-11 NOTE — PLAN OF CARE
Problem: Patient Care Overview  Goal: Plan of Care Review  Outcome: Ongoing (interventions implemented as appropriate)   06/11/19 5299   Coping/Psychosocial   Plan of Care Reviewed With patient   Plan of Care Review   Progress improving   OTHER   Outcome Summary Pt tolerated tx well this date. Pt c/o pain in B LE's. Pt gave good effort with UE ther ex. Continue OT POC.

## 2019-06-11 NOTE — PLAN OF CARE
Problem: Patient Care Overview  Goal: Plan of Care Review  Outcome: Ongoing (interventions implemented as appropriate)   06/11/19 8647   Coping/Psychosocial   Plan of Care Reviewed With patient   Plan of Care Review   Progress no change   OTHER   Outcome Summary VS stable; cont antibiotics- change to vanc; urine cx positive for mrsa- contact precuations initiated; working well with therapy     Goal: Individualization and Mutuality  Outcome: Ongoing (interventions implemented as appropriate)    Goal: Discharge Needs Assessment  Outcome: Ongoing (interventions implemented as appropriate)    Goal: Interprofessional Rounds/Family Conf  Outcome: Ongoing (interventions implemented as appropriate)      Problem: Fall Risk (Adult)  Goal: Absence of Fall  Outcome: Ongoing (interventions implemented as appropriate)      Problem: Skin Injury Risk (Adult)  Goal: Skin Health and Integrity  Outcome: Ongoing (interventions implemented as appropriate)      Problem: Surgery Nonspecified (Adult)  Goal: Signs and Symptoms of Listed Potential Problems Will be Absent, Minimized or Managed (Surgery Nonspecified)  Outcome: Ongoing (interventions implemented as appropriate)      Problem: Wound (Includes Pressure Injury) (Adult)  Goal: Signs and Symptoms of Listed Potential Problems Will be Absent, Minimized or Managed (Wound)  Outcome: Ongoing (interventions implemented as appropriate)

## 2019-06-11 NOTE — THERAPY TREATMENT NOTE
Acute Care - Occupational Therapy Treatment Note  [facility]     Patient Name: Irma Pacheco  : 1950  MRN: [id number]  Today's Date: 2019  Onset of Illness/Injury or Date of Surgery: 19  Date of Referral to OT: 19  Referring Physician: Dr. Dixon     Admit Date: 2019       ICD-10-CM ICD-9-CM   1. 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 Impaired functional mobility and activity tolerance Z74.09 V49.89   3. 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     Past Medical History:   Diagnosis Date   • Anxiety    • Arthritis    • Asthma    • CHF (congestive heart failure) (CMS/[facility])    • Colostomy care (CMS/[facility])    • Concussion     x2   • Depression    • Disease of thyroid gland    • Hyperlipidemia    • Hypotension    • Kidney disease, chronic, stage III (GFR 30-59 ml/min) (CMS/[facility])    • MS (multiple sclerosis) (CMS/[facility])    • Osteoarthritis    • Tremor, essential    • Wears glasses      Past Surgical History:   Procedure Laterality Date   • ABDOMINAL HYSTERECTOMY  1999    Has Cervix   • APPENDECTOMY     • CHOLECYSTECTOMY     • COLON RESECTION N/A 2017    Procedure: exploratory laparotomy, sigmoid colon resection and colostomy;  Surgeon: Timothy Dixon MD;  Location: [facility];  Service:    • COLON RESECTION N/A 2019    Procedure: laparotomy with lysis of adhesions, repair of small bowel to vagina fistula. small bowel resection ;  Surgeon: Timothy Dixon MD;  Location: [facility] OR;  Service: General   • EXPLORATORY LAPAROTOMY N/A 10/9/2017    Procedure: LAPAROTOMY EXPLORATORY, drainage intra-abdominal abscess, washout;  Surgeon: Arnulfo Marcum MD;  Location: [facility];  Service:    • SPIDER BITE EXCISION         Therapy Treatment    Rehabilitation Treatment Summary     Row Name 19 1405             Treatment Time/Intention    Discipline  occupational  therapy assistant  -CS      Document Type  therapy note (daily note)  -CS      Subjective Information  complains of;pain  -CS      Mode of Treatment  occupational therapy  -CS      Therapy Frequency (OT Eval)  other (see comments) 5-7 days a week   -CS      Patient Effort  adequate  -CS      Existing Precautions/Restrictions  fall  -CS      Recorded by [CS] Cathy Dillon COTA/DERICK 06/11/19 1412      Row Name 06/11/19 1405             Vital Signs    Pre Patient Position  Supine  -CS      Post Patient Position  Supine  -CS2      Recorded by [CS] Cathy Dillon COTA/L 06/11/19 1412  [CS2] Cathy Dillon PELAYO/L 06/11/19 1518      Row Name 06/11/19 1405             Cognitive Assessment/Intervention- PT/OT    Affect/Mental Status (Cognitive)  WFL  -CS      Orientation Status (Cognition)  oriented x 4  -CS      Follows Commands (Cognition)  follows one step commands;over 90% accuracy  -CS      Recorded by [CS] Cathy Dillon COTA/DERICK 06/11/19 1412      Row Name 06/11/19 1405             Cognitive Assessment Intervention- SLP    Cognitive Function (Cognition)  WFL  -CS      Recorded by [CS] Cathy Dillon PELAYO/L 06/11/19 1518      Row Name 06/11/19 1405             Bed Mobility Assessment/Treatment    Comment (Bed Mobility)  pt deferred due to pain in B LE's.  -CS      Recorded by [CS] Cathy Dillon PELAYO/L 06/11/19 1538      Row Name 06/11/19 1405             Therapeutic Exercise    Upper Extremity (Therapeutic Exercise)  bicep curl, bilateral  -CS      Upper Extremity Range of Motion (Therapeutic Exercise)  shoulder flexion/extension, bilateral;shoulder abduction/adduction, bilateral;shoulder internal/external rotation, bilateral;elbow flexion/extension, bilateral;forearm supination/pronation, bilateral;wrist flexion/extension, bilateral  -CS      Hand (Therapeutic Exercise)  finger flexion/extension, bilateral  -CS      Weight/Resistance (Therapeutic Exercise)  1 pound;yellow  -CS      Exercise Type  (Therapeutic Exercise)  AROM (active range of motion)  -CS      Position (Therapeutic Exercise)  seated  -CS      Sets/Reps (Therapeutic Exercise)  2/20  -CS      Equipment (Therapeutic Exercise)  free weight, barbell;resistive bands  -CS      Expected Outcome (Therapeutic Exercise)  improve functional tolerance, self-care activity;improve performance, BADLs;improve performance, transfer skills;increase active range of motion  -CS      Comment (Therapeutic Exercise)  PRN RB's  -CS      Recorded by [CS] Cathy Dillon COTA/L 06/11/19 1538      Row Name 06/11/19 1405             Positioning and Restraints    Pre-Treatment Position  in bed  -CS      Post Treatment Position  bed  -CS      In Bed  fowlers;call light within reach;encouraged to call for assist;exit alarm on;with family/caregiver  -CS      Recorded by [CS] Cathy Dillon COTA/L 06/11/19 1538      Row Name 06/11/19 1405             Pain Scale: Numbers Pre/Post-Treatment    Pain Scale: Numbers, Pretreatment  10/10  -CS      Pain Scale: Numbers, Post-Treatment  10/10  -CS2      Pain Location - Side  Bilateral  -CS      Pain Location  foot  -CS      Recorded by [CS] Cathy Dillon COTA/L 06/11/19 1412  [CS2] Cathy Dillon COTA/L 06/11/19 1538      Row Name                Wound 05/29/19 1327 abdomen incision;surgical    Wound - Properties Group Date first assessed: 05/29/19 [KM] Time first assessed: 1327 [KM] Present On Admission : no [KM] Location: abdomen [KM] Type: incision;surgical [KM] Recorded by:  [KM] Geno Blackmon RN 05/29/19 1327    Row Name                Wound 06/05/19 1818 Bilateral other (see notes) other (see comments)    Wound - Properties Group Date first assessed: 06/05/19 [KMA] Time first assessed: 1818 [KMA] Present On Admission : yes [KMA], Admission to   Side: Bilateral [KMA] Location: other (see notes) [KMA2], Bilateral groin & skin fold excoriation  Type: other (see comments) [KMA] Recorded by:  [KMA] Geno Onofre  N, RN 06/05/19 1819 [KMA2] Geno Onofre RN 06/05/19 1821    Row Name 06/11/19 1405             Outcome Summary/Treatment Plan (OT)    Daily Summary of Progress (OT)  progress toward functional goals is good  -CS      Plan for Continued Treatment (OT)  cont OT POC  -CS      Anticipated Discharge Disposition (OT)  anticipate therapy at next level of care  -CS      Recorded by [CS] Cathy Dillon PELAYO/L 06/11/19 1539        User Key  (r) = Recorded By, (t) = Taken By, (c) = Cosigned By    Initials Name Effective Dates Discipline    Geno Isaac RN 10/17/16 -  Nurse    CS Cathy Dillon COTA/L 03/07/18 -  OT    KM Geno Blackmon RN 05/21/18 -  Nurse        Wound 05/29/19 1327 abdomen incision;surgical (Active)   Dressing Appearance intact 6/11/2019  8:35 AM   Closure ELIZABETH 6/11/2019  8:35 AM   Base dressing in place, unable to visualize 6/11/2019  8:35 AM   Periwound intact;moist;pink 6/11/2019  5:21 AM   Drainage Characteristics/Odor creamy 6/11/2019  5:21 AM   Drainage Amount small 6/11/2019  5:21 AM   Care, Wound cleansed with;dressing removed 6/11/2019  5:21 AM   Dressing Care, Wound dressing applied;gauze;gauze, wet-to-dry 6/11/2019  5:21 AM       Wound 06/05/19 1818 Bilateral other (see notes) other (see comments) (Active)   Dressing Appearance open to air 6/11/2019  8:35 AM   Closure Open to air 6/11/2019  8:35 AM   Base pink 6/11/2019  8:35 AM   Drainage Amount none 6/10/2019  8:55 PM   Care, Wound barrier applied 6/11/2019  8:35 AM     Rehab Goal Summary     Row Name 06/11/19 1405             Transfer Goal 1 (OT)    Activity/Assistive Device (Transfer Goal 1, OT)  commode, bedside with drop arms;commode, bedside without drop arms  -CS      [place] Level/Cues Needed (Transfer Goal 1, OT)  minimum assist (75% or more patient effort)  -CS      Time Frame (Transfer Goal 1, OT)  long term goal (LTG);by discharge  -CS      Progress/Outcome (Transfer Goal 1, OT)  goal not met  -CS          Bathing Goal 1 (OT)    Activity/Assistive Device (Bathing Goal 1, OT)  upper body bathing  -CS      [place] Level/Cues Needed (Bathing Goal 1, OT)  minimum assist (75% or more patient effort)  -CS      Time Frame (Bathing Goal 1, OT)  long term goal (LTG);by discharge  -CS      Progress/Outcomes (Bathing Goal 1, OT)  goal not met  -CS         Dressing Goal 1 (OT)    Activity/Assistive Device (Dressing Goal 1, OT)  upper body dressing  -CS      [place]/Cues Needed (Dressing Goal 1, OT)  minimum assist (75% or more patient effort)  -CS      Time Frame (Dressing Goal 1, OT)  long term goal (LTG);by discharge  -CS      Progress/Outcome (Dressing Goal 1, OT)  goal not met  -CS         Grooming Goal 1 (OT)    Activity/Device (Grooming Goal 1, OT)  grooming skills, all  -CS      [place] (Grooming Goal 1, OT)  set-up required;supervision required;verbal cues required  -CS      Time Frame (Grooming Goal 1, OT)  long term goal (LTG);by discharge  -CS      Progress/Outcome (Grooming Goal 1, OT)  goal not met  -CS         Self-Feeding Goal 1 (OT)    Activity/Assistive Device (Self-Feeding Goal 1, OT)  self-feeding skills, all  -CS      [place] Level/Cues Needed (Self-Feeding Goal 1, OT)  set-up required;supervision required;verbal cues required  -CS      Time Frame (Self-Feeding Goal 1, OT)  long term goal (LTG);by discharge  -CS      Barriers (Self-Feeding Goal 1, OT)  address when medically appropriate.   -CS      Progress/Outcomes (Self-Feeding Goal 1, OT)  goal not met  -CS        User Key  (r) = Recorded By, (t) = Taken By, (c) = Cosigned By    Initials Name Provider Type Discipline    CS Cathy Dillon COTA/DERICK Occupational Therapy Assistant OT        Occupational Therapy Education     Title: PT OT SLP Therapies (In Progress)     Topic: Occupational Therapy (In Progress)     Point: ADL training (In Progress)     Description: Instruct learner(s) on proper safety adaptation and remediation techniques  during self care or transfers.   Instruct in proper use of assistive devices.    Learning Progress Summary           Patient Acceptance, E,TB,D, NR by CS at 6/11/2019  3:48 PM    Acceptance, E,TB,D, NR by CS at 6/10/2019  1:16 PM    Acceptance, E,TB, VU by LW at 6/8/2019  2:21 PM    Acceptance, E,TB,D, NR by CS at 6/7/2019  1:32 PM    Acceptance, E,TB,D, NR by CS at 6/6/2019  3:56 PM    Acceptance, E, NR by BB at 6/5/2019 11:27 AM    Acceptance, E,TB,D, NR by CS at 5/31/2019  1:12 PM    Acceptance, E, VU,NR by  at 5/30/2019  1:28 PM    Comment:  Educated about OT and POC. Educated to call for assist. Educated on safety throughout.                   Point: Home exercise program (In Progress)     Description: Instruct learner(s) on appropriate technique for monitoring, assisting and/or progressing therapeutic exercises/activities.    Learning Progress Summary           Patient Acceptance, E,TB,D, NR by CS at 6/11/2019  3:48 PM    Acceptance, E,TB,D, NR by CS at 6/10/2019  1:16 PM    Acceptance, E,TB, VU by LW at 6/8/2019  2:21 PM    Acceptance, E,TB,D, NR by CS at 6/7/2019  1:32 PM    Acceptance, E,TB,D, NR by CS at 6/6/2019  3:56 PM    Acceptance, E,TB,D, NR by CS at 5/31/2019  1:12 PM                   Point: Precautions (In Progress)     Description: Instruct learner(s) on prescribed precautions during self-care and functional transfers.    Learning Progress Summary           Patient Acceptance, E,TB,D, NR by CS at 6/11/2019  3:48 PM    Acceptance, E,TB,D, NR by CS at 6/10/2019  1:16 PM    Acceptance, E,TB, VU by LW at 6/8/2019  2:21 PM    Acceptance, E,TB,D, NR by CS at 6/7/2019  1:32 PM    Acceptance, E,TB,D, NR by CS at 6/6/2019  3:56 PM    Acceptance, E,TB,D, NR by CS at 5/31/2019  1:12 PM    Acceptance, E, NAYELY,NR by  at 5/30/2019  1:28 PM    Comment:  Educated about OT and POC. Educated to call for assist. Educated on safety throughout.                   Point: Body mechanics (In Progress)     Description:  Instruct learner(s) on proper positioning and spine alignment during self-care, functional mobility activities and/or exercises.    Learning Progress Summary           Patient Acceptance, E,TB,D, NR by CS at 6/11/2019  3:48 PM    Acceptance, E,TB,D, NR by CS at 6/10/2019  1:16 PM    Acceptance, E,TB, VU by LW at 6/8/2019  2:21 PM    Acceptance, E,TB,D, NR by CS at 6/7/2019  1:32 PM    Acceptance, E,TB,D, NR by CS at 6/6/2019  3:56 PM    Acceptance, E,TB,D, NR by CS at 5/31/2019  1:12 PM                               User Key     Initials Effective Dates Name Provider Type Discipline     06/08/18 -  Dee Mendez OTR/L Occupational Therapist OT    BB 03/07/18 -  Anila Rodriguez PELAYO/L Occupational Therapy Assistant OT    CS 03/07/18 -  Cathy Dillon PELAYO/L Occupational Therapy Assistant OT    LW 03/07/18 -  Leyla Angela PELAYO/L Occupational Therapy Assistant OT                OT Recommendation and Plan  Outcome Summary/Treatment Plan (OT)  Daily Summary of Progress (OT): progress toward functional goals is good  Plan for Continued Treatment (OT): cont OT POC  Anticipated Discharge Disposition (OT): anticipate therapy at next level of care  Therapy Frequency (OT Eval): other (see comments)(5-7 days a week )  Daily Summary of Progress (OT): progress toward functional goals is good  Plan of Care Review  Plan of Care Reviewed With: patient  Plan of Care Reviewed With: patient  Outcome Summary: Pt tolerated tx well this date. Pt c/o pain in B LE's. Pt gave good effort with UE ther ex. Continue OT POC.  Outcome Measures     Row Name 06/11/19 1500 06/10/19 1436          How much help from another person do you currently need...    Turning from your back to your side while in flat bed without using bedrails?  --  3  -KW     Moving from lying on back to sitting on the side of a flat bed without bedrails?  --  3  -KW     Moving to and from a bed to a chair (including a wheelchair)?  --  2  -KW     Standing  up from a chair using your arms (e.g., wheelchair, bedside chair)?  --  2  -KW     Climbing 3-5 steps with a railing?  --  1  -KW     To walk in hospital room?  --  2  -KW     AM-PAC 6 Clicks Score  --  13  -KW        How much help from another is currently needed...    Putting on and taking off regular lower body clothing?  1  -CS  --     Bathing (including washing, rinsing, and drying)  1  -CS  --     Toileting (which includes using toilet bed pan or urinal)  1  -CS  --     Putting on and taking off regular upper body clothing  2  -CS  --     Taking care of personal grooming (such as brushing teeth)  2  -CS  --     Eating meals  1  -CS  --     Score  8  -CS  --        Functional Assessment    Outcome Measure Options  --  AM-PAC 6 Clicks Basic Mobility (PT)  -KW       User Key  (r) = Recorded By, (t) = Taken By, (c) = Cosigned By    Initials Name Provider Type    CS Cathy Dillon COTA/DERICK Occupational Therapy Assistant    KW Martine aTm, PT Physical Therapist           Time Calculation:   Time Calculation- OT     Row Name 06/11/19 1533             Time Calculation- OT    OT Start Time  1405  -CS      OT Stop Time  1458  -CS      OT Time Calculation (min)  53 min  -CS      Total Timed Code Minutes- OT  53 minute(s)  -CS      OT Received On  06/11/19  -CS        User Key  (r) = Recorded By, (t) = Taken By, (c) = Cosigned By    Initials Name Provider Type     Cathy Dillon COTA/DERICK Occupational Therapy Assistant        Therapy Charges for Today     Code Description Service Date Service Provider Modifiers Qty    85726144167 HC OT THER PROC EA 15 MIN 6/10/2019 Cathy Dillon COTA/L GO 3    33906101447 HC OT THER PROC EA 15 MIN 6/11/2019 Cathy Dillon COTA/DERICK GO 4               FREDDY Nguyen  6/11/2019

## 2019-06-11 NOTE — PLAN OF CARE
Problem: Patient Care Overview  Goal: Plan of Care Review  Outcome: Ongoing (interventions implemented as appropriate)   06/11/19 4890   Coping/Psychosocial   Plan of Care Reviewed With patient;daughter   Plan of Care Review   Progress no change   OTHER   Outcome Summary Pt cont to decline OOB or EOB activity this pm. Pt did perform B LE supine ex's without difficulty for 2 sets for 20 reps. Pt required AAROM with HS, Hip ABD/ADD. Pt will require cont therapy upon DC.

## 2019-06-11 NOTE — PLAN OF CARE
Problem: Patient Care Overview  Goal: Plan of Care Review  Outcome: Ongoing (interventions implemented as appropriate)   06/11/19 6585   Coping/Psychosocial   Plan of Care Reviewed With caregiver;patient;daughter   Plan of Care Review   Progress improving   OTHER   Outcome Summary Improving oral intake with encouragement. Abx started for UTI. Will monitor       Problem: Skin Injury Risk (Adult)  Intervention: Promote/Optimize Nutrition   06/11/19 9212   Nutrition Interventions   Oral Nutrition Promotion calorie dense foods provided;calorie dense liquids provided;nutritional therapy counseling provided

## 2019-06-11 NOTE — CONSULTS
Adult Nutrition  Assessment    Patient Name:  Irma Pacheco  YOB: 1950  MRN: [id number]  Admit Date:  5/29/2019    Assessment Date:  6/11/2019    Comments: Pt ate 100% of her lunch this afternoon.  Praised her efforts.  She said that her stomach still hurts.  She has been started on an Abx for UTI.  Will continue regimen and encourage    Reason for Assessment     Row Name 06/11/19 1537          Reason for Assessment    Reason For Assessment  follow-up protocol         Nutrition/Diet History     Row Name 06/11/19 1537          Nutrition/Diet History    Typical Food/Fluid Intake  Pt claims that she shakes and often needs assistance with meals.  She agrees to try to eat her meat.  She ate all of her lunch.  RD went and had her a baked potato fixed and she ate so of that.              Physical Findings     Row Name 06/11/19 1539          Physical Findings    Overall Physical Appearance  obese;overweight;edematous     Gastrointestinal  colostomy     Skin  other (see comments) Surgical wound           Nutrition Prescription Ordered     Row Name 06/11/19 1539          Nutrition Prescription PO    Current PO Diet  Soft Texture     Texture  Ground     Fluid Consistency  Thin     Supplement  Boost Breeze (Ensure)         Evaluation of Received Nutrient/Fluid Intake     Row Name 06/11/19 1542          PO Evaluation    Number of Days PO Intake Evaluated  1 day     Number of Meals  4     % PO Intake  25/25/50/100               Electronically signed by:  Nancy Gilmore RD  06/11/19 3:46 PM

## 2019-06-11 NOTE — PROGRESS NOTES
GENERAL SURGERY PROGRESS NOTE  Chief Complaint:  Surgery Follow up   LOS: 13 days       Subjective     Interval History:     Feels ok this morning. Able to eat better yesterday. Reports continued drainage    Objective     Vital Signs  Temp:  [97 °F (36.1 °C)-97.9 °F (36.6 °C)] 97.1 °F (36.2 °C)  Heart Rate:  [64-77] 71  Resp:  [18] 18  BP: (102-139)/(47-63) 130/61    Physical Exam:   Abdomen soft, ostomy with stool out. Dressing in place  Labs:  Lab Results (last 24 hours)     Procedure Component Value Units Date/Time    Basic Metabolic Panel [[id number]]  (Abnormal) Collected:  06/11/19 0657    Specimen:  Blood Updated:  06/11/19 0727     Glucose 79 mg/dL      BUN 7 mg/dL      Creatinine 0.75 mg/dL      Sodium 138 mmol/L      Potassium 3.8 mmol/L      Chloride 101 mmol/L      CO2 27.0 mmol/L      Calcium 8.0 mg/dL      eGFR Non African Amer 77 mL/min/1.73      BUN/Creatinine Ratio 9.3     Anion Gap 10.0 mmol/L     Narrative:       GFR Normal >60  Chronic Kidney Disease <60  Kidney Failure <15    CBC (No Diff) [[id number]]  (Abnormal) Collected:  06/11/19 0657    Specimen:  Blood Updated:  06/11/19 0709     WBC 7.40 10*3/mm3      RBC 3.19 10*6/mm3      Hemoglobin 10.1 g/dL      Hematocrit 32.0 %      .3 fL      MCH 31.7 pg      MCHC 31.6 g/dL      RDW 12.9 %      RDW-SD 46.9 fl      MPV 9.7 fL      Platelets 275 10*3/mm3     Urinalysis, Microscopic Only - Urine, Clean Catch [[id number]]  (Abnormal) Collected:  06/10/19 0934    Specimen:  Urine, Clean Catch Updated:  06/10/19 1016     RBC, UA 3-5 /HPF      WBC, UA 31-50 /HPF      Bacteria, UA 3+ /HPF      Squamous Epithelial Cells, UA 0-2 /HPF      Yeast, UA       Moderate/2+ Budding Yeast w/Hyphae     /HPF     Hyaline Casts, UA 13-20 /LPF      Methodology Manual Light Microscopy    Urine Culture - Urine, Urine, Clean Catch [[id number]] Collected:  06/10/19 0934    Specimen:  Urine, Clean Catch Updated:  06/10/19 1016    Urinalysis With Culture If Indicated -  Urine, Clean Catch [[id number]]  (Abnormal) Collected:  06/10/19 0934    Specimen:  Urine, Clean Catch Updated:  06/10/19 0945     Color, UA Yellow     Appearance, UA Turbid     pH, UA 6.0     Specific Gravity, UA 1.013     Glucose, UA Negative     Ketones, UA Negative     Bilirubin, UA Negative     Blood, UA Small (1+)     Protein, UA Negative     Leuk Esterase, UA Large (3+)     Nitrite, UA Positive     Urobilinogen, UA 1.0 E.U./dL           Results Review:     Labs and imaging for today were reviewed.    Assessment/Plan     Irma Pacheco is a 69 y.o. 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  WBC normal today.  Work with PT/OT.  Pt expresses interest in going to LT[facility] at discharge.          This document has been electronically signed by Timothy Dixon MD on June 11, 2019 7:45 AM        Timothy Dixon MD  06/11/19  7:45 AM

## 2019-06-11 NOTE — THERAPY TREATMENT NOTE
Acute Care - Physical Therapy Treatment Note  [facility]     Patient Name: Irma Pacheco  : 1950  MRN: [id number]  Today's Date: 2019  Onset of Illness/Injury or Date of Surgery: 19  Date of Referral to PT: 19  Referring Physician: Dr. Dixon     Admit Date: 2019    Visit Dx:    ICD-10-CM ICD-9-CM   1. 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 Impaired functional mobility and activity tolerance Z74.09 V49.89   3. 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 Daughter present throughout tx.  -TW  --     Therapy Frequency (OT Eval)  --  other (see comments) 5-7 days a week   -CS     Patient Effort  adequate  -TW  adequate  -CS     Existing Precautions/Restrictions  fall  -TW  fall  -CS     Recorded by [TW] Karl Zheng PTA 19 1647 [CS] Cathy Dillon COTA/DERICK 19 1412     Row Name 19 1550 19 1405          Vital Signs    Pretreatment Heart Rate (beats/min)  74  -TW  --     Posttreatment Heart Rate (beats/min)  76  -TW  --     Pre SpO2 (%)  97  -TW  --     O2 Delivery Pre Treatment  room air  -TW  --     Post SpO2 (%)  98  -TW  --     Pre Patient Position  Supine  -TW  Supine  -CS     Intra Patient Position  Supine  -TW  --     Post Patient Position  Supine  -TW  Supine  -CS2     Recorded by  [TW] Karl Zheng, PTA 06/11/19 1647 [CS] Cathy Dillon PELAYO/L 06/11/19 1412  [CS2] Cathy Dillon PELAYO/L 06/11/19 1518     Row Name 06/11/19 1550 06/11/19 1405          Cognitive Assessment/Intervention- PT/OT    Affect/Mental Status (Cognitive)  WFL  -TW  WFL  -CS     Orientation Status (Cognition)  oriented x 4  -TW  oriented x 4  -CS     Follows Commands (Cognition)  follows one step commands;over 90% accuracy  -TW  follows one step commands;over 90% accuracy  -CS     Safety Deficit (Cognitive)  mild deficit  -TW  --     Personal Safety Interventions  muscle strengthening facilitated  -TW  --     Recorded by [TW] Karl Zheng, TERRY 06/11/19 1647 [CS] Cathy Dillon PELAYO/DERICK 06/11/19 1412     Row Name 06/11/19 1405             Cognitive Assessment Intervention- SLP    Cognitive Function (Cognition)  WFL  -CS      Recorded by [CS] Cathy Dillon PELAYO/L 06/11/19 1518      Row Name 06/11/19 1550             Safety Issues, Functional Mobility    Safety Issues Affecting Function (Mobility)  awareness of need for assistance  -TW      Impairments Affecting Function (Mobility)  endurance/activity tolerance;pain;range of motion (ROM)  -TW      Recorded by [TW] Karl Zheng, PTA 06/11/19 1647      Row Name 06/11/19 1550 06/11/19 1405          Bed Mobility Assessment/Treatment    Comment (Bed Mobility)  Pt deferred OOB/EOB activity this date.  -TW  pt deferred due to pain in B LE's.  -CS     Recorded by [TW] Karl Zheng, PTA 06/11/19 1647 [CS] Cathy Dillon PELAYO/L 06/11/19 1538     Row Name 06/11/19 1550             Lower Extremity Supine Therapeutic Exercise    Performed, Supine Lower Extremity (Therapeutic Exercise)  hip flexion/extension;hip abduction/adduction;hip external/internal rotation;SAQ (short arc quad) over bolster;quadriceps sets;gluteal sets;ankle pumps;heel slides  -TW      Exercise Type, Supine Lower Extremity (Therapeutic Exercise)  AROM (active range of  motion);AAROM (active assistive range of motion)  -TW      Sets/Reps Detail, Supine Lower Extremity (Therapeutic Exercise)  2/20  -TW      Comment, Supine Lower Extremity (Therapeutic Exercise)  Instructed pt on importance of performing ex's all throughout the day. Pt and daughter voiced gfood understanding.  -TW      Recorded by [TW] Karl Zheng PTA 06/11/19 1647      Row Name 06/11/19 1405             Therapeutic Exercise    Upper Extremity (Therapeutic Exercise)  bicep curl, bilateral  -CS      Upper Extremity Range of Motion (Therapeutic Exercise)  shoulder flexion/extension, bilateral;shoulder abduction/adduction, bilateral;shoulder internal/external rotation, bilateral;elbow flexion/extension, bilateral;forearm supination/pronation, bilateral;wrist flexion/extension, bilateral  -CS      Hand (Therapeutic Exercise)  finger flexion/extension, bilateral  -CS      Weight/Resistance (Therapeutic Exercise)  1 pound;yellow  -CS      Exercise Type (Therapeutic Exercise)  AROM (active range of motion)  -CS      Position (Therapeutic Exercise)  seated  -CS      Sets/Reps (Therapeutic Exercise)  2/20  -CS      Equipment (Therapeutic Exercise)  free weight, barbell;resistive bands  -CS      Expected Outcome (Therapeutic Exercise)  improve functional tolerance, self-care activity;improve performance, BADLs;improve performance, transfer skills;increase active range of motion  -CS      Comment (Therapeutic Exercise)  PRN RB's  -CS      Recorded by [CS] Cathy Dillon COTA/L 06/11/19 1538      Row Name 06/11/19 1550 06/11/19 1405          Positioning and Restraints    Pre-Treatment Position  in bed  -TW  in bed  -CS     Post Treatment Position  bed  -TW  bed  -CS     In Bed  supine;call light within reach;encouraged to call for assist;exit alarm on;with family/caregiver  -TW  fowlers;call light within reach;encouraged to call for assist;exit alarm on;with family/caregiver  -CS     Recorded by [TW] Norm  Karl SEPULVEDA, PTA 06/11/19 1647 [CS] Cathy Dillon PELAYO/L 06/11/19 1538     Row Name 06/11/19 1550 06/11/19 1405          Pain Scale: Numbers Pre/Post-Treatment    Pain Scale: Numbers, Pretreatment  9/10  -TW  10/10  -CS     Pain Scale: Numbers, Post-Treatment  10/10  -TW  10/10  -CS2     Pain Location - Side  Bilateral  -TW  Bilateral  -CS     Pain Location - Orientation  lower  -TW  --     Pain Location  back  -TW  foot  -CS     Pain Intervention(s)  Repositioned  -TW  --     Recorded by [TW] Karl Zheng, PTA 06/11/19 1647 [CS] Cathy Dillon PELAYO/L 06/11/19 1412  [CS2] Cathy Dillon PELAYO/L 06/11/19 1538     Row Name                Wound 05/29/19 1327 abdomen incision;surgical    Wound - Properties Group Date first assessed: 05/29/19 [KM] Time first assessed: 1327 [KM] Present On Admission : no [KM] Location: abdomen [KM] Type: incision;surgical [KM] Recorded by:  [KM] Geno Blackmon RN 05/29/19 1327    Row Name                Wound 06/05/19 1818 Bilateral other (see notes) other (see comments)    Wound - Properties Group Date first assessed: 06/05/19 [KMA] Time first assessed: 1818 [KMA] Present On Admission : yes [KMA], Admission to   Side: Bilateral [KMA] Location: other (see notes) [KMA2], Bilateral groin & skin fold excoriation  Type: other (see comments) [KMA] Recorded by:  [KMA] Geno Onofre RN 06/05/19 1819 [KMA2] Geno Onofre RN 06/05/19 1821    Row Name 06/11/19 1405             Outcome Summary/Treatment Plan (OT)    Daily Summary of Progress (OT)  progress toward functional goals is good  -CS      Plan for Continued Treatment (OT)  cont OT POC  -CS      Anticipated Discharge Disposition (OT)  anticipate therapy at next level of care  -CS      Recorded by [CS] Cathy Dillon PELAYO/L 06/11/19 1538      Row Name 06/11/19 1550             Outcome Summary/Treatment Plan (PT)    Daily Summary of Progress (PT)  progress toward functional goals is good  -TW      Barriers  to Overall Progress (PT)  weakness and obesity  -TW      Plan for Continued Treatment (PT)  Cont POC.  -TW      Anticipated Discharge Disposition (PT)  anticipate therapy at next level of care  -TW      Recorded by [TW] Karl Zheng, PTA 06/11/19 1427        User Key  (r) = Recorded By, (t) = Taken By, (c) = Cosigned By    Initials Name Effective Dates Discipline    Geno Isaac, RN 10/17/16 -  Nurse    TW Karl Zheng, PTA 03/07/18 -  PT    Cathy Shane, PELAYO/L 03/07/18 -  OT    Geno Limon RN 05/21/18 -  Nurse          Wound 05/29/19 1327 abdomen incision;surgical (Active)   Dressing Appearance intact 6/11/2019  8:35 AM   Closure ELIZABETH 6/11/2019  8:35 AM   Base dressing in place, unable to visualize 6/11/2019  8:35 AM   Periwound intact;moist;pink 6/11/2019  5:21 AM   Drainage Characteristics/Odor creamy 6/11/2019  5:21 AM   Drainage Amount small 6/11/2019  5:21 AM   Care, Wound cleansed with;dressing removed 6/11/2019  5:21 AM   Dressing Care, Wound dressing applied;gauze;gauze, wet-to-dry 6/11/2019  5:21 AM       Wound 06/05/19 1818 Bilateral other (see notes) other (see comments) (Active)   Dressing Appearance open to air 6/11/2019  8:35 AM   Closure Open to air 6/11/2019  8:35 AM   Base pink 6/11/2019  8:35 AM   Drainage Amount none 6/10/2019  8:55 PM   Care, Wound barrier applied 6/11/2019  8:35 AM       Rehab Goal Summary     Row Name 06/11/19 1550 06/11/19 1405          Physical Therapy Goals    Bed Mobility Goal Selection (PT)  bed mobility, PT goal 1  -TW  --     Transfer Goal Selection (PT)  transfer, PT goal 1  -TW  --     Gait Training Goal Selection (PT)  gait training, PT goal 1  -TW  --     Strength Goal Selection (PT)  strength, PT goal 1  -TW  --        Bed Mobility Goal 1 (PT)    Activity/Assistive Device (Bed Mobility Goal 1, PT)  sit to supine/supine to sit  -TW  --     [place] Level/Cues Needed (Bed Mobility Goal 1, PT)  minimum assist (75% or more  patient effort)  -TW  --     Time Frame (Bed Mobility Goal 1, PT)  10 days  -TW  --     Barriers (Bed Mobility Goal 1, PT)  abdominal pain, previous functional deficit  -TW  --     Progress/Outcomes (Bed Mobility Goal 1, PT)  goal not met  -TW  --        Transfer Goal 1 (PT)    Activity/Assistive Device (Transfer Goal 1, PT)  sit-to-stand/stand-to-sit;bed-to-chair/chair-to-bed;walker, rolling  -TW  --     [place] Level/Cues Needed (Transfer Goal 1, PT)  contact guard assist;verbal cues required  -TW  --     Time Frame (Transfer Goal 1, PT)  1 week  -TW  --     Barriers (Transfers Goal 1, PT)  abdominal pain, previous functional deficit  -TW  --     Progress/Outcome (Transfer Goal 1, PT)  goal not met  -TW  --        Gait Training Goal 1 (PT)    Activity/Assistive Device (Gait Training Goal 1, PT)  walker, rolling;decrease fall risk;increase endurance/gait distance  -TW  --     [place] Level (Gait Training Goal 1, PT)  contact guard assist;verbal cues required  -TW  --     Distance (Gait Goal 1, PT)  10 feet  -TW  --     Time Frame (Gait Training Goal 1, PT)  10 days  -TW  --     Barriers (Gait Training Goal 1, PT)  abdominal pain, previous functional deficit  -TW  --     Progress/Outcome (Gait Training Goal 1, PT)  goal not met  -TW  --        Strength Goal 1 (PT)    Strength Goal 1 (PT)  Pt will be able to complete 20 reps of at least 4 LE exercises in order to demo improved strength for transfers  -TW  --     Time Frame (Strength Goal 1, PT)  1 week  -TW  --     Barriers (Strength Goal 1, PT)  abdominal pain, previous functional deficit  -TW  --     Progress/Outcome (Strength Goal 1, PT)  goal not met  -TW  --        Transfer Goal 1 (OT)    Activity/Assistive Device (Transfer Goal 1, OT)  --  commode, bedside with drop arms;commode, bedside without drop arms  -CS     [place] Level/Cues Needed (Transfer Goal 1, OT)  --  minimum assist (75% or more patient effort)  -CS     Time Frame (Transfer Goal 1,  OT)  --  long term goal (LTG);by discharge  -CS     Progress/Outcome (Transfer Goal 1, OT)  --  goal not met  -CS        Bathing Goal 1 (OT)    Activity/Assistive Device (Bathing Goal 1, OT)  --  upper body bathing  -CS     [place] Level/Cues Needed (Bathing Goal 1, OT)  --  minimum assist (75% or more patient effort)  -CS     Time Frame (Bathing Goal 1, OT)  --  long term goal (LTG);by discharge  -CS     Progress/Outcomes (Bathing Goal 1, OT)  --  goal not met  -CS        Dressing Goal 1 (OT)    Activity/Assistive Device (Dressing Goal 1, OT)  --  upper body dressing  -CS     [place]/Cues Needed (Dressing Goal 1, OT)  --  minimum assist (75% or more patient effort)  -CS     Time Frame (Dressing Goal 1, OT)  --  long term goal (LTG);by discharge  -CS     Progress/Outcome (Dressing Goal 1, OT)  --  goal not met  -CS        Grooming Goal 1 (OT)    Activity/Device (Grooming Goal 1, OT)  --  grooming skills, all  -CS     [place] (Grooming Goal 1, OT)  --  set-up required;supervision required;verbal cues required  -CS     Time Frame (Grooming Goal 1, OT)  --  long term goal (LTG);by discharge  -CS     Progress/Outcome (Grooming Goal 1, OT)  --  goal not met  -CS        Self-Feeding Goal 1 (OT)    Activity/Assistive Device (Self-Feeding Goal 1, OT)  --  self-feeding skills, all  -CS     [place] Level/Cues Needed (Self-Feeding Goal 1, OT)  --  set-up required;supervision required;verbal cues required  -CS     Time Frame (Self-Feeding Goal 1, OT)  --  long term goal (LTG);by discharge  -CS     Barriers (Self-Feeding Goal 1, OT)  --  address when medically appropriate.   -CS     Progress/Outcomes (Self-Feeding Goal 1, OT)  --  goal not met  -CS       User Key  (r) = Recorded By, (t) = Taken By, (c) = Cosigned By    Initials Name Provider Type Discipline    TW Karl Zheng, PTA Physical Therapy Assistant PT    CS Cathy Dillon COTA/DERICK Occupational Therapy Assistant OT          Physical Therapy  Education     Title: PT OT SLP Therapies (In Progress)     Topic: Physical Therapy (In Progress)     Point: Mobility training (In Progress)     Learning Progress Summary           Patient Acceptance, E, NR by SHRAVAN at 6/8/2019 10:53 AM    Acceptance, E, NR by SHRAVAN at 6/7/2019  1:22 PM    Acceptance, E,TB, VU by KATH at 6/6/2019  1:31 PM    Comment:  ex throughout the day    Acceptance, E,TB, VU by KATH at 6/4/2019  1:23 PM    Acceptance, E, VU,NR by JAVI at 5/30/2019 12:58 PM    Comment:  Role of PT, PT POC, transfer technique   Family Acceptance, E,TB, VU by LN at 6/6/2019  1:31 PM    Comment:  ex throughout the day                   Point: Home exercise program (Done)     Learning Progress Summary           Patient Acceptance, E,TB, VU by LN at 6/6/2019  1:31 PM    Comment:  ex throughout the day    Acceptance, E,TB, VU by KATH at 6/3/2019  3:53 PM    Acceptance, E,TB, VU by KATH at 5/31/2019 12:58 PM   Family Acceptance, E,TB, VU by LN at 6/6/2019  1:31 PM    Comment:  ex throughout the day                   Point: Body mechanics (Done)     Learning Progress Summary           Patient Acceptance, E,TB, VU by KATH at 6/6/2019  1:31 PM    Comment:  ex throughout the day    Acceptance, E,TB, VU by KATH at 6/4/2019  1:23 PM    Acceptance, E,TB, VU by ADELINA at 6/4/2019 10:07 AM   Family Acceptance, E,TB, VU by KATH at 6/6/2019  1:31 PM    Comment:  ex throughout the day                   Point: Precautions (Done)     Learning Progress Summary           Patient Acceptance, E,TB, VU by LN at 6/6/2019  1:31 PM    Comment:  ex throughout the day    Acceptance, E,TB, VU by KATH at 6/4/2019  1:23 PM    Acceptance, E,TB, VU by KATH at 6/3/2019  3:53 PM    Acceptance, E,TB, VU by KATH at 5/31/2019 12:58 PM    Acceptance, E, VU by JAVI at 5/30/2019  1:00 PM    Comment:  Gait belt, call light, staff assistance with mobility   Family Acceptance, E,TB, VU by LN at 6/6/2019  1:31 PM    Comment:  ex throughout the day                               User Key      Initials Effective Dates Name Provider Type Discipline     10/17/16 -  Alem Garrido RN Registered Nurse Nurse    SHRAVAN 03/07/18 -  Thai Swanson PTA Physical Therapy Assistant PT    LN 03/07/18 -  Jannet Garcia PTA Physical Therapy Assistant PT    LF 07/23/18 -  Donna Stacy, LUCRECIA Physical Therapist PT                PT Recommendation and Plan  Anticipated Discharge Disposition (PT): anticipate therapy at next level of care  Outcome Summary/Treatment Plan (PT)  Daily Summary of Progress (PT): progress toward functional goals is good  Barriers to Overall Progress (PT): weakness and obesity  Plan for Continued Treatment (PT): Cont POC.  Anticipated Discharge Disposition (PT): anticipate therapy at next level of care  Plan of Care Reviewed With: patient, daughter  Progress: no change  Outcome Summary: Pt cont to decline OOB or EOB activity this pm. Pt did perform B LE supine ex's without difficulty for 2 sets for 20 reps. Pt required AAROM with HS, Hip ABD/ADD. Pt will require cont therapy upon DC.  Outcome Measures     Row Name 06/11/19 1550 06/11/19 1500 06/10/19 1436       How much help from another person do you currently need...    Turning from your back to your side while in flat bed without using bedrails?  3  -TW  --  3  -KW    Moving from lying on back to sitting on the side of a flat bed without bedrails?  3  -TW  --  3  -KW    Moving to and from a bed to a chair (including a wheelchair)?  2  -TW  --  2  -KW    Standing up from a chair using your arms (e.g., wheelchair, bedside chair)?  2  -TW  --  2  -KW    Climbing 3-5 steps with a railing?  1  -TW  --  1  -KW    To walk in hospital room?  2  -TW  --  2  -KW    AM-PAC 6 Clicks Score  13  -TW  --  13  -KW       How much help from another is currently needed...    Putting on and taking off regular lower body clothing?  --  1  -CS  --    Bathing (including washing, rinsing, and drying)  --  1  -CS  --    Toileting (which includes using toilet bed pan  or urinal)  --  1  -CS  --    Putting on and taking off regular upper body clothing  --  2  -CS  --    Taking care of personal grooming (such as brushing teeth)  --  2  -CS  --    Eating meals  --  1  -CS  --    Score  --  8  -CS  --       Functional Assessment    Outcome Measure Options  AM-PAC 6 Clicks Basic Mobility (PT)  -TW  --  AM-PAC 6 Clicks Basic Mobility (PT)  -KW      User Key  (r) = Recorded By, (t) = Taken By, (c) = Cosigned By    Initials Name Provider Type    TW Karl Zheng PTA Physical Therapy Assistant    Cathy Shane COTA/DERICK Occupational Therapy Assistant    KW Martine Tam, PT Physical Therapist         Time Calculation:   PT Charges     Row Name 06/11/19 1650             Time Calculation    Start Time  1550  -TW      Stop Time  1628  -TW      Time Calculation (min)  38 min  -TW      PT Received On  06/11/19  -TW      PT Goal Re-Cert Due Date  06/12/19  -TW         Time Calculation- PT    Total Timed Code Minutes- PT  38 minute(s)  -TW        User Key  (r) = Recorded By, (t) = Taken By, (c) = Cosigned By    Initials Name Provider Type     Karl Zheng PTA Physical Therapy Assistant        Therapy Charges for Today     Code Description Service Date Service Provider Modifiers Qty    77823109646 HC PT THERAPEUTIC ACT EA 15 MIN 6/11/2019 Karl Zheng PTA GP 1    33158760836 HC PT THER PROC EA 15 MIN 6/11/2019 Karl Zheng PTA GP 2          PT G-Codes  Outcome Measure Options: AM-PAC 6 Clicks Basic Mobility (PT)  AM-PAC 6 Clicks Score: 13  Score: 8    Karl Zheng PTA  6/11/2019

## 2019-06-12 LAB
ANION GAP SERPL CALCULATED.3IONS-SCNC: 12 MMOL/L
BUN BLD-MCNC: 6 MG/DL (ref 8–23)
BUN/CREAT SERPL: 8.2 (ref 7–25)
CALCIUM SPEC-SCNC: 8.3 MG/DL (ref 8.6–10.5)
CHLORIDE SERPL-SCNC: 102 MMOL/L (ref 98–107)
CO2 SERPL-SCNC: 26 MMOL/L (ref 22–29)
CREAT BLD-MCNC: 0.73 MG/DL (ref 0.57–1)
DEPRECATED RDW RBC AUTO: 45.4 FL (ref 37–54)
ERYTHROCYTE [DISTWIDTH] IN BLOOD BY AUTOMATED COUNT: 13 % (ref 12.3–15.4)
GFR SERPL CREATININE-BSD FRML MDRD: 80 ML/MIN/1.73
GLUCOSE BLD-MCNC: 113 MG/DL (ref 65–99)
HCT VFR BLD AUTO: 36.1 % (ref 34–46.6)
HGB BLD-MCNC: 11.8 G/DL (ref 12–15.9)
MCH RBC QN AUTO: 31.6 PG (ref 26.6–33)
MCHC RBC AUTO-ENTMCNC: 32.7 G/DL (ref 31.5–35.7)
MCV RBC AUTO: 96.8 FL (ref 79–97)
PLATELET # BLD AUTO: 320 10*3/MM3 (ref 140–450)
PMV BLD AUTO: 10 FL (ref 6–12)
POTASSIUM BLD-SCNC: 3.5 MMOL/L (ref 3.5–5.2)
RBC # BLD AUTO: 3.73 10*6/MM3 (ref 3.77–5.28)
SODIUM BLD-SCNC: 140 MMOL/L (ref 136–145)
WBC NRBC COR # BLD: 7.06 10*3/MM3 (ref 3.4–10.8)

## 2019-06-12 PROCEDURE — 25010000002 VANCOMYCIN 5 G RECONSTITUTED SOLUTION: Performed by: SURGERY

## 2019-06-12 PROCEDURE — 85027 COMPLETE CBC AUTOMATED: CPT | Performed by: SURGERY

## 2019-06-12 PROCEDURE — 99024 POSTOP FOLLOW-UP VISIT: CPT | Performed by: SURGERY

## 2019-06-12 PROCEDURE — 80048 BASIC METABOLIC PNL TOTAL CA: CPT | Performed by: SURGERY

## 2019-06-12 PROCEDURE — 25010000002 HEPARIN (PORCINE) PER 1000 UNITS: Performed by: SURGERY

## 2019-06-12 PROCEDURE — 25010000002 FLUCONAZOLE PER 200 MG: Performed by: SURGERY

## 2019-06-12 PROCEDURE — 97530 THERAPEUTIC ACTIVITIES: CPT

## 2019-06-12 PROCEDURE — 97110 THERAPEUTIC EXERCISES: CPT

## 2019-06-12 RX ORDER — MIRTAZAPINE 15 MG/1
45 TABLET, FILM COATED ORAL NIGHTLY
Status: DISCONTINUED | OUTPATIENT
Start: 2019-06-12 | End: 2019-06-14 | Stop reason: HOSPADM

## 2019-06-12 RX ORDER — MIRTAZAPINE 45 MG/1
45 TABLET, ORALLY DISINTEGRATING ORAL NIGHTLY
Status: ON HOLD | COMMUNITY
End: 2019-06-12

## 2019-06-12 RX ORDER — MIRTAZAPINE 45 MG/1
45 TABLET, FILM COATED ORAL NIGHTLY
COMMUNITY

## 2019-06-12 RX ADMIN — MEMANTINE 2.5 MG: 5 TABLET ORAL at 21:44

## 2019-06-12 RX ADMIN — HYDROCODONE BITARTRATE AND ACETAMINOPHEN 1 TABLET: 7.5; 325 TABLET ORAL at 16:28

## 2019-06-12 RX ADMIN — LEVOTHYROXINE SODIUM 137 MCG: 25 TABLET ORAL at 08:37

## 2019-06-12 RX ADMIN — BUMETANIDE 0.5 MG: 0.25 INJECTION INTRAMUSCULAR; INTRAVENOUS at 16:28

## 2019-06-12 RX ADMIN — NYSTATIN: 100000 POWDER TOPICAL at 08:37

## 2019-06-12 RX ADMIN — DONEPEZIL HYDROCHLORIDE 5 MG: 5 TABLET, FILM COATED ORAL at 22:14

## 2019-06-12 RX ADMIN — VANCOMYCIN HYDROCHLORIDE 1500 MG: 5 INJECTION, POWDER, LYOPHILIZED, FOR SOLUTION INTRAVENOUS at 16:27

## 2019-06-12 RX ADMIN — MEMANTINE 2.5 MG: 5 TABLET ORAL at 08:36

## 2019-06-12 RX ADMIN — VILAZODONE HYDROCHLORIDE 20 MG: 20 TABLET ORAL at 21:43

## 2019-06-12 RX ADMIN — FAMOTIDINE 20 MG: 20 TABLET ORAL at 18:25

## 2019-06-12 RX ADMIN — GABAPENTIN 600 MG: 300 CAPSULE ORAL at 05:49

## 2019-06-12 RX ADMIN — GLATIRAMER 40 MG: 40 INJECTION, SOLUTION SUBCUTANEOUS at 21:53

## 2019-06-12 RX ADMIN — SODIUM CHLORIDE, PRESERVATIVE FREE 10 ML: 5 INJECTION INTRAVENOUS at 21:46

## 2019-06-12 RX ADMIN — ONDANSETRON HYDROCHLORIDE 4 MG: 4 TABLET, FILM COATED ORAL at 08:55

## 2019-06-12 RX ADMIN — HEPARIN SODIUM 5000 UNITS: 5000 INJECTION INTRAVENOUS; SUBCUTANEOUS at 16:29

## 2019-06-12 RX ADMIN — HEPARIN SODIUM 5000 UNITS: 5000 INJECTION INTRAVENOUS; SUBCUTANEOUS at 21:44

## 2019-06-12 RX ADMIN — VANCOMYCIN HYDROCHLORIDE 1500 MG: 5 INJECTION, POWDER, LYOPHILIZED, FOR SOLUTION INTRAVENOUS at 03:27

## 2019-06-12 RX ADMIN — PRIMIDONE 50 MG: 50 TABLET ORAL at 05:49

## 2019-06-12 RX ADMIN — GABAPENTIN 600 MG: 300 CAPSULE ORAL at 21:44

## 2019-06-12 RX ADMIN — HEPARIN SODIUM 5000 UNITS: 5000 INJECTION INTRAVENOUS; SUBCUTANEOUS at 05:49

## 2019-06-12 RX ADMIN — HYDROCODONE BITARTRATE AND ACETAMINOPHEN 1 TABLET: 7.5; 325 TABLET ORAL at 05:50

## 2019-06-12 RX ADMIN — BUMETANIDE 0.5 MG: 0.25 INJECTION INTRAMUSCULAR; INTRAVENOUS at 03:28

## 2019-06-12 RX ADMIN — FAMOTIDINE 20 MG: 20 TABLET ORAL at 08:36

## 2019-06-12 RX ADMIN — SODIUM CHLORIDE, PRESERVATIVE FREE 10 ML: 5 INJECTION INTRAVENOUS at 08:38

## 2019-06-12 RX ADMIN — GABAPENTIN 600 MG: 300 CAPSULE ORAL at 16:28

## 2019-06-12 RX ADMIN — PRIMIDONE 50 MG: 50 TABLET ORAL at 16:28

## 2019-06-12 RX ADMIN — FLUCONAZOLE 200 MG: 200 INJECTION, SOLUTION INTRAVENOUS at 08:37

## 2019-06-12 RX ADMIN — NYSTATIN: 100000 POWDER TOPICAL at 21:43

## 2019-06-12 RX ADMIN — PRIMIDONE 50 MG: 50 TABLET ORAL at 21:44

## 2019-06-12 NOTE — DISCHARGE PLACEMENT REQUEST
"Irma Pacheco (69 y.o. Female)     Date of Birth Social Security Number Address Home Phone MRN    1950  [address] [address] [phone] [id number]    Hindu Marital Status          Yarsani        Admission Date Admission Type Admitting Provider Attending Provider Department, Room/Bed    5/29/19 Elective Timothy Dixon MD Armstrong, James Edward, MD [address], [facility]/1    Discharge Date Discharge Disposition Discharge Destination                       Attending Provider:  Timothy Dixon MD    Allergies:  Other, Codeine, Milk-related Compounds, Motrin [Ibuprofen], Penicillins    Isolation:  Contact   Infection:  MRSA (06/11/19)   Code Status:  CPR    Ht:  152.4 cm (60\")   Wt:  124 kg (272 lb 11.3 oz)    Admission Cmt:  None   Principal Problem:  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                 Active Insurance as of 5/29/2019     Primary Coverage     Payor Plan Insurance Group Employer/Plan Group    MEDICARE MEDICARE A & B      Payor Plan Address Payor Plan Phone Number Payor Plan Fax Number Effective Dates    [address] [phone]  10/1/2010 - None Entered    [place] [address]       Subscriber Name Subscriber Birth Date Member ID       IRMA PACHECO 1950 [id number]                 Emergency Contacts      (Rel.) Home Phone Work Phone Mobile Phone    Lalita Pacheco (Daughter) [phone] -- [phone]          "

## 2019-06-12 NOTE — PLAN OF CARE
Problem: Patient Care Overview  Goal: Plan of Care Review  Outcome: Ongoing (interventions implemented as appropriate)   06/12/19 1320   Coping/Psychosocial   Plan of Care Reviewed With patient   Plan of Care Review   Progress no change   OTHER   Outcome Summary Pt cont to decline EOB/OOB but willing to perform B LE supine ex's and does so for 2/20 without c/o. Pt begins to cry when encouraged to t/f EOB/OOB and states she was told by HH that she shouldnt get up anymore and amb. Pt will require f/u therapy at [facility].

## 2019-06-12 NOTE — PROGRESS NOTES
GENERAL SURGERY PROGRESS NOTE  Chief Complaint:  Surgery Follow up   LOS: 14 days       Subjective     Interval History:     Overall making slow progress.  Working with PT during my visit.  Tolerating diet, having bowel function. Would like to go to LTAC    Objective     Vital Signs  Temp:  [97.5 °F (36.4 °C)-98.2 °F (36.8 °C)] 97.6 °F (36.4 °C)  Heart Rate:  [69-79] 69  Resp:  [18] 18  BP: (111-134)/(57-65) 124/57    Physical Exam:   Abdomen soft, incision CDI with staples, packing at open lower aspect with minimal drainage. Ostomy with stool out.    Labs:  Lab Results (last 24 hours)     Procedure Component Value Units Date/Time    Urine Culture - Urine, Urine, Clean Catch [[id number]]  (Abnormal)  (Susceptibility) Collected:  06/10/19 0934    Specimen:  Urine, Clean Catch Updated:  06/12/19 1105     Urine Culture >100,000 CFU/mL Staphylococcus aureus     Comment:   Methicillin resistant Staph aureus, patient may be an isolation risk.  Staphylococcus aureus is not typically regarded as a uropathogen, except in cases with genitourinary instrumentation present.  It's presence suggests an alternate source (e.g. bloodstream, abscess, SSTI, etc.).  Please evaluate accordingly.    contact precautions requested    Methicillin resistant Staphylococcus aureus, patient may be an isolation risk.         50,000 CFU/mL Enterococcus species    Susceptibility      Staphylococcus aureus     PASTORA (Preliminary)     Nitrofurantoin Susceptible     Oxacillin Resistant     Tetracycline Susceptible     Trimethoprim + Sulfamethoxazole Susceptible     Vancomycin Susceptible                Susceptibility Comments     Staphylococcus aureus    This isolate is presumed to be clindamycin resistant based on detection of inducible clindamycin resistance.  Clindamycin may still be effective in some patients.  This isolate is presumed to be clindamycin resistant based on detection of inducible clindamycin resistance.  Clindamycin may still be  effective in some patients.             Basic Metabolic Panel [[id number]]  (Abnormal) Collected:  06/12/19 0826    Specimen:  Blood Updated:  06/12/19 0935     Glucose 113 mg/dL      BUN 6 mg/dL      Creatinine 0.73 mg/dL      Sodium 140 mmol/L      Potassium 3.5 mmol/L      Chloride 102 mmol/L      CO2 26.0 mmol/L      Calcium 8.3 mg/dL      eGFR Non African Amer 80 mL/min/1.73      BUN/Creatinine Ratio 8.2     Anion Gap 12.0 mmol/L     Narrative:       GFR Normal >60  Chronic Kidney Disease <60  Kidney Failure <15    CBC (No Diff) [[id number]]  (Abnormal) Collected:  06/12/19 0826    Specimen:  Blood Updated:  06/12/19 0909     WBC 7.06 10*3/mm3      RBC 3.73 10*6/mm3      Hemoglobin 11.8 g/dL      Hematocrit 36.1 %      MCV 96.8 fL      MCH 31.6 pg      MCHC 32.7 g/dL      RDW 13.0 %      RDW-SD 45.4 fl      MPV 10.0 fL      Platelets 320 10*3/mm3            Results Review:     Labs and imaging for today were reviewed.    Assessment/Plan     Irma Pacheco is a 69 y.o. 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      On Vancomycin for MRSA in her urine.  Anticipate at least a 14 day course of IV ABx for this.  Will keep catheter in place for now given deconditioned state and vaginal drainage.    Continue working with PT/OT on strengthening.     Work toward LTAC discharge.          This document has been electronically signed by Timothy Dixon MD on June 12, 2019 2:13 PM        Timothy Dixon MD  06/12/19  2:13 PM

## 2019-06-12 NOTE — CONSULTS
Nutrition Services    Patient Name:  Irma Pacheco  YOB: 1950  MRN: [id number]  Admit Date:  5/29/2019       Pt ate 50% of her supper last night.  She said that the meat was so dry that she tried to eat it but could not get it down.  The baked potato was hard.  They did get her a ham sandwich and she ate that.  She said that she did receive Pearce milk twice & would like to have some more.  RD will check on this and if we have some will send it with all meals for added protein that is needed.  GIANNA will monitor.      Electronically signed by:  Nancy Gilmore RD  06/12/19 9:45 AM

## 2019-06-12 NOTE — THERAPY PROGRESS REPORT/RE-CERT
Acute Care - Occupational Therapy Progress Note  [facility]     Patient Name: Irma Pacheco  : 1950  MRN: [id number]  Today's Date: 2019  Onset of Illness/Injury or Date of Surgery: 19  Date of Referral to OT: 19  Referring Physician: Dr. Dixon     Admit Date: 2019       ICD-10-CM ICD-9-CM   1. 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 Impaired functional mobility and activity tolerance Z74.09 V49.89   3. 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     Past Medical History:   Diagnosis Date   • Anxiety    • Arthritis    • Asthma    • CHF (congestive heart failure) (CMS/[facility])    • Colostomy care (CMS/[facility])    • Concussion     x2   • Depression    • Disease of thyroid gland    • Hyperlipidemia    • Hypotension    • Kidney disease, chronic, stage III (GFR 30-59 ml/min) (CMS/[facility])    • MS (multiple sclerosis) (CMS/[facility])    • Osteoarthritis    • Tremor, essential    • Wears glasses      Past Surgical History:   Procedure Laterality Date   • ABDOMINAL HYSTERECTOMY  1999    Has Cervix   • APPENDECTOMY     • CHOLECYSTECTOMY     • COLON RESECTION N/A 2017    Procedure: exploratory laparotomy, sigmoid colon resection and colostomy;  Surgeon: Timothy Dixon MD;  Location: [facility];  Service:    • COLON RESECTION N/A 2019    Procedure: laparotomy with lysis of adhesions, repair of small bowel to vagina fistula. small bowel resection ;  Surgeon: Timothy Dixon MD;  Location: [facility] OR;  Service: General   • EXPLORATORY LAPAROTOMY N/A 10/9/2017    Procedure: LAPAROTOMY EXPLORATORY, drainage intra-abdominal abscess, washout;  Surgeon: Arnulfo Marcum MD;  Location: [facility];  Service:    • SPIDER BITE EXCISION         Therapy Treatment    Rehabilitation Treatment Summary     Row Name 19 1436 19 1320          Treatment Time/Intention    Discipline   occupational therapist  -  physical therapy assistant  -TW     Document Type  progress note/recertification  -  therapy note (daily note)  -TW     Subjective Information  complains of;weakness;fatigue  -  complains of;weakness;fatigue  -TW     Mode of Treatment  individual therapy;occupational therapy  -  physical therapy;individual therapy  -TW     Patient/Family Observations  pt alone in room supine HOB up on room air, IV, catheter, ; pt dtr came during re-eval, OT educated pt's dtr about OT POC.   -2  Pt agreeable to bed ex's but cont to adamently refuse EOB/OOB this date.  -TW     Care Plan Review  risks/benefits reviewed;current/potential barriers reviewed;evaluation/treatment results reviewed;care plan/treatment goals reviewed;patient/other agree to care plan  -BH3  --     Total Minutes, Occupational Therapy Treatment  41  -BH3  --     Therapy Frequency (OT Eval)  other (see comments) 5-6 days a week   -BH3  --     Patient Effort  fair  -BH3  adequate  -TW     Comment  OT educated pt on the importance of getting up out of bed to the chair and BSC. Spent an extended amount of time on education, encouragement and problem solving with pt. Pt is concerned about falling and last time she got into the chair she reports nursing staff did not come when she called to call therapy to assist her back to bed and she sat up for 3 hours and about fell in the floor. OT educated that for OT purpose to work towards sitting on EOB and taking a few steps and pivoting to the chair/BSC, pt agreed to work towards this, pt does not want to walk and stated she can not. She stated she can not walk backwards. Pt stated she does not want to be left up in the chair for that long again. OT informed pt that she would discuss her concern with the therapy supervisor.   -BH3  --     Existing Precautions/Restrictions  fall gait belt placement   -[facility]  fall  -TW     Recorded by [] Dee Mendez, OTR/L 06/12/19 6889  [[facility]] Vanessa  Dee MARTINEZ, OTR/L 06/12/19 1539  [BH3] Dee Mendez OTR/L 06/12/19 1536 [TW] Karl Zheng, PTA 06/12/19 1426     Row Name 06/12/19 1436 06/12/19 1320          Vital Signs    Pre Systolic BP Rehab  --  128  -TW     Pre Treatment Diastolic BP  --  60  -TW     Pretreatment Heart Rate (beats/min)  72  -BH  72  -TW     Intratreatment Heart Rate (beats/min)  80  -BH  104  -TW     Posttreatment Heart Rate (beats/min)  76  -BH2  72  -TW     Pre SpO2 (%)  96  -BH  95  -TW     O2 Delivery Pre Treatment  room air  -BH  room air  -TW     Intra SpO2 (%)  96  -BH  --     O2 Delivery Intra Treatment  room air  -BH  --     Post SpO2 (%)  95  -BH2  97  -TW     O2 Delivery Post Treatment  room air  -BH2  --     Pre Patient Position  Supine  -BH  Supine  -TW     Intra Patient Position  Sitting  -BH  Supine  -TW     Post Patient Position  Supine  -BH2  Supine  -TW     Recorded by [BH] Dee Mendez OTR/L 06/12/19 1506  [BH2] Dee Mendez OTR/L 06/12/19 1516 [TW] Karl Zheng, PTA 06/12/19 1432     Row Name 06/12/19 1436 06/12/19 1320          Cognitive Assessment/Intervention- PT/OT    Affect/Mental Status (Cognitive)  WFL  -BH  WFL  -TW     Orientation Status (Cognition)  oriented x 4  -BH  oriented x 4  -TW     Follows Commands (Cognition)  follows one step commands;over 90% accuracy;verbal cues/prompting required;repetition of directions required  -BH2  follows one step commands;over 90% accuracy  -TW     Safety Deficit (Cognitive)  severe deficit;moderate deficit  -BH2  --     Personal Safety Interventions  fall prevention program maintained;supervised activity  -BH2  muscle strengthening facilitated  -TW2     Recorded by [BH] Dee Mendez OTR/L 06/12/19 1506  [BH2] Dee Mendez OTR/L 06/12/19 1536 [TW] Karl Zheng, PTA 06/12/19 1426  [TW2] Karl Zheng, PTA 06/12/19 1432     Row Name 06/12/19 1436 06/12/19 1320          Safety Issues, Functional Mobility    Safety Issues Affecting  Function (Mobility)  ability to follow commands;insight into deficits/self awareness;judgment;problem solving;safety precaution awareness;safety precautions follow-through/compliance;sequencing abilities;awareness of need for assistance;impulsivity  -  awareness of need for assistance  -     Impairments Affecting Function (Mobility)  balance;coordination;endurance/activity tolerance;motor control;motor planning;pain;postural/trunk control;range of motion (ROM);strength  -  endurance/activity tolerance;pain;range of motion (ROM)  -TW2     Recorded by [] Dee Mendez OTR/L 06/12/19 1536 [TW] Karl Zheng, PTA 06/12/19 1432  [TW2] Karl Zheng, PTA 06/12/19 1426     Row Name 06/12/19 1436 06/12/19 1320          Bed Mobility Assessment/Treatment    Bed Mobility Assessment/Treatment  scooting/bridging;supine-sit;sit-supine  -  --     Scooting/Bridging [place] (Bed Mobility)  dependent (less than 25% patient effort);2 person assist  -  --     Supine-Sit [place] (Bed Mobility)  moderate assist (50% patient effort)  -  --     Sit-Supine [place] (Bed Mobility)  maximum assist (25% patient effort);2 person assist  -  --     Bed Mobility, Safety Issues  decreased use of arms for pushing/pulling;decreased use of legs for bridging/pushing;impaired trunk control for bed mobility  -  --     Assistive Device (Bed Mobility)  bed rails;head of bed elevated  -  --     Comment (Bed Mobility)  pt fatigued sitting EOB; pt with min assist scooted up some towards HOB sitting, fatigued unable to go further or scoot back required increased assist to get back into the bed   -  Pt declined OOB/EOB. Pt begins to cry when encouraged to attempt.  -TW     Recorded by [] Dee Mendez OTR/L 06/12/19 1516 [TW] Karl Zheng, PTA 06/12/19 1432     Row Name 06/12/19 1436             Functional Mobility    Functional Mobility- Comment  defer  -      Recorded by [] Dee Mendez,  OTR/L 06/12/19 1516      Row Name 06/12/19 1436             Transfer Assessment/Treatment    Comment (Transfers)  defer  -BH      Recorded by [BH] Dee Mendez OTR/L 06/12/19 1516      Row Name 06/12/19 1436             BADL Safety/Performance    Impairments, BADL Safety/Performance  balance;endurance/activity tolerance;coordination;motor control;motor planning;pain;range of motion;shortness of breath;strength;trunk/postural control  -BH      Recorded by [BH] Dee Mendez OTR/L 06/12/19 1516      Row Name 06/12/19 1436             General ROM    GENERAL ROM COMMENTS  BUE WFL fair digit to thumb shaky movements   -BH      Recorded by [BH] Dee Mendez OTR/L 06/12/19 1506      Row Name 06/12/19 1436             MMT (Manual Muscle Testing)    General MMT Comments  BUE  grossly 4/5; BUE grossly 4/5l   -BH      Recorded by [BH] Dee Mendez OTR/L 06/12/19 1506      Row Name 06/12/19 1436             Motor Skills Assessment/Interventions    Additional Documentation  Balance (Group)  -BH      Recorded by [BH] Dee Mendez OTR/L 06/12/19 1536      Row Name 06/12/19 1320             Lower Extremity Supine Therapeutic Exercise    Performed, Supine Lower Extremity (Therapeutic Exercise)  hip flexion/extension;hip abduction/adduction;hip external/internal rotation;ankle dorsiflexion/plantarflexion;SAQ (short arc quad) over bolster;quadriceps sets;gluteal sets;heel slides  -TW      Exercise Type, Supine Lower Extremity (Therapeutic Exercise)  AROM (active range of motion);AAROM (active assistive range of motion)  -TW      Sets/Reps Detail, Supine Lower Extremity (Therapeutic Exercise)  2/20  -TW      Recorded by [TW] Karl Zheng, TERRY 06/12/19 1432      Row Name 06/12/19 1436             Balance    Balance  dynamic sitting balance;static sitting balance  -BH      Recorded by [BH] Dee Mendez OTR/L 06/12/19 1536      Row Name 06/12/19 1436             Static Sitting Balance    Level of  [place] (Unsupported Sitting, Static Balance)  standby assist  -      Recorded by [BH] Dee Mendez, OTR/L 06/12/19 1536      Row Name 06/12/19 1436             Dynamic Sitting Balance    Level of [place], Reaches Outside Midline (Sitting, Dynamic Balance)  minimal assist, 75% patient effort  -BH      Recorded by [BH] Dee Mendez, OTR/L 06/12/19 1536      Row Name 06/12/19 1436 06/12/19 1320          Positioning and Restraints    Pre-Treatment Position  in bed  -BH  in bed  -TW     Post Treatment Position  bed  -BH2  bed  -TW     In Bed  notified nsg;supine;call light within reach;encouraged to call for assist;exit alarm on;with family/caregiver;side rails up x2  -BH2  supine;call light within reach;encouraged to call for assist;exit alarm on  -TW     Recorded by [BH] Dee Mendez OTR/L 06/12/19 1506  [BH2] Dee Mendez, OTR/L 06/12/19 1516 [TW] Karl Zheng, PTA 06/12/19 1432     Row Name 06/12/19 1436 06/12/19 1320          Pain Scale: Numbers Pre/Post-Treatment    Pain Scale: Numbers, Pretreatment  10/10  -  7/10  -TW     Pain Scale: Numbers, Post-Treatment  10/10  -BH2  8/10  -TW     Pain Location - Side  Bilateral  -BH  Bilateral  -TW2     Pain Location - Orientation  lower  -BH  lower  -TW2     Pain Location  back neck/left knee   -BH  back  -TW2     Pre/Post Treatment Pain Comment  pt stated pain meds not due yet   -BH2  --     Pain Intervention(s)  Repositioned;Ambulation/increased activity;Rest  -BH3  --     Recorded by [BH] Dee Mendez OTR/L 06/12/19 1506  [BH2] Dee Mendez, OTR/L 06/12/19 1516  [BH3] Dee Mendez, OTR/L 06/12/19 1536 [TW] Karl Zheng, PTA 06/12/19 1432  [TW2] Karl Zheng, PTA 06/12/19 1426     Row Name 06/12/19 1436             Light Touch Sensation Assessment    Left Upper Extremity: Light Touch Sensation Assessment  intact  -BH      Right Upper Extremity: Light Touch Sensation Assessment  intact  -BH      Recorded by [BH]  Dee Mendez OTR/L 06/12/19 1506      Row Name                Wound 05/29/19 1327 abdomen incision;surgical    Wound - Properties Group Date first assessed: 05/29/19 [KM] Time first assessed: 1327 [KM] Present On Admission : no [KM] Location: abdomen [KM] Type: incision;surgical [KM] Recorded by:  [KM] Geno Blackmon RN 05/29/19 1327    Row Name                Wound 06/05/19 1818 Bilateral other (see notes) other (see comments)    Wound - Properties Group Date first assessed: 06/05/19 [KMA] Time first assessed: 1818 [KMA] Present On Admission : yes [KMA], Admission to   Side: Bilateral [KMA] Location: other (see notes) [KMA2], Bilateral groin & skin fold excoriation  Type: other (see comments) [KMA] Recorded by:  [KMA] Geno Onofre RN 06/05/19 1819 [KMA2] Geno Onofre RN 06/05/19 1821    Row Name 06/12/19 1436             Plan of Care Review    Plan of Care Reviewed With  patient  -BH      Recorded by [BH] Dee Mendez OTR/L 06/12/19 1506      Row Name 06/12/19 1436             Outcome Summary/Treatment Plan (OT)    Daily Summary of Progress (OT)  progress toward functional goals is good  -BH      Barriers to Overall Progress (OT)  strength, endurance, safety, fear of falling  -BH2      Plan for Continued Treatment (OT)  cont OT POC   -BH      Anticipated Discharge Disposition (OT)  anticipate therapy at next level of care;long term acute care facility;skilled nursing facility depends on progress   -BH2      Recorded by [BH] Dee Mendez OTR/L 06/12/19 1506  [BH2] Dee Mendez OTR/L 06/12/19 1536      Row Name 06/12/19 1320             Outcome Summary/Treatment Plan (PT)    Daily Summary of Progress (PT)  progress towards functional goals is fair  -TW      Barriers to Overall Progress (PT)  encourage OOB/EOB  -TW      Plan for Continued Treatment (PT)  Cont to attempt to mobilize as pt able.  -TW      Anticipated Discharge Disposition (PT)  anticipate therapy at next level of care   -TW2      Recorded by [TW] Karl Zheng, PTA 06/12/19 1432  [TW2] Karl Zheng, PTA 06/12/19 1426        User Key  (r) = Recorded By, (t) = Taken By, (c) = Cosigned By    Initials Name Effective Dates Discipline     Dee Mendez, OTR/L 06/08/18 -  OT    Geno Isaac, RN 10/17/16 -  Nurse    TW Karl Zheng, PTA 03/07/18 -  PT    Geno Limon RN 05/21/18 -  Nurse        Wound 05/29/19 1327 abdomen incision;surgical (Active)   Dressing Appearance dry;intact 6/12/2019  8:34 AM   Closure [place] 6/12/2019  8:34 AM   Base other (see comments) 6/12/2019  8:34 AM   Periwound moist;pink 6/12/2019  8:34 AM   Periwound Temperature warm 6/12/2019  8:34 AM   Drainage Characteristics/Odor creamy;purulent 6/12/2019  5:49 AM   Drainage Amount small 6/12/2019  5:49 AM   Care, Wound cleansed with 6/12/2019  5:49 AM   Dressing Care, Wound dressing changed;dressing applied;packed with;gauze, dry 6/12/2019  5:49 AM       Wound 06/05/19 1818 Bilateral other (see notes) other (see comments) (Active)   Dressing Appearance open to air 6/12/2019  8:34 AM   Closure Open to air 6/12/2019  8:34 AM   Base pink 6/12/2019  8:34 AM   Periwound pink;moist 6/12/2019  8:34 AM   Drainage Amount none 6/12/2019  8:34 AM   Care, Wound other (see comments) 6/12/2019  8:34 AM     Rehab Goal Summary     Row Name 06/12/19 1436 06/12/19 1320          Physical Therapy Goals    Bed Mobility Goal Selection (PT)  --  bed mobility, PT goal 1  -TW     Transfer Goal Selection (PT)  --  transfer, PT goal 1  -TW     Gait Training Goal Selection (PT)  --  gait training, PT goal 1  -TW     Strength Goal Selection (PT)  --  strength, PT goal 1  -TW        Bed Mobility Goal 1 (PT)    Activity/Assistive Device (Bed Mobility Goal 1, PT)  --  sit to supine/supine to sit  -TW     [place] Level/Cues Needed (Bed Mobility Goal 1, PT)  --  minimum assist (75% or more patient effort)  -TW     Time Frame (Bed Mobility Goal 1, PT)  --   10 days  -TW     Barriers (Bed Mobility Goal 1, PT)  --  abdominal pain, previous functional deficit  -TW     Progress/Outcomes (Bed Mobility Goal 1, PT)  --  goal not met  -TW        Transfer Goal 1 (PT)    Activity/Assistive Device (Transfer Goal 1, PT)  --  sit-to-stand/stand-to-sit;bed-to-chair/chair-to-bed;walker, rolling  -TW     [place] Level/Cues Needed (Transfer Goal 1, PT)  --  contact guard assist;verbal cues required  -TW     Time Frame (Transfer Goal 1, PT)  --  1 week  -TW     Barriers (Transfers Goal 1, PT)  --  abdominal pain, previous functional deficit  -TW     Progress/Outcome (Transfer Goal 1, PT)  --  goal not met  -TW        Gait Training Goal 1 (PT)    Activity/Assistive Device (Gait Training Goal 1, PT)  --  walker, rolling;decrease fall risk;increase endurance/gait distance  -TW     [place] Level (Gait Training Goal 1, PT)  --  contact guard assist;verbal cues required  -TW     Distance (Gait Goal 1, PT)  --  10 feet  -TW     Time Frame (Gait Training Goal 1, PT)  --  10 days  -TW     Barriers (Gait Training Goal 1, PT)  --  abdominal pain, previous functional deficit  -TW     Progress/Outcome (Gait Training Goal 1, PT)  --  goal not met  -TW        Strength Goal 1 (PT)    Strength Goal 1 (PT)  --  Pt will be able to complete 20 reps of at least 4 LE exercises in order to demo improved strength for transfers  -TW     Time Frame (Strength Goal 1, PT)  --  1 week  -TW     Barriers (Strength Goal 1, PT)  --  abdominal pain, previous functional deficit  -TW     Progress/Outcome (Strength Goal 1, PT)  --  goal not met  -TW        Occupational Therapy Goals    Balance Goal Selection (OT)  balance, OT goal 1  -BH  --     Additional Documentation  Balance Goal Selection (OT) (Row)  -BH  --        Transfer Goal 1 (OT)    Activity/Assistive Device (Transfer Goal 1, OT)  commode, bedside with drop arms;commode, bedside without drop arms  -BH  --     [place] Level/Cues Needed (Transfer  Goal 1, OT)  minimum assist (75% or more patient effort)  -BH  --     Time Frame (Transfer Goal 1, OT)  long term goal (LTG);by discharge  -  --     Progress/Outcome (Transfer Goal 1, OT)  goal not met  -  --        Bathing Goal 1 (OT)    Activity/Assistive Device (Bathing Goal 1, OT)  upper body bathing  -  --     [place] Level/Cues Needed (Bathing Goal 1, OT)  minimum assist (75% or more patient effort)  -BH  --     Time Frame (Bathing Goal 1, OT)  long term goal (LTG);by discharge  -  --     Progress/Outcomes (Bathing Goal 1, OT)  goal not met  -  --        Dressing Goal 1 (OT)    Activity/Assistive Device (Dressing Goal 1, OT)  upper body dressing  -  --     [place]/Cues Needed (Dressing Goal 1, OT)  minimum assist (75% or more patient effort)  -BH  --     Time Frame (Dressing Goal 1, OT)  long term goal (LTG);by discharge  -  --     Progress/Outcome (Dressing Goal 1, OT)  goal not met  -  --        Grooming Goal 1 (OT)    Activity/Device (Grooming Goal 1, OT)  grooming skills, all  -BH  --     [place] (Grooming Goal 1, OT)  set-up required;supervision required;verbal cues required  -  --     Time Frame (Grooming Goal 1, OT)  long term goal (LTG);by discharge  -  --     Progress/Outcome (Grooming Goal 1, OT)  goal not met  -  --        Self-Feeding Goal 1 (OT)    Activity/Assistive Device (Self-Feeding Goal 1, OT)  self-feeding skills, all  -  --     [place] Level/Cues Needed (Self-Feeding Goal 1, OT)  set-up required;supervision required;verbal cues required  -  --     Time Frame (Self-Feeding Goal 1, OT)  long term goal (LTG);by discharge  -  --     Barriers (Self-Feeding Goal 1, OT)  address when medically appropriate.   -  --     Progress/Outcomes (Self-Feeding Goal 1, OT)  goal not met  -  --        Balance Goal 1 (OT)    Activity/Assistive Device (Balance Goal 1, OT)  sitting, dynamic 15 min no LOB good safety   -  --     [place] Level/Cues Needed  (Balance Goal 1, OT)  standby assist  -  --     Time Frame (Balance Goal 1, OT)  long term goal (LTG);by discharge  -  --     Progress/Outcomes (Balance Goal 1, OT)  goal not met  -  --       User Key  (r) = Recorded By, (t) = Taken By, (c) = Cosigned By    Initials Name Provider Type Discipline     Dee Mendez, OTR/L Occupational Therapist OT    Karl Schmitt, PTA Physical Therapy Assistant PT        Occupational Therapy Education     Title: PT OT SLP Therapies (In Progress)     Topic: Occupational Therapy (In Progress)     Point: ADL training (Done)     Description: Instruct learner(s) on proper safety adaptation and remediation techniques during self care or transfers.   Instruct in proper use of assistive devices.    Learning Progress Summary           Patient Acceptance, E, VU,NR by  at 6/12/2019  3:38 PM    Comment:  Educated pt heavily about OT and POC, importance of working towards getting OOB. Educated to call for assist. Educated on safety throughout.    Acceptance, E,TB,D, NR by  at 6/11/2019  3:48 PM    Acceptance, E,TB,D, NR by  at 6/10/2019  1:16 PM    Acceptance, E,TB, VU by  at 6/8/2019  2:21 PM    Acceptance, E,TB,D, NR by  at 6/7/2019  1:32 PM    Acceptance, E,TB,D, NR by  at 6/6/2019  3:56 PM    Acceptance, E, NR by  at 6/5/2019 11:27 AM    Acceptance, E,TB,D, NR by  at 5/31/2019  1:12 PM    Acceptance, E, VU,NR by  at 5/30/2019  1:28 PM    Comment:  Educated about OT and POC. Educated to call for assist. Educated on safety throughout.                   Point: Home exercise program (In Progress)     Description: Instruct learner(s) on appropriate technique for monitoring, assisting and/or progressing therapeutic exercises/activities.    Learning Progress Summary           Patient Acceptance, E,TB,D, NR by CS at 6/11/2019  3:48 PM    Acceptance, E,TB,D, NR by  at 6/10/2019  1:16 PM    Acceptance, E,TB, VU by  at 6/8/2019  2:21 PM    Acceptance, E,TB,D, NR  by  at 6/7/2019  1:32 PM    Acceptance, E,TB,D, NR by  at 6/6/2019  3:56 PM    Acceptance, E,TB,D, NR by  at 5/31/2019  1:12 PM                   Point: Precautions (Done)     Description: Instruct learner(s) on prescribed precautions during self-care and functional transfers.    Learning Progress Summary           Patient Acceptance, E, VU,NR by  at 6/12/2019  3:38 PM    Comment:  Educated pt heavily about OT and POC, importance of working towards getting OOB. Educated to call for assist. Educated on safety throughout.    Acceptance, E,TB,D, NR by  at 6/11/2019  3:48 PM    Acceptance, E,TB,D, NR by  at 6/10/2019  1:16 PM    Acceptance, E,TB, VU by  at 6/8/2019  2:21 PM    Acceptance, E,TB,D, NR by  at 6/7/2019  1:32 PM    Acceptance, E,TB,D, NR by  at 6/6/2019  3:56 PM    Acceptance, E,TB,D, NR by  at 5/31/2019  1:12 PM    Acceptance, E, VU,NR by  at 5/30/2019  1:28 PM    Comment:  Educated about OT and POC. Educated to call for assist. Educated on safety throughout.                   Point: Body mechanics (In Progress)     Description: Instruct learner(s) on proper positioning and spine alignment during self-care, functional mobility activities and/or exercises.    Learning Progress Summary           Patient Acceptance, E,TB,D, NR by  at 6/11/2019  3:48 PM    Acceptance, E,TB,D, NR by  at 6/10/2019  1:16 PM    Acceptance, E,TB, VU by  at 6/8/2019  2:21 PM    Acceptance, E,TB,D, NR by  at 6/7/2019  1:32 PM    Acceptance, E,TB,D, NR by  at 6/6/2019  3:56 PM    Acceptance, E,TB,D, NR by  at 5/31/2019  1:12 PM                               User Key     Initials Effective Dates Name Provider Type Discipline     06/08/18 -  Dee Mendez OTR/L Occupational Therapist OT     03/07/18 -  Anila Rodriguez COTA/L Occupational Therapy Assistant OT    CS 03/07/18 -  Cathy Dillon COTA/L Occupational Therapy Assistant OT    LW 03/07/18 -  Leyla Angela COTA/L Occupational  Therapy Assistant OT                OT Recommendation and Plan  Outcome Summary/Treatment Plan (OT)  Daily Summary of Progress (OT): progress toward functional goals is good  Barriers to Overall Progress (OT): strength, endurance, safety, fear of falling  Plan for Continued Treatment (OT): cont OT POC   Anticipated Discharge Disposition (OT): anticipate therapy at next level of care, long term acute care facility, skilled nursing facility(depends on progress )  Planned Therapy Interventions (OT Eval): activity tolerance training, adaptive equipment training, BADL retraining, functional balance retraining, neuromuscular control/coordination retraining, occupation/activity based interventions, passive ROM/stretching, patient/caregiver education/training, ROM/therapeutic exercise, strengthening exercise, transfer/mobility retraining, wheelchair assessment/training  Therapy Frequency (OT Eval): other (see comments)(5-6 days a week )  Daily Summary of Progress (OT): progress toward functional goals is good  Plan of Care Review  Plan of Care Reviewed With: patient  Plan of Care Reviewed With: patient  Outcome Summary: OT re-assessment this date. Pt pleasant and engaging. OT spent an extended amount of time educating pt on the importance of getting to the EOB and working towards standing/pivot/few steps to a chair or BSC. Pt did communicate that HH told her not to walk but to stand and pivot. OT educated that to start working towards getting to EOB and to stand and pivot and pt agreed. Pt sat on the EOB today with mod to max assist of 1 to get to the EOB, min assist to sit and slide up towards HOB but max to dependent to get back into supine and scooted up towards the HOB due to fatigue. Pt verbalized that with therapy staff tomorrow she would try to get into the blue ortho chair but only wanted to sit up upt to 45 minutes. Pt could benefit from further skilled OT to reach maximum independence with ADL/PLOF. Pt would  benefit from further rehab at d/c and 24/7 [facility].   Outcome Measures     Row Name 06/12/19 1436 06/12/19 1320 06/11/19 1550       How much help from another person do you currently need...    Turning from your back to your side while in flat bed without using bedrails?  --  3  -TW  3  -TW    Moving from lying on back to sitting on the side of a flat bed without bedrails?  --  3  -TW  3  -TW    Moving to and from a bed to a chair (including a wheelchair)?  --  2  -TW  2  -TW    Standing up from a chair using your arms (e.g., wheelchair, bedside chair)?  --  2  -TW  2  -TW    Climbing 3-5 steps with a railing?  --  1  -TW  1  -TW    To walk in hospital room?  --  2  -TW  2  -TW    AM-[facility] 6 Clicks Score  --  13  -TW  13  -TW       How much help from another is currently needed...    Putting on and taking off regular lower body clothing?  1  -BH  --  --    Bathing (including washing, rinsing, and drying)  1  -BH  --  --    Toileting (which includes using toilet bed pan or urinal)  1  -BH  --  --    Putting on and taking off regular upper body clothing  2  -BH  --  --    Taking care of personal grooming (such as brushing teeth)  2  -BH  --  --    Eating meals  1  -BH  --  --    Score  8  -BH  --  --       Functional Assessment    Outcome Measure Options  AM-PAC 6 Clicks Daily Activity (OT)  -  AM-PAC 6 Clicks Basic Mobility (PT)  -TW  AM-[facility] 6 Clicks Basic Mobility (PT)  -TW    Row Name 06/11/19 1500 06/10/19 1436          How much help from another person do you currently need...    Turning from your back to your side while in flat bed without using bedrails?  --  3  -KW     Moving from lying on back to sitting on the side of a flat bed without bedrails?  --  3  -KW     Moving to and from a bed to a chair (including a wheelchair)?  --  2  -KW     Standing up from a chair using your arms (e.g., wheelchair, bedside chair)?  --  2  -KW     Climbing 3-5 steps with a railing?  --  1  -KW     To walk in hospital room?  --  2   -KW     AM-[facility] 6 Clicks Score  --  13  -KW        How much help from another is currently needed...    Putting on and taking off regular lower body clothing?  1  -CS  --     Bathing (including washing, rinsing, and drying)  1  -CS  --     Toileting (which includes using toilet bed pan or urinal)  1  -CS  --     Putting on and taking off regular upper body clothing  2  -CS  --     Taking care of personal grooming (such as brushing teeth)  2  -CS  --     Eating meals  1  -CS  --     Score  8  -CS  --        Functional Assessment    Outcome Measure Options  --  AM-PAC 6 Clicks Basic Mobility (PT)  -       User Key  (r) = Recorded By, (t) = Taken By, (c) = Cosigned By    Initials Name Provider Type     Dee Mendez, OTR/L Occupational Therapist    TW Karl Zheng, PTA Physical Therapy Assistant    CS Cathy Dillon, PELAYO/L Occupational Therapy Assistant    KW Martine Tam, PT Physical Therapist           Time Calculation:   Time Calculation- OT     Row Name 06/12/19 1544             Time Calculation-     OT Start Time  1436  -      OT Stop Time  1517  -      OT Time Calculation (min)  41 min  -      Total Timed Code Minutes- OT  41 minute(s)  -      OT Received On  06/12/19  -      OT Goal Re-Cert Due Date  06/25/19  -        User Key  (r) = Recorded By, (t) = Taken By, (c) = Cosigned By    Initials Name Provider Type     Dee Mendez OTR/L Occupational Therapist        Therapy Charges for Today     Code Description Service Date Service Provider Modifiers Qty    87904468206  OT THERAPEUTIC ACT EA 15 MIN 6/12/2019 Dee Mendez OTR/L GO 3               GALDINO Eldridge/DERICK  6/12/2019

## 2019-06-12 NOTE — THERAPY TREATMENT NOTE
Acute Care - Physical Therapy Treatment Note  [facility]     Patient Name: Irma Pacheco  : 1950  MRN: [id number]  Today's Date: 2019  Onset of Illness/Injury or Date of Surgery: 19  Date of Referral to PT: 19  Referring Physician: Dr. Dixon     Admit Date: 2019    Visit Dx:    ICD-10-CM ICD-9-CM   1. 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 Impaired functional mobility and activity tolerance Z74.09 V49.89   3. 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  -TW      Patient Effort  adequate  -TW      Existing Precautions/Restrictions  fall  -TW      Recorded by [TW] Karl Zheng, TERRY 19 1426      Row Name 19 1320             Vital Signs    Pre Systolic BP Rehab  128  -TW      Pre Treatment Diastolic BP  60  -TW      Pretreatment Heart Rate (beats/min)  72  -TW      Intratreatment Heart Rate (beats/min)  104  -TW      Posttreatment Heart Rate (beats/min)  72  -TW      Pre SpO2 (%)  95  -TW      O2 Delivery Pre Treatment  room air  -TW      Post SpO2 (%)  97  -TW      Pre Patient Position  Supine  -TW      Intra Patient Position  Supine  -TW      Post Patient Position  Supine  -TW      Recorded by [TW] Karl Zheng PTA 19 1432      Row Name 19 1320             Cognitive Assessment/Intervention- PT/OT    Affect/Mental Status (Cognitive)  WFL  -TW      Orientation Status (Cognition)  oriented x 4   -TW      Follows Commands (Cognition)  follows one step commands;over 90% accuracy  -TW      Personal Safety Interventions  muscle strengthening facilitated  -TW2      Recorded by [TW] Karl Zheng, [facility] 06/12/19 1426  [TW2] Karl Zheng, [facility] 06/12/19 1432      Row Name 06/12/19 1320             Safety Issues, Functional Mobility    Safety Issues Affecting Function (Mobility)  awareness of need for assistance  -TW      Impairments Affecting Function (Mobility)  endurance/activity tolerance;pain;range of motion (ROM)  -TW2      Recorded by [TW] Karl Zheng, [facility] 06/12/19 1432  [TW2] Karl Zheng, [facility] 06/12/19 1426      Row Name 06/12/19 1320             Bed Mobility Assessment/Treatment    Comment (Bed Mobility)  Pt declined OOB/EOB. Pt begins to cry when encouraged to attempt.  -TW      Recorded by [TW] Karl Zheng PTA 06/12/19 1432      Row Name 06/12/19 1320             Lower Extremity Supine Therapeutic Exercise    Performed, Supine Lower Extremity (Therapeutic Exercise)  hip flexion/extension;hip abduction/adduction;hip external/internal rotation;ankle dorsiflexion/plantarflexion;SAQ (short arc quad) over bolster;quadriceps sets;gluteal sets;heel slides  -TW      Exercise Type, Supine Lower Extremity (Therapeutic Exercise)  AROM (active range of motion);AAROM (active assistive range of motion)  -TW      Sets/Reps Detail, Supine Lower Extremity (Therapeutic Exercise)  2/20  -TW      Recorded by [TW] Karl Zheng, [facility] 06/12/19 1432      Row Name 06/12/19 1320             Positioning and Restraints    Pre-Treatment Position  in bed  -TW      Post Treatment Position  bed  -TW      In Bed  supine;call light within reach;encouraged to call for assist;exit alarm on  -TW      Recorded by [TW] Karl Zheng, TERRY 06/12/19 1432      Row Name 06/12/19 1320             Pain Scale: Numbers Pre/Post-Treatment    Pain Scale: Numbers, Pretreatment  7/10  -TW      Pain Scale:  Numbers, Post-Treatment  8/10  -TW      Pain Location - Side  Bilateral  -TW2      Pain Location - Orientation  lower  -TW2      Pain Location  back  -TW2      Recorded by [TW] Karl Zheng, PTA 06/12/19 1432  [TW2] Karl Zheng, PTA 06/12/19 1426      Row Name                Wound 05/29/19 1327 abdomen incision;surgical    Wound - Properties Group Date first assessed: 05/29/19 [KM] Time first assessed: 1327 [KM] Present On Admission : no [KM] Location: abdomen [KM] Type: incision;surgical [KM] Recorded by:  [KM] Geno Blackmon RN 05/29/19 1327    Row Name                Wound 06/05/19 1818 Bilateral other (see notes) other (see comments)    Wound - Properties Group Date first assessed: 06/05/19 [KMA] Time first assessed: 1818 [KMA] Present On Admission : yes [KMA], Admission to   Side: Bilateral [KMA] Location: other (see notes) [KMA2], Bilateral groin & skin fold excoriation  Type: other (see comments) [KMA] Recorded by:  [KMA] Geno Onofre RN 06/05/19 1819 [KMA2] Geno Onofre RN 06/05/19 1821    Row Name 06/12/19 1320             Outcome Summary/Treatment Plan (PT)    Daily Summary of Progress (PT)  progress towards functional goals is fair  -TW      Barriers to Overall Progress (PT)  encourage OOB/EOB  -TW      Plan for Continued Treatment (PT)  Cont to attempt to mobilize as pt able.  -TW      Anticipated Discharge Disposition (PT)  anticipate therapy at next level of care  -TW2      Recorded by [TW] Karl Zheng, TERRY 06/12/19 1432  [TW2] Karl Zheng, PTA 06/12/19 1426        User Key  (r) = Recorded By, (t) = Taken By, (c) = Cosigned By    Initials Name Effective Dates Discipline    KMA Geno Onofre RN 10/17/16 -  Nurse    TW Karl Zheng, PTA 03/07/18 -  PT    KM Geno Blackmon RN 05/21/18 -  Nurse          Wound 05/29/19 1327 abdomen incision;surgical (Active)   Dressing Appearance dry;intact 6/12/2019  8:34 AM   Closure Staples 6/12/2019  8:34 AM    Base other (see comments) 6/12/2019  8:34 AM   Periwound moist;pink 6/12/2019  8:34 AM   Periwound Temperature warm 6/12/2019  8:34 AM   Drainage Characteristics/Odor creamy;purulent 6/12/2019  5:49 AM   Drainage Amount small 6/12/2019  5:49 AM   Care, Wound cleansed with 6/12/2019  5:49 AM   Dressing Care, Wound dressing changed;dressing applied;packed with;gauze, dry 6/12/2019  5:49 AM       Wound 06/05/19 1818 Bilateral other (see notes) other (see comments) (Active)   Dressing Appearance open to air 6/12/2019  8:34 AM   Closure Open to air 6/12/2019  8:34 AM   Base pink 6/12/2019  8:34 AM   Periwound pink;moist 6/12/2019  8:34 AM   Drainage Amount none 6/12/2019  8:34 AM   Care, Wound other (see comments) 6/12/2019  8:34 AM       Rehab Goal Summary     Row Name 06/12/19 1320             Physical Therapy Goals    Bed Mobility Goal Selection (PT)  bed mobility, PT goal 1  -TW      Transfer Goal Selection (PT)  transfer, PT goal 1  -TW      Gait Training Goal Selection (PT)  gait training, PT goal 1  -TW      Strength Goal Selection (PT)  strength, PT goal 1  -TW         Bed Mobility Goal 1 (PT)    Activity/Assistive Device (Bed Mobility Goal 1, PT)  sit to supine/supine to sit  -TW      [place] Level/Cues Needed (Bed Mobility Goal 1, PT)  minimum assist (75% or more patient effort)  -TW      Time Frame (Bed Mobility Goal 1, PT)  10 days  -TW      Barriers (Bed Mobility Goal 1, PT)  abdominal pain, previous functional deficit  -TW      Progress/Outcomes (Bed Mobility Goal 1, PT)  goal not met  -TW         Transfer Goal 1 (PT)    Activity/Assistive Device (Transfer Goal 1, PT)  sit-to-stand/stand-to-sit;bed-to-chair/chair-to-bed;walker, rolling  -TW      [place] Level/Cues Needed (Transfer Goal 1, PT)  contact guard assist;verbal cues required  -TW      Time Frame (Transfer Goal 1, PT)  1 week  -TW      Barriers (Transfers Goal 1, PT)  abdominal pain, previous functional deficit  -TW       Progress/Outcome (Transfer Goal 1, PT)  goal not met  -TW         Gait Training Goal 1 (PT)    Activity/Assistive Device (Gait Training Goal 1, PT)  walker, rolling;decrease fall risk;increase endurance/gait distance  -TW      [place] Level (Gait Training Goal 1, PT)  contact guard assist;verbal cues required  -TW      Distance (Gait Goal 1, PT)  10 feet  -TW      Time Frame (Gait Training Goal 1, PT)  10 days  -TW      Barriers (Gait Training Goal 1, PT)  abdominal pain, previous functional deficit  -TW      Progress/Outcome (Gait Training Goal 1, PT)  goal not met  -TW         Strength Goal 1 (PT)    Strength Goal 1 (PT)  Pt will be able to complete 20 reps of at least 4 LE exercises in order to demo improved strength for transfers  -TW      Time Frame (Strength Goal 1, PT)  1 week  -TW      Barriers (Strength Goal 1, PT)  abdominal pain, previous functional deficit  -TW      Progress/Outcome (Strength Goal 1, PT)  goal not met  -TW        User Key  (r) = Recorded By, (t) = Taken By, (c) = Cosigned By    Initials Name Provider Type Discipline    TW Karl Zheng, PTA Physical Therapy Assistant PT          Physical Therapy Education     Title: PT OT SLP Therapies (In Progress)     Topic: Physical Therapy (In Progress)     Point: Mobility training (In Progress)     Learning Progress Summary           Patient Acceptance, E, NR by SHRAVAN at 6/8/2019 10:53 AM    Acceptance, E, NR by SHRAVAN at 6/7/2019  1:22 PM    Acceptance, E,TB, VU by KATH at 6/6/2019  1:31 PM    Comment:  ex throughout the day    Acceptance, E,TB, VU by KATH at 6/4/2019  1:23 PM    Acceptance, E, VU,NR by JAVI at 5/30/2019 12:58 PM    Comment:  Role of PT, PT POC, transfer technique   Family Acceptance, E,TB, VU by KATH at 6/6/2019  1:31 PM    Comment:  ex throughout the day                   Point: Home exercise program (Done)     Learning Progress Summary           Patient Acceptance, E,TB, VU by KATH at 6/6/2019  1:31 PM    Comment:  ex throughout the  day    Acceptance, E,TB, VU by LN at 6/3/2019  3:53 PM    Acceptance, E,TB, VU by LN at 5/31/2019 12:58 PM   Family Acceptance, E,TB, VU by LN at 6/6/2019  1:31 PM    Comment:  ex throughout the day                   Point: Body mechanics (Done)     Learning Progress Summary           Patient Acceptance, E,TB, VU by LN at 6/6/2019  1:31 PM    Comment:  ex throughout the day    Acceptance, E,TB, VU by LN at 6/4/2019  1:23 PM    Acceptance, E,TB, VU by JJ at 6/4/2019 10:07 AM   Family Acceptance, E,TB, VU by LN at 6/6/2019  1:31 PM    Comment:  ex throughout the day                   Point: Precautions (Done)     Learning Progress Summary           Patient Acceptance, E,TB, VU by LN at 6/6/2019  1:31 PM    Comment:  ex throughout the day    Acceptance, E,TB, VU by LN at 6/4/2019  1:23 PM    Acceptance, E,TB, VU by LN at 6/3/2019  3:53 PM    Acceptance, E,TB, VU by LN at 5/31/2019 12:58 PM    Acceptance, E, VU by  at 5/30/2019  1:00 PM    Comment:  Gait belt, call light, staff assistance with mobility   Family Acceptance, E,TB, VU by LN at 6/6/2019  1:31 PM    Comment:  ex throughout the day                               User Key     Initials Effective Dates Name Provider Type Discipline     10/17/16 -  Alem Garrido RN Registered Nurse Nurse    SHRAVAN 03/07/18 -  Thai Swanson PTA Physical Therapy Assistant PT    LN 03/07/18 -  Jannet Garcia PTA Physical Therapy Assistant PT     07/23/18 -  Donna Stacy, PT Physical Therapist PT                PT Recommendation and Plan  Anticipated Discharge Disposition (PT): anticipate therapy at next level of care  Outcome Summary/Treatment Plan (PT)  Daily Summary of Progress (PT): progress towards functional goals is fair  Barriers to Overall Progress (PT): encourage OOB/EOB  Plan for Continued Treatment (PT): Cont to attempt to mobilize as pt able.  Anticipated Discharge Disposition (PT): anticipate therapy at next level of care  Plan of Care Reviewed With:  patient  Progress: no change  Outcome Summary: Pt cont to decline EOB/OOB but willing to perform B LE supine ex's and does so for 2/20 without c/o. Pt begins to cry when encouraged to t/f EOB/OOB and states she was told by  that she shouldnt get up anymore and amb. Pt will require f/u therapy at [place].  Outcome Measures     Row Name 06/12/19 1320 06/11/19 1550 06/11/19 1500       How much help from another person do you currently need...    Turning from your back to your side while in flat bed without using bedrails?  3  -TW  3  -TW  --    Moving from lying on back to sitting on the side of a flat bed without bedrails?  3  -TW  3  -TW  --    Moving to and from a bed to a chair (including a wheelchair)?  2  -TW  2  -TW  --    Standing up from a chair using your arms (e.g., wheelchair, bedside chair)?  2  -TW  2  -TW  --    Climbing 3-5 steps with a railing?  1  -TW  1  -TW  --    To walk in hospital room?  2  -TW  2  -TW  --    AM-PAC 6 Clicks Score  13  -TW  13  -TW  --       How much help from another is currently needed...    Putting on and taking off regular lower body clothing?  --  --  1  -CS    Bathing (including washing, rinsing, and drying)  --  --  1  -CS    Toileting (which includes using toilet bed pan or urinal)  --  --  1  -CS    Putting on and taking off regular upper body clothing  --  --  2  -CS    Taking care of personal grooming (such as brushing teeth)  --  --  2  -CS    Eating meals  --  --  1  -CS    Score  --  --  8  -CS       Functional Assessment    Outcome Measure Options  AM-PAC 6 Clicks Basic Mobility (PT)  -TW  AM-PAC 6 Clicks Basic Mobility (PT)  -TW  --    Row Name 06/10/19 1436             How much help from another person do you currently need...    Turning from your back to your side while in flat bed without using bedrails?  3  -KW      Moving from lying on back to sitting on the side of a flat bed without bedrails?  3  -KW      Moving to and from a bed to a chair (including a  wheelchair)?  2  -KW      Standing up from a chair using your arms (e.g., wheelchair, bedside chair)?  2  -KW      Climbing 3-5 steps with a railing?  1  -KW      To walk in hospital room?  2  -KW      AM-PAC 6 Clicks Score  13  -KW         Functional Assessment    Outcome Measure Options  AM-PAC 6 Clicks Basic Mobility (PT)  -KW        User Key  (r) = Recorded By, (t) = Taken By, (c) = Cosigned By    Initials Name Provider Type    TW Karl Zheng PTA Physical Therapy Assistant    Cathy Shane, IGTA/DERICK Occupational Therapy Assistant    KW Martine Tam, PT Physical Therapist         Time Calculation:   PT Charges     Row Name 06/12/19 1438             Time Calculation    Start Time  1320  -TW      Stop Time  1359  -TW      Time Calculation (min)  39 min  -TW      PT Received On  06/12/19  -TW      PT Goal Re-Cert Due Date  06/12/19  -TW         Time Calculation- PT    Total Timed Code Minutes- PT  39 minute(s)  -TW        User Key  (r) = Recorded By, (t) = Taken By, (c) = Cosigned By    Initials Name Provider Type     Karl Zheng PTA Physical Therapy Assistant        Therapy Charges for Today     Code Description Service Date Service Provider Modifiers Qty    06420398598 HC PT THERAPEUTIC ACT EA 15 MIN 6/11/2019 Karl Zheng PTA GP 1    57970291052 HC PT THER PROC EA 15 MIN 6/11/2019 Karl Zheng PTA GP 2    39429836030 HC PT THER PROC EA 15 MIN 6/12/2019 Karl Zheng PTA GP 2    68290969642 HC PT THERAPEUTIC ACT EA 15 MIN 6/12/2019 Karl Zheng PTA GP 1          PT G-Codes  Outcome Measure Options: AM-PAC 6 Clicks Basic Mobility (PT)  AM-PAC 6 Clicks Score: 13  Score: 8    Karl Zheng PTA  6/12/2019

## 2019-06-12 NOTE — PLAN OF CARE
Problem: Patient Care Overview  Goal: Plan of Care Review  Outcome: Ongoing (interventions implemented as appropriate)   06/12/19 2112   Coping/Psychosocial   Plan of Care Reviewed With patient   Plan of Care Review   Progress improving   OTHER   Outcome Summary OT re-assessment this date. Pt pleasant and engaging. OT spent an extended amount of time educating pt on the importance of getting to the EOB and working towards standing/pivot/few steps to a chair or BSC. Pt did communicate that HH told her not to walk but to stand and pivot. OT educated that to start working towards getting to EOB and to stand and pivot and pt agreed. Pt sat on the EOB today with mod to max assist of 1 to get to the EOB, min assist to sit and slide up towards HOB but max to dependent to get back into supine and scooted up towards the HOB due to fatigue. Pt verbalized that with therapy staff tomorrow she would try to get into the blue ortho chair but only wanted to sit up upt to 45 minutes. Pt could benefit from further skilled OT to reach maximum independence with ADL/PLOF. Pt would benefit from further rehab at d/c and 24/7 care.

## 2019-06-12 NOTE — PLAN OF CARE
Problem: Patient Care Overview  Goal: Plan of Care Review  Outcome: Ongoing (interventions implemented as appropriate)   06/12/19 0153   Coping/Psychosocial   Plan of Care Reviewed With patient   Plan of Care Review   Progress no change   OTHER   Outcome Summary Patient VSS, PO pain meds, continue to monitor     Goal: Individualization and Mutuality  Outcome: Ongoing (interventions implemented as appropriate)    Goal: Discharge Needs Assessment  Outcome: Ongoing (interventions implemented as appropriate)      Problem: Fall Risk (Adult)  Goal: Absence of Fall  Outcome: Ongoing (interventions implemented as appropriate)      Problem: Skin Injury Risk (Adult)  Goal: Skin Health and Integrity  Outcome: Ongoing (interventions implemented as appropriate)      Problem: Surgery Nonspecified (Adult)  Goal: Signs and Symptoms of Listed Potential Problems Will be Absent, Minimized or Managed (Surgery Nonspecified)  Outcome: Ongoing (interventions implemented as appropriate)      Problem: Wound (Includes Pressure Injury) (Adult)  Goal: Signs and Symptoms of Listed Potential Problems Will be Absent, Minimized or Managed (Wound)  Outcome: Ongoing (interventions implemented as appropriate)

## 2019-06-13 ENCOUNTER — APPOINTMENT (OUTPATIENT)
Dept: ULTRASOUND IMAGING | Facility: HOSPITAL | Age: 69
End: 2019-06-13

## 2019-06-13 ENCOUNTER — APPOINTMENT (OUTPATIENT)
Dept: INTERVENTIONAL RADIOLOGY/VASCULAR | Facility: HOSPITAL | Age: 69
End: 2019-06-13

## 2019-06-13 LAB
ANION GAP SERPL CALCULATED.3IONS-SCNC: 9 MMOL/L
BACTERIA SPEC AEROBE CULT: ABNORMAL
BACTERIA SPEC AEROBE CULT: ABNORMAL
BUN BLD-MCNC: 5 MG/DL (ref 8–23)
BUN/CREAT SERPL: 6.5 (ref 7–25)
CALCIUM SPEC-SCNC: 8.3 MG/DL (ref 8.6–10.5)
CHLORIDE SERPL-SCNC: 103 MMOL/L (ref 98–107)
CO2 SERPL-SCNC: 32 MMOL/L (ref 22–29)
CREAT BLD-MCNC: 0.77 MG/DL (ref 0.57–1)
DEPRECATED RDW RBC AUTO: 48.3 FL (ref 37–54)
ERYTHROCYTE [DISTWIDTH] IN BLOOD BY AUTOMATED COUNT: 13.2 % (ref 12.3–15.4)
GFR SERPL CREATININE-BSD FRML MDRD: 75 ML/MIN/1.73
GLUCOSE BLD-MCNC: 84 MG/DL (ref 65–99)
HCT VFR BLD AUTO: 32.7 % (ref 34–46.6)
HGB BLD-MCNC: 10.4 G/DL (ref 12–15.9)
MCH RBC QN AUTO: 31.9 PG (ref 26.6–33)
MCHC RBC AUTO-ENTMCNC: 31.8 G/DL (ref 31.5–35.7)
MCV RBC AUTO: 100.3 FL (ref 79–97)
PLATELET # BLD AUTO: 316 10*3/MM3 (ref 140–450)
PMV BLD AUTO: 10.2 FL (ref 6–12)
POTASSIUM BLD-SCNC: 3.8 MMOL/L (ref 3.5–5.2)
RBC # BLD AUTO: 3.26 10*6/MM3 (ref 3.77–5.28)
SODIUM BLD-SCNC: 144 MMOL/L (ref 136–145)
VANCOMYCIN TROUGH SERPL-MCNC: 29.8 MCG/ML (ref 5–20)
WBC NRBC COR # BLD: 6.04 10*3/MM3 (ref 3.4–10.8)

## 2019-06-13 PROCEDURE — 80048 BASIC METABOLIC PNL TOTAL CA: CPT | Performed by: SURGERY

## 2019-06-13 PROCEDURE — 76937 US GUIDE VASCULAR ACCESS: CPT

## 2019-06-13 PROCEDURE — 80202 ASSAY OF VANCOMYCIN: CPT | Performed by: SURGERY

## 2019-06-13 PROCEDURE — 25010000002 FLUCONAZOLE PER 200 MG: Performed by: SURGERY

## 2019-06-13 PROCEDURE — 36410 VNPNXR 3YR/> PHY/QHP DX/THER: CPT

## 2019-06-13 PROCEDURE — 05HY33Z INSERTION OF INFUSION DEVICE INTO UPPER VEIN, PERCUTANEOUS APPROACH: ICD-10-PCS | Performed by: SURGERY

## 2019-06-13 PROCEDURE — 97110 THERAPEUTIC EXERCISES: CPT

## 2019-06-13 PROCEDURE — 25010000002 VANCOMYCIN 5 G RECONSTITUTED SOLUTION: Performed by: SURGERY

## 2019-06-13 PROCEDURE — 97530 THERAPEUTIC ACTIVITIES: CPT

## 2019-06-13 PROCEDURE — C1751 CATH, INF, PER/CENT/MIDLINE: HCPCS

## 2019-06-13 PROCEDURE — 25010000002 HEPARIN (PORCINE) PER 1000 UNITS: Performed by: SURGERY

## 2019-06-13 PROCEDURE — 85027 COMPLETE CBC AUTOMATED: CPT | Performed by: SURGERY

## 2019-06-13 PROCEDURE — 99024 POSTOP FOLLOW-UP VISIT: CPT | Performed by: SURGERY

## 2019-06-13 RX ORDER — SODIUM CHLORIDE 0.9 % (FLUSH) 0.9 %
10 SYRINGE (ML) INJECTION EVERY 12 HOURS SCHEDULED
Status: DISCONTINUED | OUTPATIENT
Start: 2019-06-13 | End: 2019-06-14 | Stop reason: HOSPADM

## 2019-06-13 RX ORDER — SODIUM CHLORIDE 0.9 % (FLUSH) 0.9 %
10 SYRINGE (ML) INJECTION AS NEEDED
Status: DISCONTINUED | OUTPATIENT
Start: 2019-06-13 | End: 2019-06-14 | Stop reason: HOSPADM

## 2019-06-13 RX ADMIN — SODIUM CHLORIDE, PRESERVATIVE FREE 10 ML: 5 INJECTION INTRAVENOUS at 21:54

## 2019-06-13 RX ADMIN — DONEPEZIL HYDROCHLORIDE 5 MG: 5 TABLET, FILM COATED ORAL at 21:51

## 2019-06-13 RX ADMIN — BUMETANIDE 0.5 MG: 0.25 INJECTION INTRAMUSCULAR; INTRAVENOUS at 02:23

## 2019-06-13 RX ADMIN — HYDROCODONE BITARTRATE AND ACETAMINOPHEN 1 TABLET: 7.5; 325 TABLET ORAL at 09:26

## 2019-06-13 RX ADMIN — FAMOTIDINE 20 MG: 20 TABLET ORAL at 09:26

## 2019-06-13 RX ADMIN — FAMOTIDINE 20 MG: 20 TABLET ORAL at 17:54

## 2019-06-13 RX ADMIN — HYDROCODONE BITARTRATE AND ACETAMINOPHEN 1 TABLET: 7.5; 325 TABLET ORAL at 17:54

## 2019-06-13 RX ADMIN — MEMANTINE 2.5 MG: 5 TABLET ORAL at 09:26

## 2019-06-13 RX ADMIN — MIRTAZAPINE 45 MG: 15 TABLET, FILM COATED ORAL at 21:51

## 2019-06-13 RX ADMIN — NYSTATIN: 100000 POWDER TOPICAL at 21:54

## 2019-06-13 RX ADMIN — FLUCONAZOLE 200 MG: 200 INJECTION, SOLUTION INTRAVENOUS at 09:27

## 2019-06-13 RX ADMIN — VILAZODONE HYDROCHLORIDE 20 MG: 20 TABLET ORAL at 21:55

## 2019-06-13 RX ADMIN — HYDROCODONE BITARTRATE AND ACETAMINOPHEN 1 TABLET: 7.5; 325 TABLET ORAL at 04:20

## 2019-06-13 RX ADMIN — NYSTATIN: 100000 POWDER TOPICAL at 09:28

## 2019-06-13 RX ADMIN — GABAPENTIN 600 MG: 300 CAPSULE ORAL at 05:28

## 2019-06-13 RX ADMIN — HEPARIN SODIUM 5000 UNITS: 5000 INJECTION INTRAVENOUS; SUBCUTANEOUS at 05:28

## 2019-06-13 RX ADMIN — VANCOMYCIN HYDROCHLORIDE 1500 MG: 5 INJECTION, POWDER, LYOPHILIZED, FOR SOLUTION INTRAVENOUS at 02:23

## 2019-06-13 RX ADMIN — LEVOTHYROXINE SODIUM 137 MCG: 25 TABLET ORAL at 09:26

## 2019-06-13 RX ADMIN — SODIUM CHLORIDE, PRESERVATIVE FREE 10 ML: 5 INJECTION INTRAVENOUS at 21:55

## 2019-06-13 RX ADMIN — PRIMIDONE 50 MG: 50 TABLET ORAL at 21:51

## 2019-06-13 RX ADMIN — MEMANTINE 2.5 MG: 5 TABLET ORAL at 21:52

## 2019-06-13 RX ADMIN — SODIUM CHLORIDE, PRESERVATIVE FREE 10 ML: 5 INJECTION INTRAVENOUS at 09:28

## 2019-06-13 RX ADMIN — HEPARIN SODIUM 5000 UNITS: 5000 INJECTION INTRAVENOUS; SUBCUTANEOUS at 21:52

## 2019-06-13 RX ADMIN — PRIMIDONE 50 MG: 50 TABLET ORAL at 14:51

## 2019-06-13 RX ADMIN — HEPARIN SODIUM 5000 UNITS: 5000 INJECTION INTRAVENOUS; SUBCUTANEOUS at 14:51

## 2019-06-13 RX ADMIN — PRIMIDONE 50 MG: 50 TABLET ORAL at 05:28

## 2019-06-13 RX ADMIN — GABAPENTIN 600 MG: 300 CAPSULE ORAL at 14:51

## 2019-06-13 RX ADMIN — GABAPENTIN 600 MG: 300 CAPSULE ORAL at 21:51

## 2019-06-13 NOTE — PROGRESS NOTES
TWO PATIENT IDENTIFIERS WERE USED. THE PATIENT WAS DRAPED WITH A FULL BODY DRAPE AND THE PATIENT'S RIGHT ARM WAS PREPPED WITH CHLORA PREP. ULTRASOUND WAS USED TO LOCALIZE THERIGHT BASILIC VEIN. SUBCUTANEOUS TISSUE AT THE CATHETER SITE WAS INFILTRATED WITH 2% LIDOCAINE. UNDER ULTRASOUND GUIDANCE, THE VEIN WAS ACCESSED WITH A 21 GAUGE  NEEDLE. AN 0.018 WIRE WAS THEN THREADED THROUGH THE NEEDLE. THE 21 GAUGE NEEDLE WAS REMOVED AND A 4 Kinyarwanda SHEATH WAS PLACED OVER THE WIRE INTO THE VEIN.THE MIDLINE CATHETER WAS TRIMMED TO 20CM. THE MIDLINE CATHETER WAS THEN PLACED OVER THE WIRE INTO THE VEIN, THE SHEATH WAS PEELED AWAY, WIRE WAS REMOVED. CATHETER WAS FLUSHED WITH NORMAL SALINE AND CATHETER TIP APPLIED. BIOPATCH PLACED. CATHETER SECURED WITH STAT LOCK AND TEGADERM. PATIENT TOLERATED PROCEDURE WELL. THIS WAS DONE IN THE ANGIOSUITE      IMPRESSION:SUCCESSFUL PLACEMENT OF DUAL LUMEN MIDLINE.           Alem Herrera  6/13/2019  4:39 PM

## 2019-06-13 NOTE — PROGRESS NOTES
GENERAL SURGERY PROGRESS NOTE  Chief Complaint:  Surgery Follow up   LOS: 15 days       Subjective     Interval History:     Feels stronger. Worked with PT/OT today. Tolerating diet    Objective     Vital Signs  Temp:  [97.3 °F (36.3 °C)-97.7 °F (36.5 °C)] 97.6 °F (36.4 °C)  Heart Rate:  [67-75] 68  Resp:  [18] 18  BP: (109-149)/(48-73) 109/73    Physical Exam:   Dressing in place  Labs:  Lab Results (last 24 hours)     Procedure Component Value Units Date/Time    Vancomycin, Trough [[id number]]  (Abnormal) Collected:  06/13/19 1431    Specimen:  Blood Updated:  06/13/19 1507     Vancomycin Trough 29.80 mcg/mL     Urine Culture - Urine, Urine, Clean Catch [[id number]]  (Abnormal)  (Susceptibility) Collected:  06/10/19 0934    Specimen:  Urine, Clean Catch Updated:  06/13/19 1302     Urine Culture >100,000 CFU/mL Staphylococcus aureus, MRSA     Comment:   Methicillin resistant Staph aureus, patient may be an isolation risk.  Staphylococcus aureus is not typically regarded as a uropathogen, except in cases with genitourinary instrumentation present.  It's presence suggests an alternate source (e.g. bloodstream, abscess, SSTI, etc.).  Please evaluate accordingly.    contact precautions requested           50,000 CFU/mL Mixed Talya Isolated    Narrative:       Specimen contains mixed organisms of questionable pathogenicity which indicates contamination with commensal talya.  Further identification is unlikely to provide clinically useful information.  Suggest recollection.    Susceptibility      Staphylococcus aureus, MRSA     PASTORA     Clindamycin Resistant     Nitrofurantoin Susceptible     Oxacillin Resistant     Tetracycline Susceptible     Trimethoprim + Sulfamethoxazole Susceptible     Vancomycin Susceptible                Susceptibility Comments     Staphylococcus aureus, MRSA    This isolate is presumed to be clindamycin resistant based on detection of inducible clindamycin resistance.  Clindamycin may still be  effective in some patients.  This isolate is presumed to be clindamycin resistant based on detection of inducible clindamycin resistance.  Clindamycin may still be effective in some patients.             Basic Metabolic Panel [[id number]]  (Abnormal) Collected:  06/13/19 0532    Specimen:  Blood Updated:  06/13/19 0658     Glucose 84 mg/dL      BUN 5 mg/dL      Creatinine 0.77 mg/dL      Sodium 144 mmol/L      Potassium 3.8 mmol/L      Chloride 103 mmol/L      CO2 32.0 mmol/L      Calcium 8.3 mg/dL      eGFR Non African Amer 75 mL/min/1.73      BUN/Creatinine Ratio 6.5     Anion Gap 9.0 mmol/L     Narrative:       GFR Normal >60  Chronic Kidney Disease <60  Kidney Failure <15    CBC (No Diff) [[id number]]  (Abnormal) Collected:  06/13/19 0532    Specimen:  Blood Updated:  06/13/19 0621     WBC 6.04 10*3/mm3      RBC 3.26 10*6/mm3      Hemoglobin 10.4 g/dL      Hematocrit 32.7 %      .3 fL      MCH 31.9 pg      MCHC 31.8 g/dL      RDW 13.2 %      RDW-SD 48.3 fl      MPV 10.2 fL      Platelets 316 10*3/mm3            Results Review:     Labs and imaging for today were reviewed.    Assessment/Plan     Irma Pacheco is a 69 y.o. 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   Vanc trough high today, defer to pharmacy for dosing/schedule.            This document has been electronically signed by Timothy Dixon MD on June 13, 2019 3:16 PM        Timothy Dixon MD  06/13/19  3:16 PM

## 2019-06-13 NOTE — PLAN OF CARE
Problem: Patient Care Overview  Goal: Plan of Care Review  Outcome: Ongoing (interventions implemented as appropriate)   06/13/19 0326   Coping/Psychosocial   Plan of Care Reviewed With patient;daughter   Plan of Care Review   Progress improving   OTHER   Outcome Summary VSS, complains of some pain. Slept well through night, continue to monitor.      Goal: Individualization and Mutuality  Outcome: Ongoing (interventions implemented as appropriate)    Goal: Discharge Needs Assessment  Outcome: Ongoing (interventions implemented as appropriate)    Goal: Interprofessional Rounds/Family Conf  Outcome: Ongoing (interventions implemented as appropriate)      Problem: Fall Risk (Adult)  Goal: Absence of Fall  Outcome: Ongoing (interventions implemented as appropriate)      Problem: Skin Injury Risk (Adult)  Goal: Skin Health and Integrity  Outcome: Ongoing (interventions implemented as appropriate)      Problem: Surgery Nonspecified (Adult)  Goal: Signs and Symptoms of Listed Potential Problems Will be Absent, Minimized or Managed (Surgery Nonspecified)  Outcome: Ongoing (interventions implemented as appropriate)      Problem: Wound (Includes Pressure Injury) (Adult)  Goal: Signs and Symptoms of Listed Potential Problems Will be Absent, Minimized or Managed (Wound)  Outcome: Ongoing (interventions implemented as appropriate)

## 2019-06-13 NOTE — THERAPY TREATMENT NOTE
Acute Care - Occupational Therapy Treatment Note  [facility]     Patient Name: Irma Pacheco  : 1950  MRN: [id number]  Today's Date: 2019  Onset of Illness/Injury or Date of Surgery: 19  Date of Referral to OT: 19  Referring Physician: Dr. Dixon     Admit Date: 2019       ICD-10-CM ICD-9-CM   1. 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 Impaired functional mobility and activity tolerance Z74.09 V49.89   3. 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     Past Medical History:   Diagnosis Date   • Anxiety    • Arthritis    • Asthma    • CHF (congestive heart failure) (CMS/[facility])    • Colostomy care (CMS/[facility])    • Concussion     x2   • Depression    • Disease of thyroid gland    • Hyperlipidemia    • Hypotension    • Kidney disease, chronic, stage III (GFR 30-59 ml/min) (CMS/[facility])    • MS (multiple sclerosis) (CMS/[facility])    • Osteoarthritis    • Tremor, essential    • Wears glasses      Past Surgical History:   Procedure Laterality Date   • ABDOMINAL HYSTERECTOMY  1999    Has Cervix   • APPENDECTOMY     • CHOLECYSTECTOMY     • COLON RESECTION N/A 2017    Procedure: exploratory laparotomy, sigmoid colon resection and colostomy;  Surgeon: Timothy Dixon MD;  Location: [facility];  Service:    • COLON RESECTION N/A 2019    Procedure: laparotomy with lysis of adhesions, repair of small bowel to vagina fistula. small bowel resection ;  Surgeon: Timothy Dixon MD;  Location: [facility] OR;  Service: General   • EXPLORATORY LAPAROTOMY N/A 10/9/2017    Procedure: LAPAROTOMY EXPLORATORY, drainage intra-abdominal abscess, washout;  Surgeon: Arnulfo Marcmu MD;  Location: [facility];  Service:    • SPIDER BITE EXCISION         Therapy Treatment    Rehabilitation Treatment Summary     Row Name 19 1005 19 0857          Treatment Time/Intention    Discipline   occupational therapy assistant  -CS  physical therapist  -LF     Document Type  therapy note (daily note)  -CS  progress note/recertification  -LF     Subjective Information  complains of;pain;weakness  -CS  complains of;pain  -LF     Mode of Treatment  occupational therapy  -CS  individual therapy;physical therapy  -LF     Patient/Family Observations  --  daughter in room; pt in bed, RA, ANITA, Aguil[place], bed alarm  -LF     Care Plan Review  --  evaluation/treatment results reviewed;care plan/treatment goals reviewed;risks/benefits reviewed;current/potential barriers reviewed;patient/other agree to care plan  -LF2     Therapy Frequency (OT Eval)  other (see comments) 5-7 days a week   -CS2  --     Patient Effort  good  -CS  good  -LF2     Comment  --  Pt was agreeable to get OOB to chair, but expressed she was very anxious about being left up in chair too long. PT and PELAYO coordinated that PELAYO will do OT session 30-60 minutes after end of PT session to assist pt back to bed. PT educated pt to use call light to notify nursing staff if pt felt she was staying up in the chair too long.  -LF3     Existing Precautions/Restrictions  fall  -CS2  --     Recorded by [CS] Cathy Dillon COTA/L 06/13/19 1011  [CS2] Cathy Dillon COTA/DERICK 06/13/19 1312 [LF] Donna Stacy, PT 06/13/19 0914  [LF2] Donna Stacy, PT 06/13/19 0924  [LF3] Donna Stacy, PT 06/13/19 0932     Row Name 06/13/19 1005 06/13/19 0857          Vital Signs    Pretreatment Heart Rate (beats/min)  70  -CS  69  -LF     Posttreatment Heart Rate (beats/min)  --  75  -LF2     Pre SpO2 (%)  94  -CS  94  -LF     O2 Delivery Pre Treatment  room air  -CS  room air  -LF     Post SpO2 (%)  --  94  -LF2     O2 Delivery Post Treatment  --  room air  -LF2     Pre Patient Position  Sitting  -CS2  Supine  -LF2     Post Patient Position  Supine  -CS  Sitting  -LF2     Recorded by [CS] Cathy Dillon PELAYO/L 06/13/19 1312  [CS2] Cathy Dillon PELAYO/L 06/13/19 1011 [LF]  Donna Stacy, PT 06/13/19 0914  [LF2] Donna Stacy, PT 06/13/19 0924     Row Name 06/13/19 1005 06/13/19 0857          Cognitive Assessment/Intervention- PT/OT    Affect/Mental Status (Cognitive)  WFL  -CS  --     Orientation Status (Cognition)  oriented x 4  -CS2  oriented x 4  -LF     Follows Commands (Cognition)  WFL  -CS2  WFL  -LF     Recorded by [CS] Cathy Dillon COTA/L 06/13/19 1312  [CS2] Cathy Dillon COTA/DERICK 06/13/19 1011 [LF] Donna Stacy, PT 06/13/19 0924     Row Name 06/13/19 1005 06/13/19 0857          Bed Mobility Assessment/Treatment    Scooting/Bridging [place] (Bed Mobility)  dependent (less than 25% patient effort);2 person assist  -CS  --     Supine-Sit [place] (Bed Mobility)  --  other (see comments) SBA  -LF     Sit-Supine [place] (Bed Mobility)  maximum assist (25% patient effort);2 person assist  -CS  --     Assistive Device (Bed Mobility)  --  bed rails;head of bed elevated  -LF     Comment (Bed Mobility)  --  pt asked to try to do supine to sit without hands-on assist, and was then able to do it with SBA with additional time  -LF     Recorded by [CS] Cathy Dillon COTA/L 06/13/19 1312 [LF] Donna Stacy, PT 06/13/19 0924     Row Name 06/13/19 1005 06/13/19 0857          Transfer Assessment/Treatment    Transfer Assessment/Treatment  chair-bed transfer;sit-stand transfer;stand-sit transfer  -CS  --     Comment (Transfers)  --  pt needed Min assist and verbal cues to scoot back in chair once seated  -LF     Recorded by [CS] Cathy Dillon PELAYO/DERICK 06/13/19 1312 [LF] Donna Stacy, PT 06/13/19 0924     Row Name 06/13/19 0857             Bed-Chair Transfer    Bed-Chair [place] (Transfers)  contact guard;verbal cues  -LF      Assistive Device (Bed-Chair Transfers)  walker, front-wheeled  -LF      Recorded by [LF] Donna Stacy, PT 06/13/19 0924      Row Name 06/13/19 1005             Chair-Bed Transfer    Chair-Bed [place] (Transfers)  contact guard;2 person  assist  -CS      Assistive Device (Chair-Bed Transfers)  walker, front-wheeled  -CS      Recorded by [CS] Cathy Dillon COTA/L 06/13/19 1312      Row Name 06/13/19 1005 06/13/19 0857          Sit-Stand Transfer    Sit-Stand [place] (Transfers)  contact guard;verbal cues  -CS  contact guard;verbal cues  -LF     Assistive Device (Sit-Stand Transfers)  walker, front-wheeled  -CS  walker, front-wheeled  -LF     Recorded by [CS] Cathy Dillon COTA/DERICK 06/13/19 1312 [LF] Donna tSacy, PT 06/13/19 0924     Row Name 06/13/19 1005 06/13/19 0857          Stand-Sit Transfer    Stand-Sit [place] (Transfers)  contact guard;verbal cues  -CS  contact guard;verbal cues  -LF     Assistive Device (Stand-Sit Transfers)  walker, front-wheeled  -CS  walker, front-wheeled  -LF     Recorded by [CS] Cathy Dillon COTA/DERICK 06/13/19 1312 [LF] Donna Stacy, PT 06/13/19 0924     Row Name 06/13/19 0857             Toilet Transfer    Type (Toilet Transfer)  sit-stand;stand-sit  -LF      Recorded by [LF] Donna Stacy, PT 06/13/19 0924      Row Name 06/13/19 0857             Lower Extremity Seated Therapeutic Exercise    Performed, Seated Lower Extremity (Therapeutic Exercise)  hip flexion/extension;hip abduction/adduction;knee flexion/extension;ankle dorsiflexion/plantarflexion;LAQ (long arc quad), knee extension;hip external/internal rotation  -LF      Exercise Type, Seated Lower Extremity (Therapeutic Exercise)  AROM (active range of motion)  -LF      Expected Outcomes, Seated Lower Extremity (Therapeutic Exercise)  improve functional stability;improve functional tolerance, self-care activity;improve performance, transfer skills;strengthen, facilitate independent active range of motion  -LF      Sets/Reps Detail, Seated Lower Extremity (Therapeutic Exercise)  20 reps  -LF      Recorded by [LF] Donna Stacy, PT 06/13/19 0924      Row Name 06/13/19 1005             Therapeutic Exercise    Upper Extremity (Therapeutic Exercise)   bicep curl, bilateral  -CS      Upper Extremity Range of Motion (Therapeutic Exercise)  shoulder flexion/extension, bilateral;shoulder abduction/adduction, bilateral;elbow flexion/extension, bilateral  -CS      Weight/Resistance (Therapeutic Exercise)  yellow  -CS      Exercise Type (Therapeutic Exercise)  AROM (active range of motion)  -CS      Position (Therapeutic Exercise)  seated  -CS      Sets/Reps (Therapeutic Exercise)  1/20  -CS      Equipment (Therapeutic Exercise)  resistive bands  -CS      Expected Outcome (Therapeutic Exercise)  improve functional tolerance, self-care activity;improve performance, BADLs;improve performance, transfer skills;increase active range of motion  -CS      Recorded by [CS] Cathy Dillon COTA/L 06/13/19 1312      Row Name 06/13/19 1005 06/13/19 0857          Positioning and Restraints    Pre-Treatment Position  sitting in chair/recliner  -CS  in bed  -LF     Post Treatment Position  bed  -CS2  chair  -LF     In Bed  supine;call light within reach;encouraged to call for assist;exit alarm on;with family/caregiver;with nsg  -CS2  --     In Chair  --  reclined;call light within reach;encouraged to call for assist;with family/caregiver;notified nsg  -LF2     Recorded by [CS] Cathy Dillon COTA/L 06/13/19 1011  [CS2] Cathy Dillon COTA/DERICK 06/13/19 1312 [LF] Donna Stacy, PT 06/13/19 0924  [LF2] Donna Stacy, PT 06/13/19 0932     Row Name 06/13/19 1005 06/13/19 0857          Pain Scale: Numbers Pre/Post-Treatment    Pain Scale: Numbers, Pretreatment  8/10  -CS  8/10  -LF     Pain Scale: Numbers, Post-Treatment  8/10  -CS2  10/10  -LF2     Pain Location - Orientation  lower  -CS  lower  -LF     Pain Location  back bottom  -CS  back and left knee  -LF     Pain Intervention(s)  Medication (See MAR);Repositioned  -CS2  Ambulation/increased activity;Repositioned;Emotional support  -LF3     Recorded by [CS] Cathy Dillon COTA/L 06/13/19 1011  [CS2] Cathy Dillon COTA/DERICK  06/13/19 1312 [LF] Donna Stacy, PT 06/13/19 0914  [LF2] Donna Stacy, PT 06/13/19 0932  [LF3] Donna Stacy, PT 06/13/19 1301     Row Name                Wound 05/29/19 1327 abdomen incision;surgical    Wound - Properties Group Date first assessed: 05/29/19 [KM] Time first assessed: 1327 [KM] Present On Admission : no [KM] Location: abdomen [KM] Type: incision;surgical [KM] Recorded by:  [KM] Geno Blackmon RN 05/29/19 1327    Row Name                Wound 06/05/19 1818 Bilateral other (see notes) other (see comments)    Wound - Properties Group Date first assessed: 06/05/19 [KMA] Time first assessed: 1818 [KMA] Present On Admission : yes [KMA], Admission to   Side: Bilateral [KMA] Location: other (see notes) [KMA2], Bilateral groin & skin fold excoriation  Type: other (see comments) [KMA] Recorded by:  [KMA] Geno Onofre RN 06/05/19 1819 [KMA2] Geno Onofre RN 06/05/19 1821    Row Name 06/13/19 0857             Plan of Care Review    Plan of Care Reviewed With  patient;daughter  -LF      Recorded by [LF] Donna Stacy, PT 06/13/19 0932      Row Name 06/13/19 1005             Outcome Summary/Treatment Plan (OT)    Daily Summary of Progress (OT)  progress toward functional goals is good  -CS      Plan for Continued Treatment (OT)  cont OT POC  -CS      Anticipated Discharge Disposition (OT)  anticipate therapy at next level of care;long term acute care facility;skilled nursing facility  -CS      Recorded by [CS] Cathy Dillon COTA/L 06/13/19 1312      Row Name 06/13/19 0857             Outcome Summary/Treatment Plan (PT)    Daily Summary of Progress (PT)  progress towards functional goals is fair  -LF      Barriers to Overall Progress (PT)  anxiety about falls and being OOB  -LF      Plan for Continued Treatment (PT)  continue transfer training and strengthening. Gait goal discontinued per pt request.  -LF      Anticipated Discharge Disposition (PT)  anticipate therapy at next level of care;skilled  nursing facility;long term acute care facility  -LF      Recorded by [LF] Dnona Stacy, PT 06/13/19 0932        User Key  (r) = Recorded By, (t) = Taken By, (c) = Cosigned By    Initials Name Effective Dates Discipline    Geno Isaac, RN 10/17/16 -  Nurse    Cathy Shane, PELAYO/L 03/07/18 -  OT    Geno Limon, RN 05/21/18 -  Nurse    Donna Calderón, PT 07/23/18 -  PT        Wound 05/29/19 1327 abdomen incision;surgical (Active)   Dressing Appearance dry;intact 6/13/2019  9:28 AM   Closure Staples 6/13/2019  9:28 AM   Base other (see comments);moist;red;granulating;slough 6/13/2019  9:28 AM   Periwound pink;warm 6/13/2019  9:28 AM   Periwound Temperature warm 6/13/2019  9:28 AM   Periwound Skin Turgor soft 6/13/2019  9:28 AM   Drainage Characteristics/Odor creamy;serosanguineous;tan 6/13/2019  9:28 AM   Drainage Amount moderate 6/13/2019  9:28 AM   Care, Wound cleansed with 6/13/2019  5:20 AM   Dressing Care, Wound dressing changed;gauze, dry 6/13/2019  5:20 AM       Wound 06/05/19 1818 Bilateral other (see notes) other (see comments) (Active)   Dressing Appearance open to air 6/13/2019  9:28 AM   Closure Open to air 6/13/2019  9:28 AM   Base pink 6/13/2019  9:28 AM   Periwound moist;pink 6/13/2019  9:28 AM   Drainage Amount none 6/13/2019  9:28 AM   Care, Wound other (see comments) 6/13/2019  9:28 AM     Rehab Goal Summary     Row Name 06/13/19 1005 06/13/19 0857          Physical Therapy Goals    Bed Mobility Goal Selection (PT)  --  bed mobility, PT goal 1  -LF     Transfer Goal Selection (PT)  --  transfer, PT goal 1  -LF     Gait Training Goal Selection (PT)  --  gait training, PT goal 1  -LF     Strength Goal Selection (PT)  --  strength, PT goal 1;strength, PT goal 2  -LF        Bed Mobility Goal 1 (PT)    Activity/Assistive Device (Bed Mobility Goal 1, PT)  --  sit to supine/supine to sit  -LF     [place] Level/Cues Needed (Bed Mobility Goal 1, PT)  --  minimum assist (75% or more  patient effort)  -LF     Time Frame (Bed Mobility Goal 1, PT)  --  10 days  -LF     Barriers (Bed Mobility Goal 1, PT)  --  abdominal pain, previous functional deficit  -LF     Progress/Outcomes (Bed Mobility Goal 1, PT)  --  goal partially met;goal ongoing met supine to sit, not met sit to supine  -LF        Transfer Goal 1 (PT)    Activity/Assistive Device (Transfer Goal 1, PT)  --  sit-to-stand/stand-to-sit;bed-to-chair/chair-to-bed;walker, rolling  -LF     [place] Level/Cues Needed (Transfer Goal 1, PT)  --  contact guard assist;verbal cues required  -LF     Time Frame (Transfer Goal 1, PT)  --  1 week  -LF     Barriers (Transfers Goal 1, PT)  --  abdominal pain, previous functional deficit  -LF     Progress/Outcome (Transfer Goal 1, PT)  --  goal partially met;goal ongoing met bed to chair, not met chair to bed  -LF        Gait Training Goal 1 (PT)    Activity/Assistive Device (Gait Training Goal 1, PT)  --  walker, rolling;decrease fall risk;increase endurance/gait distance  -LF     [place] Level (Gait Training Goal 1, PT)  --  contact guard assist;verbal cues required  -LF     Distance (Gait Goal 1, PT)  --  10 feet  -LF     Time Frame (Gait Training Goal 1, PT)  --  10 days  -LF     Barriers (Gait Training Goal 1, PT)  --  abdominal pain, previous functional deficit  -LF     Progress/Outcome (Gait Training Goal 1, PT)  --  goal no longer appropriate;other (see comments)  (Significant)  d/c goal, pt does not want to work on gait/ambulation  -LF        Strength Goal 1 (PT)    Strength Goal 1 (PT)  --  Pt will be able to complete 20 reps of at least 4 LE exercises in order to demo improved strength for transfers  -LF     Time Frame (Strength Goal 1, PT)  --  1 week  -LF     Barriers (Strength Goal 1, PT)  --  abdominal pain, previous functional deficit  -LF     Progress/Outcome (Strength Goal 1, PT)  --  goal met  (Significant)   -LF        Strength Goal 2 (PT)    Strength Goal 2 (PT)  --  Patient  "will be able to do 10 reps of standing \"mini\" marches in order to increase standing and stepping tolerance/strength for transfers  -LF     Time Frame (Strength Goal 2, PT)  --  5 days  -LF     Barriers (Strength Goal 2, PT)  --  anxiety about falls  -LF     Progress/Outcome (Strength Goal 2, PT)  --  goal not met new goal this date  -LF        Transfer Goal 1 (OT)    Activity/Assistive Device (Transfer Goal 1, OT)  commode, bedside with drop arms;commode, bedside without drop arms  -CS  --     [place] Level/Cues Needed (Transfer Goal 1, OT)  minimum assist (75% or more patient effort)  -CS  --     Time Frame (Transfer Goal 1, OT)  long term goal (LTG);by discharge  -CS  --     Progress/Outcome (Transfer Goal 1, OT)  goal not met  -CS  --        Bathing Goal 1 (OT)    Activity/Assistive Device (Bathing Goal 1, OT)  upper body bathing  -CS  --     [place] Level/Cues Needed (Bathing Goal 1, OT)  minimum assist (75% or more patient effort)  -CS  --     Time Frame (Bathing Goal 1, OT)  long term goal (LTG);by discharge  -CS  --     Progress/Outcomes (Bathing Goal 1, OT)  goal not met  -CS  --        Dressing Goal 1 (OT)    Activity/Assistive Device (Dressing Goal 1, OT)  upper body dressing  -CS  --     [place]/Cues Needed (Dressing Goal 1, OT)  minimum assist (75% or more patient effort)  -CS  --     Time Frame (Dressing Goal 1, OT)  long term goal (LTG);by discharge  -CS  --     Progress/Outcome (Dressing Goal 1, OT)  goal not met  -CS  --        Grooming Goal 1 (OT)    Activity/Device (Grooming Goal 1, OT)  grooming skills, all  -CS  --     [place] (Grooming Goal 1, OT)  set-up required;supervision required;verbal cues required  -CS  --     Time Frame (Grooming Goal 1, OT)  long term goal (LTG);by discharge  -CS  --     Progress/Outcome (Grooming Goal 1, OT)  goal not met  -CS  --        Self-Feeding Goal 1 (OT)    Activity/Assistive Device (Self-Feeding Goal 1, OT)  self-feeding skills, all  -CS  " --     [place] Level/Cues Needed (Self-Feeding Goal 1, OT)  set-up required;supervision required;verbal cues required  -CS  --     Time Frame (Self-Feeding Goal 1, OT)  long term goal (LTG);by discharge  -CS  --     Barriers (Self-Feeding Goal 1, OT)  address when medically appropriate.   -CS  --     Progress/Outcomes (Self-Feeding Goal 1, OT)  goal not met  -CS  --        Balance Goal 1 (OT)    Activity/Assistive Device (Balance Goal 1, OT)  sitting, dynamic  -CS  --     [place] Level/Cues Needed (Balance Goal 1, OT)  standby assist  -CS  --     Time Frame (Balance Goal 1, OT)  long term goal (LTG);by discharge  -CS  --     Progress/Outcomes (Balance Goal 1, OT)  goal not met  -CS  --       User Key  (r) = Recorded By, (t) = Taken By, (c) = Cosigned By    Initials Name Provider Type Discipline    CS Cathy Dillon COTA/DERICK Occupational Therapy Assistant OT    Donna Calderón, PT Physical Therapist PT        Occupational Therapy Education     Title: PT OT SLP Therapies (In Progress)     Topic: Occupational Therapy (In Progress)     Point: ADL training (In Progress)     Description: Instruct learner(s) on proper safety adaptation and remediation techniques during self care or transfers.   Instruct in proper use of assistive devices.    Learning Progress Summary           Patient Acceptance, E,TB,D, NR by CS at 6/13/2019  1:13 PM    Acceptance, E, VU,NR by  at 6/12/2019  3:38 PM    Comment:  Educated pt heavily about OT and POC, importance of working towards getting OOB. Educated to call for assist. Educated on safety throughout.    Acceptance, E,TB,D, NR by CS at 6/11/2019  3:48 PM    Acceptance, E,TB,D, NR by CS at 6/10/2019  1:16 PM    Acceptance, E,TB, VU by  at 6/8/2019  2:21 PM    Acceptance, E,TB,D, NR by CS at 6/7/2019  1:32 PM    Acceptance, E,TB,D, NR by CS at 6/6/2019  3:56 PM    Acceptance, E, NR by  at 6/5/2019 11:27 AM    Acceptance, E,TB,D, NR by CS at 5/31/2019  1:12 PM    Acceptance,  E, VU,NR by  at 5/30/2019  1:28 PM    Comment:  Educated about OT and POC. Educated to call for assist. Educated on safety throughout.                   Point: Home exercise program (In Progress)     Description: Instruct learner(s) on appropriate technique for monitoring, assisting and/or progressing therapeutic exercises/activities.    Learning Progress Summary           Patient Acceptance, E,TB,D, NR by CS at 6/13/2019  1:13 PM    Acceptance, E,TB,D, NR by  at 6/11/2019  3:48 PM    Acceptance, E,TB,D, NR by  at 6/10/2019  1:16 PM    Acceptance, E,TB, VU by  at 6/8/2019  2:21 PM    Acceptance, E,TB,D, NR by  at 6/7/2019  1:32 PM    Acceptance, E,TB,D, NR by  at 6/6/2019  3:56 PM    Acceptance, E,TB,D, NR by  at 5/31/2019  1:12 PM                   Point: Precautions (In Progress)     Description: Instruct learner(s) on prescribed precautions during self-care and functional transfers.    Learning Progress Summary           Patient Acceptance, E,TB,D, NR by  at 6/13/2019  1:13 PM    Acceptance, E, VU,NR by  at 6/12/2019  3:38 PM    Comment:  Educated pt heavily about OT and POC, importance of working towards getting OOB. Educated to call for assist. Educated on safety throughout.    Acceptance, E,TB,D, NR by  at 6/11/2019  3:48 PM    Acceptance, E,TB,D, NR by  at 6/10/2019  1:16 PM    Acceptance, E,TB, VU by  at 6/8/2019  2:21 PM    Acceptance, E,TB,D, NR by  at 6/7/2019  1:32 PM    Acceptance, E,TB,D, NR by  at 6/6/2019  3:56 PM    Acceptance, E,TB,D, NR by  at 5/31/2019  1:12 PM    Acceptance, E, VU,NR by  at 5/30/2019  1:28 PM    Comment:  Educated about OT and POC. Educated to call for assist. Educated on safety throughout.                   Point: Body mechanics (In Progress)     Description: Instruct learner(s) on proper positioning and spine alignment during self-care, functional mobility activities and/or exercises.    Learning Progress Summary           Patient Acceptance,  E,TB,D, NR by CS at 6/13/2019  1:13 PM    Acceptance, E,TB,D, NR by CS at 6/11/2019  3:48 PM    Acceptance, E,TB,D, NR by CS at 6/10/2019  1:16 PM    Acceptance, E,TB, VU by  at 6/8/2019  2:21 PM    Acceptance, E,TB,D, NR by CS at 6/7/2019  1:32 PM    Acceptance, E,TB,D, NR by CS at 6/6/2019  3:56 PM    Acceptance, E,TB,D, NR by CS at 5/31/2019  1:12 PM                               User Key     Initials Effective Dates Name Provider Type Discipline     06/08/18 -  Dee Mendez OTR/L Occupational Therapist OT    BB 03/07/18 -  Anila Rodriguez PELAYO/L Occupational Therapy Assistant OT    CS 03/07/18 -  Cathy Dillon PELAYO/L Occupational Therapy Assistant OT    LW 03/07/18 -  Leyla Angela PELAYO/L Occupational Therapy Assistant OT                OT Recommendation and Plan  Outcome Summary/Treatment Plan (OT)  Daily Summary of Progress (OT): progress toward functional goals is good  Plan for Continued Treatment (OT): cont OT POC  Anticipated Discharge Disposition (OT): anticipate therapy at next level of care, long term acute care facility, skilled nursing facility  Therapy Frequency (OT Eval): other (see comments)(5-7 days a week )  Daily Summary of Progress (OT): progress toward functional goals is good  Plan of Care Review  Plan of Care Reviewed With: patient  Plan of Care Reviewed With: patient  Outcome Summary: Pt tolerated tx well this date. Pt was CGA x 2 with chair-bed t/f. Pt was max A x 2 with sit-sup. Pt was dep x 2 with scooting/bridging. Pt gave good effort with UE ther ex. Continue OT POC.  Outcome Measures     Row Name 06/13/19 1300 06/13/19 0857 06/12/19 1850       How much help from another person do you currently need...    Turning from your back to your side while in flat bed without using bedrails?  --  3  -LF  --    Moving from lying on back to sitting on the side of a flat bed without bedrails?  --  3  -LF  --    Moving to and from a bed to a chair (including a wheelchair)?  --   3  -LF  --    Standing up from a chair using your arms (e.g., wheelchair, bedside chair)?  --  3  -LF  --    Climbing 3-5 steps with a railing?  --  1  -LF  --    To walk in hospital room?  --  2  -LF  --    AM-PAC 6 Clicks Score  --  15  -LF  --       How much help from another is currently needed...    Putting on and taking off regular lower body clothing?  1  -CS  --  1  -BH    Bathing (including washing, rinsing, and drying)  1  -CS  --  1  -BH    Toileting (which includes using toilet bed pan or urinal)  1  -CS  --  1  -BH    Putting on and taking off regular upper body clothing  2  -CS  --  2  -BH    Taking care of personal grooming (such as brushing teeth)  2  -CS  --  2  -BH    Eating meals  1  -CS  --  1  -BH    Score  8  -CS  --  8  -BH       Functional Assessment    Outcome Measure Options  --  AM-PAC 6 Clicks Basic Mobility (PT)  -  AM-PAC 6 Clicks Daily Activity (OT)  -    Row Name 06/12/19 1320 06/11/19 1550 06/11/19 1500       How much help from another person do you currently need...    Turning from your back to your side while in flat bed without using bedrails?  3  -TW  3  -TW  --    Moving from lying on back to sitting on the side of a flat bed without bedrails?  3  -TW  3  -TW  --    Moving to and from a bed to a chair (including a wheelchair)?  2  -TW  2  -TW  --    Standing up from a chair using your arms (e.g., wheelchair, bedside chair)?  2  -TW  2  -TW  --    Climbing 3-5 steps with a railing?  1  -TW  1  -TW  --    To walk in hospital room?  2  -TW  2  -TW  --    AM-PAC 6 Clicks Score  13  -TW  13  -TW  --       How much help from another is currently needed...    Putting on and taking off regular lower body clothing?  --  --  1  -CS    Bathing (including washing, rinsing, and drying)  --  --  1  -CS    Toileting (which includes using toilet bed pan or urinal)  --  --  1  -CS    Putting on and taking off regular upper body clothing  --  --  2  -CS    Taking care of personal grooming  (such as brushing teeth)  --  --  2  -CS    Eating meals  --  --  1  -CS    Score  --  --  8  -CS       Functional Assessment    Outcome Measure Options  AM-[facility] 6 Clicks Basic Mobility (PT)  -TW  AM-[facility] 6 Clicks Basic Mobility (PT)  -  --    Row Name 06/10/19 1436             How much help from another person do you currently need...    Turning from your back to your side while in flat bed without using bedrails?  3  -KW      Moving from lying on back to sitting on the side of a flat bed without bedrails?  3  -KW      Moving to and from a bed to a chair (including a wheelchair)?  2  -KW      Standing up from a chair using your arms (e.g., wheelchair, bedside chair)?  2  -KW      Climbing 3-5 steps with a railing?  1  -KW      To walk in hospital room?  2  -KW      AM-PAC 6 Clicks Score  13  -KW         Functional Assessment    Outcome Measure Options  AM-[facility] 6 Clicks Basic Mobility (PT)  -KW        User Key  (r) = Recorded By, (t) = Taken By, (c) = Cosigned By    Initials Name Provider Type     Dee Mendez, OTR/L Occupational Therapist    TW Karl Zheng, PTA Physical Therapy Assistant    CS Cathy Dillon COTA/DERICK Occupational Therapy Assistant    KW Martine Tam, PT Physical Therapist     Donna Stacy, PT Physical Therapist           Time Calculation:   Time Calculation- OT     Row Name 06/13/19 1318             Time Calculation- OT    OT Start Time  1005  -CS      OT Stop Time  1043  -CS      OT Time Calculation (min)  38 min  -CS      Total Timed Code Minutes- OT  38 minute(s)  -      OT Received On  06/13/19  -        User Key  (r) = Recorded By, (t) = Taken By, (c) = Cosigned By    Initials Name Provider Type    CS Cathy Dillon COTA/DERICK Occupational Therapy Assistant        Therapy Charges for Today     Code Description Service Date Service Provider Modifiers Qty    86748931585 HC OT THER PROC EA 15 MIN 6/13/2019 Cathy Dillon COTA/L GO 2    32024689354 HC OT THERAPEUTIC ACT EA  15 MIN 6/13/2019 Cathy Dillon, GITA/L GO 1               FREDDY Nguyen  6/13/2019

## 2019-06-13 NOTE — PLAN OF CARE
Problem: Patient Care Overview  Goal: Plan of Care Review  Outcome: Ongoing (interventions implemented as appropriate)   06/13/19 1121   Coping/Psychosocial   Plan of Care Reviewed With patient;daughter   OTHER   Outcome Summary PT re-cert completed. Patient was agreeable to get up to chair as long as someone would help her back to bed in 30-60 minutes. Patient completed supine to sit with SBA. Patient transferred EOB to chair with CGA and RW. Patient has met 1 PT goal. Gait goal discontinued per pt request to not address ambulation. Patient's anxiety of falling has been limiting her motivation to get OOB, making progress much slower than anticipated. Pt will needed 24/7 care and continued PT after acute care discharge.

## 2019-06-13 NOTE — PLAN OF CARE
Problem: Patient Care Overview  Goal: Plan of Care Review  Outcome: Ongoing (interventions implemented as appropriate)   06/13/19 1313   Coping/Psychosocial   Plan of Care Reviewed With patient   Plan of Care Review   Progress improving   OTHER   Outcome Summary Pt tolerated tx well this date. Pt was CGA x 2 with chair-bed t/f. Pt was max A x 2 with sit-sup. Pt was dep x 2 with scooting/bridging. Pt gave good effort with UE ther ex. Continue OT POC.

## 2019-06-13 NOTE — PROGRESS NOTES
"Pharmacokinetics by Pharmacy - Vancomycin - Day 3    Irma Pacheco is a 69 y.o. female on vancomycin for MRSA UTI      [Ht: 152.4 cm (60\"); Wt: 124 kg (272 lb 11.3 oz)]    Estimated Creatinine Clearance: 82.8 mL/min (by C-G formula based on SCr of 0.77 mg/dL).   Lab Results   Component Value Date    CREATININE 0.77 06/13/2019    CREATININE 0.73 06/12/2019    CREATININE 0.75 06/11/2019        Lab Results   Component Value Date    WBC 6.04 06/13/2019    WBC 7.06 06/12/2019    WBC 7.40 06/11/2019      Lab Results   Component Value Date    CRP 1.90 (H) 10/16/2017    CRP 4.40 (H) 10/13/2017    LACTATE 1.0 10/08/2017      Temp Readings from Last 1 Encounters:   06/13/19 97.6 °F (36.4 °C) (Oral)       Lab Results   Component Value Date    [facility] 06/13/[facility]       Culture Results:  Microbiology Results (last 10 days)       Procedure Component Value - Date/Time    Urine Culture - Urine, Urine, Clean Catch [[id number]]  (Abnormal)  (Susceptibility) Collected:  06/10/19 0934    Lab Status:  Final result Specimen:  Urine, Clean Catch Updated:  06/13/19 1302     Urine Culture >100,000 CFU/mL Staphylococcus aureus, MRSA     Comment:   Methicillin resistant Staph aureus, patient may be an isolation risk.  Staphylococcus aureus is not typically regarded as a uropathogen, except in cases with genitourinary instrumentation present.  It's presence suggests an alternate source (e.g. bloodstream, abscess, SSTI, etc.).  Please evaluate accordingly.    contact precautions requested           50,000 CFU/mL Mixed Talya Isolated    Narrative:       Specimen contains mixed organisms of questionable pathogenicity which indicates contamination with commensal talya.  Further identification is unlikely to provide clinically useful information.  Suggest recollection.    Susceptibility        Staphylococcus aureus, MRSA     PASTORA     Clindamycin Resistant     Nitrofurantoin Susceptible     Oxacillin Resistant     Tetracycline " Susceptible     Trimethoprim + Sulfamethoxazole Susceptible     Vancomycin Susceptible                  Susceptibility Comments       Staphylococcus aureus, MRSA    This isolate is presumed to be clindamycin resistant based on detection of inducible clindamycin resistance.  Clindamycin may still be effective in some patients.  This isolate is presumed to be clindamycin resistant based on detection of inducible clindamycin resistance.  Clindamycin may still be effective in some patients.                            Indication: MRSA UTI    Current dose: Vancomycin held, day 3    Assessment/Plan  Trough 29.8, supratherapeutic  Goal 10-15 for UTI    SCr stable, WBC stable, afebrile      1. Hold vancomycin and check random 6/14 0600 with [facility]      Jovany Skinner RPH  06/13/19 3:19 PM

## 2019-06-13 NOTE — THERAPY PROGRESS REPORT/RE-CERT
Acute Care - Physical Therapy Progress Note  [facility]     Patient Name: Irma Pacheco  : 1950  MRN: [id number]  Today's Date: 2019  Onset of Illness/Injury or Date of Surgery: 19  Date of Referral to PT: 19  Referring Physician: Dr. Dixon     Admit Date: 2019    Visit Dx:    ICD-10-CM ICD-9-CM   1. 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 Impaired functional mobility and activity tolerance Z74.09 V49.89   3. 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       Therapy Treatment    Rehabilitation Treatment Summary     Row Name 19 1005 19 0857          Treatment Time/Intention    Discipline  occupational therapy assistant  -CS  physical therapist  -LF     Document Type  therapy note (daily note)  -CS  progress note/recertification  -LF     Subjective Information  complains of;pain;weakness  -CS  complains of;pain  -LF     Mode of Treatment  occupational therapy  -CS  individual therapy;physical therapy  -LF     Patient/Family Observations  --  daughter in room; pt in bed, RA, IV, [place], bed alarm  -LF     Care Plan Review  --  evaluation/treatment results reviewed;care plan/treatment goals reviewed;risks/benefits reviewed;current/potential barriers reviewed;patient/other agree to care plan  -LF2     Patient Effort  good  -CS  good  -LF2     Comment  --  Pt was agreeable to get OOB to chair, but expressed she was very anxious about being left up in chair too long. PT and PELAYO coordinated that PELAYO will do OT session 30-60 minutes after end of PT session to assist pt back to bed. PT educated pt to use call light to notify nursing staff if pt felt she was staying up in the chair too long.  -LF3     Recorded by [CS] Cathy Dillon COTA/DERICK 19 1011 [LF] Donna Stacy, LUCRECIA 19 0914  [LF2] Donna Stacy, PT 19 0924  [LF3] Donna Stacy, PT 19 0932     Sita  Name 06/13/19 1005 06/13/19 0857          Vital Signs    Pretreatment Heart Rate (beats/min)  --  69  -LF     Posttreatment Heart Rate (beats/min)  --  75  -LF2     Pre SpO2 (%)  --  94  -LF     O2 Delivery Pre Treatment  --  room air  -LF     Post SpO2 (%)  --  94  -LF2     O2 Delivery Post Treatment  --  room air  -LF2     Pre Patient Position  Sitting  -CS  Supine  -LF2     Post Patient Position  --  Sitting  -LF2     Recorded by [CS] Cathy Dillon COTA/DERICK 06/13/19 1011 [LF] Donna Stacy, PT 06/13/19 0914  [LF2] Donna Stacy, PT 06/13/19 0924     Row Name 06/13/19 1005 06/13/19 0857          Cognitive Assessment/Intervention- PT/OT    Orientation Status (Cognition)  oriented x 4  -CS  oriented x 4  -LF     Follows Commands (Cognition)  WFL  -CS  WFL  -LF     Recorded by [CS] Cathy Dillon COTA/DERICK 06/13/19 1011 [LF] Donna Stacy, PT 06/13/19 0924     Row Name 06/13/19 0857             Bed Mobility Assessment/Treatment    Supine-Sit Cascade (Bed Mobility)  other (see comments) SBA  -LF      Assistive Device (Bed Mobility)  bed rails;head of bed elevated  -LF      Comment (Bed Mobility)  pt asked to try to do supine to sit without hands-on assist, and was then able to do it with SBA with additional time  -LF      Recorded by [LF] Donna Stacy, PT 06/13/19 0924      Row Name 06/13/19 0857             Transfer Assessment/Treatment    Comment (Transfers)  pt needed Min assist and verbal cues to scoot back in chair once seated  -LF      Recorded by [LF] Donna Stacy, PT 06/13/19 0924      Row Name 06/13/19 0857             Bed-Chair Transfer    Bed-Chair Cascade (Transfers)  contact guard;verbal cues  -LF      Assistive Device (Bed-Chair Transfers)  walker, front-wheeled  -LF      Recorded by [LF] Donna Stacy, PT 06/13/19 0924      Row Name 06/13/19 0857             Sit-Stand Transfer    Sit-Stand Cascade (Transfers)  contact guard;verbal cues  -LF      Assistive Device (Sit-Stand Transfers)  walker,  front-wheeled  -LF      Recorded by [LF] Donna Stacy, PT 06/13/19 0924      Row Name 06/13/19 0857             Stand-Sit Transfer    Stand-Sit [place] (Transfers)  contact guard;verbal cues  -LF      Assistive Device (Stand-Sit Transfers)  walker, front-wheeled  -LF      Recorded by [LF] Donna Stacy, PT 06/13/19 0924      Row Name 06/13/19 0857             Toilet Transfer    Type (Toilet Transfer)  sit-stand;stand-sit  -LF      Recorded by [LF] Donna Stacy, PT 06/13/19 0924      Row Name 06/13/19 0857             Lower Extremity Seated Therapeutic Exercise    Performed, Seated Lower Extremity (Therapeutic Exercise)  hip flexion/extension;hip abduction/adduction;knee flexion/extension;ankle dorsiflexion/plantarflexion;LAQ (long arc quad), knee extension;hip external/internal rotation  -LF      Exercise Type, Seated Lower Extremity (Therapeutic Exercise)  AROM (active range of motion)  -LF      Expected Outcomes, Seated Lower Extremity (Therapeutic Exercise)  improve functional stability;improve functional tolerance, self-care activity;improve performance, transfer skills;strengthen, facilitate independent active range of motion  -LF      Sets/Reps Detail, Seated Lower Extremity (Therapeutic Exercise)  20 reps  -LF      Recorded by [LF] Donna Stacy, PT 06/13/19 0924      Row Name 06/13/19 1005 06/13/19 0857          Positioning and Restraints    Pre-Treatment Position  sitting in chair/recliner  -CS  in bed  -LF     Post Treatment Position  --  chair  -LF     In Chair  --  reclined;call light within reach;encouraged to call for assist;with family/caregiver;notified nsg  -LF2     Recorded by [CS] Cathy Dillon COTA/DERICK 06/13/19 1011 [LF] Donna Stacy, PT 06/13/19 0924  [LF2] Donna Stacy, PT 06/13/19 0932     Row Name 06/13/19 1005 06/13/19 0857          Pain Scale: Numbers Pre/Post-Treatment    Pain Scale: Numbers, Pretreatment  8/10  -CS  8/10  -LF     Pain Scale: Numbers, Post-Treatment  --  10/10  -LF2      Pain Location - Orientation  lower  -CS  lower  -LF     Pain Location  back bottom  -CS  back and left knee  -LF     Pain Intervention(s)  --  Ambulation/increased activity;Repositioned;Emotional support  -LF3     Recorded by [CS] Cathy Dillon COTA/L 06/13/19 1011 [LF] Donna Stacy, PT 06/13/19 0914  [LF2] Donna Stacy, PT 06/13/19 0932  [LF3] Donna Stacy, PT 06/13/19 1301     Row Name                Wound 05/29/19 1327 abdomen incision;surgical    Wound - Properties Group Date first assessed: 05/29/19 [KM] Time first assessed: 1327 [KM] Present On Admission : no [KM] Location: abdomen [KM] Type: incision;surgical [KM] Recorded by:  [KM] Geno Blackmon RN 05/29/19 1327    Row Name                Wound 06/05/19 1818 Bilateral other (see notes) other (see comments)    Wound - Properties Group Date first assessed: 06/05/19 [KMA] Time first assessed: 1818 [KMA] Present On Admission : yes [KMA], Admission to 3E  Side: Bilateral [KMA] Location: other (see notes) [KMA2], Bilateral groin & skin fold excoriation  Type: other (see comments) [KMA] Recorded by:  [KMA] Geno Onofre RN 06/05/19 1819 [KMA2] Geno Onofre RN 06/05/19 1821    Row Name 06/13/19 0857             Plan of Care Review    Plan of Care Reviewed With  patient;daughter  -LF      Recorded by [LF] Donna Stacy, PT 06/13/19 0932      Row Name 06/13/19 0857             Outcome Summary/Treatment Plan (PT)    Daily Summary of Progress (PT)  progress towards functional goals is fair  -LF      Barriers to Overall Progress (PT)  anxiety about falls and being OOB  -LF      Plan for Continued Treatment (PT)  continue transfer training and strengthening. Gait goal discontinued per pt request.  -LF      Anticipated Discharge Disposition (PT)  anticipate therapy at next level of care;skilled nursing facility;long term acute care facility  -LF      Recorded by [LF] Donna Stacy, PT 06/13/19 0932        User Key  (r) = Recorded By, (t) = Taken By, (c) =  Cosigned By    Initials Name Effective Dates Discipline    Geno Isaac, RN 10/17/16 -  Nurse    Cathy Shane, PELAYO/L 03/07/18 -  OT    Geno Limon RN 05/21/18 -  Nurse    Donna Calderón, PT 07/23/18 -  PT          Wound 05/29/19 1327 abdomen incision;surgical (Active)   Dressing Appearance dry;intact 6/13/2019  9:28 AM   Closure Staples 6/13/2019  9:28 AM   Base other (see comments);moist;red;granulating;slough 6/13/2019  9:28 AM   Periwound pink;warm 6/13/2019  9:28 AM   Periwound Temperature warm 6/13/2019  9:28 AM   Periwound Skin Turgor soft 6/13/2019  9:28 AM   Drainage Characteristics/Odor creamy;serosanguineous;tan 6/13/2019  9:28 AM   Drainage Amount moderate 6/13/2019  9:28 AM   Care, Wound cleansed with 6/13/2019  5:20 AM   Dressing Care, Wound dressing changed;gauze, dry 6/13/2019  5:20 AM       Wound 06/05/19 1818 Bilateral other (see notes) other (see comments) (Active)   Dressing Appearance open to air 6/13/2019  9:28 AM   Closure Open to air 6/13/2019  9:28 AM   Base pink 6/13/2019  9:28 AM   Periwound moist;pink 6/13/2019  9:28 AM   Drainage Amount none 6/13/2019  9:28 AM   Care, Wound other (see comments) 6/13/2019  9:28 AM       Rehab Goal Summary     Row Name 06/13/19 0857             Physical Therapy Goals    Bed Mobility Goal Selection (PT)  bed mobility, PT goal 1  -LF      Transfer Goal Selection (PT)  transfer, PT goal 1  -LF      Gait Training Goal Selection (PT)  gait training, PT goal 1  -LF      Strength Goal Selection (PT)  strength, PT goal 1;strength, PT goal 2  -LF         Bed Mobility Goal 1 (PT)    Activity/Assistive Device (Bed Mobility Goal 1, PT)  sit to supine/supine to sit  -LF      [place] Level/Cues Needed (Bed Mobility Goal 1, PT)  minimum assist (75% or more patient effort)  -LF      Time Frame (Bed Mobility Goal 1, PT)  10 days  -LF      Barriers (Bed Mobility Goal 1, PT)  abdominal pain, previous functional deficit  -LF       "Progress/Outcomes (Bed Mobility Goal 1, PT)  goal partially met;goal ongoing met supine to sit, not met sit to supine  -LF         Transfer Goal 1 (PT)    Activity/Assistive Device (Transfer Goal 1, PT)  sit-to-stand/stand-to-sit;bed-to-chair/chair-to-bed;walker, rolling  -LF      [place] Level/Cues Needed (Transfer Goal 1, PT)  contact guard assist;verbal cues required  -LF      Time Frame (Transfer Goal 1, PT)  1 week  -LF      Barriers (Transfers Goal 1, PT)  abdominal pain, previous functional deficit  -LF      Progress/Outcome (Transfer Goal 1, PT)  goal partially met;goal ongoing met bed to chair, not met chair to bed  -LF         Gait Training Goal 1 (PT)    Activity/Assistive Device (Gait Training Goal 1, PT)  walker, rolling;decrease fall risk;increase endurance/gait distance  -LF      [place] Level (Gait Training Goal 1, PT)  contact guard assist;verbal cues required  -LF      Distance (Gait Goal 1, PT)  10 feet  -LF      Time Frame (Gait Training Goal 1, PT)  10 days  -LF      Barriers (Gait Training Goal 1, PT)  abdominal pain, previous functional deficit  -LF      Progress/Outcome (Gait Training Goal 1, PT)  goal no longer appropriate;other (see comments)  (Significant)  d/c goal, pt does not want to work on gait/ambulation  -LF         Strength Goal 1 (PT)    Strength Goal 1 (PT)  Pt will be able to complete 20 reps of at least 4 LE exercises in order to demo improved strength for transfers  -LF      Time Frame (Strength Goal 1, PT)  1 week  -LF      Barriers (Strength Goal 1, PT)  abdominal pain, previous functional deficit  -LF      Progress/Outcome (Strength Goal 1, PT)  goal met  (Significant)   -LF         Strength Goal 2 (PT)    Strength Goal 2 (PT)  Patient will be able to do 10 reps of standing \"mini\" marches in order to increase standing and stepping tolerance/strength for transfers  -LF      Time Frame (Strength Goal 2, PT)  5 days  -LF      Barriers (Strength Goal 2, PT)  anxiety " about falls  -LF      Progress/Outcome (Strength Goal 2, PT)  goal not met new goal this date  -        User Key  (r) = Recorded By, (t) = Taken By, (c) = Cosigned By    Initials Name Provider Type Discipline    Donna Calderón PT Physical Therapist PT          Physical Therapy Education     Title: PT OT SLP Therapies (In Progress)     Topic: Physical Therapy (Done)     Point: Mobility training (Done)     Learning Progress Summary           Patient Acceptance, E, VU,NR by  at 6/13/2019  1:07 PM    Comment:  need to/ benefits of getting OOB daily, transfer and scooting technique    Acceptance, E, NR by SHRAVAN at 6/8/2019 10:53 AM    Acceptance, E, NR by SHRAVAN at 6/7/2019  1:22 PM    Acceptance, E,TB, VU by KATH at 6/6/2019  1:31 PM    Comment:  ex throughout the day    Acceptance, E,TB, VU by KATH at 6/4/2019  1:23 PM    Acceptance, E, VU,NR by JAVI at 5/30/2019 12:58 PM    Comment:  Role of PT, PT POC, transfer technique   Family Acceptance, E, VU,NR by JAVI at 6/13/2019  1:07 PM    Comment:  need to/ benefits of getting OOB daily, transfer and scooting technique    Acceptance, E,TB, VU by KATH at 6/6/2019  1:31 PM    Comment:  ex throughout the day                   Point: Home exercise program (Done)     Learning Progress Summary           Patient Acceptance, E,TB, VU by KATH at 6/6/2019  1:31 PM    Comment:  ex throughout the day    Acceptance, E,TB, VU by KATH at 6/3/2019  3:53 PM    Acceptance, E,TB, VU by LN at 5/31/2019 12:58 PM   Family Acceptance, E,TB, VU by LN at 6/6/2019  1:31 PM    Comment:  ex throughout the day                   Point: Body mechanics (Done)     Learning Progress Summary           Patient Acceptance, E,TB, VU by KATH at 6/6/2019  1:31 PM    Comment:  ex throughout the day    Acceptance, E,TB, VU by KATH at 6/4/2019  1:23 PM    Acceptance, E,TB, VU by ADELINA at 6/4/2019 10:07 AM   Family Acceptance, E,TB, VU by KATH at 6/6/2019  1:31 PM    Comment:  ex throughout the day                   Point: Precautions  (Done)     Learning Progress Summary           Patient Acceptance, E, VU by LF at 6/13/2019  1:07 PM    Comment:  call light, staff assistance with all mobility    Acceptance, E,TB, VU by LN at 6/6/2019  1:31 PM    Comment:  ex throughout the day    Acceptance, E,TB, VU by LN at 6/4/2019  1:23 PM    Acceptance, E,TB, VU by LN at 6/3/2019  3:53 PM    Acceptance, E,TB, VU by LN at 5/31/2019 12:58 PM    Acceptance, E, VU by LF at 5/30/2019  1:00 PM    Comment:  Gait belt, call light, staff assistance with mobility   Family Acceptance, E,TB, VU by LN at 6/6/2019  1:31 PM    Comment:  ex throughout the day                               User Key     Initials Effective Dates Name Provider Type Discipline    ADELINA 10/17/16 -  Alem Garriod RN Registered Nurse Nurse     03/07/18 -  Thai Swanson, PTA Physical Therapy Assistant PT     03/07/18 -  Jannet Garcia PTA Physical Therapy Assistant PT     07/23/18 -  Donna Stacy, PT Physical Therapist PT                PT Recommendation and Plan  Anticipated Discharge Disposition (PT): anticipate therapy at next level of care, skilled nursing facility, long term acute care facility  Planned Therapy Interventions (PT Eval): balance training, bed mobility training, home exercise program, gait training, neuromuscular re-education, patient/family education, postural re-education, ROM (range of motion), strengthening, stretching, transfer training, wheelchair management/propulsion training  Therapy Frequency (PT Clinical Impression): other (see comments)(6x/wk)  Outcome Summary/Treatment Plan (PT)  Daily Summary of Progress (PT): progress towards functional goals is fair  Barriers to Overall Progress (PT): anxiety about falls and being OOB  Plan for Continued Treatment (PT): continue transfer training and strengthening. Gait goal discontinued per pt request.  Anticipated Discharge Disposition (PT): anticipate therapy at next level of care, skilled nursing facility, long term  acute care facility  Plan of Care Reviewed With: patient, daughter  Outcome Summary: PT re-cert completed. Patient was agreeable to get up to chair as long as someone would help her back to bed in 30-60 minutes. Patient completed supine to sit with SBA. Patient transferred EOB to chair with CGA and RW. Patient has met 1 PT goal. Gait goal discontinued per pt request to not address ambulation. Patient's anxiety of falling has been limiting her motivation to get OOB, making progress much slower than anticipated. Pt will needed 24/7 care and continued PT after acute care discharge.  Outcome Measures     Row Name 06/13/19 0857 06/12/19 1436 06/12/19 1320       How much help from another person do you currently need...    Turning from your back to your side while in flat bed without using bedrails?  3  -LF  --  3  -TW    Moving from lying on back to sitting on the side of a flat bed without bedrails?  3  -LF  --  3  -TW    Moving to and from a bed to a chair (including a wheelchair)?  3  -LF  --  2  -TW    Standing up from a chair using your arms (e.g., wheelchair, bedside chair)?  3  -LF  --  2  -TW    Climbing 3-5 steps with a railing?  1  -LF  --  1  -TW    To walk in hospital room?  2  -LF  --  2  -TW    AM-PAC 6 Clicks Score  15  -LF  --  13  -TW       How much help from another is currently needed...    Putting on and taking off regular lower body clothing?  --  1  -BH  --    Bathing (including washing, rinsing, and drying)  --  1  -BH  --    Toileting (which includes using toilet bed pan or urinal)  --  1  -BH  --    Putting on and taking off regular upper body clothing  --  2  -BH  --    Taking care of personal grooming (such as brushing teeth)  --  2  -BH  --    Eating meals  --  1  -BH  --    Score  --  8  -BH  --       Functional Assessment    Outcome Measure Options  AM-PAC 6 Clicks Basic Mobility (PT)  -LF  AM-PAC 6 Clicks Daily Activity (OT)  -BH  AM-PAC 6 Clicks Basic Mobility (PT)  -TW    Row Name  06/11/19 1550 06/11/19 1500 06/10/19 1436       How much help from another person do you currently need...    Turning from your back to your side while in flat bed without using bedrails?  3  -TW  --  3  -KW    Moving from lying on back to sitting on the side of a flat bed without bedrails?  3  -TW  --  3  -KW    Moving to and from a bed to a chair (including a wheelchair)?  2  -TW  --  2  -KW    Standing up from a chair using your arms (e.g., wheelchair, bedside chair)?  2  -TW  --  2  -KW    Climbing 3-5 steps with a railing?  1  -TW  --  1  -KW    To walk in hospital room?  2  -TW  --  2  -KW    AM-PAC 6 Clicks Score  13  -TW  --  13  -KW       How much help from another is currently needed...    Putting on and taking off regular lower body clothing?  --  1  -CS  --    Bathing (including washing, rinsing, and drying)  --  1  -CS  --    Toileting (which includes using toilet bed pan or urinal)  --  1  -CS  --    Putting on and taking off regular upper body clothing  --  2  -CS  --    Taking care of personal grooming (such as brushing teeth)  --  2  -CS  --    Eating meals  --  1  -CS  --    Score  --  8  -CS  --       Functional Assessment    Outcome Measure Options  AM-PAC 6 Clicks Basic Mobility (PT)  -TW  --  AM-PAC 6 Clicks Basic Mobility (PT)  -KW      User Key  (r) = Recorded By, (t) = Taken By, (c) = Cosigned By    Initials Name Provider Type     Dee Mendez, OTR/L Occupational Therapist    TW Karl Zheng, PTA Physical Therapy Assistant    CS Cathy Dillon, PELAYO/L Occupational Therapy Assistant    KW Martine Tam, PT Physical Therapist    LF Donna Stacy, LUCRECIA Physical Therapist         Time Calculation:   PT Charges     Row Name 06/13/19 0857             Time Calculation    Start Time  0857  -LF      Stop Time  0926  -LF      Time Calculation (min)  29 min  -LF      PT Received On  06/13/19  -LF         Time Calculation- PT    Total Timed Code Minutes- PT  29 minute(s)  -LF         Timed  Charges    39572 - PT Therapeutic Exercise Minutes  16  -LF      32945 - PT Therapeutic Activity Minutes  13  -LF        User Key  (r) = Recorded By, (t) = Taken By, (c) = Cosigned By    Initials Name Provider Type     Donna Stacy PT Physical Therapist        Therapy Charges for Today     Code Description Service Date Service Provider Modifiers Qty    31645110975  PT THER PROC EA 15 MIN 6/13/2019 Donna Stacy, PT GP 1    [id number]  PT THERAPEUTIC ACT EA 15 MIN 6/13/2019 Donna Stacy, PT GP 1          PT G-Codes  Outcome Measure Options: AM-PAC 6 Clicks Basic Mobility (PT)  AM-PAC 6 Clicks Score: 15  Score: 8    Donna Stacy PT  6/13/2019

## 2019-06-14 ENCOUNTER — HOSPITAL ENCOUNTER (OUTPATIENT)
Facility: HOSPITAL | Age: 69
Discharge: HOME | End: 2019-07-06
Attending: INTERNAL MEDICINE | Admitting: INTERNAL MEDICINE

## 2019-06-14 ENCOUNTER — APPOINTMENT (OUTPATIENT)
Dept: GENERAL RADIOLOGY | Facility: HOSPITAL | Age: 69
End: 2019-06-14

## 2019-06-14 VITALS
SYSTOLIC BLOOD PRESSURE: 127 MMHG | BODY MASS INDEX: 53.54 KG/M2 | DIASTOLIC BLOOD PRESSURE: 70 MMHG | RESPIRATION RATE: 18 BRPM | TEMPERATURE: 97.6 F | HEART RATE: 80 BPM | HEIGHT: 60 IN | WEIGHT: 272.71 LBS | OXYGEN SATURATION: 96 %

## 2019-06-14 DIAGNOSIS — Z74.09 IMPAIRED MOBILITY AND ADLS: ICD-10-CM

## 2019-06-14 DIAGNOSIS — Z78.9 IMPAIRED MOBILITY AND ADLS: ICD-10-CM

## 2019-06-14 DIAGNOSIS — Z74.09 IMPAIRED PHYSICAL MOBILITY: ICD-10-CM

## 2019-06-14 LAB
ALBUMIN SERPL-MCNC: 3 G/DL (ref 3.5–5.2)
ALBUMIN/GLOB SERPL: 1.3 G/DL
ALP SERPL-CCNC: 113 U/L (ref 39–117)
ALT SERPL W P-5'-P-CCNC: 13 U/L (ref 1–33)
ANION GAP SERPL CALCULATED.3IONS-SCNC: 13 MMOL/L
AST SERPL-CCNC: 21 U/L (ref 1–32)
BASOPHILS # BLD AUTO: 0.04 10*3/MM3 (ref 0–0.2)
BASOPHILS NFR BLD AUTO: 0.6 % (ref 0–1.5)
BILIRUB SERPL-MCNC: 0.2 MG/DL (ref 0.2–1.2)
BUN BLD-MCNC: 6 MG/DL (ref 8–23)
BUN/CREAT SERPL: 7.9 (ref 7–25)
CALCIUM SPEC-SCNC: 8.4 MG/DL (ref 8.6–10.5)
CHLORIDE SERPL-SCNC: 103 MMOL/L (ref 98–107)
CO2 SERPL-SCNC: 28 MMOL/L (ref 22–29)
CREAT BLD-MCNC: 0.76 MG/DL (ref 0.57–1)
DEPRECATED RDW RBC AUTO: 50.4 FL (ref 37–54)
EOSINOPHIL # BLD AUTO: 0.15 10*3/MM3 (ref 0–0.4)
EOSINOPHIL NFR BLD AUTO: 2.3 % (ref 0.3–6.2)
ERYTHROCYTE [DISTWIDTH] IN BLOOD BY AUTOMATED COUNT: 13.3 % (ref 12.3–15.4)
GFR SERPL CREATININE-BSD FRML MDRD: 76 ML/MIN/1.73
GLOBULIN UR ELPH-MCNC: 2.3 GM/DL
GLUCOSE BLD-MCNC: 83 MG/DL (ref 65–99)
HCT VFR BLD AUTO: 33.7 % (ref 34–46.6)
HGB BLD-MCNC: 10.7 G/DL (ref 12–15.9)
HYPOCHROMIA BLD QL: NORMAL
IMM GRANULOCYTES # BLD AUTO: 0.07 10*3/MM3 (ref 0–0.05)
IMM GRANULOCYTES NFR BLD AUTO: 1.1 % (ref 0–0.5)
LYMPHOCYTES # BLD AUTO: 2.01 10*3/MM3 (ref 0.7–3.1)
LYMPHOCYTES NFR BLD AUTO: 30.3 % (ref 19.6–45.3)
MAGNESIUM SERPL-MCNC: 1.7 MG/DL (ref 1.6–2.4)
MCH RBC QN AUTO: 32.4 PG (ref 26.6–33)
MCHC RBC AUTO-ENTMCNC: 31.8 G/DL (ref 31.5–35.7)
MCV RBC AUTO: 102.1 FL (ref 79–97)
MONOCYTES # BLD AUTO: 0.34 10*3/MM3 (ref 0.1–0.9)
MONOCYTES NFR BLD AUTO: 5.1 % (ref 5–12)
NEUTROPHILS # BLD AUTO: 4.02 10*3/MM3 (ref 1.7–7)
NEUTROPHILS NFR BLD AUTO: 60.6 % (ref 42.7–76)
NRBC BLD AUTO-RTO: 0 /100 WBC (ref 0–0.2)
PLAT MORPH BLD: NORMAL
PLATELET # BLD AUTO: 208 10*3/MM3 (ref 140–450)
PMV BLD AUTO: 10.1 FL (ref 6–12)
POTASSIUM BLD-SCNC: 3 MMOL/L (ref 3.5–5.2)
PROT SERPL-MCNC: 5.3 G/DL (ref 6–8.5)
RBC # BLD AUTO: 3.3 10*6/MM3 (ref 3.77–5.28)
SODIUM BLD-SCNC: 144 MMOL/L (ref 136–145)
VANCOMYCIN SERPL-MCNC: 22.3 MCG/ML (ref 5–40)
WBC MORPH BLD: NORMAL
WBC NRBC COR # BLD: 6.63 10*3/MM3 (ref 3.4–10.8)

## 2019-06-14 PROCEDURE — 97110 THERAPEUTIC EXERCISES: CPT

## 2019-06-14 PROCEDURE — 80053 COMPREHEN METABOLIC PANEL: CPT | Performed by: INTERNAL MEDICINE

## 2019-06-14 PROCEDURE — 87150 DNA/RNA AMPLIFIED PROBE: CPT | Performed by: INTERNAL MEDICINE

## 2019-06-14 PROCEDURE — 25010000002 HEPARIN (PORCINE) PER 1000 UNITS: Performed by: INTERNAL MEDICINE

## 2019-06-14 PROCEDURE — 87040 BLOOD CULTURE FOR BACTERIA: CPT | Performed by: INTERNAL MEDICINE

## 2019-06-14 PROCEDURE — 85025 COMPLETE CBC W/AUTO DIFF WBC: CPT | Performed by: INTERNAL MEDICINE

## 2019-06-14 PROCEDURE — 87147 CULTURE TYPE IMMUNOLOGIC: CPT | Performed by: INTERNAL MEDICINE

## 2019-06-14 PROCEDURE — 99024 POSTOP FOLLOW-UP VISIT: CPT | Performed by: SURGERY

## 2019-06-14 PROCEDURE — 97530 THERAPEUTIC ACTIVITIES: CPT

## 2019-06-14 PROCEDURE — 71045 X-RAY EXAM CHEST 1 VIEW: CPT

## 2019-06-14 PROCEDURE — 93005 ELECTROCARDIOGRAM TRACING: CPT | Performed by: INTERNAL MEDICINE

## 2019-06-14 PROCEDURE — 25010000002 HEPARIN (PORCINE) PER 1000 UNITS: Performed by: SURGERY

## 2019-06-14 PROCEDURE — 93010 ELECTROCARDIOGRAM REPORT: CPT | Performed by: INTERNAL MEDICINE

## 2019-06-14 PROCEDURE — 80202 ASSAY OF VANCOMYCIN: CPT | Performed by: SURGERY

## 2019-06-14 PROCEDURE — 25010000002 FLUCONAZOLE PER 200 MG: Performed by: SURGERY

## 2019-06-14 PROCEDURE — 83735 ASSAY OF MAGNESIUM: CPT | Performed by: INTERNAL MEDICINE

## 2019-06-14 PROCEDURE — 25010000002 MAGNESIUM SULFATE IN D5W 1G/100ML (PREMIX) 1-5 GM/100ML-% SOLUTION: Performed by: INTERNAL MEDICINE

## 2019-06-14 PROCEDURE — 85007 BL SMEAR W/DIFF WBC COUNT: CPT | Performed by: INTERNAL MEDICINE

## 2019-06-14 RX ORDER — LANOLIN ALCOHOL/MO/W.PET/CERES
6 CREAM (GRAM) TOPICAL NIGHTLY PRN
Status: DISCONTINUED | OUTPATIENT
Start: 2019-06-14 | End: 2019-07-06 | Stop reason: HOSPADM

## 2019-06-14 RX ORDER — HYDROCODONE BITARTRATE AND ACETAMINOPHEN 7.5; 325 MG/1; MG/1
1 TABLET ORAL EVERY 4 HOURS PRN
Status: DISPENSED | OUTPATIENT
Start: 2019-06-14 | End: 2019-06-24

## 2019-06-14 RX ORDER — TIZANIDINE 4 MG/1
4 TABLET ORAL 2 TIMES DAILY PRN
Status: DISCONTINUED | OUTPATIENT
Start: 2019-06-14 | End: 2019-07-06 | Stop reason: HOSPADM

## 2019-06-14 RX ORDER — DONEPEZIL HYDROCHLORIDE 5 MG/1
5 TABLET, FILM COATED ORAL NIGHTLY
Status: DISCONTINUED | OUTPATIENT
Start: 2019-06-14 | End: 2019-07-06 | Stop reason: HOSPADM

## 2019-06-14 RX ORDER — IPRATROPIUM BROMIDE AND ALBUTEROL SULFATE 2.5; .5 MG/3ML; MG/3ML
3 SOLUTION RESPIRATORY (INHALATION)
Status: DISCONTINUED | OUTPATIENT
Start: 2019-06-14 | End: 2019-06-15

## 2019-06-14 RX ORDER — MAGNESIUM SULFATE 1 G/100ML
1 INJECTION INTRAVENOUS ONCE
Status: DISCONTINUED | OUTPATIENT
Start: 2019-06-14 | End: 2019-06-15

## 2019-06-14 RX ORDER — NYSTATIN 100000 [USP'U]/G
POWDER TOPICAL EVERY 12 HOURS SCHEDULED
Status: DISCONTINUED | OUTPATIENT
Start: 2019-06-14 | End: 2019-07-06 | Stop reason: HOSPADM

## 2019-06-14 RX ORDER — VILAZODONE HYDROCHLORIDE 20 MG/1
20 TABLET ORAL NIGHTLY
Status: DISCONTINUED | OUTPATIENT
Start: 2019-06-14 | End: 2019-07-06 | Stop reason: HOSPADM

## 2019-06-14 RX ORDER — GABAPENTIN 300 MG/1
600 CAPSULE ORAL EVERY 8 HOURS SCHEDULED
Status: DISCONTINUED | OUTPATIENT
Start: 2019-06-14 | End: 2019-07-06 | Stop reason: HOSPADM

## 2019-06-14 RX ORDER — FAMOTIDINE 20 MG/1
20 TABLET, FILM COATED ORAL
Status: DISCONTINUED | OUTPATIENT
Start: 2019-06-14 | End: 2019-07-06 | Stop reason: HOSPADM

## 2019-06-14 RX ORDER — BUMETANIDE 0.25 MG/ML
0.5 INJECTION INTRAMUSCULAR; INTRAVENOUS EVERY 12 HOURS
Status: DISCONTINUED | OUTPATIENT
Start: 2019-06-15 | End: 2019-07-02

## 2019-06-14 RX ORDER — ONDANSETRON 4 MG/1
4 TABLET, FILM COATED ORAL EVERY 6 HOURS PRN
Status: DISCONTINUED | OUTPATIENT
Start: 2019-06-14 | End: 2019-07-06 | Stop reason: HOSPADM

## 2019-06-14 RX ORDER — FLUCONAZOLE 2 MG/ML
200 INJECTION, SOLUTION INTRAVENOUS DAILY
Qty: 300 ML | Refills: 0 | Status: SHIPPED | OUTPATIENT
Start: 2019-06-15 | End: 2019-06-18

## 2019-06-14 RX ORDER — GLATIRAMER 40 MG/ML
40 INJECTION, SOLUTION SUBCUTANEOUS 3 TIMES WEEKLY
Status: DISCONTINUED | OUTPATIENT
Start: 2019-06-17 | End: 2019-07-06 | Stop reason: HOSPADM

## 2019-06-14 RX ORDER — FLUCONAZOLE 2 MG/ML
200 INJECTION, SOLUTION INTRAVENOUS DAILY
Status: DISPENSED | OUTPATIENT
Start: 2019-06-15 | End: 2019-06-20

## 2019-06-14 RX ORDER — PRIMIDONE 50 MG/1
50 TABLET ORAL EVERY 8 HOURS SCHEDULED
Status: DISCONTINUED | OUTPATIENT
Start: 2019-06-14 | End: 2019-07-06 | Stop reason: HOSPADM

## 2019-06-14 RX ORDER — HEPARIN SODIUM 5000 [USP'U]/ML
5000 INJECTION, SOLUTION INTRAVENOUS; SUBCUTANEOUS EVERY 8 HOURS SCHEDULED
Status: DISCONTINUED | OUTPATIENT
Start: 2019-06-14 | End: 2019-07-06 | Stop reason: HOSPADM

## 2019-06-14 RX ORDER — MEMANTINE HYDROCHLORIDE 5 MG/1
2.5 TABLET ORAL EVERY 12 HOURS SCHEDULED
Status: DISCONTINUED | OUTPATIENT
Start: 2019-06-14 | End: 2019-07-06 | Stop reason: HOSPADM

## 2019-06-14 RX ORDER — MIRTAZAPINE 15 MG/1
45 TABLET, FILM COATED ORAL NIGHTLY
Status: DISCONTINUED | OUTPATIENT
Start: 2019-06-14 | End: 2019-07-06 | Stop reason: HOSPADM

## 2019-06-14 RX ORDER — POTASSIUM CHLORIDE 750 MG/1
40 CAPSULE, EXTENDED RELEASE ORAL
Status: DISPENSED | OUTPATIENT
Start: 2019-06-14 | End: 2019-06-15

## 2019-06-14 RX ADMIN — FLUCONAZOLE 200 MG: 200 INJECTION, SOLUTION INTRAVENOUS at 09:16

## 2019-06-14 RX ADMIN — FAMOTIDINE 20 MG: 20 TABLET ORAL at 09:09

## 2019-06-14 RX ADMIN — SODIUM CHLORIDE, PRESERVATIVE FREE 10 ML: 5 INJECTION INTRAVENOUS at 09:10

## 2019-06-14 RX ADMIN — BUMETANIDE 0.5 MG: 0.25 INJECTION INTRAMUSCULAR; INTRAVENOUS at 14:12

## 2019-06-14 RX ADMIN — PRIMIDONE 50 MG: 50 TABLET ORAL at 05:26

## 2019-06-14 RX ADMIN — SODIUM CHLORIDE, PRESERVATIVE FREE 10 ML: 5 INJECTION INTRAVENOUS at 09:09

## 2019-06-14 RX ADMIN — HEPARIN SODIUM 5000 UNITS: 5000 INJECTION INTRAVENOUS; SUBCUTANEOUS at 14:13

## 2019-06-14 RX ADMIN — GABAPENTIN 600 MG: 300 CAPSULE ORAL at 14:12

## 2019-06-14 RX ADMIN — HYDROCODONE BITARTRATE AND ACETAMINOPHEN 1 TABLET: 7.5; 325 TABLET ORAL at 02:29

## 2019-06-14 RX ADMIN — HYDROCODONE BITARTRATE AND ACETAMINOPHEN 1 TABLET: 7.5; 325 TABLET ORAL at 15:55

## 2019-06-14 RX ADMIN — LEVOTHYROXINE SODIUM 137 MCG: 25 TABLET ORAL at 09:11

## 2019-06-14 RX ADMIN — HYDROCODONE BITARTRATE AND ACETAMINOPHEN 1 TABLET: 7.5; 325 TABLET ORAL at 09:16

## 2019-06-14 RX ADMIN — BUMETANIDE 0.5 MG: 0.25 INJECTION INTRAMUSCULAR; INTRAVENOUS at 02:20

## 2019-06-14 RX ADMIN — MEMANTINE 2.5 MG: 5 TABLET ORAL at 09:15

## 2019-06-14 RX ADMIN — PRIMIDONE 50 MG: 50 TABLET ORAL at 14:12

## 2019-06-14 RX ADMIN — GABAPENTIN 600 MG: 300 CAPSULE ORAL at 05:26

## 2019-06-14 RX ADMIN — NYSTATIN: 100000 POWDER TOPICAL at 09:16

## 2019-06-14 RX ADMIN — HEPARIN SODIUM 5000 UNITS: 5000 INJECTION INTRAVENOUS; SUBCUTANEOUS at 05:26

## 2019-06-14 NOTE — PLAN OF CARE
Problem: Patient Care Overview  Goal: Plan of Care Review  Outcome: Ongoing (interventions implemented as appropriate)   06/14/19 1401   Coping/Psychosocial   Plan of Care Reviewed With patient;daughter   OTHER   Outcome Summary PT treatment completed for this date. Patient was again agreeable to get OOB to chair as long as help would come to assist her back to bed in a timely manner. PT coordinated with pt's RN that nursing staff would assist pt back to bed later. Patient t/f supine to sit with SBA, needing extending time to do without physical assistance. Pt then t/f EOB to chair with CGA and RW. Per chart review and pt report, pt is to transfer to [facility] later this date. Continue skilled PT at next level of care.

## 2019-06-14 NOTE — PLAN OF CARE
Problem: Patient Care Overview  Goal: Plan of Care Review  Outcome: Ongoing (interventions implemented as appropriate)   06/14/19 0341   Coping/Psychosocial   Plan of Care Reviewed With patient   Plan of Care Review   Progress improving   OTHER   Outcome Summary vss. pain controlled with po meds. voiding adequately. continue to monitor.       Problem: Fall Risk (Adult)  Goal: Absence of Fall  Outcome: Ongoing (interventions implemented as appropriate)      Problem: Skin Injury Risk (Adult)  Goal: Skin Health and Integrity  Outcome: Ongoing (interventions implemented as appropriate)      Problem: Surgery Nonspecified (Adult)  Goal: Signs and Symptoms of Listed Potential Problems Will be Absent, Minimized or Managed (Surgery Nonspecified)  Outcome: Ongoing (interventions implemented as appropriate)      Problem: Wound (Includes Pressure Injury) (Adult)  Goal: Signs and Symptoms of Listed Potential Problems Will be Absent, Minimized or Managed (Wound)  Outcome: Ongoing (interventions implemented as appropriate)

## 2019-06-14 NOTE — PROGRESS NOTES
"Pharmacokinetics by Pharmacy - Vancomycin    Irma Pacheco is a 69 y.o. female receiving vancomycin currently on hold, day 4 for MRSA UTI  Patient is also receiving fluconazole    Objective:  [Ht: 152.4 cm (60\"); Wt: 124 kg (272 lb 11.3 oz)]     Lab Results   Component Value Date    WBC 6.04 06/13/2019    WBC 7.06 06/12/2019    WBC 7.40 06/11/2019      Lab Results   Component Value Date    CRP 1.90 (H) 10/16/2017    CRP 4.40 (H) 10/13/2017    LACTATE 1.0 10/08/2017      Temp Readings from Last 1 Encounters:   06/14/19 97.7 °F (36.5 °C) (Oral)     Estimated Creatinine Clearance: 82.8 mL/min (by C-G formula based on SCr of 0.77 mg/dL).   Lab Results   Component Value Date    CREATININE 0.77 06/13/2019    CREATININE 0.73 06/12/2019    CREATININE 0.75 06/11/2019       Lab Results   Component Value Date    VANCOTROUGH 29.80 (C) 06/13/2019    VANCORANDOM 22.30 06/14/2019       Culture Results:  Microbiology Results (last 10 days)       Procedure Component Value - Date/Time    Urine Culture - Urine, Urine, Clean Catch [[id number]]  (Abnormal)  (Susceptibility) Collected:  06/10/19 0934    Lab Status:  Final result Specimen:  Urine, Clean Catch Updated:  06/13/19 1302     Urine Culture >100,000 CFU/mL Staphylococcus aureus, MRSA     Comment:   Methicillin resistant Staph aureus, patient may be an isolation risk.  Staphylococcus aureus is not typically regarded as a uropathogen, except in cases with genitourinary instrumentation present.  It's presence suggests an alternate source (e.g. bloodstream, abscess, SSTI, etc.).  Please evaluate accordingly.    contact precautions requested           50,000 CFU/mL Mixed Talya Isolated    Narrative:       Specimen contains mixed organisms of questionable pathogenicity which indicates contamination with commensal talya.  Further identification is unlikely to provide clinically useful information.  Suggest recollection.    Susceptibility        Staphylococcus aureus, MRSA     PASTORA "     Clindamycin Resistant     Nitrofurantoin Susceptible     Oxacillin Resistant     Tetracycline Susceptible     Trimethoprim + Sulfamethoxazole Susceptible     Vancomycin Susceptible                  Susceptibility Comments       Staphylococcus aureus, MRSA    This isolate is presumed to be clindamycin resistant based on detection of inducible clindamycin resistance.  Clindamycin may still be effective in some patients.  This isolate is presumed to be clindamycin resistant based on detection of inducible clindamycin resistance.  Clindamycin may still be effective in some patients.                        Assessment:  WBC n/a  SCr n/a  Afebrile  Labs will possibly be drawn once patient reaches LTACH    Labs in progress:  All finalized    Trough 6/13, drawn appropriately, 29.8  Repeat trough 6/14 0553 is 22.3, 15h20m after trough yesterday.    Approx k = 0.0189  T1/2 = 36 hr  Time since last dose, about 27 hours.  Patient is most likely accumulating vancomycin due to BMI 53.3.  Estimated time to trough of 10 is 42 hours.  Will order next random tomorrow 6/15 1800.  Patient will most likely need 24 or 36 hr dosing to help prevent further accumulation.      Utilizing traditional dosing   Goal trough for MRSA UTI: 10-15    Plan:  1. Continue to hold vancomycin  2. Next random 6/15 1800  3. Pharmacy will monitor renal function and adjust dose accordingly.      Eugenio Tijerina [facility]   06/14/19 8:23 AM

## 2019-06-14 NOTE — PLAN OF CARE
Problem: Patient Care Overview  Goal: Plan of Care Review  Outcome: Ongoing (interventions implemented as appropriate)   06/14/19 1043   Coping/Psychosocial   Plan of Care Reviewed With patient   Plan of Care Review   Progress no change   OTHER   Outcome Summary no acute changes; will continue to monitor.     Goal: Individualization and Mutuality  Outcome: Ongoing (interventions implemented as appropriate)    Goal: Discharge Needs Assessment  Outcome: Ongoing (interventions implemented as appropriate)    Goal: Interprofessional Rounds/Family Conf  Outcome: Ongoing (interventions implemented as appropriate)      Problem: Fall Risk (Adult)  Goal: Absence of Fall  Outcome: Ongoing (interventions implemented as appropriate)      Problem: Skin Injury Risk (Adult)  Goal: Skin Health and Integrity  Outcome: Ongoing (interventions implemented as appropriate)      Problem: Surgery Nonspecified (Adult)  Goal: Signs and Symptoms of Listed Potential Problems Will be Absent, Minimized or Managed (Surgery Nonspecified)  Outcome: Ongoing (interventions implemented as appropriate)      Problem: Wound (Includes Pressure Injury) (Adult)  Goal: Signs and Symptoms of Listed Potential Problems Will be Absent, Minimized or Managed (Wound)  Outcome: Ongoing (interventions implemented as appropriate)

## 2019-06-14 NOTE — PROGRESS NOTES
GENERAL SURGERY PROGRESS NOTE  Chief Complaint:  Surgery Follow up   LOS: 16 days       Subjective     Interval History:     Feels well. Wants to go to LTAC    Objective     Vital Signs  Temp:  [97.6 °F (36.4 °C)-99.3 °F (37.4 °C)] 97.6 °F (36.4 °C)  Heart Rate:  [] 80  Resp:  [18] 18  BP: (118-141)/(57-88) 127/70    Physical Exam:   Abdomen soft, dressing in place. Ostomy with stool out.  Labs:  Lab Results (last 24 hours)     Procedure Component Value Units Date/Time    Vancomycin, Random [[id number]]  (Normal) Collected:  06/14/19 0553    Specimen:  Blood Updated:  06/14/19 0634     Vancomycin Random 22.30 mcg/mL     Vancomycin, Trough [[id number]]  (Abnormal) Collected:  06/13/19 1431    Specimen:  Blood Updated:  06/13/19 1507     Vancomycin Trough 29.80 mcg/mL            Results Review:     Labs and imaging for today were reviewed.    Assessment/Plan     Irma Pacheco is a 69 y.o. 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  Continue local wound care.          This document has been electronically signed by Timothy Dixon MD on June 14, 2019 2:49 PM        Timothy Dixon MD  06/14/19  2:49 PM

## 2019-06-14 NOTE — THERAPY TREATMENT NOTE
Acute Care - Physical Therapy Treatment Note  [facility]     Patient Name: Irma Pacheco  : 1950  MRN: [id number]  Today's Date: 2019  Onset of Illness/Injury or Date of Surgery: 19  Date of Referral to PT: 19  Referring Physician: Dr. Dixon     Admit Date: 2019    Visit Dx:    ICD-10-CM ICD-9-CM   1. 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 Impaired functional mobility and activity tolerance Z74.09 V49.89   3. 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       Therapy Treatment    Rehabilitation Treatment Summary     Row Name 19 1401 19 1318          Treatment Time/Intention    Discipline  physical therapist  -LF  occupational therapy assistant  -CS     Document Type  therapy note (daily note)  -LF  therapy note (daily note)  -CS     Subjective Information  no complaints  -LF2  complains of;pain  -CS     Mode of Treatment  individual therapy;physical therapy  -LF  occupational therapy  -CS     Patient/Family Observations  daughter in room; pt in bed, midline catheter, [place], bed alarm  -LF  --     Care Plan Review  evaluation/treatment results reviewed;care plan/treatment goals reviewed;risks/benefits reviewed;current/potential barriers reviewed;patient/other agree to care plan  -LF  --     Therapy Frequency (OT Eval)  --  other (see comments) 5-7 days a week   -CS     Patient Effort  good  -LF2  --     Existing Precautions/Restrictions  fall  -LF2  fall  -CS     Recorded by [LF] Donna Stacy, PT 19 1408  [LF2] Donna Stacy, PT 19 1451 [CS] Cathy Dillon COTA/L 19 1322     Row Name 19 1318             Vital Signs    Pre Patient Position  Supine  -CS      Recorded by [CS] Cathy Dillon COTA/L 19 1322      Row Name 19 1401 19 1318          Cognitive Assessment/Intervention- PT/OT    Affect/Mental Status (Cognitive)  [facility]   -LF  WFL  -CS     Orientation Status (Cognition)  oriented x 4  -LF  oriented x 4  -CS     Follows Commands (Cognition)  WFL  -LF  WFL  -CS     Recorded by [LF] Donna Stacy, PT 06/14/19 1451 [CS] Cathy Dillon COTA/L 06/14/19 1322     Row Name 06/14/19 1401             Safety Issues, Functional Mobility    Impairments Affecting Function (Mobility)  balance;coordination;endurance/activity tolerance;motor control;motor planning;pain;postural/trunk control;range of motion (ROM);strength  -LF      Recorded by [LF] Donna Stacy, PT 06/14/19 1451      Row Name 06/14/19 1401             Bed Mobility Assessment/Treatment    Supine-Sit [place] (Bed Mobility)  other (see comments) SBA - pt asked PT to not help; pt needed extra time  -LF      Assistive Device (Bed Mobility)  bed rails;head of bed elevated  -LF      Recorded by [LF] Donna Stacy, PT 06/14/19 1451      Row Name 06/14/19 1401             Transfer Assessment/Treatment    Comment (Transfers)  PT and pt spoke with pt's RN that PT would assist pt to chair and nursing would assist pt back to bed when pt was ready. Pt's RN was agreeable to this plan.  -LF      Recorded by [LF] Donna Stacy, PT 06/14/19 1451      Row Name 06/14/19 1401             Bed-Chair Transfer    Bed-Chair [place] (Transfers)  contact guard;verbal cues  -LF      Assistive Device (Bed-Chair Transfers)  walker, front-wheeled  -LF      Recorded by [LF] Donna Stacy, PT 06/14/19 1451      Row Name 06/14/19 1401             Sit-Stand Transfer    Sit-Stand [place] (Transfers)  contact guard;verbal cues  -LF      Assistive Device (Sit-Stand Transfers)  walker, front-wheeled  -LF      Recorded by [LF] Donna Stacy, PT 06/14/19 1451      Row Name 06/14/19 1401             Stand-Sit Transfer    Stand-Sit [place] (Transfers)  contact guard;verbal cues  -LF      Assistive Device (Stand-Sit Transfers)  walker, front-wheeled  -LF      Recorded by [LF] Donna Stacy, PT 06/14/19 1451      Row  Name 06/14/19 1401             Gait/Stairs Assessment/Training    Comment (Gait/Stairs)  pt pivoted between bed and chair with RW, but no true ambulation performed. Pt has asked to not make ambulation a goal.  -LF      Recorded by [LF] Donna Stacy, PT 06/14/19 1451      Row Name 06/14/19 1401             Lower Extremity Supine Therapeutic Exercise    Performed, Supine Lower Extremity (Therapeutic Exercise)  ankle pumps;gluteal sets;heel slides;hip external/internal rotation  -LF      Exercise Type, Supine Lower Extremity (Therapeutic Exercise)  AROM (active range of motion);AAROM (active assistive range of motion) AAROM only with heel slides  -LF      Expected Outcomes, Supine Lower Extremity (Therapeutic Exercise)  improve functional stability;improve functional tolerance, self-care activity;improve performance, transfer skills;strengthen, facilitate independent active range of motion  -LF2      Sets/Reps Detail, Supine Lower Extremity (Therapeutic Exercise)  30 reps  -LF2      Comment, Supine Lower Extremity (Therapeutic Exercise)  pt and daughter reported pt did at least one set of bed exercises yesterday outside of PT or OT treatment session.  -LF      Recorded by [LF] Donna Stacy, PT 06/14/19 1451  [LF2] Donna Stacy, PT 06/14/19 1408      Row Name 06/14/19 1401 06/14/19 1318          Positioning and Restraints    Pre-Treatment Position  in bed  -LF  in bed  -CS     Post Treatment Position  chair  -LF  --     In Chair  reclined;call light within reach;encouraged to call for assist;with family/caregiver;notified nsg  -LF  --     Recorded by [LF] Donna Stacy, PT 06/14/19 1451 [CS] Cathy Dillon COTA/L 06/14/19 1322     Row Name 06/14/19 1401 06/14/19 1318          Pain Scale: Numbers Pre/Post-Treatment    Pain Scale: Numbers, Pretreatment  8/10  -LF  7/10  -CS     Pain Scale: Numbers, Post-Treatment  8/10  -LF2  --     Pain Location - Side  --  Bilateral  -CS     Pain Location - Orientation  generalized  -LF   "lower  -CS     Pain Location  -- \"all of my joints\"  -LF  back  -CS     Pain Intervention(s)  Repositioned;Ambulation/increased activity  -LF  --     Recorded by [LF] Donna Stacy, PT 06/14/19 1408  [LF2] Donna Stacy, PT 06/14/19 1451 [CS] Cathy Dillon, PELAYO/L 06/14/19 1322     Row Name                Wound 05/29/19 1327 abdomen incision;surgical    Wound - Properties Group Date first assessed: 05/29/19 [KM] Time first assessed: 1327 [KM] Present On Admission : no [KM] Location: abdomen [KM] Type: incision;surgical [KM] Recorded by:  [KM] Geno Blackmon RN 05/29/19 1327    Row Name                Wound 06/05/19 1818 Bilateral other (see notes) other (see comments)    Wound - Properties Group Date first assessed: 06/05/19 [KMA] Time first assessed: 1818 [KMA] Present On Admission : yes [KMA], Admission to   Side: Bilateral [KMA] Location: other (see notes) [KMA2], Bilateral groin & skin fold excoriation  Type: other (see comments) [KMA] Recorded by:  [KMA] Geno Onofre RN 06/05/19 1819 [KMA2] Geno Onofre RN 06/05/19 1821    Row Name 06/14/19 1401             Plan of Care Review    Plan of Care Reviewed With  patient;daughter  -LF      Recorded by [LF] Donna Stacy, PT 06/14/19 1408      Row Name 06/14/19 1401             Outcome Summary/Treatment Plan (PT)    Daily Summary of Progress (PT)  progress toward functional goals is gradual  -LF      Barriers to Overall Progress (PT)  anxiety about falls and being OOB  -LF2      Plan for Continued Treatment (PT)  pt should be transferred to LTACH later this date, but continue PT POC until then.  -LF2      Anticipated Discharge Disposition (PT)  anticipate therapy at next level of care;skilled nursing facility;long term acute care facility  -LF2      Recorded by [LF] Donna Stacy, PT 06/14/19 1408  [LF2] Donna Stacy, PT 06/14/19 1451        User Key  (r) = Recorded By, (t) = Taken By, (c) = Cosigned By    Initials Name Effective Dates Discipline    KMA " Geno Onofre, RN 10/17/16 -  Nurse    Cathy Shane, PELAYO/L 03/07/18 -  OT    Geno Limon RN 05/21/18 -  Nurse    Donna Calderón, LUCRECIA 07/23/18 -  PT          Wound 05/29/19 1327 abdomen incision;surgical (Active)   Dressing Appearance dry;intact 6/14/2019  7:41 AM   Closure [place]IZ[place] 6/14/2019  7:41 AM   Base dressing in place, unable to visualize 6/14/2019  7:41 AM   Periwound pink;warm 6/14/2019  7:41 AM   Periwound Temperature warm 6/14/2019  7:41 AM   Periwound Skin Turgor soft 6/14/2019  7:41 AM   Edges open 6/14/2019  7:41 AM   Drainage Characteristics/Odor creamy;serosanguineous;tan 6/14/2019  7:41 AM   Drainage Amount small 6/14/2019  7:41 AM   Dressing Care, Wound dressing changed;gauze 6/14/2019  5:46 AM       Wound 06/05/19 1818 Bilateral other (see notes) other (see comments) (Active)   Dressing Appearance open to air 6/14/2019  7:41 AM   Closure Open to air 6/14/2019  7:41 AM   Base pink 6/14/2019  7:41 AM   Periwound moist;pink 6/14/2019  7:41 AM   Edges open 6/14/2019  7:41 AM   Drainage Amount none 6/14/2019  7:41 AM       Rehab Goal Summary     Row Name 06/14/19 1401             Physical Therapy Goals    Bed Mobility Goal Selection (PT)  bed mobility, PT goal 1  -LF      Transfer Goal Selection (PT)  transfer, PT goal 1  -LF      Gait Training Goal Selection (PT)  gait training, PT goal 1  -LF      Strength Goal Selection (PT)  strength, PT goal 1;strength, PT goal 2  -LF         Bed Mobility Goal 1 (PT)    Activity/Assistive Device (Bed Mobility Goal 1, PT)  sit to supine/supine to sit  -LF      [place] Level/Cues Needed (Bed Mobility Goal 1, PT)  minimum assist (75% or more patient effort)  -LF      Time Frame (Bed Mobility Goal 1, PT)  10 days  -LF      Barriers (Bed Mobility Goal 1, PT)  abdominal pain, previous functional deficit  -LF      Progress/Outcomes (Bed Mobility Goal 1, PT)  goal partially met;goal ongoing met supine to sit, not met sit to supine  -LF         Transfer  "Goal 1 (PT)    Activity/Assistive Device (Transfer Goal 1, PT)  sit-to-stand/stand-to-sit;bed-to-chair/chair-to-bed;walker, rolling  -LF      [place] Level/Cues Needed (Transfer Goal 1, PT)  contact guard assist;verbal cues required  -LF      Time Frame (Transfer Goal 1, PT)  1 week  -LF      Barriers (Transfers Goal 1, PT)  abdominal pain, previous functional deficit  -LF      Progress/Outcome (Transfer Goal 1, PT)  goal met  (Significant)  met bed to chair, not met chair to bed  -LF         Strength Goal 2 (PT)    Strength Goal 2 (PT)  Patient will be able to do 10 reps of standing \"mini\" marches in order to increase standing and stepping tolerance/strength for transfers  -LF      Time Frame (Strength Goal 2, PT)  5 days  -LF      Barriers (Strength Goal 2, PT)  anxiety about falls  -LF      Progress/Outcome (Strength Goal 2, PT)  goal not met;goal ongoing  -LF        User Key  (r) = Recorded By, (t) = Taken By, (c) = Cosigned By    Initials Name Provider Type Discipline    Donna Calderón, LUCRECIA Physical Therapist PT          Physical Therapy Education     Title: PT OT SLP Therapies (In Progress)     Topic: Physical Therapy (Done)     Point: Mobility training (Done)     Learning Progress Summary           Patient Acceptance, E, VU by JAVI at 6/14/2019  2:55 PM    Comment:  technique to scoot back in chair    Acceptance, E, VU,NR by JAVI at 6/13/2019  1:07 PM    Comment:  need to/ benefits of getting OOB daily, transfer and scooting technique    Acceptance, E, NR by SHRAVAN at 6/8/2019 10:53 AM    Acceptance, E, NR by SHRAVAN at 6/7/2019  1:22 PM    Acceptance, E,TB, VU by KATH at 6/6/2019  1:31 PM    Comment:  ex throughout the day    Acceptance, E,TB, VU by KATH at 6/4/2019  1:23 PM    Acceptance, E, VU,NR by JAVI at 5/30/2019 12:58 PM    Comment:  Role of PT, PT POC, transfer technique   Family Acceptance, E, VU,NR by JAVI at 6/13/2019  1:07 PM    Comment:  need to/ benefits of getting OOB daily, transfer and scooting technique    " Acceptance, E,TB, VU by LN at 6/6/2019  1:31 PM    Comment:  ex throughout the day                   Point: Home exercise program (Done)     Learning Progress Summary           Patient Acceptance, E,TB, VU by LN at 6/6/2019  1:31 PM    Comment:  ex throughout the day    Acceptance, E,TB, VU by LN at 6/3/2019  3:53 PM    Acceptance, E,TB, VU by LN at 5/31/2019 12:58 PM   Family Acceptance, E,TB, VU by LN at 6/6/2019  1:31 PM    Comment:  ex throughout the day                   Point: Body mechanics (Done)     Learning Progress Summary           Patient Acceptance, E,TB, VU by LN at 6/6/2019  1:31 PM    Comment:  ex throughout the day    Acceptance, E,TB, VU by LN at 6/4/2019  1:23 PM    Acceptance, E,TB, VU by ADELINA at 6/4/2019 10:07 AM   Family Acceptance, E,TB, VU by LN at 6/6/2019  1:31 PM    Comment:  ex throughout the day                   Point: Precautions (Done)     Learning Progress Summary           Patient Acceptance, E, VU by JAVI at 6/13/2019  1:07 PM    Comment:  call light, staff assistance with all mobility    Acceptance, E,TB, VU by LN at 6/6/2019  1:31 PM    Comment:  ex throughout the day    Acceptance, E,TB, VU by LN at 6/4/2019  1:23 PM    Acceptance, E,TB, VU by LN at 6/3/2019  3:53 PM    Acceptance, E,TB, VU by LN at 5/31/2019 12:58 PM    Acceptance, E, VU by LF at 5/30/2019  1:00 PM    Comment:  Gait belt, call light, staff assistance with mobility   Family Acceptance, E,TB, VU by LN at 6/6/2019  1:31 PM    Comment:  ex throughout the day                               User Key     Initials Effective Dates Name Provider Type Discipline    J 10/17/16 -  Alem Garrido RN Registered Nurse Nurse    SHRAVAN 03/07/18 -  Thai Swanson PTA Physical Therapy Assistant PT    LN 03/07/18 -  Jannet Garcia PTA Physical Therapy Assistant PT     07/23/18 -  Donna Stacy PT Physical Therapist PT                PT Recommendation and Plan  Anticipated Discharge Disposition (PT): anticipate therapy at next  level of care, skilled nursing facility, long term acute care facility  Planned Therapy Interventions (PT Eval): balance training, bed mobility training, home exercise program, gait training, neuromuscular re-education, patient/family education, postural re-education, ROM (range of motion), strengthening, stretching, transfer training, wheelchair management/propulsion training  Therapy Frequency (PT Clinical Impression): other (see comments)(6x/wk)  Outcome Summary/Treatment Plan (PT)  Daily Summary of Progress (PT): progress toward functional goals is gradual  Barriers to Overall Progress (PT): anxiety about falls and being OOB  Plan for Continued Treatment (PT): pt should be transferred to LTACH later this date, but continue PT POC until then.  Anticipated Discharge Disposition (PT): anticipate therapy at next level of care, skilled nursing facility, long term acute care facility  Plan of Care Reviewed With: patient, daughter  Outcome Summary: PT treatment completed for this date. Patient was again agreeable to get OOB to chair as long as help would come to assist her back to bed in a timely manner. PT coordinated with pt's RN that nursing staff would assist pt back to bed later. Patient t/f supine to sit with SBA, needing extending time to do without physical assistance. Pt then t/f EOB to chair with CGA and RW. Per chart review and pt report, pt is to transfer to LTACH later this date. Continue skilled PT at next level of care.  Outcome Measures     Row Name 06/14/19 1401 06/13/19 1300 06/13/19 0857       How much help from another person do you currently need...    Turning from your back to your side while in flat bed without using bedrails?  3  -LF  --  3  -LF    Moving from lying on back to sitting on the side of a flat bed without bedrails?  3  -LF  --  3  -LF    Moving to and from a bed to a chair (including a wheelchair)?  3  -LF  --  3  -LF    Standing up from a chair using your arms (e.g., wheelchair,  bedside chair)?  3  -LF  --  3  -LF    Climbing 3-5 steps with a railing?  1  -LF  --  1  -LF    To walk in hospital room?  1  -LF  --  2  -LF    AM-PAC 6 Clicks Score  14  -LF  --  15  -LF       How much help from another is currently needed...    Putting on and taking off regular lower body clothing?  --  1  -CS  --    Bathing (including washing, rinsing, and drying)  --  1  -CS  --    Toileting (which includes using toilet bed pan or urinal)  --  1  -CS  --    Putting on and taking off regular upper body clothing  --  2  -CS  --    Taking care of personal grooming (such as brushing teeth)  --  2  -CS  --    Eating meals  --  1  -CS  --    Score  --  8  -CS  --       Functional Assessment    Outcome Measure Options  AM-[facility] 6 Clicks Basic Mobility (PT)  -LF  --  [facility] 6 Clicks Basic Mobility (PT)  -LF    Row Name 06/12/19 1436 06/12/19 1320 06/11/19 1550       How much help from another person do you currently need...    Turning from your back to your side while in flat bed without using bedrails?  --  3  -TW  3  -TW    Moving from lying on back to sitting on the side of a flat bed without bedrails?  --  3  -TW  3  -TW    Moving to and from a bed to a chair (including a wheelchair)?  --  2  -TW  2  -TW    Standing up from a chair using your arms (e.g., wheelchair, bedside chair)?  --  2  -TW  2  -TW    Climbing 3-5 steps with a railing?  --  1  -TW  1  -TW    To walk in hospital room?  --  2  -TW  2  -TW    AM-PAC 6 Clicks Score  --  13  -TW  13  -TW       How much help from another is currently needed...    Putting on and taking off regular lower body clothing?  1  -BH  --  --    Bathing (including washing, rinsing, and drying)  1  -BH  --  --    Toileting (which includes using toilet bed pan or urinal)  1  -BH  --  --    Putting on and taking off regular upper body clothing  2  -BH  --  --    Taking care of personal grooming (such as brushing teeth)  2  -BH  --  --    Eating meals  1  -BH  --  --    Score  8   -  --  --       Functional Assessment    Outcome Measure Options  AM-PAC 6 Clicks Daily Activity (OT)  -  AM-PAC 6 Clicks Basic Mobility (PT)  -  AM-PAC 6 Clicks Basic Mobility (PT)  -    Row Name 06/11/19 1500             How much help from another is currently needed...    Putting on and taking off regular lower body clothing?  1  -CS      Bathing (including washing, rinsing, and drying)  1  -CS      Toileting (which includes using toilet bed pan or urinal)  1  -CS      Putting on and taking off regular upper body clothing  2  -CS      Taking care of personal grooming (such as brushing teeth)  2  -CS      Eating meals  1  -CS      Score  8  -CS        User Key  (r) = Recorded By, (t) = Taken By, (c) = Cosigned By    Initials Name Provider Type     Dee Mendez, OTR/L Occupational Therapist    TW Karl Zheng, PTA Physical Therapy Assistant    CS Cathy Dillon, GITA/L Occupational Therapy Assistant    LF Donna Stacy PT Physical Therapist         Time Calculation:   PT Charges     Row Name 06/14/19 1401             Time Calculation    Start Time  1401  -LF      Stop Time  1433  -LF      Time Calculation (min)  32 min  -LF      PT Received On  06/14/19  -LF         Time Calculation- PT    Total Timed Code Minutes- PT  32 minute(s)  -LF         Timed Charges    57114 - PT Therapeutic Exercise Minutes  18  -LF      19329 - PT Therapeutic Activity Minutes  14  -LF        User Key  (r) = Recorded By, (t) = Taken By, (c) = Cosigned By    Initials Name Provider Type     Donna Stacy PT Physical Therapist        Therapy Charges for Today     Code Description Service Date Service Provider Modifiers Qty    03234005363 HC PT THER PROC EA 15 MIN 6/13/2019 Donna Stacy, PT GP 1    49500505483 HC PT THERAPEUTIC ACT EA 15 MIN 6/13/2019 Donna Stacy, PT GP 1    83879114818 HC PT THER PROC EA 15 MIN 6/14/2019 Donna Stacy, PT GP 1    39721953895 HC PT THERAPEUTIC ACT EA 15 MIN 6/14/2019 Donna Stacy, PT GP 1           PT G-Codes  Outcome Measure Options: AM-PAC 6 Clicks Basic Mobility (PT)  AM-PAC 6 Clicks Score: 14  Score: 8    Donna Stacy PT  6/14/2019

## 2019-06-14 NOTE — THERAPY TREATMENT NOTE
Acute Care - Occupational Therapy Treatment Note  [facility]     Patient Name: Irma Pacheco  : 1950  MRN: [id number]  Today's Date: 2019  Onset of Illness/Injury or Date of Surgery: 19  Date of Referral to OT: 19  Referring Physician: Dr. Dixon     Admit Date: 2019       ICD-10-CM ICD-9-CM   1. 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 Impaired functional mobility and activity tolerance Z74.09 V49.89   3. 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     Past Medical History:   Diagnosis Date   • Anxiety    • Arthritis    • Asthma    • CHF (congestive heart failure) (CMS/[facility])    • Colostomy care (CMS/[facility])    • Concussion     x2   • Depression    • Disease of thyroid gland    • Hyperlipidemia    • Hypotension    • Kidney disease, chronic, stage III (GFR 30-59 ml/min) (CMS/[facility])    • MS (multiple sclerosis) (CMS/[facility])    • Osteoarthritis    • Tremor, essential    • Wears glasses      Past Surgical History:   Procedure Laterality Date   • ABDOMINAL HYSTERECTOMY  1999    Has Cervix   • APPENDECTOMY     • CHOLECYSTECTOMY     • COLON RESECTION N/A 2017    Procedure: exploratory laparotomy, sigmoid colon resection and colostomy;  Surgeon: Timothy Dixon MD;  Location: [facility];  Service:    • COLON RESECTION N/A 2019    Procedure: laparotomy with lysis of adhesions, repair of small bowel to vagina fistula. small bowel resection ;  Surgeon: Timothy Dixon MD;  Location: [facility] OR;  Service: General   • EXPLORATORY LAPAROTOMY N/A 10/9/2017    Procedure: LAPAROTOMY EXPLORATORY, drainage intra-abdominal abscess, washout;  Surgeon: Arnulfo Marcum MD;  Location: [facility];  Service:    • SPIDER BITE EXCISION         Therapy Treatment    Rehabilitation Treatment Summary     Row Name 19 1401 19 1318          Treatment Time/Intention    Discipline   physical therapist  -LF  occupational therapy assistant  -CS     Document Type  therapy note (daily note)  -LF  therapy note (daily note)  -CS     Subjective Information  no complaints  -LF2  complains of;pain  -CS     Mode of Treatment  individual therapy;physical therapy  -LF  occupational therapy  -CS     Patient/Family Observations  daughter in room; pt in bed, midline catheter, [place], bed alarm  -LF  --     Care Plan Review  evaluation/treatment results reviewed;care plan/treatment goals reviewed;risks/benefits reviewed;current/potential barriers reviewed;patient/other agree to care plan  -LF  --     Therapy Frequency (OT Eval)  --  other (see comments) 5-7 days a week   -CS     Patient Effort  good  -LF2  --     Existing Precautions/Restrictions  fall  -LF2  fall  -CS     Recorded by [LF] Donna Stacy, PT 06/14/19 1408  [LF2] Dnona Stacy, PT 06/14/19 1451 [CS] Cathy Dillon PELAYO/L 06/14/19 1322     Row Name 06/14/19 1318             Vital Signs    Pre Patient Position  Supine  -CS      Post Patient Position  Supine  -CS2      Recorded by [CS] Cathy Dillon PELAYO/L 06/14/19 1322  [CS2] Cathy Dillon PELAYO/L 06/14/19 1516      Row Name 06/14/19 1401 06/14/19 1318          Cognitive Assessment/Intervention- PT/OT    Affect/Mental Status (Cognitive)  WFL  -LF  WFL  -CS     Orientation Status (Cognition)  oriented x 4  -LF  oriented x 4  -CS     Follows Commands (Cognition)  WFL  -LF  WFL  -CS     Recorded by [LF] Donna Stacy, PT 06/14/19 1451 [CS] Cahty Diloln PELAYO/L 06/14/19 1322     Row Name 06/14/19 1401             Safety Issues, Functional Mobility    Impairments Affecting Function (Mobility)  balance;coordination;endurance/activity tolerance;motor control;motor planning;pain;postural/trunk control;range of motion (ROM);strength  -LF      Recorded by [LF] Donna Stacy, PT 06/14/19 1451      Row Name 06/14/19 1401 06/14/19 1318          Bed Mobility Assessment/Treatment    Supine-Sit  [place] (Bed Mobility)  other (see comments) SBA - pt asked PT to not help; pt needed extra time  -LF  --     Assistive Device (Bed Mobility)  bed rails;head of bed elevated  -LF  --     Comment (Bed Mobility)  --  pt deferred  -CS     Recorded by [LF] Donna Stacy, PT 06/14/19 1451 [CS] Cathy Dillon COTA/L 06/14/19 1516     Row Name 06/14/19 1401             Transfer Assessment/Treatment    Comment (Transfers)  PT and pt spoke with pt's RN that PT would assist pt to chair and nursing would assist pt back to bed when pt was ready. Pt's RN was agreeable to this plan.  -LF      Recorded by [LF] Donna Stacy, PT 06/14/19 1451      Row Name 06/14/19 1401             Bed-Chair Transfer    Bed-Chair [place] (Transfers)  contact guard;verbal cues  -LF      Assistive Device (Bed-Chair Transfers)  walker, front-wheeled  -LF      Recorded by [LF] Donna Stacy, PT 06/14/19 1451      Row Name 06/14/19 1401             Sit-Stand Transfer    Sit-Stand [place] (Transfers)  contact guard;verbal cues  -LF      Assistive Device (Sit-Stand Transfers)  walker, front-wheeled  -LF      Recorded by [LF] Donna Stacy, PT 06/14/19 1451      Row Name 06/14/19 1401             Stand-Sit Transfer    Stand-Sit [place] (Transfers)  contact guard;verbal cues  -LF      Assistive Device (Stand-Sit Transfers)  walker, front-wheeled  -LF      Recorded by [LF] Donna Stacy, PT 06/14/19 1451      Row Name 06/14/19 1401             Gait/Stairs Assessment/Training    Comment (Gait/Stairs)  pt pivoted between bed and chair with RW, but no true ambulation performed. Pt has asked to not make ambulation a goal.  -LF      Recorded by [LF] Donna Stacy, PT 06/14/19 1451      Row Name 06/14/19 1401             Lower Extremity Supine Therapeutic Exercise    Performed, Supine Lower Extremity (Therapeutic Exercise)  ankle pumps;gluteal sets;heel slides;hip external/internal rotation  -LF      Exercise Type, Supine Lower Extremity (Therapeutic  Exercise)  AROM (active range of motion);AAROM (active assistive range of motion) AAROM only with heel slides  -LF      Expected Outcomes, Supine Lower Extremity (Therapeutic Exercise)  improve functional stability;improve functional tolerance, self-care activity;improve performance, transfer skills;strengthen, facilitate independent active range of motion  -LF2      Sets/Reps Detail, Supine Lower Extremity (Therapeutic Exercise)  30 reps  -LF2      Comment, Supine Lower Extremity (Therapeutic Exercise)  pt and daughter reported pt did at least one set of bed exercises yesterday outside of PT or OT treatment session.  -LF      Recorded by [LF] Donna Stacy, PT 06/14/19 1451  [LF2] Donna Stacy, PT 06/14/19 1408      Row Name 06/14/19 1318             Therapeutic Exercise    Upper Extremity (Therapeutic Exercise)  bicep curl, bilateral  -CS      Upper Extremity Range of Motion (Therapeutic Exercise)  shoulder flexion/extension, bilateral;shoulder horizontal abduction/adduction, bilateral  -CS      Weight/Resistance (Therapeutic Exercise)  yellow  -CS      Exercise Type (Therapeutic Exercise)  AROM (active range of motion)  -CS      Position (Therapeutic Exercise)  seated  -CS      Sets/Reps (Therapeutic Exercise)  1/20  -CS      Equipment (Therapeutic Exercise)  resistive bands  -CS      Expected Outcome (Therapeutic Exercise)  improve functional tolerance, self-care activity;improve performance, transfer skills;increase active range of motion;improve performance, BADLs  -CS      Recorded by [CS] Cathy Dillon COTA/L 06/14/19 1516      Row Name 06/14/19 1401 06/14/19 1318          Positioning and Restraints    Pre-Treatment Position  in bed  -LF  in bed  -CS     Post Treatment Position  chair  -LF  bed  -CS2     In Bed  --  supine;call light within reach;encouraged to call for assist;exit alarm on  -CS2     In Chair  reclined;call light within reach;encouraged to call for assist;with family/caregiver;notified nsg   "-LF  --     Recorded by [LF] Donna Stacy, PT 06/14/19 1451 [CS] Cathy Dillon, PELAYO/L 06/14/19 1322  [CS2] Cathy Dillon, PELAYO/L 06/14/19 1516     Row Name 06/14/19 1401 06/14/19 1318          Pain Scale: Numbers Pre/Post-Treatment    Pain Scale: Numbers, Pretreatment  8/10  -LF  7/10  -CS     Pain Scale: Numbers, Post-Treatment  8/10  -LF2  7/10  -CS2     Pain Location - Side  --  Bilateral  -CS     Pain Location - Orientation  generalized  -LF  lower  -CS     Pain Location  -- \"all of my joints\"  -LF  back  -CS     Pain Intervention(s)  Repositioned;Ambulation/increased activity  -LF  --     Recorded by [LF] Donna Stacy, PT 06/14/19 1408  [LF2] Donna Stacy, PT 06/14/19 1451 [CS] Cathy Dillon, PELAYO/L 06/14/19 1322  [CS2] Cathy Dillon, PELAYO/L 06/14/19 1516     Row Name                Wound 05/29/19 1327 abdomen incision;surgical    Wound - Properties Group Date first assessed: 05/29/19 [KM] Time first assessed: 1327 [KM] Present On Admission : no [KM] Location: abdomen [KM] Type: incision;surgical [KM] Recorded by:  [KM] Geno Blackmon RN 05/29/19 1327    Row Name                Wound 06/05/19 1818 Bilateral other (see notes) other (see comments)    Wound - Properties Group Date first assessed: 06/05/19 [KMA] Time first assessed: 1818 [KMA] Present On Admission : yes [KMA], Admission to   Side: Bilateral [KMA] Location: other (see notes) [KMA2], Bilateral groin & skin fold excoriation  Type: other (see comments) [KMA] Recorded by:  [KMA] Geno Onofre RN 06/05/19 1819 [KMA2] Geno Onofre RN 06/05/19 1821    Row Name 06/14/19 1401             Plan of Care Review    Plan of Care Reviewed With  patient;daughter  -LF      Recorded by [LF] Donna Stacy, PT 06/14/19 4960      Row Name 06/14/19 1877             Outcome Summary/Treatment Plan (OT)    Daily Summary of Progress (OT)  progress toward functional goals is good  -CS      Plan for Continued Treatment (OT)  cont OT POC  -CS      " Anticipated Discharge Disposition (OT)  anticipate therapy at next level of care;long term acute care facility;skilled nursing facility  -CS      Recorded by [CS] Cathy Dillon COTA/L 06/14/19 1516      Row Name 06/14/19 1401             Outcome Summary/Treatment Plan (PT)    Daily Summary of Progress (PT)  progress toward functional goals is gradual  -LF      Barriers to Overall Progress (PT)  anxiety about falls and being OOB  -LF2      Plan for Continued Treatment (PT)  pt should be transferred to LTACH later this date, but continue PT POC until then.  -LF2      Anticipated Discharge Disposition (PT)  anticipate therapy at next level of care;skilled nursing facility;long term acute care facility  -LF2      Recorded by [LF] Donna Stacy, PT 06/14/19 1408  [LF2] Donna Stacy, PT 06/14/19 1451        User Key  (r) = Recorded By, (t) = Taken By, (c) = Cosigned By    Initials Name Effective Dates Discipline    Geno Isaac, RN 10/17/16 -  Nurse    Cathy Shane COTA/L 03/07/18 -  OT    Geno Limon, RN 05/21/18 -  Nurse    LF Donna Stacy, PT 07/23/18 -  PT        Wound 05/29/19 1327 abdomen incision;surgical (Active)   Dressing Appearance dry;intact 6/14/2019  7:41 AM   Closure ELIZABETH 6/14/2019  7:41 AM   Base dressing in place, unable to visualize 6/14/2019  7:41 AM   Periwound pink;warm 6/14/2019  7:41 AM   Periwound Temperature warm 6/14/2019  7:41 AM   Periwound Skin Turgor soft 6/14/2019  7:41 AM   Edges open 6/14/2019  7:41 AM   Drainage Characteristics/Odor creamy;serosanguineous;tan 6/14/2019  7:41 AM   Drainage Amount small 6/14/2019  7:41 AM   Dressing Care, Wound dressing changed;gauze 6/14/2019  5:46 AM       Wound 06/05/19 1818 Bilateral other (see notes) other (see comments) (Active)   Dressing Appearance open to air 6/14/2019  7:41 AM   Closure Open to air 6/14/2019  7:41 AM   Base pink 6/14/2019  7:41 AM   Periwound moist;pink 6/14/2019  7:41 AM   Edges open 6/14/2019  7:41 AM  "  Drainage Amount none 6/14/2019  7:41 AM     Rehab Goal Summary     Row Name 06/14/19 1401 06/14/19 5573          Physical Therapy Goals    Bed Mobility Goal Selection (PT)  bed mobility, PT goal 1  -LF  --     Transfer Goal Selection (PT)  transfer, PT goal 1  -LF  --     Gait Training Goal Selection (PT)  gait training, PT goal 1  -LF  --     Strength Goal Selection (PT)  strength, PT goal 1;strength, PT goal 2  -LF  --        Bed Mobility Goal 1 (PT)    Activity/Assistive Device (Bed Mobility Goal 1, PT)  sit to supine/supine to sit  -LF  --     [place] Level/Cues Needed (Bed Mobility Goal 1, PT)  minimum assist (75% or more patient effort)  -LF  --     Time Frame (Bed Mobility Goal 1, PT)  10 days  -LF  --     Barriers (Bed Mobility Goal 1, PT)  abdominal pain, previous functional deficit  -LF  --     Progress/Outcomes (Bed Mobility Goal 1, PT)  goal partially met;goal ongoing met supine to sit, not met sit to supine  -LF  --        Transfer Goal 1 (PT)    Activity/Assistive Device (Transfer Goal 1, PT)  sit-to-stand/stand-to-sit;bed-to-chair/chair-to-bed;walker, rolling  -LF  --     [place] Level/Cues Needed (Transfer Goal 1, PT)  contact guard assist;verbal cues required  -LF  --     Time Frame (Transfer Goal 1, PT)  1 week  -LF  --     Barriers (Transfers Goal 1, PT)  abdominal pain, previous functional deficit  -LF  --     Progress/Outcome (Transfer Goal 1, PT)  goal met  (Significant)  met bed to chair, not met chair to bed  -LF  --        Strength Goal 2 (PT)    Strength Goal 2 (PT)  Patient will be able to do 10 reps of standing \"mini\" marches in order to increase standing and stepping tolerance/strength for transfers  -LF  --     Time Frame (Strength Goal 2, PT)  5 days  -LF  --     Barriers (Strength Goal 2, PT)  anxiety about falls  -LF  --     Progress/Outcome (Strength Goal 2, PT)  goal not met;goal ongoing  -LF  --        Transfer Goal 1 (OT)    Activity/Assistive Device (Transfer Goal 1, " OT)  --  commode, bedside with drop arms;commode, bedside without drop arms  -CS     [place] Level/Cues Needed (Transfer Goal 1, OT)  --  minimum assist (75% or more patient effort)  -CS     Time Frame (Transfer Goal 1, OT)  --  long term goal (LTG);by discharge  -CS     Progress/Outcome (Transfer Goal 1, OT)  --  goal not met  -CS        Bathing Goal 1 (OT)    Activity/Assistive Device (Bathing Goal 1, OT)  --  upper body bathing  -CS     [place] Level/Cues Needed (Bathing Goal 1, OT)  --  minimum assist (75% or more patient effort)  -CS     Time Frame (Bathing Goal 1, OT)  --  long term goal (LTG);by discharge  -CS     Progress/Outcomes (Bathing Goal 1, OT)  --  goal not met  -CS        Dressing Goal 1 (OT)    Activity/Assistive Device (Dressing Goal 1, OT)  --  upper body dressing  -CS     [place]/Cues Needed (Dressing Goal 1, OT)  --  minimum assist (75% or more patient effort)  -CS     Time Frame (Dressing Goal 1, OT)  --  long term goal (LTG);by discharge  -CS     Progress/Outcome (Dressing Goal 1, OT)  --  goal not met  -CS        Grooming Goal 1 (OT)    Activity/Device (Grooming Goal 1, OT)  --  grooming skills, all  -CS     [place] (Grooming Goal 1, OT)  --  set-up required;supervision required;verbal cues required  -CS     Time Frame (Grooming Goal 1, OT)  --  long term goal (LTG);by discharge  -CS     Progress/Outcome (Grooming Goal 1, OT)  --  goal not met  -CS        Self-Feeding Goal 1 (OT)    Activity/Assistive Device (Self-Feeding Goal 1, OT)  --  self-feeding skills, all  -CS     [place] Level/Cues Needed (Self-Feeding Goal 1, OT)  --  set-up required;supervision required;verbal cues required  -CS     Time Frame (Self-Feeding Goal 1, OT)  --  long term goal (LTG);by discharge  -CS     Barriers (Self-Feeding Goal 1, OT)  --  address when medically appropriate.   -CS     Progress/Outcomes (Self-Feeding Goal 1, OT)  --  goal not met  -CS        Balance Goal 1 (OT)     Activity/Assistive Device (Balance Goal 1, OT)  --  sitting, dynamic  -CS     [place] Level/Cues Needed (Balance Goal 1, OT)  --  standby assist  -CS     Time Frame (Balance Goal 1, OT)  --  long term goal (LTG);by discharge  -CS     Progress/Outcomes (Balance Goal 1, OT)  --  goal not met  -CS       User Key  (r) = Recorded By, (t) = Taken By, (c) = Cosigned By    Initials Name Provider Type Discipline    CS Cathy Dillon COTA/DERICK Occupational Therapy Assistant OT    Donna Calderón PT Physical Therapist PT        Occupational Therapy Education     Title: PT OT SLP Therapies (In Progress)     Topic: Occupational Therapy (In Progress)     Point: ADL training (In Progress)     Description: Instruct learner(s) on proper safety adaptation and remediation techniques during self care or transfers.   Instruct in proper use of assistive devices.    Learning Progress Summary           Patient Acceptance, E,TB,D, NR by  at 6/14/2019  3:18 PM    Acceptance, E,TB,D, NR by  at 6/13/2019  1:13 PM    Acceptance, E, VU,NR by  at 6/12/2019  3:38 PM    Comment:  Educated pt heavily about OT and POC, importance of working towards getting OOB. Educated to call for assist. Educated on safety throughout.    Acceptance, E,TB,D, NR by CS at 6/11/2019  3:48 PM    Acceptance, E,TB,D, NR by CS at 6/10/2019  1:16 PM    Acceptance, E,TB, VU by  at 6/8/2019  2:21 PM    Acceptance, E,TB,D, NR by CS at 6/7/2019  1:32 PM    Acceptance, E,TB,D, NR by CS at 6/6/2019  3:56 PM    Acceptance, E, NR by  at 6/5/2019 11:27 AM    Acceptance, E,TB,D, NR by  at 5/31/2019  1:12 PM    Acceptance, E, VU,NR by BH at 5/30/2019  1:28 PM    Comment:  Educated about OT and POC. Educated to call for assist. Educated on safety throughout.                   Point: Home exercise program (In Progress)     Description: Instruct learner(s) on appropriate technique for monitoring, assisting and/or progressing therapeutic exercises/activities.    Learning  Progress Summary           Patient Acceptance, E,TB,D, NR by CS at 6/14/2019  3:18 PM    Acceptance, E,TB,D, NR by CS at 6/13/2019  1:13 PM    Acceptance, E,TB,D, NR by CS at 6/11/2019  3:48 PM    Acceptance, E,TB,D, NR by CS at 6/10/2019  1:16 PM    Acceptance, E,TB, VU by  at 6/8/2019  2:21 PM    Acceptance, E,TB,D, NR by CS at 6/7/2019  1:32 PM    Acceptance, E,TB,D, NR by CS at 6/6/2019  3:56 PM    Acceptance, E,TB,D, NR by CS at 5/31/2019  1:12 PM                   Point: Precautions (In Progress)     Description: Instruct learner(s) on prescribed precautions during self-care and functional transfers.    Learning Progress Summary           Patient Acceptance, E,TB,D, NR by CS at 6/14/2019  3:18 PM    Acceptance, E,TB,D, NR by CS at 6/13/2019  1:13 PM    Acceptance, E, VU,NR by  at 6/12/2019  3:38 PM    Comment:  Educated pt heavily about OT and POC, importance of working towards getting OOB. Educated to call for assist. Educated on safety throughout.    Acceptance, E,TB,D, NR by CS at 6/11/2019  3:48 PM    Acceptance, E,TB,D, NR by CS at 6/10/2019  1:16 PM    Acceptance, E,TB, VU by  at 6/8/2019  2:21 PM    Acceptance, E,TB,D, NR by CS at 6/7/2019  1:32 PM    Acceptance, E,TB,D, NR by CS at 6/6/2019  3:56 PM    Acceptance, E,TB,D, NR by CS at 5/31/2019  1:12 PM    Acceptance, E, VU,NR by  at 5/30/2019  1:28 PM    Comment:  Educated about OT and POC. Educated to call for assist. Educated on safety throughout.                   Point: Body mechanics (In Progress)     Description: Instruct learner(s) on proper positioning and spine alignment during self-care, functional mobility activities and/or exercises.    Learning Progress Summary           Patient Acceptance, E,TB,D, NR by CS at 6/14/2019  3:18 PM    Acceptance, E,TB,D, NR by CS at 6/13/2019  1:13 PM    Acceptance, E,TB,D, NR by CS at 6/11/2019  3:48 PM    Acceptance, E,TB,D, NR by CS at 6/10/2019  1:16 PM    Acceptance, E,TB, VU by LW at 6/8/2019   2:21 PM    Acceptance, E,TB,D, NR by CS at 6/7/2019  1:32 PM    Acceptance, E,TB,D, NR by CS at 6/6/2019  3:56 PM    Acceptance, E,TB,D, NR by CS at 5/31/2019  1:12 PM                               User Key     Initials Effective Dates Name Provider Type Discipline     06/08/18 -  Dee Mendez, OTR/L Occupational Therapist OT    BB 03/07/18 -  Anila Rodriguez PELAYO/L Occupational Therapy Assistant OT    CS 03/07/18 -  Cathy Dillon PELAYO/L Occupational Therapy Assistant OT    LW 03/07/18 -  Leyla Angela PELAYO/L Occupational Therapy Assistant OT                OT Recommendation and Plan  Outcome Summary/Treatment Plan (OT)  Daily Summary of Progress (OT): progress toward functional goals is good  Plan for Continued Treatment (OT): cont OT POC  Anticipated Discharge Disposition (OT): anticipate therapy at next level of care, long term acute care facility, skilled nursing facility  Therapy Frequency (OT Eval): other (see comments)(5-7 days a week )  Daily Summary of Progress (OT): progress toward functional goals is good  Plan of Care Review  Plan of Care Reviewed With: patient  Plan of Care Reviewed With: patient  Outcome Summary: Pt tolerated tx well this date. Pt deferred to any EOB activities. Pt gave good effort with UE ther ex. Continue OT POC.  Outcome Measures     Row Name 06/14/19 1500 06/14/19 1401 06/13/19 1300       How much help from another person do you currently need...    Turning from your back to your side while in flat bed without using bedrails?  --  3  -LF  --    Moving from lying on back to sitting on the side of a flat bed without bedrails?  --  3  -LF  --    Moving to and from a bed to a chair (including a wheelchair)?  --  3  -LF  --    Standing up from a chair using your arms (e.g., wheelchair, bedside chair)?  --  3  -LF  --    Climbing 3-5 steps with a railing?  --  1  -LF  --    To walk in hospital room?  --  1  -LF  --    AM-PAC 6 Clicks Score  --  14  -LF  --       How  much help from another is currently needed...    Putting on and taking off regular lower body clothing?  1  -CS  --  1  -CS    Bathing (including washing, rinsing, and drying)  1  -CS  --  1  -CS    Toileting (which includes using toilet bed pan or urinal)  1  -CS  --  1  -CS    Putting on and taking off regular upper body clothing  2  -CS  --  2  -CS    Taking care of personal grooming (such as brushing teeth)  2  -CS  --  2  -CS    Eating meals  1  -CS  --  1  -CS    Score  8  -CS  --  8  -CS       Functional Assessment    Outcome Measure Options  --  AM-PAC 6 Clicks Basic Mobility (PT)  -LF  --    Row Name 06/13/19 0857 06/12/19 1436 06/12/19 1320       How much help from another person do you currently need...    Turning from your back to your side while in flat bed without using bedrails?  3  -LF  --  3  -TW    Moving from lying on back to sitting on the side of a flat bed without bedrails?  3  -LF  --  3  -TW    Moving to and from a bed to a chair (including a wheelchair)?  3  -LF  --  2  -TW    Standing up from a chair using your arms (e.g., wheelchair, bedside chair)?  3  -LF  --  2  -TW    Climbing 3-5 steps with a railing?  1  -LF  --  1  -TW    To walk in hospital room?  2  -LF  --  2  -TW    AM-PAC 6 Clicks Score  15  -LF  --  13  -TW       How much help from another is currently needed...    Putting on and taking off regular lower body clothing?  --  1  -BH  --    Bathing (including washing, rinsing, and drying)  --  1  -BH  --    Toileting (which includes using toilet bed pan or urinal)  --  1  -BH  --    Putting on and taking off regular upper body clothing  --  2  -BH  --    Taking care of personal grooming (such as brushing teeth)  --  2  -BH  --    Eating meals  --  1  -BH  --    Score  --  8  -BH  --       Functional Assessment    Outcome Measure Options  AM-PAC 6 Clicks Basic Mobility (PT)  -LF  AM-PAC 6 Clicks Daily Activity (OT)  -BH  AM-PAC 6 Clicks Basic Mobility (PT)  -TW    Row Name  06/11/19 1550             How much help from another person do you currently need...    Turning from your back to your side while in flat bed without using bedrails?  3  -TW      Moving from lying on back to sitting on the side of a flat bed without bedrails?  3  -TW      Moving to and from a bed to a chair (including a wheelchair)?  2  -TW      Standing up from a chair using your arms (e.g., wheelchair, bedside chair)?  2  -TW      Climbing 3-5 steps with a railing?  1  -TW      To walk in hospital room?  2  -TW      AM-PAC 6 Clicks Score  13  -TW         Functional Assessment    Outcome Measure Options  AM-PAC 6 Clicks Basic Mobility (PT)  -TW        User Key  (r) = Recorded By, (t) = Taken By, (c) = Cosigned By    Initials Name Provider Type     Dee Mendez, OTR/L Occupational Therapist    TW Karl Zheng, PTA Physical Therapy Assistant    CS Cathy Dillon, PELAYO/DERICK Occupational Therapy Assistant    LF Donna Stacy, PT Physical Therapist           Time Calculation:   Time Calculation- OT     Row Name 06/14/19 1524             Time Calculation- OT    OT Start Time  1318  -CS      OT Stop Time  1353  -CS      OT Time Calculation (min)  35 min  -CS      Total Timed Code Minutes- OT  35 minute(s)  -CS      OT Received On  06/14/19  -        User Key  (r) = Recorded By, (t) = Taken By, (c) = Cosigned By    Initials Name Provider Type     Cathy Dillon PELAYO/DERICK Occupational Therapy Assistant        Therapy Charges for Today     Code Description Service Date Service Provider Modifiers Qty    42126913997 HC OT THER PROC EA 15 MIN 6/13/2019 Cathy Dillon COTA/L GO 2    25536437730 HC OT THERAPEUTIC ACT EA 15 MIN 6/13/2019 Cathy Dillon COTA/L GO 1    75333286071 HC OT THER PROC EA 15 MIN 6/14/2019 Cathy Dillon COTA/L GO 2               FREDDY Nguyen  6/14/2019

## 2019-06-14 NOTE — PLAN OF CARE
Problem: Patient Care Overview  Goal: Plan of Care Review  Outcome: Ongoing (interventions implemented as appropriate)   06/14/19 1670   Coping/Psychosocial   Plan of Care Reviewed With patient   Plan of Care Review   Progress improving   OTHER   Outcome Summary Pt tolerated tx well this date. Pt deferred to any EOB activities. Pt gave good effort with UE ther ex. Continue OT POC.

## 2019-06-15 LAB
BACTERIA BLD CULT: ABNORMAL
POTASSIUM BLD-SCNC: 4.2 MMOL/L (ref 3.5–5.2)
VANCOMYCIN SERPL-MCNC: 9.6 MCG/ML (ref 5–40)

## 2019-06-15 PROCEDURE — 25010000002 FLUCONAZOLE PER 200 MG: Performed by: INTERNAL MEDICINE

## 2019-06-15 PROCEDURE — 80202 ASSAY OF VANCOMYCIN: CPT | Performed by: INTERNAL MEDICINE

## 2019-06-15 PROCEDURE — 84132 ASSAY OF SERUM POTASSIUM: CPT | Performed by: INTERNAL MEDICINE

## 2019-06-15 PROCEDURE — 25010000002 HEPARIN (PORCINE) PER 1000 UNITS: Performed by: INTERNAL MEDICINE

## 2019-06-15 PROCEDURE — 25010000002 VANCOMYCIN: Performed by: INTERNAL MEDICINE

## 2019-06-15 RX ORDER — IPRATROPIUM BROMIDE AND ALBUTEROL SULFATE 2.5; .5 MG/3ML; MG/3ML
3 SOLUTION RESPIRATORY (INHALATION) EVERY 4 HOURS PRN
Status: DISCONTINUED | OUTPATIENT
Start: 2019-06-15 | End: 2019-07-06 | Stop reason: HOSPADM

## 2019-06-16 LAB
ALBUMIN SERPL-MCNC: 3 G/DL (ref 3.5–5.2)
ALBUMIN/GLOB SERPL: 1.2 G/DL
ALP SERPL-CCNC: 120 U/L (ref 39–117)
ALT SERPL W P-5'-P-CCNC: 13 U/L (ref 1–33)
ANION GAP SERPL CALCULATED.3IONS-SCNC: 10 MMOL/L
AST SERPL-CCNC: 20 U/L (ref 1–32)
BACTERIA SPEC AEROBE CULT: ABNORMAL
BILIRUB SERPL-MCNC: 0.2 MG/DL (ref 0.2–1.2)
BUN BLD-MCNC: 5 MG/DL (ref 8–23)
BUN/CREAT SERPL: 6.8 (ref 7–25)
CALCIUM SPEC-SCNC: 8.6 MG/DL (ref 8.6–10.5)
CHLORIDE SERPL-SCNC: 102 MMOL/L (ref 98–107)
CO2 SERPL-SCNC: 31 MMOL/L (ref 22–29)
CREAT BLD-MCNC: 0.74 MG/DL (ref 0.57–1)
DEPRECATED RDW RBC AUTO: 48.1 FL (ref 37–54)
ERYTHROCYTE [DISTWIDTH] IN BLOOD BY AUTOMATED COUNT: 13.1 % (ref 12.3–15.4)
GFR SERPL CREATININE-BSD FRML MDRD: 78 ML/MIN/1.73
GLOBULIN UR ELPH-MCNC: 2.6 GM/DL
GLUCOSE BLD-MCNC: 91 MG/DL (ref 65–99)
GRAM STN SPEC: ABNORMAL
HCT VFR BLD AUTO: 33.6 % (ref 34–46.6)
HGB BLD-MCNC: 10.6 G/DL (ref 12–15.9)
ISOLATED FROM: ABNORMAL
MAGNESIUM SERPL-MCNC: 2 MG/DL (ref 1.6–2.4)
MCH RBC QN AUTO: 31.7 PG (ref 26.6–33)
MCHC RBC AUTO-ENTMCNC: 31.5 G/DL (ref 31.5–35.7)
MCV RBC AUTO: 100.6 FL (ref 79–97)
PLATELET # BLD AUTO: 249 10*3/MM3 (ref 140–450)
PMV BLD AUTO: 9.9 FL (ref 6–12)
POTASSIUM BLD-SCNC: 4.5 MMOL/L (ref 3.5–5.2)
PROT SERPL-MCNC: 5.6 G/DL (ref 6–8.5)
RBC # BLD AUTO: 3.34 10*6/MM3 (ref 3.77–5.28)
SODIUM BLD-SCNC: 143 MMOL/L (ref 136–145)
WBC NRBC COR # BLD: 6.2 10*3/MM3 (ref 3.4–10.8)

## 2019-06-16 PROCEDURE — 25010000002 HEPARIN (PORCINE) PER 1000 UNITS: Performed by: INTERNAL MEDICINE

## 2019-06-16 PROCEDURE — 25010000002 FLUCONAZOLE PER 200 MG: Performed by: INTERNAL MEDICINE

## 2019-06-16 PROCEDURE — 83735 ASSAY OF MAGNESIUM: CPT | Performed by: INTERNAL MEDICINE

## 2019-06-16 PROCEDURE — 85027 COMPLETE CBC AUTOMATED: CPT | Performed by: INTERNAL MEDICINE

## 2019-06-16 PROCEDURE — 97162 PT EVAL MOD COMPLEX 30 MIN: CPT

## 2019-06-16 PROCEDURE — 80053 COMPREHEN METABOLIC PANEL: CPT | Performed by: INTERNAL MEDICINE

## 2019-06-17 PROCEDURE — 87186 SC STD MICRODIL/AGAR DIL: CPT | Performed by: INTERNAL MEDICINE

## 2019-06-17 PROCEDURE — 25010000002 HEPARIN (PORCINE) PER 1000 UNITS: Performed by: INTERNAL MEDICINE

## 2019-06-17 PROCEDURE — 87070 CULTURE OTHR SPECIMN AEROBIC: CPT | Performed by: INTERNAL MEDICINE

## 2019-06-17 PROCEDURE — 97530 THERAPEUTIC ACTIVITIES: CPT

## 2019-06-17 PROCEDURE — 87147 CULTURE TYPE IMMUNOLOGIC: CPT | Performed by: INTERNAL MEDICINE

## 2019-06-17 PROCEDURE — 87205 SMEAR GRAM STAIN: CPT | Performed by: INTERNAL MEDICINE

## 2019-06-17 PROCEDURE — 25010000002 FLUCONAZOLE PER 200 MG: Performed by: INTERNAL MEDICINE

## 2019-06-17 PROCEDURE — 25010000002 VANCOMYCIN: Performed by: INTERNAL MEDICINE

## 2019-06-17 PROCEDURE — 97166 OT EVAL MOD COMPLEX 45 MIN: CPT

## 2019-06-17 PROCEDURE — 97110 THERAPEUTIC EXERCISES: CPT

## 2019-06-17 RX ORDER — ACETAMINOPHEN 325 MG/1
650 TABLET ORAL EVERY 6 HOURS PRN
Status: DISCONTINUED | OUTPATIENT
Start: 2019-06-17 | End: 2019-07-06 | Stop reason: HOSPADM

## 2019-06-17 RX ORDER — SODIUM CHLORIDE 9 MG/ML
INJECTION, SOLUTION INTRAVENOUS
Status: DISPENSED
Start: 2019-06-17 | End: 2019-06-17

## 2019-06-18 LAB
ALBUMIN SERPL-MCNC: 2.9 G/DL (ref 3.5–5.2)
ALBUMIN/GLOB SERPL: 1 G/DL
ALP SERPL-CCNC: 129 U/L (ref 39–117)
ALT SERPL W P-5'-P-CCNC: 20 U/L (ref 1–33)
ANION GAP SERPL CALCULATED.3IONS-SCNC: 12 MMOL/L
AST SERPL-CCNC: 27 U/L (ref 1–32)
BILIRUB SERPL-MCNC: 0.2 MG/DL (ref 0.2–1.2)
BUN BLD-MCNC: 7 MG/DL (ref 8–23)
BUN/CREAT SERPL: 8.4 (ref 7–25)
CALCIUM SPEC-SCNC: 8.6 MG/DL (ref 8.6–10.5)
CHLORIDE SERPL-SCNC: 102 MMOL/L (ref 98–107)
CO2 SERPL-SCNC: 29 MMOL/L (ref 22–29)
CREAT BLD-MCNC: 0.83 MG/DL (ref 0.57–1)
DEPRECATED RDW RBC AUTO: 45.7 FL (ref 37–54)
ERYTHROCYTE [DISTWIDTH] IN BLOOD BY AUTOMATED COUNT: 12.8 % (ref 12.3–15.4)
GFR SERPL CREATININE-BSD FRML MDRD: 69 ML/MIN/1.73
GLOBULIN UR ELPH-MCNC: 2.8 GM/DL
GLUCOSE BLD-MCNC: 89 MG/DL (ref 65–99)
HCT VFR BLD AUTO: 32 % (ref 34–46.6)
HGB BLD-MCNC: 10.3 G/DL (ref 12–15.9)
MAGNESIUM SERPL-MCNC: 2 MG/DL (ref 1.6–2.4)
MCH RBC QN AUTO: 31.4 PG (ref 26.6–33)
MCHC RBC AUTO-ENTMCNC: 32.2 G/DL (ref 31.5–35.7)
MCV RBC AUTO: 97.6 FL (ref 79–97)
PLATELET # BLD AUTO: 230 10*3/MM3 (ref 140–450)
PMV BLD AUTO: 10.2 FL (ref 6–12)
POTASSIUM BLD-SCNC: 4.2 MMOL/L (ref 3.5–5.2)
PROT SERPL-MCNC: 5.7 G/DL (ref 6–8.5)
RBC # BLD AUTO: 3.28 10*6/MM3 (ref 3.77–5.28)
SODIUM BLD-SCNC: 143 MMOL/L (ref 136–145)
VANCOMYCIN TROUGH SERPL-MCNC: 9.9 MCG/ML (ref 5–20)
WBC NRBC COR # BLD: 6.16 10*3/MM3 (ref 3.4–10.8)

## 2019-06-18 PROCEDURE — 80053 COMPREHEN METABOLIC PANEL: CPT | Performed by: INTERNAL MEDICINE

## 2019-06-18 PROCEDURE — 25010000002 HEPARIN (PORCINE) PER 1000 UNITS: Performed by: INTERNAL MEDICINE

## 2019-06-18 PROCEDURE — 83735 ASSAY OF MAGNESIUM: CPT | Performed by: INTERNAL MEDICINE

## 2019-06-18 PROCEDURE — 85027 COMPLETE CBC AUTOMATED: CPT | Performed by: INTERNAL MEDICINE

## 2019-06-18 PROCEDURE — 97110 THERAPEUTIC EXERCISES: CPT

## 2019-06-18 PROCEDURE — 25010000002 VANCOMYCIN: Performed by: INTERNAL MEDICINE

## 2019-06-18 PROCEDURE — 97530 THERAPEUTIC ACTIVITIES: CPT

## 2019-06-18 PROCEDURE — 97535 SELF CARE MNGMENT TRAINING: CPT

## 2019-06-18 PROCEDURE — 25010000002 FLUCONAZOLE PER 200 MG: Performed by: INTERNAL MEDICINE

## 2019-06-18 PROCEDURE — 80202 ASSAY OF VANCOMYCIN: CPT | Performed by: INTERNAL MEDICINE

## 2019-06-18 RX ORDER — SODIUM CHLORIDE 0.9 % (FLUSH) 0.9 %
10 SYRINGE (ML) INJECTION AS NEEDED
Status: DISCONTINUED | OUTPATIENT
Start: 2019-06-18 | End: 2019-07-06 | Stop reason: HOSPADM

## 2019-06-18 RX ORDER — SODIUM CHLORIDE 0.9 % (FLUSH) 0.9 %
10 SYRINGE (ML) INJECTION EVERY 8 HOURS SCHEDULED
Status: DISCONTINUED | OUTPATIENT
Start: 2019-06-18 | End: 2019-07-06 | Stop reason: HOSPADM

## 2019-06-18 RX ORDER — HEPARIN SODIUM,PORCINE 10 UNIT/ML
30 VIAL (ML) INTRAVENOUS EVERY 8 HOURS SCHEDULED
Status: DISCONTINUED | OUTPATIENT
Start: 2019-06-18 | End: 2019-07-06 | Stop reason: HOSPADM

## 2019-06-18 RX ORDER — SODIUM CHLORIDE 9 MG/ML
INJECTION, SOLUTION INTRAVENOUS
Status: DISPENSED
Start: 2019-06-18 | End: 2019-06-18

## 2019-06-19 LAB
BACTERIA SPEC AEROBE CULT: ABNORMAL
BACTERIA SPEC AEROBE CULT: ABNORMAL
GRAM STN SPEC: ABNORMAL
GRAM STN SPEC: ABNORMAL

## 2019-06-19 PROCEDURE — 97530 THERAPEUTIC ACTIVITIES: CPT

## 2019-06-19 PROCEDURE — 97110 THERAPEUTIC EXERCISES: CPT

## 2019-06-19 PROCEDURE — 25010000002 FLUCONAZOLE PER 200 MG: Performed by: INTERNAL MEDICINE

## 2019-06-19 PROCEDURE — 25010000002 HEPARIN (PORCINE) PER 1000 UNITS: Performed by: INTERNAL MEDICINE

## 2019-06-19 PROCEDURE — 97116 GAIT TRAINING THERAPY: CPT

## 2019-06-20 LAB
ALBUMIN SERPL-MCNC: 3.1 G/DL (ref 3.5–5.2)
ALBUMIN/GLOB SERPL: 1.2 G/DL
ALP SERPL-CCNC: 114 U/L (ref 39–117)
ALT SERPL W P-5'-P-CCNC: 14 U/L (ref 1–33)
ANION GAP SERPL CALCULATED.3IONS-SCNC: 11 MMOL/L
AST SERPL-CCNC: 19 U/L (ref 1–32)
BILIRUB SERPL-MCNC: 0.2 MG/DL (ref 0.2–1.2)
BUN BLD-MCNC: 12 MG/DL (ref 8–23)
BUN/CREAT SERPL: 13.6 (ref 7–25)
CALCIUM SPEC-SCNC: 8.2 MG/DL (ref 8.6–10.5)
CHLORIDE SERPL-SCNC: 100 MMOL/L (ref 98–107)
CO2 SERPL-SCNC: 29 MMOL/L (ref 22–29)
CREAT BLD-MCNC: 0.88 MG/DL (ref 0.57–1)
DEPRECATED RDW RBC AUTO: 46.3 FL (ref 37–54)
ERYTHROCYTE [DISTWIDTH] IN BLOOD BY AUTOMATED COUNT: 13.2 % (ref 12.3–15.4)
GFR SERPL CREATININE-BSD FRML MDRD: 64 ML/MIN/1.73
GLOBULIN UR ELPH-MCNC: 2.5 GM/DL
GLUCOSE BLD-MCNC: 96 MG/DL (ref 65–99)
HCT VFR BLD AUTO: 30.1 % (ref 34–46.6)
HGB BLD-MCNC: 9.8 G/DL (ref 12–15.9)
MAGNESIUM SERPL-MCNC: 1.8 MG/DL (ref 1.6–2.4)
MCH RBC QN AUTO: 31.9 PG (ref 26.6–33)
MCHC RBC AUTO-ENTMCNC: 32.6 G/DL (ref 31.5–35.7)
MCV RBC AUTO: 98 FL (ref 79–97)
PLATELET # BLD AUTO: 212 10*3/MM3 (ref 140–450)
PMV BLD AUTO: 10.5 FL (ref 6–12)
POTASSIUM BLD-SCNC: 3.3 MMOL/L (ref 3.5–5.2)
PROT SERPL-MCNC: 5.6 G/DL (ref 6–8.5)
RBC # BLD AUTO: 3.07 10*6/MM3 (ref 3.77–5.28)
SODIUM BLD-SCNC: 140 MMOL/L (ref 136–145)
WBC NRBC COR # BLD: 6.99 10*3/MM3 (ref 3.4–10.8)

## 2019-06-20 PROCEDURE — 25010000002 VANCOMYCIN: Performed by: INTERNAL MEDICINE

## 2019-06-20 PROCEDURE — 25010000002 HEPARIN (PORCINE) PER 1000 UNITS: Performed by: INTERNAL MEDICINE

## 2019-06-20 PROCEDURE — 97535 SELF CARE MNGMENT TRAINING: CPT

## 2019-06-20 PROCEDURE — 83735 ASSAY OF MAGNESIUM: CPT | Performed by: INTERNAL MEDICINE

## 2019-06-20 PROCEDURE — 97116 GAIT TRAINING THERAPY: CPT

## 2019-06-20 PROCEDURE — 97110 THERAPEUTIC EXERCISES: CPT

## 2019-06-20 PROCEDURE — 85027 COMPLETE CBC AUTOMATED: CPT | Performed by: INTERNAL MEDICINE

## 2019-06-20 PROCEDURE — 80053 COMPREHEN METABOLIC PANEL: CPT | Performed by: INTERNAL MEDICINE

## 2019-06-20 RX ORDER — POTASSIUM CHLORIDE 750 MG/1
40 CAPSULE, EXTENDED RELEASE ORAL
Status: DISPENSED | OUTPATIENT
Start: 2019-06-20 | End: 2019-06-20

## 2019-06-21 LAB — POTASSIUM BLD-SCNC: 3.7 MMOL/L (ref 3.5–5.2)

## 2019-06-21 PROCEDURE — 25010000002 HEPARIN (PORCINE) PER 1000 UNITS: Performed by: INTERNAL MEDICINE

## 2019-06-21 PROCEDURE — 97530 THERAPEUTIC ACTIVITIES: CPT

## 2019-06-21 PROCEDURE — 97110 THERAPEUTIC EXERCISES: CPT

## 2019-06-21 PROCEDURE — 84132 ASSAY OF SERUM POTASSIUM: CPT | Performed by: INTERNAL MEDICINE

## 2019-06-21 NOTE — DISCHARGE SUMMARY
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  She was initially kept in the intensive care unit for several days until she was deemed stable for transfer to the floor.  Due to her pre-existing medical conditions and deconditioning the patient participated with physical therapy and occupational therapy chiefly from her bed.  Several days postoperatively the patient was noted to have increasing amounts of drainage from the inferior aspect of her wound.  Her wound was opened and there was purulent appearing drainage.  Subsequently the patient began having similar drainage from her vagina.  A CT scan was performed showing no definite drainable abscesses and no evidence of leakage from the small intestine.  Patient was kept on antibiotics and her drainage nearly completely subsided.  She underwent local wound care.  She continued to work with physical therapy.  At the time of discharge patient requested transfer to the LT for further rehabilitation.    Condition at time of Discharge: Stable  Discharge Diet: Regular      Discharge Medications:     Your medication list      START taking these medications      Instructions Last Dose Given Next Dose Due   fluconazole 200-0.9 MG/100ML-% IVPB  Commonly known as:  DIFLUCAN  Start taking on:  6/15/2019      Infuse 100 mL into a venous catheter Daily for 3 doses.          CONTINUE taking these medications      Instructions Last Dose Given Next Dose Due   albuterol sulfate  (90 Base) MCG/ACT inhaler  Commonly known as:  PROVENTIL  HFA;VENTOLIN HFA;PROAIR HFA      Inhale 2 puffs Every 4 (Four) Hours As Needed for Wheezing.       bumetanide 1 MG tablet  Commonly known as:  BUMEX      Take 1 mg by mouth Daily.       COPAXONE 40 MG/ML solution prefilled syringe  Generic drug:  Glatiramer Acetate      Every Evening. Monday Wednesday and Friday       donepezil 5 MG tablet  Commonly known as:  ARICEPT      Take 5 mg by mouth Every Night.       gabapentin 600 MG tablet  Commonly known as:  NEURONTIN      Take 600 mg by mouth 3 (Three) Times a Day.       levothyroxine 137 MCG tablet  Commonly known as:  SYNTHROID, LEVOTHROID      Take 137 mcg by mouth Daily. No med changes after last   level check       melatonin 5 MG tablet tablet      Take 5 mg by mouth Every Night.       memantine 7 MG capsule sustained-release 24 hr extended release capsule  Commonly known as:  NAMENDA XR      Take 28 mg by mouth Daily.       REMERON 45 MG tablet  Generic drug:  mirtazapine      Take 45 mg by mouth Every Night.       nystatin 511299 UNIT/GM cream  Commonly known as:  MYCOSTATIN      Apply 1 application topically As Needed.       omeprazole 20 MG capsule  Commonly known as:  PRILOSEC      Take 1 capsule by mouth 2 (Two) Times a Day.       ondansetron 4 MG tablet  Commonly known as:  ZOFRAN      Take 4 mg by mouth Every 8 (Eight) Hours As Needed for Nausea or Vomiting.       ONE-A-DAY WOMENS FORMULA PO      Take 1 tablet by mouth Daily.       oxyCODONE-acetaminophen 7.5-325 MG per tablet  Commonly known as:  PERCOCET      Take 1 tablet by mouth Every 6 (Six) Hours As Needed.       potassium chloride 10 MEQ CR capsule  Commonly known as:  MICRO-K      Take 10 mEq by mouth 3 (Three) Times a Day.       primidone 50 MG tablet  Commonly known as:  MYSOLINE      Take  by mouth 3 (Three) Times a Day.       rosuvastatin 10 MG tablet  Commonly known as:  CRESTOR      Take 10 mg by mouth As Needed.       tiZANidine 4 MG tablet  Commonly known as:  ZANAFLEX      Take 4 mg by  mouth 2 (Two) Times a Day As Needed for Muscle Spasms.       vilazodone 20 MG tablet tablet  Commonly known as:  VIIBRYD      Take 20 mg by mouth Daily.             Where to Get Your Medications      These medications were sent to [facility] - [place], [place] - [address] - [phone] PH - [phone] FX  [address], [facility] [address]    Phone:  [phone]   · fluconazole 200-0.9 MG/100ML-% IVPB         Activity Instructions     Discharge Activity      1) Continue physical therapy and occupational therapy.  2) Change abdominal dressing twice daily and as needed.  3) Remove staples in 1 week.                        This document has been electronically signed by Timothy Dixon MD on June 21, 2019 2:37 PM

## 2019-06-22 LAB
ALBUMIN SERPL-MCNC: 3.3 G/DL (ref 3.5–5.2)
ALBUMIN/GLOB SERPL: 1.2 G/DL
ALP SERPL-CCNC: 112 U/L (ref 39–117)
ALT SERPL W P-5'-P-CCNC: 17 U/L (ref 1–33)
ANION GAP SERPL CALCULATED.3IONS-SCNC: 15 MMOL/L
AST SERPL-CCNC: 24 U/L (ref 1–32)
BILIRUB SERPL-MCNC: 0.3 MG/DL (ref 0.2–1.2)
BUN BLD-MCNC: 11 MG/DL (ref 8–23)
BUN/CREAT SERPL: 13.6 (ref 7–25)
CALCIUM SPEC-SCNC: 8.6 MG/DL (ref 8.6–10.5)
CHLORIDE SERPL-SCNC: 100 MMOL/L (ref 98–107)
CO2 SERPL-SCNC: 25 MMOL/L (ref 22–29)
CREAT BLD-MCNC: 0.81 MG/DL (ref 0.57–1)
DEPRECATED RDW RBC AUTO: 47.1 FL (ref 37–54)
ERYTHROCYTE [DISTWIDTH] IN BLOOD BY AUTOMATED COUNT: 13.3 % (ref 12.3–15.4)
GFR SERPL CREATININE-BSD FRML MDRD: 71 ML/MIN/1.73
GLOBULIN UR ELPH-MCNC: 2.7 GM/DL
GLUCOSE BLD-MCNC: 92 MG/DL (ref 65–99)
HCT VFR BLD AUTO: 32.6 % (ref 34–46.6)
HGB BLD-MCNC: 10.6 G/DL (ref 12–15.9)
MAGNESIUM SERPL-MCNC: 1.8 MG/DL (ref 1.6–2.4)
MCH RBC QN AUTO: 31.8 PG (ref 26.6–33)
MCHC RBC AUTO-ENTMCNC: 32.5 G/DL (ref 31.5–35.7)
MCV RBC AUTO: 97.9 FL (ref 79–97)
PLATELET # BLD AUTO: 213 10*3/MM3 (ref 140–450)
PMV BLD AUTO: 10.3 FL (ref 6–12)
POTASSIUM BLD-SCNC: 3.7 MMOL/L (ref 3.5–5.2)
PROT SERPL-MCNC: 6 G/DL (ref 6–8.5)
RBC # BLD AUTO: 3.33 10*6/MM3 (ref 3.77–5.28)
SODIUM BLD-SCNC: 140 MMOL/L (ref 136–145)
WBC NRBC COR # BLD: 7.06 10*3/MM3 (ref 3.4–10.8)

## 2019-06-22 PROCEDURE — 83735 ASSAY OF MAGNESIUM: CPT | Performed by: INTERNAL MEDICINE

## 2019-06-22 PROCEDURE — 80053 COMPREHEN METABOLIC PANEL: CPT | Performed by: INTERNAL MEDICINE

## 2019-06-22 PROCEDURE — 85027 COMPLETE CBC AUTOMATED: CPT | Performed by: INTERNAL MEDICINE

## 2019-06-22 PROCEDURE — 25010000002 HEPARIN (PORCINE) PER 1000 UNITS: Performed by: INTERNAL MEDICINE

## 2019-06-23 PROCEDURE — 97110 THERAPEUTIC EXERCISES: CPT

## 2019-06-23 PROCEDURE — 25010000002 HEPARIN (PORCINE) PER 1000 UNITS: Performed by: INTERNAL MEDICINE

## 2019-06-23 PROCEDURE — 97530 THERAPEUTIC ACTIVITIES: CPT

## 2019-06-23 PROCEDURE — 97116 GAIT TRAINING THERAPY: CPT

## 2019-06-24 LAB
ALBUMIN SERPL-MCNC: 3.2 G/DL (ref 3.5–5.2)
ALBUMIN/GLOB SERPL: 1.1 G/DL
ALP SERPL-CCNC: 113 U/L (ref 39–117)
ALT SERPL W P-5'-P-CCNC: 18 U/L (ref 1–33)
ANION GAP SERPL CALCULATED.3IONS-SCNC: 13 MMOL/L
AST SERPL-CCNC: 26 U/L (ref 1–32)
BILIRUB SERPL-MCNC: 0.4 MG/DL (ref 0.2–1.2)
BUN BLD-MCNC: 13 MG/DL (ref 8–23)
BUN/CREAT SERPL: 13.1 (ref 7–25)
CALCIUM SPEC-SCNC: 8.6 MG/DL (ref 8.6–10.5)
CHLORIDE SERPL-SCNC: 101 MMOL/L (ref 98–107)
CO2 SERPL-SCNC: 28 MMOL/L (ref 22–29)
CREAT BLD-MCNC: 0.99 MG/DL (ref 0.57–1)
DEPRECATED RDW RBC AUTO: 48.4 FL (ref 37–54)
ERYTHROCYTE [DISTWIDTH] IN BLOOD BY AUTOMATED COUNT: 13.8 % (ref 12.3–15.4)
GFR SERPL CREATININE-BSD FRML MDRD: 56 ML/MIN/1.73
GLOBULIN UR ELPH-MCNC: 2.8 GM/DL
GLUCOSE BLD-MCNC: 97 MG/DL (ref 65–99)
HCT VFR BLD AUTO: 31.4 % (ref 34–46.6)
HGB BLD-MCNC: 10.2 G/DL (ref 12–15.9)
MAGNESIUM SERPL-MCNC: 1.9 MG/DL (ref 1.6–2.4)
MCH RBC QN AUTO: 31.6 PG (ref 26.6–33)
MCHC RBC AUTO-ENTMCNC: 32.5 G/DL (ref 31.5–35.7)
MCV RBC AUTO: 97.2 FL (ref 79–97)
PLATELET # BLD AUTO: 193 10*3/MM3 (ref 140–450)
PMV BLD AUTO: 10.3 FL (ref 6–12)
POTASSIUM BLD-SCNC: 4.1 MMOL/L (ref 3.5–5.2)
PROT SERPL-MCNC: 6 G/DL (ref 6–8.5)
RBC # BLD AUTO: 3.23 10*6/MM3 (ref 3.77–5.28)
SODIUM BLD-SCNC: 142 MMOL/L (ref 136–145)
WBC NRBC COR # BLD: 7.56 10*3/MM3 (ref 3.4–10.8)

## 2019-06-24 PROCEDURE — 85027 COMPLETE CBC AUTOMATED: CPT | Performed by: INTERNAL MEDICINE

## 2019-06-24 PROCEDURE — 97530 THERAPEUTIC ACTIVITIES: CPT

## 2019-06-24 PROCEDURE — 97110 THERAPEUTIC EXERCISES: CPT

## 2019-06-24 PROCEDURE — 97535 SELF CARE MNGMENT TRAINING: CPT

## 2019-06-24 PROCEDURE — 97116 GAIT TRAINING THERAPY: CPT

## 2019-06-24 PROCEDURE — 83735 ASSAY OF MAGNESIUM: CPT | Performed by: INTERNAL MEDICINE

## 2019-06-24 PROCEDURE — 25010000002 HEPARIN (PORCINE) PER 1000 UNITS: Performed by: INTERNAL MEDICINE

## 2019-06-24 PROCEDURE — 80053 COMPREHEN METABOLIC PANEL: CPT | Performed by: INTERNAL MEDICINE

## 2019-06-24 RX ORDER — HYDROCODONE BITARTRATE AND ACETAMINOPHEN 7.5; 325 MG/1; MG/1
1 TABLET ORAL EVERY 4 HOURS PRN
Status: DISPENSED | OUTPATIENT
Start: 2019-06-24 | End: 2019-07-04

## 2019-06-25 PROCEDURE — 97535 SELF CARE MNGMENT TRAINING: CPT

## 2019-06-25 PROCEDURE — 97116 GAIT TRAINING THERAPY: CPT

## 2019-06-25 PROCEDURE — 97110 THERAPEUTIC EXERCISES: CPT

## 2019-06-25 PROCEDURE — 25010000002 HEPARIN (PORCINE) PER 1000 UNITS: Performed by: INTERNAL MEDICINE

## 2019-06-26 LAB
ALBUMIN SERPL-MCNC: 3.5 G/DL (ref 3.5–5.2)
ALBUMIN/GLOB SERPL: 1.3 G/DL
ALP SERPL-CCNC: 120 U/L (ref 39–117)
ALT SERPL W P-5'-P-CCNC: 19 U/L (ref 1–33)
ANION GAP SERPL CALCULATED.3IONS-SCNC: 14 MMOL/L
AST SERPL-CCNC: 22 U/L (ref 1–32)
BILIRUB SERPL-MCNC: 0.3 MG/DL (ref 0.2–1.2)
BILIRUB UR QL STRIP: NEGATIVE
BUN BLD-MCNC: 16 MG/DL (ref 8–23)
BUN/CREAT SERPL: 16.2 (ref 7–25)
CALCIUM SPEC-SCNC: 8.5 MG/DL (ref 8.6–10.5)
CHLORIDE SERPL-SCNC: 98 MMOL/L (ref 98–107)
CLARITY UR: CLEAR
CO2 SERPL-SCNC: 27 MMOL/L (ref 22–29)
COLOR UR: YELLOW
CREAT BLD-MCNC: 0.99 MG/DL (ref 0.57–1)
DEPRECATED RDW RBC AUTO: 47.6 FL (ref 37–54)
ERYTHROCYTE [DISTWIDTH] IN BLOOD BY AUTOMATED COUNT: 13.5 % (ref 12.3–15.4)
GFR SERPL CREATININE-BSD FRML MDRD: 56 ML/MIN/1.73
GLOBULIN UR ELPH-MCNC: 2.6 GM/DL
GLUCOSE BLD-MCNC: 98 MG/DL (ref 65–99)
GLUCOSE UR STRIP-MCNC: NEGATIVE MG/DL
HCT VFR BLD AUTO: 31.5 % (ref 34–46.6)
HGB BLD-MCNC: 10.4 G/DL (ref 12–15.9)
HGB UR QL STRIP.AUTO: NEGATIVE
KETONES UR QL STRIP: NEGATIVE
LEUKOCYTE ESTERASE UR QL STRIP.AUTO: NEGATIVE
MAGNESIUM SERPL-MCNC: 1.8 MG/DL (ref 1.6–2.4)
MCH RBC QN AUTO: 31.9 PG (ref 26.6–33)
MCHC RBC AUTO-ENTMCNC: 33 G/DL (ref 31.5–35.7)
MCV RBC AUTO: 96.6 FL (ref 79–97)
NITRITE UR QL STRIP: NEGATIVE
PH UR STRIP.AUTO: 6 [PH] (ref 5–9)
PLATELET # BLD AUTO: 166 10*3/MM3 (ref 140–450)
PMV BLD AUTO: 10.6 FL (ref 6–12)
POTASSIUM BLD-SCNC: 3.3 MMOL/L (ref 3.5–5.2)
PROT SERPL-MCNC: 6.1 G/DL (ref 6–8.5)
PROT UR QL STRIP: NEGATIVE
RBC # BLD AUTO: 3.26 10*6/MM3 (ref 3.77–5.28)
SODIUM BLD-SCNC: 139 MMOL/L (ref 136–145)
SP GR UR STRIP: 1 (ref 1–1.03)
UROBILINOGEN UR QL STRIP: NORMAL
WBC NRBC COR # BLD: 7.66 10*3/MM3 (ref 3.4–10.8)

## 2019-06-26 PROCEDURE — 81003 URINALYSIS AUTO W/O SCOPE: CPT | Performed by: INTERNAL MEDICINE

## 2019-06-26 PROCEDURE — 84132 ASSAY OF SERUM POTASSIUM: CPT | Performed by: INTERNAL MEDICINE

## 2019-06-26 PROCEDURE — 80053 COMPREHEN METABOLIC PANEL: CPT | Performed by: INTERNAL MEDICINE

## 2019-06-26 PROCEDURE — 97530 THERAPEUTIC ACTIVITIES: CPT

## 2019-06-26 PROCEDURE — 97116 GAIT TRAINING THERAPY: CPT

## 2019-06-26 PROCEDURE — 83735 ASSAY OF MAGNESIUM: CPT | Performed by: INTERNAL MEDICINE

## 2019-06-26 PROCEDURE — 85027 COMPLETE CBC AUTOMATED: CPT | Performed by: INTERNAL MEDICINE

## 2019-06-26 PROCEDURE — 25010000002 HEPARIN (PORCINE) PER 1000 UNITS: Performed by: INTERNAL MEDICINE

## 2019-06-26 PROCEDURE — 97535 SELF CARE MNGMENT TRAINING: CPT

## 2019-06-26 RX ORDER — POTASSIUM CHLORIDE 750 MG/1
40 CAPSULE, EXTENDED RELEASE ORAL
Status: DISPENSED | OUTPATIENT
Start: 2019-06-26 | End: 2019-06-26

## 2019-06-27 LAB — POTASSIUM BLD-SCNC: 3.7 MMOL/L (ref 3.5–5.2)

## 2019-06-27 PROCEDURE — 97530 THERAPEUTIC ACTIVITIES: CPT

## 2019-06-27 PROCEDURE — 93005 ELECTROCARDIOGRAM TRACING: CPT | Performed by: INTERNAL MEDICINE

## 2019-06-27 PROCEDURE — 25010000002 HEPARIN (PORCINE) PER 1000 UNITS: Performed by: INTERNAL MEDICINE

## 2019-06-27 PROCEDURE — 93010 ELECTROCARDIOGRAM REPORT: CPT | Performed by: INTERNAL MEDICINE

## 2019-06-27 PROCEDURE — 97116 GAIT TRAINING THERAPY: CPT

## 2019-06-27 RX ORDER — ALUMINA, MAGNESIA, AND SIMETHICONE 2400; 2400; 240 MG/30ML; MG/30ML; MG/30ML
15 SUSPENSION ORAL
Status: DISCONTINUED | OUTPATIENT
Start: 2019-06-27 | End: 2019-06-27

## 2019-06-27 RX ORDER — ALUMINA, MAGNESIA, AND SIMETHICONE 2400; 2400; 240 MG/30ML; MG/30ML; MG/30ML
10 SUSPENSION ORAL
Status: DISCONTINUED | OUTPATIENT
Start: 2019-06-27 | End: 2019-07-06 | Stop reason: HOSPADM

## 2019-06-28 LAB
ALBUMIN SERPL-MCNC: 3.6 G/DL (ref 3.5–5.2)
ALBUMIN/GLOB SERPL: 1.2 G/DL
ALP SERPL-CCNC: 124 U/L (ref 39–117)
ALT SERPL W P-5'-P-CCNC: 22 U/L (ref 1–33)
ANION GAP SERPL CALCULATED.3IONS-SCNC: 17 MMOL/L (ref 5–15)
AST SERPL-CCNC: 33 U/L (ref 1–32)
BILIRUB SERPL-MCNC: 0.4 MG/DL (ref 0.2–1.2)
BUN BLD-MCNC: 17 MG/DL (ref 8–23)
BUN/CREAT SERPL: 18.7 (ref 7–25)
CALCIUM SPEC-SCNC: 8.7 MG/DL (ref 8.6–10.5)
CHLORIDE SERPL-SCNC: 100 MMOL/L (ref 98–107)
CO2 SERPL-SCNC: 23 MMOL/L (ref 22–29)
CREAT BLD-MCNC: 0.91 MG/DL (ref 0.57–1)
DEPRECATED RDW RBC AUTO: 49.4 FL (ref 37–54)
ERYTHROCYTE [DISTWIDTH] IN BLOOD BY AUTOMATED COUNT: 13.9 % (ref 12.3–15.4)
GFR SERPL CREATININE-BSD FRML MDRD: 62 ML/MIN/1.73
GLOBULIN UR ELPH-MCNC: 2.9 GM/DL
GLUCOSE BLD-MCNC: 101 MG/DL (ref 65–99)
HCT VFR BLD AUTO: 33.7 % (ref 34–46.6)
HGB BLD-MCNC: 11.2 G/DL (ref 12–15.9)
MAGNESIUM SERPL-MCNC: 1.9 MG/DL (ref 1.6–2.4)
MCH RBC QN AUTO: 32.5 PG (ref 26.6–33)
MCHC RBC AUTO-ENTMCNC: 33.2 G/DL (ref 31.5–35.7)
MCV RBC AUTO: 97.7 FL (ref 79–97)
PLATELET # BLD AUTO: 181 10*3/MM3 (ref 140–450)
PMV BLD AUTO: 11.4 FL (ref 6–12)
POTASSIUM BLD-SCNC: 4 MMOL/L (ref 3.5–5.2)
PROT SERPL-MCNC: 6.5 G/DL (ref 6–8.5)
RBC # BLD AUTO: 3.45 10*6/MM3 (ref 3.77–5.28)
SODIUM BLD-SCNC: 140 MMOL/L (ref 136–145)
WBC NRBC COR # BLD: 11.2 10*3/MM3 (ref 3.4–10.8)

## 2019-06-28 PROCEDURE — 97535 SELF CARE MNGMENT TRAINING: CPT

## 2019-06-28 PROCEDURE — 25010000002 MAGNESIUM SULFATE IN D5W 1G/100ML (PREMIX) 1-5 GM/100ML-% SOLUTION: Performed by: INTERNAL MEDICINE

## 2019-06-28 PROCEDURE — 83735 ASSAY OF MAGNESIUM: CPT | Performed by: INTERNAL MEDICINE

## 2019-06-28 PROCEDURE — 25010000002 HEPARIN (PORCINE) PER 1000 UNITS: Performed by: INTERNAL MEDICINE

## 2019-06-28 PROCEDURE — 97110 THERAPEUTIC EXERCISES: CPT

## 2019-06-28 PROCEDURE — 97530 THERAPEUTIC ACTIVITIES: CPT

## 2019-06-28 PROCEDURE — 80053 COMPREHEN METABOLIC PANEL: CPT | Performed by: INTERNAL MEDICINE

## 2019-06-28 PROCEDURE — 85027 COMPLETE CBC AUTOMATED: CPT | Performed by: INTERNAL MEDICINE

## 2019-06-28 RX ORDER — SODIUM CHLORIDE 9 MG/ML
INJECTION, SOLUTION INTRAVENOUS
Status: DISPENSED
Start: 2019-06-28 | End: 2019-06-29

## 2019-06-28 RX ORDER — MAGNESIUM SULFATE 1 G/100ML
1 INJECTION INTRAVENOUS ONCE
Status: DISCONTINUED | OUTPATIENT
Start: 2019-06-28 | End: 2019-06-30

## 2019-06-29 PROCEDURE — 25010000002 HEPARIN (PORCINE) PER 1000 UNITS: Performed by: INTERNAL MEDICINE

## 2019-06-30 ENCOUNTER — APPOINTMENT (OUTPATIENT)
Dept: GENERAL RADIOLOGY | Facility: HOSPITAL | Age: 69
End: 2019-06-30

## 2019-06-30 LAB
ALBUMIN SERPL-MCNC: 3.3 G/DL (ref 3.5–5.2)
ALBUMIN/GLOB SERPL: 1.1 G/DL
ALP SERPL-CCNC: 145 U/L (ref 39–117)
ALT SERPL W P-5'-P-CCNC: 38 U/L (ref 1–33)
ANION GAP SERPL CALCULATED.3IONS-SCNC: 16 MMOL/L (ref 5–15)
AST SERPL-CCNC: 45 U/L (ref 1–32)
BILIRUB SERPL-MCNC: 0.3 MG/DL (ref 0.2–1.2)
BUN BLD-MCNC: 21 MG/DL (ref 8–23)
BUN/CREAT SERPL: 20.6 (ref 7–25)
CALCIUM SPEC-SCNC: 8.8 MG/DL (ref 8.6–10.5)
CHLORIDE SERPL-SCNC: 99 MMOL/L (ref 98–107)
CO2 SERPL-SCNC: 23 MMOL/L (ref 22–29)
CREAT BLD-MCNC: 1.02 MG/DL (ref 0.57–1)
DEPRECATED RDW RBC AUTO: 47.4 FL (ref 37–54)
ERYTHROCYTE [DISTWIDTH] IN BLOOD BY AUTOMATED COUNT: 13.6 % (ref 12.3–15.4)
GFR SERPL CREATININE-BSD FRML MDRD: 54 ML/MIN/1.73
GLOBULIN UR ELPH-MCNC: 3 GM/DL
GLUCOSE BLD-MCNC: 100 MG/DL (ref 65–99)
HCT VFR BLD AUTO: 33.8 % (ref 34–46.6)
HGB BLD-MCNC: 11.1 G/DL (ref 12–15.9)
MAGNESIUM SERPL-MCNC: 2.1 MG/DL (ref 1.6–2.4)
MCH RBC QN AUTO: 31.3 PG (ref 26.6–33)
MCHC RBC AUTO-ENTMCNC: 32.8 G/DL (ref 31.5–35.7)
MCV RBC AUTO: 95.2 FL (ref 79–97)
PLATELET # BLD AUTO: 186 10*3/MM3 (ref 140–450)
PMV BLD AUTO: 12.1 FL (ref 6–12)
POTASSIUM BLD-SCNC: 3.6 MMOL/L (ref 3.5–5.2)
PROT SERPL-MCNC: 6.3 G/DL (ref 6–8.5)
RBC # BLD AUTO: 3.55 10*6/MM3 (ref 3.77–5.28)
SODIUM BLD-SCNC: 138 MMOL/L (ref 136–145)
WBC NRBC COR # BLD: 11.39 10*3/MM3 (ref 3.4–10.8)

## 2019-06-30 PROCEDURE — 71046 X-RAY EXAM CHEST 2 VIEWS: CPT

## 2019-06-30 PROCEDURE — 25010000002 FUROSEMIDE PER 20 MG

## 2019-06-30 PROCEDURE — 25010000002 HEPARIN (PORCINE) PER 1000 UNITS: Performed by: INTERNAL MEDICINE

## 2019-06-30 PROCEDURE — 97116 GAIT TRAINING THERAPY: CPT

## 2019-06-30 PROCEDURE — 97530 THERAPEUTIC ACTIVITIES: CPT

## 2019-06-30 PROCEDURE — 80053 COMPREHEN METABOLIC PANEL: CPT | Performed by: INTERNAL MEDICINE

## 2019-06-30 PROCEDURE — 83735 ASSAY OF MAGNESIUM: CPT | Performed by: INTERNAL MEDICINE

## 2019-06-30 PROCEDURE — 85027 COMPLETE CBC AUTOMATED: CPT | Performed by: INTERNAL MEDICINE

## 2019-06-30 RX ORDER — FUROSEMIDE 10 MG/ML
40 INJECTION INTRAMUSCULAR; INTRAVENOUS ONCE
Status: DISCONTINUED | OUTPATIENT
Start: 2019-06-30 | End: 2019-07-02

## 2019-06-30 RX ORDER — FUROSEMIDE 10 MG/ML
INJECTION INTRAMUSCULAR; INTRAVENOUS
Status: DISPENSED
Start: 2019-06-30 | End: 2019-07-01

## 2019-07-01 PROCEDURE — 97110 THERAPEUTIC EXERCISES: CPT

## 2019-07-01 PROCEDURE — 25010000002 FUROSEMIDE PER 20 MG: Performed by: INTERNAL MEDICINE

## 2019-07-01 PROCEDURE — 97535 SELF CARE MNGMENT TRAINING: CPT

## 2019-07-01 PROCEDURE — 25010000002 HEPARIN (PORCINE) PER 1000 UNITS: Performed by: INTERNAL MEDICINE

## 2019-07-01 PROCEDURE — 25010000003 POTASSIUM CHLORIDE 10 MEQ/100ML SOLUTION: Performed by: INTERNAL MEDICINE

## 2019-07-01 RX ORDER — POTASSIUM CHLORIDE 7.45 MG/ML
10 INJECTION INTRAVENOUS
Status: DISCONTINUED | OUTPATIENT
Start: 2019-07-01 | End: 2019-07-01

## 2019-07-02 LAB
ALBUMIN SERPL-MCNC: 3.5 G/DL (ref 3.5–5.2)
ALBUMIN/GLOB SERPL: 1.2 G/DL
ALP SERPL-CCNC: 158 U/L (ref 39–117)
ALT SERPL W P-5'-P-CCNC: 30 U/L (ref 1–33)
ANION GAP SERPL CALCULATED.3IONS-SCNC: 15 MMOL/L (ref 5–15)
AST SERPL-CCNC: 29 U/L (ref 1–32)
BILIRUB SERPL-MCNC: 0.5 MG/DL (ref 0.2–1.2)
BUN BLD-MCNC: 25 MG/DL (ref 8–23)
BUN/CREAT SERPL: 22.5 (ref 7–25)
CALCIUM SPEC-SCNC: 9 MG/DL (ref 8.6–10.5)
CHLORIDE SERPL-SCNC: 98 MMOL/L (ref 98–107)
CO2 SERPL-SCNC: 26 MMOL/L (ref 22–29)
CREAT BLD-MCNC: 1.11 MG/DL (ref 0.57–1)
DEPRECATED RDW RBC AUTO: 45.2 FL (ref 37–54)
ERYTHROCYTE [DISTWIDTH] IN BLOOD BY AUTOMATED COUNT: 13.2 % (ref 12.3–15.4)
GFR SERPL CREATININE-BSD FRML MDRD: 49 ML/MIN/1.73
GLOBULIN UR ELPH-MCNC: 2.9 GM/DL
GLUCOSE BLD-MCNC: 107 MG/DL (ref 65–99)
HCT VFR BLD AUTO: 33.1 % (ref 34–46.6)
HGB BLD-MCNC: 11.1 G/DL (ref 12–15.9)
MAGNESIUM SERPL-MCNC: 2 MG/DL (ref 1.6–2.4)
MCH RBC QN AUTO: 31.4 PG (ref 26.6–33)
MCHC RBC AUTO-ENTMCNC: 33.5 G/DL (ref 31.5–35.7)
MCV RBC AUTO: 93.5 FL (ref 79–97)
PLATELET # BLD AUTO: 240 10*3/MM3 (ref 140–450)
PMV BLD AUTO: 11.2 FL (ref 6–12)
POTASSIUM BLD-SCNC: 3.7 MMOL/L (ref 3.5–5.2)
PROT SERPL-MCNC: 6.4 G/DL (ref 6–8.5)
RBC # BLD AUTO: 3.54 10*6/MM3 (ref 3.77–5.28)
SODIUM BLD-SCNC: 139 MMOL/L (ref 136–145)
WBC NRBC COR # BLD: 12.37 10*3/MM3 (ref 3.4–10.8)

## 2019-07-02 PROCEDURE — 97110 THERAPEUTIC EXERCISES: CPT

## 2019-07-02 PROCEDURE — 25010000002 HEPARIN (PORCINE) PER 1000 UNITS: Performed by: INTERNAL MEDICINE

## 2019-07-02 PROCEDURE — 97535 SELF CARE MNGMENT TRAINING: CPT

## 2019-07-02 PROCEDURE — 83735 ASSAY OF MAGNESIUM: CPT | Performed by: INTERNAL MEDICINE

## 2019-07-02 PROCEDURE — 85027 COMPLETE CBC AUTOMATED: CPT | Performed by: INTERNAL MEDICINE

## 2019-07-02 PROCEDURE — 80053 COMPREHEN METABOLIC PANEL: CPT | Performed by: INTERNAL MEDICINE

## 2019-07-02 RX ORDER — BUMETANIDE 1 MG/1
1 TABLET ORAL
Status: DISCONTINUED | OUTPATIENT
Start: 2019-07-02 | End: 2019-07-06 | Stop reason: HOSPADM

## 2019-07-03 PROCEDURE — 25010000002 HEPARIN (PORCINE) PER 1000 UNITS: Performed by: INTERNAL MEDICINE

## 2019-07-03 PROCEDURE — 97530 THERAPEUTIC ACTIVITIES: CPT

## 2019-07-03 PROCEDURE — 97116 GAIT TRAINING THERAPY: CPT

## 2019-07-03 PROCEDURE — 97535 SELF CARE MNGMENT TRAINING: CPT

## 2019-07-04 LAB
ALBUMIN SERPL-MCNC: 3.8 G/DL (ref 3.5–5.2)
ALBUMIN/GLOB SERPL: 1.3 G/DL
ALP SERPL-CCNC: 167 U/L (ref 39–117)
ALT SERPL W P-5'-P-CCNC: 27 U/L (ref 1–33)
ANION GAP SERPL CALCULATED.3IONS-SCNC: 19 MMOL/L (ref 5–15)
AST SERPL-CCNC: 26 U/L (ref 1–32)
BILIRUB SERPL-MCNC: 0.5 MG/DL (ref 0.2–1.2)
BUN BLD-MCNC: 25 MG/DL (ref 8–23)
BUN/CREAT SERPL: 21 (ref 7–25)
CALCIUM SPEC-SCNC: 9.3 MG/DL (ref 8.6–10.5)
CHLORIDE SERPL-SCNC: 96 MMOL/L (ref 98–107)
CO2 SERPL-SCNC: 25 MMOL/L (ref 22–29)
CREAT BLD-MCNC: 1.19 MG/DL (ref 0.57–1)
DEPRECATED RDW RBC AUTO: 46.2 FL (ref 37–54)
ERYTHROCYTE [DISTWIDTH] IN BLOOD BY AUTOMATED COUNT: 13.4 % (ref 12.3–15.4)
GFR SERPL CREATININE-BSD FRML MDRD: 45 ML/MIN/1.73
GLOBULIN UR ELPH-MCNC: 2.9 GM/DL
GLUCOSE BLD-MCNC: 99 MG/DL (ref 65–99)
HCT VFR BLD AUTO: 34.6 % (ref 34–46.6)
HGB BLD-MCNC: 11.4 G/DL (ref 12–15.9)
MAGNESIUM SERPL-MCNC: 2 MG/DL (ref 1.6–2.4)
MCH RBC QN AUTO: 31.2 PG (ref 26.6–33)
MCHC RBC AUTO-ENTMCNC: 32.9 G/DL (ref 31.5–35.7)
MCV RBC AUTO: 94.8 FL (ref 79–97)
PLATELET # BLD AUTO: 217 10*3/MM3 (ref 140–450)
PMV BLD AUTO: 11.5 FL (ref 6–12)
POTASSIUM BLD-SCNC: 3 MMOL/L (ref 3.5–5.2)
PROT SERPL-MCNC: 6.7 G/DL (ref 6–8.5)
RBC # BLD AUTO: 3.65 10*6/MM3 (ref 3.77–5.28)
SODIUM BLD-SCNC: 140 MMOL/L (ref 136–145)
WBC NRBC COR # BLD: 12.87 10*3/MM3 (ref 3.4–10.8)

## 2019-07-04 PROCEDURE — 25010000002 HEPARIN (PORCINE) PER 1000 UNITS: Performed by: INTERNAL MEDICINE

## 2019-07-04 PROCEDURE — 83735 ASSAY OF MAGNESIUM: CPT | Performed by: INTERNAL MEDICINE

## 2019-07-04 PROCEDURE — 80053 COMPREHEN METABOLIC PANEL: CPT | Performed by: INTERNAL MEDICINE

## 2019-07-04 PROCEDURE — 85027 COMPLETE CBC AUTOMATED: CPT | Performed by: INTERNAL MEDICINE

## 2019-07-04 PROCEDURE — 97116 GAIT TRAINING THERAPY: CPT

## 2019-07-04 PROCEDURE — 25010000002 MAGNESIUM SULFATE IN D5W 1G/100ML (PREMIX) 1-5 GM/100ML-% SOLUTION: Performed by: INTERNAL MEDICINE

## 2019-07-04 PROCEDURE — 97530 THERAPEUTIC ACTIVITIES: CPT

## 2019-07-04 RX ORDER — POTASSIUM CHLORIDE 750 MG/1
40 CAPSULE, EXTENDED RELEASE ORAL
Status: DISPENSED | OUTPATIENT
Start: 2019-07-04 | End: 2019-07-04

## 2019-07-04 RX ORDER — MAGNESIUM SULFATE 1 G/100ML
1 INJECTION INTRAVENOUS ONCE
Status: DISCONTINUED | OUTPATIENT
Start: 2019-07-04 | End: 2019-07-06 | Stop reason: HOSPADM

## 2019-07-05 LAB — POTASSIUM BLD-SCNC: 4 MMOL/L (ref 3.5–5.2)

## 2019-07-05 PROCEDURE — 97535 SELF CARE MNGMENT TRAINING: CPT

## 2019-07-05 PROCEDURE — 25010000002 HEPARIN (PORCINE) PER 1000 UNITS: Performed by: INTERNAL MEDICINE

## 2019-07-05 PROCEDURE — 97116 GAIT TRAINING THERAPY: CPT

## 2019-07-05 PROCEDURE — 97110 THERAPEUTIC EXERCISES: CPT

## 2019-07-05 PROCEDURE — 97530 THERAPEUTIC ACTIVITIES: CPT

## 2019-07-05 PROCEDURE — 84132 ASSAY OF SERUM POTASSIUM: CPT | Performed by: INTERNAL MEDICINE

## 2019-07-05 RX ORDER — HYDROCODONE BITARTRATE AND ACETAMINOPHEN 7.5; 325 MG/1; MG/1
1 TABLET ORAL EVERY 4 HOURS PRN
Status: DISCONTINUED | OUTPATIENT
Start: 2019-07-05 | End: 2019-07-06 | Stop reason: HOSPADM

## 2019-07-06 VITALS — HEART RATE: 86 BPM

## 2019-07-06 LAB
ALBUMIN SERPL-MCNC: 3.6 G/DL (ref 3.5–5.2)
ALBUMIN/GLOB SERPL: 1.4 G/DL
ALP SERPL-CCNC: 136 U/L (ref 39–117)
ALT SERPL W P-5'-P-CCNC: 18 U/L (ref 1–33)
ANION GAP SERPL CALCULATED.3IONS-SCNC: 18 MMOL/L (ref 5–15)
AST SERPL-CCNC: 23 U/L (ref 1–32)
BILIRUB SERPL-MCNC: 0.3 MG/DL (ref 0.2–1.2)
BUN BLD-MCNC: 22 MG/DL (ref 8–23)
BUN/CREAT SERPL: 20.2 (ref 7–25)
CALCIUM SPEC-SCNC: 8.7 MG/DL (ref 8.6–10.5)
CHLORIDE SERPL-SCNC: 95 MMOL/L (ref 98–107)
CO2 SERPL-SCNC: 22 MMOL/L (ref 22–29)
CREAT BLD-MCNC: 1.09 MG/DL (ref 0.57–1)
DEPRECATED RDW RBC AUTO: 45.9 FL (ref 37–54)
ERYTHROCYTE [DISTWIDTH] IN BLOOD BY AUTOMATED COUNT: 13.3 % (ref 12.3–15.4)
GFR SERPL CREATININE-BSD FRML MDRD: 50 ML/MIN/1.73
GLOBULIN UR ELPH-MCNC: 2.6 GM/DL
GLUCOSE BLD-MCNC: 134 MG/DL (ref 65–99)
HCT VFR BLD AUTO: 32.9 % (ref 34–46.6)
HGB BLD-MCNC: 10.7 G/DL (ref 12–15.9)
MAGNESIUM SERPL-MCNC: 1.8 MG/DL (ref 1.6–2.4)
MCH RBC QN AUTO: 31.2 PG (ref 26.6–33)
MCHC RBC AUTO-ENTMCNC: 32.5 G/DL (ref 31.5–35.7)
MCV RBC AUTO: 95.9 FL (ref 79–97)
PLATELET # BLD AUTO: 186 10*3/MM3 (ref 140–450)
PMV BLD AUTO: 11.9 FL (ref 6–12)
POTASSIUM BLD-SCNC: 3.9 MMOL/L (ref 3.5–5.2)
PROT SERPL-MCNC: 6.2 G/DL (ref 6–8.5)
RBC # BLD AUTO: 3.43 10*6/MM3 (ref 3.77–5.28)
SODIUM BLD-SCNC: 135 MMOL/L (ref 136–145)
WBC NRBC COR # BLD: 13.78 10*3/MM3 (ref 3.4–10.8)

## 2019-07-06 PROCEDURE — 80053 COMPREHEN METABOLIC PANEL: CPT | Performed by: INTERNAL MEDICINE

## 2019-07-06 PROCEDURE — 83735 ASSAY OF MAGNESIUM: CPT | Performed by: INTERNAL MEDICINE

## 2019-07-06 PROCEDURE — 85027 COMPLETE CBC AUTOMATED: CPT | Performed by: INTERNAL MEDICINE

## 2019-07-06 PROCEDURE — 25010000002 HEPARIN (PORCINE) PER 1000 UNITS: Performed by: INTERNAL MEDICINE
